# Patient Record
Sex: MALE | Race: WHITE | NOT HISPANIC OR LATINO | Employment: OTHER | URBAN - METROPOLITAN AREA
[De-identification: names, ages, dates, MRNs, and addresses within clinical notes are randomized per-mention and may not be internally consistent; named-entity substitution may affect disease eponyms.]

---

## 2019-08-01 ENCOUNTER — TELEPHONE (OUTPATIENT)
Dept: SCHEDULING | Facility: IMAGING CENTER | Age: 69
End: 2019-08-01

## 2019-08-07 ENCOUNTER — OFFICE VISIT (OUTPATIENT)
Dept: MEDICAL GROUP | Age: 69
End: 2019-08-07
Payer: MEDICARE

## 2019-08-07 VITALS
WEIGHT: 138.4 LBS | SYSTOLIC BLOOD PRESSURE: 118 MMHG | BODY MASS INDEX: 23.06 KG/M2 | DIASTOLIC BLOOD PRESSURE: 76 MMHG | HEART RATE: 82 BPM | OXYGEN SATURATION: 95 % | HEIGHT: 65 IN | TEMPERATURE: 98.5 F

## 2019-08-07 DIAGNOSIS — Z11.59 NEED FOR HEPATITIS C SCREENING TEST: ICD-10-CM

## 2019-08-07 DIAGNOSIS — J43.8 OTHER EMPHYSEMA (HCC): ICD-10-CM

## 2019-08-07 DIAGNOSIS — I10 ESSENTIAL HYPERTENSION: ICD-10-CM

## 2019-08-07 DIAGNOSIS — F17.210 CIGARETTE NICOTINE DEPENDENCE, UNCOMPLICATED: ICD-10-CM

## 2019-08-07 DIAGNOSIS — Z12.2 ENCOUNTER FOR SCREENING FOR MALIGNANT NEOPLASM OF LUNG: ICD-10-CM

## 2019-08-07 DIAGNOSIS — Z12.11 SCREENING FOR COLON CANCER: ICD-10-CM

## 2019-08-07 DIAGNOSIS — E78.5 DYSLIPIDEMIA: ICD-10-CM

## 2019-08-07 PROCEDURE — 99204 OFFICE O/P NEW MOD 45 MIN: CPT | Performed by: INTERNAL MEDICINE

## 2019-08-07 RX ORDER — HYDROCHLOROTHIAZIDE 25 MG/1
25 TABLET ORAL DAILY
Qty: 90 TAB | Refills: 3 | Status: SHIPPED | OUTPATIENT
Start: 2019-08-07 | End: 2020-06-29 | Stop reason: SDUPTHER

## 2019-08-07 RX ORDER — HYDROCHLOROTHIAZIDE 25 MG/1
25 TABLET ORAL DAILY
COMMUNITY
End: 2019-08-07 | Stop reason: SDUPTHER

## 2019-08-07 RX ORDER — BUPROPION HYDROCHLORIDE 75 MG/1
75 TABLET ORAL 2 TIMES DAILY
Qty: 180 TAB | Refills: 1 | Status: SHIPPED | OUTPATIENT
Start: 2019-08-07 | End: 2020-06-29

## 2019-08-07 RX ORDER — ALBUTEROL SULFATE 90 UG/1
2 AEROSOL, METERED RESPIRATORY (INHALATION) EVERY 6 HOURS PRN
Qty: 8.5 G | Refills: 3 | Status: SHIPPED | OUTPATIENT
Start: 2019-08-07 | End: 2020-11-02 | Stop reason: SDUPTHER

## 2019-08-07 RX ORDER — LISINOPRIL 20 MG/1
20 TABLET ORAL DAILY
COMMUNITY
End: 2019-08-07 | Stop reason: SDUPTHER

## 2019-08-07 RX ORDER — LISINOPRIL 20 MG/1
20 TABLET ORAL DAILY
Qty: 90 TAB | Refills: 3 | Status: SHIPPED | OUTPATIENT
Start: 2019-08-07 | End: 2020-06-29 | Stop reason: SDUPTHER

## 2019-08-07 SDOH — HEALTH STABILITY: MENTAL HEALTH: HOW OFTEN DO YOU HAVE 6 OR MORE DRINKS ON ONE OCCASION?: NEVER

## 2019-08-07 SDOH — HEALTH STABILITY: MENTAL HEALTH: HOW OFTEN DO YOU HAVE A DRINK CONTAINING ALCOHOL?: 2-3 TIMES A WEEK

## 2019-08-07 SDOH — HEALTH STABILITY: MENTAL HEALTH: HOW MANY STANDARD DRINKS CONTAINING ALCOHOL DO YOU HAVE ON A TYPICAL DAY?: 1 OR 2

## 2019-08-07 ASSESSMENT — PAIN SCALES - GENERAL: PAINLEVEL: NO PAIN

## 2019-08-07 ASSESSMENT — PATIENT HEALTH QUESTIONNAIRE - PHQ9: CLINICAL INTERPRETATION OF PHQ2 SCORE: 0

## 2019-08-07 NOTE — LETTER
[facility]  Jasmin Mead M.D.  [address]  [place] [address]  Fax: [phone]   Authorization for Release/Disclosure of   Protected Health Information   Name: ELO STARKEY : 1950 SSN: [id number]   Address: [address] [place] [address] Phone:    [phone] (home)    I authorize the entity listed below to release/disclose the PHI below to:   [facility]/Jasmin Mead M.D. and Jasmin Mead M.D.   Provider or Entity Name:  Rich Marshall MD  [place], [place]   Address   [facility], [facility], [address] [address] Phone:   [phone]    Fax:     Reason for request: continuity of care   Information to be released:    [  ] LAST COLONOSCOPY,  including any PATH REPORT and follow-up  [  ] LAST FIT/COLOGUARD RESULT [  ] LAST DEXA  [  ] LAST MAMMOGRAM  [  ] LAST PAP  [  ] LAST LABS [  ] RETINA EXAM REPORT  [XXX] IMMUNIZATION RECORDS  [XXX] Release all info      [  ] Check here and initial the line next to each item to release ALL health information INCLUDING  _____ Care and treatment for drug and / or alcohol abuse  _____ HIV testing, infection status, or AIDS  _____ Genetic Testing    DATES OF SERVICE OR TIME PERIOD TO BE DISCLOSED: _____________  I understand and acknowledge that:  * This Authorization may be revoked at any time by you in writing, except if your health information has already been used or disclosed.  * Your health information that will be used or disclosed as a result of you signing this authorization could be re-disclosed by the recipient. If this occurs, your re-disclosed health information may no longer be protected by State or Federal laws.  * You may refuse to sign this Authorization. Your refusal will not affect your ability to obtain treatment.  * This Authorization becomes effective upon signing and will  on (date) __________.      If no date is indicated, this Authorization will  one (1) year from the signature date.    Name:  Bright Shirley    Signature:   Date:     8/7/2019       PLEASE FAX REQUESTED RECORDS BACK TO: [phone]

## 2019-08-07 NOTE — PROGRESS NOTES
Faxed pt signed Cologaurd Order Requisition Form & signed Consent to release all medical info.([place], [place]). Received confirmation of completed fax transmittal. Scanned to pt's media.

## 2019-08-07 NOTE — PROGRESS NOTES
Bright Shirley is a 69 y.o. male here to establish care and the evaluation and management of:      HPI:      Essential hypertension  Chronic. Patient is on a combo pill of lisinopril 20 mg and hydrochlorothiazide 25 mg once daily. No reports of medication side effects or associated symptoms regarding hypertension. Patient's blood pressure in clinic today was 118/76. He is requesting to get a cheaper medication and states he is willing to take the pills individually.    Dyslipidemia  Patient's previous lab test showed slightly elevated cholesterol. He is not currently on any cholesterol medications. No reports of any active associated symptoms.    Need for hepatitis C screening test  Patient agrees to get hepatitis C screening, per CDC guidelines. Denies any active associated symptoms regarding this.    Screening for colon cancer  Patient has never had a colonoscopy, but he has done a stool fit test in the past which was negative. He denies personal or family history of colon cancer. Denies history of polyps.     Encounter for screening for malignant neoplasm of lung  Cigarette nicotine dependence, uncomplicated   Other emphysema (HCC)  Patient is a smoker of 1 pack per day for 45 years. He would like to be referred for lung cancer screening. He denies family history of lung cancer. Patient states he used Nicorette in the past to try to quit which seemed to help. States he tried wellbutrin many years ago and does not recall experiencing any significant medication side effects. States he has a vape pen at home which he sometimes uses if he is not smoking. States he was diagnosed with emphysema some time within the last 10 years. He is on Qvar which he does not use often. Patient notes that his father  from liver cirrhosis due to alcohol at age 48. States his father was a heavy drinker as well as a smoker.    Current medicines (including changes today)  Current Outpatient Medications   Medication Sig Dispense Refill    • NON SPECIFIED      • hydroCHLOROthiazide (HYDRODIURIL) 25 MG Tab Take 1 Tab by mouth every day. 90 Tab 3   • lisinopril (PRINIVIL) 20 MG Tab Take 1 Tab by mouth every day. 90 Tab 3   • albuterol 108 (90 Base) MCG/ACT Aero Soln inhalation aerosol Inhale 2 Puffs by mouth every 6 hours as needed. 8.5 g 3   • buPROPion (WELLBUTRIN) 75 MG Tab Take 1 Tab by mouth 2 times a day. 180 Tab 1     No current facility-administered medications for this visit.      He  has no past medical history on file.  He  has a past surgical history that includes hernia repair (Right) and orif, femur (Right).  Social History     Tobacco Use   • Smoking status: Current Every Day Smoker     Packs/day: 1.00     Years: 40.00     Pack years: 40.00     Types: Cigarettes     Start date: 1975   • Smokeless tobacco: Never Used   • Tobacco comment: vape pen & cigarettes   Substance Use Topics   • Alcohol use: Yes     Alcohol/week: 1.8 oz     Types: 1 Glasses of wine, 2 Cans of beer per week     Frequency: 2-3 times a week     Drinks per session: 1 or 2     Binge frequency: Never     Comment: weekend/social   • Drug use: Yes     Types: Marijuana     Comment: ediables     Social History     Social History Narrative   • Not on file     Family History   Problem Relation Age of Onset   • Cancer Mother         breast cancer   • Stroke Mother    • Other Father         cirrhosis form alcohol   • Stroke Sister    • Heart Disease Brother         cardiac aneurysm   • Hypertension Maternal Grandmother    • Hyperlipidemia Maternal Grandmother      No family status information on file.     Health Maintenance Topics with due status: Overdue       Topic Date Due    HEPATITIS C SCREENING 1950    IMM DTaP/Tdap/Td Vaccine 06/18/1969    COLONOSCOPY 06/18/2000    IMM ZOSTER VACCINES 06/18/2000    IMM PNEUMOCOCCAL VACCINE: 65+ Years 06/18/2015         ROS    Gen.: Denied weight change, appetite change, fatigue.  ENT: Denied sinus tenderness, nasal congestion,  "runny nose, or sore throat  CVS: Denied chest pain, palpitations, legs swelling.  Respiratory: Denied active cough, shortness of breath, wheezing.  GI: Denied abdominal pain, constipation or diarrhea.  Endocrine: Denied temperature intolerance, increased frequency of urination, polyphagia or polydipsia.  Musculoskeletal: Denied back pain or joint pain.    All other systems reviewed and are negative     Objective:     /76 (BP Location: Right arm, Patient Position: Sitting, BP Cuff Size: Adult)   Pulse 82   Temp 36.9 °C (98.5 °F) (Temporal)   Ht 1.64 m (5' 4.57\")   Wt 62.8 kg (138 lb 6.4 oz)   SpO2 95%  Body mass index is 23.34 kg/m².  Physical Exam:    Constitutional: Well nourished and Well developed, Alert, no distress.  Skin: Warm, dry, good turgor, no rashes in visible areas.  Eye: Equal, round and reactive, conjunctiva clear, lids normal.  ENMT: Lips without lesions, good dentition, oropharynx clear.  Neck: Trachea midline, no masses, no thyromegaly. No cervical or supraclavicular lymphadenopathy.  Respiratory: Unlabored respiratory effort, lungs clear to auscultation, no wheezes, no ronchi.  Cardiovascular: Normal S1, S2, no murmur, no edema.   Abdomen: Soft, non distended, non-tender, no masses, no hepatosplenomegaly. Bowel sound normal.  Extremities: No edema, no clubbing, no cyanosis.  Psych: Alert and oriented x3, normal affect and mood.      Assessment and Plan:   The following treatment plan was discussed       1. Essential hypertension  - Well-controlled on medication. Patient is doing well on lisinopril-hydrochlorothiazide pills. However, he is requesting to get these medications as individual pills to try to reduce cost.  - Recheck lab 1-2 weeks before next follow up visit.  - Reviewed the risks and benefits as well as potential side effects of medications with patient.  - Discussed to eat low-sodium diet and encouraged to do regular physical exercise.  - Recommend to monitor blood pressure " and heart rate at home.   - CBC WITH DIFFERENTIAL; Future  - Comp Metabolic Panel; Future  - hydroCHLOROthiazide (HYDRODIURIL) 25 MG Tab; Take 1 Tab by mouth every day.  Dispense: 90 Tab; Refill: 3  - lisinopril (PRINIVIL) 20 MG Tab; Take 1 Tab by mouth every day.  Dispense: 90 Tab; Refill: 3    2. Dyslipidemia  - Discussed last lab results from [place] which showed cholesterol elevated at 213. He is not currently on any cholesterol medications. He will manage this with his own efforts at this time.  - Recheck lab 1-2 weeks before next follow up visit.  - Advised to eat low fat, low carbohydrate and high fiber diet as well as do cardio physical exercise regularly.    - Comp Metabolic Panel; Future  - Lipid Profile; Future    3. Other emphysema (HCC)  - Patient states he was diagnosed with emphysema and COPD in the past. He was previously prescribed Qvar. Prescribing albuterol inhaler which he will use as needed. Advised to use his Qvar if he experiences severe emphysema symptoms. Reviewed the risks and benefits as well as potential side effects of medications with patient.   - albuterol 108 (90 Base) MCG/ACT Aero Soln inhalation aerosol; Inhale 2 Puffs by mouth every 6 hours as needed.  Dispense: 8.5 g; Refill: 3    4. Need for hepatitis C screening test  - Ordering hepatitis C screening test according to CDC guidelines.  Patient agrees with the plan.  - HEP C VIRUS ANTIBODY; Future    5. Screening for colon cancer  - Discussed options for colon cancer screening including colonoscopy and Cologuard test.  Patient agrees to get cologuard test. He understands that he is to get a colonoscopy if cologuard test is positive. He understands that he is to get cologuard test every 3 years if it is negative.  - COLOGUARD (FIT DNA)    6. Encounter for screening for malignant neoplasm of lung  - Patient would like to get referral lung cancer screening program   - REFERRAL TO LUNG CANCER SCREENING PROGRAM    7. Cigarette nicotine  dependence, uncomplicated   - Patient is interested in tobacco cessation. Prescribing wellbutrin to take 75 mg twice a day. Provided instructions on weaning off of cigarettes while on wellbutrin. Reviewed the risks and benefits as well as potential side effects of wellbutrin with patient.   - REFERRAL TO LUNG CANCER SCREENING PROGRAM  - buPROPion (WELLBUTRIN) 75 MG Tab; Take 1 Tab by mouth 2 times a day.  Dispense: 180 Tab; Refill: 1     Records requested.  Followup: Return in about 3 months (around 11/7/2019), or if symptoms worsen or fail to improve, for COPD, Hypertension, Hyperlipidemia, Lab review. sooner should new symptoms or problems arise.      Please note that this dictation was created using voice recognition software. I have made every reasonable attempt to correct obvious errors, but I expect that there may have unintended errors in text, spelling, punctuation, or grammar that I did not discover.        I, Nitin Walters (Scribe), am scribing for, and in the presence of, Jasmin Mead M.D.    Electronically signed by: Nitin Walters (Yoselinibe), 8/7/2019    IJasmin M.D. personally performed the services described in this documentation, as scribed by Nitin Walters in my presence, and it is both accurate and complete.

## 2019-08-12 ENCOUNTER — TELEPHONE (OUTPATIENT)
Dept: HEMATOLOGY ONCOLOGY | Facility: MEDICAL CENTER | Age: 69
End: 2019-08-12

## 2019-08-12 NOTE — TELEPHONE ENCOUNTER
Received referral to lung cancer screening program.  Chart review to assess for lung cancer screening program eligibility.   1. Age 55-77 yrs of age? Yes 69 y.o.  2. 30 pack year hx of smoking, or greater? Yes 1 [id number]= [id number] hx  3. Current smoker or if quit, has pt quit within last 15 yrs?Yes  Current smoker  4. Any signs or symptoms of lung cancer? None noted  5. Previous history of lung cancer? None noted  6. Chest CT within past 12 mos.? None noted  Patient does meet eligibility criteria. [facility] scheduling notified to schedule the shared decision making visit.

## 2019-09-04 ENCOUNTER — OFFICE VISIT (OUTPATIENT)
Dept: HEMATOLOGY ONCOLOGY | Facility: MEDICAL CENTER | Age: 69
End: 2019-09-04
Payer: MEDICARE

## 2019-09-04 VITALS
RESPIRATION RATE: 12 BRPM | TEMPERATURE: 98.1 F | SYSTOLIC BLOOD PRESSURE: 102 MMHG | HEIGHT: 64 IN | DIASTOLIC BLOOD PRESSURE: 60 MMHG | WEIGHT: 136.8 LBS | HEART RATE: 79 BPM | OXYGEN SATURATION: 95 % | BODY MASS INDEX: 23.35 KG/M2

## 2019-09-04 DIAGNOSIS — F17.210 CIGARETTE SMOKER: ICD-10-CM

## 2019-09-04 PROCEDURE — G0296 VISIT TO DETERM LDCT ELIG: HCPCS | Performed by: NURSE PRACTITIONER

## 2019-09-04 ASSESSMENT — ENCOUNTER SYMPTOMS
WHEEZING: 0
HEMOPTYSIS: 0
SHORTNESS OF BREATH: 1
WEIGHT LOSS: 0
SPUTUM PRODUCTION: 1
COUGH: 1

## 2019-09-04 ASSESSMENT — PAIN SCALES - GENERAL: PAINLEVEL: NO PAIN

## 2019-09-04 NOTE — PROGRESS NOTES
"Subjective:      Bright Shirley is a 69 y.o. male who presents for Lung Cancer Screening Program Prescreen for lung cancer screening shared decision making visit.       HPI   Patient seen today for initial lung cancer screening visit. Patient referred by PCP, Dr. Jasmin Mead.     The patient meets eligibility criteria including age, smoking history (30+ pack years), if former smoker, quit in the last 15 years, and absence of signs or symptoms of lung cancer.    - Age - 69  - Smoking history - Patient has smoked for 40 years at an average of 1 ppd = 40 pack year smoking history.  - Current smoking status - current smoker  - No symptoms of lung cancer and no previous history of lung cancer        Allergies   Allergen Reactions   • Seasonal          Current Outpatient Medications on File Prior to Visit   Medication Sig Dispense Refill   • NON SPECIFIED      • hydroCHLOROthiazide (HYDRODIURIL) 25 MG Tab Take 1 Tab by mouth every day. 90 Tab 3   • lisinopril (PRINIVIL) 20 MG Tab Take 1 Tab by mouth every day. 90 Tab 3   • albuterol 108 (90 Base) MCG/ACT Aero Soln inhalation aerosol Inhale 2 Puffs by mouth every 6 hours as needed. (Patient not taking: Reported on 9/4/2019) 8.5 g 3   • buPROPion (WELLBUTRIN) 75 MG Tab Take 1 Tab by mouth 2 times a day. (Patient not taking: Reported on 9/4/2019) 180 Tab 1     No current facility-administered medications on file prior to visit.           Review of Systems   Constitutional: Positive for malaise/fatigue (a bit more over the past few years - especially sicne moving to altitude). Negative for weight loss.   Respiratory: Positive for cough, sputum production (clear) and shortness of breath (with exertion). Negative for hemoptysis and wheezing.           Objective:     /60 (BP Location: Left arm, Patient Position: Sitting, BP Cuff Size: Adult)   Pulse 79   Temp 36.7 °C (98.1 °F) (Temporal)   Resp 12   Ht 1.626 m (5' 4\")   Wt 62.1 kg (136 lb 12.7 oz)   SpO2 95%  "  BMI 23.48 kg/m²      Physical Exam   Constitutional: He is oriented to person, place, and time. He appears well-developed and well-nourished. No distress.   Cardiovascular: Normal rate, regular rhythm and normal heart sounds. Exam reveals no gallop and no friction rub.   No murmur heard.  Pulmonary/Chest: Effort normal and breath sounds normal. No respiratory distress. He has no wheezes.   Musculoskeletal: Normal range of motion.   Neurological: He is alert and oriented to person, place, and time.   Skin: Skin is warm and dry. He is not diaphoretic.   Vitals reviewed.         Assessment/Plan:     1. Cigarette smoker  CT-LUNG CANCER-SCREENING       We conducted a shared decision-making process using a decision aid. We reviewed benefits and harms of screening, including false positives and potential need for additional diagnostic testing, the possibility of over diagnosis, and total radiation exposure.    We discussed the importance of adhering to annual LDCT screening. We also discussed the impact of comorbities on the patient's the ability or willingness to undergo diagnostic procedure(s) and treatment.    Counseling on the importance of maintaining cigarette smoking abstinence if former smoker; or the importance of smoking cessation if current smoker and, if appropriate, furnishing of information about tobacco cessation interventions. I provided patient with smoking cessation materials and resources within [facility] and the community. Patient appreciative of the resources.     Based on our discussion, we have decided to begin annual lung cancer screening starting now.

## 2019-11-11 DIAGNOSIS — R19.5 POSITIVE COLORECTAL CANCER SCREENING USING COLOGUARD TEST: ICD-10-CM

## 2019-11-11 NOTE — PROGRESS NOTES
Patient has positive test on recent Cologuard result done on 11/4/2019.  I received the Cologuard result today.  I placed the urgent referral to gastroenterology to do further evaluation with colonoscopy.  The result message is route to Medicare assistant to call to inform patient regarding the test result and the referral.    Jasmin Mead M.D.

## 2019-11-13 ENCOUNTER — TELEPHONE (OUTPATIENT)
Dept: MEDICAL GROUP | Age: 69
End: 2019-11-13

## 2019-11-14 ENCOUNTER — OFFICE VISIT (OUTPATIENT)
Dept: MEDICAL GROUP | Age: 69
End: 2019-11-14
Payer: MEDICARE

## 2019-11-14 VITALS
DIASTOLIC BLOOD PRESSURE: 64 MMHG | WEIGHT: 137.8 LBS | BODY MASS INDEX: 22.96 KG/M2 | HEART RATE: 72 BPM | HEIGHT: 65 IN | OXYGEN SATURATION: 95 % | TEMPERATURE: 97.2 F | SYSTOLIC BLOOD PRESSURE: 112 MMHG

## 2019-11-14 DIAGNOSIS — Z23 NEED FOR VACCINATION: ICD-10-CM

## 2019-11-14 DIAGNOSIS — I10 ESSENTIAL HYPERTENSION: ICD-10-CM

## 2019-11-14 DIAGNOSIS — E78.5 DYSLIPIDEMIA: ICD-10-CM

## 2019-11-14 DIAGNOSIS — J43.8 OTHER EMPHYSEMA (HCC): ICD-10-CM

## 2019-11-14 PROCEDURE — G0009 ADMIN PNEUMOCOCCAL VACCINE: HCPCS | Performed by: INTERNAL MEDICINE

## 2019-11-14 PROCEDURE — 90670 PCV13 VACCINE IM: CPT | Performed by: INTERNAL MEDICINE

## 2019-11-14 PROCEDURE — 99214 OFFICE O/P EST MOD 30 MIN: CPT | Mod: 25 | Performed by: INTERNAL MEDICINE

## 2019-11-14 PROCEDURE — G0008 ADMIN INFLUENZA VIRUS VAC: HCPCS | Performed by: INTERNAL MEDICINE

## 2019-11-14 PROCEDURE — 90662 IIV NO PRSV INCREASED AG IM: CPT | Performed by: INTERNAL MEDICINE

## 2019-11-14 ASSESSMENT — PAIN SCALES - GENERAL: PAINLEVEL: NO PAIN

## 2019-11-14 NOTE — TELEPHONE ENCOUNTER
----- Message from Jasmin Mead M.D. sent at 11/11/2019  3:04 PM PST -----  Please call to inform patient that his recent Cologuard result is positive.  It is important to have additional work-up with colonoscopy.  I placed the urgent referral to gastroenterology for colonoscopy.  He will receive a call from referral department in 1-2 week to schedule with gastroenterology.    Jasmin Mead M.D.

## 2019-11-14 NOTE — PROGRESS NOTES
Subjective:   Bright Shirley is a 69 y.o. male here today for evaluation and management of:    Patient had positive Cologuard stool test on 11/15/19. Today I advised patient on scheduling a colonoscopy to follow up on these results. At this time he denies any gross hematochezia or abdominal pain.  The referral to GI consultant was already approved and I provide contact information of GI consultant to patient.    Essential hypertension  Chronic. Patient reports compliancy with lisinopril 20 mg QD and hydrochlorothiazide 25 mg QD and denies any associated side effects. BP in clinic today is normal at 112/64. He denies any chest pain, dizziness, or headaches. Patient did not complete blood work prior to today's appointment, however has blood work scheduled for tomorrow.    Dyslipidemia  Chronic. Patient is currently attempting to control with diet and exercise, no prior statin treatment. He is a current smoker and being treated for hypertension. Patient did not complete blood work prior to today's appointment, however has blood work scheduled for tomorrow. Today he has agreed that if lipid panel is elevated, he will fill prescription for statin.    Other emphysema (HCC)  Chronic. Patient reports emphysema diagnosis within the past 10 years. At visit on 8/07/19 I prescribed him albuterol inhaler which he has not yet filled due to financial reasons. Patient is a current smoker with history of 1 pack daily for the past 45 years. He has pending prescription from 8/07/19 for bupropion 75 mg QD to help with smoking cessation, which he has not yet filled. He has previously used Nicorette for smoking cessation. Patient was seen by Amelia GARCIA (Oncology) on 9/04/19 for annual lung cancer screening. At that time CT-lung was ordered, however patient has not completed. He denies familial history of lung cancer.     Current medicines (including changes today)  Current Outpatient Medications   Medication Sig Dispense Refill  "  • hydroCHLOROthiazide (HYDRODIURIL) 25 MG Tab Take 1 Tab by mouth every day. 90 Tab 3   • lisinopril (PRINIVIL) 20 MG Tab Take 1 Tab by mouth every day. 90 Tab 3   • albuterol 108 (90 Base) MCG/ACT Aero Soln inhalation aerosol Inhale 2 Puffs by mouth every 6 hours as needed. 8.5 g 3   • buPROPion (WELLBUTRIN) 75 MG Tab Take 1 Tab by mouth 2 times a day. 180 Tab 1   • NON SPECIFIED        No current facility-administered medications for this visit.      He  has no past medical history on file.    ROS   No chest pain, no shortness of breath, no abdominal pain       Objective:     /64 (BP Location: Left arm, Patient Position: Sitting, BP Cuff Size: Adult)   Pulse 72   Temp 36.2 °C (97.2 °F) (Temporal)   Ht 1.646 m (5' 4.8\")   Wt 62.5 kg (137 lb 12.8 oz)   SpO2 95%  Body mass index is 23.07 kg/m².   Physical Exam:  General: Alert, oriented and no acute distress.  Eye contact is good, speech goal directed, affect calm  HEENT: conjunctiva non-injected, sclera non-icteric.  Oral mucous membranes pink and moist with no lesions.  Pinna normal.   Lungs: Normal respiratory effort, clear to auscultation bilaterally with good excursion.  CV: regular rate and rhythm. No murmurs.   Abdomen: soft, non distended, nontender, Bowel sound normal.  Ext: no edema, color normal, vascularity normal, temperature normal  Musculoskeletal exam: moving all extremities freely      Assessment and Plan:   The following treatment plan was discussed:    1. Essential hypertension  - Well-controlled. Continue current regimen, lisinopril 20 mg QD and hydrochlorothiazide 25 mg QD. Reviewed the risks and benefits as well as potential side effects of medications with patient.  - Discussed to eat low-sodium diet and encouraged to do regular physical exercise.  - Recommend to monitor blood pressure and heart rate at home.   - Plan to continue to monitor with blood work, which will be ordered and reviewed by their new PCP.    2. Dyslipidemia  - " Patient did not complete blood work prior to today's appointment, however has blood work scheduled for tomorrow. If lipid panel is elevated, patient agrees to initiate statin medication. I will [facility] regarding test results and if/what medication should be initiated. I reviewed potential risks, benefits, and side effects of medication with the patient in clinic today.  - Advised to eat low fat, low carbohydrate and high fiber diet as well as do cardio physical exercise regularly.     3. Other emphysema (HCC)  - Patient was encouraged to  previously ordered albuterol inhaler. I reviewed potential risks, benefits, and side effects of this medication with the patient in clinic today. We reviewed proper use of this inhaler.  - For smoking cessation, patient is advised to initiate previously ordered bupropion  75 mg QD. I reviewed potential risks, benefits, and side effects of this medication with the patient in clinic today. We had discussion of triggers to smoke and reasons to quit.  Counseled patient for smoking cessation today. Discussed stepped-down approach and support groups available to assist with smoking cessation.    4. Need for vaccination  - Patient was agreeable to receiving the influenza vaccine in clinic today after discussion of potential risks, benefits, and side effects. Vaccine was administered without adverse effects.  - Patient was agreeable to receiving the PCV-13 vaccine in clinic today after discussion of potential risks, benefits, and side effects. Vaccine was administered without adverse effects.  - INFLUENZA VACCINE, HIGH DOSE (65+ ONLY)  - Pneumococcal Conjugate Vaccine 13-Valent    5. Health Maintenance   - Patient is due for Hep C screening and it will be completed in upcoming blood test.  Patient is due for Tdap vaccine which he elects to postpone at this time.  - Patient was advised to inquire about the shingles vaccine at their local pharmacy due to the  cost of vaccine and his current health insurance. I reviewed the potential risks, benefits, and side effects with the patient.  - Patient should scheduled colonoscopy due to recent positive cologuard stool test. No acute bowel concerns. See HPI.  I provided contact information of GI consultant to patient.  I also provided patient a phone number to schedule for CT lungs cancer screening.    Follow up: Return in about 8 weeks (around 1/9/2020), or if symptoms worsen or fail to improve, for Hypertension, Hyperlipidemia, COPD, tobacco dependence, Lab review.    I, Tracey Kline (Scribe), am scribing for, and in the presence of, Jasmin Mead M.D.. Electronically signed by: Tracey Kline (Louis), 11/14/2019.  ?  I, Jasmin Mead M.D., personally performed the services described in this documentation, as scribed by Tracey Kline in my presence, and it is both accurate and complete.    Please note that this dictation was created using voice recognition software. I have made every reasonable attempt to correct obvious errors, but I expect that there may have unintended errors in text, spelling, punctuation, or grammar that I did not discover.

## 2019-11-14 NOTE — TELEPHONE ENCOUNTER
1. Caller Name: Bright Shirley                                         Call Back Number: [phone] (home)       Patient approves a detailed voicemail message: N\A    Patient called back and was informed of his positive cologuard resutls.

## 2019-11-14 NOTE — TELEPHONE ENCOUNTER
Phone Number Called: [phone] (home)     Call outcome: left message for patient to call back regarding message below    Message: LVM to call back for results

## 2019-11-18 ENCOUNTER — HOSPITAL ENCOUNTER (OUTPATIENT)
Dept: LAB | Facility: MEDICAL CENTER | Age: 69
End: 2019-11-18
Attending: INTERNAL MEDICINE
Payer: MEDICARE

## 2019-11-18 DIAGNOSIS — I10 ESSENTIAL HYPERTENSION: ICD-10-CM

## 2019-11-18 DIAGNOSIS — E78.5 DYSLIPIDEMIA: ICD-10-CM

## 2019-11-18 DIAGNOSIS — Z11.59 NEED FOR HEPATITIS C SCREENING TEST: ICD-10-CM

## 2019-11-18 LAB
ALBUMIN SERPL BCP-MCNC: 3.8 G/DL (ref 3.2–4.9)
ALBUMIN/GLOB SERPL: 1.2 G/DL
ALP SERPL-CCNC: 66 U/L (ref 30–99)
ALT SERPL-CCNC: 18 U/L (ref 2–50)
ANION GAP SERPL CALC-SCNC: 6 MMOL/L (ref 0–11.9)
AST SERPL-CCNC: 20 U/L (ref 12–45)
BASOPHILS # BLD AUTO: 1 % (ref 0–1.8)
BASOPHILS # BLD: 0.08 K/UL (ref 0–0.12)
BILIRUB SERPL-MCNC: 0.4 MG/DL (ref 0.1–1.5)
BUN SERPL-MCNC: 19 MG/DL (ref 8–22)
CALCIUM SERPL-MCNC: 9.3 MG/DL (ref 8.5–10.5)
CHLORIDE SERPL-SCNC: 101 MMOL/L (ref 96–112)
CHOLEST SERPL-MCNC: 207 MG/DL (ref 100–199)
CO2 SERPL-SCNC: 29 MMOL/L (ref 20–33)
CREAT SERPL-MCNC: 0.9 MG/DL (ref 0.5–1.4)
EOSINOPHIL # BLD AUTO: 0.15 K/UL (ref 0–0.51)
EOSINOPHIL NFR BLD: 1.8 % (ref 0–6.9)
ERYTHROCYTE [DISTWIDTH] IN BLOOD BY AUTOMATED COUNT: 42.4 FL (ref 35.9–50)
FASTING STATUS PATIENT QL REPORTED: NORMAL
GLOBULIN SER CALC-MCNC: 3.1 G/DL (ref 1.9–3.5)
GLUCOSE SERPL-MCNC: 86 MG/DL (ref 65–99)
HCT VFR BLD AUTO: 51.8 % (ref 42–52)
HCV AB SER QL: NEGATIVE
HDLC SERPL-MCNC: 50 MG/DL
HGB BLD-MCNC: 17 G/DL (ref 14–18)
IMM GRANULOCYTES # BLD AUTO: 0.03 K/UL (ref 0–0.11)
IMM GRANULOCYTES NFR BLD AUTO: 0.4 % (ref 0–0.9)
LDLC SERPL CALC-MCNC: 138 MG/DL
LYMPHOCYTES # BLD AUTO: 2.31 K/UL (ref 1–4.8)
LYMPHOCYTES NFR BLD: 28.3 % (ref 22–41)
MCH RBC QN AUTO: 29.6 PG (ref 27–33)
MCHC RBC AUTO-ENTMCNC: 32.8 G/DL (ref 33.7–35.3)
MCV RBC AUTO: 90.2 FL (ref 81.4–97.8)
MONOCYTES # BLD AUTO: 0.65 K/UL (ref 0–0.85)
MONOCYTES NFR BLD AUTO: 8 % (ref 0–13.4)
NEUTROPHILS # BLD AUTO: 4.94 K/UL (ref 1.82–7.42)
NEUTROPHILS NFR BLD: 60.5 % (ref 44–72)
NRBC # BLD AUTO: 0 K/UL
NRBC BLD-RTO: 0 /100 WBC
PLATELET # BLD AUTO: 301 K/UL (ref 164–446)
PMV BLD AUTO: 9.2 FL (ref 9–12.9)
POTASSIUM SERPL-SCNC: 4.1 MMOL/L (ref 3.6–5.5)
PROT SERPL-MCNC: 6.9 G/DL (ref 6–8.2)
RBC # BLD AUTO: 5.74 M/UL (ref 4.7–6.1)
SODIUM SERPL-SCNC: 136 MMOL/L (ref 135–145)
TRIGL SERPL-MCNC: 94 MG/DL (ref 0–149)
WBC # BLD AUTO: 8.2 K/UL (ref 4.8–10.8)

## 2019-11-18 PROCEDURE — 80061 LIPID PANEL: CPT

## 2019-11-18 PROCEDURE — 80053 COMPREHEN METABOLIC PANEL: CPT

## 2019-11-18 PROCEDURE — 85025 COMPLETE CBC W/AUTO DIFF WBC: CPT

## 2019-11-18 PROCEDURE — 36415 COLL VENOUS BLD VENIPUNCTURE: CPT

## 2019-11-18 PROCEDURE — 86803 HEPATITIS C AB TEST: CPT

## 2019-11-19 DIAGNOSIS — E78.5 DYSLIPIDEMIA: ICD-10-CM

## 2019-11-19 RX ORDER — SIMVASTATIN 10 MG
10 TABLET ORAL EVERY EVENING
Qty: 90 TAB | Refills: 3 | Status: SHIPPED | OUTPATIENT
Start: 2019-11-19 | End: 2020-06-29 | Stop reason: SDUPTHER

## 2019-11-19 NOTE — PROGRESS NOTES
Recent blood tests results on 11/18/2019 showed high total cholesterol and LDL cholesterol. The 10-year ASCVD risk score ([place] ERICK Jr., et al., 2013) is: 19.8%  Patient has high ASCVD score, has hypertension and has smoking history.  So I recommend him to start statin.  He agreed to prescribe statin during last office visit on 11/14/2019 if his recent cholesterol levels are high.  I prescribed simvastatin 10 mg to take 1 tablet every evening to [facility] pharmacy today.  I also inform patient with the result note through my chart today.  Please see result note for discussion.    Jasmin Mead M.D.

## 2020-06-29 ENCOUNTER — OFFICE VISIT (OUTPATIENT)
Dept: MEDICAL GROUP | Facility: MEDICAL CENTER | Age: 70
End: 2020-06-29
Payer: MEDICARE

## 2020-06-29 VITALS
WEIGHT: 146 LBS | SYSTOLIC BLOOD PRESSURE: 110 MMHG | HEART RATE: 75 BPM | OXYGEN SATURATION: 95 % | DIASTOLIC BLOOD PRESSURE: 70 MMHG | HEIGHT: 64 IN | RESPIRATION RATE: 16 BRPM | BODY MASS INDEX: 24.92 KG/M2 | TEMPERATURE: 97.7 F

## 2020-06-29 DIAGNOSIS — E78.5 DYSLIPIDEMIA: ICD-10-CM

## 2020-06-29 DIAGNOSIS — Z00.00 HEALTHCARE MAINTENANCE: ICD-10-CM

## 2020-06-29 DIAGNOSIS — F17.210 CIGARETTE NICOTINE DEPENDENCE, UNCOMPLICATED: ICD-10-CM

## 2020-06-29 DIAGNOSIS — I10 ESSENTIAL HYPERTENSION: ICD-10-CM

## 2020-06-29 DIAGNOSIS — J43.8 OTHER EMPHYSEMA (HCC): ICD-10-CM

## 2020-06-29 DIAGNOSIS — Z12.11 COLON CANCER SCREENING: ICD-10-CM

## 2020-06-29 DIAGNOSIS — M25.552 LEFT HIP PAIN: ICD-10-CM

## 2020-06-29 PROCEDURE — 99204 OFFICE O/P NEW MOD 45 MIN: CPT | Performed by: FAMILY MEDICINE

## 2020-06-29 RX ORDER — LISINOPRIL 20 MG/1
20 TABLET ORAL DAILY
Qty: 90 TAB | Refills: 3 | Status: SHIPPED | OUTPATIENT
Start: 2020-06-29 | End: 2020-11-02 | Stop reason: SDUPTHER

## 2020-06-29 RX ORDER — HYDROCHLOROTHIAZIDE 25 MG/1
25 TABLET ORAL DAILY
Qty: 90 TAB | Refills: 3 | Status: SHIPPED | OUTPATIENT
Start: 2020-06-29 | End: 2021-05-14

## 2020-06-29 RX ORDER — SIMVASTATIN 10 MG
10 TABLET ORAL EVERY EVENING
Qty: 90 TAB | Refills: 3 | Status: SHIPPED | OUTPATIENT
Start: 2020-06-29 | End: 2021-05-17 | Stop reason: SDUPTHER

## 2020-06-29 SDOH — ECONOMIC STABILITY: TRANSPORTATION INSECURITY
IN THE PAST 12 MONTHS, HAS THE LACK OF TRANSPORTATION KEPT YOU FROM MEDICAL APPOINTMENTS OR FROM GETTING MEDICATIONS?: NO

## 2020-06-29 SDOH — ECONOMIC STABILITY: FOOD INSECURITY: WITHIN THE PAST 12 MONTHS, THE FOOD YOU BOUGHT JUST DIDN'T LAST AND YOU DIDN'T HAVE MONEY TO GET MORE.: NEVER TRUE

## 2020-06-29 SDOH — ECONOMIC STABILITY: HOUSING INSECURITY
IN THE LAST 12 MONTHS, WAS THERE A TIME WHEN YOU DID NOT HAVE A STEADY PLACE TO SLEEP OR SLEPT IN A SHELTER (INCLUDING NOW)?: NO

## 2020-06-29 SDOH — SOCIAL STABILITY: SOCIAL NETWORK
DO YOU BELONG TO ANY CLUBS OR ORGANIZATIONS SUCH AS CHURCH GROUPS UNIONS, FRATERNAL OR ATHLETIC GROUPS, OR SCHOOL GROUPS?: NO

## 2020-06-29 SDOH — ECONOMIC STABILITY: INCOME INSECURITY: HOW HARD IS IT FOR YOU TO PAY FOR THE VERY BASICS LIKE FOOD, HOUSING, MEDICAL CARE, AND HEATING?: NOT VERY HARD

## 2020-06-29 SDOH — ECONOMIC STABILITY: INCOME INSECURITY: IN THE LAST 12 MONTHS, WAS THERE A TIME WHEN YOU WERE NOT ABLE TO PAY THE MORTGAGE OR RENT ON TIME?: NO

## 2020-06-29 SDOH — ECONOMIC STABILITY: TRANSPORTATION INSECURITY
IN THE PAST 12 MONTHS, HAS LACK OF TRANSPORTATION KEPT YOU FROM MEETINGS, WORK, OR FROM GETTING THINGS NEEDED FOR DAILY LIVING?: NO

## 2020-06-29 SDOH — ECONOMIC STABILITY: FOOD INSECURITY: WITHIN THE PAST 12 MONTHS, YOU WORRIED THAT YOUR FOOD WOULD RUN OUT BEFORE YOU GOT MONEY TO BUY MORE.: NEVER TRUE

## 2020-06-29 SDOH — HEALTH STABILITY: MENTAL HEALTH
STRESS IS WHEN SOMEONE FEELS TENSE, NERVOUS, ANXIOUS, OR CAN'T SLEEP AT NIGHT BECAUSE THEIR MIND IS TROUBLED. HOW STRESSED ARE YOU?: ONLY A LITTLE

## 2020-06-29 SDOH — HEALTH STABILITY: MENTAL HEALTH: HOW OFTEN DO YOU HAVE A DRINK CONTAINING ALCOHOL?: 2-4 TIMES A MONTH

## 2020-06-29 SDOH — SOCIAL STABILITY: SOCIAL NETWORK: ARE YOU MARRIED, WIDOWED, DIVORCED, SEPARATED, NEVER MARRIED, OR LIVING WITH A PARTNER?: SEPARATED

## 2020-06-29 SDOH — SOCIAL STABILITY: SOCIAL NETWORK: HOW OFTEN DO YOU GET TOGETHER WITH FRIENDS OR RELATIVES?: ONCE A WEEK

## 2020-06-29 SDOH — HEALTH STABILITY: PHYSICAL HEALTH: ON AVERAGE, HOW MANY DAYS PER WEEK DO YOU ENGAGE IN MODERATE TO STRENUOUS EXERCISE (LIKE A BRISK WALK)?: 0 DAYS

## 2020-06-29 SDOH — HEALTH STABILITY: PHYSICAL HEALTH: ON AVERAGE, HOW MANY MINUTES DO YOU ENGAGE IN EXERCISE AT THIS LEVEL?: 0 MINUTES

## 2020-06-29 SDOH — HEALTH STABILITY: PHYSICAL HEALTH: ON AVERAGE, HOW MANY MINUTES DO YOU ENGAGE IN EXERCISE AT THIS LEVEL?: 0 MIN

## 2020-06-29 SDOH — SOCIAL STABILITY: SOCIAL NETWORK: IN A TYPICAL WEEK, HOW MANY TIMES DO YOU TALK ON THE PHONE WITH FAMILY, FRIENDS, OR NEIGHBORS?: THREE TIMES A WEEK

## 2020-06-29 SDOH — SOCIAL STABILITY: SOCIAL NETWORK: HOW OFTEN DO YOU ATTENT MEETINGS OF THE CLUB OR ORGANIZATION YOU BELONG TO?: NEVER

## 2020-06-29 SDOH — ECONOMIC STABILITY: HOUSING INSECURITY

## 2020-06-29 SDOH — SOCIAL STABILITY: SOCIAL NETWORK: HOW OFTEN DO YOU ATTEND CHURCH OR RELIGIOUS SERVICES?: NEVER

## 2020-06-29 SDOH — ECONOMIC STABILITY: TRANSPORTATION INSECURITY
IN THE PAST 12 MONTHS, HAS LACK OF RELIABLE TRANSPORTATION KEPT YOU FROM MEDICAL APPOINTMENTS, MEETINGS, WORK OR FROM GETTING THINGS NEEDED FOR DAILY LIVING?: NO

## 2020-06-29 ASSESSMENT — SOCIAL DETERMINANTS OF HEALTH (SDOH)
HOW HARD IS IT FOR YOU TO PAY FOR THE VERY BASICS LIKE FOOD, HOUSING, MEDICAL CARE, AND HEATING?: NOT VERY HARD
HOW MANY DRINKS CONTAINING ALCOHOL DO YOU HAVE ON A TYPICAL DAY WHEN YOU ARE DRINKING: 1 OR 2
IN A TYPICAL WEEK, HOW MANY TIMES DO YOU TALK ON THE PHONE WITH FAMILY, FRIENDS, OR NEIGHBORS?: THREE TIMES A WEEK
DO YOU BELONG TO ANY CLUBS OR ORGANIZATIONS SUCH AS CHURCH GROUPS UNIONS, FRATERNAL OR ATHLETIC GROUPS, OR SCHOOL GROUPS?: NO
WITHIN THE PAST 12 MONTHS, THE FOOD YOU BOUGHT JUST DIDN'T LAST AND YOU DIDN'T HAVE MONEY TO GET MORE: NEVER TRUE
HOW OFTEN DO YOU HAVE SIX OR MORE DRINKS ON ONE OCCASION: NEVER
HOW OFTEN DO YOU ATTEND CHURCH OR RELIGIOUS SERVICES?: NEVER
HOW OFTEN DO YOU GET TOGETHER WITH FRIENDS OR RELATIVES?: ONCE A WEEK
WITHIN THE PAST 12 MONTHS, YOU WORRIED THAT YOUR FOOD WOULD RUN OUT BEFORE YOU GOT THE MONEY TO BUY MORE: NEVER TRUE
HOW OFTEN DO YOU ATTENT MEETINGS OF THE CLUB OR ORGANIZATION YOU BELONG TO?: NEVER
HOW OFTEN DO YOU HAVE A DRINK CONTAINING ALCOHOL: 2-4 TIMES A MONTH

## 2020-06-29 ASSESSMENT — FIBROSIS 4 INDEX: FIB4 SCORE: 1.1

## 2020-06-29 ASSESSMENT — PATIENT HEALTH QUESTIONNAIRE - PHQ9: CLINICAL INTERPRETATION OF PHQ2 SCORE: 0

## 2020-06-29 NOTE — PROGRESS NOTES
CC: New patient: Hypertension, hyperlipidemia, COPD, smoking, left hip pain    HPI:  Bright presents today to establish new PCP.    Patient has been active and independent with all ADLs.  Has the following chronic medical issues:     Essential hypertension  Has been adequately controlled on current medication. Denies headache, chest pain, and SOB.patient has been on hydrochlorothiazide 25 mg daily, lisinopril 20 mg daily.  No side effects    Dyslipidemia  He has been tolerating the statin. Denies muscle pain LFTs has been normal, he is currently on simvastatin 10 mg daily.  No stable diabetes, CAD, or stroke    Other emphysema (HCC)/tobacco dependence  Patient is currently asymptomatic denies any cough or shortness of breath.  He gets symptoms once in a while, he has been on albuterol as needed. Patient smokes about a pack a day for more than 40 years.  Discussed smoking cessation, patient is not ready    Left hip pain  Patient has been experiencing left hip pain.  He has history of right hip fracture, and he is concerned about that his right leg is shorter than the left leg and thus causing the pain on the left hip.  The pain is mainly on the left hip and knee it radiates to anterior left thigh.  Sometimes it wakes him from sleeping, pain scale is 3-5 over 10, dull aching pain(annoying pain).    Patient is due for colonoscopy.  Referral placed  Will discuss vaccination next visit.        Patient Active Problem List    Diagnosis Date Noted   • Dyslipidemia 08/07/2019   • Other emphysema (HCC) 08/07/2019   • Cigarette nicotine dependence, uncomplicated  08/07/2019   • Essential hypertension 01/10/2011       Current Outpatient Medications   Medication Sig Dispense Refill   • simvastatin (ZOCOR) 10 MG Tab Take 1 Tab by mouth every evening. 90 Tab 3   • hydroCHLOROthiazide (HYDRODIURIL) 25 MG Tab Take 1 Tab by mouth every day. 90 Tab 3   • lisinopril (PRINIVIL) 20 MG Tab Take 1 Tab by mouth every day. 90 Tab 3   •  albuterol 108 (90 Base) MCG/ACT Aero Soln inhalation aerosol Inhale 2 Puffs by mouth every 6 hours as needed. 8.5 g 3   • NON SPECIFIED        No current facility-administered medications for this visit.          Allergies as of 06/29/2020 - Reviewed 06/29/2020   Allergen Reaction Noted   • Seasonal  08/07/2019        Social History     Socioeconomic History   • Marital status: Single     Spouse name: Not on file   • Number of children: Not on file   • Years of education: Not on file   • Highest education level: Bachelor's degree (e.g., BA, AB, BS)   Occupational History   • Not on file   Social Needs   • Financial resource strain: Not very hard   • Food insecurity     Worry: Never true     Inability: Never true   • Transportation needs     Medical: No     Non-medical: No   Tobacco Use   • Smoking status: Current Every Day Smoker     Packs/day: 1.00     Years: 40.00     Pack years: 40.00     Types: Cigarettes     Start date: 1975   • Smokeless tobacco: Never Used   • Tobacco comment: vape pen & cigarettes   Substance and Sexual Activity   • Alcohol use: Yes     Alcohol/week: 1.8 oz     Types: 1 Glasses of wine, 2 Cans of beer per week     Frequency: 2-4 times a month     Drinks per session: 1 or 2     Binge frequency: Never     Comment: weekend/social   • Drug use: Not Currently     Types: Marijuana     Comment: ediables   • Sexual activity: Not Currently   Lifestyle   • Physical activity     Days per week: 0 days     Minutes per session: 0 min   • Stress: Only a little   Relationships   • Social connections     Talks on phone: Three times a week     Gets together: Once a week     Attends Rastafari service: Never     Active member of club or organization: No     Attends meetings of clubs or organizations: Never     Relationship status:    • Intimate partner violence     Fear of current or ex partner: Not on file     Emotionally abused: Not on file     Physically abused: Not on file     Forced sexual  "activity: Not on file   Other Topics Concern   • Not on file   Social History Narrative   • Not on file       Family History   Problem Relation Age of Onset   • Cancer Mother         breast cancer   • Stroke Mother    • Other Father         cirrhosis form alcohol   • Stroke Sister    • Heart Disease Brother         cardiac aneurysm   • Hypertension Maternal Grandmother    • Hyperlipidemia Maternal Grandmother        Past Surgical History:   Procedure Laterality Date   • HERNIA REPAIR Right    • ORIF, FEMUR Right     15 years ago       ROS:  Denies any Headache, Blurred Vision, Confusion Chest pain,  Shortness of breath,  Abdominal pain, Changes of bowel or bladder, Lower ext edema, Fevers, Nights sweats, Weight Changes, Focal weakness or numbness.  All other systems are negative.    /70 (BP Location: Right arm, Patient Position: Sitting, BP Cuff Size: Adult)   Pulse 75   Temp 36.5 °C (97.7 °F) (Temporal)   Resp 16   Ht 1.626 m (5' 4\")   Wt 66.2 kg (146 lb)   SpO2 95%   BMI 25.06 kg/m²     Physical Exam:  Gen:         Alert and oriented, No apparent distress.  HEENT:   Perrla, TM clear,  Oralpharynx no erythema or exudates.  Neck:       No Jugular venous distension, Lymphadenopathy, Thyromegaly, Bruits.  Lungs:     Clear to auscultation bilaterally  CV:          Regular rate and rhythm. No murmurs, rubs or gallops.  Abd:         Soft non tender, non distended. Normal active bowel sounds. No                                        Hepatosplenomegaly, No pulsatile masses.  Ext:          No clubbing, cyanosis, edema.      Assessment and Plan.   70 y.o. male     1. Essential hypertension  Controlled.  Continue on hydrochlorothiazide 25 mg daily, lisinopril 20 mg daily.    - CBC WITH DIFFERENTIAL; Future  - Comp Metabolic Panel; Future  - Lipid Profile; Future  - TSH; Future    2. Dyslipidemia  He has been tolerating the statin. Denies muscle pain LFTs has been normal  Continue simvastatin 10 mg daily.    - " Lipid Profile; Future    3. Other emphysema (HCC)  Currently asymptomatic.  Continue on albuterol as needed.  Smoking cessation discussed, patient is not ready    4. Cigarette nicotine dependence, uncomplicated   Patient smokes about a pack a day for more than 40 years, has a history of COPD which is mild, has been on albuterol as needed.  Discussed smoking cessation, patient is not ready    5. Healthcare maintenance  Patient is due for colonoscopy.  Referral placed  Will discuss vaccination next visit.    6. Colon cancer screening    - REFERRAL TO GI FOR COLONOSCOPY    7. Left hip pain  Possible osteoarthritis, however patient has history of right hip fracture, and he is concerned about that his right leg is shorter than the left leg and thus causing the pain on the left hip.  Will refer patient to be evaluated by an orthopedist.  - REFERRAL TO ORTHOPEDICS

## 2020-08-04 ENCOUNTER — HOSPITAL ENCOUNTER (OUTPATIENT)
Dept: LAB | Facility: MEDICAL CENTER | Age: 70
End: 2020-08-04
Attending: FAMILY MEDICINE
Payer: MEDICARE

## 2020-08-04 DIAGNOSIS — I10 ESSENTIAL HYPERTENSION: ICD-10-CM

## 2020-08-04 DIAGNOSIS — E78.5 DYSLIPIDEMIA: ICD-10-CM

## 2020-08-04 LAB
ALBUMIN SERPL BCP-MCNC: 4.1 G/DL (ref 3.2–4.9)
ALBUMIN/GLOB SERPL: 1.3 G/DL
ALP SERPL-CCNC: 74 U/L (ref 30–99)
ALT SERPL-CCNC: 21 U/L (ref 2–50)
ANION GAP SERPL CALC-SCNC: 12 MMOL/L (ref 7–16)
AST SERPL-CCNC: 21 U/L (ref 12–45)
BASOPHILS # BLD AUTO: 0.9 % (ref 0–1.8)
BASOPHILS # BLD: 0.09 K/UL (ref 0–0.12)
BILIRUB SERPL-MCNC: 0.2 MG/DL (ref 0.1–1.5)
BUN SERPL-MCNC: 19 MG/DL (ref 8–22)
CALCIUM SERPL-MCNC: 9.3 MG/DL (ref 8.4–10.2)
CHLORIDE SERPL-SCNC: 102 MMOL/L (ref 96–112)
CHOLEST SERPL-MCNC: 168 MG/DL (ref 100–199)
CO2 SERPL-SCNC: 24 MMOL/L (ref 20–33)
CREAT SERPL-MCNC: 0.92 MG/DL (ref 0.5–1.4)
EOSINOPHIL # BLD AUTO: 0.24 K/UL (ref 0–0.51)
EOSINOPHIL NFR BLD: 2.4 % (ref 0–6.9)
ERYTHROCYTE [DISTWIDTH] IN BLOOD BY AUTOMATED COUNT: 40.4 FL (ref 35.9–50)
FASTING STATUS PATIENT QL REPORTED: NORMAL
GLOBULIN SER CALC-MCNC: 3.1 G/DL (ref 1.9–3.5)
GLUCOSE SERPL-MCNC: 91 MG/DL (ref 65–99)
HCT VFR BLD AUTO: 46.3 % (ref 42–52)
HDLC SERPL-MCNC: 51 MG/DL
HGB BLD-MCNC: 15.5 G/DL (ref 14–18)
IMM GRANULOCYTES # BLD AUTO: 0.04 K/UL (ref 0–0.11)
IMM GRANULOCYTES NFR BLD AUTO: 0.4 % (ref 0–0.9)
LDLC SERPL CALC-MCNC: 94 MG/DL
LYMPHOCYTES # BLD AUTO: 3.55 K/UL (ref 1–4.8)
LYMPHOCYTES NFR BLD: 35.6 % (ref 22–41)
MCH RBC QN AUTO: 29.4 PG (ref 27–33)
MCHC RBC AUTO-ENTMCNC: 33.5 G/DL (ref 33.7–35.3)
MCV RBC AUTO: 87.9 FL (ref 81.4–97.8)
MONOCYTES # BLD AUTO: 0.67 K/UL (ref 0–0.85)
MONOCYTES NFR BLD AUTO: 6.7 % (ref 0–13.4)
NEUTROPHILS # BLD AUTO: 5.37 K/UL (ref 1.82–7.42)
NEUTROPHILS NFR BLD: 54 % (ref 44–72)
NRBC # BLD AUTO: 0 K/UL
NRBC BLD-RTO: 0 /100 WBC
PLATELET # BLD AUTO: 289 K/UL (ref 164–446)
PMV BLD AUTO: 8.9 FL (ref 9–12.9)
POTASSIUM SERPL-SCNC: 4 MMOL/L (ref 3.6–5.5)
PROT SERPL-MCNC: 7.2 G/DL (ref 6–8.2)
RBC # BLD AUTO: 5.27 M/UL (ref 4.7–6.1)
SODIUM SERPL-SCNC: 138 MMOL/L (ref 135–145)
TRIGL SERPL-MCNC: 116 MG/DL (ref 0–149)
TSH SERPL DL<=0.005 MIU/L-ACNC: 2.14 UIU/ML (ref 0.38–5.33)
WBC # BLD AUTO: 10 K/UL (ref 4.8–10.8)

## 2020-08-04 PROCEDURE — 85025 COMPLETE CBC W/AUTO DIFF WBC: CPT

## 2020-08-04 PROCEDURE — 84443 ASSAY THYROID STIM HORMONE: CPT

## 2020-08-04 PROCEDURE — 36415 COLL VENOUS BLD VENIPUNCTURE: CPT

## 2020-08-04 PROCEDURE — 80061 LIPID PANEL: CPT

## 2020-08-04 PROCEDURE — 80053 COMPREHEN METABOLIC PANEL: CPT

## 2020-11-02 ENCOUNTER — OFFICE VISIT (OUTPATIENT)
Dept: MEDICAL GROUP | Facility: MEDICAL CENTER | Age: 70
End: 2020-11-02
Payer: MEDICARE

## 2020-11-02 VITALS
HEART RATE: 82 BPM | BODY MASS INDEX: 24.84 KG/M2 | DIASTOLIC BLOOD PRESSURE: 70 MMHG | OXYGEN SATURATION: 90 % | SYSTOLIC BLOOD PRESSURE: 140 MMHG | HEIGHT: 64 IN | TEMPERATURE: 97.5 F | WEIGHT: 145.5 LBS | RESPIRATION RATE: 16 BRPM

## 2020-11-02 DIAGNOSIS — I10 ESSENTIAL HYPERTENSION: ICD-10-CM

## 2020-11-02 DIAGNOSIS — R21 SKIN RASH: ICD-10-CM

## 2020-11-02 DIAGNOSIS — J43.8 OTHER EMPHYSEMA (HCC): ICD-10-CM

## 2020-11-02 DIAGNOSIS — E78.5 DYSLIPIDEMIA: ICD-10-CM

## 2020-11-02 DIAGNOSIS — Z23 NEED FOR VACCINATION: ICD-10-CM

## 2020-11-02 PROCEDURE — 99214 OFFICE O/P EST MOD 30 MIN: CPT | Mod: 25 | Performed by: FAMILY MEDICINE

## 2020-11-02 PROCEDURE — G0008 ADMIN INFLUENZA VIRUS VAC: HCPCS | Performed by: FAMILY MEDICINE

## 2020-11-02 PROCEDURE — 90662 IIV NO PRSV INCREASED AG IM: CPT | Performed by: FAMILY MEDICINE

## 2020-11-02 PROCEDURE — 90472 IMMUNIZATION ADMIN EACH ADD: CPT | Performed by: FAMILY MEDICINE

## 2020-11-02 PROCEDURE — 90715 TDAP VACCINE 7 YRS/> IM: CPT | Performed by: FAMILY MEDICINE

## 2020-11-02 RX ORDER — TRIAMCINOLONE ACETONIDE 1 MG/G
1 CREAM TOPICAL 2 TIMES DAILY
Qty: 45 G | Refills: 0 | Status: ON HOLD | OUTPATIENT
Start: 2020-11-02 | End: 2021-06-11

## 2020-11-02 RX ORDER — LISINOPRIL 20 MG/1
20 TABLET ORAL DAILY
Qty: 90 TAB | Refills: 3 | Status: SHIPPED | OUTPATIENT
Start: 2020-11-02 | End: 2021-05-17 | Stop reason: SDUPTHER

## 2020-11-02 RX ORDER — ALBUTEROL SULFATE 90 UG/1
2 AEROSOL, METERED RESPIRATORY (INHALATION) EVERY 6 HOURS PRN
Qty: 8.5 G | Refills: 3 | Status: SHIPPED | OUTPATIENT
Start: 2020-11-02

## 2020-11-02 ASSESSMENT — FIBROSIS 4 INDEX: FIB4 SCORE: 1.11

## 2020-11-02 NOTE — PROGRESS NOTES
CC: Hypertension, hyperlipidemia, emphysema, skin rash, vaccinations    HPI:   Bright presents today to discuss the following medical issues:    Essential hypertension  Has been adequately controlled on current medication. Denies headache, chest pain, and SOB.Has been on lisinopril 20 mg daily    Dyslipidemia  He has been tolerating the statin. Denies muscle pain LFTs has been normal, has been on simvastatin 10 mg daily    Other emphysema (HCC)  Currently denies any cough or shortness of breath.  Has normal oxygen saturation.  Has been on albuterol as needed, needs refill.    Skin rash  Has erythematous macular rash on the lateral aspect of the upper right thigh, it is itchy, has been there for 2 weeks.  No other similar rash on his body.    Due for Tdap and flu shot  Due for shingles vaccine, not available.    Patient Active Problem List    Diagnosis Date Noted   • Dyslipidemia 08/07/2019   • Other emphysema (HCC) 08/07/2019   • Cigarette nicotine dependence, uncomplicated  08/07/2019   • Essential hypertension 01/10/2011       Current Outpatient Medications   Medication Sig Dispense Refill   • triamcinolone acetonide (KENALOG) 0.1 % Cream Apply 1 Application to affected area(s) 2 times a day. 45 g 0   • albuterol 108 (90 Base) MCG/ACT Aero Soln inhalation aerosol Inhale 2 Puffs by mouth every 6 hours as needed. 8.5 g 3   • lisinopril (PRINIVIL) 20 MG Tab Take 1 Tab by mouth every day. 90 Tab 3   • hydroCHLOROthiazide (HYDRODIURIL) 25 MG Tab Take 1 Tab by mouth every day. 90 Tab 3   • simvastatin (ZOCOR) 10 MG Tab Take 1 Tab by mouth every evening. 90 Tab 3   • NON SPECIFIED        No current facility-administered medications for this visit.          Allergies as of 11/02/2020 - Reviewed 06/29/2020   Allergen Reaction Noted   • Seasonal  08/07/2019        ROS: Denies any chest pain, Shortness of breath, Changes bowel or bladder, Lower extremity edema.    Physical Exam:  /70 (BP Location: Right arm, Patient  "Position: Sitting, BP Cuff Size: Adult)   Pulse 82   Temp 36.4 °C (97.5 °F) (Temporal)   Resp 16   Ht 1.626 m (5' 4\")   Wt 66 kg (145 lb 8.1 oz)   SpO2 90%   BMI 24.98 kg/m²   Gen.: Well-developed, well-nourished, no apparent distress,pleasant and cooperative with the examination  Skin:  Warm and dry with good turgor.  Erythematous macular upper right thigh, size 3 x 3 cm  HEENT:Sinuses nontender with palpation, TMs clear, nares patent with pink mucosa and clear rhinorrhea,no septal deviation ,polyps or lesions. lips without lesions, oropharynx clear.  Neck: Trachea midline,no masses or adenopathy. No JVD.  Cor: Regular rate and rhythm without murmur, gallop or rub.  Lungs: Respirations unlabored.Clear to auscultation with equal breath sounds bilaterally. No wheezes, rhonchi.  Extremities: No cyanosis, clubbing or edema.        Assessment and Plan.   70 y.o. male     1. Essential hypertension  Controlled.  Continue lisinopril 20 mg daily  - lisinopril (PRINIVIL) 20 MG Tab; Take 1 Tab by mouth every day.  Dispense: 90 Tab; Refill: 3    2. Dyslipidemia  He has been tolerating the statin. Denies muscle pain LFTs has been normal  Continue simvastatin 10 mg daily    3. Other emphysema (HCC)  Stable.  Continue on albuterol as needed    - albuterol 108 (90 Base) MCG/ACT Aero Soln inhalation aerosol; Inhale 2 Puffs by mouth every 6 hours as needed.  Dispense: 8.5 g; Refill: 3    4. Need for vaccination  Due for Tdap and flu shot  Due for shingles vaccine, not available.    - Tdap =>8yo IM  - INFLUENZA VACCINE, HIGH DOSE (65+ ONLY)    5. Skin rash  Possibly localized allergic reaction.    - triamcinolone acetonide (KENALOG) 0.1 % Cream; Apply 1 Application to affected area(s) 2 times a day.  Dispense: 45 g; Refill: 0      "

## 2021-01-15 DIAGNOSIS — Z23 NEED FOR VACCINATION: ICD-10-CM

## 2021-02-17 ENCOUNTER — IMMUNIZATION (OUTPATIENT)
Dept: FAMILY PLANNING/WOMEN'S HEALTH CLINIC | Facility: IMMUNIZATION CENTER | Age: 71
End: 2021-02-17
Attending: INTERNAL MEDICINE
Payer: MEDICARE

## 2021-02-17 DIAGNOSIS — Z23 ENCOUNTER FOR VACCINATION: Primary | ICD-10-CM

## 2021-02-17 DIAGNOSIS — Z23 NEED FOR VACCINATION: ICD-10-CM

## 2021-02-17 PROCEDURE — 0001A PFIZER SARS-COV-2 VACCINE: CPT

## 2021-02-17 PROCEDURE — 91300 PFIZER SARS-COV-2 VACCINE: CPT

## 2021-03-10 ENCOUNTER — IMMUNIZATION (OUTPATIENT)
Dept: FAMILY PLANNING/WOMEN'S HEALTH CLINIC | Facility: IMMUNIZATION CENTER | Age: 71
End: 2021-03-10
Attending: INTERNAL MEDICINE
Payer: MEDICARE

## 2021-03-10 DIAGNOSIS — Z23 ENCOUNTER FOR VACCINATION: Primary | ICD-10-CM

## 2021-03-10 PROCEDURE — 0002A PFIZER SARS-COV-2 VACCINE: CPT | Performed by: NURSE PRACTITIONER

## 2021-03-10 PROCEDURE — 91300 PFIZER SARS-COV-2 VACCINE: CPT | Performed by: NURSE PRACTITIONER

## 2021-05-13 DIAGNOSIS — I10 ESSENTIAL HYPERTENSION: ICD-10-CM

## 2021-05-14 ENCOUNTER — PATIENT MESSAGE (OUTPATIENT)
Dept: HEALTH INFORMATION MANAGEMENT | Facility: OTHER | Age: 71
End: 2021-05-14

## 2021-05-14 RX ORDER — HYDROCHLOROTHIAZIDE 25 MG/1
TABLET ORAL
Qty: 90 TABLET | Refills: 0 | Status: ON HOLD | OUTPATIENT
Start: 2021-05-14 | End: 2021-06-11

## 2021-05-17 DIAGNOSIS — I10 ESSENTIAL HYPERTENSION: ICD-10-CM

## 2021-05-17 DIAGNOSIS — E78.5 DYSLIPIDEMIA: ICD-10-CM

## 2021-05-17 NOTE — TELEPHONE ENCOUNTER
Pharmacy states sig is missing for refill instructions, and requesting 100 day supply per insurance

## 2021-05-18 RX ORDER — LISINOPRIL 20 MG/1
20 TABLET ORAL DAILY
Qty: 90 TABLET | Refills: 3 | Status: ON HOLD | OUTPATIENT
Start: 2021-05-18 | End: 2021-06-11

## 2021-05-18 RX ORDER — SIMVASTATIN 10 MG
10 TABLET ORAL EVERY EVENING
Qty: 90 TABLET | Refills: 3 | Status: SHIPPED | OUTPATIENT
Start: 2021-05-18 | End: 2021-12-06 | Stop reason: SDUPTHER

## 2021-06-06 ENCOUNTER — APPOINTMENT (OUTPATIENT)
Dept: RADIOLOGY | Facility: MEDICAL CENTER | Age: 71
DRG: 480 | End: 2021-06-06
Attending: EMERGENCY MEDICINE
Payer: MEDICARE

## 2021-06-06 ENCOUNTER — ANESTHESIA (OUTPATIENT)
Dept: SURGERY | Facility: MEDICAL CENTER | Age: 71
DRG: 480 | End: 2021-06-06
Payer: MEDICARE

## 2021-06-06 ENCOUNTER — HOSPITAL ENCOUNTER (INPATIENT)
Facility: MEDICAL CENTER | Age: 71
LOS: 5 days | DRG: 480 | End: 2021-06-11
Attending: EMERGENCY MEDICINE | Admitting: STUDENT IN AN ORGANIZED HEALTH CARE EDUCATION/TRAINING PROGRAM
Payer: MEDICARE

## 2021-06-06 ENCOUNTER — APPOINTMENT (OUTPATIENT)
Dept: RADIOLOGY | Facility: MEDICAL CENTER | Age: 71
DRG: 480 | End: 2021-06-06
Attending: ORTHOPAEDIC SURGERY
Payer: MEDICARE

## 2021-06-06 ENCOUNTER — ANESTHESIA EVENT (OUTPATIENT)
Dept: SURGERY | Facility: MEDICAL CENTER | Age: 71
DRG: 480 | End: 2021-06-06
Payer: MEDICARE

## 2021-06-06 DIAGNOSIS — R55 NEAR SYNCOPE: ICD-10-CM

## 2021-06-06 DIAGNOSIS — S72.102A CLOSED FRACTURE OF TROCHANTER OF LEFT FEMUR, INITIAL ENCOUNTER (HCC): ICD-10-CM

## 2021-06-06 DIAGNOSIS — E11.9 NEW ONSET TYPE 2 DIABETES MELLITUS (HCC): ICD-10-CM

## 2021-06-06 DIAGNOSIS — R06.02 SOB (SHORTNESS OF BREATH): ICD-10-CM

## 2021-06-06 DIAGNOSIS — E27.9 LESION OF ADRENAL GLAND (HCC): ICD-10-CM

## 2021-06-06 DIAGNOSIS — S72.145A CLOSED NONDISPLACED INTERTROCHANTERIC FRACTURE OF LEFT FEMUR, INITIAL ENCOUNTER (HCC): ICD-10-CM

## 2021-06-06 PROBLEM — S72.143A INTERTROCHANTERIC FRACTURE OF FEMUR (HCC): Status: ACTIVE | Noted: 2021-06-06

## 2021-06-06 LAB
ALBUMIN SERPL BCP-MCNC: 4 G/DL (ref 3.2–4.9)
ALBUMIN/GLOB SERPL: 1.3 G/DL
ALP SERPL-CCNC: 81 U/L (ref 30–99)
ALT SERPL-CCNC: 16 U/L (ref 2–50)
ANION GAP SERPL CALC-SCNC: 14 MMOL/L (ref 7–16)
AST SERPL-CCNC: 19 U/L (ref 12–45)
BASOPHILS # BLD AUTO: 0.1 % (ref 0–1.8)
BASOPHILS # BLD: 0.02 K/UL (ref 0–0.12)
BILIRUB SERPL-MCNC: 0.5 MG/DL (ref 0.1–1.5)
BUN SERPL-MCNC: 28 MG/DL (ref 8–22)
CALCIUM SERPL-MCNC: 8.7 MG/DL (ref 8.5–10.5)
CHLORIDE SERPL-SCNC: 93 MMOL/L (ref 96–112)
CO2 SERPL-SCNC: 22 MMOL/L (ref 20–33)
CREAT SERPL-MCNC: 1.07 MG/DL (ref 0.5–1.4)
D DIMER PPP IA.FEU-MCNC: >20 UG/ML (FEU) (ref 0–0.5)
EKG IMPRESSION: NORMAL
EOSINOPHIL # BLD AUTO: 0 K/UL (ref 0–0.51)
EOSINOPHIL NFR BLD: 0 % (ref 0–6.9)
ERYTHROCYTE [DISTWIDTH] IN BLOOD BY AUTOMATED COUNT: 40.8 FL (ref 35.9–50)
EST. AVERAGE GLUCOSE BLD GHB EST-MCNC: 111 MG/DL
FLUAV RNA SPEC QL NAA+PROBE: NEGATIVE
FLUBV RNA SPEC QL NAA+PROBE: NEGATIVE
GLOBULIN SER CALC-MCNC: 3 G/DL (ref 1.9–3.5)
GLUCOSE SERPL-MCNC: 238 MG/DL (ref 65–99)
HBA1C MFR BLD: 5.5 % (ref 4–5.6)
HCT VFR BLD AUTO: 43.3 % (ref 42–52)
HGB BLD-MCNC: 14.3 G/DL (ref 14–18)
IMM GRANULOCYTES # BLD AUTO: 0.06 K/UL (ref 0–0.11)
IMM GRANULOCYTES NFR BLD AUTO: 0.4 % (ref 0–0.9)
LIPASE SERPL-CCNC: 20 U/L (ref 11–82)
LYMPHOCYTES # BLD AUTO: 1.54 K/UL (ref 1–4.8)
LYMPHOCYTES NFR BLD: 10.1 % (ref 22–41)
MCH RBC QN AUTO: 29.7 PG (ref 27–33)
MCHC RBC AUTO-ENTMCNC: 33 G/DL (ref 33.7–35.3)
MCV RBC AUTO: 89.8 FL (ref 81.4–97.8)
MONOCYTES # BLD AUTO: 0.85 K/UL (ref 0–0.85)
MONOCYTES NFR BLD AUTO: 5.6 % (ref 0–13.4)
NEUTROPHILS # BLD AUTO: 12.78 K/UL (ref 1.82–7.42)
NEUTROPHILS NFR BLD: 83.8 % (ref 44–72)
NRBC # BLD AUTO: 0 K/UL
NRBC BLD-RTO: 0 /100 WBC
PLATELET # BLD AUTO: 264 K/UL (ref 164–446)
PMV BLD AUTO: 9.4 FL (ref 9–12.9)
POTASSIUM SERPL-SCNC: 4.1 MMOL/L (ref 3.6–5.5)
PROT SERPL-MCNC: 7 G/DL (ref 6–8.2)
RBC # BLD AUTO: 4.82 M/UL (ref 4.7–6.1)
RSV RNA SPEC QL NAA+PROBE: NEGATIVE
SARS-COV-2 RNA RESP QL NAA+PROBE: NOTDETECTED
SODIUM SERPL-SCNC: 129 MMOL/L (ref 135–145)
SPECIMEN SOURCE: NORMAL
TROPONIN T SERPL-MCNC: <6 NG/L (ref 6–19)
WBC # BLD AUTO: 15.3 K/UL (ref 4.8–10.8)

## 2021-06-06 PROCEDURE — 0241U HCHG SARS-COV-2 COVID-19 NFCT DS RESP RNA 4 TRGT MIC: CPT

## 2021-06-06 PROCEDURE — 73700 CT LOWER EXTREMITY W/O DYE: CPT | Mod: LT,MG

## 2021-06-06 PROCEDURE — 160035 HCHG PACU - 1ST 60 MINS PHASE I: Performed by: ORTHOPAEDIC SURGERY

## 2021-06-06 PROCEDURE — 83036 HEMOGLOBIN GLYCOSYLATED A1C: CPT

## 2021-06-06 PROCEDURE — 700117 HCHG RX CONTRAST REV CODE 255: Performed by: EMERGENCY MEDICINE

## 2021-06-06 PROCEDURE — 160036 HCHG PACU - EA ADDL 30 MINS PHASE I: Performed by: ORTHOPAEDIC SURGERY

## 2021-06-06 PROCEDURE — 160009 HCHG ANES TIME/MIN: Performed by: ORTHOPAEDIC SURGERY

## 2021-06-06 PROCEDURE — 160041 HCHG SURGERY MINUTES - EA ADDL 1 MIN LEVEL 4: Performed by: ORTHOPAEDIC SURGERY

## 2021-06-06 PROCEDURE — 71045 X-RAY EXAM CHEST 1 VIEW: CPT

## 2021-06-06 PROCEDURE — 700101 HCHG RX REV CODE 250: Performed by: ANESTHESIOLOGY

## 2021-06-06 PROCEDURE — 85025 COMPLETE CBC W/AUTO DIFF WBC: CPT

## 2021-06-06 PROCEDURE — 160002 HCHG RECOVERY MINUTES (STAT): Performed by: ORTHOPAEDIC SURGERY

## 2021-06-06 PROCEDURE — 700105 HCHG RX REV CODE 258: Performed by: ANESTHESIOLOGY

## 2021-06-06 PROCEDURE — 93005 ELECTROCARDIOGRAM TRACING: CPT

## 2021-06-06 PROCEDURE — 84484 ASSAY OF TROPONIN QUANT: CPT

## 2021-06-06 PROCEDURE — C9803 HOPD COVID-19 SPEC COLLECT: HCPCS | Performed by: EMERGENCY MEDICINE

## 2021-06-06 PROCEDURE — A9270 NON-COVERED ITEM OR SERVICE: HCPCS | Performed by: STUDENT IN AN ORGANIZED HEALTH CARE EDUCATION/TRAINING PROGRAM

## 2021-06-06 PROCEDURE — 83690 ASSAY OF LIPASE: CPT

## 2021-06-06 PROCEDURE — 72190 X-RAY EXAM OF PELVIS: CPT

## 2021-06-06 PROCEDURE — 93005 ELECTROCARDIOGRAM TRACING: CPT | Performed by: EMERGENCY MEDICINE

## 2021-06-06 PROCEDURE — 0QS734Z REPOSITION LEFT UPPER FEMUR WITH INTERNAL FIXATION DEVICE, PERCUTANEOUS APPROACH: ICD-10-PCS | Performed by: ORTHOPAEDIC SURGERY

## 2021-06-06 PROCEDURE — 80053 COMPREHEN METABOLIC PANEL: CPT

## 2021-06-06 PROCEDURE — 700111 HCHG RX REV CODE 636 W/ 250 OVERRIDE (IP): Performed by: ANESTHESIOLOGY

## 2021-06-06 PROCEDURE — 501838 HCHG SUTURE GENERAL: Performed by: ORTHOPAEDIC SURGERY

## 2021-06-06 PROCEDURE — 71275 CT ANGIOGRAPHY CHEST: CPT | Mod: ME

## 2021-06-06 PROCEDURE — 3E0R3BZ INTRODUCTION OF ANESTHETIC AGENT INTO SPINAL CANAL, PERCUTANEOUS APPROACH: ICD-10-PCS | Performed by: ANESTHESIOLOGY

## 2021-06-06 PROCEDURE — 160029 HCHG SURGERY MINUTES - 1ST 30 MINS LEVEL 4: Performed by: ORTHOPAEDIC SURGERY

## 2021-06-06 PROCEDURE — 85379 FIBRIN DEGRADATION QUANT: CPT

## 2021-06-06 PROCEDURE — 96374 THER/PROPH/DIAG INJ IV PUSH: CPT

## 2021-06-06 PROCEDURE — 36415 COLL VENOUS BLD VENIPUNCTURE: CPT

## 2021-06-06 PROCEDURE — 70450 CT HEAD/BRAIN W/O DYE: CPT | Mod: ME

## 2021-06-06 PROCEDURE — 770006 HCHG ROOM/CARE - MED/SURG/GYN SEMI*

## 2021-06-06 PROCEDURE — 700111 HCHG RX REV CODE 636 W/ 250 OVERRIDE (IP): Performed by: EMERGENCY MEDICINE

## 2021-06-06 PROCEDURE — 160048 HCHG OR STATISTICAL LEVEL 1-5: Performed by: ORTHOPAEDIC SURGERY

## 2021-06-06 PROCEDURE — 99285 EMERGENCY DEPT VISIT HI MDM: CPT

## 2021-06-06 PROCEDURE — 700111 HCHG RX REV CODE 636 W/ 250 OVERRIDE (IP): Mod: JG

## 2021-06-06 PROCEDURE — P9045 ALBUMIN (HUMAN), 5%, 250 ML: HCPCS | Mod: JG

## 2021-06-06 PROCEDURE — 700105 HCHG RX REV CODE 258: Performed by: STUDENT IN AN ORGANIZED HEALTH CARE EDUCATION/TRAINING PROGRAM

## 2021-06-06 PROCEDURE — C1713 ANCHOR/SCREW BN/BN,TIS/BN: HCPCS | Performed by: ORTHOPAEDIC SURGERY

## 2021-06-06 PROCEDURE — 99223 1ST HOSP IP/OBS HIGH 75: CPT | Mod: AI | Performed by: STUDENT IN AN ORGANIZED HEALTH CARE EDUCATION/TRAINING PROGRAM

## 2021-06-06 PROCEDURE — 500891 HCHG PACK, ORTHO MAJOR: Performed by: ORTHOPAEDIC SURGERY

## 2021-06-06 PROCEDURE — 73502 X-RAY EXAM HIP UNI 2-3 VIEWS: CPT | Mod: LT

## 2021-06-06 PROCEDURE — 700102 HCHG RX REV CODE 250 W/ 637 OVERRIDE(OP): Performed by: STUDENT IN AN ORGANIZED HEALTH CARE EDUCATION/TRAINING PROGRAM

## 2021-06-06 DEVICE — SCREW CORTEX 4.5X38 ST OIC - (T=8): Type: IMPLANTABLE DEVICE | Site: HIP | Status: FUNCTIONAL

## 2021-06-06 RX ORDER — AMOXICILLIN 250 MG
2 CAPSULE ORAL 2 TIMES DAILY
Status: DISCONTINUED | OUTPATIENT
Start: 2021-06-06 | End: 2021-06-11 | Stop reason: HOSPADM

## 2021-06-06 RX ORDER — SODIUM CHLORIDE, SODIUM GLUCONATE, SODIUM ACETATE, POTASSIUM CHLORIDE AND MAGNESIUM CHLORIDE 526; 502; 368; 37; 30 MG/100ML; MG/100ML; MG/100ML; MG/100ML; MG/100ML
INJECTION, SOLUTION INTRAVENOUS
Status: DISCONTINUED | OUTPATIENT
Start: 2021-06-06 | End: 2021-06-06 | Stop reason: SURG

## 2021-06-06 RX ORDER — KETOROLAC TROMETHAMINE 30 MG/ML
INJECTION, SOLUTION INTRAMUSCULAR; INTRAVENOUS PRN
Status: DISCONTINUED | OUTPATIENT
Start: 2021-06-06 | End: 2021-06-06 | Stop reason: SURG

## 2021-06-06 RX ORDER — HYDROMORPHONE HYDROCHLORIDE 1 MG/ML
1 INJECTION, SOLUTION INTRAMUSCULAR; INTRAVENOUS; SUBCUTANEOUS
Status: DISCONTINUED | OUTPATIENT
Start: 2021-06-06 | End: 2021-06-06 | Stop reason: HOSPADM

## 2021-06-06 RX ORDER — BUPIVACAINE HYDROCHLORIDE 2.5 MG/ML
INJECTION, SOLUTION EPIDURAL; INFILTRATION; INTRACAUDAL PRN
Status: DISCONTINUED | OUTPATIENT
Start: 2021-06-06 | End: 2021-06-06 | Stop reason: SURG

## 2021-06-06 RX ORDER — ONDANSETRON 4 MG/1
4 TABLET, ORALLY DISINTEGRATING ORAL EVERY 4 HOURS PRN
Status: DISCONTINUED | OUTPATIENT
Start: 2021-06-06 | End: 2021-06-11 | Stop reason: HOSPADM

## 2021-06-06 RX ORDER — LISINOPRIL 10 MG/1
20 TABLET ORAL DAILY
Status: DISCONTINUED | OUTPATIENT
Start: 2021-06-06 | End: 2021-06-07

## 2021-06-06 RX ORDER — ALBUMIN, HUMAN INJ 5% 5 %
SOLUTION INTRAVENOUS
Status: COMPLETED
Start: 2021-06-06 | End: 2021-06-06

## 2021-06-06 RX ORDER — LIDOCAINE HYDROCHLORIDE 20 MG/ML
INJECTION, SOLUTION EPIDURAL; INFILTRATION; INTRACAUDAL; PERINEURAL PRN
Status: DISCONTINUED | OUTPATIENT
Start: 2021-06-06 | End: 2021-06-06 | Stop reason: SURG

## 2021-06-06 RX ORDER — POLYETHYLENE GLYCOL 3350 17 G/17G
1 POWDER, FOR SOLUTION ORAL
Status: DISCONTINUED | OUTPATIENT
Start: 2021-06-06 | End: 2021-06-11 | Stop reason: HOSPADM

## 2021-06-06 RX ORDER — ONDANSETRON 2 MG/ML
INJECTION INTRAMUSCULAR; INTRAVENOUS PRN
Status: DISCONTINUED | OUTPATIENT
Start: 2021-06-06 | End: 2021-06-06 | Stop reason: SURG

## 2021-06-06 RX ORDER — MIDAZOLAM HYDROCHLORIDE 1 MG/ML
INJECTION INTRAMUSCULAR; INTRAVENOUS PRN
Status: DISCONTINUED | OUTPATIENT
Start: 2021-06-06 | End: 2021-06-06 | Stop reason: SURG

## 2021-06-06 RX ORDER — CALCIUM CHLORIDE 100 MG/ML
INJECTION INTRAVENOUS; INTRAVENTRICULAR PRN
Status: DISCONTINUED | OUTPATIENT
Start: 2021-06-06 | End: 2021-06-06 | Stop reason: SURG

## 2021-06-06 RX ORDER — DIPHENHYDRAMINE HYDROCHLORIDE 50 MG/ML
12.5 INJECTION INTRAMUSCULAR; INTRAVENOUS
Status: DISCONTINUED | OUTPATIENT
Start: 2021-06-06 | End: 2021-06-06 | Stop reason: HOSPADM

## 2021-06-06 RX ORDER — LIDOCAINE HYDROCHLORIDE 40 MG/ML
SOLUTION TOPICAL PRN
Status: DISCONTINUED | OUTPATIENT
Start: 2021-06-06 | End: 2021-06-06 | Stop reason: SURG

## 2021-06-06 RX ORDER — SODIUM CHLORIDE, SODIUM GLUCONATE, SODIUM ACETATE, POTASSIUM CHLORIDE AND MAGNESIUM CHLORIDE 526; 502; 368; 37; 30 MG/100ML; MG/100ML; MG/100ML; MG/100ML; MG/100ML
500 INJECTION, SOLUTION INTRAVENOUS CONTINUOUS
Status: DISCONTINUED | OUTPATIENT
Start: 2021-06-06 | End: 2021-06-06 | Stop reason: HOSPADM

## 2021-06-06 RX ORDER — LABETALOL HYDROCHLORIDE 5 MG/ML
10 INJECTION, SOLUTION INTRAVENOUS EVERY 4 HOURS PRN
Status: DISCONTINUED | OUTPATIENT
Start: 2021-06-06 | End: 2021-06-11 | Stop reason: HOSPADM

## 2021-06-06 RX ORDER — ALBUMIN, HUMAN INJ 5% 5 %
250 SOLUTION INTRAVENOUS ONCE
Status: DISCONTINUED | OUTPATIENT
Start: 2021-06-06 | End: 2021-06-06

## 2021-06-06 RX ORDER — HETASTARCH 6 G/100ML
INJECTION, SOLUTION INTRAVENOUS PRN
Status: DISCONTINUED | OUTPATIENT
Start: 2021-06-06 | End: 2021-06-06 | Stop reason: SURG

## 2021-06-06 RX ORDER — NICOTINE 21 MG/24HR
14 PATCH, TRANSDERMAL 24 HOURS TRANSDERMAL
Status: DISCONTINUED | OUTPATIENT
Start: 2021-06-06 | End: 2021-06-11 | Stop reason: HOSPADM

## 2021-06-06 RX ORDER — SODIUM CHLORIDE, SODIUM LACTATE, POTASSIUM CHLORIDE, CALCIUM CHLORIDE 600; 310; 30; 20 MG/100ML; MG/100ML; MG/100ML; MG/100ML
INJECTION, SOLUTION INTRAVENOUS
Status: DISCONTINUED | OUTPATIENT
Start: 2021-06-06 | End: 2021-06-06 | Stop reason: SURG

## 2021-06-06 RX ORDER — ONDANSETRON 2 MG/ML
4 INJECTION INTRAMUSCULAR; INTRAVENOUS EVERY 4 HOURS PRN
Status: DISCONTINUED | OUTPATIENT
Start: 2021-06-06 | End: 2021-06-11 | Stop reason: HOSPADM

## 2021-06-06 RX ORDER — ACETAMINOPHEN 500 MG
1000 TABLET ORAL EVERY 6 HOURS PRN
Status: ON HOLD | COMMUNITY
End: 2021-06-11

## 2021-06-06 RX ORDER — HALOPERIDOL 5 MG/ML
1 INJECTION INTRAMUSCULAR
Status: DISCONTINUED | OUTPATIENT
Start: 2021-06-06 | End: 2021-06-06 | Stop reason: HOSPADM

## 2021-06-06 RX ORDER — MEPERIDINE HYDROCHLORIDE 25 MG/ML
12.5 INJECTION INTRAMUSCULAR; INTRAVENOUS; SUBCUTANEOUS
Status: DISCONTINUED | OUTPATIENT
Start: 2021-06-06 | End: 2021-06-06 | Stop reason: HOSPADM

## 2021-06-06 RX ORDER — MIDAZOLAM HYDROCHLORIDE 1 MG/ML
1 INJECTION INTRAMUSCULAR; INTRAVENOUS
Status: DISCONTINUED | OUTPATIENT
Start: 2021-06-06 | End: 2021-06-06 | Stop reason: HOSPADM

## 2021-06-06 RX ORDER — SODIUM CHLORIDE 9 MG/ML
INJECTION, SOLUTION INTRAVENOUS CONTINUOUS
Status: DISCONTINUED | OUTPATIENT
Start: 2021-06-06 | End: 2021-06-10

## 2021-06-06 RX ORDER — PHENYLEPHRINE HCL IN 0.9% NACL 0.5 MG/5ML
SYRINGE (ML) INTRAVENOUS PRN
Status: DISCONTINUED | OUTPATIENT
Start: 2021-06-06 | End: 2021-06-06 | Stop reason: SURG

## 2021-06-06 RX ORDER — ENALAPRILAT 1.25 MG/ML
1.25 INJECTION INTRAVENOUS EVERY 6 HOURS PRN
Status: DISCONTINUED | OUTPATIENT
Start: 2021-06-06 | End: 2021-06-11 | Stop reason: HOSPADM

## 2021-06-06 RX ORDER — OXYCODONE HYDROCHLORIDE 5 MG/1
5 TABLET ORAL
Status: DISCONTINUED | OUTPATIENT
Start: 2021-06-06 | End: 2021-06-11 | Stop reason: HOSPADM

## 2021-06-06 RX ORDER — BISACODYL 10 MG
10 SUPPOSITORY, RECTAL RECTAL
Status: DISCONTINUED | OUTPATIENT
Start: 2021-06-06 | End: 2021-06-11 | Stop reason: HOSPADM

## 2021-06-06 RX ORDER — ROCURONIUM BROMIDE 10 MG/ML
INJECTION, SOLUTION INTRAVENOUS PRN
Status: DISCONTINUED | OUTPATIENT
Start: 2021-06-06 | End: 2021-06-06 | Stop reason: SURG

## 2021-06-06 RX ORDER — SIMVASTATIN 20 MG
10 TABLET ORAL EVERY EVENING
Status: DISCONTINUED | OUTPATIENT
Start: 2021-06-06 | End: 2021-06-11 | Stop reason: HOSPADM

## 2021-06-06 RX ORDER — OXYCODONE HCL 5 MG/5 ML
10 SOLUTION, ORAL ORAL
Status: DISCONTINUED | OUTPATIENT
Start: 2021-06-06 | End: 2021-06-06 | Stop reason: HOSPADM

## 2021-06-06 RX ORDER — HYDROMORPHONE HYDROCHLORIDE 1 MG/ML
0.2 INJECTION, SOLUTION INTRAMUSCULAR; INTRAVENOUS; SUBCUTANEOUS
Status: DISCONTINUED | OUTPATIENT
Start: 2021-06-06 | End: 2021-06-06 | Stop reason: HOSPADM

## 2021-06-06 RX ORDER — ONDANSETRON 2 MG/ML
4 INJECTION INTRAMUSCULAR; INTRAVENOUS
Status: DISCONTINUED | OUTPATIENT
Start: 2021-06-06 | End: 2021-06-06 | Stop reason: HOSPADM

## 2021-06-06 RX ORDER — ACETAMINOPHEN 325 MG/1
650 TABLET ORAL EVERY 6 HOURS PRN
Status: DISCONTINUED | OUTPATIENT
Start: 2021-06-06 | End: 2021-06-11 | Stop reason: HOSPADM

## 2021-06-06 RX ORDER — CEFAZOLIN SODIUM 1 G/3ML
INJECTION, POWDER, FOR SOLUTION INTRAMUSCULAR; INTRAVENOUS PRN
Status: DISCONTINUED | OUTPATIENT
Start: 2021-06-06 | End: 2021-06-06 | Stop reason: SURG

## 2021-06-06 RX ORDER — OXYCODONE HCL 5 MG/5 ML
5 SOLUTION, ORAL ORAL
Status: DISCONTINUED | OUTPATIENT
Start: 2021-06-06 | End: 2021-06-06 | Stop reason: HOSPADM

## 2021-06-06 RX ORDER — MORPHINE SULFATE 0.5 MG/ML
INJECTION, SOLUTION EPIDURAL; INTRATHECAL; INTRAVENOUS PRN
Status: DISCONTINUED | OUTPATIENT
Start: 2021-06-06 | End: 2021-06-06 | Stop reason: SURG

## 2021-06-06 RX ORDER — HYDROMORPHONE HYDROCHLORIDE 1 MG/ML
1 INJECTION, SOLUTION INTRAMUSCULAR; INTRAVENOUS; SUBCUTANEOUS ONCE
Status: COMPLETED | OUTPATIENT
Start: 2021-06-06 | End: 2021-06-06

## 2021-06-06 RX ORDER — IPRATROPIUM BROMIDE AND ALBUTEROL SULFATE 2.5; .5 MG/3ML; MG/3ML
3 SOLUTION RESPIRATORY (INHALATION)
Status: DISCONTINUED | OUTPATIENT
Start: 2021-06-06 | End: 2021-06-06 | Stop reason: HOSPADM

## 2021-06-06 RX ORDER — HYDROCHLOROTHIAZIDE 25 MG/1
25 TABLET ORAL
Status: DISCONTINUED | OUTPATIENT
Start: 2021-06-06 | End: 2021-06-07

## 2021-06-06 RX ORDER — ALBUTEROL SULFATE 90 UG/1
2 AEROSOL, METERED RESPIRATORY (INHALATION) EVERY 6 HOURS PRN
Status: DISCONTINUED | OUTPATIENT
Start: 2021-06-06 | End: 2021-06-06

## 2021-06-06 RX ORDER — ALBUMIN, HUMAN INJ 5% 5 %
12.5 SOLUTION INTRAVENOUS ONCE
Status: COMPLETED | OUTPATIENT
Start: 2021-06-06 | End: 2021-06-06

## 2021-06-06 RX ORDER — HYDROMORPHONE HYDROCHLORIDE 1 MG/ML
0.4 INJECTION, SOLUTION INTRAMUSCULAR; INTRAVENOUS; SUBCUTANEOUS
Status: DISCONTINUED | OUTPATIENT
Start: 2021-06-06 | End: 2021-06-06 | Stop reason: HOSPADM

## 2021-06-06 RX ORDER — HYDROMORPHONE HYDROCHLORIDE 1 MG/ML
0.25 INJECTION, SOLUTION INTRAMUSCULAR; INTRAVENOUS; SUBCUTANEOUS
Status: DISCONTINUED | OUTPATIENT
Start: 2021-06-06 | End: 2021-06-11 | Stop reason: HOSPADM

## 2021-06-06 RX ORDER — IBUPROFEN 200 MG
400 TABLET ORAL EVERY 6 HOURS PRN
Status: ON HOLD | COMMUNITY
End: 2021-06-11

## 2021-06-06 RX ORDER — OXYCODONE HYDROCHLORIDE 5 MG/1
2.5 TABLET ORAL
Status: DISCONTINUED | OUTPATIENT
Start: 2021-06-06 | End: 2021-06-11 | Stop reason: HOSPADM

## 2021-06-06 RX ADMIN — OXYCODONE 5 MG: 5 TABLET ORAL at 13:18

## 2021-06-06 RX ADMIN — SODIUM CHLORIDE, SODIUM GLUCONATE, SODIUM ACETATE, POTASSIUM CHLORIDE AND MAGNESIUM CHLORIDE: 526; 502; 368; 37; 30 INJECTION, SOLUTION INTRAVENOUS at 19:40

## 2021-06-06 RX ADMIN — SODIUM CHLORIDE: 9 INJECTION, SOLUTION INTRAVENOUS at 12:59

## 2021-06-06 RX ADMIN — HETASTARCH IN SODIUM CHLORIDE 500 ML: 6; .9 INJECTION, SOLUTION INTRAVENOUS at 19:40

## 2021-06-06 RX ADMIN — NICOTINE 14 MG: 14 PATCH TRANSDERMAL at 07:00

## 2021-06-06 RX ADMIN — EPHEDRINE SULFATE 10 MG: 50 INJECTION, SOLUTION INTRAVENOUS at 19:33

## 2021-06-06 RX ADMIN — PROPOFOL 100 MG: 10 INJECTION, EMULSION INTRAVENOUS at 19:15

## 2021-06-06 RX ADMIN — EPHEDRINE SULFATE 50 MG: 50 INJECTION INTRAVENOUS at 21:16

## 2021-06-06 RX ADMIN — ROCURONIUM BROMIDE 50 MG: 10 INJECTION, SOLUTION INTRAVENOUS at 19:15

## 2021-06-06 RX ADMIN — SODIUM CHLORIDE, SODIUM GLUCONATE, SODIUM ACETATE, POTASSIUM CHLORIDE AND MAGNESIUM CHLORIDE: 526; 502; 368; 37; 30 INJECTION, SOLUTION INTRAVENOUS at 20:03

## 2021-06-06 RX ADMIN — BUPIVACAINE HYDROCHLORIDE 3 ML: 2.5 INJECTION, SOLUTION EPIDURAL; INFILTRATION; INTRACAUDAL; PERINEURAL at 20:14

## 2021-06-06 RX ADMIN — LIDOCAINE HYDROCHLORIDE 4 ML: 40 SOLUTION TOPICAL at 19:15

## 2021-06-06 RX ADMIN — MORPHINE SULFATE 0.15 MG: 0.5 INJECTION, SOLUTION EPIDURAL; INTRATHECAL; INTRAVENOUS at 20:14

## 2021-06-06 RX ADMIN — Medication 200 MCG: at 19:33

## 2021-06-06 RX ADMIN — SODIUM CHLORIDE, POTASSIUM CHLORIDE, SODIUM LACTATE AND CALCIUM CHLORIDE: 600; 310; 30; 20 INJECTION, SOLUTION INTRAVENOUS at 19:07

## 2021-06-06 RX ADMIN — LIDOCAINE HYDROCHLORIDE 50 MG: 20 INJECTION, SOLUTION EPIDURAL; INFILTRATION; INTRACAUDAL at 19:15

## 2021-06-06 RX ADMIN — ALBUMIN HUMAN 12.5 G: 50 SOLUTION INTRAVENOUS at 21:18

## 2021-06-06 RX ADMIN — SUGAMMADEX 200 MG: 100 INJECTION, SOLUTION INTRAVENOUS at 20:10

## 2021-06-06 RX ADMIN — HYDROMORPHONE HYDROCHLORIDE 1 MG: 1 INJECTION, SOLUTION INTRAMUSCULAR; INTRAVENOUS; SUBCUTANEOUS at 04:01

## 2021-06-06 RX ADMIN — ONDANSETRON 4 MG: 2 INJECTION INTRAMUSCULAR; INTRAVENOUS at 20:10

## 2021-06-06 RX ADMIN — ALBUMIN, HUMAN INJ 5% 12.5 G: 5 SOLUTION at 21:18

## 2021-06-06 RX ADMIN — CEFAZOLIN 2 G: 330 INJECTION, POWDER, FOR SOLUTION INTRAMUSCULAR; INTRAVENOUS at 19:19

## 2021-06-06 RX ADMIN — CALCIUM CHLORIDE 1 G: 100 INJECTION INTRAVENOUS; INTRAVENTRICULAR at 19:35

## 2021-06-06 RX ADMIN — MIDAZOLAM HYDROCHLORIDE 2 MG: 1 INJECTION, SOLUTION INTRAMUSCULAR; INTRAVENOUS at 19:13

## 2021-06-06 RX ADMIN — OXYCODONE 2.5 MG: 5 TABLET ORAL at 10:08

## 2021-06-06 RX ADMIN — HYDROCHLOROTHIAZIDE 25 MG: 25 TABLET ORAL at 06:59

## 2021-06-06 RX ADMIN — IOHEXOL 47 ML: 350 INJECTION, SOLUTION INTRAVENOUS at 05:15

## 2021-06-06 RX ADMIN — FENTANYL CITRATE 100 MCG: 50 INJECTION, SOLUTION INTRAMUSCULAR; INTRAVENOUS at 19:29

## 2021-06-06 RX ADMIN — KETOROLAC TROMETHAMINE 15 MG: 30 INJECTION, SOLUTION INTRAMUSCULAR at 20:10

## 2021-06-06 ASSESSMENT — ENCOUNTER SYMPTOMS
NAUSEA: 0
NECK PAIN: 0
DIARRHEA: 0
BACK PAIN: 0
SORE THROAT: 0
DOUBLE VISION: 0
COUGH: 1
VOMITING: 0
INSOMNIA: 0
SPUTUM PRODUCTION: 1
BRUISES/BLEEDS EASILY: 0
BLURRED VISION: 0
FOCAL WEAKNESS: 0
FALLS: 1
WHEEZING: 0
CHILLS: 0
LOSS OF CONSCIOUSNESS: 1
DEPRESSION: 0
DIAPHORESIS: 1
CONSTIPATION: 0
SHORTNESS OF BREATH: 0
BLOOD IN STOOL: 0
PALPITATIONS: 0
HEADACHES: 0
HEARTBURN: 0
WEAKNESS: 0
FEVER: 0
ABDOMINAL PAIN: 0

## 2021-06-06 ASSESSMENT — PAIN DESCRIPTION - PAIN TYPE
TYPE: ACUTE PAIN
TYPE: ACUTE PAIN
TYPE: SURGICAL PAIN
TYPE: ACUTE PAIN
TYPE: SURGICAL PAIN
TYPE: ACUTE PAIN;SURGICAL PAIN
TYPE: ACUTE PAIN
TYPE: SURGICAL PAIN
TYPE: SURGICAL PAIN
TYPE: ACUTE PAIN

## 2021-06-06 ASSESSMENT — PAIN SCALES - GENERAL: PAIN_LEVEL: 0

## 2021-06-06 ASSESSMENT — FIBROSIS 4 INDEX: FIB4 SCORE: 1.11

## 2021-06-06 NOTE — ASSESSMENT & PLAN NOTE
Intertrochanteric femur fracture of left femur appreciated on CT scan as seen above  Pt underwent surgical treatment of left intertrochanteric femur fracture with dynamic hip screw and sideplate in PM of 6/5.  Analgesics for pain control  Pulses intact and no paresthesias associated.    Enoxaparin as VTE restarted 6/7.   PT/OT eval - pending assessment for stairs at home.     6/10/21: Pending SNF placement.

## 2021-06-06 NOTE — ASSESSMENT & PLAN NOTE
BP has been borderline low here  IVF provided  Diuretics HCTZ on hold  Lisinopril decreased to 10mg daily    6/9/21: We will continue with lisnopril. Monitor, make changes accordingly.

## 2021-06-06 NOTE — ED PROVIDER NOTES
"ED Provider Note    Scribed for Russel Mckenna M.D. by Nadeem Canela. 6/6/2021  1:35 AM    Primary care provider: Nella Ramirez M.D.  Means of arrival: EMS  History obtained from: Patient  History limited by: None    CHIEF COMPLAINT  Chief Complaint   Patient presents with   • Syncope     c/o \"blacking out\" after feeling dizzy/light headed after standing up, then had GLF, denies hitting head   • Hip Pain     left hip pain after fall, limited ROM d/t increased pain, NO shortening or rotation noted       HPI  Bright Shirley is a 70 y.o. male with a history of arthritis in his left hip who presents to the Emergency Department via EMS for moderate left hip pain following a fall that occurred around 4 PM yesterday. Per the patient, he got up from the couch in his living room when he suddenly experienced lightheadedness, causing him to pass out and fall on hist left hip. Now he is unable to bear weight on his left hip or move it. He states that this is the second time that he has fallen this week due to feeling lightheaded for the past couple of days; his first fall was 3 days ago. The patient reports additional symptoms of palpitations and cough with sputum production (no blood) onset 2-3 months ago, and denies vomiting, diarrhea, leg swelling, hemoptysis, headache, head trauma, nausea, or chest pain. No other exacerbating or alleviating factors were noted. He also denies blood thinner use, new medications, recreational drug use prior to his falls, or steroid or antibiotic use, and adds he has a history of high cholesterol, COPD, and hypertension. He says his PCP wanted him to get his left hip replaced due to his arthritis. He has never been intubated for his COPD.     REVIEW OF SYSTEMS  Pertinent positives include: syncope, left hip pain, lightheadedness, palpitations, cough with sputum production. Pertinent negatives include: vomiting, diarrhea, leg swelling, hemoptysis, headache, head trauma, nausea, or " chest pain. See history of present illness. All other systems are negative.     PAST MEDICAL HISTORY   has a past medical history of Chronic obstructive pulmonary disease (HCC), Hypercholesteremia, and Hypertension.    SURGICAL HISTORY   has a past surgical history that includes hernia repair (Right); orif, femur (Right); other orthopedic surgery; and other.    SOCIAL HISTORY  Social History     Tobacco Use   • Smoking status: Current Some Day Smoker     Packs/day: 1.00     Years: 40.00     Pack years: 40.00     Types: Cigarettes     Start date: 1975   • Smokeless tobacco: Never Used   • Tobacco comment: vape pen & cigarettes   Vaping Use   • Vaping Use: Some days   Substance Use Topics   • Alcohol use: Yes     Alcohol/week: 1.8 oz     Types: 1 Glasses of wine, 2 Cans of beer per week     Comment: weekend/social   • Drug use: Yes     Types: Marijuana     Comment: THC edible      Social History     Substance and Sexual Activity   Drug Use Yes   • Types: Marijuana    Comment: THC edible       FAMILY HISTORY  Family History   Problem Relation Age of Onset   • Cancer Mother         breast cancer   • Stroke Mother    • Other Father         cirrhosis form alcohol   • Stroke Sister    • Heart Disease Brother         cardiac aneurysm   • Hypertension Maternal Grandmother    • Hyperlipidemia Maternal Grandmother        CURRENT MEDICATIONS  Current Outpatient Medications   Medication Instructions   • albuterol 108 (90 Base) MCG/ACT Aero Soln inhalation aerosol 2 Puffs, Inhalation, EVERY 6 HOURS PRN   • hydroCHLOROthiazide (HYDRODIURIL) 25 MG Tab TAKE 1 TABLET BY MOUTH EVERY DAY   • lisinopril (PRINIVIL) 20 mg, Oral, DAILY   • NON SPECIFIED No dose, route, or frequency recorded.   • simvastatin (ZOCOR) 10 mg, Oral, EVERY EVENING   • triamcinolone acetonide (KENALOG) 0.1 % Cream 1 Application, Topical, 2 TIMES DAILY     ALLERGIES  Allergies   Allergen Reactions   • Seasonal        PHYSICAL EXAM  VITAL SIGNS: /66   Pulse  "(!) 108   Temp 36.7 °C (98.1 °F) (Oral)   Resp 20   Ht 1.702 m (5' 7\")   Wt 59.9 kg (132 lb)   SpO2 93%   BMI 20.67 kg/m²     Constitutional: Alert in no apparent distress.  HENT: No signs of trauma, Bilateral external ears normal, Nose normal. Uvula midline.   Eyes: Pupils are equal and reactive, Conjunctiva normal, Non-icteric.   Neck: Normal range of motion, No tenderness, Supple, No stridor.   Lymphatic: No lymphadenopathy noted.   Cardiovascular: Regular rate and rhythm, no murmurs.   Thorax & Lungs: Diminished breath sounds at base bilaterally, No respiratory distress, No wheezing, No chest tenderness.   Abdomen:  Soft, No tenderness, No peritoneal signs, No masses, No pulsatile masses.   Skin: Warm, Dry, No erythema, No rash.   Back: No bony tenderness, No CVA tenderness. No C/T/L spine step-offs or deformities, No C/T/L spine tenderness to palpation.  Extremities: Intact distal pulses, 2+ dp pulses, No edema, Tenderness to palpation of lateral aspect of left thigh. No cyanosis.  Musculoskeletal: Tenderness upon internal rotation of left leg. No major deformities noted.   Neurologic: Alert , Normal motor function, Normal sensory function, No focal deficits noted.   Psychiatric: Affect normal, Judgment normal, Mood normal.     DIAGNOSTIC STUDIES / PROCEDURES    LABS  Labs Reviewed   CBC WITH DIFFERENTIAL - Abnormal; Notable for the following components:       Result Value    WBC 15.3 (*)     MCHC 33.0 (*)     Neutrophils-Polys 83.80 (*)     Lymphocytes 10.10 (*)     Neutrophils (Absolute) 12.78 (*)     All other components within normal limits    Narrative:     1. Age less then 50  2. Heart reate less then 100  3. 02 sat > 94%  4. No prior history of DVT or PE  5. No recent trauma or surgery (2 weeks)  6. No hemoptisis.  7. No  or HRP  8. No un-lateral leg swelling.   COMP METABOLIC PANEL - Abnormal; Notable for the following components:    Sodium 129 (*)     Chloride 93 (*)     Glucose 238 (*)     " Bun 28 (*)     All other components within normal limits    Narrative:     1. Age less then 50  2. Heart reate less then 100  3. 02 sat > 94%  4. No prior history of DVT or PE  5. No recent trauma or surgery (2 weeks)  6. No hemoptisis.  7. No  or HRP  8. No un-lateral leg swelling.   D-DIMER - Abnormal; Notable for the following components:    D-Dimer Screen >20.00 (*)     All other components within normal limits    Narrative:     1. Age less then 50  2. Heart reate less then 100  3. 02 sat > 94%  4. No prior history of DVT or PE  5. No recent trauma or surgery (2 weeks)  6. No hemoptisis.  7. No  or HRP  8. No un-lateral leg swelling.   TROPONIN    Narrative:     1. Age less then 50  2. Heart reate less then 100  3. 02 sat > 94%  4. No prior history of DVT or PE  5. No recent trauma or surgery (2 weeks)  6. No hemoptisis.  7. No  or HRP  8. No un-lateral leg swelling.   LIPASE    Narrative:     1. Age less then 50  2. Heart reate less then 100  3. 02 sat > 94%  4. No prior history of DVT or PE  5. No recent trauma or surgery (2 weeks)  6. No hemoptisis.  7. No  or HRP  8. No un-lateral leg swelling.   ESTIMATED GFR    Narrative:     1. Age less then 50  2. Heart reate less then 100  3. 02 sat > 94%  4. No prior history of DVT or PE  5. No recent trauma or surgery (2 weeks)  6. No hemoptisis.  7. No  or HRP  8. No un-lateral leg swelling.   HEMOGLOBIN A1C   COV-2, FLU A/B, AND RSV BY PCR    Narrative:     Have you been in close contact with a person who is suspected  or known to be positive for COVID-19 within the last 30 days  (e.g. last seen that person < 30 days ago)->No      All labs reviewed by me.        RADIOLOGY  CT-CTA CHEST PULMONARY ARTERY W/ RECONS   Final Result      1.  No evidence of pulmonary emboli or other acute thoracic abnormality.   2.  Emphysema, diffuse bladder bolus formation in both lungs.   3.  Bilateral low-attenuation adrenal gland lesions.            CT-HIP W/O PLUS  RECONS LEFT   Final Result      Intertrochanteric fracture of the left femur.      CT-HEAD W/O   Final Result      1.  No acute intracranial abnormality.   2.  Age-consistent atrophy, along with chronic white matter lucencies in both cerebral hemispheres as noted above.               INTERPRETING LOCATION:  [facility] [facility], [place], [id number]      DX-PELVIS-TRAUMA SERIES  3-   Final Result      No acute bony findings.      DX-CHEST-PORTABLE (1 VIEW)   Final Result      Minimal diffuse bilateral interstitial opacities, consistent with mild pulmonary edema.        The radiologist's interpretation of all radiological studies have been reviewed by me.    COURSE & MEDICAL DECISION MAKING  Nursing notes, VS, PMSFHx reviewed in chart.    70 y.o. male p/w chief complaint of left hip pain.    1:35 AM Patient seen and examined at bedside. Discussed plans to evaluate with labs and radiology. Patient verbalizes understanding and agreement to this plan of care.     I verified that the patient was wearing a mask and I was wearing appropriate PPE every time I entered the room. The patient's mask was on the patient at all times during my encounter except for a brief view of the oropharynx.     The differential diagnoses include but are not limited to:     X-ray of hip with no evidence of fx,   Given patient is unable to bear weight and has significant tenderness to palpation of the left hip, will obtain CT-hip to evaluate for fx w/ neg XR  CT-head ordered given multiple falls for concern of tearing bridging veins and distracting injury.     Given shortness of breath and multiple syncopal episodes D-dimer ordered found to be greater than 28 therefore CT PE ordered with no evidence of pulmonary embolism however severe emphysema present.    Patient with no prior history of diabetes however found to have glucose greater than 200 here today.  Dr. Gauthier aware and plan for sliding scale insulin as inpatient    Incidental adrenal gland lesions  noted, would consider follow-up as outpatient.    2:08 AM Reviewed patient's radiology. Ordered CT-Hip w/o to evaluate further.     2:18 AM Patient reevaluated at bedside. Discussed ordering further imaging to evaluate further. Patient verbalizes understanding and agreement to this plan of care.     5:38 AM discussed this case with Dr. Weeks he requests patient to be kept n.p.o. for potential surgery later today  Patient mated to Dr. Gauhtier in guarded condition.      FINAL IMPRESSION  1. Closed fracture of trochanter of left femur, initial encounter ([facility])    2. SOB (shortness of breath)    3. Near syncope    4. Lesion of adrenal gland (HCC)    5. New onset type 2 diabetes mellitus (HCC)          Nadeem HERNANDEZ (Scribe), am scribing for, and in the presence of, Russel Mckenna M.D..    Electronically signed by: Nadeem Canela (Scribe), 6/6/2021    IRussel M.D. personally performed the services described in this documentation, as scribed by Nadeem Canela in my presence, and it is both accurate and complete.    C    The note accurately reflects work and decisions made by me.  Russel Mckenna M.D.  6/6/2021  5:07 AM

## 2021-06-06 NOTE — CONSULTS
"Case Summary:  70 y.o. male presenting with a chief complaint of left hip pain after mechanical fall at home yesterday.  He is unable to ambulate.  He states that he had a similar injury about 15 years ago on the right side and had surgery for this.  He denies any numbness or tingling.      Ortho Exam / Vitals / Weight:  Body mass index is 20.67 kg/m².  Height: 170.2 cm (5' 7\") Weight: 59.9 kg (132 lb)  /66   Pulse 88   Temp 36.7 °C (98.1 °F) (Oral)   Resp 16   Ht 1.702 m (5' 7\")   Wt 59.9 kg (132 lb)   SpO2 96%   BMI 20.67 kg/m²       Orthopedic Physical Exam:  GEN: NAD, resting comfortably, nonlabored breathing on RA, pleasant and conversational      LISSA:  - skin intact, atraumatic, otherwise distally neurovascularly intact  -Pain with any left hip motion  - fires tibial and peroneal nerves appropriately  - compartments soft and compressible  - SILT tibial/peroneal/sural/saphenous nerve distribution  - digits warm and well perfused, excellent capillary refill        Lab:    Recent Labs     06/06/21  0122   WBC 15.3*   RBC 4.82   HEMOGLOBIN 14.3   HEMATOCRIT 43.3   MCV 89.8   MCH 29.7   MCHC 33.0*   RDW 40.8   PLATELETCT 264   MPV 9.4     Recent Labs     06/06/21  0122   SODIUM 129*   POTASSIUM 4.1   CHLORIDE 93*   CO2 22   GLUCOSE 238*   BUN 28*             Past Medical / Past Surgical:  Past Medical History:   Diagnosis Date   • Chronic obstructive pulmonary disease (HCC)    • Hypercholesteremia    • Hypertension       Past Surgical History:   Procedure Laterality Date   • HERNIA REPAIR Right    • ORIF, FEMUR Right     15 years ago   • OTHER      right inguinal hernia repair   • OTHER ORTHOPEDIC SURGERY      right hip       Home Medications:  [facility] [employer]     Allergies:    Seasonal    Social / Family Histories:    Social History     Tobacco Use   • Smoking status: Current Some Day Smoker     Packs/day: 1.00     Years: 40.00     Pack years: 40.00     Types: Cigarettes     Start date: 1975   • " Smokeless tobacco: Never Used   • Tobacco comment: vape pen & cigarettes   Substance Use Topics   • Alcohol use: Yes     Alcohol/week: 1.8 oz     Types: 1 Glasses of wine, 2 Cans of beer per week     Comment: weekend/social     @[facility]([id number])@  Social History     Social History Narrative   • Not on file      Family History   Problem Relation Age of Onset   • Cancer Mother         breast cancer   • Stroke Mother    • Other Father         cirrhosis form alcohol   • Stroke Sister    • Heart Disease Brother         cardiac aneurysm   • Hypertension Maternal Grandmother    • Hyperlipidemia Maternal Grandmother        Review of Systems:    Per HPI. The remainder of the review of systems is negative/non-contributory.       Imaging / Other Studies:    Radiographs as well as a CT scan show a displaced left intertrochanteric hip fracture      Assessment and Recommendations:  70 y.o. male with left IT fracture.  Discussed the injury with him as well as treatment options.  He does have severe hip arthritis as well and was asking about performing a total hip arthroplasty to treat both problems at the same time.  We discussed that this can cause some issues due to the fracture going through his greater trochanter and the associated muscles that attach here.  We discussed that generally the outcomes are better for a staged ORIF followed by total hip arthroplasty in the future.  He expressed understanding.  -N.p.o.  -Plan for ORIF left hip today

## 2021-06-06 NOTE — ED TRIAGE NOTES
"Chief Complaint   Patient presents with   • Syncope     c/o \"blacking out\" after feeling dizzy/light headed after standing up, then had GLF, denies hitting head   • Hip Pain     left hip pain after fall, limited ROM d/t increased pain, NO shortening or rotation noted     Pt BB [facility] with above complaints. Denies chest pain or discomfort. EKG complete, blood drawn, sent to lab.   "

## 2021-06-06 NOTE — PROGRESS NOTES
hospitals Short Progress Note    Pt was admitted past midnight. Dr. Gauthier's H&P, ED course, labs and imagings reviewed.     69 y/o M with COPD presented with syncope suffering a fall and subsequent left intertrochanteric fracture (noted on CT).  D-dimer elevated greater than 20 on admission.  Subsequent CTA negative for PE.  Patient complains of 2 syncopal episodes this past week and does have associated murmur (echo requested).  Orthopedic surgery consulted by ERP and patient was kelp n.p.o. and held anticoagulation for potential surgical intervention today 6/6  Hemodynamically stable - Pt was subsequently admitted for further management.     6/6 on T3 unit:  Vitals reviewed; afebrile.  The rest of the vitals within normal parameters. BP a bit soft.   Pain scale reported - 5 in left hip  Blood glucose in last 24hrs - 238    At bedside, denies acute complain. Pain in left hip with movement but he is looking forward to the procedure. He recounts the events leading to syncope - happened when he stood up. He denies recent changes in medications. Reports to be in his baseline health but this is the 3rd syncopal episode.     Exam: grossly unremarkable except left leg ROM limited due to pain; soft murmur in 2nd LLSB.     Labs reviewed  Trop neg  Lipase negative  WBC 15.5 with left shift  , CL 93  CR 1.07  HbA1C 5.5    Imagings reviewed    EKG reviewed - Sinus tach (V-rate 114), QTc 463    Plan:  Agree with Dr. Gauthier's Assessment and Plan:   -Added IVF (hypoNa and hypoCl with a slight bump in Cr)  -Diuretic (HCTZ on hold)  -Will follow up Echo  -Likely current cause of syncope is vasovagal with position change  -Orthopedics consult appreciated - will monitor post op.

## 2021-06-06 NOTE — PROGRESS NOTES
Attending Hospitalist today is Dr Teague starting at 0700. Please call this Physician for orders, updates and questions.

## 2021-06-06 NOTE — ASSESSMENT & PLAN NOTE
Murmur appreciated on physical examination.  Most likely consistent with mitral regurgitation no signs of aortic insufficiency per auscultation; however, ordered echocardiogram to access (pending)  Sinus tachycardia appreciated on EKG without signs of S1Q3T3.  D-dimer was elevated however CTA negative for pulmonary emboli.  Trop neg; no chest pain    Based on nature of the clinical history, likely cause of syncope is a vasovagal episode (too much anti-BP +/- dehydration).   BP meds being adjusted  Pt has been advised to have a regular BP check (he will get a BP machine) and to take his time changing positions.     6/10/21: Patient currently not complaining of symptoms. Echocardiogram report grossly normal.

## 2021-06-06 NOTE — H&P
"Hospital Medicine History & Physical Note    Date of Service  6/6/2021    Primary Care Physician  Nella Ramirez M.D.    Consultants  Orthopedic surgery    Code Status  Full Code    Chief Complaint  Chief Complaint   Patient presents with   • Syncope     c/o \"blacking out\" after feeling dizzy/light headed after standing up, then had GLF, denies hitting head   • Hip Pain     left hip pain after fall, limited ROM d/t increased pain, NO shortening or rotation noted       History of Presenting Illness  70 y.o. male who presented 6/6/2021 with complaints of syncopal episode after standing up.  Patient states that he had fallen over on hardwood floor and difficulty ambulating/standing on left lower extremity.  Of note, patient does complain of prior syncopal event earlier this week without any trauma injury.  He is concerned after passing out and was told in the past he will need an echocardiogram which we will order on this admission.  Patient also complains of severe left lower extremity pain without radiation at level of hip.  Admits to relief with analgesics and worsened with movement.  He also complains of night sweats/diaphoresis and denies unintentional weight loss.  He admits to smoking 1 pack/day and requests nicotine patch at this time.  He does complain of chronic cough with sputum production and informed of chronic bronchitis.  He denies any fevers or chills, vertigo or incoordination.  Also denies substernal chest pain, cough with sputum production, hemoptysis, hematemesis, nausea vomiting, constipation, diarrhea, melena, hematochezia, paresthesias, dysuria, hematuria.    Vital signs on admission were as follows: 98.1, 110, 20, 128/76, 93% room air.    Chest x-ray was performed and revealed minimal diffuse bilateral interstitial opacities consistent with mild pulmonary edema.  Pelvic x-ray was performed and revealed no bony abnormalities.  CT head was performed without contrast and reveals " age-related atrophy along with chronic white matter lucencies in both cerebral hemispheres however no acute intracranial abnormalities noted.  CT head without contrast left side reveals intertrochanteric fracture of left femur.  CTA was performed and reveals no evidence of pulmonary emboli or thoracic abnormality however does reveal emphysema, reported as bladder bullous formation in both lungs but probably needs bilateral bullae in both lungs.  He is also noted he had bilateral low-attenuation adrenal gland lesions.    Labs were obtained on admission and CBC reveals neutrophilic predominant leukocytosis with WBC count of 15.3, mild lymphocytopenia at 10.1% and otherwise relatively unremarkable.  Lipase measured at level of 20, troponin T less than 6, CMP reveals hyperglycemia of 238, hyponatremia of 129, hypochloremia of 93 and otherwise relatively unremarkable.  Hemoglobin A1c has been checked and currently pending.  D-dimer was ordered and resulted as positive.  20 and subsequent CTA negative for PE.    Patient administered analgesics in ED.  ERP had consulted with orthopedic surgery who will evaluate in a.m.  Patient will be kept n.p.o. and anticoagulation held for suspected surgical intervention in a.m.  He agrees to full CODE STATUS at time of evaluation and is alert and oriented x4.  Patient will be optimized in ED and subsequently transferred to orthopedic [place] floor for further optimization of medical management.    Review of Systems  Review of Systems   Constitutional: Positive for diaphoresis (and night sweats). Negative for chills and fever.   HENT: Negative for congestion and sore throat.    Eyes: Negative for blurred vision and double vision.   Respiratory: Positive for cough and sputum production. Negative for shortness of breath and wheezing.    Cardiovascular: Negative for chest pain and palpitations.   Gastrointestinal: Negative for abdominal pain, blood in stool, constipation, diarrhea, heartburn,  melena, nausea and vomiting.   Genitourinary: Negative for dysuria and frequency.   Musculoskeletal: Positive for falls and joint pain. Negative for back pain and neck pain.   Skin: Negative for itching and rash.   Neurological: Positive for loss of consciousness. Negative for focal weakness, weakness and headaches.   Endo/Heme/Allergies: Negative for environmental allergies. Does not bruise/bleed easily.   Psychiatric/Behavioral: Negative for depression. The patient does not have insomnia.        Past Medical History   has a past medical history of Chronic obstructive pulmonary disease (HCC), Hypercholesteremia, and Hypertension.    Surgical History   has a past surgical history that includes hernia repair (Right); orif, femur (Right); other orthopedic surgery; and other.     Family History  family history includes Cancer in his mother; Heart Disease in his brother; Hyperlipidemia in his maternal grandmother; Hypertension in his maternal grandmother; Other in his father; Stroke in his mother and sister.     Social History   reports that he has been smoking cigarettes. He started smoking about 46 years ago. He has a 40.00 pack-year smoking history. He has never used smokeless tobacco. He reports current alcohol use of about 1.8 oz of alcohol per week. He reports current drug use. Drug: Marijuana.    Allergies  Allergies   Allergen Reactions   • Seasonal        Medications  Prior to Admission Medications   Prescriptions Last Dose Informant Patient Reported? Taking?   NON SPECIFIED   Yes No   albuterol 108 (90 Base) MCG/ACT Aero Soln inhalation aerosol   No No   Sig: Inhale 2 Puffs by mouth every 6 hours as needed.   hydroCHLOROthiazide (HYDRODIURIL) 25 MG Tab   No No   Sig: TAKE 1 TABLET BY MOUTH EVERY DAY   lisinopril (PRINIVIL) 20 MG Tab   No No   Sig: Take 1 tablet by mouth every day.   simvastatin (ZOCOR) 10 MG Tab   No No   Sig: Take 1 tablet by mouth every evening.   triamcinolone acetonide (KENALOG) 0.1 %  Cream   No No   Sig: Apply 1 Application to affected area(s) 2 times a day.      Facility-Administered Medications: None       Physical Exam  Temp:  [36.7 °C (98.1 °F)] 36.7 °C (98.1 °F)  Pulse:  [] 93  Resp:  [16-20] 16  BP: ()/(56-76) 103/60  SpO2:  [90 %-93 %] 90 %    Physical Exam  Vitals reviewed.   Constitutional:       Appearance: Normal appearance. He is normal weight. He is not toxic-appearing or diaphoretic.   HENT:      Head: Normocephalic and atraumatic.      Mouth/Throat:      Mouth: Mucous membranes are moist.      Pharynx: Oropharynx is clear. No oropharyngeal exudate or posterior oropharyngeal erythema.      Comments: Dentures appreciated upper and lower  Eyes:      General: No scleral icterus.     Extraocular Movements: Extraocular movements intact.      Conjunctiva/sclera: Conjunctivae normal.      Pupils: Pupils are equal, round, and reactive to light.   Neck:      Vascular: No carotid bruit.   Cardiovascular:      Rate and Rhythm: Regular rhythm. Tachycardia present.      Pulses: Normal pulses.      Heart sounds: Murmur (Grade 3 systolic ejection murmur consistent with mitral regurgitation.) heard.   No friction rub. No gallop.    Pulmonary:      Effort: Pulmonary effort is normal.      Breath sounds: Normal breath sounds. No wheezing, rhonchi or rales.   Abdominal:      General: Bowel sounds are normal. There is no distension.      Palpations: Abdomen is soft. There is no mass.      Tenderness: There is no abdominal tenderness. There is no guarding or rebound.      Hernia: No hernia is present.   Musculoskeletal:         General: Tenderness and signs of injury (left hip) present.      Cervical back: Normal range of motion and neck supple. No muscular tenderness.      Right lower leg: No edema.      Left lower leg: No edema.   Lymphadenopathy:      Cervical: No cervical adenopathy.   Skin:     General: Skin is warm and dry.      Capillary Refill: Capillary refill takes less than 2  seconds.      Coloration: Skin is not pale.      Findings: No erythema or rash.   Neurological:      General: No focal deficit present.      Mental Status: He is alert and oriented to person, place, and time. Mental status is at baseline.      Cranial Nerves: No cranial nerve deficit.      Sensory: No sensory deficit.      Motor: No weakness.   Psychiatric:         Mood and Affect: Mood normal.         Behavior: Behavior normal.         Thought Content: Thought content normal.         Judgment: Judgment normal.         Laboratory:  Recent Labs     06/06/21  0122   WBC 15.3*   RBC 4.82   HEMOGLOBIN 14.3   HEMATOCRIT 43.3   MCV 89.8   MCH 29.7   MCHC 33.0*   RDW 40.8   PLATELETCT 264   MPV 9.4     Recent Labs     06/06/21  0122   SODIUM 129*   POTASSIUM 4.1   CHLORIDE 93*   CO2 22   GLUCOSE 238*   BUN 28*   CREATININE 1.07   CALCIUM 8.7     Recent Labs     06/06/21 0122   ALTSGPT 16   ASTSGOT 19   ALKPHOSPHAT 81   TBILIRUBIN 0.5   LIPASE 20   GLUCOSE 238*         No results for input(s): NTPROBNP in the last 72 hours.      Recent Labs     06/06/21 0122   TROPONINT <6       I reviewed and interpreted the above twelve-lead EKG and appreciate sinus tachycardia without S1Q3T3 waveform abnormality.  QTc within normal limits and no ischemic changes noted.    Imaging:  CT-CTA CHEST PULMONARY ARTERY W/ RECONS   Final Result      1.  No evidence of pulmonary emboli or other acute thoracic abnormality.   2.  Emphysema, diffuse bladder bolus formation in both lungs.   3.  Bilateral low-attenuation adrenal gland lesions.            CT-HIP W/O PLUS RECONS LEFT   Final Result      Intertrochanteric fracture of the left femur.      CT-HEAD W/O   Final Result      1.  No acute intracranial abnormality.   2.  Age-consistent atrophy, along with chronic white matter lucencies in both cerebral hemispheres as noted above.               INTERPRETING LOCATION:  [address] [facility], [place], [address]      DX-PELVIS-TRAUMA SERIES  3-   Final Result       No acute bony findings.      DX-CHEST-PORTABLE (1 VIEW)   Final Result      Minimal diffuse bilateral interstitial opacities, consistent with mild pulmonary edema.      EC-ECHOCARDIOGRAM COMPLETE W/O CONT    (Results Pending)       I have reviewed and interpreted the above CT pelvis and appreciate left-sided intratrochanteric fracture as seen above.    Assessment/Plan:  I anticipate this patient will require at least two midnights for appropriate medical management, necessitating inpatient admission.    Syncope- (present on admission)  Assessment & Plan  Murmur appreciated on physical examination.  Most likely consistent with mitral regurgitation no signs of aortic insufficiency per auscultation however we will order echocardiogram to rule out aortic insufficiency.  Orthostatic vital signs ordered and pending.  Admit to telemetry  Sinus tachycardia appreciated on EKG without signs of S1Q3T3.  D-dimer was elevated however CTA negative for pulmonary emboli.    Intertrochanteric fracture of femur (HCC)- (present on admission)  Assessment & Plan  Intertrochanteric femur fracture of left femur appreciated on CT scan as seen above  N.p.o.  Orthopedic surgery consulted by ERP  Hold anticoagulation for expected surgery in a.m.  Analgesics for pain control  Pulses intact and no paresthesias associated.    Cigarette nicotine dependence, uncomplicated - (present on admission)  Assessment & Plan  Smoking cessation counseling commenced at bedside.  Patient in agreement with nicotine patch for smoking cessation.  Recommend outpatient follow-up with pulmonology/PCP for continuation of smoking cessation.    Essential hypertension- (present on admission)  Assessment & Plan  Continue lisinopril 20 mg p.o. daily  Continue hydrochlorothiazide 25 mg p.o. daily  As needed antihypertensive medications ordered

## 2021-06-06 NOTE — ED NOTES
Tech from Lab called with critical result of D-Dimer >20 at 0331. Critical lab result read back to .   Dr. Mckenna notified of critical lab result at 0332.  Critical lab result read back by Dr. Mckenna.

## 2021-06-06 NOTE — ASSESSMENT & PLAN NOTE
Smoking cessation counseling commenced at bedside.    Patient in agreement with nicotine patch for smoking cessation.    Recommend outpatient follow-up with PCP for continuation of smoking cessation and NRT if agreeable.

## 2021-06-07 ENCOUNTER — PATIENT OUTREACH (OUTPATIENT)
Dept: HEALTH INFORMATION MANAGEMENT | Facility: OTHER | Age: 71
End: 2021-06-07

## 2021-06-07 ENCOUNTER — APPOINTMENT (OUTPATIENT)
Dept: CARDIOLOGY | Facility: MEDICAL CENTER | Age: 71
DRG: 480 | End: 2021-06-07
Attending: STUDENT IN AN ORGANIZED HEALTH CARE EDUCATION/TRAINING PROGRAM
Payer: MEDICARE

## 2021-06-07 PROBLEM — R33.8 ACUTE URINARY RETENTION: Status: ACTIVE | Noted: 2021-06-07

## 2021-06-07 PROBLEM — D64.9 ANEMIA: Status: ACTIVE | Noted: 2021-06-07

## 2021-06-07 LAB
ANION GAP SERPL CALC-SCNC: 13 MMOL/L (ref 7–16)
BUN SERPL-MCNC: 13 MG/DL (ref 8–22)
CALCIUM SERPL-MCNC: 8.3 MG/DL (ref 8.5–10.5)
CHLORIDE SERPL-SCNC: 98 MMOL/L (ref 96–112)
CO2 SERPL-SCNC: 21 MMOL/L (ref 20–33)
CREAT SERPL-MCNC: 0.78 MG/DL (ref 0.5–1.4)
ERYTHROCYTE [DISTWIDTH] IN BLOOD BY AUTOMATED COUNT: 42.6 FL (ref 35.9–50)
GLUCOSE SERPL-MCNC: 144 MG/DL (ref 65–99)
HCT VFR BLD AUTO: 33 % (ref 42–52)
HGB BLD-MCNC: 10.8 G/DL (ref 14–18)
MCH RBC QN AUTO: 30.3 PG (ref 27–33)
MCHC RBC AUTO-ENTMCNC: 32.7 G/DL (ref 33.7–35.3)
MCV RBC AUTO: 92.7 FL (ref 81.4–97.8)
PLATELET # BLD AUTO: 232 K/UL (ref 164–446)
PMV BLD AUTO: 9.7 FL (ref 9–12.9)
POTASSIUM SERPL-SCNC: 3.6 MMOL/L (ref 3.6–5.5)
RBC # BLD AUTO: 3.56 M/UL (ref 4.7–6.1)
SODIUM SERPL-SCNC: 132 MMOL/L (ref 135–145)
WBC # BLD AUTO: 13.7 K/UL (ref 4.8–10.8)

## 2021-06-07 PROCEDURE — 80048 BASIC METABOLIC PNL TOTAL CA: CPT

## 2021-06-07 PROCEDURE — 94664 DEMO&/EVAL PT USE INHALER: CPT

## 2021-06-07 PROCEDURE — 85027 COMPLETE CBC AUTOMATED: CPT

## 2021-06-07 PROCEDURE — A9270 NON-COVERED ITEM OR SERVICE: HCPCS | Performed by: STUDENT IN AN ORGANIZED HEALTH CARE EDUCATION/TRAINING PROGRAM

## 2021-06-07 PROCEDURE — 97166 OT EVAL MOD COMPLEX 45 MIN: CPT

## 2021-06-07 PROCEDURE — 99233 SBSQ HOSP IP/OBS HIGH 50: CPT | Performed by: STUDENT IN AN ORGANIZED HEALTH CARE EDUCATION/TRAINING PROGRAM

## 2021-06-07 PROCEDURE — 99406 BEHAV CHNG SMOKING 3-10 MIN: CPT

## 2021-06-07 PROCEDURE — 700105 HCHG RX REV CODE 258: Performed by: STUDENT IN AN ORGANIZED HEALTH CARE EDUCATION/TRAINING PROGRAM

## 2021-06-07 PROCEDURE — 97161 PT EVAL LOW COMPLEX 20 MIN: CPT

## 2021-06-07 PROCEDURE — 700102 HCHG RX REV CODE 250 W/ 637 OVERRIDE(OP): Performed by: STUDENT IN AN ORGANIZED HEALTH CARE EDUCATION/TRAINING PROGRAM

## 2021-06-07 PROCEDURE — 700111 HCHG RX REV CODE 636 W/ 250 OVERRIDE (IP): Performed by: STUDENT IN AN ORGANIZED HEALTH CARE EDUCATION/TRAINING PROGRAM

## 2021-06-07 PROCEDURE — 770006 HCHG ROOM/CARE - MED/SURG/GYN SEMI*

## 2021-06-07 PROCEDURE — 36415 COLL VENOUS BLD VENIPUNCTURE: CPT

## 2021-06-07 PROCEDURE — 51798 US URINE CAPACITY MEASURE: CPT

## 2021-06-07 PROCEDURE — 93306 TTE W/DOPPLER COMPLETE: CPT

## 2021-06-07 RX ORDER — SODIUM CHLORIDE 9 MG/ML
500 INJECTION, SOLUTION INTRAVENOUS ONCE
Status: COMPLETED | OUTPATIENT
Start: 2021-06-07 | End: 2021-06-07

## 2021-06-07 RX ORDER — LISINOPRIL 10 MG/1
10 TABLET ORAL DAILY
Status: DISCONTINUED | OUTPATIENT
Start: 2021-06-08 | End: 2021-06-11 | Stop reason: HOSPADM

## 2021-06-07 RX ORDER — TAMSULOSIN HYDROCHLORIDE 0.4 MG/1
0.4 CAPSULE ORAL
Status: DISCONTINUED | OUTPATIENT
Start: 2021-06-07 | End: 2021-06-11 | Stop reason: HOSPADM

## 2021-06-07 RX ADMIN — DOCUSATE SODIUM 50 MG AND SENNOSIDES 8.6 MG 2 TABLET: 8.6; 5 TABLET, FILM COATED ORAL at 05:26

## 2021-06-07 RX ADMIN — SIMVASTATIN 10 MG: 20 TABLET, FILM COATED ORAL at 16:58

## 2021-06-07 RX ADMIN — SODIUM CHLORIDE 500 ML: 9 INJECTION, SOLUTION INTRAVENOUS at 16:14

## 2021-06-07 RX ADMIN — SODIUM CHLORIDE: 9 INJECTION, SOLUTION INTRAVENOUS at 22:42

## 2021-06-07 RX ADMIN — OXYCODONE 5 MG: 5 TABLET ORAL at 21:22

## 2021-06-07 RX ADMIN — DOCUSATE SODIUM 50 MG AND SENNOSIDES 8.6 MG 2 TABLET: 8.6; 5 TABLET, FILM COATED ORAL at 16:58

## 2021-06-07 RX ADMIN — SODIUM CHLORIDE: 9 INJECTION, SOLUTION INTRAVENOUS at 15:51

## 2021-06-07 RX ADMIN — LISINOPRIL 20 MG: 10 TABLET ORAL at 05:26

## 2021-06-07 RX ADMIN — TAMSULOSIN HYDROCHLORIDE 0.4 MG: 0.4 CAPSULE ORAL at 11:51

## 2021-06-07 RX ADMIN — NICOTINE 14 MG: 14 PATCH TRANSDERMAL at 05:27

## 2021-06-07 RX ADMIN — ENOXAPARIN SODIUM 40 MG: 40 INJECTION SUBCUTANEOUS at 11:51

## 2021-06-07 RX ADMIN — OXYCODONE 5 MG: 5 TABLET ORAL at 15:50

## 2021-06-07 SDOH — ECONOMIC STABILITY: FOOD INSECURITY: WITHIN THE PAST 12 MONTHS, THE FOOD YOU BOUGHT JUST DIDN'T LAST AND YOU DIDN'T HAVE MONEY TO GET MORE.: NEVER TRUE

## 2021-06-07 SDOH — ECONOMIC STABILITY: FOOD INSECURITY: WITHIN THE PAST 12 MONTHS, YOU WORRIED THAT YOUR FOOD WOULD RUN OUT BEFORE YOU GOT MONEY TO BUY MORE.: NEVER TRUE

## 2021-06-07 SDOH — ECONOMIC STABILITY: INCOME INSECURITY: HOW HARD IS IT FOR YOU TO PAY FOR THE VERY BASICS LIKE FOOD, HOUSING, MEDICAL CARE, AND HEATING?: NOT HARD AT ALL

## 2021-06-07 ASSESSMENT — ENCOUNTER SYMPTOMS
MYALGIAS: 0
COUGH: 0
FOCAL WEAKNESS: 0
FEVER: 0
SPUTUM PRODUCTION: 0
SORE THROAT: 0
ABDOMINAL PAIN: 0
HEADACHES: 0
DIZZINESS: 1
PALPITATIONS: 0
HEARTBURN: 0
NAUSEA: 0
SHORTNESS OF BREATH: 0
DEPRESSION: 0
FALLS: 1
CHILLS: 0
NECK PAIN: 0
VOMITING: 0

## 2021-06-07 ASSESSMENT — COGNITIVE AND FUNCTIONAL STATUS - GENERAL
CLIMB 3 TO 5 STEPS WITH RAILING: A LOT
MOBILITY SCORE: 17
MOVING FROM LYING ON BACK TO SITTING ON SIDE OF FLAT BED: A LITTLE
WALKING IN HOSPITAL ROOM: A LITTLE
MOVING TO AND FROM BED TO CHAIR: A LITTLE
SUGGESTED CMS G CODE MODIFIER DAILY ACTIVITY: CJ
DAILY ACTIVITIY SCORE: 21
STANDING UP FROM CHAIR USING ARMS: A LITTLE
SUGGESTED CMS G CODE MODIFIER MOBILITY: CK
DRESSING REGULAR LOWER BODY CLOTHING: A LITTLE
TURNING FROM BACK TO SIDE WHILE IN FLAT BAD: A LITTLE
HELP NEEDED FOR BATHING: A LITTLE
TOILETING: A LITTLE

## 2021-06-07 ASSESSMENT — PAIN DESCRIPTION - PAIN TYPE
TYPE: ACUTE PAIN;SURGICAL PAIN
TYPE: ACUTE PAIN

## 2021-06-07 ASSESSMENT — GAIT ASSESSMENTS
DEVIATION: ANTALGIC;STEP TO
ASSISTIVE DEVICE: FRONT WHEEL WALKER
GAIT LEVEL OF ASSIST: SUPERVISED
DISTANCE (FEET): 40

## 2021-06-07 ASSESSMENT — ACTIVITIES OF DAILY LIVING (ADL): TOILETING: INDEPENDENT

## 2021-06-07 ASSESSMENT — LIFESTYLE VARIABLES: PACK_YEARS: 40

## 2021-06-07 NOTE — ANESTHESIA PROCEDURE NOTES
Spinal Block    Date/Time: 6/6/2021 8:10 PM  Performed by: Oliver Zaldivar III, M.D.  Authorized by: Oliver Zaldivar III, M.D.     Patient Location:  OR  Start Time:  6/6/2021 8:10 PM  End Time:  6/6/2021 8:14 PM  Reason for Block: primary anesthetic    patient identified, IV checked, site marked, risks and benefits discussed, surgical consent, monitors and equipment checked, pre-op evaluation and timeout performed    Patient Position:  Sitting  Prep: ChloraPrep, patient draped and sterile technique    Monitoring:  Blood pressure, continuous pulse oximetry and heart rate  Approach:  Midline  Location:  L3-4  Injection Technique:  Single-shot  Skin infiltration:  Lidocaine  Strength:  1%  Dose:  3ml  Needle Type:  Pencan  Needle Gauge:  25 G  CSF flowing pre/post injection:  Yes  Sensory Level:  T10   Done under GA

## 2021-06-07 NOTE — RESPIRATORY CARE
COPD EDUCATION by COPD CLINICAL EDUCATOR  6/6/2021 at 5:15 PM by Leena Sauceda, RRT     Patient not available Team will revisit

## 2021-06-07 NOTE — PROGRESS NOTES
Pt having decreased urine output post-op. Pt starting to complain of discomfort and having the sensation to urinate but not being able to urinate a lot at 0530. This RN ordered a bladder scan per protocol, traction arrived and performed scan. Reported to this RN that scan was >1000mL. Pt stated he would like to try to urinate more one more time prior to this RN placing menon catheter. RN agreed with the condition that if he did not pee >600mL a menon would be placed per protocol, and if he did this RN would page the MD for other orders. Pt only able to pee 250 mL after trying to pee post bladder scan. 16F menon catheter placed per protocol. Urine flowback was immediate. 750mL output noted, bag continuing to fill when draining.  See Flowsheet documentation.

## 2021-06-07 NOTE — THERAPY
"Physical Therapy   Initial Evaluation     Patient Name: Bright Shirley  Age:  70 y.o., Sex:  male  Medical Record #: [id number]  Today's Date: 6/7/2021     Precautions: Fall Risk, Weight Bearing As Tolerated Left Lower Extremity    Assessment  Patient is 70 y.o. male that presented to acute with L hip pain after syncopal episode. Imaging revealed L intertrochanteric fracture, he is s/p surgical intervention with dynamic hip screw and sideplate. He ambulated approximately 40ft with FWW and supervision and reported remarkable low pain since surgery. Provided education regarding WB precautions, use of AD, progression of ROM and activity; patient verbalized understanding. Will follow. Anticipate he will progress well with continued mobilization.    Plan    Recommend Physical Therapy 4 times per week until therapy goals are met for the following treatments:  Bed Mobility, Community Re-integration, Equipment, Gait Training, Manual Therapy, Neuro Re-Education / Balance, Self Care/Home Evaluation, Stair Training, Therapeutic Activities and Therapeutic Exercises    DC Equipment Recommendations: Front-Wheel Walker  Discharge Recommendations: Recommend outpatient physical therapy services to address higher level deficits    Subjective    \"It took me weeks to get to this point with the last hip.\"     Objective       06/07/21 0905   Total Time Spent   Total Time Spent (Mins) 18   Charge Group   PT Evaluation PT Evaluation Low   Initial Contact Note    Initial Contact Note Order Received and Verified, Physical Therapy Evaluation in Progress with Full Report to Follow.   Precautions   Precautions Fall Risk;Weight Bearing As Tolerated Left Lower Extremity   Vitals   O2 (LPM) 0   O2 Delivery Device None - Room Air   Pain 0 - 10 Group   Therapist Pain Assessment   (no pain complaint during session)   Prior Living Situation   Prior Services None   Housing / Facility   (duplex)   Steps Into Home 6   Steps In Home 0   Equipment Owned None "   Lives with - Patient's Self Care Capacity Unrelated Adult   Comments Patient reported roommate able to assist as needed   Prior Level of Functional Mobility   Bed Mobility Independent   Transfer Status Independent   Ambulation Independent   Distance Ambulation (Feet)   (community)   Assistive Devices Used None   Stairs Independent   History of Falls   History of Falls Yes  (reason for admit)   Cognition    Cognition / Consciousness WDL   Level of Consciousness Alert   Comments pleasant, cooperative   Passive ROM Lower Body   Passive ROM Lower Body WDL   Comments functional for mobility, anticipate some deficits to full ROM due to pain   Active ROM Lower Body    Active ROM Lower Body  WDL   Comments as above   Strength Lower Body   Lower Body Strength  WDL   Comments as above   Sensation Lower Body   Lower Extremity Sensation   Not Tested   Lower Body Muscle Tone   Lower Body Muscle Tone  WDL   Coordination Lower Body    Coordination Lower Body  WDL   Balance Assessment   Sitting Balance (Static) Fair +   Sitting Balance (Dynamic) Fair +   Standing Balance (Static) Fair   Standing Balance (Dynamic) Fair   Weight Shift Sitting Good   Weight Shift Standing Fair   Comments with FWW, no LOB   Gait Analysis   Gait Level Of Assist Supervised   Assistive Device Front Wheel Walker   Distance (Feet) 40   # of Times Distance was Traveled 1   Deviation Antalgic;Step To  (progressed to step through during session)   # of Stairs Climbed 0   Weight Bearing Status WBAT LLE   Vision Deficits Impacting Mobility NT   Bed Mobility    Supine to Sit Supervised  (HOB flat, no rail)   Sit to Supine   (NT, left in chair)   Scooting Supervised  (seated)   Functional Mobility   Sit to Stand Supervised   Bed, Chair, Wheelchair Transfer Supervised   Transfer Method Stand Step   How much difficulty does the patient currently have...   Turning over in bed (including adjusting bedclothes, sheets and blankets)? 3   Sitting down on and standing up  from a chair with arms (e.g., wheelchair, bedside commode, etc.) 3   Moving from lying on back to sitting on the side of the bed? 3   How much help from another person does the patient currently need...   Moving to and from a bed to a chair (including a wheelchair)? 3   Need to walk in a hospital room? 3   Climbing 3-5 steps with a railing? 2   6 clicks Mobility Score 17   Activity Tolerance   Sitting in Chair left in chair   Sitting Edge of Bed 5 min   Standing 10 min   Comments no overt pain, fatigue, SOB, dizziness   Edema / Skin Assessment   Edema / Skin  Not Assessed   Patient / Family Goals    Patient / Family Goal #1 go home   Short Term Goals    Short Term Goal # 1 Patient will ambulate 150ft with supervision within 6tx in order to access environment   Short Term Goal # 2 Patient will ascend/descend 6 steps with supervision within 6tx in order to enter/exit home   Education Group   Education Provided Role of Physical Therapist;Weight Bearing Precautions   Role of Physical Therapist Patient Response Patient;Acceptance;Explanation;Verbal Demonstration   Problem List    Problems Pain;Impaired Ambulation;Decreased Activity Tolerance   Anticipated Discharge Equipment and Recommendations   DC Equipment Recommendations Front-Wheel Walker   Discharge Recommendations Anticipate that the patient will have no further physical therapy needs after discharge from the hospital   Interdisciplinary Plan of Care Collaboration   IDT Collaboration with  Nursing;Occupational Therapist   Patient Position at End of Therapy Seated;Call Light within Reach;Tray Table within Reach;Phone within Reach   Collaboration Comments RN aware of visit, response   Session Information   Date / Session Number  6/7-1 (1/4, 6/13)   Priority 4  (if DCing home for stairs)

## 2021-06-07 NOTE — PROGRESS NOTES
Called Echo tech, she said that the pt will most likely to have Echo done either this evening or tomorrow morning.

## 2021-06-07 NOTE — ASSESSMENT & PLAN NOTE
Likely acute blood loss related anemia from procedure  No other terri bleeding   Will monitor, repeat CBC and make changes accordingly.

## 2021-06-07 NOTE — PROGRESS NOTES
2 RN Skin Check    2 RN skin check complete.   Devices in place: OxyMask.  Skin assessed under devices: yes.  Confirmed pressure ulcers found on: NA.  New potential pressure ulcers noted on NA Wound consult placed No (with explanation) no new potention for PI.  The following interventions in place Pillows.    L hip incision, dressing in place CDI.   Blanchable redness on sacrum.   SCD to RLE only.

## 2021-06-07 NOTE — ANESTHESIA PROCEDURE NOTES
Peripheral IV    Date/Time: 6/6/2021 7:22 PM  Performed by: Oliver Zaldivar III, M.D.  Authorized by: Oliver Zaldivar III, M.D.     Size:  16 G (short Jelco)  Laterality:  Left (hand)  Local Anesthetic:  None  Site Prep:  Alcohol  Technique:  Direct puncture  Attempts:  1

## 2021-06-07 NOTE — CARE PLAN
The patient is Stable - Low risk of patient condition declining or worsening    Shift Goals  Clinical Goals: pain control    Progress made toward(s) clinical / shift goals:  pt is exhibiting adequate pain control on this shift.     Problem: Pain - Standard  Goal: Alleviation of pain or a reduction in pain to the patient’s comfort goal  Outcome: Progressing     Problem: Knowledge Deficit - Standard  Goal: Patient and family/care givers will demonstrate understanding of plan of care, disease process/condition, diagnostic tests and medications  Outcome: Progressing     Problem: Fall Risk  Goal: Patient will remain free from falls  Outcome: Progressing

## 2021-06-07 NOTE — ANESTHESIA POSTPROCEDURE EVALUATION
Patient: Bright Shirley    Procedure Summary     Date: 06/06/21 Room / Location: [place] [address] / SURGERY [facility]    Anesthesia Start: 1907 Anesthesia Stop: 2032    Procedure: FIXATION, FRACTURE, HIP, USING DYNAMIC HIP SCREW, WITH COMPRESSION (Left Hip) Diagnosis: (LEFT intertrochanteric fracture)    Surgeons: Avinash Suazo M.D. Responsible Provider: Oliver Zaldivar III, M.D.    Anesthesia Type: general, spinal ASA Status: 2 - Emergent          Final Anesthesia Type: general, spinal  Last vitals  BP   Blood Pressure : (!) 83/49    Temp   36.4 °C (97.5 °F)    Pulse   89   Resp   14    SpO2   100 %      Anesthesia Post Evaluation    Patient location during evaluation: PACU  Patient participation: complete - patient participated  Level of consciousness: awake and alert  Pain score: 0    Airway patency: patent  Anesthetic complications: no  Cardiovascular status: hemodynamically stable  Respiratory status: acceptable  Hydration status: euvolemic    PONV: none          No complications documented.     Nurse Pain Score: 0 (NPRS)

## 2021-06-07 NOTE — ANESTHESIA TIME REPORT
Anesthesia Start and Stop Event Times     Date Time Event    6/6/2021 1812 Ready for Procedure     1907 Anesthesia Start     2032 Anesthesia Stop        Responsible Staff  06/06/21    Name Role Begin End    Oliver Zaldivar III, M.D. Anesth 1907 2032        Preop Diagnosis (Free Text):  Pre-op Diagnosis     LEFT intertrochanteric fracture        Preop Diagnosis (Codes):    Post op Diagnosis  Closed fracture of left femur, unspecified fracture morphology, initial encounter ([facility])      Premium Reason  E. Weekend    Comments: emergency case, post op spinal under GA for post op pain control

## 2021-06-07 NOTE — OR NURSING
Pt A&O x4. VSS. Pt's BP WNL x4.   Report called to JOYCELYN Douglas and pt transported to T 334-01 via bed.   Pt transported on 10 L O2 via oxymask per [facility] Protocol. O2 tank 80% full.

## 2021-06-07 NOTE — OR NURSING
2031- Pt arrives to PACU from OR on 6L of oxygen via mask. Report received. Dressing to L hip CDI. Pt switched to 10L mask per [facility] protocol. Pt denies pain.    2048- Pt roommate Chalino called for update, no answer at this time.      2112- Dr. Zaldivar notified of pt hypotension, orders received.     2125- Report given to Kaye HIRSCH.

## 2021-06-07 NOTE — ANESTHESIA PREPROCEDURE EVALUATION
Relevant Problems   PULMONARY   (positive) Other emphysema (HCC)      CARDIAC   (positive) Essential hypertension       Physical Exam    Airway   Mallampati: II  TM distance: >3 FB  Neck ROM: full       Cardiovascular - normal exam  Rhythm: regular  Rate: normal  (-) murmur     Dental - normal exam           Pulmonary - normal exam  Breath sounds clear to auscultation     Abdominal    Neurological - normal exam         Other findings: Smoker, COPD, toothless, acute hip fracture with intractable fracture pain            Anesthesia Plan    ASA 2- EMERGENT       Plan - general and peripheral nerve block     Peripheral nerve block will be post-op pain control  Airway plan will be ETT    (Acute Intractable hip fracture pain)      Induction: intravenous    Postoperative Plan: Postoperative administration of opioids is intended.    Pertinent diagnostic labs and testing reviewed    Informed Consent:    Anesthetic plan and risks discussed with patient.    Use of blood products discussed with: patient whom consented to blood products.

## 2021-06-07 NOTE — DISCHARGE PLANNING
Anticipated Discharge Disposition:Home with HH and DME    Action: Pt needs HH, SCP contracts with [facility]. Pt needs FWW, [facility] is the [facility]. Referrals made.    Barriers to Discharge:Pending HH accept and DME delivery.    Plan: F/U on referrals

## 2021-06-07 NOTE — PROGRESS NOTES
Community Health Worker Intake  • Social determinates of health intake complete.   • Identified no barriers.  • Contact information provided to Bright Shirley.  • Has PCP appointment scheduled for June 22 at 4pm.  • Accepted [facility].   • Inpatient assessment completed.    CHW met with pt bedside to introduce [facility]. Pt accepted and CHW sent referral via email. Pt agreeable for CHW to schedule hospital follow up. Pt will drive himself to the appt. Pt denies needs for housing, and food resources. Pt lives in a home with roommate. Pt has a good support system with friends and neighbors. Pt states he is confident in managing his health post d/c. Pt reports no other needs from CHW.     Plan: Warm-hand off to [facility].

## 2021-06-07 NOTE — ASSESSMENT & PLAN NOTE
Likely post-op related  Acute retention that needed a menon cath placement overnight.   Flomax added  Will try a voiding trial in PM 6/7; if still retention, then will have to go home with menon and f/u outpatient     6/8/21: It appears patient passed voiding trial, we will continue to monitor.

## 2021-06-07 NOTE — CARE PLAN
The patient is Stable - Low risk of patient condition declining or worsening    Shift Goals  Clinical Goals: PT/OT  Patient Goals: Therapy   Family Goals: no family present    Progress made toward(s) clinical / shift goals:  Yes      Problem: Pain - Standard  Goal: Alleviation of pain or a reduction in pain to the patient’s comfort goal  Outcome: Progressing     Problem: Knowledge Deficit - Standard  Goal: Patient and family/care givers will demonstrate understanding of plan of care, disease process/condition, diagnostic tests and medications  Outcome: Progressing     Problem: Fall Risk  Goal: Patient will remain free from falls  Outcome: Progressing

## 2021-06-07 NOTE — ANESTHESIA PROCEDURE NOTES
Peripheral IV    Date/Time: 6/6/2021 7:37 PM  Performed by: Oliver Zaldivar III, M.D.  Authorized by: Oliver Zaldivar III, M.D.     Size:  16 G (short Jelco)  Laterality:  Right (hand)  Local Anesthetic:  None  Site Prep:  Alcohol  Technique:  Direct puncture  Attempts:  1

## 2021-06-07 NOTE — OR NURSING
Bedside hand off received from JOYCELYN Marquez. Care assumed.     Pt resting quietly in bed and denies pain and nausea.   Sensation absent in BLE at level L1.   Surgical drsg CDI. Ice pack in place. Pedal pulse 2+.     EPhedrine IM and albumin 12.5 mg administered. Will continue to monitor BP.

## 2021-06-07 NOTE — ANESTHESIA PREPROCEDURE EVALUATION
Relevant Problems   PULMONARY   (positive) Other emphysema (HCC)      CARDIAC   (positive) Essential hypertension       Physical Exam    Anesthesia Plan    ASA 2- EMERGENT       Plan - general and spinal   Neuraxial block will be post-op pain control    Airway plan will be ETT    (Intractable fracture pain from acute hip fracture, post op spinal under GA for post op pain control)      Induction: intravenous    Postoperative Plan: Postoperative administration of opioids is intended.    Pertinent diagnostic labs and testing reviewed    Informed Consent:    Anesthetic plan and risks discussed with patient.    Use of blood products discussed with: patient whom consented to blood products.

## 2021-06-07 NOTE — PROGRESS NOTES
Bedside report received from day RN, pt currently in OR.  PACU RN Kaye called and gave this RN report on pt surgery.     Pt arrived on floor, 2RN skin check completed. Pt resting comfortably with no pain at this time.     Neuro - A/Ox4. nerve block in place, pt WBAT. Pt has little to no sensation in BLE, can however move BLE.   Resp - diminished bases  Cardiac - intact, trace edema in LLE  GI - hypoactive bowel sounds   - incontinent due to  Nerve block, pt urinating with no issues, BS q6 in place  Skin - L hip incision, dressing in place CDI. Blanchable redness on sacrum.     POC discussed with pt, no other concerns at this time. Call light and side table within reach. Hourly rounding in place.

## 2021-06-07 NOTE — ANESTHESIA PROCEDURE NOTES
Airway    Date/Time: 6/6/2021 7:15 PM  Performed by: Oliver Zaldivar III, M.D.  Authorized by: Oliver Zaldivar III, M.D.     Location:  OR  Urgency:  Elective  Difficult Airway: No    Indications for Airway Management:  Anesthesia      Spontaneous Ventilation: absent    Sedation Level:  Deep  Preoxygenated: Yes    Patient Position:  Sniffing  Final Airway Type:  Endotracheal airway  Final Endotracheal Airway:  ETT  Cuffed: Yes    Technique Used for Successful ETT Placement:  Direct laryngoscopy    Insertion Site:  Oral  Blade Type:  Alexander  Laryngoscope Blade/Videolaryngoscope Blade Size:  3  ETT Size (mm):  8.0  Measured from:  Lips  ETT to Lips (cm):  23  Placement Verified by: auscultation and capnometry    Cormack-Lehane Classification:  Grade III - view of epiglottis only  Number of Attempts at Approach:  1

## 2021-06-07 NOTE — DISCHARGE PLANNING
Care Transition Team Assessment  In the case of an emergency, pt's legal NOK is sister Rafael Collier     RNCM met with pt at bedside and obtained the information used in this assessment. Pt verified accuracy of facesheet. Pt lives in a single story home with room mate.   Pt uses [facility]. Prior to current hospitalization, pt was completely independent in ADLS/IADLS. Pt drives and is able to attend necessary MD appointments.  Pt has a good support system. Pt denies any hx of substance use and denies any dx of mh.    Information Source:pt  Orientation Level: Oriented X4  Information Given By: Patient  Informant's Name:  (Bright)  Who is responsible for making decisions for patient? : Patient         Elopement Risk  Legal Hold: No  Ambulatory or Self Mobile in Wheelchair: No-Not an Elopement Risk  Elopement Risk: Not at Risk for Elopement    Interdisciplinary Discharge Planning  Does Admitting Nurse Feel This Could be a Complex Discharge?: No  Primary Care Physician:  (Dr. Ramirez)  Lives with - Patient's Self Care Capacity: Unrelated Adult  Patient or legal guardian wants to designate a caregiver: No  Support Systems: Family Member(s), Friends / Neighbors  Housing / Facility: [address]  Do You Take your Prescribed Medications Regularly: Yes  Mobility Issues: No  Prior Services: None  Durable Medical Equipment: Not Applicable    Discharge Preparedness  What is your plan after discharge?: Home with help, Home health care  Prior Functional Level: Ambulatory, Drives Self, Independent with Activities of Daily Living, Independent with Medication Management  Difficulity with ADLs: None  Difficulity with IADLs: None    Functional Assesment  Prior Functional Level: Ambulatory, Drives Self, Independent with Activities of Daily Living, Independent with Medication Management    Finances  Prescription Coverage: Yes    Vision / Hearing Impairment  Right Eye Vision: Impaired, Wears Glasses  Left Eye Vision:  Impaired, Wears Glasses              Domestic Abuse  Have you ever been the victim of abuse or violence?: No    Psychological Assessment  History of Substance Abuse: None  History of Psychiatric Problems: No

## 2021-06-07 NOTE — PROGRESS NOTES
[facility] Medicine Daily Progress Note    Date of Service  6/7/2021    Chief Complaint  70 y.o. male presented 6/6/2021 with syncope and fall    Hospital Course  71 y/o M with COPD presented with syncope suffering a fall and subsequent left intertrochanteric fracture (noted on CT).  D-dimer elevated greater than 20 on admission.  Subsequent CTA negative for PE.  Patient complains of 2 syncopal episodes this past week and does have associated murmur (echo requested). Orthopedic surgery consulted by ERP and patient was kelp n.p.o. and held anticoagulation for potential surgical intervention today 6/6  Hemodynamically stable - Pt was subsequently admitted for further management.      6/6 on T3 unit:  Vitals wnl; afebrile. BP a bit soft. Pain scale reported - 5 in left hip  Blood glucose in last 24hrs - 238 (HbA1C 5.5)  Based on nature of the clinical history, likely cause of syncope is a vasovagal episode (too much anti-BP +/- dehydration).   Exam: grossly unremarkable except left leg ROM limited due to pain; soft murmur in 2nd LLSB.   EKG - NSR, QTc 463  Leukocytosis is likely reactive; IVF started for hypoNa and hypoCl (with a slight bump in Cr)  Pt underwent surgical treatment of left intertrochanteric femur fracture with dynamic hip screw and sideplate in PM.     Interval Problem Update  6/7:  Vitals reviewed; afebrile.  The rest of the vitals within normal parameters. SBP in 90s.   Pain scale reported - none this AM  Blood glucose in last 24hrs - mid 100s   I/O - was retaining overnight with bladder scan >1000cc - required a menon placement overnight.       At bedside, reported that pain is adequately controlled - able to participate with PT/OT - to re-eval for stairs tomorrow 6/8. Possible etiology of syncopal discussed - stop HCTZ and c/w lisinopril discussed. Echo pending for murmur.     Labs reviewed  WBC 15.5 > 13.7  Hb 14.3 > 10.8   > 133  CR 1.07 > 0.78    Consultants/Specialty  Orthopedics     Code  Status  Full Code    Disposition  Likely home in AM 6/8   Home health referral placed     Barrier to DC: continued monitoring (Echo), PT/OT (will need to see how Pt engages stairs), pain management    Discussed with patient, patient's nurse and with multidisciplinary team during rounds including , pharmacist and charge nurse.        Review of Systems  Review of Systems   Constitutional: Negative for chills, fever and malaise/fatigue.   HENT: Negative for congestion and sore throat.    Respiratory: Negative for cough, sputum production and shortness of breath.    Cardiovascular: Negative for chest pain, palpitations and leg swelling.   Gastrointestinal: Negative for abdominal pain, heartburn, nausea and vomiting.   Genitourinary: Negative for dysuria, frequency and urgency.   Musculoskeletal: Positive for falls and joint pain. Negative for myalgias and neck pain.   Neurological: Positive for dizziness. Negative for focal weakness and headaches.   Psychiatric/Behavioral: Negative for depression.        Physical Exam  Temp:  [36.2 °C (97.2 °F)-37.8 °C (100.1 °F)] 36.3 °C (97.3 °F)  Pulse:  [79-98] 98  Resp:  [12-21] 17  BP: ()/(49-71) 95/53  SpO2:  [88 %-100 %] 88 %    Physical Exam  Vitals and nursing note reviewed.   Constitutional:       General: He is not in acute distress.     Appearance: Normal appearance. He is not ill-appearing.   HENT:      Head: Normocephalic and atraumatic.      Mouth/Throat:      Mouth: Mucous membranes are moist.      Pharynx: Oropharynx is clear.   Eyes:      General: No scleral icterus.     Conjunctiva/sclera: Conjunctivae normal.   Cardiovascular:      Rate and Rhythm: Normal rate and regular rhythm.      Pulses: Normal pulses.      Heart sounds: Murmur heard.     Pulmonary:      Effort: Pulmonary effort is normal. No respiratory distress.      Breath sounds: Normal breath sounds. No wheezing.   Abdominal:      General: Bowel sounds are normal. There is no distension.       Palpations: Abdomen is soft.      Tenderness: There is no abdominal tenderness.   Genitourinary:     Comments: Pittman in place  Musculoskeletal:         General: Tenderness (Left hip) present. No swelling.   Skin:     General: Skin is warm and dry.      Capillary Refill: Capillary refill takes less than 2 seconds.   Neurological:      General: No focal deficit present.      Mental Status: He is alert and oriented to person, place, and time. Mental status is at baseline.   Psychiatric:         Mood and Affect: Mood normal.         Fluids    Intake/Output Summary (Last 24 hours) at 6/7/2021 1033  Last data filed at 6/7/2021 0600  Gross per 24 hour   Intake 4190 ml   Output 1200 ml   Net 2990 ml       Laboratory  Recent Labs     06/06/21  0122 06/07/21  0619   WBC 15.3* 13.7*   RBC 4.82 3.56*   HEMOGLOBIN 14.3 10.8*   HEMATOCRIT 43.3 33.0*   MCV 89.8 92.7   MCH 29.7 30.3   MCHC 33.0* 32.7*   RDW 40.8 42.6   PLATELETCT 264 232   MPV 9.4 9.7     Recent Labs     06/06/21  0122 06/07/21  0619   SODIUM 129* 132*   POTASSIUM 4.1 3.6   CHLORIDE 93* 98   CO2 22 21   GLUCOSE 238* 144*   BUN 28* 13   CREATININE 1.07 0.78   CALCIUM 8.7 8.3*                   Imaging  DX-PORTABLE FLUORO > 1 HOUR   Final Result      Portable fluoroscopy utilized for 54 seconds.         INTERPRETING LOCATION: [address] [place] [address], [id number]      DX-HIP-UNILATERAL-W/O PELVIS-2/3 VIEWS LEFT   Final Result      1.  Previous ORIF of left hip.      2.  No evidence of acute fracture and/or dislocation.      CT-CTA CHEST PULMONARY ARTERY W/ RECONS   Final Result      1.  No evidence of pulmonary emboli or other acute thoracic abnormality.   2.  Emphysema, diffuse bladder bolus formation in both lungs.   3.  Bilateral low-attenuation adrenal gland lesions.            CT-HIP W/O PLUS RECONS LEFT   Final Result      Intertrochanteric fracture of the left femur.      CT-HEAD W/O   Final Result      1.  No acute intracranial abnormality.   2.  Age-consistent  atrophy, along with chronic white matter lucencies in both cerebral hemispheres as noted above.               INTERPRETING LOCATION:  [address], [place], [address]      DX-PELVIS-TRAUMA SERIES  3-   Final Result      No acute bony findings.      DX-CHEST-PORTABLE (1 VIEW)   Final Result      Minimal diffuse bilateral interstitial opacities, consistent with mild pulmonary edema.      EC-ECHOCARDIOGRAM COMPLETE W/O CONT    (Results Pending)        Assessment/Plan  * Syncope- (present on admission)  Assessment & Plan  Murmur appreciated on physical examination.  Most likely consistent with mitral regurgitation no signs of aortic insufficiency per auscultation; however, ordered echocardiogram to access (pending)  Sinus tachycardia appreciated on EKG without signs of S1Q3T3.  D-dimer was elevated however CTA negative for pulmonary emboli.  Trop neg; no chest pain    Based on nature of the clinical history, likely cause of syncope is a vasovagal episode (too much anti-BP +/- dehydration).   BP meds being adjusted  Pt has been advised to have a regular BP check (he will get a BP machine) and to take his time changing positions.       Anemia  Assessment & Plan  Likely acute blood loss related anemia from procedure  No other terri bleeding   Will monitor for now    Acute urinary retention- (present on admission)  Assessment & Plan  Likely post-op related  Acute retention that needed a menon cath placement overnight.   Flomax added  Will try a voiding trial in PM 6/7; if still retention, then will have to go home with menon and f/u outpatient     Intertrochanteric fracture of femur (HCC)- (present on admission)  Assessment & Plan  Intertrochanteric femur fracture of left femur appreciated on CT scan as seen above  Pt underwent surgical treatment of left intertrochanteric femur fracture with dynamic hip screw and sideplate in PM of 6/5.  Analgesics for pain control  Pulses intact and no paresthesias associated.    Enoxaparin as  VTE restarted 6/7.   PT/OT eval - pending assessment for stairs at home.     Cigarette nicotine dependence, uncomplicated - (present on admission)  Assessment & Plan  Smoking cessation counseling commenced at bedside.    Patient in agreement with nicotine patch for smoking cessation.    Recommend outpatient follow-up with PCP for continuation of smoking cessation and NRT if agreeable.     Dyslipidemia- (present on admission)  Assessment & Plan  C/w home statin    Essential hypertension- (present on admission)  Assessment & Plan  BP has been borderline low here  IVF provided  Diuretics HCTZ on hold  Lisinopril decreased to 10mg daily       VTE prophylaxis: Enoxaparin

## 2021-06-07 NOTE — OP REPORT
DATE OF OPERATION: 6/6/2021     PREOPERATIVE DIAGNOSIS: left intertrochanteric femur fracture    POSTOPERATIVE DIAGNOSIS: Same    PROCEDURE PERFORMED: Surgical treatment of left intertrochanteric femur fracture with dynamic hip screw and sideplate    SURGEON: Avinash Suazo M.D.     ANESTHESIOLOGIST: Dr. Zaldivar    ANESTHESIA: General    ASA CLASSIFICATION: II    INDICATIONS: The patient is a 70 y.o. male with a left intertrochanteric femur fracture resulting from a ground level fall.  The patient denies antecedent pain, and was found to have a normal neurovascular exam and skin envelope.  Radiographs demonstrated the intertrochanteric femur fracture.  Given these findings, the patient is an appropriately indicated candidate for surgical treatment of the intertrochanteric fracture.  I discussed the risks and benefits of the procedure, including the risks of infection, wound healing complication, neurovascular injury, persistent hip pain, malunion, non-union, malrotation, and the medical risks of anesthesia including MI, stroke, and death.  Benefits include early mobilization, improved chance of union, and reduction in the medical risks of hip fractures.  Alternatives to surgery were also discussed, including non-operative management, which I did not recommend.  The patient was in agreement with the plan to proceed, and the informed consent was signed and documented.  I met with the patient pre-operatively and marked the operative extremity with their agreement.  We proceeded to the operating room.     WOUND CLASSIFICATION: Class I    SPECIMEN: None    ESTIMATED BLOOD LOSS: 125 mL    PROCEDURE:  The patient underwent anesthesia, and was positioned supine on the fracture table with all bony prominences were well padded.  Sequential compression devices were employed.  Preoperative imaging was obtained, demonstrated good reduction of the fracture using the table. The correct operative site was prepped and draped into a  sterile field, and the surgical team scrubbed in.  A procedural pause was conducted to verify correct patient, correct extremity, presence of the surgeons initials on the operative extremity, and administration of IV antibiotics, in this case, Ancef.    The starting guide pin for the [facility] DHS was laid over the proximal femur, and fluoroscopy was used to localize a lateral thigh incision.  We then dissected down to the lateral femur, incising the fascia with cautery, and elevated the vastus lateralis off the posterior septum to expose the lateral femur.  We then further reduced the fracture using a bone hook and zimmerman elevator.    Then, using the 135 degree guide, we advanced the guide pin for the DHS lag screw to a center-center position in the femoral neck and head, confirmed by AP and lateral fluoroscopy.  We measured for our lag screw, and selected a 95 mm screw.  We then reamed for our screw using the triple reamer.  We tapped for the screw over the guide pin.  We then placed the lag screw by hand.  When that was complete, we advanced the 2-hole 135 degree DHS long barrel side plate over the lag screw, and impacted it into place.  We then drilled for, and placed, two 4.5 mm cortical non-locking screws, each achieving excellent bicortical purchase.  We then confirmed our reduction and hardware position on AP and lateral fluoroscopy, and were quite satisfied.    We then irrigated all wounds with normal saline, and closed in layers, with#1 Vicryl in the fascia, 2-0 Vicryl in the subcutaneous tissue, and staples in the skin.  Sterile dressings were applied.       The patient tolerated the procedure well. There were no apparent complications. All sponge, needle, and instrument counts were correct on two separate occasions. He was awakened, extubated, and transferred to the recovery room in satisfactory condition.     Post-Operative Plan:    1.  The patient should bear weight as tolerated on their operative extremity.   Gait aids (crutch or crutches, cane, walker) may be used as needed, and may be discontinued when no longer required.  2.  IV antibiotics - may be continued for 24 hours, but are not required.  3.  DVT prophylaxis - SCD's and Lovenox 40 mg SQ daily while inpatient.  The patient may transition to Aspirin 325 mg PO BID as an outpatient  4.  Discharge planning  ____________________________________   Avinash Suazo M.D.   DD: 6/6/2021  8:21 PM

## 2021-06-07 NOTE — RESPIRATORY CARE
"  COPD EDUCATION by COPD CLINICAL EDUCATOR  6/7/2021  at  11:26 AM by Zeinab Li, RRT     Patient interviewed by COPD education team.  Patient unable to participate in full program.  Short intervention has been conducted.  A comprehensive packet including information about COPD and home treatment.    Smoking Cessation Intervention and education completed, 5 minutes spent on smoking cessation education with patient.    Provided smoking cessation packet with \"Tips to Quit\" and brochure for \"Free Smoking Cessation Classes\".     COPD Assessment  COPD Clinical Specialists ONLY  COPD Education Initiated: Yes--Short Intervention  Physician Name: ELSA RESENDIZ  Pulmonologist Name: not on file. patient not interested in follow up at this time  Referrals Initiated: Yes  Pulmonary Rehab: N/A  Smoking Cessation: Yes  $ Smoking Cessation 3-10 Minutes: Symptomatic  Smoking Pack Years: 40  Hospice: N/A  Home Health Care: Yes  [facility] Outreach: Yes  [facility]: Yes (outreach 5/14/2021. information given to patient)  [facility]: N/A  Private In-Home Care Agency: N/A  Is this a COPD exacerbation patient?: No  $ Demo/Eval of SVN's, MDI's and Aerosols: Yes    Meds to Beds  Would the patient like to opt in for Bedside Medication Delivery at Discharge?: Yes, interested     MY COPD ACTION PLAN     It is recommended that patients and physicians /healthcare providers complete this action plan together. This plan should be discussed at each physician visit and updated as needed.    The green, yellow and red zones show groups of symptoms of COPD. This list of symptoms is not comprehensive, and you may experience other symptoms. In the \"Actions\" column, your healthcare provider has recommended actions for you to take based on your symptoms.    Patient Name: Bright Shirley   YOB: 1950   Last Updated on: 6/7/2021 11:25 AM   Green Zone:  I am doing well today Actions   •  Usual " "activitiy and exercise level •  Take daily medications   •  Usual amounts of cough and phlegm/mucus •  Use oxygen as prescribed   •  Sleep well at night •  Continue regular exercise/diet plan   •  Appetite is good •  At all times avoid cigarette smoke, inhaled irritants     Daily Medications (these medications are taken every day):                Yellow Zone:  I am having a bad day or a COPD flare Actions   •  More breathless than usual •  Continue daily medications   •  I have less energy for my daily activities •  Use quick relief inhaler as ordered   •  Increased or thicker phlegm/mucus •  Use oxygen as prescribed   •  Using quick relief inhaler/nebulizer more often •  Get plenty of rest   •  Swelling of ankles more than usual •  Use pursed lip breathing   •  More coughing than usual •  At all times avoid cigarette smoke, inhaled irritants   •  I feel like I have a \"chest cold\"   •  Poor sleep and my symptoms woke me up   •  My appetite is not good   •  My medicine is not helping    •  Call provider immediately if symptoms don’t improve     Continue daily medications, add rescue medications:   Albuterol 2 Puffs Every 6 hours PRN       Medications to be used during a flare up, (as Discussed with Provider):           Additional Information:  Use with spacer    Red Zone:  I need urgent medical care Actions   •  Severe shortness of breath even at rest •  Call 911 or seek medical care immediately   •  Not able to do any activity because of breathing    •  Fever or shaking chills    •  Feeling confused or very drowsy     •  Chest pains    •  Coughing up blood              "

## 2021-06-07 NOTE — THERAPY
"Occupational Therapy   Initial Evaluation     Patient Name: Bright Shirley  Age:  70 y.o., Sex:  male  Medical Record #: [id number]  Today's Date: 6/7/2021     Precautions  Precautions: (P) Fall Risk, Weight Bearing As Tolerated Left Lower Extremity    Assessment  Patient is 70 y.o. male admitted following a GLF pt sustained a left femur fracture s/p dynamic hip screw and sideplate, WBAT LLE. Pt lives in a [facility] with [address] [facility] and a roommate who can assist as needed. Pt able to complete most mobility with Nicolle, Nicolle needed for lower body dressing with adaptive equipment, pt would benefit from continued skilled therapy while admitted with no further needs at discharge.    Plan    Recommend Occupational Therapy 3 times per week until therapy goals are met for the following treatments:  Adaptive Equipment, Self Care/Activities of Daily Living, Therapeutic Activities and Therapeutic Exercises.    DC Equipment Recommendations: (P) Other (Comments) (sock aide, reacher)  Discharge Recommendations: (P) Anticipate that the patient will have no further occupational therapy needs after discharge from the hospital     Subjective    \"My roommate can help me with that\"     Objective       06/07/21 0907   Prior Living Situation   Prior Services None   Housing / Facility 1 Story Apartment / Condo   Steps Into Home 6   Bathroom Set up Bathtub / Shower Combination   Equipment Owned None   Lives with - Patient's Self Care Capacity Unrelated Adult   Comments Roommate able to assist as needed   Prior Level of ADL Function   Self Feeding Independent   Grooming / Hygiene Independent   Bathing Independent   Dressing Independent   Toileting Independent   Prior Level of IADL Function   Medication Management Independent   Laundry Independent   Kitchen Mobility Independent   Finances Independent   Home Management Independent   Shopping Independent   Prior Level Of Mobility Independent Without Device in Community   Driving / Transportation Driving " Independent   Occupation (Pre-Hospital Vocational) Retired Due To Age   History of Falls   History of Falls Yes   Date of Last Fall   (reason for admit)   Precautions   Precautions Fall Risk;Weight Bearing As Tolerated Left Lower Extremity   Pain 0 - 10 Group   Therapist Pain Assessment Post Activity Pain Same as Prior to Activity;Nurse Notified   Cognition    Cognition / Consciousness WDL   Level of Consciousness Alert   Comments Pleasant, cooperative, receptive to all education   Active ROM Upper Body   Active ROM Upper Body  WDL   Dominant Hand Right   Strength Upper Body   Upper Body Strength  WDL   Sensation Upper Body   Upper Extremity Sensation  WDL   Upper Body Muscle Tone   Upper Body Muscle Tone  WDL   Neurological Concerns   Neurological Concerns No   Coordination Upper Body   Coordination WDL   Balance Assessment   Sitting Balance (Static) Fair +   Sitting Balance (Dynamic) Fair +   Standing Balance (Static) Fair   Standing Balance (Dynamic) Fair   Weight Shift Sitting Good   Weight Shift Standing Fair   Comments w/ FWW   Bed Mobility    Supine to Sit Supervised   Scooting Supervised   ADL Assessment   Upper Body Dressing Supervision   Lower Body Dressing Minimal Assist   Toileting   (NT-cath in place, no BM needed)   How much help from another person does the patient currently need...   Putting on and taking off regular lower body clothing? 3   Bathing (including washing, rinsing, and drying)? 3   Toileting, which includes using a toilet, bedpan, or urinal? 3   Putting on and taking off regular upper body clothing? 4   Taking care of personal grooming such as brushing teeth? 4   Eating meals? 4   6 Clicks Daily Activity Score 21   Functional Mobility   Sit to Stand Minimal Assist   Bed, Chair, Wheelchair Transfer Minimal Assist   Toilet Transfers Supervised   Transfer Method Stand Step   Mobility bed mobility, bathroom mobility, hallway, up to chair   Comments w/ FWW   Visual Perception   Visual  Perception  Not Tested   Edema / Skin Assessment   Edema / Skin  Not Assessed   Activity Tolerance   Sitting in Chair left seated in chair   Sitting Edge of Bed 5   Standing 10   Patient / Family Goals   Patient / Family Goal #1 To go home   Short Term Goals   Short Term Goal # 1 Pt will complete all ADL transfers with supervision   Short Term Goal # 2 Pt will complete LB dressing with supervision   Short Term Goal # 3 Pt will complete toileting with supervision   Education Group   Education Provided Role of Occupational Therapist;Adaptive Equipment;Weight Bearing Precautions   Role of Occupational Therapist Patient Response Patient;Acceptance;Explanation   Adaptive Equipment Patient Response Patient;Acceptance;Explanation;Handout;Action Demonstration   Weight Bearing Precautions Patient Response Patient;Acceptance;Explanation;Action Demonstration   Problem List   Problem List Decreased Active Daily Living Skills;Decreased Homemaking Skills;Decreased Functional Mobility;Decreased Activity Tolerance   Interdisciplinary Plan of Care Collaboration   IDT Collaboration with  Nursing;Physical Therapist   Patient Position at End of Therapy Seated;Call Light within Reach;Tray Table within Reach;Phone within Reach   Collaboration Comments RN updated

## 2021-06-07 NOTE — DISCHARGE PLANNING
Received Choice form at 1200  Agency/Facility Name: [facility]  Referral sent per Choice form @ 1200.

## 2021-06-07 NOTE — PROGRESS NOTES
"   Orthopaedic Progress Note    Interval changes:  Patient doing well  Cleared for DC by ortho pending medicine clearance    ROS - Patient denies any new issues.  Pain well controlled.    BP (!) 94/58   Pulse (!) 106   Temp 36.4 °C (97.6 °F) (Temporal)   Resp 18   Ht 1.702 m (5' 7\")   Wt 59.9 kg (132 lb)   SpO2 95%       Patient seen and examined  No acute distress  Breathing non labored  RRR  L hip Surgical dressing is clean, dry, and intact. Patient clearly fires tibialis anterior, EHL, and gastrocnemius/soleus. Sensation is intact to light touch throughout superficial peroneal, deep peroneal, tibial, saphenous, and sural nerve distributions. Strong and palpable 2+ dorsalis pedis and posterior tibial pulses with capillary refill less than 2 seconds. No lower leg tenderness or discomfort.        Recent Labs     06/06/21  0122 06/07/21  0619   WBC 15.3* 13.7*   RBC 4.82 3.56*   HEMOGLOBIN 14.3 10.8*   HEMATOCRIT 43.3 33.0*   MCV 89.8 92.7   MCH 29.7 30.3   MCHC 33.0* 32.7*   RDW 40.8 42.6   PLATELETCT 264 232   MPV 9.4 9.7       Active Hospital Problems    Diagnosis    • Acute urinary retention [R33.8]    • Anemia [D64.9]    • Intertrochanteric fracture of femur (HCC) [S72.143A]    • Syncope [R55]    • Cigarette nicotine dependence, uncomplicated  [F17.210]    • Dyslipidemia [E78.5]    • Essential hypertension [I10]        Assessment/Plan:  Patient doing well  Cleared for DC by ortho pending medicine clearance  POD#1 S/P Surgical treatment of left intertrochanteric femur fracture with dynamic hip screw and sideplate  Wt bearing status - WBAT  Wound care/Drains - dressing changed every other day by nursing  Future Procedures - none planned   Lovenox: Start 6/7, Duration-until ambulatory > 150'  Sutures/Staples out- 10-14 days post operatively  PT/OT-initiated  Antibiotics: completed  DVT Prophylaxis- TEDS/SCDs/Foot pumps  [place]man-none  Case Coordination for Discharge Planning - Disposition home   "

## 2021-06-08 ENCOUNTER — HOME HEALTH ADMISSION (OUTPATIENT)
Dept: HOME HEALTH SERVICES | Facility: HOME HEALTHCARE | Age: 71
End: 2021-06-08
Payer: MEDICARE

## 2021-06-08 PROBLEM — J96.01 ACUTE HYPOXEMIC RESPIRATORY FAILURE (HCC): Status: ACTIVE | Noted: 2021-06-08

## 2021-06-08 LAB
BASOPHILS # BLD AUTO: 0.3 % (ref 0–1.8)
BASOPHILS # BLD: 0.03 K/UL (ref 0–0.12)
EOSINOPHIL # BLD AUTO: 0.01 K/UL (ref 0–0.51)
EOSINOPHIL NFR BLD: 0.1 % (ref 0–6.9)
ERYTHROCYTE [DISTWIDTH] IN BLOOD BY AUTOMATED COUNT: 41.9 FL (ref 35.9–50)
HCT VFR BLD AUTO: 29.3 % (ref 42–52)
HGB BLD-MCNC: 9.6 G/DL (ref 14–18)
IMM GRANULOCYTES # BLD AUTO: 0.05 K/UL (ref 0–0.11)
IMM GRANULOCYTES NFR BLD AUTO: 0.5 % (ref 0–0.9)
LYMPHOCYTES # BLD AUTO: 1.85 K/UL (ref 1–4.8)
LYMPHOCYTES NFR BLD: 17.6 % (ref 22–41)
MCH RBC QN AUTO: 30.1 PG (ref 27–33)
MCHC RBC AUTO-ENTMCNC: 32.8 G/DL (ref 33.7–35.3)
MCV RBC AUTO: 91.8 FL (ref 81.4–97.8)
MONOCYTES # BLD AUTO: 0.75 K/UL (ref 0–0.85)
MONOCYTES NFR BLD AUTO: 7.1 % (ref 0–13.4)
NEUTROPHILS # BLD AUTO: 7.81 K/UL (ref 1.82–7.42)
NEUTROPHILS NFR BLD: 74.4 % (ref 44–72)
NRBC # BLD AUTO: 0 K/UL
NRBC BLD-RTO: 0 /100 WBC
PLATELET # BLD AUTO: 190 K/UL (ref 164–446)
PMV BLD AUTO: 9.3 FL (ref 9–12.9)
PROCALCITONIN SERPL-MCNC: 0.25 NG/ML
RBC # BLD AUTO: 3.19 M/UL (ref 4.7–6.1)
WBC # BLD AUTO: 10.5 K/UL (ref 4.8–10.8)

## 2021-06-08 PROCEDURE — A9270 NON-COVERED ITEM OR SERVICE: HCPCS | Performed by: INTERNAL MEDICINE

## 2021-06-08 PROCEDURE — 97116 GAIT TRAINING THERAPY: CPT | Mod: CQ

## 2021-06-08 PROCEDURE — 700102 HCHG RX REV CODE 250 W/ 637 OVERRIDE(OP): Performed by: STUDENT IN AN ORGANIZED HEALTH CARE EDUCATION/TRAINING PROGRAM

## 2021-06-08 PROCEDURE — 84145 PROCALCITONIN (PCT): CPT

## 2021-06-08 PROCEDURE — 36415 COLL VENOUS BLD VENIPUNCTURE: CPT

## 2021-06-08 PROCEDURE — 97530 THERAPEUTIC ACTIVITIES: CPT | Mod: CQ

## 2021-06-08 PROCEDURE — 770006 HCHG ROOM/CARE - MED/SURG/GYN SEMI*

## 2021-06-08 PROCEDURE — 700105 HCHG RX REV CODE 258: Performed by: STUDENT IN AN ORGANIZED HEALTH CARE EDUCATION/TRAINING PROGRAM

## 2021-06-08 PROCEDURE — A9270 NON-COVERED ITEM OR SERVICE: HCPCS | Performed by: STUDENT IN AN ORGANIZED HEALTH CARE EDUCATION/TRAINING PROGRAM

## 2021-06-08 PROCEDURE — 700111 HCHG RX REV CODE 636 W/ 250 OVERRIDE (IP): Performed by: STUDENT IN AN ORGANIZED HEALTH CARE EDUCATION/TRAINING PROGRAM

## 2021-06-08 PROCEDURE — 99232 SBSQ HOSP IP/OBS MODERATE 35: CPT | Performed by: INTERNAL MEDICINE

## 2021-06-08 PROCEDURE — 85025 COMPLETE CBC W/AUTO DIFF WBC: CPT

## 2021-06-08 PROCEDURE — 700102 HCHG RX REV CODE 250 W/ 637 OVERRIDE(OP): Performed by: INTERNAL MEDICINE

## 2021-06-08 PROCEDURE — 97110 THERAPEUTIC EXERCISES: CPT | Mod: CQ

## 2021-06-08 RX ADMIN — SODIUM CHLORIDE: 9 INJECTION, SOLUTION INTRAVENOUS at 10:00

## 2021-06-08 RX ADMIN — TAMSULOSIN HYDROCHLORIDE 0.4 MG: 0.4 CAPSULE ORAL at 09:44

## 2021-06-08 RX ADMIN — LISINOPRIL 10 MG: 10 TABLET ORAL at 05:47

## 2021-06-08 RX ADMIN — OXYCODONE 2.5 MG: 5 TABLET ORAL at 12:56

## 2021-06-08 RX ADMIN — ASPIRIN 81 MG: 81 TABLET, COATED ORAL at 17:16

## 2021-06-08 RX ADMIN — NICOTINE 14 MG: 14 PATCH TRANSDERMAL at 05:48

## 2021-06-08 RX ADMIN — OXYCODONE 2.5 MG: 5 TABLET ORAL at 09:44

## 2021-06-08 RX ADMIN — OXYCODONE 5 MG: 5 TABLET ORAL at 02:27

## 2021-06-08 RX ADMIN — SIMVASTATIN 10 MG: 20 TABLET, FILM COATED ORAL at 17:16

## 2021-06-08 RX ADMIN — DOCUSATE SODIUM 50 MG AND SENNOSIDES 8.6 MG 2 TABLET: 8.6; 5 TABLET, FILM COATED ORAL at 05:48

## 2021-06-08 RX ADMIN — ENOXAPARIN SODIUM 40 MG: 40 INJECTION SUBCUTANEOUS at 05:47

## 2021-06-08 ASSESSMENT — GAIT ASSESSMENTS
ASSISTIVE DEVICE: FRONT WHEEL WALKER
DEVIATION: ANTALGIC;BRADYKINETIC;SHUFFLED GAIT
GAIT LEVEL OF ASSIST: MINIMAL ASSIST
DISTANCE (FEET): 10

## 2021-06-08 ASSESSMENT — ENCOUNTER SYMPTOMS
CHILLS: 0
FEVER: 0
PALPITATIONS: 0
HEARTBURN: 0
FOCAL WEAKNESS: 0
DEPRESSION: 0
COUGH: 0
MYALGIAS: 0
ABDOMINAL PAIN: 0
SORE THROAT: 0
VOMITING: 0
SHORTNESS OF BREATH: 0
DIZZINESS: 0
HEADACHES: 0
NAUSEA: 0
SPUTUM PRODUCTION: 0
NECK PAIN: 0

## 2021-06-08 ASSESSMENT — COGNITIVE AND FUNCTIONAL STATUS - GENERAL
MOVING FROM LYING ON BACK TO SITTING ON SIDE OF FLAT BED: A LOT
SUGGESTED CMS G CODE MODIFIER MOBILITY: CK
MOVING TO AND FROM BED TO CHAIR: A LOT
WALKING IN HOSPITAL ROOM: A LITTLE
STANDING UP FROM CHAIR USING ARMS: A LITTLE
MOBILITY SCORE: 15
TURNING FROM BACK TO SIDE WHILE IN FLAT BAD: A LITTLE
CLIMB 3 TO 5 STEPS WITH RAILING: A LOT

## 2021-06-08 ASSESSMENT — PAIN DESCRIPTION - PAIN TYPE
TYPE: SURGICAL PAIN
TYPE: ACUTE PAIN;SURGICAL PAIN
TYPE: SURGICAL PAIN
TYPE: ACUTE PAIN;SURGICAL PAIN
TYPE: SURGICAL PAIN

## 2021-06-08 NOTE — PROGRESS NOTES
Called echo tech regarding echo results still pending. Per echo tech, results finalized and should be available shortly.

## 2021-06-08 NOTE — THERAPY
"Physical Therapy   Daily Treatment     Patient Name: Bright Shirley  Age:  70 y.o., Sex:  male  Medical Record #: [id number]  Today's Date: 6/8/2021     Precautions: Fall Risk, Weight Bearing As Tolerated Left Lower Extremity    Assessment    Pt presenting w/ decreased functional mobility than initial eval. Pt more limited by fatigue and SOB. Pt demonstrating a significant increase WOB after only 10 feet of gait causing pt to shake and require more assist for balance. Pt needing a lot of effort to perform stairs and heavy use of B HR however pt only has one HR at home. Pts O2 dropping to 78 on 1L during mobility and he needed 5 min and an increase to 3L to get to 90. Pt provided an HEP to assist w/ strength. Currently, pt is not safe for DC home d/t incapability to perform household mobility today. Pt educate on increased mobility w/ nursing staff throughout the day.    Plan    Continue current treatment plan.    DC Equipment Recommendations: Front-Wheel Walker  Discharge Recommendations: Other - (Currently post acute however anticipate progression to HH)      Subjective    \"I can usually tell when I'm going to pass out.\"     Objective       06/08/21 1056   Precautions   Precautions Fall Risk;Weight Bearing As Tolerated Left Lower Extremity   Gait Analysis   Gait Level Of Assist Minimal Assist   Assistive Device Front Wheel Walker   Distance (Feet) 10   # of Times Distance was Traveled 2   Deviation Antalgic;Bradykinetic;Shuffled Gait  (Tremulous)   # of Stairs Climbed 2   Level of Assist with Stairs Minimal Assist   Comments Pt w/ a significant increase in WOB w/ little mobility causing pt to shake. Pt needing a lot of effort to perform stairs and heavy HR use.   Bed Mobility    Comments Pt up in chair pre/post. Stating he needed assist w/ L LE to get OOB.   Functional Mobility   Sit to Stand Minimal Assist   Bed, Chair, Wheelchair Transfer Minimal Assist   Transfer Method Other (Comments)  (Ambulating)   Mobility With " FWW   Short Term Goals    Short Term Goal # 1 Patient will ambulate 150ft with supervision within 6tx in order to access environment   Goal Outcome # 1 goal not met   Short Term Goal # 2 Patient will ascend/descend 6 steps with supervision within 6tx in order to enter/exit home   Goal Outcome # 2 Goal not met

## 2021-06-08 NOTE — DISCHARGE PLANNING
Anticipated Discharge Disposition: Home with HH and DME.    Action: FWW delivered yesterday by [employer]. PT states walker is defective, it rocks. Called [employer], spoke to Kelby. Replacement FWW will be delivered to bedside in 2 hrs. [facility] has accepted.    Barriers to Discharge: Pending new FWW delivery.    Plan: F/U to ensure walker delivered.

## 2021-06-08 NOTE — PROGRESS NOTES
"   Orthopaedic Progress Note    Interval changes:  Patient doing well  Cleared for DC by ortho pending medicine clearance    ROS - Patient denies any new issues.  Pain well controlled.    /68   Pulse (!) 103   Temp 37.2 °C (99 °F) (Temporal)   Resp 16   Ht 1.702 m (5' 7\")   Wt 59.9 kg (132 lb)   SpO2 91%       Patient seen and examined  No acute distress  Breathing non labored  RRR  L hip surgical dressing is clean, dry, and intact. Patient clearly fires tibialis anterior, EHL, and gastrocnemius/soleus. Sensation is intact to light touch throughout superficial peroneal, deep peroneal, tibial, saphenous, and sural nerve distributions. Strong and palpable 2+ dorsalis pedis and posterior tibial pulses with capillary refill less than 2 seconds. No lower leg tenderness or discomfort.        Recent Labs     06/06/21  0122 06/07/21  0619 06/08/21  0603   WBC 15.3* 13.7* 10.5   RBC 4.82 3.56* 3.19*   HEMOGLOBIN 14.3 10.8* 9.6*   HEMATOCRIT 43.3 33.0* 29.3*   MCV 89.8 92.7 91.8   MCH 29.7 30.3 30.1   MCHC 33.0* 32.7* 32.8*   RDW 40.8 42.6 41.9   PLATELETCT 264 232 190   MPV 9.4 9.7 9.3       Active Hospital Problems    Diagnosis    • Acute urinary retention [R33.8]    • Anemia [D64.9]    • Intertrochanteric fracture of femur (HCC) [S72.143A]    • Syncope [R55]    • Cigarette nicotine dependence, uncomplicated  [F17.210]    • Dyslipidemia [E78.5]    • Essential hypertension [I10]        Assessment/Plan:  Patient doing well  Cleared for DC by ortho pending medicine clearance  POD#2 S/P Surgical treatment of left intertrochanteric femur fracture with dynamic hip screw and sideplate  Wt bearing status - WBAT  Wound care/Drains - dressing changed every other day by nursing  Future Procedures - none planned   Lovenox: Start 6/7, Duration-until ambulatory > 150'  Sutures/Staples out- 14 days post operatively  PT/OT-initiated  Antibiotics: completed  DVT Prophylaxis- TEDS/SCDs/Foot pumps  Pittman-none  Case Coordination " for Discharge Planning - Disposition home

## 2021-06-08 NOTE — ASSESSMENT & PLAN NOTE
Patient currently on 2L of oxygen, desaturating on ambulation.  We are pending echocardiogram result  Patient had a CTA of the chest on 6/6/21, without reporting of PE. They did report emphysema on imaging report.  We have ordered bilateral US of lower extremities to rule out DVT.  We will encourage IS  Supplemental oxygen as needed    We will order procalcitonin as well.    6/9/21: Procalcitonin mildly elevated, CT scan did not report PE on 6/6. US of bilateral legs negative for DVT. No leukocytosis or fever. CT scan did report emphysema. We will start the patient on duonebs and encourage incentive spirometer.    6/10: Due to the patient's emphysema we will treat as copd exacerbation, patient complaining of sputum production, SOB, we will start prednisone 40 mg x 5 days and also ceftriaxone.

## 2021-06-08 NOTE — PROGRESS NOTES
Pt BP has been running low all day. MD notified. Orders received for bolus of  cc one time. Post bolus BP 97/62. Pt is asymptomatic.

## 2021-06-08 NOTE — CARE PLAN
The patient is Stable - Low risk of patient condition declining or worsening    Shift Goals  Clinical Goals: Pain control, up to chair for meals  Patient Goals: Pain control  Family Goals: no family present    Progress made toward(s) clinical / shift goals:  Pain medication given (see MAR). Pt educated about non-pharmacological pain control interventions. Pain well controlled with current pain medication regimen. Pt educated about mobility goals. Pt up to chair for lunch.    Patient is not progressing towards the following goals:none      Problem: Pain - Standard  Goal: Alleviation of pain or a reduction in pain to the patient’s comfort goal  Outcome: Progressing     Problem: Knowledge Deficit - Standard  Goal: Patient and family/care givers will demonstrate understanding of plan of care, disease process/condition, diagnostic tests and medications  Outcome: Progressing     Problem: Fall Risk  Goal: Patient will remain free from falls  Outcome: Progressing

## 2021-06-08 NOTE — PROGRESS NOTES
Hospital Medicine Daily Progress Note    Date of Service  6/8/2021    Chief Complaint  70 y.o. male presented 6/6/2021 with syncope and fall    Hospital Course  69 y/o M with COPD presented with syncope suffering a fall and subsequent left intertrochanteric fracture (noted on CT).  D-dimer elevated greater than 20 on admission.  Subsequent CTA negative for PE.  Patient complains of 2 syncopal episodes this past week and does have associated murmur (echo requested). Orthopedic surgery consulted by ERP and patient was kelp n.p.o. and held anticoagulation for potential surgical intervention today 6/6  Hemodynamically stable - Pt was subsequently admitted for further management.      6/6 on T3 unit:  Vitals wnl; afebrile. BP a bit soft. Pain scale reported - 5 in left hip  Blood glucose in last 24hrs - 238 (HbA1C 5.5)  Based on nature of the clinical history, likely cause of syncope is a vasovagal episode (too much anti-BP +/- dehydration).   Exam: grossly unremarkable except left leg ROM limited due to pain; soft murmur in 2nd LLSB.   EKG - NSR, QTc 463  Leukocytosis is likely reactive; IVF started for hypoNa and hypoCl (with a slight bump in Cr)  Pt underwent surgical treatment of left intertrochanteric femur fracture with dynamic hip screw and sideplate in PM.     Interval Problem Update  6/7:  Vitals reviewed; afebrile.  The rest of the vitals within normal parameters. SBP in 90s.   Pain scale reported - none this AM  Blood glucose in last 24hrs - mid 100s   I/O - was retaining overnight with bladder scan >1000cc - required a menon placement overnight.       At bedside, reported that pain is adequately controlled - able to participate with PT/OT - to re-eval for stairs tomorrow 6/8. Possible etiology of syncopal discussed - stop HCTZ and c/w lisinopril discussed. Echo pending for murmur.     PATIENT SEEN BY PREVIOUS HOSPITALIST UNTIL 6/7/21 6/8/21: Patient seen at bedside this morning.  Patient currently using  oxygen, the patient denies using oxygen at home.  CTA of the chest on 6/6/21 was negative for PE.  We have ordered ultrasound of the lower extremities to rule out DVT.  We have also ordered procalcitonin, pending result. As per nursing no other overnight events reported.    Labs reviewed  WBC 15.5 > 13.7  Hb 14.3 > 10.8   > 133  CR 1.07 > 0.78    Consultants/Specialty  Orthopedics     Code Status  Full Code    Disposition  Pending final recommendations.    Barrier to DC: continued monitoring (Echo), PT/OT (will need to see how Pt engages stairs), pain management    Discussed with patient, patient's nurse and with multidisciplinary team during rounds including , pharmacist and charge nurse.        Review of Systems  Review of Systems   Constitutional: Negative for chills, fever and malaise/fatigue.   HENT: Negative for congestion and sore throat.    Respiratory: Negative for cough, sputum production and shortness of breath.    Cardiovascular: Negative for chest pain, palpitations and leg swelling.   Gastrointestinal: Negative for abdominal pain, heartburn, nausea and vomiting.   Genitourinary: Negative for dysuria, frequency and urgency.   Musculoskeletal: Positive for joint pain. Negative for myalgias and neck pain.   Neurological: Negative for dizziness, focal weakness and headaches.   Psychiatric/Behavioral: Negative for depression.        Physical Exam  Temp:  [36.8 °C (98.3 °F)-37.3 °C (99.1 °F)] 36.8 °C (98.3 °F)  Pulse:  [102-107] 103  Resp:  [16-18] 17  BP: ()/(60-69) 127/61  SpO2:  [91 %-96 %] 93 %    Physical Exam  Vitals and nursing note reviewed.   Constitutional:       General: He is not in acute distress.     Appearance: Normal appearance. He is not ill-appearing.   HENT:      Head: Normocephalic and atraumatic.      Mouth/Throat:      Mouth: Mucous membranes are moist.      Pharynx: Oropharynx is clear.   Eyes:      General: No scleral icterus.     Conjunctiva/sclera:  Conjunctivae normal.   Cardiovascular:      Rate and Rhythm: Normal rate and regular rhythm.      Pulses: Normal pulses.   Pulmonary:      Effort: Pulmonary effort is normal. No respiratory distress.      Breath sounds: Normal breath sounds. No wheezing.   Abdominal:      General: Bowel sounds are normal. There is no distension.      Palpations: Abdomen is soft.      Tenderness: There is no abdominal tenderness.   Genitourinary:     Comments: Pittman in place  Musculoskeletal:         General: Tenderness (Left hip) present. No swelling.   Skin:     General: Skin is warm and dry.      Capillary Refill: Capillary refill takes less than 2 seconds.   Neurological:      General: No focal deficit present.      Mental Status: He is alert and oriented to person, place, and time. Mental status is at baseline.   Psychiatric:         Mood and Affect: Mood normal.         Fluids    Intake/Output Summary (Last 24 hours) at 6/8/2021 1653  Last data filed at 6/8/2021 1338  Gross per 24 hour   Intake 1949 ml   Output 2825 ml   Net -876 ml       Laboratory  Recent Labs     06/06/21  0122 06/07/21  0619 06/08/21  0603   WBC 15.3* 13.7* 10.5   RBC 4.82 3.56* 3.19*   HEMOGLOBIN 14.3 10.8* 9.6*   HEMATOCRIT 43.3 33.0* 29.3*   MCV 89.8 92.7 91.8   MCH 29.7 30.3 30.1   MCHC 33.0* 32.7* 32.8*   RDW 40.8 42.6 41.9   PLATELETCT 264 232 190   MPV 9.4 9.7 9.3     Recent Labs     06/06/21  0122 06/07/21  0619   SODIUM 129* 132*   POTASSIUM 4.1 3.6   CHLORIDE 93* 98   CO2 22 21   GLUCOSE 238* 144*   BUN 28* 13   CREATININE 1.07 0.78   CALCIUM 8.7 8.3*                   Imaging  EC-ECHOCARDIOGRAM COMPLETE W/O CONT         DX-PORTABLE FLUORO > 1 HOUR   Final Result      Portable fluoroscopy utilized for 54 seconds.         INTERPRETING LOCATION: [address] [place] [address] [place], [id number]      DX-HIP-UNILATERAL-W/O PELVIS-2/3 VIEWS LEFT   Final Result      1.  Previous ORIF of left hip.      2.  No evidence of acute fracture and/or dislocation.      CT-CTA CHEST  PULMONARY ARTERY W/ RECONS   Final Result      1.  No evidence of pulmonary emboli or other acute thoracic abnormality.   2.  Emphysema, diffuse bladder bolus formation in both lungs.   3.  Bilateral low-attenuation adrenal gland lesions.            CT-HIP W/O PLUS RECONS LEFT   Final Result      Intertrochanteric fracture of the left femur.      CT-HEAD W/O   Final Result      1.  No acute intracranial abnormality.   2.  Age-consistent atrophy, along with chronic white matter lucencies in both cerebral hemispheres as noted above.               INTERPRETING LOCATION:  [facility], [facility], [address]      DX-PELVIS-TRAUMA SERIES  3-   Final Result      No acute bony findings.      DX-CHEST-PORTABLE (1 VIEW)   Final Result      Minimal diffuse bilateral interstitial opacities, consistent with mild pulmonary edema.      US-EXTREMITY VENOUS LOWER BILAT    (Results Pending)        Assessment/Plan  * Acute hypoxemic respiratory failure (HCC)  Assessment & Plan  Patient currently on 2L of oxygen, desaturating on ambulation.  We are pending echocardiogram result  Patient had a CTA of the chest on 6/6/21, without reporting of PE. They did report emphysema on imaging report.  We have ordered bilateral US of lower extremities to rule out DVT.  We will encourage IS  Supplemental oxygen as needed    We will order procalcitonin as well.    Intertrochanteric fracture of femur (HCC)- (present on admission)  Assessment & Plan  Intertrochanteric femur fracture of left femur appreciated on CT scan as seen above  Pt underwent surgical treatment of left intertrochanteric femur fracture with dynamic hip screw and sideplate in PM of 6/5.  Analgesics for pain control  Pulses intact and no paresthesias associated.    Enoxaparin as VTE restarted 6/7.   PT/OT eval - pending assessment for stairs at home.     6/8/21: Pending final recommendations for placement.    Anemia  Assessment & Plan  Likely acute blood loss related anemia from  procedure  No other terri bleeding   Will monitor, repeat CBC and make changes accordingly.    Acute urinary retention- (present on admission)  Assessment & Plan  Likely post-op related  Acute retention that needed a menon cath placement overnight.   Flomax added  Will try a voiding trial in PM 6/7; if still retention, then will have to go home with menon and f/u outpatient     6/8/21: It appears patient passed voiding trial, we will continue to monitor.    Syncope- (present on admission)  Assessment & Plan  Murmur appreciated on physical examination.  Most likely consistent with mitral regurgitation no signs of aortic insufficiency per auscultation; however, ordered echocardiogram to access (pending)  Sinus tachycardia appreciated on EKG without signs of S1Q3T3.  D-dimer was elevated however CTA negative for pulmonary emboli.  Trop neg; no chest pain    Based on nature of the clinical history, likely cause of syncope is a vasovagal episode (too much anti-BP +/- dehydration).   BP meds being adjusted  Pt has been advised to have a regular BP check (he will get a BP machine) and to take his time changing positions.     6/8/21: Patient currently not complaining of symptoms. Pending echocardiogram.      Cigarette nicotine dependence, uncomplicated - (present on admission)  Assessment & Plan  Smoking cessation counseling commenced at bedside.    Patient in agreement with nicotine patch for smoking cessation.    Recommend outpatient follow-up with PCP for continuation of smoking cessation and NRT if agreeable.     Dyslipidemia- (present on admission)  Assessment & Plan  C/w home statin    Essential hypertension- (present on admission)  Assessment & Plan  BP has been borderline low here  IVF provided  Diuretics HCTZ on hold  Lisinopril decreased to 10mg daily    6/8/21: We will continue with lisnopril. Monitor, make changes accordingly.       VTE prophylaxis: Enoxaparin

## 2021-06-08 NOTE — DISCHARGE PLANNING
ATTN: Case Management  RE: Referral for Home Health    As of 6/8/2021, we have accepted the Home Health referral for the patient listed above.    A [facility] clinician will be out to see the patient within 48 hours. If you have any questions or concerns regarding the patient’s transition to Home Health, please do not hesitate to contact us at [phone].      We look forward to collaborating with you,  [facility] Team

## 2021-06-08 NOTE — CARE PLAN
The patient is Stable - Low risk of patient condition declining or worsening    Shift Goals  Clinical Goals: PT/OT  Patient Goals: Therapy   Family Goals: no family present    Progress made toward(s) clinical / shift goals:  Patient pain is controlled with PO medication. Up to chair and ambulating to bathroom; standby assist with walker; steady gait. BP soft at times. Other VSS. Will continue to monitor.        Problem: Pain - Standard  Goal: Alleviation of pain or a reduction in pain to the patient’s comfort goal  Outcome: Progressing     Problem: Fall Risk  Goal: Patient will remain free from falls  Outcome: Progressing

## 2021-06-09 ENCOUNTER — APPOINTMENT (OUTPATIENT)
Dept: RADIOLOGY | Facility: MEDICAL CENTER | Age: 71
DRG: 480 | End: 2021-06-09
Attending: INTERNAL MEDICINE
Payer: MEDICARE

## 2021-06-09 PROBLEM — E87.6 HYPOKALEMIA: Status: ACTIVE | Noted: 2021-06-09

## 2021-06-09 LAB
ALBUMIN SERPL BCP-MCNC: 2.4 G/DL (ref 3.2–4.9)
ALBUMIN/GLOB SERPL: 0.9 G/DL
ALP SERPL-CCNC: 60 U/L (ref 30–99)
ALT SERPL-CCNC: 19 U/L (ref 2–50)
ANION GAP SERPL CALC-SCNC: 9 MMOL/L (ref 7–16)
AST SERPL-CCNC: 16 U/L (ref 12–45)
BASOPHILS # BLD AUTO: 0.2 % (ref 0–1.8)
BASOPHILS # BLD: 0.02 K/UL (ref 0–0.12)
BILIRUB SERPL-MCNC: 0.5 MG/DL (ref 0.1–1.5)
BUN SERPL-MCNC: 7 MG/DL (ref 8–22)
CALCIUM SERPL-MCNC: 7.4 MG/DL (ref 8.5–10.5)
CHLORIDE SERPL-SCNC: 105 MMOL/L (ref 96–112)
CO2 SERPL-SCNC: 23 MMOL/L (ref 20–33)
CREAT SERPL-MCNC: 0.55 MG/DL (ref 0.5–1.4)
EOSINOPHIL # BLD AUTO: 0.04 K/UL (ref 0–0.51)
EOSINOPHIL NFR BLD: 0.5 % (ref 0–6.9)
ERYTHROCYTE [DISTWIDTH] IN BLOOD BY AUTOMATED COUNT: 43.8 FL (ref 35.9–50)
GLOBULIN SER CALC-MCNC: 2.7 G/DL (ref 1.9–3.5)
GLUCOSE SERPL-MCNC: 100 MG/DL (ref 65–99)
HCT VFR BLD AUTO: 27.4 % (ref 42–52)
HGB BLD-MCNC: 8.6 G/DL (ref 14–18)
IMM GRANULOCYTES # BLD AUTO: 0.05 K/UL (ref 0–0.11)
IMM GRANULOCYTES NFR BLD AUTO: 0.6 % (ref 0–0.9)
LYMPHOCYTES # BLD AUTO: 1.25 K/UL (ref 1–4.8)
LYMPHOCYTES NFR BLD: 14.4 % (ref 22–41)
MAGNESIUM SERPL-MCNC: 1.9 MG/DL (ref 1.5–2.5)
MCH RBC QN AUTO: 29.5 PG (ref 27–33)
MCHC RBC AUTO-ENTMCNC: 31.4 G/DL (ref 33.7–35.3)
MCV RBC AUTO: 93.8 FL (ref 81.4–97.8)
MONOCYTES # BLD AUTO: 0.75 K/UL (ref 0–0.85)
MONOCYTES NFR BLD AUTO: 8.7 % (ref 0–13.4)
NEUTROPHILS # BLD AUTO: 6.55 K/UL (ref 1.82–7.42)
NEUTROPHILS NFR BLD: 75.6 % (ref 44–72)
NRBC # BLD AUTO: 0 K/UL
NRBC BLD-RTO: 0 /100 WBC
PLATELET # BLD AUTO: 215 K/UL (ref 164–446)
PMV BLD AUTO: 9.7 FL (ref 9–12.9)
POTASSIUM SERPL-SCNC: 3.2 MMOL/L (ref 3.6–5.5)
PROT SERPL-MCNC: 5.1 G/DL (ref 6–8.2)
RBC # BLD AUTO: 2.92 M/UL (ref 4.7–6.1)
SODIUM SERPL-SCNC: 137 MMOL/L (ref 135–145)
WBC # BLD AUTO: 8.7 K/UL (ref 4.8–10.8)

## 2021-06-09 PROCEDURE — A9270 NON-COVERED ITEM OR SERVICE: HCPCS | Performed by: STUDENT IN AN ORGANIZED HEALTH CARE EDUCATION/TRAINING PROGRAM

## 2021-06-09 PROCEDURE — 85025 COMPLETE CBC W/AUTO DIFF WBC: CPT

## 2021-06-09 PROCEDURE — 700105 HCHG RX REV CODE 258: Performed by: STUDENT IN AN ORGANIZED HEALTH CARE EDUCATION/TRAINING PROGRAM

## 2021-06-09 PROCEDURE — 36415 COLL VENOUS BLD VENIPUNCTURE: CPT

## 2021-06-09 PROCEDURE — 700111 HCHG RX REV CODE 636 W/ 250 OVERRIDE (IP): Performed by: STUDENT IN AN ORGANIZED HEALTH CARE EDUCATION/TRAINING PROGRAM

## 2021-06-09 PROCEDURE — A9270 NON-COVERED ITEM OR SERVICE: HCPCS | Performed by: INTERNAL MEDICINE

## 2021-06-09 PROCEDURE — 700102 HCHG RX REV CODE 250 W/ 637 OVERRIDE(OP): Performed by: STUDENT IN AN ORGANIZED HEALTH CARE EDUCATION/TRAINING PROGRAM

## 2021-06-09 PROCEDURE — 83735 ASSAY OF MAGNESIUM: CPT

## 2021-06-09 PROCEDURE — 770006 HCHG ROOM/CARE - MED/SURG/GYN SEMI*

## 2021-06-09 PROCEDURE — 80053 COMPREHEN METABOLIC PANEL: CPT

## 2021-06-09 PROCEDURE — 93970 EXTREMITY STUDY: CPT

## 2021-06-09 PROCEDURE — 99232 SBSQ HOSP IP/OBS MODERATE 35: CPT | Performed by: INTERNAL MEDICINE

## 2021-06-09 PROCEDURE — 700102 HCHG RX REV CODE 250 W/ 637 OVERRIDE(OP): Performed by: INTERNAL MEDICINE

## 2021-06-09 RX ORDER — IPRATROPIUM BROMIDE AND ALBUTEROL SULFATE 2.5; .5 MG/3ML; MG/3ML
3 SOLUTION RESPIRATORY (INHALATION)
Status: DISCONTINUED | OUTPATIENT
Start: 2021-06-09 | End: 2021-06-10

## 2021-06-09 RX ORDER — POTASSIUM CHLORIDE 20 MEQ/1
20 TABLET, EXTENDED RELEASE ORAL ONCE
Status: COMPLETED | OUTPATIENT
Start: 2021-06-09 | End: 2021-06-09

## 2021-06-09 RX ADMIN — SIMVASTATIN 10 MG: 20 TABLET, FILM COATED ORAL at 16:39

## 2021-06-09 RX ADMIN — OXYCODONE 2.5 MG: 5 TABLET ORAL at 00:58

## 2021-06-09 RX ADMIN — POTASSIUM CHLORIDE 20 MEQ: 1500 TABLET, EXTENDED RELEASE ORAL at 18:26

## 2021-06-09 RX ADMIN — LISINOPRIL 10 MG: 10 TABLET ORAL at 04:57

## 2021-06-09 RX ADMIN — SODIUM CHLORIDE: 9 INJECTION, SOLUTION INTRAVENOUS at 00:59

## 2021-06-09 RX ADMIN — NICOTINE 14 MG: 14 PATCH TRANSDERMAL at 04:58

## 2021-06-09 RX ADMIN — ASPIRIN 81 MG: 81 TABLET, COATED ORAL at 04:57

## 2021-06-09 RX ADMIN — OXYCODONE 2.5 MG: 5 TABLET ORAL at 21:14

## 2021-06-09 RX ADMIN — ENOXAPARIN SODIUM 40 MG: 40 INJECTION SUBCUTANEOUS at 04:57

## 2021-06-09 RX ADMIN — OXYCODONE 5 MG: 5 TABLET ORAL at 15:49

## 2021-06-09 ASSESSMENT — ENCOUNTER SYMPTOMS
DEPRESSION: 0
DIZZINESS: 0
CHILLS: 0
COUGH: 0
MYALGIAS: 0
SPUTUM PRODUCTION: 0
HEARTBURN: 0
FEVER: 0
HEADACHES: 0
ABDOMINAL PAIN: 0
SHORTNESS OF BREATH: 1
NECK PAIN: 0
VOMITING: 0
SORE THROAT: 0
FOCAL WEAKNESS: 0
PALPITATIONS: 0
NAUSEA: 0

## 2021-06-09 ASSESSMENT — PAIN DESCRIPTION - PAIN TYPE
TYPE: ACUTE PAIN;SURGICAL PAIN

## 2021-06-09 NOTE — CARE PLAN
Problem: Knowledge Deficit - Standard  Goal: Patient and family/care givers will demonstrate understanding of plan of care, disease process/condition, diagnostic tests and medications  Outcome: Progressing     Problem: Knowledge Deficit - Standard  Goal: Patient and family/care givers will demonstrate understanding of plan of care, disease process/condition, diagnostic tests and medications  Outcome: Progressing   The patient is Stable - Low risk of patient condition declining or worsening    Shift Goals  Clinical Goals: Pain control, up to chair for meals  Patient Goals: Pain control  Family Goals: no family present    Progress made toward(s) clinical / shift goals:  Went over POC.    Patient is not progressing towards the following goals:

## 2021-06-09 NOTE — DISCHARGE PLANNING
Received Choice form at [phone]  Agency/Facility Name: [facility] [place], [place]  Referral sent per Choice form @ [phone]

## 2021-06-09 NOTE — PROGRESS NOTES
Hospital Medicine Daily Progress Note    Date of Service  6/9/2021    Chief Complaint  70 y.o. male presented 6/6/2021 with syncope and fall    Hospital Course  69 y/o M with COPD presented with syncope suffering a fall and subsequent left intertrochanteric fracture (noted on CT).  D-dimer elevated greater than 20 on admission.  Subsequent CTA negative for PE.  Patient complains of 2 syncopal episodes this past week and does have associated murmur (echo requested). Orthopedic surgery consulted by ERP and patient was kelp n.p.o. and held anticoagulation for potential surgical intervention today 6/6  Hemodynamically stable - Pt was subsequently admitted for further management.      6/6 on T3 unit:  Vitals wnl; afebrile. BP a bit soft. Pain scale reported - 5 in left hip  Blood glucose in last 24hrs - 238 (HbA1C 5.5)  Based on nature of the clinical history, likely cause of syncope is a vasovagal episode (too much anti-BP +/- dehydration).   Exam: grossly unremarkable except left leg ROM limited due to pain; soft murmur in 2nd LLSB.   EKG - NSR, QTc 463  Leukocytosis is likely reactive; IVF started for hypoNa and hypoCl (with a slight bump in Cr)  Pt underwent surgical treatment of left intertrochanteric femur fracture with dynamic hip screw and sideplate in PM.     Interval Problem Update  6/7:  Vitals reviewed; afebrile.  The rest of the vitals within normal parameters. SBP in 90s.   Pain scale reported - none this AM  Blood glucose in last 24hrs - mid 100s   I/O - was retaining overnight with bladder scan >1000cc - required a menon placement overnight.       At bedside, reported that pain is adequately controlled - able to participate with PT/OT - to re-eval for stairs tomorrow 6/8. Possible etiology of syncopal discussed - stop HCTZ and c/w lisinopril discussed. Echo pending for murmur.     PATIENT SEEN BY PREVIOUS HOSPITALIST UNTIL 6/7/21 6/8/21: Patient seen at bedside this morning.  Patient currently using  oxygen, the patient denies using oxygen at home.  CTA of the chest on 6/6/21 was negative for PE.  We have ordered ultrasound of the lower extremities to rule out DVT.  We have also ordered procalcitonin, pending result. As per nursing no other overnight events reported.    6/9: Patient seen at bedside this morning. Patient not complaining of pain at the moment. It appears PT have now recommend SNF placement, and we are pending placement. We are pending echocardiogram. As per nursing no other over night events reported.    Labs reviewed  WBC 15.5 > 13.7  Hb 14.3 > 10.8   > 133  CR 1.07 > 0.78    Consultants/Specialty  Orthopedics     Code Status  Full Code    Disposition  Pending final recommendations.    Barrier to DC: continued monitoring (Echo), PT/OT (will need to see how Pt engages stairs), pain management    Discussed with patient, patient's nurse and with multidisciplinary team during rounds including , pharmacist and charge nurse.        Review of Systems  Review of Systems   Constitutional: Negative for chills, fever and malaise/fatigue.   HENT: Negative for congestion and sore throat.    Respiratory: Positive for shortness of breath. Negative for cough and sputum production.    Cardiovascular: Negative for chest pain, palpitations and leg swelling.   Gastrointestinal: Negative for abdominal pain, heartburn, nausea and vomiting.   Genitourinary: Negative for dysuria, frequency and urgency.   Musculoskeletal: Positive for joint pain. Negative for myalgias and neck pain.   Neurological: Negative for dizziness, focal weakness and headaches.   Psychiatric/Behavioral: Negative for depression.        Physical Exam  Temp:  [36.7 °C (98 °F)-37.2 °C (99 °F)] 36.7 °C (98 °F)  Pulse:  [] 98  Resp:  [16-17] 16  BP: (100-133)/(60-72) 114/66  SpO2:  [93 %-98 %] 94 %    Physical Exam  Vitals and nursing note reviewed.   Constitutional:       General: He is not in acute distress.     Appearance:  Normal appearance. He is not ill-appearing.   HENT:      Head: Normocephalic and atraumatic.      Mouth/Throat:      Mouth: Mucous membranes are moist.      Pharynx: Oropharynx is clear.   Eyes:      General: No scleral icterus.     Conjunctiva/sclera: Conjunctivae normal.   Cardiovascular:      Rate and Rhythm: Normal rate and regular rhythm.      Pulses: Normal pulses.   Pulmonary:      Breath sounds: No wheezing or rhonchi.   Abdominal:      General: Bowel sounds are normal. There is no distension.      Palpations: Abdomen is soft.      Tenderness: There is no abdominal tenderness.   Musculoskeletal:         General: Tenderness (Left hip) present. No swelling.   Skin:     General: Skin is warm and dry.      Capillary Refill: Capillary refill takes less than 2 seconds.   Neurological:      General: No focal deficit present.      Mental Status: He is alert and oriented to person, place, and time. Mental status is at baseline.   Psychiatric:         Mood and Affect: Mood normal.         Fluids    Intake/Output Summary (Last 24 hours) at 6/9/2021 1654  Last data filed at 6/9/2021 1600  Gross per 24 hour   Intake --   Output 2475 ml   Net -2475 ml       Laboratory  Recent Labs     06/07/21  0619 06/08/21  0603 06/09/21  0756   WBC 13.7* 10.5 8.7   RBC 3.56* 3.19* 2.92*   HEMOGLOBIN 10.8* 9.6* 8.6*   HEMATOCRIT 33.0* 29.3* 27.4*   MCV 92.7 91.8 93.8   MCH 30.3 30.1 29.5   MCHC 32.7* 32.8* 31.4*   RDW 42.6 41.9 43.8   PLATELETCT 232 190 215   MPV 9.7 9.3 9.7     Recent Labs     06/07/21  0619 06/09/21  0756   SODIUM 132* 137   POTASSIUM 3.6 3.2*   CHLORIDE 98 105   CO2 21 23   GLUCOSE 144* 100*   BUN 13 7*   CREATININE 0.78 0.55   CALCIUM 8.3* 7.4*                   Imaging  US-EXTREMITY VENOUS LOWER BILAT   Final Result      EC-ECHOCARDIOGRAM COMPLETE W/O CONT         DX-PORTABLE FLUORO > 1 HOUR   Final Result      Portable fluoroscopy utilized for 54 seconds.         INTERPRETING LOCATION: [address] [place] [address], [facility]       DX-HIP-UNILATERAL-W/O PELVIS-2/3 VIEWS LEFT   Final Result      1.  Previous ORIF of left hip.      2.  No evidence of acute fracture and/or dislocation.      CT-CTA CHEST PULMONARY ARTERY W/ RECONS   Final Result      1.  No evidence of pulmonary emboli or other acute thoracic abnormality.   2.  Emphysema, diffuse bladder bolus formation in both lungs.   3.  Bilateral low-attenuation adrenal gland lesions.            CT-HIP W/O PLUS RECONS LEFT   Final Result      Intertrochanteric fracture of the left femur.      CT-HEAD W/O   Final Result      1.  No acute intracranial abnormality.   2.  Age-consistent atrophy, along with chronic white matter lucencies in both cerebral hemispheres as noted above.               INTERPRETING LOCATION:  [address] [place] [address], [facility], [address]      DX-PELVIS-TRAUMA SERIES  3-   Final Result      No acute bony findings.      DX-CHEST-PORTABLE (1 VIEW)   Final Result      Minimal diffuse bilateral interstitial opacities, consistent with mild pulmonary edema.           Assessment/Plan  * Acute hypoxemic respiratory failure (HCC)  Assessment & Plan  Patient currently on 2L of oxygen, desaturating on ambulation.  We are pending echocardiogram result  Patient had a CTA of the chest on 6/6/21, without reporting of PE. They did report emphysema on imaging report.  We have ordered bilateral US of lower extremities to rule out DVT.  We will encourage IS  Supplemental oxygen as needed    We will order procalcitonin as well.    6/9/21: Procalcitonin mildly elevated, CT scan did not report PE on 6/6. US of bilateral legs negative for DVT. No leukocytosis or fever. CT scan did report emphysema. We will start the patient on duonebs and encourage incentive spirometer.    Intertrochanteric fracture of femur (HCC)- (present on admission)  Assessment & Plan  Intertrochanteric femur fracture of left femur appreciated on CT scan as seen above  Pt underwent surgical treatment of left intertrochanteric femur  fracture with dynamic hip screw and sideplate in PM of 6/5.  Analgesics for pain control  Pulses intact and no paresthesias associated.    Enoxaparin as VTE restarted 6/7.   PT/OT eval - pending assessment for stairs at home.     6/9/21: Pending SNF placement.    Hypokalemia  Assessment & Plan  Replace as needed  Monitor, make changes accordingly    Anemia  Assessment & Plan  Likely acute blood loss related anemia from procedure  No other terri bleeding   Will monitor, repeat CBC and make changes accordingly.    Acute urinary retention- (present on admission)  Assessment & Plan  Likely post-op related  Acute retention that needed a menon cath placement overnight.   Flomax added  Will try a voiding trial in PM 6/7; if still retention, then will have to go home with menon and f/u outpatient     6/8/21: It appears patient passed voiding trial, we will continue to monitor.    Syncope- (present on admission)  Assessment & Plan  Murmur appreciated on physical examination.  Most likely consistent with mitral regurgitation no signs of aortic insufficiency per auscultation; however, ordered echocardiogram to access (pending)  Sinus tachycardia appreciated on EKG without signs of S1Q3T3.  D-dimer was elevated however CTA negative for pulmonary emboli.  Trop neg; no chest pain    Based on nature of the clinical history, likely cause of syncope is a vasovagal episode (too much anti-BP +/- dehydration).   BP meds being adjusted  Pt has been advised to have a regular BP check (he will get a BP machine) and to take his time changing positions.     6/9/21: Patient currently not complaining of symptoms. Pending echocardiogram results.      Cigarette nicotine dependence, uncomplicated - (present on admission)  Assessment & Plan  Smoking cessation counseling commenced at bedside.    Patient in agreement with nicotine patch for smoking cessation.    Recommend outpatient follow-up with PCP for continuation of smoking cessation and NRT  if agreeable.     Dyslipidemia- (present on admission)  Assessment & Plan  C/w home statin    Essential hypertension- (present on admission)  Assessment & Plan  BP has been borderline low here  IVF provided  Diuretics HCTZ on hold  Lisinopril decreased to 10mg daily    6/9/21: We will continue with lisnopril. Monitor, make changes accordingly.       VTE prophylaxis: Enoxaparin

## 2021-06-09 NOTE — PROGRESS NOTES
"   Orthopaedic Progress Note    Interval changes:  Patient doing well  Cleared for DC by ortho pending medicine clearance    ROS - Patient denies any new issues.  Pain well controlled.    /71   Pulse 92   Temp 36.7 °C (98.1 °F) (Temporal)   Resp 16   Ht 1.702 m (5' 7\")   Wt 59.9 kg (132 lb)   SpO2 98%     Patient seen and examined  No acute distress  Breathing non labored  RRR  L hip surgical dressing is clean, dry, and intact. Patient clearly fires tibialis anterior, EHL, and gastrocnemius/soleus. Sensation is intact to light touch throughout superficial peroneal, deep peroneal, tibial, saphenous, and sural nerve distributions. Strong and palpable 2+ dorsalis pedis and posterior tibial pulses with capillary refill less than 2 seconds. No lower leg tenderness or discomfort.      Recent Labs     06/07/21  0619 06/08/21  0603 06/09/21  0756   WBC 13.7* 10.5 8.7   RBC 3.56* 3.19* 2.92*   HEMOGLOBIN 10.8* 9.6* 8.6*   HEMATOCRIT 33.0* 29.3* 27.4*   MCV 92.7 91.8 93.8   MCH 30.3 30.1 29.5   MCHC 32.7* 32.8* 31.4*   RDW 42.6 41.9 43.8   PLATELETCT 232 190 215   MPV 9.7 9.3 9.7     Active Hospital Problems    Diagnosis    • Acute hypoxemic respiratory failure (HCC) [J96.01]    • Acute urinary retention [R33.8]    • Anemia [D64.9]    • Intertrochanteric fracture of femur (HCC) [S72.143A]    • Syncope [R55]    • Cigarette nicotine dependence, uncomplicated  [F17.210]    • Dyslipidemia [E78.5]    • Essential hypertension [I10]      Assessment/Plan:  Patient doing well  Cleared for DC by ortho pending medicine clearance  POD #3 S/P Surgical treatment of left intertrochanteric femur fracture with dynamic hip screw and sideplate  Wt bearing status - WBAT  Wound care/Drains - dressing changed every other day by nursing  Future Procedures - none planned   Lovenox: Start 6/7, Duration-until ambulatory > 150'  Sutures/Staples out- 14 days post operatively  PT/OT-initiated  Antibiotics: completed  DVT Prophylaxis- " TEDS/SCDs/Foot pumps  Zain-none  Case Coordination for Discharge Planning - Disposition home

## 2021-06-09 NOTE — CARE PLAN
The patient is Stable - Low risk of patient condition declining or worsening    Shift Goals  Clinical Goals: Pain control, up to chair for meals  Patient Goals: Pain control  Family Goals: no family present    Progress made toward(s) clinical / shift goals:  Patient received PO pain medication x1 during shift. 2L O2. Doppler of BLE today. VSS. Will continue to monitor.

## 2021-06-10 LAB
ALBUMIN SERPL BCP-MCNC: 2.4 G/DL (ref 3.2–4.9)
ALBUMIN/GLOB SERPL: 0.9 G/DL
ALP SERPL-CCNC: 79 U/L (ref 30–99)
ALT SERPL-CCNC: 30 U/L (ref 2–50)
ANION GAP SERPL CALC-SCNC: 8 MMOL/L (ref 7–16)
AST SERPL-CCNC: 29 U/L (ref 12–45)
BASOPHILS # BLD AUTO: 0.3 % (ref 0–1.8)
BASOPHILS # BLD: 0.03 K/UL (ref 0–0.12)
BILIRUB SERPL-MCNC: 0.5 MG/DL (ref 0.1–1.5)
BUN SERPL-MCNC: 9 MG/DL (ref 8–22)
CALCIUM SERPL-MCNC: 7.4 MG/DL (ref 8.5–10.5)
CHLORIDE SERPL-SCNC: 105 MMOL/L (ref 96–112)
CO2 SERPL-SCNC: 24 MMOL/L (ref 20–33)
CREAT SERPL-MCNC: 0.62 MG/DL (ref 0.5–1.4)
EOSINOPHIL # BLD AUTO: 0.09 K/UL (ref 0–0.51)
EOSINOPHIL NFR BLD: 1 % (ref 0–6.9)
ERYTHROCYTE [DISTWIDTH] IN BLOOD BY AUTOMATED COUNT: 41.2 FL (ref 35.9–50)
GLOBULIN SER CALC-MCNC: 2.8 G/DL (ref 1.9–3.5)
GLUCOSE SERPL-MCNC: 111 MG/DL (ref 65–99)
HCT VFR BLD AUTO: 27.6 % (ref 42–52)
HGB BLD-MCNC: 9.1 G/DL (ref 14–18)
IMM GRANULOCYTES # BLD AUTO: 0.04 K/UL (ref 0–0.11)
IMM GRANULOCYTES NFR BLD AUTO: 0.4 % (ref 0–0.9)
LV EJECT FRACT  99904: 70
LV EJECT FRACT MOD 2C 99903: 72.15
LV EJECT FRACT MOD 4C 99902: 68.25
LV EJECT FRACT MOD BP 99901: 68.71
LYMPHOCYTES # BLD AUTO: 1.57 K/UL (ref 1–4.8)
LYMPHOCYTES NFR BLD: 16.7 % (ref 22–41)
MAGNESIUM SERPL-MCNC: 1.9 MG/DL (ref 1.5–2.5)
MCH RBC QN AUTO: 29.8 PG (ref 27–33)
MCHC RBC AUTO-ENTMCNC: 33 G/DL (ref 33.7–35.3)
MCV RBC AUTO: 90.5 FL (ref 81.4–97.8)
MONOCYTES # BLD AUTO: 0.92 K/UL (ref 0–0.85)
MONOCYTES NFR BLD AUTO: 9.8 % (ref 0–13.4)
NEUTROPHILS # BLD AUTO: 6.77 K/UL (ref 1.82–7.42)
NEUTROPHILS NFR BLD: 71.8 % (ref 44–72)
NRBC # BLD AUTO: 0 K/UL
NRBC BLD-RTO: 0 /100 WBC
PLATELET # BLD AUTO: 235 K/UL (ref 164–446)
PMV BLD AUTO: 9.1 FL (ref 9–12.9)
POTASSIUM SERPL-SCNC: 3.6 MMOL/L (ref 3.6–5.5)
PROT SERPL-MCNC: 5.2 G/DL (ref 6–8.2)
RBC # BLD AUTO: 3.05 M/UL (ref 4.7–6.1)
SODIUM SERPL-SCNC: 137 MMOL/L (ref 135–145)
WBC # BLD AUTO: 9.4 K/UL (ref 4.8–10.8)

## 2021-06-10 PROCEDURE — 700102 HCHG RX REV CODE 250 W/ 637 OVERRIDE(OP): Performed by: STUDENT IN AN ORGANIZED HEALTH CARE EDUCATION/TRAINING PROGRAM

## 2021-06-10 PROCEDURE — 97116 GAIT TRAINING THERAPY: CPT | Mod: CQ

## 2021-06-10 PROCEDURE — 99232 SBSQ HOSP IP/OBS MODERATE 35: CPT | Performed by: INTERNAL MEDICINE

## 2021-06-10 PROCEDURE — 700102 HCHG RX REV CODE 250 W/ 637 OVERRIDE(OP): Performed by: INTERNAL MEDICINE

## 2021-06-10 PROCEDURE — 85025 COMPLETE CBC W/AUTO DIFF WBC: CPT

## 2021-06-10 PROCEDURE — A9270 NON-COVERED ITEM OR SERVICE: HCPCS | Performed by: STUDENT IN AN ORGANIZED HEALTH CARE EDUCATION/TRAINING PROGRAM

## 2021-06-10 PROCEDURE — 700111 HCHG RX REV CODE 636 W/ 250 OVERRIDE (IP): Performed by: INTERNAL MEDICINE

## 2021-06-10 PROCEDURE — 94640 AIRWAY INHALATION TREATMENT: CPT

## 2021-06-10 PROCEDURE — A9270 NON-COVERED ITEM OR SERVICE: HCPCS | Performed by: INTERNAL MEDICINE

## 2021-06-10 PROCEDURE — 700111 HCHG RX REV CODE 636 W/ 250 OVERRIDE (IP): Performed by: STUDENT IN AN ORGANIZED HEALTH CARE EDUCATION/TRAINING PROGRAM

## 2021-06-10 PROCEDURE — 83735 ASSAY OF MAGNESIUM: CPT

## 2021-06-10 PROCEDURE — 80053 COMPREHEN METABOLIC PANEL: CPT

## 2021-06-10 PROCEDURE — 700101 HCHG RX REV CODE 250: Performed by: INTERNAL MEDICINE

## 2021-06-10 PROCEDURE — 93306 TTE W/DOPPLER COMPLETE: CPT | Mod: 26 | Performed by: INTERNAL MEDICINE

## 2021-06-10 PROCEDURE — 97530 THERAPEUTIC ACTIVITIES: CPT | Mod: CQ

## 2021-06-10 PROCEDURE — 94760 N-INVAS EAR/PLS OXIMETRY 1: CPT

## 2021-06-10 PROCEDURE — 36415 COLL VENOUS BLD VENIPUNCTURE: CPT

## 2021-06-10 PROCEDURE — 770006 HCHG ROOM/CARE - MED/SURG/GYN SEMI*

## 2021-06-10 RX ORDER — IPRATROPIUM BROMIDE AND ALBUTEROL SULFATE 2.5; .5 MG/3ML; MG/3ML
3 SOLUTION RESPIRATORY (INHALATION)
Status: DISCONTINUED | OUTPATIENT
Start: 2021-06-11 | End: 2021-06-11 | Stop reason: HOSPADM

## 2021-06-10 RX ORDER — DOXYCYCLINE 100 MG/1
100 TABLET ORAL EVERY 12 HOURS
Status: DISCONTINUED | OUTPATIENT
Start: 2021-06-10 | End: 2021-06-10

## 2021-06-10 RX ORDER — PREDNISONE 20 MG/1
40 TABLET ORAL DAILY
Status: DISCONTINUED | OUTPATIENT
Start: 2021-06-10 | End: 2021-06-11 | Stop reason: HOSPADM

## 2021-06-10 RX ORDER — CHOLECALCIFEROL (VITAMIN D3) 125 MCG
5 CAPSULE ORAL NIGHTLY
Status: DISCONTINUED | OUTPATIENT
Start: 2021-06-10 | End: 2021-06-11 | Stop reason: HOSPADM

## 2021-06-10 RX ORDER — DOXYCYCLINE 100 MG/1
100 TABLET ORAL EVERY 12 HOURS
Status: DISCONTINUED | OUTPATIENT
Start: 2021-06-11 | End: 2021-06-11 | Stop reason: HOSPADM

## 2021-06-10 RX ORDER — DOXYCYCLINE 100 MG/1
100 TABLET ORAL ONCE
Status: COMPLETED | OUTPATIENT
Start: 2021-06-10 | End: 2021-06-10

## 2021-06-10 RX ADMIN — ACETAMINOPHEN 650 MG: 325 TABLET, FILM COATED ORAL at 23:18

## 2021-06-10 RX ADMIN — DOXYCYCLINE 100 MG: 100 TABLET, FILM COATED ORAL at 15:36

## 2021-06-10 RX ADMIN — PREDNISONE 40 MG: 20 TABLET ORAL at 15:36

## 2021-06-10 RX ADMIN — ASPIRIN 81 MG: 81 TABLET, COATED ORAL at 05:44

## 2021-06-10 RX ADMIN — NICOTINE 14 MG: 14 PATCH TRANSDERMAL at 05:44

## 2021-06-10 RX ADMIN — IPRATROPIUM BROMIDE AND ALBUTEROL SULFATE 3 ML: .5; 2.5 SOLUTION RESPIRATORY (INHALATION) at 15:23

## 2021-06-10 RX ADMIN — LISINOPRIL 10 MG: 10 TABLET ORAL at 05:44

## 2021-06-10 RX ADMIN — ENOXAPARIN SODIUM 40 MG: 40 INJECTION SUBCUTANEOUS at 05:45

## 2021-06-10 RX ADMIN — OXYCODONE 2.5 MG: 5 TABLET ORAL at 05:48

## 2021-06-10 RX ADMIN — IPRATROPIUM BROMIDE AND ALBUTEROL SULFATE 3 ML: .5; 2.5 SOLUTION RESPIRATORY (INHALATION) at 11:12

## 2021-06-10 RX ADMIN — IPRATROPIUM BROMIDE AND ALBUTEROL SULFATE 3 ML: .5; 2.5 SOLUTION RESPIRATORY (INHALATION) at 07:40

## 2021-06-10 RX ADMIN — OXYCODONE 5 MG: 5 TABLET ORAL at 15:40

## 2021-06-10 RX ADMIN — IPRATROPIUM BROMIDE AND ALBUTEROL SULFATE 3 ML: .5; 2.5 SOLUTION RESPIRATORY (INHALATION) at 19:58

## 2021-06-10 RX ADMIN — SIMVASTATIN 10 MG: 20 TABLET, FILM COATED ORAL at 18:00

## 2021-06-10 RX ADMIN — Medication 5 MG: at 23:18

## 2021-06-10 ASSESSMENT — ENCOUNTER SYMPTOMS
HEADACHES: 0
SORE THROAT: 0
VOMITING: 0
CHILLS: 0
DEPRESSION: 0
COUGH: 1
FEVER: 0
MYALGIAS: 0
HEARTBURN: 0
SHORTNESS OF BREATH: 1
SPUTUM PRODUCTION: 1
PALPITATIONS: 0
DIZZINESS: 0
ABDOMINAL PAIN: 0
NECK PAIN: 0
NAUSEA: 0
FOCAL WEAKNESS: 0

## 2021-06-10 ASSESSMENT — GAIT ASSESSMENTS
GAIT LEVEL OF ASSIST: MINIMAL ASSIST
DEVIATION: ANTALGIC;BRADYKINETIC;SHUFFLED GAIT
ASSISTIVE DEVICE: FRONT WHEEL WALKER
DISTANCE (FEET): 40

## 2021-06-10 ASSESSMENT — PAIN DESCRIPTION - PAIN TYPE
TYPE: ACUTE PAIN;SURGICAL PAIN
TYPE: SURGICAL PAIN
TYPE: ACUTE PAIN;SURGICAL PAIN

## 2021-06-10 ASSESSMENT — COGNITIVE AND FUNCTIONAL STATUS - GENERAL
WALKING IN HOSPITAL ROOM: A LITTLE
STANDING UP FROM CHAIR USING ARMS: A LITTLE
MOVING FROM LYING ON BACK TO SITTING ON SIDE OF FLAT BED: A LITTLE
MOVING TO AND FROM BED TO CHAIR: A LITTLE
TURNING FROM BACK TO SIDE WHILE IN FLAT BAD: A LITTLE
MOBILITY SCORE: 17
CLIMB 3 TO 5 STEPS WITH RAILING: A LOT
SUGGESTED CMS G CODE MODIFIER MOBILITY: CK

## 2021-06-10 NOTE — CARE PLAN
The patient is Stable - Low risk of patient condition declining or worsening    Shift Goals  Clinical Goals: Pain control, up to chair for meals  Patient Goals: Pain control  Family Goals: no family present    Progress made toward(s) clinical / shift goals:  Patient taking oxy as needed for pain. Incision clean, dry, intact. Call appropriately before ambulation. VSS. Will continue to monitor.        Problem: Pain - Standard  Goal: Alleviation of pain or a reduction in pain to the patient’s comfort goal  Outcome: Progressing     Problem: Fall Risk  Goal: Patient will remain free from falls  Outcome: Progressing

## 2021-06-10 NOTE — THERAPY
Physical Therapy   Daily Treatment     Patient Name: Bright Shirley  Age:  70 y.o., Sex:  male  Medical Record #: [id number]  Today's Date: 6/10/2021     Precautions: Fall Risk, Weight Bearing As Tolerated Left Lower Extremity    Assessment    Pt was pleasant and agreeable to therapy session. Continues to present with impairments in strength, balance, activity tolerance. He reports he has roommate but can only assist a small amount. He continues to require hands on for safety with balance. Presents with short shuffling steps, slow tanner and cues to push fww instead of picking up. Deferred stairs due to focusing on quality and gait pattern. He was able to teach back no concerns about supine exercises but reviewed seated exercises for strengthening. Will continue to follow while in house. Educated and encouraged to continue to mobilize with nursing staff.     Plan    Continue current treatment plan.    DC Equipment Recommendations: Front-Wheel Walker  Discharge Recommendations: Recommend post-acute placement for additional physical therapy services prior to discharge home         06/10/21 1025   Other Treatments   Other Treatments Provided Reviewed seated exercises while in chair. Pt reporting no issues for supine exercises.    Balance   Sitting Balance (Static) Fair +   Sitting Balance (Dynamic) Fair   Standing Balance (Static) Fair   Standing Balance (Dynamic) Poor +   Weight Shift Sitting Fair   Weight Shift Standing Fair   Comments with fww no lob    Gait Analysis   Gait Level Of Assist Minimal Assist   Assistive Device Front Wheel Walker   Distance (Feet) 40   # of Times Distance was Traveled 2   Deviation Antalgic;Bradykinetic;Shuffled Gait   Skilled Intervention Verbal Cuing   Comments Vc for improving sequencing with fww and step through gait pattern. Hands on for safety, Deferred stairs to focus on distance with gait.    Bed Mobility    Supine to Sit Supervised   Sit to Supine   (left up in chair )   Scooting  Supervised   Comments hob elevated, up in chair post tx session    Functional Mobility   Sit to Stand Minimal Assist   Bed, Chair, Wheelchair Transfer Minimal Assist   Skilled Intervention Verbal Cuing   Comments Assist for slow descend and vc for hand placement and technique    Short Term Goals    Short Term Goal # 1 Patient will ambulate 150ft with supervision within 6tx in order to access environment   Goal Outcome # 1 goal not met   Short Term Goal # 2 Patient will ascend/descend 6 steps with supervision within 6tx in order to enter/exit home   Goal Outcome # 2 Goal not met

## 2021-06-10 NOTE — PROGRESS NOTES
Hospital Medicine Daily Progress Note    Date of Service  6/10/2021    Chief Complaint  70 y.o. male presented 6/6/2021 with syncope and fall    Hospital Course  71 y/o M with COPD presented with syncope suffering a fall and subsequent left intertrochanteric fracture (noted on CT).  D-dimer elevated greater than 20 on admission.  Subsequent CTA negative for PE.  Patient complains of 2 syncopal episodes this past week and does have associated murmur (echo requested). Orthopedic surgery consulted by ERP and patient was kelp n.p.o. and held anticoagulation for potential surgical intervention today 6/6  Hemodynamically stable - Pt was subsequently admitted for further management.      6/6 on T3 unit:  Vitals wnl; afebrile. BP a bit soft. Pain scale reported - 5 in left hip  Blood glucose in last 24hrs - 238 (HbA1C 5.5)  Based on nature of the clinical history, likely cause of syncope is a vasovagal episode (too much anti-BP +/- dehydration).   Exam: grossly unremarkable except left leg ROM limited due to pain; soft murmur in 2nd LLSB.   EKG - NSR, QTc 463  Leukocytosis is likely reactive; IVF started for hypoNa and hypoCl (with a slight bump in Cr)  Pt underwent surgical treatment of left intertrochanteric femur fracture with dynamic hip screw and sideplate in PM.     Interval Problem Update  6/7:  Vitals reviewed; afebrile.  The rest of the vitals within normal parameters. SBP in 90s.   Pain scale reported - none this AM  Blood glucose in last 24hrs - mid 100s   I/O - was retaining overnight with bladder scan >1000cc - required a menon placement overnight.       At bedside, reported that pain is adequately controlled - able to participate with PT/OT - to re-eval for stairs tomorrow 6/8. Possible etiology of syncopal discussed - stop HCTZ and c/w lisinopril discussed. Echo pending for murmur.     PATIENT SEEN BY PREVIOUS HOSPITALIST UNTIL 6/7/21 6/8/21: Patient seen at bedside this morning.  Patient currently using  oxygen, the patient denies using oxygen at home.  CTA of the chest on 6/6/21 was negative for PE.  We have ordered ultrasound of the lower extremities to rule out DVT.  We have also ordered procalcitonin, pending result. As per nursing no other overnight events reported.    6/9: Patient seen at bedside this morning. Patient not complaining of pain at the moment. It appears PT have now recommend SNF placement, and we are pending placement. We are pending echocardiogram. As per nursing no other over night events reported.    6/10: Patient seen at bedside this morning.  The patient still complaining of some sputum production and cough.  We will start the patient on prednisone and ceftriaxone as COPD exacerbation.  Echocardiogram report was grossly normal.  We are pending placement.  As per nursing no other overnight events reported.    Labs reviewed  WBC 15.5 > 13.7  Hb 14.3 > 10.8   > 133  CR 1.07 > 0.78    Consultants/Specialty  Orthopedics     Code Status  Full Code    Disposition  Pending final recommendations.    Barrier to DC: continued monitoring (Echo), PT/OT (will need to see how Pt engages stairs), pain management    Discussed with patient, patient's nurse and with multidisciplinary team during rounds including , pharmacist and charge nurse.        Review of Systems  Review of Systems   Constitutional: Negative for chills, fever and malaise/fatigue.   HENT: Negative for congestion and sore throat.    Respiratory: Positive for cough, sputum production and shortness of breath.    Cardiovascular: Negative for chest pain, palpitations and leg swelling.   Gastrointestinal: Negative for abdominal pain, heartburn, nausea and vomiting.   Genitourinary: Negative for dysuria, frequency and urgency.   Musculoskeletal: Positive for joint pain. Negative for myalgias and neck pain.   Neurological: Negative for dizziness, focal weakness and headaches.   Psychiatric/Behavioral: Negative for depression.         Physical Exam  Temp:  [35.9 °C (96.7 °F)-36.7 °C (98 °F)] 35.9 °C (96.7 °F)  Pulse:  [90-99] 99  Resp:  [] 16  BP: (114-131)/(66-81) 117/69  SpO2:  [90 %-97 %] 94 %    Physical Exam  Vitals and nursing note reviewed.   Constitutional:       General: He is not in acute distress.     Appearance: Normal appearance. He is not ill-appearing.   HENT:      Head: Normocephalic and atraumatic.      Mouth/Throat:      Mouth: Mucous membranes are moist.      Pharynx: Oropharynx is clear.   Eyes:      General: No scleral icterus.     Conjunctiva/sclera: Conjunctivae normal.   Cardiovascular:      Rate and Rhythm: Normal rate and regular rhythm.      Pulses: Normal pulses.   Pulmonary:      Breath sounds: No wheezing or rhonchi.   Abdominal:      General: Bowel sounds are normal. There is no distension.      Palpations: Abdomen is soft.      Tenderness: There is no abdominal tenderness.   Musculoskeletal:         General: Tenderness (Left hip) present. No swelling.   Skin:     General: Skin is warm and dry.      Capillary Refill: Capillary refill takes less than 2 seconds.   Neurological:      General: No focal deficit present.      Mental Status: He is alert and oriented to person, place, and time. Mental status is at baseline.   Psychiatric:         Mood and Affect: Mood normal.         Fluids    Intake/Output Summary (Last 24 hours) at 6/10/2021 1401  Last data filed at 6/10/2021 1000  Gross per 24 hour   Intake --   Output 1950 ml   Net -1950 ml       Laboratory  Recent Labs     06/08/21  0603 06/09/21  0756 06/10/21  0504   WBC 10.5 8.7 9.4   RBC 3.19* 2.92* 3.05*   HEMOGLOBIN 9.6* 8.6* 9.1*   HEMATOCRIT 29.3* 27.4* 27.6*   MCV 91.8 93.8 90.5   MCH 30.1 29.5 29.8   MCHC 32.8* 31.4* 33.0*   RDW 41.9 43.8 41.2   PLATELETCT 190 215 235   MPV 9.3 9.7 9.1     Recent Labs     06/09/21  0756 06/10/21  0504   SODIUM 137 137   POTASSIUM 3.2* 3.6   CHLORIDE 105 105   CO2 23 24   GLUCOSE 100* 111*   BUN 7* 9   CREATININE  0.55 0.62   CALCIUM 7.4* 7.4*                   Imaging  US-EXTREMITY VENOUS LOWER BILAT   Final Result      EC-ECHOCARDIOGRAM COMPLETE W/O CONT   Final Result      DX-PORTABLE FLUORO > 1 HOUR   Final Result      Portable fluoroscopy utilized for 54 seconds.         INTERPRETING LOCATION: [address], [place], [address]      DX-HIP-UNILATERAL-W/O PELVIS-2/3 VIEWS LEFT   Final Result      1.  Previous ORIF of left hip.      2.  No evidence of acute fracture and/or dislocation.      CT-CTA CHEST PULMONARY ARTERY W/ RECONS   Final Result      1.  No evidence of pulmonary emboli or other acute thoracic abnormality.   2.  Emphysema, diffuse bladder bolus formation in both lungs.   3.  Bilateral low-attenuation adrenal gland lesions.            CT-HIP W/O PLUS RECONS LEFT   Final Result      Intertrochanteric fracture of the left femur.      CT-HEAD W/O   Final Result      1.  No acute intracranial abnormality.   2.  Age-consistent atrophy, along with chronic white matter lucencies in both cerebral hemispheres as noted above.               INTERPRETING LOCATION:  [address], [place], [address]      DX-PELVIS-TRAUMA SERIES  3-   Final Result      No acute bony findings.      DX-CHEST-PORTABLE (1 VIEW)   Final Result      Minimal diffuse bilateral interstitial opacities, consistent with mild pulmonary edema.           Assessment/Plan  * Acute hypoxemic respiratory failure (HCC)  Assessment & Plan  Patient currently on 2L of oxygen, desaturating on ambulation.  We are pending echocardiogram result  Patient had a CTA of the chest on 6/6/21, without reporting of PE. They did report emphysema on imaging report.  We have ordered bilateral US of lower extremities to rule out DVT.  We will encourage IS  Supplemental oxygen as needed    We will order procalcitonin as well.    6/9/21: Procalcitonin mildly elevated, CT scan did not report PE on 6/6. US of bilateral legs negative for DVT. No leukocytosis or fever. CT scan did report  emphysema. We will start the patient on duonebs and encourage incentive spirometer.    6/10: Due to the patient's emphysema we will treat as copd exacerbation, patient complaining of sputum production, SOB, we will start prednisone 40 mg x 5 days and also ceftriaxone.    Intertrochanteric fracture of femur (HCC)- (present on admission)  Assessment & Plan  Intertrochanteric femur fracture of left femur appreciated on CT scan as seen above  Pt underwent surgical treatment of left intertrochanteric femur fracture with dynamic hip screw and sideplate in PM of 6/5.  Analgesics for pain control  Pulses intact and no paresthesias associated.    Enoxaparin as VTE restarted 6/7.   PT/OT eval - pending assessment for stairs at home.     6/10/21: Pending SNF placement.    Hypokalemia  Assessment & Plan  Replace as needed  Monitor, make changes accordingly    Anemia  Assessment & Plan  Likely acute blood loss related anemia from procedure  No other terri bleeding   Will monitor, repeat CBC and make changes accordingly.    Acute urinary retention- (present on admission)  Assessment & Plan  Likely post-op related  Acute retention that needed a menon cath placement overnight.   Flomax added  Will try a voiding trial in PM 6/7; if still retention, then will have to go home with menon and f/u outpatient     6/8/21: It appears patient passed voiding trial, we will continue to monitor.    Syncope- (present on admission)  Assessment & Plan  Murmur appreciated on physical examination.  Most likely consistent with mitral regurgitation no signs of aortic insufficiency per auscultation; however, ordered echocardiogram to access (pending)  Sinus tachycardia appreciated on EKG without signs of S1Q3T3.  D-dimer was elevated however CTA negative for pulmonary emboli.  Trop neg; no chest pain    Based on nature of the clinical history, likely cause of syncope is a vasovagal episode (too much anti-BP +/- dehydration).   BP meds being  adjusted  Pt has been advised to have a regular BP check (he will get a BP machine) and to take his time changing positions.     6/10/21: Patient currently not complaining of symptoms. Echocardiogram report grossly normal.    Cigarette nicotine dependence, uncomplicated - (present on admission)  Assessment & Plan  Smoking cessation counseling commenced at bedside.    Patient in agreement with nicotine patch for smoking cessation.    Recommend outpatient follow-up with PCP for continuation of smoking cessation and NRT if agreeable.     Dyslipidemia- (present on admission)  Assessment & Plan  C/w home statin    Essential hypertension- (present on admission)  Assessment & Plan  BP has been borderline low here  IVF provided  Diuretics HCTZ on hold  Lisinopril decreased to 10mg daily    6/9/21: We will continue with lisnopril. Monitor, make changes accordingly.       VTE prophylaxis: Enoxaparin

## 2021-06-10 NOTE — DISCHARGE PLANNING
@0630  Agency[facility]Facility Name: [facility]   Spoke To: Tess   Outcome: Per Tess, she will review referral.    [facility]   Outcome: Voice mail left regarding referral. Requested. Requested a call back.

## 2021-06-10 NOTE — DISCHARGE PLANNING
[facility]ency[facility] Name: [facility]   Spoke To: Tess   Outcome: Per Tess bed available tomorrow for patient

## 2021-06-10 NOTE — CARE PLAN
Problem: Knowledge Deficit - Standard  Goal: Patient and family/care givers will demonstrate understanding of plan of care, disease process/condition, diagnostic tests and medications  Outcome: Progressing   The patient is Stable - Low risk of patient condition declining or worsening    Shift Goals  Clinical Goals: Pain control, up to chair for meals  Patient Goals: Pain control  Family Goals: no family present    Progress made toward(s) clinical / shift goals:  Went over POC.    Patient is not progressing towards the following goals:

## 2021-06-11 ENCOUNTER — PATIENT OUTREACH (OUTPATIENT)
Dept: HEALTH INFORMATION MANAGEMENT | Facility: OTHER | Age: 71
End: 2021-06-11

## 2021-06-11 VITALS
SYSTOLIC BLOOD PRESSURE: 108 MMHG | DIASTOLIC BLOOD PRESSURE: 72 MMHG | WEIGHT: 132 LBS | HEART RATE: 102 BPM | OXYGEN SATURATION: 92 % | BODY MASS INDEX: 20.72 KG/M2 | RESPIRATION RATE: 16 BRPM | HEIGHT: 67 IN | TEMPERATURE: 98.1 F

## 2021-06-11 LAB
BASOPHILS # BLD AUTO: 0.2 % (ref 0–1.8)
BASOPHILS # BLD: 0.02 K/UL (ref 0–0.12)
EOSINOPHIL # BLD AUTO: 0.02 K/UL (ref 0–0.51)
EOSINOPHIL NFR BLD: 0.2 % (ref 0–6.9)
ERYTHROCYTE [DISTWIDTH] IN BLOOD BY AUTOMATED COUNT: 43 FL (ref 35.9–50)
HCT VFR BLD AUTO: 25.5 % (ref 42–52)
HCT VFR BLD AUTO: 31.7 % (ref 42–52)
HGB BLD-MCNC: 8.2 G/DL (ref 14–18)
HGB BLD-MCNC: 9.9 G/DL (ref 14–18)
IMM GRANULOCYTES # BLD AUTO: 0.04 K/UL (ref 0–0.11)
IMM GRANULOCYTES NFR BLD AUTO: 0.5 % (ref 0–0.9)
LYMPHOCYTES # BLD AUTO: 1.9 K/UL (ref 1–4.8)
LYMPHOCYTES NFR BLD: 21.4 % (ref 22–41)
MCH RBC QN AUTO: 29.6 PG (ref 27–33)
MCHC RBC AUTO-ENTMCNC: 32.2 G/DL (ref 33.7–35.3)
MCV RBC AUTO: 92.1 FL (ref 81.4–97.8)
MONOCYTES # BLD AUTO: 0.84 K/UL (ref 0–0.85)
MONOCYTES NFR BLD AUTO: 9.5 % (ref 0–13.4)
NEUTROPHILS # BLD AUTO: 6.05 K/UL (ref 1.82–7.42)
NEUTROPHILS NFR BLD: 68.2 % (ref 44–72)
NRBC # BLD AUTO: 0 K/UL
NRBC BLD-RTO: 0 /100 WBC
PLATELET # BLD AUTO: 269 K/UL (ref 164–446)
PMV BLD AUTO: 9.4 FL (ref 9–12.9)
RBC # BLD AUTO: 2.77 M/UL (ref 4.7–6.1)
WBC # BLD AUTO: 8.9 K/UL (ref 4.8–10.8)

## 2021-06-11 PROCEDURE — 85014 HEMATOCRIT: CPT

## 2021-06-11 PROCEDURE — 97116 GAIT TRAINING THERAPY: CPT | Mod: CQ

## 2021-06-11 PROCEDURE — 97110 THERAPEUTIC EXERCISES: CPT | Mod: CQ

## 2021-06-11 PROCEDURE — 700102 HCHG RX REV CODE 250 W/ 637 OVERRIDE(OP): Performed by: INTERNAL MEDICINE

## 2021-06-11 PROCEDURE — 94640 AIRWAY INHALATION TREATMENT: CPT

## 2021-06-11 PROCEDURE — 700102 HCHG RX REV CODE 250 W/ 637 OVERRIDE(OP): Performed by: STUDENT IN AN ORGANIZED HEALTH CARE EDUCATION/TRAINING PROGRAM

## 2021-06-11 PROCEDURE — 99239 HOSP IP/OBS DSCHRG MGMT >30: CPT | Performed by: INTERNAL MEDICINE

## 2021-06-11 PROCEDURE — 700111 HCHG RX REV CODE 636 W/ 250 OVERRIDE (IP): Performed by: INTERNAL MEDICINE

## 2021-06-11 PROCEDURE — A9270 NON-COVERED ITEM OR SERVICE: HCPCS | Performed by: STUDENT IN AN ORGANIZED HEALTH CARE EDUCATION/TRAINING PROGRAM

## 2021-06-11 PROCEDURE — 700111 HCHG RX REV CODE 636 W/ 250 OVERRIDE (IP): Performed by: STUDENT IN AN ORGANIZED HEALTH CARE EDUCATION/TRAINING PROGRAM

## 2021-06-11 PROCEDURE — 700101 HCHG RX REV CODE 250: Performed by: INTERNAL MEDICINE

## 2021-06-11 PROCEDURE — 36415 COLL VENOUS BLD VENIPUNCTURE: CPT

## 2021-06-11 PROCEDURE — 85025 COMPLETE CBC W/AUTO DIFF WBC: CPT

## 2021-06-11 PROCEDURE — 85018 HEMOGLOBIN: CPT

## 2021-06-11 PROCEDURE — A9270 NON-COVERED ITEM OR SERVICE: HCPCS | Performed by: INTERNAL MEDICINE

## 2021-06-11 RX ORDER — AMOXICILLIN 250 MG
2 CAPSULE ORAL 2 TIMES DAILY
Qty: 30 TABLET | Refills: 0 | Status: SHIPPED
Start: 2021-06-11 | End: 2021-07-02

## 2021-06-11 RX ORDER — OXYCODONE HYDROCHLORIDE 5 MG/1
5 TABLET ORAL EVERY 8 HOURS PRN
Qty: 12 TABLET | Refills: 0 | Status: SHIPPED | OUTPATIENT
Start: 2021-06-11 | End: 2021-06-16

## 2021-06-11 RX ORDER — IPRATROPIUM BROMIDE AND ALBUTEROL SULFATE 2.5; .5 MG/3ML; MG/3ML
3 SOLUTION RESPIRATORY (INHALATION) 4 TIMES DAILY
Status: SHIPPED
Start: 2021-06-11 | End: 2021-07-02

## 2021-06-11 RX ORDER — TAMSULOSIN HYDROCHLORIDE 0.4 MG/1
0.4 CAPSULE ORAL
Qty: 30 CAPSULE | Status: SHIPPED
Start: 2021-06-12 | End: 2021-07-12

## 2021-06-11 RX ORDER — PREDNISONE 20 MG/1
40 TABLET ORAL DAILY
Qty: 30 TABLET | Refills: 0 | Status: SHIPPED | OUTPATIENT
Start: 2021-06-12 | End: 2022-09-28

## 2021-06-11 RX ORDER — LISINOPRIL 10 MG/1
10 TABLET ORAL DAILY
Qty: 30 TABLET | Status: SHIPPED
Start: 2021-06-12 | End: 2021-09-08

## 2021-06-11 RX ORDER — POLYETHYLENE GLYCOL 3350 17 G/17G
17 POWDER, FOR SOLUTION ORAL
Refills: 3 | Status: SHIPPED
Start: 2021-06-11 | End: 2021-07-02

## 2021-06-11 RX ORDER — NICOTINE 21 MG/24HR
1 PATCH, TRANSDERMAL 24 HOURS TRANSDERMAL EVERY 24 HOURS
Qty: 30 PATCH | Status: SHIPPED
Start: 2021-06-11 | End: 2021-07-02 | Stop reason: SDUPTHER

## 2021-06-11 RX ORDER — CHOLECALCIFEROL (VITAMIN D3) 125 MCG
5 CAPSULE ORAL
Status: SHIPPED
Start: 2021-06-11

## 2021-06-11 RX ORDER — ACETAMINOPHEN 325 MG/1
650 TABLET ORAL EVERY 6 HOURS PRN
Qty: 30 TABLET | Refills: 0 | Status: SHIPPED
Start: 2021-06-11 | End: 2021-07-06 | Stop reason: SDUPTHER

## 2021-06-11 RX ORDER — DOXYCYCLINE 100 MG/1
100 TABLET ORAL EVERY 12 HOURS
Qty: 8 TABLET | Refills: 0 | Status: SHIPPED | OUTPATIENT
Start: 2021-06-11 | End: 2021-06-15

## 2021-06-11 RX ORDER — BISACODYL 10 MG
10 SUPPOSITORY, RECTAL RECTAL
Refills: 0 | Status: SHIPPED
Start: 2021-06-11 | End: 2021-07-02

## 2021-06-11 RX ADMIN — LISINOPRIL 10 MG: 10 TABLET ORAL at 06:11

## 2021-06-11 RX ADMIN — IPRATROPIUM BROMIDE AND ALBUTEROL SULFATE 3 ML: .5; 2.5 SOLUTION RESPIRATORY (INHALATION) at 10:34

## 2021-06-11 RX ADMIN — IPRATROPIUM BROMIDE AND ALBUTEROL SULFATE 3 ML: .5; 2.5 SOLUTION RESPIRATORY (INHALATION) at 07:46

## 2021-06-11 RX ADMIN — NICOTINE 14 MG: 14 PATCH TRANSDERMAL at 06:12

## 2021-06-11 RX ADMIN — PREDNISONE 40 MG: 20 TABLET ORAL at 06:11

## 2021-06-11 RX ADMIN — OXYCODONE 2.5 MG: 5 TABLET ORAL at 04:23

## 2021-06-11 RX ADMIN — DOXYCYCLINE 100 MG: 100 TABLET, FILM COATED ORAL at 06:11

## 2021-06-11 RX ADMIN — TAMSULOSIN HYDROCHLORIDE 0.4 MG: 0.4 CAPSULE ORAL at 08:05

## 2021-06-11 RX ADMIN — ASPIRIN 81 MG: 81 TABLET, COATED ORAL at 06:11

## 2021-06-11 RX ADMIN — ENOXAPARIN SODIUM 40 MG: 40 INJECTION SUBCUTANEOUS at 06:11

## 2021-06-11 ASSESSMENT — GAIT ASSESSMENTS
ASSISTIVE DEVICE: FRONT WHEEL WALKER
DISTANCE (FEET): 50
DEVIATION: ANTALGIC;STEP TO;BRADYKINETIC;SHUFFLED GAIT
GAIT LEVEL OF ASSIST: MINIMAL ASSIST

## 2021-06-11 ASSESSMENT — COGNITIVE AND FUNCTIONAL STATUS - GENERAL
CLIMB 3 TO 5 STEPS WITH RAILING: A LOT
MOVING FROM LYING ON BACK TO SITTING ON SIDE OF FLAT BED: A LITTLE
MOBILITY SCORE: 17
STANDING UP FROM CHAIR USING ARMS: A LITTLE
MOVING TO AND FROM BED TO CHAIR: A LITTLE
SUGGESTED CMS G CODE MODIFIER MOBILITY: CK
WALKING IN HOSPITAL ROOM: A LITTLE
TURNING FROM BACK TO SIDE WHILE IN FLAT BAD: A LITTLE

## 2021-06-11 ASSESSMENT — PAIN DESCRIPTION - PAIN TYPE: TYPE: SURGICAL PAIN

## 2021-06-11 NOTE — PROGRESS NOTES
"   Orthopaedic Progress Note    Interval changes:  Patient doing well  Cleared for DC today    ROS - Patient denies any new issues.  Pain well controlled.    /72   Pulse (!) 102   Temp 36.7 °C (98.1 °F) (Temporal)   Resp 16   Ht 1.702 m (5' 7\")   Wt 59.9 kg (132 lb)   SpO2 92%     Patient seen and examined  No acute distress  Breathing non labored  RRR  L hip surgical dressing is clean, dry, and intact. Patient clearly fires tibialis anterior, EHL, and gastrocnemius/soleus. Sensation is intact to light touch throughout superficial peroneal, deep peroneal, tibial, saphenous, and sural nerve distributions. Strong and palpable 2+ dorsalis pedis and posterior tibial pulses with capillary refill less than 2 seconds. No lower leg tenderness or discomfort.      Recent Labs     06/09/21  0756 06/09/21  0756 06/10/21  0504 06/11/21  0641 06/11/21  1018   WBC 8.7  --  9.4 8.9  --    RBC 2.92*  --  3.05* 2.77*  --    HEMOGLOBIN 8.6*   < > 9.1* 8.2* 9.9*   HEMATOCRIT 27.4*   < > 27.6* 25.5* 31.7*   MCV 93.8  --  90.5 92.1  --    MCH 29.5  --  29.8 29.6  --    MCHC 31.4*  --  33.0* 32.2*  --    RDW 43.8  --  41.2 43.0  --    PLATELETCT 215  --  235 269  --    MPV 9.7  --  9.1 9.4  --     < > = values in this interval not displayed.     Active Hospital Problems    Diagnosis    • Hypokalemia [E87.6]    • Acute hypoxemic respiratory failure (HCC) [J96.01]    • Acute urinary retention [R33.8]    • Anemia [D64.9]    • Intertrochanteric fracture of femur (HCC) [S72.143A]    • Syncope [R55]    • Cigarette nicotine dependence, uncomplicated  [F17.210]    • Dyslipidemia [E78.5]    • Essential hypertension [I10]      Assessment/Plan:  Patient doing well  Cleared for DC by ortho pending medicine clearance  POD #5 S/P Surgical treatment of left intertrochanteric femur fracture with dynamic hip screw and sideplate  Wt bearing status - WBAT  Wound care/Drains - dressing changed every other day by nursing  Future Procedures - none " planned   Lovenox: Start 6/7, Duration-until ambulatory > 150'  Sutures/Staples out- 14 days post operatively  PT/OT-initiated  Antibiotics: completed  DVT Prophylaxis- TEDS/SCDs/Foot pumps  [place]-none  Case Coordination for Discharge Planning - Disposition home

## 2021-06-11 NOTE — DISCHARGE PLANNING
Anticipated Discharge Disposition: [facility] today at noon via wheelchair van    Action: Spoke with the patient at the bedside about going to [facility] today. The patient verbalized understanding and agreement and signed the [employer] choice form. The patient stated that he already let his family know about the transfer.    Barriers to Discharge: None    Plan: Will continue to assist with any discharge needs/barriers.

## 2021-06-11 NOTE — DISCHARGE PLANNING
ATTN: Case Management  RE: Referral for Home Health    Reason for referral denial: Patient discharged to [facility]              Unfortunately, we are not able to accept this referral for the reason listed above. If further clarity is needed, our Transitional Care Specialists are available to discuss any barriers to service at [phone].      We look forward to collaborating with you in the future,  [facility] Team

## 2021-06-11 NOTE — PROGRESS NOTES
Patient left unit in  with advanced transport to [facility]. Personal belongings including clothing and personal laptop sent with patient. Patient stable at discharge.

## 2021-06-11 NOTE — PROGRESS NOTES
Paged at [phone]. Spoke to Dr. Wood at. Pt is requesting Melatonin to help him sleep. MD stated he would place order as appropriate.

## 2021-06-11 NOTE — THERAPY
"Physical Therapy   Daily Treatment     Patient Name: Bright Shirley  Age:  70 y.o., Sex:  male  Medical Record #: [id number]  Today's Date: 6/11/2021     Precautions: Fall Risk, Weight Bearing As Tolerated Left Lower Extremity    Assessment    Pt continues to present highly motivated to participate. Pt continues to be limited by poor endurance. Pt attempting to get dressed EOB and was significantly fatigued from just donning underwear. Pt had one major posterior LOB during initial STS. With increased standing, pt was able to demonstrate improved balance. Pts gait mostly limited by increased WOB, requiring frequent standing rest breaks. Pt did state improved ease of weight bearing.    Plan    Continue current treatment plan.    DC Equipment Recommendations: Unable to determine at this time  Discharge Recommendations: Recommend post-acute placement for additional physical therapy services prior to discharge home      Subjective    \"I heard I will get respiratory therapy there too.\"     Objective       06/11/21 1016   Precautions   Precautions Fall Risk;Weight Bearing As Tolerated Left Lower Extremity   Gait Analysis   Gait Level Of Assist Minimal Assist   Assistive Device Front Wheel Walker   Distance (Feet) 50   # of Times Distance was Traveled 1   Deviation Antalgic;Step To;Bradykinetic;Shuffled Gait   Comments Pt w/ heavy UE use. Frequent standing rest breaks d/t increased WOB.   Bed Mobility    Supine to Sit Supervised   Sit to Supine   (NT up in chair)   Scooting Supervised   Rolling Supervised   Comments Pt scooting self toward the EOB then hooking R LE to move the L.   Functional Mobility   Sit to Stand Minimal Assist   Bed, Chair, Wheelchair Transfer Minimal Assist   Transfer Method Other (Comments)  (Ambulating)   Mobility With FWW. Pt w/ one posterior LOB during STS.   Short Term Goals    Short Term Goal # 1 Patient will ambulate 150ft with supervision within 6tx in order to access environment   Goal Outcome # 1 " goal not met   Short Term Goal # 2 Patient will ascend/descend 6 steps with supervision within 6tx in order to enter/exit home   Goal Outcome # 2 Goal not met

## 2021-06-11 NOTE — DISCHARGE PLANNING
Agency[facility]Facility Name: [facility]   Spoke To: Tess   Outcome: Per Tess she has arranged transport for 1200 today.

## 2021-06-11 NOTE — CARE PLAN
The patient is Stable - Low risk of patient condition declining or worsening    Shift Goals  Clinical Goals: Discharge to SNF  Patient Goals: Pain control  Family Goals: no family present    Progress made toward(s) clinical / shift goals:  Pain management, discharge planning      Problem: Pain - Standard  Goal: Alleviation of pain or a reduction in pain to the patient’s comfort goal  Outcome: Progressing     Problem: Knowledge Deficit - Standard  Goal: Patient and family/care givers will demonstrate understanding of plan of care, disease process/condition, diagnostic tests and medications  Outcome: Progressing     Problem: Fall Risk  Goal: Patient will remain free from falls  Outcome: Progressing

## 2021-06-11 NOTE — CARE PLAN
The patient is Stable - Low risk of patient condition declining or worsening    Shift Goals: Pain control, use of IS  Clinical Goals: Pain control, use of IS  Patient Goals: Pain control  Family Goals: no family present    Progress made toward(s) clinical / shift goals:  Use of IS, PRN pain medication     Patient is not progressing towards the following goals: N/A      Problem: Pain - Standard  Goal: Alleviation of pain or a reduction in pain to the patient’s comfort goal  Outcome: Progressing  Pain assessed using verbal and nonverbal scales. PRN pain medication given as needed and as ordered.      Mobility Progress Note    Surgery patient: Yes  Date of surgery: 6/6/2021  Ambulated 50 ft on day of surgery? (N/A if patient did not undergo surgery today): N/A  Number of times ambulated 50 feet or greater today: One  Patient has been up to chair, edge of bed or HOB 90 degrees for all meals?: Yes  Goal met? (goal is ambulating at least 50 feet 2 times on day shift, one time on night shift): No  If patient did not meet mobility goal, why?: Pt refused ambulation on night shift, but stated he would like to walk in the morning. Will continue to encourage mobility.

## 2021-06-11 NOTE — DISCHARGE INSTRUCTIONS
Discharge Instructions    [facility] other [facility] by [facility] with escort. Discharged via ambulance, hospital escort: Yes.  Special equipment needed: Oxygen    Be sure to schedule a follow-up appointment with your primary care doctor or any specialists as instructed.     Discharge Plan:   Diet Plan: Discussed  Activity Level: Discussed  Confirmed Follow up Appointment: Patient to Call and Schedule Appointment  Confirmed Symptoms Management: Discussed  Medication Reconciliation Updated: Yes    I understand that a diet low in cholesterol, fat, and sodium is recommended for good health. Unless I have been given specific instructions below for another diet, I accept this instruction as my diet prescription.   Other diet: Regular    Special Instructions: Discharge instructions for the Orthopedic Patient    Follow up with Primary Care Physician within 2 weeks of discharge to home, regarding:  Review of medications and diagnostic testing.  Surveillance for medical complications.  Workup and treatment of osteoporosis, if appropriate.     -Is this a Hip/Knee/Shoulder Joint Replacement patient? No    -Is this patient being discharged with medication to prevent blood clots?  Yes, Aspirin Aspirin, ASA oral tablets  What is this medicine?  ASPIRIN (AS pir in) is a pain reliever. It is used to treat mild pain and fever. This medicine is also used as directed by a doctor to prevent and to treat heart attacks, to prevent strokes and blood clots, and to treat arthritis or inflammation.  This medicine may be used for other purposes; ask your health care provider or pharmacist if you have questions.  COMMON BRAND NAME(S): Aspir-Low, Aspir-Ara, Aspirtab, Elvia Advanced Aspirin, Elvia Aspirin, Elvia Aspirin Extra Strength, Elvia Aspirin Plus, Elvia Extra Strength, Elvia Extra Strength Plus, Elvia Genuine Aspirin, Elvia Womens Aspirin, Bufferin, Bufferin Extra Strength, Bufferin Low Dose  What should I tell my health care  provider before I take this medicine?  They need to know if you have any of these conditions:  · anemia  · asthma  · bleeding problems  · child with chickenpox, the flu, or other viral infection  · diabetes  · gout  · if you frequently drink alcohol containing drinks  · kidney disease  · liver disease  · low level of vitamin K  · lupus  · smoke tobacco  · stomach ulcers or other problems  · an unusual or allergic reaction to aspirin, tartrazine dye, other medicines, dyes, or preservatives  · pregnant or trying to get pregnant  · breast-feeding  How should I use this medicine?  Take this medicine by mouth with a glass of water. Follow the directions on the package or prescription label. You can take this medicine with or without food. If it upsets your stomach, take it with food. Do not take your medicine more often than directed.  Talk to your pediatrician regarding the use of this medicine in children. While this drug may be prescribed for children as young as 12 years of age for selected conditions, precautions do apply. Children and teenagers should not use this medicine to treat chicken pox or flu symptoms unless directed by a doctor.  Patients over 65 years old may have a stronger reaction and need a smaller dose.  Overdosage: If you think you have taken too much of this medicine contact a poison control center or emergency room at once.  NOTE: This medicine is only for you. Do not share this medicine with others.  What if I miss a dose?  If you are taking this medicine on a regular schedule and miss a dose, take it as soon as you can. If it is almost time for your next dose, take only that dose. Do not take double or extra doses.  What may interact with this medicine?  Do not take this medicine with any of the following medications:  · cidofovir  · ketorolac  · probenecid  This medicine may also interact with the following medications:  · alcohol  · alendronate  · bismuth subsalicylate  · flavocoxid  · herbal  supplements like feverfew, garlic, iza, ginkgo biloba, horse chestnut  · medicines for diabetes or glaucoma like acetazolamide, methazolamide  · medicines for gout  · medicines that treat or prevent blood clots like enoxaparin, heparin, ticlopidine, warfarin  · other aspirin and aspirin-like medicines  · NSAIDs, medicines for pain and inflammation, like ibuprofen or naproxen  · pemetrexed  · sulfinpyrazone  · varicella live vaccine  This list may not describe all possible interactions. Give your health care provider a list of all the medicines, herbs, non-prescription drugs, or dietary supplements you use. Also tell them if you smoke, drink alcohol, or use illegal drugs. Some items may interact with your medicine.  What should I watch for while using this medicine?  If you are treating yourself for pain, tell your doctor or health care professional if the pain lasts more than 10 days, if it gets worse, or if there is a new or different kind of pain. Tell your doctor if you see redness or swelling. Also, check with your doctor if you have a fever that lasts for more than 3 days. Only take this medicine to prevent heart attacks or blood clotting if prescribed by your doctor or health care professional.  Do not take aspirin or aspirin-like medicines with this medicine. Too much aspirin can be dangerous. Always read the labels carefully.  This medicine can irritate your stomach or cause bleeding problems. Do not smoke cigarettes or drink alcohol while taking this medicine. Do not lie down for 30 minutes after taking this medicine to prevent irritation to your throat.  If you are scheduled for any medical or dental procedure, tell your healthcare provider that you are taking this medicine. You may need to stop taking this medicine before the procedure.  This medicine may be used to treat migraines. If you take migraine medicines for 10 or more days a month, your migraines may get worse. Keep a diary of headache days  and medicine use. Contact your healthcare professional if your migraine attacks occur more frequently.  What side effects may I notice from receiving this medicine?  Side effects that you should report to your doctor or health care professional as soon as possible:  · allergic reactions like skin rash, itching or hives, swelling of the face, lips, or tongue  · breathing problems  · changes in hearing, ringing in the ears  · confusion  · general ill feeling or flu-like symptoms  · pain on swallowing  · redness, blistering, peeling or loosening of the skin, including inside the mouth or nose  · signs and symptoms of bleeding such as bloody or black, tarry stools; red or dark-brown urine; spitting up blood or brown material that looks like coffee grounds; red spots on the skin; unusual bruising or bleeding from the eye, gums, or nose  · trouble passing urine or change in the amount of urine  · unusually weak or tired  · yellowing of the eyes or skin  Side effects that usually do not require medical attention (report to your doctor or health care professional if they continue or are bothersome):  · diarrhea or constipation  · headache  · nausea, vomiting  · stomach gas, heartburn  This list may not describe all possible side effects. Call your doctor for medical advice about side effects. You may report side effects to FDA at 8-340-FDA-2973.  Where should I keep my medicine?  Keep out of the reach of children.  Store at room temperature between 15 and 30 degrees C (59 and 86 degrees F). Protect from heat and moisture. Do not use this medicine if it has a strong vinegar smell. Throw away any unused medicine after the expiration date.  NOTE: This sheet is a summary. It may not cover all possible information. If you have questions about this medicine, talk to your doctor, pharmacist, or health care provider.  © 2020 [employer]/Gold Standard (2018-01-30 10:42:13)      · Is patient discharged on Warfarin / Coumadin?   No      Depression / Suicide Risk    As you are discharged from this [facility] facility, it is important to learn how to keep safe from harming yourself.    Recognize the warning signs:  · Abrupt changes in personality, positive or negative- including increase in energy   · Giving away possessions  · Change in eating patterns- significant weight changes-  positive or negative  · Change in sleeping patterns- unable to sleep or sleeping all the time   · Unwillingness or inability to communicate  · Depression  · Unusual sadness, discouragement and loneliness  · Talk of wanting to die  · Neglect of personal appearance   · Rebelliousness- reckless behavior  · Withdrawal from people/activities they love  · Confusion- inability to concentrate     If you or a loved one observes any of these behaviors or has concerns about self-harm, here's what you can do:  · Talk about it- your feelings and reasons for harming yourself  · Remove any means that you might use to hurt yourself (examples: pills, rope, extension cords, firearm)  · Get professional help from the community (Mental Health, Substance Abuse, psychological counseling)  · Do not be alone:Call your Safe Contact- someone whom you trust who will be there for you.  · Call your local CRISIS HOTLINE 815-1205 or 752-054-2048  · Call your local Children's Mobile Crisis Response Team [place] (868) 705-5100 or www.[employer]  · Call the toll free National Suicide Prevention Hotlines   · National Suicide Prevention Lifeline 797-556-CJAX (6348)  · National Hope Line Network 800-SUICIDE (007-8173)    - Discharge Instructions to Pt:  · Follow up with your Primary Care Physician   · Follow up with your Orthopedic Surgeon  · Be compliant with your follow up appointments.  · Please be compliant with your medications, including new ones.  · A new medication has been given please take as advised.  · Keep blood pressure controlled and be compliant to keep systolic blood  pressure <140.  · Please adhere to a healthy diet, that consist of low fat, low salt, low calorie.  · Weight loss and physical activity as tolerated is encouraged.   · Ambulate with assistance.  ·  If there any signs or symptoms of worsening or symptoms recurring, please call your Primary Care Physician or return to Emergency Department for further assessment.  · Avoid sudden changes in position, like standing up quickly.  · Do not drive until cleared by PCP.  · Refrain from drinking alcohol and smoking.     Recommendation to PCP:  · Would recommend a CBC, BMP in 3-5 days.  · Your pt will need close monitoring.  · If  failure to respond to therapy, we suggest having patient return for further care, or if cannot be treated as an outpatient, return to ED if worsening of symptoms.  · Please make certain your pt follows up with specialist appointments  · Ensure patient adheres to diet and lifestyle modifications and reconcile.  · Please note the change to medication and reconcile.  · Patient without progression of symptoms. Prognosis and risk factors discussed in detail with patient and he understands.

## 2021-06-11 NOTE — DISCHARGE SUMMARY
"Discharge Summary    CHIEF COMPLAINT ON ADMISSION  Chief Complaint   Patient presents with   • Syncope     c/o \"blacking out\" after feeling dizzy/light headed after standing up, then had GLF, denies hitting head   • Hip Pain     left hip pain after fall, limited ROM d/t increased pain, NO shortening or rotation noted       Reason for Admission  EMS     CODE STATUS  Full Code    HPI & HOSPITAL COURSE  \"70 y.o. male who presented 6/6/2021 with complaints of syncopal episode after standing up.  Patient states that he had fallen over on hardwood floor and difficulty ambulating/standing on left lower extremity.  Of note, patient does complain of prior syncopal event earlier this week without any trauma injury.  He is concerned after passing out and was told in the past he will need an echocardiogram which we will order on this admission.  Patient also complains of severe left lower extremity pain without radiation at level of hip.  Admits to relief with analgesics and worsened with movement.  He also complains of night sweats/diaphoresis and denies unintentional weight loss.  He admits to smoking 1 pack/day and requests nicotine patch at this time.  He does complain of chronic cough with sputum production and informed of chronic bronchitis.  He denies any fevers or chills, vertigo or incoordination.  Also denies substernal chest pain, cough with sputum production, hemoptysis, hematemesis, nausea vomiting, constipation, diarrhea, melena, hematochezia, paresthesias, dysuria, hematuria.     Vital signs on admission were as follows: 98.1, 110, 20, 128/76, 93% room air.     Chest x-ray was performed and revealed minimal diffuse bilateral interstitial opacities consistent with mild pulmonary edema.  Pelvic x-ray was performed and revealed no bony abnormalities.  CT head was performed without contrast and reveals age-related atrophy along with chronic white matter lucencies in both cerebral hemispheres however no acute " "intracranial abnormalities noted.  CT head without contrast left side reveals intertrochanteric fracture of left femur.  CTA was performed and reveals no evidence of pulmonary emboli or thoracic abnormality however does reveal emphysema, reported as bladder bullous formation in both lungs but probably needs bilateral bullae in both lungs.  He is also noted he had bilateral low-attenuation adrenal gland lesions.     Labs were obtained on admission and CBC reveals neutrophilic predominant leukocytosis with WBC count of 15.3, mild lymphocytopenia at 10.1% and otherwise relatively unremarkable.  Lipase measured at level of 20, troponin T less than 6, CMP reveals hyperglycemia of 238, hyponatremia of 129, hypochloremia of 93 and otherwise relatively unremarkable.  Hemoglobin A1c has been checked and currently pending.  D-dimer was ordered and resulted as positive.  20 and subsequent CTA negative for PE.\"    The patient was admitted, Orthopedic surgery evaluated the patient and performed surgical treatment of left intertrochanteric femur fracture with dynamic hip screw and sideplate. PT evaluated patient and recommended SNF where he will be going today.    During this hospitalization the patient became hypoxemic.  The patient states that he does not use oxygen at home.  There was a concern for pulmonary embolism, however CTA was negative for PE and we also ordered ultrasound of the bilateral lower extremities which was negative for DVT.  CT scan did report emphysema, and the patient was complaining of increased cough and sputum production so we started the patient on COPD exacerbation medications with prednisone, duo nebs and doxycycline.  The patient states that he feels much better.  He will need to complete 5 days of treatment and he has been accepted to a skilled nursing facility where he will be discharged today.    The patient did have a echocardiogram done during this hospitalization which was grossly normal.  The " patient denied any further syncopal episodes during this hospitalization and the patient mentions that he feels much better.    Today the patient was seen at bedside, currently lying in bed comfortably, he has been accepted to SNF and he will be discharged today.      Therefore, he is discharged in fair and stable condition to skilled nursing facility.    The patient met 2-midnight criteria for an inpatient stay at the time of discharge.      FOLLOW UP ITEMS POST DISCHARGE  Patient will require close follow-up with PCP and orthopedic surgery.    DISCHARGE DIAGNOSES  Principal Problem:    Acute hypoxemic respiratory failure (HCC) POA: Unknown  Active Problems:    Intertrochanteric fracture of femur (HCC) POA: Yes    Essential hypertension POA: Yes    Dyslipidemia POA: Yes    Cigarette nicotine dependence, uncomplicated  POA: Yes    Syncope POA: Yes    Acute urinary retention POA: Yes    Anemia POA: No    Hypokalemia POA: Unknown  Resolved Problems:    * No resolved hospital problems. *      FOLLOW UP  Future Appointments   Date Time Provider Department Center   6/22/2021  4:00 PM Nella Ramirez M.D. [id number] [address] [address]  [phone]        Avinash Suazo M.D.  [address] [facility] [address]  [phone]    Call  Please call to schedule a post op with Dr. Suazo for a vistit in 2 weeks. Thank you.      MEDICATIONS ON DISCHARGE     Medication List      START taking these medications      Instructions   bisacodyl 10 MG Supp  Commonly known as: DULCOLAX   Insert 1 Suppository into the rectum 1 time a day as needed (if magnesium hydroxide ineffective after 24 hours).  Dose: 10 mg     doxycycline monohydrate 100 MG tablet  Commonly known as: ADOXA   Take 1 tablet by mouth every 12 hours for 4 days.  Dose: 100 mg     enoxaparin 40 MG/0.4ML Soln inj  Start taking on: June 12, 2021  Commonly known as: LOVENOX   Inject 40 mg under the skin  every day.  Dose: 40 mg     ipratropium-albuterol 0.5-2.5 (3) MG/3ML nebulizer solution  Commonly known as: DUONEB   Take 3 mL by nebulization 4 times a day.  Dose: 3 mL     melatonin 5 mg Tabs   Take 1 tablet by mouth at bedtime.  Dose: 5 mg     nicotine 14 MG/24HR Pt24  Commonly known as: NICODERM   Place 1 Patch on the skin every 24 hours.  Dose: 1 Patch     oxyCODONE immediate-release 5 MG Tabs  Commonly known as: ROXICODONE   Take 1 tablet by mouth every 8 hours as needed for Severe Pain for up to 5 days.  Dose: 5 mg     polyethylene glycol/lytes 17 g Pack  Commonly known as: MIRALAX   Take 1 Packet by mouth 1 time a day as needed (if sennosides and docusate ineffective after 24 hours).  Dose: 17 g     predniSONE 20 MG Tabs  Start taking on: June 12, 2021  Commonly known as: DELTASONE   Take 2 Tablets by mouth every day.  Dose: 40 mg     senna-docusate 8.6-50 MG Tabs  Commonly known as: PERICOLACE or SENOKOT S   Take 2 Tablets by mouth 2 times a day.  Dose: 2 tablet     tamsulosin 0.4 MG capsule  Start taking on: June 12, 2021  Commonly known as: FLOMAX   Take 1 capsule by mouth 1/2 hour after breakfast.  Dose: 0.4 mg        CHANGE how you take these medications      Instructions   acetaminophen 325 MG Tabs  What changed:   · medication strength  · how much to take  Commonly known as: Tylenol   Take 2 Tablets by mouth every 6 hours as needed.  Dose: 650 mg     lisinopril 10 MG Tabs  Start taking on: June 12, 2021  What changed:   · medication strength  · how much to take  Commonly known as: PRINIVIL   Take 1 tablet by mouth every day.  Dose: 10 mg        CONTINUE taking these medications      Instructions   albuterol 108 (90 Base) MCG/ACT Aers inhalation aerosol   Doctor's comments: Please provide any brand of albuterol that cover under patient's insurance.  Inhale 2 Puffs by mouth every 6 hours as needed.  Dose: 2 Puff     aspirin EC 81 MG Tbec  Commonly known as: ECOTRIN   Take 81 mg by mouth every  day.  Dose: 81 mg     simvastatin 10 MG Tabs  Commonly known as: ZOCOR   Take 1 tablet by mouth every evening.  Dose: 10 mg        STOP taking these medications    hydroCHLOROthiazide 25 MG Tabs  Commonly known as: HYDRODIURIL     ibuprofen 200 MG Tabs  Commonly known as: MOTRIN     triamcinolone acetonide 0.1 % Crea  Commonly known as: KENALOG            Allergies  Allergies   Allergen Reactions   • Seasonal        DIET  Orders Placed This Encounter   Procedures   • Diet Order Diet: Regular     Standing Status:   Standing     Number of Occurrences:   1     Order Specific Question:   Diet:     Answer:   Regular [1]       ACTIVITY  As tolerated and directed by skilled nursing.  Weight bearing as tolerated and directed by skilled nursing.    LINES, DRAINS, AND WOUNDS  This is an automated list. Peripheral IVs will be removed prior to discharge.       Wound 06/06/21 Incision Hip Left XEROFORM, 4X4 GAUZE, MEDIPORE TAPE, TEGADERM (Active)   Site Assessment ANAHI 06/11/21 0805   Periwound Assessment ANAHI 06/11/21 0805   Margins ANAHI 06/11/21 0805   Closure ANAHI 06/11/21 0805   Drainage Amount ANAHI 06/11/21 0805   Drainage Description ANAHI 06/11/21 0805   Treatments Ice applied 06/07/21 0845   Dressing Options Dry Gauze;Transparent Film 06/11/21 0805   Dressing Changed Observed 06/11/21 0805   Dressing Status Clean;Dry;Intact 06/11/21 0805                  MENTAL STATUS ON TRANSFER   Alert and Oriented          CONSULTATIONS  Orthopedic Surgery    PROCEDURES  Surgical treatment of left intertrochanteric femur fracture with dynamic hip screw and sideplate on 6/6/21 by Dr Avinash Suazo.    US-EXTREMITY VENOUS LOWER BILAT   Final Result      EC-ECHOCARDIOGRAM COMPLETE W/O CONT   Final Result      DX-PORTABLE FLUORO > 1 HOUR   Final Result      Portable fluoroscopy utilized for 54 seconds.         INTERPRETING LOCATION: [address] [place] [address] [place], [address]      DX-HIP-UNILATERAL-W/O PELVIS-2/3 VIEWS LEFT   Final Result      1.  Previous ORIF  of left hip.      2.  No evidence of acute fracture and/or dislocation.      CT-CTA CHEST PULMONARY ARTERY W/ RECONS   Final Result      1.  No evidence of pulmonary emboli or other acute thoracic abnormality.   2.  Emphysema, diffuse bladder bolus formation in both lungs.   3.  Bilateral low-attenuation adrenal gland lesions.            CT-HIP W/O PLUS RECONS LEFT   Final Result      Intertrochanteric fracture of the left femur.      CT-HEAD W/O   Final Result      1.  No acute intracranial abnormality.   2.  Age-consistent atrophy, along with chronic white matter lucencies in both cerebral hemispheres as noted above.               INTERPRETING LOCATION:  [address] [place] [address], [id number]      DX-PELVIS-TRAUMA SERIES  3-   Final Result      No acute bony findings.      DX-CHEST-PORTABLE (1 VIEW)   Final Result      Minimal diffuse bilateral interstitial opacities, consistent with mild pulmonary edema.          LABORATORY  Lab Results   Component Value Date    SODIUM 137 06/10/2021    POTASSIUM 3.6 06/10/2021    CHLORIDE 105 06/10/2021    CO2 24 06/10/2021    GLUCOSE 111 (H) 06/10/2021    BUN 9 06/10/2021    CREATININE 0.62 06/10/2021        Lab Results   Component Value Date    WBC 8.9 06/11/2021    HEMOGLOBIN 9.9 (L) 06/11/2021    HEMATOCRIT 31.7 (L) 06/11/2021    PLATELETCT 269 06/11/2021        Total time of the discharge process exceeds 35 minutes.

## 2021-06-15 PROBLEM — K52.1 DIARRHEA DUE TO DRUG: Status: ACTIVE | Noted: 2021-06-15

## 2021-06-23 PROBLEM — K52.1 DIARRHEA DUE TO DRUG: Status: RESOLVED | Noted: 2021-06-15 | Resolved: 2021-06-23

## 2021-06-30 ENCOUNTER — HOME HEALTH ADMISSION (OUTPATIENT)
Dept: HOME HEALTH SERVICES | Facility: HOME HEALTHCARE | Age: 71
End: 2021-06-30
Payer: MEDICARE

## 2021-06-30 PROBLEM — Z99.81 SUPPLEMENTAL OXYGEN DEPENDENT: Status: ACTIVE | Noted: 2021-06-30

## 2021-07-01 ENCOUNTER — TELEPHONE (OUTPATIENT)
Dept: HEALTH INFORMATION MANAGEMENT | Facility: OTHER | Age: 71
End: 2021-07-01

## 2021-07-01 PROBLEM — J44.9 COPD (CHRONIC OBSTRUCTIVE PULMONARY DISEASE) (HCC): Status: ACTIVE | Noted: 2021-07-01

## 2021-07-01 NOTE — TELEPHONE ENCOUNTER
TCN contacted Patient to discuss discharge plan from SNF. Patient informed about covered services at DC such as GSC, HH and DME. Patient is agreeable to [facility]SC and referral was sent at conclusion of call. Facility to set up HH with [facility]. Patient will also require the following DME :O2 for night which is being ordered by facility prior to DC. Pt requesting smaller tanks than wht were provided and he was encouraged to discuss concern with [facility] provider during visit. Contact information given to patient for [facility] should any additional questions or needs arise. .

## 2021-07-03 ENCOUNTER — HOME CARE VISIT (OUTPATIENT)
Dept: HOME HEALTH SERVICES | Facility: HOME HEALTHCARE | Age: 71
End: 2021-07-03
Payer: MEDICARE

## 2021-07-03 VITALS
TEMPERATURE: 98.8 F | HEART RATE: 61 BPM | RESPIRATION RATE: 16 BRPM | BODY MASS INDEX: 22.91 KG/M2 | DIASTOLIC BLOOD PRESSURE: 70 MMHG | WEIGHT: 146 LBS | HEIGHT: 67 IN | OXYGEN SATURATION: 94 % | SYSTOLIC BLOOD PRESSURE: 105 MMHG

## 2021-07-03 PROCEDURE — G0493 RN CARE EA 15 MIN HH/HOSPICE: HCPCS

## 2021-07-03 PROCEDURE — 665001 SOC-HOME HEALTH

## 2021-07-03 SDOH — ECONOMIC STABILITY: HOUSING INSECURITY: EVIDENCE OF SMOKING MATERIAL: 0

## 2021-07-03 ASSESSMENT — PATIENT HEALTH QUESTIONNAIRE - PHQ9
1. LITTLE INTEREST OR PLEASURE IN DOING THINGS: 00
CLINICAL INTERPRETATION OF PHQ2 SCORE: 0
2. FEELING DOWN, DEPRESSED, IRRITABLE, OR HOPELESS: 00

## 2021-07-03 ASSESSMENT — ENCOUNTER SYMPTOMS
ADDITIONAL INFORMATION: 3/10
VOMITING: DENIES
NAUSEA: DENIE

## 2021-07-03 ASSESSMENT — FIBROSIS 4 INDEX: FIB4 SCORE: 1.4

## 2021-07-05 ENCOUNTER — HOME CARE VISIT (OUTPATIENT)
Dept: HOME HEALTH SERVICES | Facility: HOME HEALTHCARE | Age: 71
End: 2021-07-05
Payer: MEDICARE

## 2021-07-05 VITALS
DIASTOLIC BLOOD PRESSURE: 67 MMHG | RESPIRATION RATE: 16 BRPM | OXYGEN SATURATION: 91 % | SYSTOLIC BLOOD PRESSURE: 128 MMHG | TEMPERATURE: 97.7 F | HEART RATE: 67 BPM

## 2021-07-05 PROCEDURE — G0151 HHCP-SERV OF PT,EA 15 MIN: HCPCS

## 2021-07-05 SDOH — ECONOMIC STABILITY: HOUSING INSECURITY: EVIDENCE OF SMOKING MATERIAL: 1

## 2021-07-05 ASSESSMENT — BALANCE ASSESSMENTS
IMMEDIATE STANDING BALANCE FIRST 5 SECONDS: 1 - STEADY BUT USES WALKER OR OTHER SUPPORT
BALANCE SCORE: 10
ARISING SCORE: 1
NUDGED SCORE: 1
EYES CLOSED AT MAXIMUM POSITION NUDGED: 1 - STEADY
STANDING BALANCE: 1 - STEADY BUT WIDE STANCE AND USES CANE OR OTHER SUPPORT
NUDGED: 1 - STAGGERS, GRABS, CATCHES SELF
SITTING BALANCE: 1 - STEADY, SAFE
SITTING DOWN: 1 - USES ARMS OR NOT SMOOTH MOTION
TURNING 360 DEGREES STEPS: 0 - DISCONTINUOUS STEPS
ARISES: 1 - ABLE, USES ARMS TO HELP
ATTEMPTS TO ARISE: 2 - ABLE TO RISE, ONE ATTEMPT

## 2021-07-05 ASSESSMENT — GAIT ASSESSMENTS
GAIT SCORE: 7
TRUNK: 0 - MARKED SWAY OR USES WALKING AID
TRUNK SCORE: 0
BALANCE AND GAIT SCORE: 17
INITIATION OF GAIT IMMEDIATELY AFTER GO: 1 - NO HESITANCY
PATH SCORE: 1
PATH: 1 - MILD/MODERATE DEVIATION OR USES WALKING AID
WALKING STANCE: 0 - HEELS APART
STEP SYMMETRY: 0 - RIGHT AND LEFT STEP LENGTH NOT EQUAL
STEP CONTINUITY: 1 - STEPS APPEAR CONTINUOUS

## 2021-07-05 ASSESSMENT — ACTIVITIES OF DAILY LIVING (ADL)
AMBULATION ASSISTANCE ON FLAT SURFACES: 1
AMBULATION_DISTANCE/DURATION_TOLERATED: 100 FEET X 2

## 2021-07-05 ASSESSMENT — ENCOUNTER SYMPTOMS: MUSCLE WEAKNESS: 1

## 2021-07-06 ENCOUNTER — HOME CARE VISIT (OUTPATIENT)
Dept: HOME HEALTH SERVICES | Facility: HOME HEALTHCARE | Age: 71
End: 2021-07-06
Payer: MEDICARE

## 2021-07-06 ENCOUNTER — DOCUMENTATION (OUTPATIENT)
Dept: VASCULAR LAB | Facility: MEDICAL CENTER | Age: 71
End: 2021-07-06

## 2021-07-06 PROCEDURE — G0299 HHS/HOSPICE OF RN EA 15 MIN: HCPCS

## 2021-07-06 RX ORDER — ACETAMINOPHEN 325 MG/1
650 TABLET ORAL EVERY 6 HOURS PRN
Qty: 30 TABLET | Refills: 0 | Status: SHIPPED | OUTPATIENT
Start: 2021-07-06

## 2021-07-06 NOTE — PROGRESS NOTES
Medication chart review for [facility] services    PCP:  Nella Ramirez M.D.  [address] [address]  [place] [address]  Fax: [phone]    Current medication list     Current Outpatient Medications:   •  acetaminophen, 650 mg, Oral, Q6HRS PRN  •  docosahexanoic acid, 1,000 mg, Oral, DAILY AT 1800  •  Co Q-10, 1 capsule, Oral, DAILY AT 1800  •  Ginseng, 250 mg, Oral, DAILY AT 1800  •  Multiple Vitamin (MULTIVITAMIN ADULT PO), 1 tablet, Oral, DAILY AT 1800  •  diphenhydramine, 25 mg, Oral, HS PRN  •  calcium polycarbophil, 625 mg, Oral, DAILY  •  Ginkgo, 1 tablet, Oral, DAILY AT 1800  •  Home Care Oxygen, 2 L/min, Inhalation, QHS  •  nicotine, 1 Patch, Transdermal, Q24HRS  •  lisinopril, 10 mg, Oral, DAILY  •  melatonin, 5 mg, Oral, QHS  •  predniSONE, 40 mg, Oral, DAILY (Patient not taking: Reported on 6/23/2021)  •  tamsulosin, 0.4 mg, Oral, AFTER BREAKFAST (Patient not taking: Reported on 6/23/2021)  •  aspirin EC, 81 mg, Oral, DAILY  •  simvastatin, 10 mg, Oral, Q EVENING  •  albuterol, 2 Puff, Inhalation, Q6HRS PRN    Allergies   Allergen Reactions   • Seasonal        Labs     Lab Results   Component Value Date/Time    SODIUM 137 06/10/2021 05:04 AM    POTASSIUM 3.6 06/10/2021 05:04 AM    CHLORIDE 105 06/10/2021 05:04 AM    CO2 24 06/10/2021 05:04 AM    GLUCOSE 111 (H) 06/10/2021 05:04 AM    BUN 9 06/10/2021 05:04 AM    CREATININE 0.62 06/10/2021 05:04 AM      Lab Results   Component Value Date/Time    ALKPHOSPHAT 79 06/10/2021 05:04 AM    ASTSGOT 29 06/10/2021 05:04 AM    ALTSGPT 30 06/10/2021 05:04 AM    TBILIRUBIN 0.5 06/10/2021 05:04 AM    ALBUMIN 2.4 (L) 06/10/2021 05:04 AM          Assessment and Plan:   • Received referral from [facility]. Medications reviewed.         Armando Guzman, PharmD, MS, BCACP, [facility]  Phone [phone] fax [phone]    This note was created using voice recognition software (Dragon). The accuracy of the dictation is  limited by the abilities of the software. I have reviewed the note prior to signing, however some errors in grammar and context are still possible. If you have any questions related to this note please do not hesitate to contact our office.

## 2021-07-07 ENCOUNTER — TELEPHONE (OUTPATIENT)
Dept: MEDICAL GROUP | Facility: MEDICAL CENTER | Age: 71
End: 2021-07-07

## 2021-07-07 ENCOUNTER — HOME CARE VISIT (OUTPATIENT)
Dept: HOME HEALTH SERVICES | Facility: HOME HEALTHCARE | Age: 71
End: 2021-07-07
Payer: MEDICARE

## 2021-07-07 VITALS
SYSTOLIC BLOOD PRESSURE: 112 MMHG | OXYGEN SATURATION: 93 % | DIASTOLIC BLOOD PRESSURE: 60 MMHG | HEART RATE: 89 BPM | RESPIRATION RATE: 16 BRPM | TEMPERATURE: 97.4 F

## 2021-07-07 ASSESSMENT — ENCOUNTER SYMPTOMS
MUSCLE WEAKNESS: 1
LIMITED RANGE OF MOTION: 1
NAUSEA: DENIES
VOMITING: DENIES

## 2021-07-07 NOTE — TELEPHONE ENCOUNTER
ESTABLISHED PATIENT PRE-VISIT PLANNING     Patient was NOT contacted to complete PVP.     Note: Patient will not be contacted if there is no indication to call.     1.  Reviewed notes from the last few office visits within the medical group: Yes    2.  If any orders were placed at last visit or intended to be done for this visit (i.e. 6 mos follow-up), do we have Results/Consult Notes?         •  Labs - Labs were not ordered at last office visit.  Note: If patient appointment is for lab review and patient did not complete labs, check with provider if OK to reschedule patient until labs completed.       •  Imaging - Imaging was not ordered at last office visit.       •  Referrals - No referrals were ordered at last office visit.    3. Is this appointment scheduled as a Hospital Follow-Up? Yes, visit was at [facility].     4. AHA (Pulse8) form printed for Provider? Email sent to [facility] requesting form

## 2021-07-07 NOTE — CASE COMMUNICATION
For information only    Pt ambulates with walker, answered door when SN arrived. He lives with roommate who is sleeping in another bedroom during this visit. Small portable oxygen tanks are delivered this morning but pt wasn't home at the time of delivery. Oxygen company representative will come back tomorrow to demonstrate of oxygen tanks.   SpO2 above 90 % today even with exertion. Pt reports only uses supplemental oxygen at night time. Educated pt on oxygen safety, and assisted pt to place all oxygen tanks on their side since there's no rack available. Reviewed oxygen equipment care. Pt voiced understanding.   Pt reports urinary frequency due to use of Flomax which he was taking daily since home from SNF. SNF record shows Flomax was discontinued on 6/17/21. Pt tells RN that he still has one tab of Flomax left but won' take since it's stopped by SNF medical provider.   Pt declines SLP eval. No memory, cognition, or swallowing issue noted at this visit. Will continue to monitor.

## 2021-07-08 ENCOUNTER — HOME CARE VISIT (OUTPATIENT)
Dept: HOME HEALTH SERVICES | Facility: HOME HEALTHCARE | Age: 71
End: 2021-07-08
Payer: MEDICARE

## 2021-07-08 VITALS
HEART RATE: 88 BPM | RESPIRATION RATE: 16 BRPM | TEMPERATURE: 98.3 F | OXYGEN SATURATION: 88 % | SYSTOLIC BLOOD PRESSURE: 108 MMHG | DIASTOLIC BLOOD PRESSURE: 70 MMHG

## 2021-07-08 PROCEDURE — G0151 HHCP-SERV OF PT,EA 15 MIN: HCPCS

## 2021-07-08 NOTE — Clinical Note
FYI    Patient Bright Shirley presented with the following variations in vitals with activity today.    HR:  88 at rest, 110 with activity,94 after 3 min rest/recovery  O2 sats: 92 at rest, 83 with activity, 92 after 3 min rest/recovery.  Patient on ROOM AIR

## 2021-07-09 ENCOUNTER — HOME CARE VISIT (OUTPATIENT)
Dept: HOME HEALTH SERVICES | Facility: HOME HEALTHCARE | Age: 71
End: 2021-07-09
Payer: MEDICARE

## 2021-07-09 VITALS
RESPIRATION RATE: 16 BRPM | OXYGEN SATURATION: 93 % | TEMPERATURE: 98.6 F | DIASTOLIC BLOOD PRESSURE: 80 MMHG | HEART RATE: 67 BPM | SYSTOLIC BLOOD PRESSURE: 120 MMHG

## 2021-07-09 PROCEDURE — G0299 HHS/HOSPICE OF RN EA 15 MIN: HCPCS

## 2021-07-09 PROCEDURE — G0155 HHCP-SVS OF CSW,EA 15 MIN: HCPCS

## 2021-07-09 ASSESSMENT — ENCOUNTER SYMPTOMS
VOMITING: DENIES
NAUSEA: DENIES
MUSCLE WEAKNESS: 1

## 2021-07-09 ASSESSMENT — SOCIAL DETERMINANTS OF HEALTH (SDOH)
HAS ADEQUATE INCOME: Y
IS CONCERNED ABOUT INCOME: N

## 2021-07-12 ENCOUNTER — OFFICE VISIT (OUTPATIENT)
Dept: MEDICAL GROUP | Facility: MEDICAL CENTER | Age: 71
End: 2021-07-12
Payer: MEDICARE

## 2021-07-12 ENCOUNTER — HOME CARE VISIT (OUTPATIENT)
Dept: HOME HEALTH SERVICES | Facility: HOME HEALTHCARE | Age: 71
End: 2021-07-12
Payer: MEDICARE

## 2021-07-12 VITALS
HEIGHT: 66 IN | WEIGHT: 142.4 LBS | TEMPERATURE: 98.5 F | SYSTOLIC BLOOD PRESSURE: 120 MMHG | RESPIRATION RATE: 16 BRPM | OXYGEN SATURATION: 92 % | DIASTOLIC BLOOD PRESSURE: 84 MMHG | HEART RATE: 99 BPM | BODY MASS INDEX: 22.88 KG/M2

## 2021-07-12 DIAGNOSIS — J43.9 PULMONARY EMPHYSEMA, UNSPECIFIED EMPHYSEMA TYPE (HCC): ICD-10-CM

## 2021-07-12 DIAGNOSIS — S72.146D CLOSED NONDISPLACED INTERTROCHANTERIC FRACTURE OF FEMUR WITH ROUTINE HEALING, UNSPECIFIED LATERALITY, SUBSEQUENT ENCOUNTER: ICD-10-CM

## 2021-07-12 DIAGNOSIS — Z09 HOSPITAL DISCHARGE FOLLOW-UP: ICD-10-CM

## 2021-07-12 DIAGNOSIS — D64.9 ANEMIA, UNSPECIFIED TYPE: ICD-10-CM

## 2021-07-12 PROCEDURE — 99214 OFFICE O/P EST MOD 30 MIN: CPT | Performed by: FAMILY MEDICINE

## 2021-07-12 RX ORDER — LEVOFLOXACIN 500 MG/1
500 TABLET, FILM COATED ORAL
COMMUNITY
Start: 2021-07-05 | End: 2021-07-12

## 2021-07-12 ASSESSMENT — FIBROSIS 4 INDEX: FIB4 SCORE: 1.4

## 2021-07-12 NOTE — CASE COMMUNICATION
CM noted.    ----- Message -----  From: Zeferino Gray, PT  Sent: 7/9/2021   7:11 AM PDT  To: DORCAS Brandon    Patient Bright Shirley presented with the following variations in vitals with activity today.    HR:  88 at rest, 110 with activity,94 after 3 min rest/recovery  O2 sats: 92 at rest, 83 with activity, 92 after 3 min rest/recovery.  Patient on ROOM AIR

## 2021-07-12 NOTE — PROGRESS NOTES
CC: Hospital discharge follow-up(intertrochanteric left femur fracture status post surgery)    HPI:   Bright presents today for posthospitalization follow-up.    Patient was admitted to [facility] on 6/6/2021.  He presented with syncopal episode after standing up.  Patient states that he had fallen over on hardwood floor and difficulty ambulating/standing on left lower extremity.  Patient was complaining of severe left lower extremity pain without radiation at level of hip.  Patient was a chronic smoker, used to smoke 1 pack a day until the day of admission.  He also presented with chronic cough with sputum production .     Chest x-ray was performed and revealed minimal diffuse bilateral interstitial opacities consistent with mild pulmonary edema.  Pelvic x-ray was performed and revealed no bony abnormalities.  CT head was performed without contrast and reveals age-related atrophy along with chronic white matter lucencies in both cerebral hemispheres however no acute intracranial abnormalities noted.  CT hip, left side reveals intertrochanteric fracture of left femur.  CTA was performed and reveals no evidence of pulmonary emboli or thoracic abnormality however does reveal emphysema, reported as bladder bullous formation in both lungs but probably needs bilateral bullae in both lungs.  Patient felt better the patient was admitted, Orthopedic surgery evaluated the patient and performed surgical treatment of left intertrochanteric femur fracture with dynamic hip screw and sideplate. PT evaluated patient and recommended SNF .The patient did have a echocardiogram done during this hospitalization which was grossly normal.  The patient denied any further syncopal episodes during this hospitalization and the patient mentions that he feels much better.   During this hospitalization the patient became hypoxemic.  The patient states that he does not use oxygen at home.  There was a concern for pulmonary embolism, however  CTA was negative for PE and we also ordered ultrasound of the bilateral lower extremities which was negative for DVT.  CT scan did report emphysema, and the patient was complaining of increased cough and sputum production so we started the patient on COPD exacerbation medications with prednisone, duo nebs and doxycycline.    And was discharged on 6/11/2021, sent to rehab facility on 5 days and to follow antibiotics while he was on rehab facility.    Clinic today for follow-up, he has been walking independently a walker.  Denies cough or shortness of breath, takes oxygen only at night.  However his oxygen saturation today is normal.  Has been using albuterol only as needed, once maximum twice a week sometimes none during the week.  Denies any headache or dizziness.  His blood pressure has been adequately controlled, lisinopril dose decreased 10 mg daily.    Patient Active Problem List    Diagnosis Date Noted   • COPD (chronic obstructive pulmonary disease) ([facility]) 07/01/2021   • Supplemental oxygen dependent 06/30/2021   • Hypokalemia 06/09/2021   • Acute hypoxemic respiratory failure ([facility]) 06/08/2021   • Acute urinary retention 06/07/2021   • Anemia 06/07/2021   • Intertrochanteric fracture of femur ([facility]) 06/06/2021   • Syncope 06/06/2021   • Dyslipidemia 08/07/2019   • Other emphysema ([facility]) 08/07/2019   • Cigarette nicotine dependence, uncomplicated  08/07/2019   • Essential hypertension 01/10/2011       Current Outpatient Medications   Medication Sig Dispense Refill   • acetaminophen (TYLENOL) 325 MG Tab Take 2 Tablets by mouth every 6 hours as needed. 30 tablet 0   • aspirin (ASA) 325 MG Tab Take 0.25 Tablets by mouth every day. Rx indicates 81 mg of ASA daily. Pt states he has 325 mg tabs and takes 1/4 tab daily. States he will purchase 81 mg ASAP.     • docosahexanoic acid (OMEGA 3 FA) 1000 MG Cap Take 1,000 mg by mouth every day at 6 PM.     • Coenzyme Q10 (CO Q-10) 100 MG Cap Take 1 capsule by mouth every day  "at 6 PM.     • Ginseng 250 MG Cap Take 250 mg by mouth every day at 6 PM.     • Multiple Vitamin (MULTIVITAMIN ADULT PO) Take 1 tablet by mouth every day at 6 PM.     • diphenhydramine (SOMINEX) 25 MG tablet Take 25 mg by mouth at bedtime as needed. Patient use as a sleep aid PRN.     • calcium polycarbophil (FIBERCON) 625 MG Tab Take 625 mg by mouth every day.     • Ginkgo 60 MG Tab Take 1 tablet by mouth every day at 6 PM.     • Home Care Oxygen Inhale 2 L/min at bedtime. PATIENT STATES HE ONLY USES HIS OXYGEN AT [facility].     • nicotine (NICODERM) 14 MG/24HR PATCH 24 HR Place 1 Patch on the skin every 24 hours for 14 days. 30 Patch 0   • lisinopril (PRINIVIL) 10 MG Tab Take 1 tablet by mouth every day. 30 tablet    • melatonin 5 mg Tab Take 1 tablet by mouth at bedtime.     • predniSONE (DELTASONE) 20 MG Tab Take 2 Tablets by mouth every day. (Patient not taking: Reported on 6/23/2021) 30 tablet 0   • aspirin EC (ECOTRIN) 81 MG Tablet Delayed Response Take 81 mg by mouth every day.     • simvastatin (ZOCOR) 10 MG Tab Take 1 tablet by mouth every evening. 90 tablet 3   • albuterol 108 (90 Base) MCG/ACT Aero Soln inhalation aerosol Inhale 2 Puffs by mouth every 6 hours as needed. 8.5 g 3     No current facility-administered medications for this visit.         Allergies as of 07/12/2021 - Reviewed 07/12/2021   Allergen Reaction Noted   • Seasonal  08/07/2019        ROS: Denies any chest pain, Shortness of breath, Changes bowel or bladder, Lower extremity edema.    Physical Exam:  /84 (BP Location: Right arm, Patient Position: Sitting, BP Cuff Size: Adult)   Pulse 99   Temp 36.9 °C (98.5 °F) (Temporal)   Resp 16   Ht 1.676 m (5' 6\")   Wt 64.6 kg (142 lb 6.4 oz)   SpO2 92%   BMI 22.98 kg/m²   Gen.: Well-developed, well-nourished, no apparent distress,pleasant and cooperative with the examination  Skin:  Warm and dry with good turgor. No rashes or suspicious lesions in visible areas  HEENT:Sinuses nontender " with palpation, TMs clear, nares patent with pink mucosa and clear rhinorrhea,no septal deviation ,polyps or lesions. lips without lesions, oropharynx clear.  Neck: Trachea midline,no masses or adenopathy. No JVD.  Cor: Regular rate and rhythm without murmur, gallop or rub.  Lungs: Respirations unlabored.Clear to auscultation with equal breath sounds bilaterally. No wheezes, rhonchi.  Extremities: No cyanosis, clubbing or edema.      Assessment and Plan.   71 y.o. male     1. Hospital discharge follow-up  Hospital discharge summary reviewed.    2. Closed nondisplaced intertrochanteric fracture of femur with routine healing, unspecified laterality, subsequent encounter  S/P surgical treatment of left intertrochanteric femur fracture with dynamic hip screw and sideplate  Currently no pain, however Tylenol has been helping his pain he does have it.    3. Pulmonary emphysema, unspecified emphysema type (HCC)  Stable.  Normal oxygen saturation.  Continue on albuterol as needed  Continue on oxygen only at night    4. Anemia, unspecified type  Probably due to bleeding during surgery.  However patient has been asymptomatic.    Repeat CBC.    - CBC WITH DIFFERENTIAL; Future

## 2021-07-13 ENCOUNTER — HOME CARE VISIT (OUTPATIENT)
Dept: HOME HEALTH SERVICES | Facility: HOME HEALTHCARE | Age: 71
End: 2021-07-13
Payer: MEDICARE

## 2021-07-13 VITALS
DIASTOLIC BLOOD PRESSURE: 70 MMHG | TEMPERATURE: 98.1 F | OXYGEN SATURATION: 94 % | HEART RATE: 87 BPM | SYSTOLIC BLOOD PRESSURE: 144 MMHG | RESPIRATION RATE: 16 BRPM

## 2021-07-13 VITALS
TEMPERATURE: 98.1 F | RESPIRATION RATE: 16 BRPM | SYSTOLIC BLOOD PRESSURE: 144 MMHG | HEART RATE: 87 BPM | OXYGEN SATURATION: 94 % | DIASTOLIC BLOOD PRESSURE: 70 MMHG

## 2021-07-13 PROCEDURE — G0299 HHS/HOSPICE OF RN EA 15 MIN: HCPCS

## 2021-07-13 PROCEDURE — G0151 HHCP-SERV OF PT,EA 15 MIN: HCPCS

## 2021-07-13 SDOH — ECONOMIC STABILITY: HOUSING INSECURITY: EVIDENCE OF SMOKING MATERIAL: 0

## 2021-07-13 SDOH — ECONOMIC STABILITY: HOUSING INSECURITY
HOME SAFETY: PT ONLY USES OXYGEN AT NIGHT AND REPORTS HE NEVER SMOKE WITH OXYGEN ON OR NEARBY. HE VERBALIZES UNDERSTANDING REGARDING OXYGEN SAFETY AND THE POTENTIAL DEADLY CONSEQUENCE R/T TO USE OF OPEN FLAME NEAR ANY OXYGEN SOURCES.

## 2021-07-13 ASSESSMENT — ACTIVITIES OF DAILY LIVING (ADL)
FEEDING_WITHIN_DEFINED_LIMITS: 1
AMBULATION ASSISTANCE: INDEPENDENT
BATHING_WITHIN_DEFINED_LIMITS: 1
OASIS_M1830: 05
GROOMING_WITHIN_DEFINED_LIMITS: 1
AMBULATION ASSISTANCE: 1

## 2021-07-13 ASSESSMENT — SOCIAL DETERMINANTS OF HEALTH (SDOH): IS CONCERNED ABOUT INCOME: N

## 2021-07-13 NOTE — CASE COMMUNICATION
Quality Review for 7.3.21 SOC OASIS performed on by KEYLA Stiles RN on 7.12, 2021:    Edits completed by KEYLA Stiles RN:  1. Added 02 template to MAR  2. Changed  to 7.3.21 date of valid referral  3. Added #6 to , pt had been using tobacco  4. Changed [id number] to 4 per MAR and resp tab pt using at NOC at rest   5. Changed  to na per MAR.   6. Changed  to 3 per ambulation score.   7. Changed  to 8  8. Added up as tolerated to activities permitted. Added endurance, ambulation and dyspnea with min exertion to functional limitations  9. Added ambulate only with assistance, correct use of support devices, phone access, proper med use, seizures precautions, smoke detectors, fall risk to safety measures. Added heart healthy diet to nutritional requirements per dxs  10. Updated F2F data  11. Added HTN and surgical aftercare to care plan. Re added 02 education with each visit selected. Scheduled out interventions  12. Labeled chart for [facility] referral for COPD  13. Changed [id number] to 3 per PLOF in narrative  14. Resolved depression from care plan per MAR, no supporting dx and PHQ 2 being 0.  15. Changed , , ,  to 2,  to 1,  to 2,  to 3, SR5050 C,E,F,G,H to 4, HK4210 A-F, I,J,K,P to 4 per narrative reporting pt has SOB, taxing effort to ambulate, and weakness and fatigue and per PT eval on 7.5.21 reporting pt can ambulate 100 ft x 2 but needs supervision. Changed  to 5, pt would need showering DME to safely perform. Changed  to 7.5.21 date of PT eval, data used as part of the collaboration convention

## 2021-07-13 NOTE — CASE COMMUNICATION
Patient is axox4, had PT visit earlier and was watching TV when SN arrived. Left hip surgical wound has healed. Pt tells this nurse that he is no longer home-bound. He has been going out actively for grocery shopping and dining with friends. Reviewed Home Health services criteria with patient. Pt voiced understanding and signed HHCCN to be discharged from  discipline due to not home-bound and no Skilled Nursing needs at this time. Patient states he would like to transition to out patient therapy. He has scheduled OT eval tomorrow and tells this RN that he would like to keep this appointment and meet with  OT.

## 2021-07-14 SDOH — ECONOMIC STABILITY: HOUSING INSECURITY
HOME SAFETY: OXYGEN SAFETY RISK ASSESSMENT PERFORMED. PATIENT DOES HAVE A NO SMOKING SIGN POSTED IN THE HOME. PATIENT DOES NOT HAVE A WORKING FIRE EXTINGUISHER PRESENT IN THE HOME.  PATIENTINSTRUCTED TO GET A FIRE EXTINGUISHER FOR HIS HOUSE. SMOKE ALARMS ARE PRES

## 2021-07-14 SDOH — ECONOMIC STABILITY: HOUSING INSECURITY
HOME SAFETY: ENT AND FUNCTIONAL ON EACH LEVEL OF THE HOME. PATIENT DOES HAVE A FIRE ESCAPE PLAN DEVELOPED. PATIENT DOES NOT HAVE FLAMMABLE MATERIALS PRESENT IN THE HOME PRESENTING A FIRE HAZARD. NO EVIDENCE FOUND OF SMOKING MATERIALS PRESENT IN THE HOME.

## 2021-07-14 NOTE — CASE COMMUNICATION
"Had further discussion with patient. Patient told this RN that he is independent with ADLs and IADLs, and \"Do I need OT? I don't think it will do me any good.\" Advised patient that OT eval is canceled due to no need at this time. Also advised patient that he is expected to be discharged from HH agency on 7/15/21 due to not home bound and no continuous HH nursing or therapy needs. Encouraged patient to call this RN or HH agency for any further questions or concerns. "

## 2021-07-14 NOTE — CASE COMMUNICATION
I agree with changes.  ----- Message -----  From: Mayelin Stiles R.N.  Sent: 7/13/2021   9:55 AM PDT  To: Rossana Mcdaniel R.N.      Quality Review for 7.3.21 SOC OASIS performed on by KEYLA Stiles RN on 7.12, 2021:    Edits completed by KEYLA Stiles RN:  1. Added 02 template to MAR  2. Changed  to 7.3.21 date of valid referral  3. Added #6 to , pt had been using tobacco  4. Changed [id number] to 4 per MAR and resp tab pt using at NOC at rest   5. Changed  to na per MAR.   6. Changed  to 3 per ambulation score.   7. Changed  to 8  8. Added up as tolerated to activities permitted. Added endurance, ambulation and dyspnea with min exertion to functional limitations  9. Added ambulate only with assistance, correct use of support devices, phone access, proper med use, seizures precautions, smoke detectors, fall risk to safety measures. Added heart healthy diet to nutritional requirements per dxs  10. Updated F2F data  11. Added HTN and surgical aftercare to care plan. Re added 02 education with each visit selected. Scheduled out interventions  12. Labeled chart for [facility] referral for COPD  13. Changed [id number] to 3 per PLOF in narrative  14. Resolved depression from care plan per MAR, no supporting dx and PHQ 2 being 0.  15. Changed , , ,  to 2,  to 1,  to 2,  to 3, TB9293 C,E,F,G,H to 4, HN3049 A-F, I,J,K,P to 4 per narrative reporting pt has SOB, taxing effort to ambulate, and weakness and fatigue and per PT eval on 7.5.21 reporting pt can ambulate 100 ft x 2 but needs supervision. Changed  to 5, pt would need showering DME to safely perform. Changed  to 7.5.21 date of PT eval, data used as part of the collaboration convention

## 2021-07-15 ENCOUNTER — HOME CARE VISIT (OUTPATIENT)
Dept: HOME HEALTH SERVICES | Facility: HOME HEALTHCARE | Age: 71
End: 2021-07-15
Payer: MEDICARE

## 2021-07-15 VITALS
RESPIRATION RATE: 16 BRPM | OXYGEN SATURATION: 95 % | DIASTOLIC BLOOD PRESSURE: 80 MMHG | HEART RATE: 88 BPM | SYSTOLIC BLOOD PRESSURE: 120 MMHG | TEMPERATURE: 98 F

## 2021-07-15 PROCEDURE — G0151 HHCP-SERV OF PT,EA 15 MIN: HCPCS

## 2021-07-15 ASSESSMENT — GAIT ASSESSMENTS
PATH: 1 - MILD/MODERATE DEVIATION OR USES WALKING AID
TRUNK: 0 - MARKED SWAY OR USES WALKING AID
STEP CONTINUITY: 1 - STEPS APPEAR CONTINUOUS
WALKING STANCE: 0 - HEELS APART
PATH SCORE: 1
STEP SYMMETRY: 1 - RIGHT AND LEFT STEP LENGTH APPEAR EQUAL
TRUNK SCORE: 0
GAIT SCORE: 8
INITIATION OF GAIT IMMEDIATELY AFTER GO: 1 - NO HESITANCY
BALANCE AND GAIT SCORE: 22

## 2021-07-15 ASSESSMENT — BALANCE ASSESSMENTS
ARISING SCORE: 2
SITTING BALANCE: 1 - STEADY, SAFE
ATTEMPTS TO ARISE: 2 - ABLE TO RISE, ONE ATTEMPT
STANDING BALANCE: 2 - NARROW STANCE WITHOUT SUPPORT
ARISES: 2 - ABLE WITHOUT USING ARMS
NUDGED SCORE: 2
IMMEDIATE STANDING BALANCE FIRST 5 SECONDS: 1 - STEADY BUT USES WALKER OR OTHER SUPPORT
SITTING DOWN: 2 - SAFE, SMOOTH MOTION
TURNING 360 DEGREES STEPS: 0 - DISCONTINUOUS STEPS
EYES CLOSED AT MAXIMUM POSITION NUDGED: 1 - STEADY
BALANCE SCORE: 14
NUDGED: 2 - STEADY

## 2021-07-15 ASSESSMENT — ACTIVITIES OF DAILY LIVING (ADL)
AMBULATION ASSISTANCE ON UNEVEN SURFACES: 1
OASIS_M1830: 01
AMBULATION_DISTANCE/DURATION_TOLERATED: >500 FEET
HOME_HEALTH_OASIS: 00
AMBULATION ASSISTANCE ON FLAT SURFACES: 1

## 2021-07-15 ASSESSMENT — PATIENT HEALTH QUESTIONNAIRE - PHQ9: CLINICAL INTERPRETATION OF PHQ2 SCORE: 0

## 2021-07-15 NOTE — Clinical Note
OASIS dc complete 7/15/21 with HH goals met. Pt will be transitioning to [facility] outpatient PT on 7/19/21

## 2021-07-19 ENCOUNTER — HOME CARE VISIT (OUTPATIENT)
Dept: HOME HEALTH SERVICES | Facility: HOME HEALTHCARE | Age: 71
End: 2021-07-19
Payer: MEDICARE

## 2021-07-19 ENCOUNTER — PHYSICAL THERAPY (OUTPATIENT)
Dept: PHYSICAL THERAPY | Facility: REHABILITATION | Age: 71
End: 2021-07-19
Attending: FAMILY MEDICINE
Payer: MEDICARE

## 2021-07-19 ENCOUNTER — DOCUMENTATION (OUTPATIENT)
Dept: HOME HEALTH SERVICES | Facility: HOME HEALTHCARE | Age: 71
End: 2021-07-19

## 2021-07-19 DIAGNOSIS — M62.81 MUSCLE WEAKNESS (GENERALIZED): ICD-10-CM

## 2021-07-19 PROCEDURE — 97161 PT EVAL LOW COMPLEX 20 MIN: CPT

## 2021-07-19 ASSESSMENT — ENCOUNTER SYMPTOMS
PAIN SCALE AT LOWEST: 0
PAIN SCALE AT HIGHEST: 0
PAIN SCALE: 1

## 2021-07-19 NOTE — CASE COMMUNICATION
I agree with these changes.     ----- Message -----  From: Mayelin Stiles R.N.  Sent: 7/19/2021   8:22 AM PDT  To: Zeferino Gray PT      Quality Review for 7.15.21 DC OASIS performed on by KEYLA Stiles RN on 7.19, 2021:    Edits completed by KEYLA Stiles RN:  1. Changed  to 2 per last two visits reporting no pain as well as dc visit  2. Changed  to 4, pt wears his 02 at NOC  3. Changed  to 9 per SOC and chart review, there were no clinical significant med issues.   4. Sent [facility] referral and CC to PCP

## 2021-07-19 NOTE — OP THERAPY EVALUATION
Outpatient Physical Therapy  INITIAL EVALUATION    [facility] [address] [facility], [address]  [place] [address]  Phone:  [phone]    Date of Evaluation: 2021    Patient: Bright Shirley  YOB: 1950  MRN: [id number]     Referring Provider: Nella Ramirez M.D.  [address]  [facility] [address]  [place] [address]   Referring Diagnosis Muscle weakness (generalized) [M62.81]     Time Calculation  Start time: 1415  Stop time: 1500 Time Calculation (min): 45 minutes     Chief Complaint: No chief complaint on file.    Visit Diagnoses     ICD-10-CM   1. Muscle weakness (generalized)  M62.81     Date of onset of impairment: 2021    Subjective:   History of Present Illness:     Date of onset:  2021    Mechanism of injury:  Patient underwent surgical treatment of left intertrochanteric femur fracture with dynamic hip screw and sideplate on 2021.  Patient was discharged from hospital to SNF for 20 days and then treated with HH PT for 2 weeks, and now has been referred to OP physical therapy.  Per HH PT note patient is discharged at Mod I level for community distances, but remains a moderate fall risk.  Patient reports that he is using cane for household distance, but uses 4WW for community distances.  Before using no AD.     Patient reports that he is taking 2 Tylenol every 6 hours, has been having trouble sleeping.  Doesn't want to call his symptoms pain, but more ache.  Doesn't believe that ache is making it worse. Averaging 2000 steps per day.     Side of the foot numbness new onset.  Feels numb at time. Unsure if it changes because he doesn't pay much attention to it.   Every since the fall patient states that he has use it at night.  Feels like he has lost stamina.  States he checks his SPO2 frequently.    PMHx - COP2.  Pain:     Current pain ratin    At best pain ratin    At worst pain ratin  Patient Goals:     Patient goals for therapy:   Procious with ADLs/IADLs      Past Medical History:   Diagnosis Date   • Chronic obstructive pulmonary disease (HCC)    • Hypercholesteremia    • Hypertension      Past Surgical History:   Procedure Laterality Date   • PB TREAT INTER/SUBTROCH FX,W/PLATE/SCREW Left 2021    Procedure: FIXATION, FRACTURE, HIP, USING DYNAMIC HIP SCREW, WITH COMPRESSION;  Surgeon: Avinash Suazo M.D.;  Location: SURGERY [facility];  Service: Orthopedics   • HERNIA REPAIR Right    • ORIF, FEMUR Right     15 years ago   • OTHER      right inguinal hernia repair   • OTHER ORTHOPEDIC SURGERY      right hip     Social History     Tobacco Use   • Smoking status: Former Smoker     Packs/day: 1.00     Years: 40.00     Pack years: 40.00     Types: Cigarettes     Start date:      Quit date: 2021     Years since quittin.0   • Smokeless tobacco: Never Used   • Tobacco comment: vape pen & cigarettes   Substance Use Topics   • Alcohol use: Yes     Alcohol/week: 1.8 oz     Types: 1 Glasses of wine, 2 Cans of beer per week     Comment: weekend/social     Family and Occupational History     Socioeconomic History   • Marital status: Single     Spouse name: Not on file   • Number of children: Not on file   • Years of education: Not on file   • Highest education level: Bachelor's degree (e.g., BA, AB, BS)   Occupational History   • Not on file     Objective     Functional Assessment     Comments  DGI - 20 with cane   Sit<>Stand for 30 seconds - 7 times     Hip Strength   ABD - 3-/5   Extension - 4/5   Hip ER - 4/5     Hip ROM   Extension - 0  ER - 15  IR - 10    SPO2   Prior:  HR - 96, O2 - 97  After - HR - 94, O2 - 96    Patient notes O2 usually is around 95%.         Therapeutic Exercises (CPT 66259):     1. UPOC - 2021      Time-based treatments/modalities:           Assessment, Response and Plan:   Impairments: lacks appropriate home exercise program and pain with function    Assessment details:  Patient demonstrate signs  and symptoms that are consistent with muscle atrophy secondary surgically repair hip fracture, indicated by visible atrophy, strength deficits, and balance deficits.  Current impairments include strength, ROM, balance, activity tolerance, proprioception, and pain which are all negatively impacting functional mobility.  Anticipate patient will need long POC, but overall will progress well with physical therapy.  COPD will limit capacity to strengthening hip and train balance.    Precautions include:  COPD, likely syncope from low O2.   Goals:   Short Term Goals:   Patient will demonstrate compliance with HEP in 1 week in order to progress with physical therapy  Patient will demonstrate 6 minutes of continuous walking without O2 dropping below 90 in order to tolerate further strengthening to progress in rehabilitation.   Patient will demonstrate 1/2 grade improvement in hip strength in order to improve participate in ADLs  Short term goal time span:  4-6 weeks      Long Term Goals:    Patient will demonstrate independence with HEP in 4 week in order to progress towards d/c from physical therapy  Patient will demonstrate DGI of > 19 without cane in order to demonstrate patient is not a community fall risk   Patient will demonstrate 1/2 grade improvement in hip strength in order to improve participate in ADLs  Long term goal time span:  2-4 months    Plan:   Therapy options:  Physical therapy treatment to continue  Planned therapy interventions:  Neuromuscular Re-education (CPT 28942), Manual Therapy (CPT 03599), Gait Training (CPT 00235), Therapeutic Exercise (CPT 99568) and Therapeutic Activities (CPT 26552)  Frequency:  2x week  Duration in weeks:  12  Duration in visits:  20  Discussed with:  Patient  Plan details:  Likely will reduce to 1x/week at later stages of rehabilitation.       Functional Assessment Used        Referring provider co-signature:  I have reviewed this plan of care and my co-signature certifies  the need for services.    Certification Period: 07/19/2021 to  10/17/21    Physician Signature: ________________________________ Date: ______________

## 2021-07-19 NOTE — CASE COMMUNICATION
Quality Review for 7.15.21 DC OASIS performed on by KEYLA Stiles RN on 7.19, 2021:    Edits completed by KEYLA Stiles RN:  1. Changed  to 2 per last two visits reporting no pain as well as dc visit  2. Changed  to 4, pt wears his 02 at NOC  3. Changed  to 9 per SOC and chart review, there were no clinical significant med issues.   4. Sent [facility] referral and CC to PCP

## 2021-07-19 NOTE — CASE COMMUNICATION
CM noted. Thank you.   ----- Message -----  From: Zeferino Gray, PT  Sent: 7/15/2021  12:32 PM PDT  To: [facility] dc complete 7/15/21 with HH goals met. Pt will be transitioning to [facility] outpatient PT on 7/19/21

## 2021-07-19 NOTE — CASE COMMUNICATION
FYI: Mr. Shirley has been referred to the [facility] Outreach program for continued care and follow up for COPD after discharge from [facility]. Thank you for allowing [facility] to serve your patients health care needs.

## 2021-07-22 ENCOUNTER — PHYSICAL THERAPY (OUTPATIENT)
Dept: PHYSICAL THERAPY | Facility: REHABILITATION | Age: 71
End: 2021-07-22
Attending: FAMILY MEDICINE
Payer: MEDICARE

## 2021-07-22 DIAGNOSIS — M62.81 MUSCLE WEAKNESS (GENERALIZED): ICD-10-CM

## 2021-07-22 PROCEDURE — 97110 THERAPEUTIC EXERCISES: CPT

## 2021-07-22 NOTE — OP THERAPY DAILY TREATMENT
Outpatient Physical Therapy  DAILY TREATMENT     [facility] Outpatient Physical Therapy [address], [address]  [place] [address]  Phone:  [phone]    Date: 07/22/2021    Patient: Bright Shirley  YOB: 1950  MRN: [id number]     Time Calculation    Start time: 1330  Stop time: 1415 Time Calculation (min): 45 minutes     Chief Complaint: No chief complaint on file.    Visit #: 2    SUBJECTIVE:  Ready to get started.     OBJECTIVE:  6MWT - 180 feet <-- limited by O2, patient cued to stop at 88%, and cued to start again at 90% if patient felt he was ready.           Therapeutic Exercises (CPT 78860):     1. UPOC - 8/19/2021      Therapeutic Exercise Summary:   6MWT   LAQ -   Clams - 4 x 12   Sit<>Stands - performing at home     Interval Walking Program  12 x 50 feet no cane - starting at 92%, dropping to low as 84%.  Resting 1-2 minutes until return to 92%. Stop interval program secondary to loss of gait quality more than aerobic tolerance at this time.       Time-based treatments/modalities:    Physical Therapy Timed Treatment Charges  Therapeutic exercise minutes (CPT 09337): 45 minutes    ASSESSMENT:   Introduced loading program, and monitored spO2 without session.  Aerobic capacity is limiting factor during session.  Increased rest time needed to maximize function.  Patient tolerated well.      Fatigue - 3-4/10    PLAN/RECOMMENDATIONS:   Plan for treatment: therapy treatment to continue next visit.  Planned interventions for next visit: continue with current treatment.

## 2021-07-25 PROCEDURE — G0180 MD CERTIFICATION HHA PATIENT: HCPCS | Performed by: FAMILY MEDICINE

## 2021-07-26 ENCOUNTER — PHYSICAL THERAPY (OUTPATIENT)
Dept: PHYSICAL THERAPY | Facility: REHABILITATION | Age: 71
End: 2021-07-26
Attending: FAMILY MEDICINE
Payer: MEDICARE

## 2021-07-26 DIAGNOSIS — M62.81 MUSCLE WEAKNESS (GENERALIZED): ICD-10-CM

## 2021-07-26 PROCEDURE — 97110 THERAPEUTIC EXERCISES: CPT

## 2021-07-26 NOTE — OP THERAPY DAILY TREATMENT
Outpatient Physical Therapy  DAILY TREATMENT     [facility] Outpatient Physical Therapy [address], [address]  [place] [address]  Phone:  [phone]    Date: 07/26/2021    Patient: Bright Shirley  YOB: 1950  MRN: [id number]     Time Calculation    Start time: 1330  Stop time: 1415 Time Calculation (min): 45 minutes     Chief Complaint: No chief complaint on file.    Visit #: 3    SUBJECTIVE:  Was sore suprisingly after last session.  Trouble breathing with all the smoke.  Patient notes saw MD, and will follow up in 6 weeks as bone not fully healed yet.     OBJECTIVE:  6MWT - 180 feet <-- limited by O2, patient cued to stop at 88%, and cued to start again at 90% if patient felt he was ready.           Therapeutic Exercises (CPT 76093):     1. UPOC - 8/19/2021      Therapeutic Exercise Summary:   Static step ups - 6 sets   Retrowalks - 3 sets   Standing Hip ABD - 2 sets    Clams -  3 sets   LAQ -  OTB 3 sets     Walking Program  2 x 150 feet with no cane.      HEP  Sit<>Stands, Standing hip ABD B, Ambulation program.      Time-based treatments/modalities:    Physical Therapy Timed Treatment Charges  Therapeutic exercise minutes (CPT 47619): 45 minutes    ASSESSMENT:   Focused on proximal stability and balance today.  Overall patient progressing well. Patient has difficulty with CC tolerance, and compensates with lumbar spine.     Fatigue - 4-5/10    PLAN/RECOMMENDATIONS:   Plan for treatment: therapy treatment to continue next visit.  Planned interventions for next visit: continue with current treatment.

## 2021-07-28 ENCOUNTER — PHYSICAL THERAPY (OUTPATIENT)
Dept: PHYSICAL THERAPY | Facility: REHABILITATION | Age: 71
End: 2021-07-28
Attending: FAMILY MEDICINE
Payer: MEDICARE

## 2021-07-28 DIAGNOSIS — M62.81 MUSCLE WEAKNESS (GENERALIZED): ICD-10-CM

## 2021-07-28 PROCEDURE — 97110 THERAPEUTIC EXERCISES: CPT

## 2021-07-28 NOTE — OP THERAPY DAILY TREATMENT
Outpatient Physical Therapy  DAILY TREATMENT     [facility] Outpatient Physical Therapy [address] [facility], [address]  [place] [address]  Phone:  [phone]    Date: 07/28/2021    Patient: Bright Shirley  YOB: 1950  MRN: [id number]     Time Calculation    Start time: 1415  Stop time: 1500 Time Calculation (min): 45 minutes     Chief Complaint: No chief complaint on file.    Visit #: 4    SUBJECTIVE:  Sore still at night, but able to walk more due to the air quality improving. After last session 4-5/10 soreness.      OBJECTIVE:  6MWT - 180 feet <-- limited by O2, patient cued to stop at 88%, and cued to start again at 90% if patient felt he was ready.     Awaiting Bone healing (6 weeks from 7/26)          Therapeutic Exercises (CPT 86726):     1. UPOC - 8/19/2021      Therapeutic Exercise Summary:   Static step ups - 3 x 3 x 10 seconds    Retrowalks - 3 sets   Standing Hip ABD - 4 sets    Clams -  2 sets   LAQ -  PTB 4 x 8     Lateral Walks - 6 laps    Unilateral Reverse Hypers - 3 sets (first two very high, more standing hip abduction)     HEP  Sit<>Stands, Standing hip ABD B, Ambulation program.      Time-based treatments/modalities:    Physical Therapy Timed Treatment Charges  Therapeutic exercise minutes (CPT 92935): 45 minutes    ASSESSMENT:   Continue to progress lateral hip loading and focus on hip strength.  Patient walking at home and performing sit<>stand.  Overall expect slow and consistent progress.     Fatigue - 5-6/10    PLAN/RECOMMENDATIONS:   Plan for treatment: therapy treatment to continue next visit.  Planned interventions for next visit: continue with current treatment.

## 2021-08-02 ENCOUNTER — PHYSICAL THERAPY (OUTPATIENT)
Dept: PHYSICAL THERAPY | Facility: REHABILITATION | Age: 71
End: 2021-08-02
Attending: FAMILY MEDICINE
Payer: MEDICARE

## 2021-08-02 DIAGNOSIS — M62.81 MUSCLE WEAKNESS (GENERALIZED): ICD-10-CM

## 2021-08-02 PROCEDURE — 97110 THERAPEUTIC EXERCISES: CPT

## 2021-08-04 ENCOUNTER — PHYSICAL THERAPY (OUTPATIENT)
Dept: PHYSICAL THERAPY | Facility: REHABILITATION | Age: 71
End: 2021-08-04
Attending: FAMILY MEDICINE
Payer: MEDICARE

## 2021-08-04 DIAGNOSIS — M62.81 MUSCLE WEAKNESS (GENERALIZED): ICD-10-CM

## 2021-08-04 PROCEDURE — 97110 THERAPEUTIC EXERCISES: CPT

## 2021-08-04 NOTE — OP THERAPY DAILY TREATMENT
Outpatient Physical Therapy  DAILY TREATMENT     [facility] Outpatient Physical Therapy [address], [address]  [place] [address]  Phone:  [phone]    Date: 08/04/2021    Patient: Bright Shirley  YOB: 1950  MRN: [id number]     Time Calculation    Start time: 1036  Stop time: 1115 Time Calculation (min): 39 minutes     Chief Complaint: No chief complaint on file.    Visit #: 6    SUBJECTIVE:  AM/PM pain worse, sore after last visit, but functionally improving.     OBJECTIVE:  Awaiting Bone healing (MD visit around 9/6/2021 per patient)          Therapeutic Exercises (CPT 33011):     1. UPOC - 8/19/2021      Therapeutic Exercise Summary:   Bridges - 3 sets   Static step ups - 3 x 3 x 10 seconds  Standing Hip ABD - 4 sets    Unilateral Reverse Hypers - 5 sets  Manual hip flexor stretch - 5 sets   Next visit - active hip flexion - 2 sets   Gait - 6 sets 120 feet         Time-based treatments/modalities:    Physical Therapy Timed Treatment Charges  Therapeutic exercise minutes (CPT 92589): 38 minutes    ASSESSMENT:   Reduced CC loading due to soreness and increased OC activities and volume.  Patient tolerated well, will determine if CC Activities was  of soreness, or just tissue healing.  Patient verbalizes understanding.     Fatigue - 3-4/10    PLAN/RECOMMENDATIONS:   Plan for treatment: therapy treatment to continue next visit.  Planned interventions for next visit: continue with current treatment.

## 2021-08-05 ENCOUNTER — PATIENT OUTREACH (OUTPATIENT)
Dept: HEALTH INFORMATION MANAGEMENT | Facility: OTHER | Age: 71
End: 2021-08-05

## 2021-08-05 NOTE — NON-PROVIDER
Reminder-Comprehensive Health Assessment. Member confirmed appointment. Verified HIPAA, no questions or concerns.

## 2021-08-09 ENCOUNTER — PHYSICAL THERAPY (OUTPATIENT)
Dept: PHYSICAL THERAPY | Facility: REHABILITATION | Age: 71
End: 2021-08-09
Attending: FAMILY MEDICINE
Payer: MEDICARE

## 2021-08-09 DIAGNOSIS — M62.81 MUSCLE WEAKNESS (GENERALIZED): ICD-10-CM

## 2021-08-09 PROBLEM — R73.9 HYPERGLYCEMIA: Status: ACTIVE | Noted: 2021-08-09

## 2021-08-09 PROBLEM — J96.11 CHRONIC HYPOXEMIC RESPIRATORY FAILURE (HCC): Status: ACTIVE | Noted: 2021-08-09

## 2021-08-09 PROBLEM — I73.9 PERIPHERAL VASCULAR DISEASE, UNSPECIFIED (HCC): Status: ACTIVE | Noted: 2021-08-09

## 2021-08-09 PROBLEM — E88.810 DYSMETABOLIC SYNDROME: Status: ACTIVE | Noted: 2021-08-09

## 2021-08-09 PROCEDURE — 97110 THERAPEUTIC EXERCISES: CPT

## 2021-08-09 NOTE — OP THERAPY DAILY TREATMENT
Outpatient Physical Therapy  DAILY TREATMENT     [facility] Outpatient Physical Therapy [address] [facility], [address]  [place] [address]  Phone:  [phone]    Date: 08/09/2021    Patient: Bright Shirley  YOB: 1950  MRN: [id number]     Time Calculation    Start time: 1500  Stop time: 1545 Time Calculation (min): 45 minutes     Chief Complaint: No chief complaint on file.    Visit #: 7    SUBJECTIVE:  Response to reduced CC activity - no change in soreness, but using Rolator less.     OBJECTIVE:  Awaiting Bone healing (MD visit around 9/6/2021 per patient)          Therapeutic Exercises (CPT 15047):     1. UPOC - 8/19/2021      Therapeutic Exercise Summary:   Bridges - 4 set x 10   Clams - 2 sets x 20   Static step ups - 3 x 3 x 10 seconds (held)  Unilateral Reverse Hypers - 4 sets  AROM hip flexion (supine) - 4 sets  (bent knee)    Gait - 4 sets 200 feet       Time-based treatments/modalities:    Physical Therapy Timed Treatment Charges  Therapeutic exercise minutes (CPT 30897): 40 minutes    ASSESSMENT:   Progressed program this visit, overall patient progressing as expected.  Reintroduced CC loading secondary to no change in soreness post activities.  Continues to demonstrate improved single leg balance and hip extension strength.  Plan to progress to more balance activities next visit as patient tolerating more volume.     Fatigue - 5/10    PLAN/RECOMMENDATIONS:   Plan for treatment: therapy treatment to continue next visit.  Planned interventions for next visit: continue with current treatment.

## 2021-08-11 ENCOUNTER — PHYSICAL THERAPY (OUTPATIENT)
Dept: PHYSICAL THERAPY | Facility: REHABILITATION | Age: 71
End: 2021-08-11
Attending: FAMILY MEDICINE
Payer: MEDICARE

## 2021-08-11 DIAGNOSIS — M62.81 MUSCLE WEAKNESS (GENERALIZED): ICD-10-CM

## 2021-08-11 PROCEDURE — 97110 THERAPEUTIC EXERCISES: CPT

## 2021-08-11 PROCEDURE — 97140 MANUAL THERAPY 1/> REGIONS: CPT

## 2021-08-11 NOTE — OP THERAPY DAILY TREATMENT
Outpatient Physical Therapy  DAILY TREATMENT     [facility] Outpatient Physical Therapy [address] [facility], [address]  [place] [address]  Phone:  [phone]    Date: 08/11/2021    Patient: Bright Shirley  YOB: 1950  MRN: [id number]     Time Calculation    Start time: 0900  Stop time: 0945 Time Calculation (min): 45 minutes     Chief Complaint: No chief complaint on file.    Visit #: 8    SUBJECTIVE:  Sore after the last session, but overall doing fine.  Took some medication and I am okay (sore in the AM this morning).  Patient notes he is sleeping longer intervals.     OBJECTIVE:  Awaiting Bone healing (MD visit around 9/6/2021 per patient)          Therapeutic Exercises (CPT 19062):     1. UPOC - 8/19/2021      Therapeutic Exercise Summary:   Bridges - 4 set x 10   Clams - 2 sets x 20   SL ABD - 4 x 5   Static step ups - 3 x 3 x 10 seconds (held)  Unilateral Reverse Hypers - 4 sets  AROM hip flexion (supine) - 4 sets  (bent knee)    Gait - 4 sets 200 feet (held)    Time-based treatments/modalities:    Physical Therapy Timed Treatment Charges  Manual therapy minutes (CPT 35687): 15 minutes  Therapeutic exercise minutes (CPT 31794): 25 minutes    ASSESSMENT:   Hypertrophy loading performed in open chain to reduce soreness, but motor control in CC.  Reinforced improving hip extension in gait now that patient does not have trendelenburg.  Minimal CC today secondary to increased discomfort into hip.  Will reintroduce CC next visit (less fatigue)     Fatigue - 4/10    PLAN/RECOMMENDATIONS:   Plan for treatment: therapy treatment to continue next visit.  Planned interventions for next visit: continue with current treatment.

## 2021-08-12 ENCOUNTER — TELEPHONE (OUTPATIENT)
Dept: MEDICAL GROUP | Facility: MEDICAL CENTER | Age: 71
End: 2021-08-12

## 2021-08-12 NOTE — TELEPHONE ENCOUNTER
Phone Number Called: [phone]    Call outcome: Spoke to pharmacy    Message: Called patient's pharmacy per patient request to figure out why he is only receiving a 30 day supply instead of a 3 month supply when picking up his medications. Per pharmacy, it is his insurance that is only allowing the 30-day supply. Patient notified to call insurance.

## 2021-08-16 ENCOUNTER — PHYSICAL THERAPY (OUTPATIENT)
Dept: PHYSICAL THERAPY | Facility: REHABILITATION | Age: 71
End: 2021-08-16
Attending: FAMILY MEDICINE
Payer: MEDICARE

## 2021-08-16 DIAGNOSIS — M62.81 MUSCLE WEAKNESS (GENERALIZED): ICD-10-CM

## 2021-08-16 PROCEDURE — 97110 THERAPEUTIC EXERCISES: CPT

## 2021-08-16 NOTE — OP THERAPY DAILY TREATMENT
Outpatient Physical Therapy  DAILY TREATMENT     [facility] Outpatient Physical Therapy [address] [facility], [address]  [place] [address]  Phone:  [phone]    Date: 08/16/2021    Patient: Bright Shirley  YOB: 1950  MRN: [id number]     Time Calculation    Start time: 1332  Stop time: 1415 Time Calculation (min): 43 minutes     Chief Complaint: No chief complaint on file.    Visit #: 9    SUBJECTIVE:  Feel like I have made no progress, like I stuck in a rut.  (discussed re-evaluation next visit, as patient making visible progress, but likely does not notice the day to day improvement).      OBJECTIVE:  Awaiting Bone healing (MD visit around 9/6/2021 per patient)          Therapeutic Exercises (CPT 80309):     1. UPOC - 8/19/2021      Therapeutic Exercise Summary:   Bridges - 2 x 10   SL ABD - 2 x 5  AROM hip flexion (supine) - 2 x 5 (bent knee)   Gait - 200 feet (cued reduced cane use)     Static step ups - 2 x 3 x 15 seconds   Static step up w/ lift off 2 x 3 x 3-5 seconds    Time-based treatments/modalities:    Physical Therapy Timed Treatment Charges  Therapeutic exercise minutes (CPT 97737): 45 minutes    ASSESSMENT:   Continue to promote LE strength, endurance and gait quality.  Reinforced the importance of hip extension during gait.  Overall patient progressing as expected, limited by bone healing.  Re-assessment next visit; provide number for patient to demonstrate progress; as mental aspect of rehabilitation is challenging - feeling like he is limiting, and night pain remaining during bone healing.      Fatigue - 5/10    PLAN/RECOMMENDATIONS:   Plan for treatment: therapy treatment to continue next visit.  Planned interventions for next visit: continue with current treatment.

## 2021-08-18 ENCOUNTER — PATIENT MESSAGE (OUTPATIENT)
Dept: MEDICAL GROUP | Facility: MEDICAL CENTER | Age: 71
End: 2021-08-18

## 2021-08-18 ENCOUNTER — PHYSICAL THERAPY (OUTPATIENT)
Dept: PHYSICAL THERAPY | Facility: REHABILITATION | Age: 71
End: 2021-08-18
Attending: FAMILY MEDICINE
Payer: MEDICARE

## 2021-08-18 DIAGNOSIS — M62.81 MUSCLE WEAKNESS (GENERALIZED): ICD-10-CM

## 2021-08-18 PROCEDURE — 97112 NEUROMUSCULAR REEDUCATION: CPT

## 2021-08-18 PROCEDURE — 97110 THERAPEUTIC EXERCISES: CPT

## 2021-08-18 NOTE — OP THERAPY DAILY TREATMENT
Outpatient Physical Therapy  DAILY TREATMENT     [facility] Outpatient Physical Therapy [address], [address]  [place] [address]  Phone:  [phone]    Date: 08/18/2021    Patient: Bright Shirley  YOB: 1950  MRN: [id number]     Time Calculation    Start time: 1421  Stop time: 1500 Time Calculation (min): 39 minutes     Chief Complaint: No chief complaint on file.    Visit #: 10    SUBJECTIVE:  Night pain continues.     Patient showed up 6 minutes late to this visit.     OBJECTIVE:  Awaiting Bone healing (MD visit around 9/6/2021 per patient)  MMT - Hip ABD - 3+, Hip Extension - 4/5   30 second sit<>stand - 10     BP - 176/100 - 186/106 (post exercise) - 176/100 (3-4 minutes of rest), all asymptomatic          Therapeutic Exercises (CPT 01168):     1. UPOC - 8/19/2021      Therapeutic Exercise Summary:   Bridges - 2 x 10   SL ABD - 2 x 5, 20 second hold     Held   AROM hip flexion (supine) - 2 x 5 (bent knee)   Gait - 200 feet (cued reduced cane use)   Static step ups - 2 x 3 x 15 seconds   Static step up w/ lift off 2 x 3 x 3-5 seconds    Time-based treatments/modalities:    Physical Therapy Timed Treatment Charges  Neuromusc re-ed, balance, coor, post minutes (CPT 33782): 9 minutes  Therapeutic exercise minutes (CPT 20360): 30 minutes    ASSESSMENT:   Held PT today secondary to blood pressure.  Physician called and messaged about follow up, and recommend patient to clal.  Educated patient on FAST, and signs of cardiovascular issues.  Reinforced going to ER if signs present, especially FAST, and recommend patient hold drinking ETOH or exercising outside of ADLs until follow up with MD.  Patient receptive.     PT will hold until patient is medically cleared to return to PT.  If no specific guideline provide in writing, will continue with standard exercise guidelines with BP monitoring.    PLAN/RECOMMENDATIONS:   Plan for treatment: therapy treatment to continue next visit.  Planned interventions  for next visit: continue with current treatment.

## 2021-08-23 ENCOUNTER — APPOINTMENT (OUTPATIENT)
Dept: PHYSICAL THERAPY | Facility: REHABILITATION | Age: 71
End: 2021-08-23
Attending: FAMILY MEDICINE
Payer: MEDICARE

## 2021-08-25 ENCOUNTER — APPOINTMENT (OUTPATIENT)
Dept: PHYSICAL THERAPY | Facility: REHABILITATION | Age: 71
End: 2021-08-25
Attending: FAMILY MEDICINE
Payer: MEDICARE

## 2021-08-25 ENCOUNTER — PATIENT MESSAGE (OUTPATIENT)
Dept: MEDICAL GROUP | Facility: MEDICAL CENTER | Age: 71
End: 2021-08-25

## 2021-09-01 ENCOUNTER — PATIENT MESSAGE (OUTPATIENT)
Dept: MEDICAL GROUP | Facility: MEDICAL CENTER | Age: 71
End: 2021-09-01

## 2021-09-02 ENCOUNTER — PATIENT MESSAGE (OUTPATIENT)
Dept: MEDICAL GROUP | Facility: MEDICAL CENTER | Age: 71
End: 2021-09-02

## 2021-09-02 ENCOUNTER — APPOINTMENT (OUTPATIENT)
Dept: PHYSICAL THERAPY | Facility: REHABILITATION | Age: 71
End: 2021-09-02
Attending: FAMILY MEDICINE
Payer: MEDICARE

## 2021-09-03 ENCOUNTER — TELEPHONE (OUTPATIENT)
Dept: MEDICAL GROUP | Facility: MEDICAL CENTER | Age: 71
End: 2021-09-03

## 2021-09-03 NOTE — TELEPHONE ENCOUNTER
Marilu from [facility] PT called and stated that Addy Copeland needs medical clearance for the patient to return to PT. Marilu stated that is can be emailed over to him.

## 2021-09-07 ENCOUNTER — PHYSICAL THERAPY (OUTPATIENT)
Dept: PHYSICAL THERAPY | Facility: REHABILITATION | Age: 71
End: 2021-09-07
Attending: FAMILY MEDICINE
Payer: MEDICARE

## 2021-09-07 DIAGNOSIS — M62.81 MUSCLE WEAKNESS (GENERALIZED): ICD-10-CM

## 2021-09-07 PROCEDURE — 97112 NEUROMUSCULAR REEDUCATION: CPT

## 2021-09-07 PROCEDURE — 97110 THERAPEUTIC EXERCISES: CPT

## 2021-09-07 PROCEDURE — 97530 THERAPEUTIC ACTIVITIES: CPT

## 2021-09-07 NOTE — OP THERAPY DAILY TREATMENT
Outpatient Physical Therapy  DAILY TREATMENT     [facility] Outpatient Physical Therapy [place] [address] [facility], [address]  [place] [address]  Phone:  [phone]    Date: 09/07/2021    Patient: Bright Shirley  YOB: 1950  MRN: [id number]     Time Calculation    Start time: 1038  Stop time: 1103 Time Calculation (min): 25 minutes     Chief Complaint: No chief complaint on file.    Visit #: 11    SUBJECTIVE:  About the same, just has been walking a little bit.  BP has been variable and can vary side to side.  Have been taking medication, but sometimes not taking it in the PM if BP is low (can get into the 100s).      Follow up visit for MD - reports 9/21 (around there per patient)    Patient showed up 7 minutes late to todays visit.     OBJECTIVE:  Per Physician Note - Patient can participate in PT as long as blood pressure remains 145/90.      Took BP medication at 9 o'clock.   Pre - BP - 160/89  Post gentle exercises - 167/92  After rest - 160/85    Taken with machine and manually.  Left arm.     Last visit   MMT - Hip ABD - 3+, Hip Extension - 4/5   30 second sit<>stand - 10         Therapeutic Exercises (CPT 93274):     1. UPOC - 8/19/2021      Therapeutic Exercise Summary:   Bridges - 3 x 10   SL ABD - 2 x 5, 20 second hold     Held   AROM hip flexion (supine) - 2 x 5 (bent knee)   Gait - 200 feet (cued reduced cane use)   Static step ups - 2 x 3 x 15 seconds   Static step up w/ lift off 2 x 3 x 3-5 seconds    Time-based treatments/modalities:    Physical Therapy Timed Treatment Charges  Therapeutic activity minutes (CPT 35187): 25 minutes    ASSESSMENT:   Held PT today with monitoring of symptoms as patient remains to have variable and elevated BP.  Defer to MD for medical optimization as patient is exceeding BP of 145/90 indicated as max level by referring provider.  Reached out to MD for further guidance.    PT will hold until patient is medically cleared to return to PT, or until patient is managed to  lower than 145/90 set by MD, unless further orders provided.     PLAN/RECOMMENDATIONS:   Plan for treatment: Hold until medically optimized.   Planned interventions for next visit: continue with current treatment.

## 2021-09-08 ENCOUNTER — PATIENT MESSAGE (OUTPATIENT)
Dept: MEDICAL GROUP | Facility: MEDICAL CENTER | Age: 71
End: 2021-09-08

## 2021-09-08 DIAGNOSIS — I10 ESSENTIAL HYPERTENSION: ICD-10-CM

## 2021-09-08 PROCEDURE — RXMED WILLOW AMBULATORY MEDICATION CHARGE: Performed by: FAMILY MEDICINE

## 2021-09-08 RX ORDER — AMLODIPINE BESYLATE 10 MG/1
10 TABLET ORAL DAILY
Qty: 90 TABLET | Refills: 2 | Status: SHIPPED | OUTPATIENT
Start: 2021-09-08 | End: 2022-06-20 | Stop reason: SDUPTHER

## 2021-09-08 RX ORDER — LISINOPRIL 20 MG/1
TABLET ORAL
Qty: 90 TABLET | Refills: 3 | Status: SHIPPED | OUTPATIENT
Start: 2021-09-08 | End: 2022-09-28

## 2021-09-08 RX ORDER — LISINOPRIL 20 MG/1
20 TABLET ORAL 2 TIMES DAILY
Qty: 180 TABLET | Refills: 2 | Status: SHIPPED | OUTPATIENT
Start: 2021-09-08 | End: 2021-10-05 | Stop reason: SDUPTHER

## 2021-09-09 ENCOUNTER — PHARMACY VISIT (OUTPATIENT)
Dept: PHARMACY | Facility: MEDICAL CENTER | Age: 71
End: 2021-09-09
Payer: COMMERCIAL

## 2021-09-14 ENCOUNTER — APPOINTMENT (OUTPATIENT)
Dept: PHYSICAL THERAPY | Facility: REHABILITATION | Age: 71
End: 2021-09-14
Attending: FAMILY MEDICINE
Payer: MEDICARE

## 2021-09-16 ENCOUNTER — PATIENT MESSAGE (OUTPATIENT)
Dept: MEDICAL GROUP | Facility: MEDICAL CENTER | Age: 71
End: 2021-09-16

## 2021-09-16 ENCOUNTER — APPOINTMENT (OUTPATIENT)
Dept: PHYSICAL THERAPY | Facility: REHABILITATION | Age: 71
End: 2021-09-16
Attending: FAMILY MEDICINE
Payer: MEDICARE

## 2021-09-16 VITALS — DIASTOLIC BLOOD PRESSURE: 80 MMHG | SYSTOLIC BLOOD PRESSURE: 128 MMHG

## 2021-09-20 ENCOUNTER — PHYSICAL THERAPY (OUTPATIENT)
Dept: PHYSICAL THERAPY | Facility: REHABILITATION | Age: 71
End: 2021-09-20
Attending: FAMILY MEDICINE
Payer: MEDICARE

## 2021-09-20 ENCOUNTER — TELEPHONE (OUTPATIENT)
Dept: MEDICAL GROUP | Facility: MEDICAL CENTER | Age: 71
End: 2021-09-20

## 2021-09-20 DIAGNOSIS — M62.81 MUSCLE WEAKNESS (GENERALIZED): ICD-10-CM

## 2021-09-20 PROCEDURE — 97110 THERAPEUTIC EXERCISES: CPT

## 2021-09-20 NOTE — OP THERAPY DAILY TREATMENT
Outpatient Physical Therapy  DAILY TREATMENT     [facility] Outpatient Physical Therapy [address], [address]  [place] [address]  Phone:  [phone]    Date: 09/20/2021    Patient: Bright Shirley  YOB: 1950  MRN: [id number]     Time Calculation    Start time: 1540  Stop time: 1620 Time Calculation (min): 40 minutes     Chief Complaint: No chief complaint on file.    Visit #: 12    SUBJECTIVE:  Ortho - x-ray looks good, bone is healed, unsure why you still hurt, gave pain meds.  PCP - new BP medication. Helping but BP can get low at night.      OBJECTIVE:  BP - 138/88          Therapeutic Exercises (CPT 71629):     1. UPOC - next visit      Therapeutic Exercise Summary:   Bridges - 2 x 10   < 90 piriformis stretch - 3 x 30 seconds   Bent knee hip flexor stretch   Clams - 30x   SL ABD - only felt in HS (d/c for now)    SLS - 10 x 10 seconds   SLS with step - 4 laps     Distraction improves symptoms.       Time-based treatments/modalities:    Physical Therapy Timed Treatment Charges  Therapeutic exercise minutes (CPT 21565): 40 minutes      ASSESSMENT:   Re-initiated PT plan of care, will re-assess next visit.  Patient tolerated gentle mobility and strengthening this visit, but has some soreness into lateral hip.  Anticipate will need to gradually reintroduce full program.     PLAN/RECOMMENDATIONS:   Plan for treatment: therapy treatment to continue next visit.  Planned interventions for next visit: continue with current treatment.

## 2021-09-20 NOTE — TELEPHONE ENCOUNTER
----- Message from Addy Copeland PT sent at 9/20/2021  9:44 AM PDT -----  Tonja Carmen,    I was just reviewing Bright's chart today, do you mind having your office fax over to the [facility] office a clearance to return to PT, with any BP limitations you may have?  (similar to the one before).  Thanks,    Addy

## 2021-09-27 ENCOUNTER — PHYSICAL THERAPY (OUTPATIENT)
Dept: PHYSICAL THERAPY | Facility: REHABILITATION | Age: 71
End: 2021-09-27
Attending: FAMILY MEDICINE
Payer: MEDICARE

## 2021-09-27 DIAGNOSIS — M62.81 MUSCLE WEAKNESS (GENERALIZED): ICD-10-CM

## 2021-09-27 PROCEDURE — 97110 THERAPEUTIC EXERCISES: CPT

## 2021-09-27 NOTE — OP THERAPY DAILY TREATMENT
Outpatient Physical Therapy  DAILY TREATMENT     [facility] Outpatient Physical Therapy [address] [facility], [address]  [place] [address]  Phone:  [phone]    Date: 09/27/2021    Patient: Bright Shirley  YOB: 1950  MRN: [id number]     Time Calculation    Start time: 1415  Stop time: 1500 Time Calculation (min): 45 minutes     Chief Complaint: No chief complaint on file.    Visit #: 13    SUBJECTIVE:  Hip mostly sore at night, taking a lot of pain medication, during the day it's overall fine.     OBJECTIVE:  Sit<> 30 seconds -11  SLS - 18 seconds  DGI without cane - 20/26 not fall risk, but likely fall risk when fatigued.  Continue to use cane as needed.           Therapeutic Exercises (CPT 12050):     1. UPOC - 10/27/21      Therapeutic Exercise Summary:   Bridges - 3 x 10   Clams in extension - 3 x 10   Toe Taps - 3 sets (held)    Reviewed flexibility drills   < 90 piriformis stretch - 3 x 30 seconds   Bent knee hip flexor stretch      SLS - 10 x 10 seconds   SLS with step - 4 laps  Step ups - 4inch - 3x5, 6 inch - 2x5, 8 inch - 1 x 3     Lateral Steps - 4 x 1 lap     Distraction improves symptoms.       Time-based treatments/modalities:    Physical Therapy Timed Treatment Charges  Therapeutic exercise minutes (CPT 89768): 45 minutes    ASSESSMENT:   Progressed balance and lateral hip strengthening this visit.  Focused on increasing endurance and CC tolerance.  Anticipate soreness.  Patient is behind in rehabilitation, due to long layoff from cardiovascular concerns.  Plan to continue to progress as able. No good response to gentle distraction, but stretching appears to relieve pain some.  Encouraged stretching HEP.     PLAN/RECOMMENDATIONS:   Plan for treatment: therapy treatment to continue next visit.  Planned interventions for next visit: continue with current treatment.

## 2021-09-30 ENCOUNTER — PHYSICAL THERAPY (OUTPATIENT)
Dept: PHYSICAL THERAPY | Facility: REHABILITATION | Age: 71
End: 2021-09-30
Attending: FAMILY MEDICINE
Payer: MEDICARE

## 2021-09-30 DIAGNOSIS — M62.81 MUSCLE WEAKNESS (GENERALIZED): ICD-10-CM

## 2021-09-30 PROCEDURE — 97110 THERAPEUTIC EXERCISES: CPT

## 2021-09-30 PROCEDURE — 97112 NEUROMUSCULAR REEDUCATION: CPT

## 2021-09-30 NOTE — OP THERAPY DAILY TREATMENT
Outpatient Physical Therapy  DAILY TREATMENT     [facility] Outpatient Physical Therapy [address], [address]  [place] [address]  Phone:  [phone]    Date: 09/30/2021    Patient: Bright Shirley  YOB: 1950  MRN: [id number]     Time Calculation    Start time: 1330  Stop time: 1415 Time Calculation (min): 45 minutes     Chief Complaint: No chief complaint on file.    Visit #: 14    SUBJECTIVE:  Not too bad, sore but doing okay.  Some soreness into the hip at night still.     OBJECTIVE:  Sit<> 30 seconds -11  SLS - 18 seconds  DGI without cane - 20/26 not fall risk, but likely fall risk when fatigued.  Continue to use cane as needed.           Therapeutic Exercises (CPT 74189):     1. UPOC - 10/27/21      Therapeutic Exercise Summary:   Bridges - 3 x 10   Clams in extension - 2 x 15  Toe Taps - 2 sets     Reviewed flexibility drills   < 90 piriformis stretch - 3 x 30 seconds   Bent knee hip flexor stretch     Distraction improves symptoms.     Therapeutic Treatments and Modalities:     1. Neuromuscular Re-education (CPT 59022)    Therapeutic Treatment and Modalities Summary: Step ups - 8 inches - 3x5   Side step ups - 6 inch 2 x 5, 8 inch 2 x 5   Split Squat holds B UE assist 4 x 10 seconds    Time-based treatments/modalities:    Physical Therapy Timed Treatment Charges  Neuromusc re-ed, balance, coor, post minutes (CPT 03606): 15 minutes  Therapeutic exercise minutes (CPT 28828): 30 minutes    ASSESSMENT:   Progressed balance and lateral hip strengthening this visit.  Focused on increasing endurance and CC tolerance.  Anticipate soreness.  Continue to encourage ambulation and stretching, as despite clear functional progress, patient focused on night pain as measure of improvement.  Likely will take more time to improve strength/ROM prior to pain reducing, consistent reinforcement of that provided.     Patient is behind in rehabilitation, due to long layoff from cardiovascular concerns.   Plan to continue to progress as able. No good response to gentle distraction, but stretching appears to relieve pain some.  Encouraged stretching HEP.     PLAN/RECOMMENDATIONS:   Plan for treatment: therapy treatment to continue next visit.  Planned interventions for next visit: continue with current treatment.

## 2021-10-04 ENCOUNTER — PHYSICAL THERAPY (OUTPATIENT)
Dept: PHYSICAL THERAPY | Facility: REHABILITATION | Age: 71
End: 2021-10-04
Attending: FAMILY MEDICINE
Payer: MEDICARE

## 2021-10-04 DIAGNOSIS — M62.81 MUSCLE WEAKNESS (GENERALIZED): ICD-10-CM

## 2021-10-04 PROCEDURE — 97112 NEUROMUSCULAR REEDUCATION: CPT

## 2021-10-04 PROCEDURE — 97110 THERAPEUTIC EXERCISES: CPT

## 2021-10-04 PROCEDURE — 97124 MASSAGE THERAPY: CPT

## 2021-10-04 NOTE — OP THERAPY DAILY TREATMENT
Outpatient Physical Therapy  DAILY TREATMENT     [facility] Outpatient Physical Therapy [address] [facility], [address]  [place] [address]  Phone:  [phone]    Date: 10/04/2021    Patient: Bright Shirley  YOB: 1950  MRN: [id number]     Time Calculation    Start time: 1545  Stop time: 1630 Time Calculation (min): 45 minutes     Chief Complaint: No chief complaint on file.    Visit #: 15    SUBJECTIVE:  Been limiting more today, was sore after last two sessions, but not too bad.     OBJECTIVE:  Sit<> 30 seconds -11  SLS - 18 seconds  DGI without cane - 20/26 not fall risk, but likely fall risk when fatigued.  Continue to use cane as needed.           Therapeutic Exercises (CPT 89640):     1. UPOC - 10/27/21      Therapeutic Exercise Summary:   Bridges - 3 x 10   Clams in extension - 2 x 15  SL ABD - 5 x 20 seconds   Planks (d/c worsened ROM)    Reviewed flexibility drills   crosslegged ER - 10x  AAROM hip flexion with straight leg - 10x   SL ABD 5 x 20 seconds (performed today)  Heat - 10 minutes     Distraction improves symptoms.     Therapeutic Treatments and Modalities:     1. Manual Therapy (CPT 68189)    Therapeutic Treatment and Modalities Summary:   Posterior glide progressive   Lateral glide with flexion and ER.   (both improved hip flexion/ER movement    NM (held)  Side step ups - 6 inch 2 x 5, 8 inch 2 x 5   Split Squat holds B UE assist 4 x 10 seconds  Lateral Steps w/ March - 6 laps     Time-based treatments/modalities:    Physical Therapy Timed Treatment Charges  Neuromusc re-ed, balance, coor, post minutes (CPT 50577): 15 minutes  Therapeutic exercise minutes (CPT 81138): 30 minutes    ASSESSMENT:   POC adjusted to focus more on ROM than balance at this point, as patient demonstrates good balance, despite ABD weakness.  Hip Flexion/ER/IR remain limited despite stretching program, plan to focus HEP on joint capsule, starting posterior and moving anterior.     Patient is behind in  rehabilitation, due to long layoff from cardiovascular concerns.  Plan to continue to progress as able. No good response to gentle distraction, but stretching appears to relieve pain some.  Encouraged stretching HEP.     PLAN/RECOMMENDATIONS:   Plan for treatment: therapy treatment to continue next visit.  Planned interventions for next visit: continue with current treatment.

## 2021-10-05 DIAGNOSIS — I10 ESSENTIAL HYPERTENSION: ICD-10-CM

## 2021-10-05 RX ORDER — LISINOPRIL 20 MG/1
20 TABLET ORAL 2 TIMES DAILY
Qty: 200 TABLET | Refills: 2 | Status: SHIPPED | OUTPATIENT
Start: 2021-10-05 | End: 2021-12-06 | Stop reason: SDUPTHER

## 2021-10-07 ENCOUNTER — APPOINTMENT (OUTPATIENT)
Dept: PHYSICAL THERAPY | Facility: REHABILITATION | Age: 71
End: 2021-10-07
Attending: FAMILY MEDICINE
Payer: MEDICARE

## 2021-10-18 ENCOUNTER — PHARMACY VISIT (OUTPATIENT)
Dept: PHARMACY | Facility: MEDICAL CENTER | Age: 71
End: 2021-10-18
Payer: COMMERCIAL

## 2021-10-18 PROCEDURE — RXMED WILLOW AMBULATORY MEDICATION CHARGE: Performed by: FAMILY MEDICINE

## 2021-10-20 ENCOUNTER — PHYSICAL THERAPY (OUTPATIENT)
Dept: PHYSICAL THERAPY | Facility: REHABILITATION | Age: 71
End: 2021-10-20
Attending: FAMILY MEDICINE
Payer: MEDICARE

## 2021-10-20 DIAGNOSIS — M62.81 MUSCLE WEAKNESS (GENERALIZED): ICD-10-CM

## 2021-10-20 PROCEDURE — 97110 THERAPEUTIC EXERCISES: CPT

## 2021-10-20 PROCEDURE — 97112 NEUROMUSCULAR REEDUCATION: CPT

## 2021-10-20 NOTE — OP THERAPY DAILY TREATMENT
Outpatient Physical Therapy  DAILY TREATMENT     [facility] Physical Therapy [address], [address]  [place] [address]  Phone:  [phone]    Date: 10/20/2021    Patient: Bright Shirley  YOB: 1950  MRN: [id number]     Time Calculation    Start time: 1200  Stop time: 1245 Time Calculation (min): 45 minutes         Chief Complaint: hip pain  Visit #: 16    SUBJECTIVE:  Pt states he was sore for a couple days after last visit: no sharp pain.     OBJECTIVE:      Therapeutic Exercises (CPT 57213):     1. Bike  5 minutes    2. Ball bridging , 2 x 15    3. Bridge with marching    4. Shuttle  3 C DL, 2C SL, 30x DL 2 x 10 left LE    5. 6 inch step downs, 2 x 12, pt uses right LE to push up step    6. Lateral stepping with tband, needs stand by assist for balance    7. Ball bridging with hs curls, 2 x 15, mild balance problems    8. Clam shells with pink tband, 2 x 15    9. Balance work on 1/2 roller, SBA    10. Tandem walking      Therapeutic Exercise Summary: Access Code: [email or web address]  URL: [email or web address]  Date: 10/20/2021  Prepared by: Nany Moscoso    Exercises  •Hooklying Isometric Clamshell - 1 x daily - 7 x weekly - 3 sets - 10 reps  •Supine Bridge with Resistance Band - 1 x daily - 7 x weekly - 3 sets - 10 reps  •Marching Bridge - 1 x daily - 7 x weekly - 3 sets - 10 reps          Time-based treatments/modalities:    Physical Therapy Timed Treatment Charges  Neuromusc re-ed, balance, coor, post minutes (CPT 77840): 10 minutes  Therapeutic exercise minutes (CPT 07078): 35 minutes        ASSESSMENT:   Response to treatment: pt fatigues quickly with SOB. Added a few new exercises to HEP. Pt has some balance deficits with single leg activities.     PLAN/RECOMMENDATIONS:   Plan for treatment: therapy treatment to continue next visit.  Planned interventions for next visit: neuromuscular re-education (CPT 16595) and therapeutic exercise (CPT 40189).

## 2021-11-03 ENCOUNTER — TELEPHONE (OUTPATIENT)
Dept: MEDICAL GROUP | Facility: MEDICAL CENTER | Age: 71
End: 2021-11-03

## 2021-11-03 NOTE — TELEPHONE ENCOUNTER
ESTABLISHED PATIENT PRE-VISIT PLANNING     Patient was contacted to complete PVP.     Note: Patient will not be contacted if there is no indication to call.     1.  Reviewed notes from the last few office visits within the medical group: Yes    2.  If any orders were placed at last visit or intended to be done for this visit (i.e. 6 mos follow-up), do we have Results/Consult Notes?         •  Labs - Labs ordered, NOT completed. Patient advised to complete prior to next appointment.  Note: If patient appointment is for lab review and patient did not complete labs, check with provider if OK to reschedule patient until labs completed.       •  Imaging - Imaging was not ordered at last office visit.       •  Referrals - No referrals were ordered at last office visit.    3. Is this appointment scheduled as a Hospital Follow-Up? No    4.  Immunizations were updated in Epic using Reconcile Outside Information activity? No    5.  Patient is due for the following Health Maintenance Topics:   Health Maintenance Due   Topic Date Due   • Annual Pulmonary Function Test / Spirometry  Never done   • IMM ZOSTER VACCINES (1 of 2) Never done   • IMM PNEUMOCOCCAL VACCINE: 65+ Years (1 of 2 - PPSV23) 01/09/2020   • IMM INFLUENZA (1) 09/01/2021       - Patient is up-to-date on all Health Maintenance topics. No records have been requested at this time.    6.  AHA (Pulse8) form printed for Provider? No, patient does not have any open alerts

## 2021-11-04 ENCOUNTER — PHARMACY VISIT (OUTPATIENT)
Dept: PHARMACY | Facility: MEDICAL CENTER | Age: 71
End: 2021-11-04
Payer: COMMERCIAL

## 2021-11-04 ENCOUNTER — OFFICE VISIT (OUTPATIENT)
Dept: MEDICAL GROUP | Facility: MEDICAL CENTER | Age: 71
End: 2021-11-04
Payer: MEDICARE

## 2021-11-04 VITALS
RESPIRATION RATE: 16 BRPM | HEART RATE: 93 BPM | HEIGHT: 66 IN | TEMPERATURE: 98.3 F | WEIGHT: 139 LBS | DIASTOLIC BLOOD PRESSURE: 60 MMHG | SYSTOLIC BLOOD PRESSURE: 118 MMHG | BODY MASS INDEX: 22.34 KG/M2 | OXYGEN SATURATION: 96 %

## 2021-11-04 DIAGNOSIS — I10 ESSENTIAL HYPERTENSION: ICD-10-CM

## 2021-11-04 DIAGNOSIS — J44.9 CHRONIC OBSTRUCTIVE PULMONARY DISEASE, UNSPECIFIED COPD TYPE (HCC): ICD-10-CM

## 2021-11-04 DIAGNOSIS — Z23 NEED FOR VACCINATION: ICD-10-CM

## 2021-11-04 DIAGNOSIS — D64.9 ANEMIA, UNSPECIFIED TYPE: ICD-10-CM

## 2021-11-04 PROCEDURE — RXMED WILLOW AMBULATORY MEDICATION CHARGE: Performed by: INTERNAL MEDICINE

## 2021-11-04 PROCEDURE — 90662 IIV NO PRSV INCREASED AG IM: CPT | Performed by: FAMILY MEDICINE

## 2021-11-04 PROCEDURE — 99214 OFFICE O/P EST MOD 30 MIN: CPT | Mod: 25 | Performed by: FAMILY MEDICINE

## 2021-11-04 PROCEDURE — G0008 ADMIN INFLUENZA VIRUS VAC: HCPCS | Performed by: FAMILY MEDICINE

## 2021-11-04 RX ORDER — RNA INGREDIENT BNT-162B2 0.23 G/1.8ML
0.3 INJECTION, SUSPENSION INTRAMUSCULAR
Qty: 0.3 ML | Refills: 0 | Status: SHIPPED | OUTPATIENT
Start: 2021-11-04 | End: 2022-09-28

## 2021-11-04 ASSESSMENT — FIBROSIS 4 INDEX: FIB4 SCORE: 1.4

## 2021-11-04 NOTE — PROGRESS NOTES
CC: Anemia, hypertension, COPD    HPI:   Bright presents today to discuss the following:    Anemia, unspecified type  Patient had left femur fracture, status post internal fixation with plates and screws.  Patient has been feeling better.  However patient had significant anemia, his last hemoglobin level was 9.9.   Last visit patient advised to repeat CBC but has not done it.  Currently denies any palpitation, shortness of breath.  He has been having intermittent dizziness.  Denies any blood in the stool, or abdominal pain.    Essential hypertension  Blood pressure has been adequately controlled on lisinopril 20 mg twice a day, and amlodipine 10 mg daily.  No side effects    Chronic obstructive pulmonary disease, unspecified COPD type ([facility])  Patient denies any cough or shortness of breath.  Has been on albuterol as needed    Patient is due for flu shot.    Patient Active Problem List    Diagnosis Date Noted   • Peripheral vascular disease, unspecified ([facility]) 08/09/2021   • Hyperglycemia 08/09/2021   • Dysmetabolic syndrome 08/09/2021   • Chronic hypoxemic respiratory failure ([facility]) 08/09/2021   • COPD (chronic obstructive pulmonary disease) ([facility]) 07/01/2021   • Supplemental oxygen dependent 06/30/2021   • Hypokalemia 06/09/2021   • Acute hypoxemic respiratory failure ([facility]) 06/08/2021   • Acute urinary retention 06/07/2021   • Anemia 06/07/2021   • Intertrochanteric fracture of femur ([facility]) 06/06/2021   • Syncopal episodes 06/06/2021   • Dyslipidemia 08/07/2019   • Other emphysema ([facility]) 08/07/2019   • Cigarette nicotine dependence, uncomplicated  08/07/2019   • Essential hypertension 01/10/2011       Current Outpatient Medications   Medication Sig Dispense Refill   • lisinopril (PRINIVIL) 20 MG Tab Take 1 Tablet by mouth 2 times a day. 200 Tablet 2   • celecoxib (CELEBREX) 200 MG Cap Take 1 Capsule by mouth 2 times a day. 60 Capsule 0   • lisinopril (PRINIVIL) 20 MG Tab TAKE 1 TABLET BY MOUTH EVERY DAY 90 Tablet 3   •  amLODIPine (NORVASC) 10 MG Tab Take 1 Tablet by mouth every day. 90 Tablet 2   • acetaminophen (TYLENOL) 325 MG Tab Take 2 Tablets by mouth every 6 hours as needed. 30 tablet 0   • aspirin (ASA) 325 MG Tab Take 0.25 Tablets by mouth every day. Rx indicates 81 mg of ASA daily. Pt states he has 325 mg tabs and takes 1/4 tab daily. States he will purchase 81 mg ASAP.     • docosahexanoic acid (OMEGA 3 FA) 1000 MG Cap Take 1,000 mg by mouth every day at 6 PM.     • Coenzyme Q10 (CO Q-10) 100 MG Cap Take 1 capsule by mouth every day at 6 PM.     • Ginseng 250 MG Cap Take 250 mg by mouth every day at 6 PM.     • Multiple Vitamin (MULTIVITAMIN ADULT PO) Take 1 tablet by mouth every day at 6 PM.     • diphenhydramine (SOMINEX) 25 MG tablet Take 25 mg by mouth at bedtime as needed. Patient use as a sleep aid PRN.     • calcium polycarbophil (FIBERCON) 625 MG Tab Take 625 mg by mouth every day.     • Ginkgo 60 MG Tab Take 1 tablet by mouth every day at 6 PM.     • Home Care Oxygen Inhale 2 L/min at bedtime. PATIENT STATES HE ONLY USES HIS OXYGEN AT [facility].  Oxygen dose range: 2 L/min  Respiratory route via: Nasal Cannula   Oxygen supplier: Preferred         • melatonin 5 mg Tab Take 1 tablet by mouth at bedtime.     • predniSONE (DELTASONE) 20 MG Tab Take 2 Tablets by mouth every day. 30 tablet 0   • aspirin EC (ECOTRIN) 81 MG Tablet Delayed Response Take 81 mg by mouth every day. (Patient not taking: Reported on 8/9/2021)     • simvastatin (ZOCOR) 10 MG Tab Take 1 tablet by mouth every evening. 90 tablet 3   • albuterol 108 (90 Base) MCG/ACT Aero Soln inhalation aerosol Inhale 2 Puffs by mouth every 6 hours as needed. 8.5 g 3     No current facility-administered medications for this visit.         Allergies as of 11/04/2021 - Reviewed 11/04/2021   Allergen Reaction Noted   • Seasonal  08/07/2019        ROS: Denies any chest pain, Shortness of breath, Changes bowel or bladder, Lower extremity edema.    Physical Exam:  /60  "(BP Location: Right arm, Patient Position: Sitting, BP Cuff Size: Adult)   Pulse 93   Temp 36.8 °C (98.3 °F) (Temporal)   Resp 16   Ht 1.676 m (5' 6\")   Wt 63 kg (139 lb)   SpO2 96%   BMI 22.44 kg/m²   Gen.: Well-developed, well-nourished, no apparent distress,pleasant and cooperative with the examination  Skin:  Warm and dry with good turgor. No rashes or suspicious lesions in visible areas  HEENT:Sinuses nontender with palpation, TMs clear, nares patent with pink mucosa and clear rhinorrhea,no septal deviation ,polyps or lesions. lips without lesions, oropharynx clear.  Neck: Trachea midline,no masses or adenopathy. No JVD.  Cor: Regular rate and rhythm without murmur, gallop or rub.  Lungs: Respirations unlabored.Clear to auscultation with equal breath sounds bilaterally. No wheezes, rhonchi.  Extremities: No cyanosis, clubbing or edema.      Assessment and Plan.   71 y.o. male     1. Anemia, unspecified type  Patient advised to repeat CBC but has not done it.  Last hemoglobin was 9.9.  Will check CBC and iron level.    - CBC WITH DIFFERENTIAL; Future  - IRON/TOTAL IRON BIND; Future    2. Need for vaccination  Due for flu shot, given today.  - INFLUENZA VACCINE, HIGH DOSE (65+ ONLY)    3. Essential hypertension  Controlled.  Continue on lisinopril 20 mg twice a day, and amlodipine 10 mg daily    4. Chronic obstructive pulmonary disease, unspecified COPD type (HCC)  Stable.  Continue on albuterol as needed      "

## 2021-12-05 PROCEDURE — RXMED WILLOW AMBULATORY MEDICATION CHARGE: Performed by: FAMILY MEDICINE

## 2021-12-06 ENCOUNTER — PATIENT MESSAGE (OUTPATIENT)
Dept: MEDICAL GROUP | Facility: MEDICAL CENTER | Age: 71
End: 2021-12-06

## 2021-12-06 ENCOUNTER — PHARMACY VISIT (OUTPATIENT)
Dept: PHARMACY | Facility: MEDICAL CENTER | Age: 71
End: 2021-12-06
Payer: COMMERCIAL

## 2021-12-06 DIAGNOSIS — E78.5 DYSLIPIDEMIA: ICD-10-CM

## 2021-12-06 DIAGNOSIS — I10 ESSENTIAL HYPERTENSION: ICD-10-CM

## 2021-12-06 RX ORDER — LISINOPRIL 20 MG/1
20 TABLET ORAL 2 TIMES DAILY
Qty: 200 TABLET | Refills: 2 | Status: SHIPPED | OUTPATIENT
Start: 2021-12-06 | End: 2022-02-10 | Stop reason: SDUPTHER

## 2021-12-06 RX ORDER — SIMVASTATIN 10 MG
10 TABLET ORAL EVERY EVENING
Qty: 90 TABLET | Refills: 3 | Status: SHIPPED | OUTPATIENT
Start: 2021-12-06 | End: 2022-06-28

## 2021-12-06 NOTE — TELEPHONE ENCOUNTER
From: Bright Shirley  To: Physician Nella Ramirez  Sent: 12/6/2021 3:07 PM PST  Subject: Lisinopril 20 mg.    I am out of my Lisinopril prescription as of today. I attempted to order it from the [facility] but I get a message saying it is too soon to reorder? Not sure why this is happening? I notice that my chart stills indicated 1 pill once a day, when you changed my prescription to twice a day? Can you please or your nurse please clear this up with the Pharmacy and my insurance so I can get this refilled. The last time I ordered a refill was on Oct. 18th, 2021. Thank you, Bright Shirley. My prescription number is [id number].

## 2021-12-07 PROCEDURE — RXMED WILLOW AMBULATORY MEDICATION CHARGE: Performed by: FAMILY MEDICINE

## 2021-12-08 ENCOUNTER — PHARMACY VISIT (OUTPATIENT)
Dept: PHARMACY | Facility: MEDICAL CENTER | Age: 71
End: 2021-12-08
Payer: COMMERCIAL

## 2021-12-08 PROCEDURE — RXMED WILLOW AMBULATORY MEDICATION CHARGE: Performed by: FAMILY MEDICINE

## 2021-12-21 ENCOUNTER — PHARMACY VISIT (OUTPATIENT)
Dept: PHARMACY | Facility: MEDICAL CENTER | Age: 71
End: 2021-12-21
Payer: COMMERCIAL

## 2022-01-03 PROCEDURE — RXMED WILLOW AMBULATORY MEDICATION CHARGE: Performed by: FAMILY MEDICINE

## 2022-01-05 ENCOUNTER — PHARMACY VISIT (OUTPATIENT)
Dept: PHARMACY | Facility: MEDICAL CENTER | Age: 72
End: 2022-01-05
Payer: COMMERCIAL

## 2022-02-01 NOTE — OP THERAPY DAILY TREATMENT
Outpatient Physical Therapy  DAILY TREATMENT     [facility] Outpatient Physical Therapy [address] [facility], [address]  [place] [address]  Phone:  [phone]    Date: 08/02/2021    Patient: Bright Shirley  YOB: 1950  MRN: [id number]     Time Calculation    Start time: 1036  Stop time: 1115 Time Calculation (min): 39 minutes     Chief Complaint: No chief complaint on file.    Visit #: 5    SUBJECTIVE:  Tired of night pain (discussed with position and talking to MD about pain management).      Soreness - 7/10    OBJECTIVE:  Awaiting Bone healing (MD visit around 9/6/2021 per patient)          Therapeutic Exercises (CPT 78680):     1. UPOC - 8/19/2021      Therapeutic Exercise Summary:   Bridges - 3 sets   Static step ups - 3 x 3 x 10 seconds    Standing Hip ABD - 4 sets    LAQ -  PTB 4 x 8 (held)  Lateral Walks - 6 laps  (held)  Unilateral Reverse Hypers - 2 sets (held)    HEP  Sit<>Stands, Standing hip ABD B, Ambulation program.      Time-based treatments/modalities:    Physical Therapy Timed Treatment Charges  Therapeutic exercise minutes (CPT 67451): 45 minutes    ASSESSMENT:   Continue to progression of left LE strengthening.  Patient tolerating more CC activity (gradually).  Continue to reinforce need for strengthening and improving gait mechanics without cane.  Progressing as expected. Reduced volume today as patient will return on Wednesday.    Fatigue - 3-4/10    PLAN/RECOMMENDATIONS:   Plan for treatment: therapy treatment to continue next visit.  Planned interventions for next visit: continue with current treatment.       
Detail Level: Zone
Detail Level: Generalized

## 2022-02-07 ENCOUNTER — PHARMACY VISIT (OUTPATIENT)
Dept: PHARMACY | Facility: MEDICAL CENTER | Age: 72
End: 2022-02-07
Payer: COMMERCIAL

## 2022-02-07 PROCEDURE — RXMED WILLOW AMBULATORY MEDICATION CHARGE: Performed by: FAMILY MEDICINE

## 2022-02-10 DIAGNOSIS — I10 ESSENTIAL HYPERTENSION: ICD-10-CM

## 2022-02-10 RX ORDER — LISINOPRIL 20 MG/1
20 TABLET ORAL 2 TIMES DAILY
Qty: 200 TABLET | Refills: 2 | Status: SHIPPED | OUTPATIENT
Start: 2022-02-10 | End: 2023-01-13 | Stop reason: SDUPTHER

## 2022-03-01 PROCEDURE — RXMED WILLOW AMBULATORY MEDICATION CHARGE: Performed by: FAMILY MEDICINE

## 2022-03-03 ENCOUNTER — TELEPHONE (OUTPATIENT)
Dept: PHYSICAL THERAPY | Facility: REHABILITATION | Age: 72
End: 2022-03-03
Payer: MEDICARE

## 2022-03-03 NOTE — OP THERAPY DISCHARGE SUMMARY
Outpatient Physical Therapy  DISCHARGE SUMMARY NOTE      [facility] Physical Therapy [address]  [address]  [place] [address]  Phone:  [phone]  Fax:  [phone]    Date of Visit: 03/03/2022    Patient: Bright Shirley  YOB: 1950  MRN: [id number]     Referring Provider: No referring provider defined for this encounter.   Referring Diagnosis No admission diagnoses are documented for this encounter.     Your patient is being discharged from Physical Therapy with the following comments:   · Patient cancelled or missed more than 2 scheduled appointments/non-compliant    Addy Copeland PT    Date: 3/3/2022

## 2022-03-07 ENCOUNTER — PHARMACY VISIT (OUTPATIENT)
Dept: PHARMACY | Facility: MEDICAL CENTER | Age: 72
End: 2022-03-07
Payer: COMMERCIAL

## 2022-03-07 PROCEDURE — RXMED WILLOW AMBULATORY MEDICATION CHARGE: Performed by: FAMILY MEDICINE

## 2022-03-11 ENCOUNTER — PHARMACY VISIT (OUTPATIENT)
Dept: PHARMACY | Facility: MEDICAL CENTER | Age: 72
End: 2022-03-11
Payer: COMMERCIAL

## 2022-05-26 PROBLEM — J96.01 ACUTE HYPOXEMIC RESPIRATORY FAILURE (HCC): Status: RESOLVED | Noted: 2021-06-08 | Resolved: 2022-05-26

## 2022-05-26 PROBLEM — R33.8 ACUTE URINARY RETENTION: Status: RESOLVED | Noted: 2021-06-07 | Resolved: 2022-05-26

## 2022-05-26 PROBLEM — E44.1 MILD PROTEIN-CALORIE MALNUTRITION (HCC): Status: ACTIVE | Noted: 2022-05-26

## 2022-05-26 PROBLEM — Z99.81 SUPPLEMENTAL OXYGEN DEPENDENT: Status: RESOLVED | Noted: 2021-06-30 | Resolved: 2022-05-26

## 2022-05-26 PROBLEM — S72.143A INTERTROCHANTERIC FRACTURE OF FEMUR (HCC): Status: RESOLVED | Noted: 2021-06-06 | Resolved: 2022-05-26

## 2022-05-26 PROBLEM — E83.51 HYPOCALCEMIA: Status: ACTIVE | Noted: 2022-05-26

## 2022-05-26 PROBLEM — J43.8 OTHER EMPHYSEMA (HCC): Status: RESOLVED | Noted: 2019-08-07 | Resolved: 2022-05-26

## 2022-06-20 ENCOUNTER — TELEPHONE (OUTPATIENT)
Dept: MEDICAL GROUP | Facility: MEDICAL CENTER | Age: 72
End: 2022-06-20
Payer: MEDICARE

## 2022-06-20 ENCOUNTER — PHARMACY VISIT (OUTPATIENT)
Dept: PHARMACY | Facility: MEDICAL CENTER | Age: 72
End: 2022-06-20
Payer: COMMERCIAL

## 2022-06-20 DIAGNOSIS — I10 ESSENTIAL HYPERTENSION: ICD-10-CM

## 2022-06-20 PROCEDURE — RXMED WILLOW AMBULATORY MEDICATION CHARGE: Performed by: FAMILY MEDICINE

## 2022-06-20 RX ORDER — AMLODIPINE BESYLATE 10 MG/1
10 TABLET ORAL DAILY
Qty: 90 TABLET | Refills: 2 | Status: SHIPPED | OUTPATIENT
Start: 2022-06-20 | End: 2023-03-17 | Stop reason: SDUPTHER

## 2022-06-20 NOTE — TELEPHONE ENCOUNTER
[facility]/APPOINTMENT SCHEDULING  Patient overdue for appointment. Called patient to schedule appointment.  Phone Number Called: [phone] (home)   Call outcome: Spoke to patient, scheduled appointment, Dr. Ramirez, Tuesday, 6/28/22, Check-in: 1:05 PM, [address], [phone].

## 2022-06-24 ENCOUNTER — HOSPITAL ENCOUNTER (OUTPATIENT)
Dept: LAB | Facility: MEDICAL CENTER | Age: 72
End: 2022-06-24
Attending: FAMILY MEDICINE
Payer: MEDICARE

## 2022-06-24 DIAGNOSIS — D64.9 ANEMIA, UNSPECIFIED TYPE: ICD-10-CM

## 2022-06-24 LAB
BASOPHILS # BLD AUTO: 0.9 % (ref 0–1.8)
BASOPHILS # BLD: 0.08 K/UL (ref 0–0.12)
EOSINOPHIL # BLD AUTO: 0.22 K/UL (ref 0–0.51)
EOSINOPHIL NFR BLD: 2.4 % (ref 0–6.9)
ERYTHROCYTE [DISTWIDTH] IN BLOOD BY AUTOMATED COUNT: 42.5 FL (ref 35.9–50)
HCT VFR BLD AUTO: 46 % (ref 42–52)
HGB BLD-MCNC: 14.8 G/DL (ref 14–18)
IMM GRANULOCYTES # BLD AUTO: 0.03 K/UL (ref 0–0.11)
IMM GRANULOCYTES NFR BLD AUTO: 0.3 % (ref 0–0.9)
IRON SATN MFR SERPL: 32 % (ref 15–55)
IRON SERPL-MCNC: 99 UG/DL (ref 50–180)
LYMPHOCYTES # BLD AUTO: 3.05 K/UL (ref 1–4.8)
LYMPHOCYTES NFR BLD: 33.9 % (ref 22–41)
MCH RBC QN AUTO: 29.4 PG (ref 27–33)
MCHC RBC AUTO-ENTMCNC: 32.2 G/DL (ref 33.7–35.3)
MCV RBC AUTO: 91.5 FL (ref 81.4–97.8)
MONOCYTES # BLD AUTO: 0.72 K/UL (ref 0–0.85)
MONOCYTES NFR BLD AUTO: 8 % (ref 0–13.4)
NEUTROPHILS # BLD AUTO: 4.89 K/UL (ref 1.82–7.42)
NEUTROPHILS NFR BLD: 54.5 % (ref 44–72)
NRBC # BLD AUTO: 0 K/UL
NRBC BLD-RTO: 0 /100 WBC
PLATELET # BLD AUTO: 304 K/UL (ref 164–446)
PMV BLD AUTO: 9.5 FL (ref 9–12.9)
RBC # BLD AUTO: 5.03 M/UL (ref 4.7–6.1)
TIBC SERPL-MCNC: 311 UG/DL (ref 250–450)
UIBC SERPL-MCNC: 212 UG/DL (ref 110–370)
WBC # BLD AUTO: 9 K/UL (ref 4.8–10.8)

## 2022-06-24 PROCEDURE — 85025 COMPLETE CBC W/AUTO DIFF WBC: CPT

## 2022-06-24 PROCEDURE — 83550 IRON BINDING TEST: CPT

## 2022-06-24 PROCEDURE — 83540 ASSAY OF IRON: CPT

## 2022-06-24 PROCEDURE — 36415 COLL VENOUS BLD VENIPUNCTURE: CPT

## 2022-06-28 ENCOUNTER — OFFICE VISIT (OUTPATIENT)
Dept: MEDICAL GROUP | Facility: MEDICAL CENTER | Age: 72
End: 2022-06-28
Payer: MEDICARE

## 2022-06-28 VITALS
SYSTOLIC BLOOD PRESSURE: 120 MMHG | RESPIRATION RATE: 16 BRPM | TEMPERATURE: 98.7 F | WEIGHT: 153 LBS | OXYGEN SATURATION: 95 % | BODY MASS INDEX: 26.12 KG/M2 | DIASTOLIC BLOOD PRESSURE: 60 MMHG | HEIGHT: 64 IN | HEART RATE: 72 BPM

## 2022-06-28 DIAGNOSIS — E78.5 DYSLIPIDEMIA: ICD-10-CM

## 2022-06-28 DIAGNOSIS — R55 SYNCOPE, UNSPECIFIED SYNCOPE TYPE: ICD-10-CM

## 2022-06-28 DIAGNOSIS — I10 ESSENTIAL HYPERTENSION: ICD-10-CM

## 2022-06-28 PROCEDURE — RXMED WILLOW AMBULATORY MEDICATION CHARGE: Performed by: FAMILY MEDICINE

## 2022-06-28 PROCEDURE — 99214 OFFICE O/P EST MOD 30 MIN: CPT | Performed by: FAMILY MEDICINE

## 2022-06-28 RX ORDER — ROSUVASTATIN CALCIUM 10 MG/1
10 TABLET, COATED ORAL EVERY EVENING
Qty: 30 TABLET | Refills: 11 | Status: SHIPPED | OUTPATIENT
Start: 2022-06-28 | End: 2023-07-12 | Stop reason: SDUPTHER

## 2022-06-28 ASSESSMENT — FIBROSIS 4 INDEX: FIB4 SCORE: 1.25

## 2022-06-28 NOTE — PROGRESS NOTES
CC: Recurrent syncopal episodes, hypertension, hyperlipidemia    HPI:   Bright presents today to discuss the following:    Recurrent syncopal episode  Patient reported 2 syncopal episodes in the past 2 weeks.  The first one he was standing in his bedroom doing nothing, all of a sudden he blacked out and fell down to the ground.  He lost his consciousness for few seconds, work-up and stand up normal without problem.  He denies any dizziness just before that episode.  But he felt slightly dizzy before he blacked out in the second episode, he did not fall, he leaned against the wall in the house for few seconds and wake up immediately.  Patient had similar episode a year ago after which he fell and broke his hip.    Essential hypertension  Blood pressure has been good controlled on lisinopril 20 mg daily, and amlodipine 10 mg daily.  No side effects    Dyslipidemia  He has been tolerating the statin. Denies muscle pain LFTs has been normal.  He has been on simvastatin 10 mg daily, no issues with statins.         Patient Active Problem List    Diagnosis Date Noted   • Hypocalcemia 05/26/2022   • Mild protein-calorie malnutrition (HCC) 05/26/2022   • BMI 25.0-25.9,adult 05/26/2022   • Peripheral arterial disease (HCC) 08/09/2021   • Hyperglycemia 08/09/2021   • Dysmetabolic syndrome 08/09/2021   • Chronic hypoxemic respiratory failure ([facility]) 08/09/2021   • COPD (chronic obstructive pulmonary disease) ([facility]) 07/01/2021   • Hypokalemia 06/09/2021   • Anemia 06/07/2021   • Syncopal episodes 06/06/2021   • Dyslipidemia 08/07/2019   • Cigarette nicotine dependence, uncomplicated  08/07/2019   • Essential hypertension 01/10/2011       Current Outpatient Medications   Medication Sig Dispense Refill   • rosuvastatin (CRESTOR) 10 MG Tab Take 1 Tablet by mouth every evening. 30 Tablet 11   • amLODIPine (NORVASC) 10 MG Tab Take 1 Tablet by mouth every day. 90 Tablet 2   • lisinopril (PRINIVIL) 20 MG Tab Take 1 Tablet by mouth 2 times  a day. 200 Tablet 2   • COVID-19 mRNA Vaccine, Pfizer, (PFIZER-[employer] COVID-19 VACC) 30 MCG/0.3ML Suspension injection Inject 0.3 mL into the shoulder, thigh, or buttocks. 0.3 mL 0   • lisinopril (PRINIVIL) 20 MG Tab TAKE 1 TABLET BY MOUTH EVERY DAY 90 Tablet 3   • acetaminophen (TYLENOL) 325 MG Tab Take 2 Tablets by mouth every 6 hours as needed. 30 tablet 0   • aspirin (ASA) 325 MG Tab Take 0.25 Tablets by mouth every day. Rx indicates 81 mg of ASA daily. Pt states he has 325 mg tabs and takes 1/4 tab daily. States he will purchase 81 mg ASAP.     • docosahexanoic acid (OMEGA 3 FA) 1000 MG Cap Take 1,000 mg by mouth every day at 6 PM.     • Coenzyme Q10 (CO Q-10) 100 MG Cap Take 1 capsule by mouth every day at 6 PM.     • Ginseng 250 MG Cap Take 250 mg by mouth every day at 6 PM.     • Multiple Vitamin (MULTIVITAMIN ADULT PO) Take 1 tablet by mouth every day at 6 PM.     • calcium polycarbophil (FIBERCON) 625 MG Tab Take 625 mg by mouth every day.     • Ginkgo 60 MG Tab Take 1 tablet by mouth every day at 6 PM.     • Home Care Oxygen Inhale 2 L/min at bedtime. PATIENT STATES HE ONLY USES HIS OXYGEN AT [facility].  Oxygen dose range: 2 L/min  Respiratory route via: Nasal Cannula   Oxygen supplier: Preferred         • melatonin 5 mg Tab Take 1 tablet by mouth at bedtime.     • predniSONE (DELTASONE) 20 MG Tab Take 2 Tablets by mouth every day. 30 tablet 0   • aspirin EC (ECOTRIN) 81 MG Tablet Delayed Response Take 81 mg by mouth every day. (Patient not taking: Reported on 8/9/2021)     • albuterol 108 (90 Base) MCG/ACT Aero Soln inhalation aerosol Inhale 2 Puffs by mouth every 6 hours as needed. 8.5 g 3     No current facility-administered medications for this visit.         Allergies as of 06/28/2022 - Reviewed 05/26/2022   Allergen Reaction Noted   • Seasonal  08/07/2019        ROS: Denies any chest pain, Shortness of breath, Changes bowel or bladder, Lower extremity edema.    Physical Exam:  /60 (BP Location:  "Right arm, Patient Position: Sitting, BP Cuff Size: Adult)   Pulse 72   Temp 37.1 °C (98.7 °F) (Temporal)   Resp 16   Ht 1.626 m (5' 4\")   Wt 69.4 kg (153 lb)   SpO2 95%   BMI 26.26 kg/m²   Gen.: Well-developed, well-nourished, no apparent distress,pleasant and cooperative with the examination  Skin:  Warm and dry with good turgor. No rashes or suspicious lesions in visible areas  HEENT:Sinuses nontender with palpation, TMs clear, nares patent with pink mucosa and clear rhinorrhea,no septal deviation ,polyps or lesions. lips without lesions, oropharynx clear.  Neck: Trachea midline,no masses or adenopathy. No JVD.  Cor: Regular rate and rhythm without murmur, gallop or rub.  Lungs: Respirations unlabored.Clear to auscultation with equal breath sounds bilaterally. No wheezes, rhonchi.  Extremities: No cyanosis, clubbing or edema.      Assessment and Plan.   72 y.o. male     1. Syncope, unspecified syncope type  Patient has 2 syncopal episodes in the past 2 weeks, vasovagal is unlikely.  Had a major syncopal episode a year ago after which he broke his hip.    - REFERRAL TO CARDIOLOGY  - [facility] - Cardiology Performed (48HR); Future  - CT-HEAD W/O; Future  - EC-ECHOCARDIOGRAM COMPLETE W/ CONT; Future  - US-CAROTID DOPPLER BILAT; Future    2. Essential hypertension  Controlled.  Continue on lisinopril 20 mg daily, and amlodipine 10 mg daily.    - CBC WITH DIFFERENTIAL; Future  - Comp Metabolic Panel; Future  - Lipid Profile; Future  - TSH; Future    3. Dyslipidemia  He has been tolerating the statin. Denies muscle pain LFTs has been normal  He has been on simvastatin 10 mg daily, no issues with statins.  So we will change simvastatin to Crestor 10 mg daily.    - rosuvastatin (CRESTOR) 10 MG Tab; Take 1 Tablet by mouth every evening.  Dispense: 30 Tablet; Refill: 11  - Lipid Profile; Future  - TSH; Future          "

## 2022-07-06 ENCOUNTER — PHARMACY VISIT (OUTPATIENT)
Dept: PHARMACY | Facility: MEDICAL CENTER | Age: 72
End: 2022-07-06
Payer: COMMERCIAL

## 2022-07-08 ENCOUNTER — NON-PROVIDER VISIT (OUTPATIENT)
Dept: CARDIOLOGY | Facility: MEDICAL CENTER | Age: 72
End: 2022-07-08
Attending: FAMILY MEDICINE
Payer: MEDICARE

## 2022-07-08 DIAGNOSIS — I49.3 PVCS (PREMATURE VENTRICULAR CONTRACTIONS): ICD-10-CM

## 2022-07-08 DIAGNOSIS — I49.1 PREMATURE ATRIAL CONTRACTIONS: ICD-10-CM

## 2022-07-08 DIAGNOSIS — I47.10 SVT (SUPRAVENTRICULAR TACHYCARDIA) (HCC): ICD-10-CM

## 2022-07-08 DIAGNOSIS — R55 SYNCOPE, UNSPECIFIED SYNCOPE TYPE: ICD-10-CM

## 2022-07-11 ENCOUNTER — TELEPHONE (OUTPATIENT)
Dept: CARDIOLOGY | Facility: MEDICAL CENTER | Age: 72
End: 2022-07-11
Payer: MEDICARE

## 2022-07-11 LAB — EKG IMPRESSION: NORMAL

## 2022-07-11 PROCEDURE — 93224 XTRNL ECG REC UP TO 48 HRS: CPT | Performed by: STUDENT IN AN ORGANIZED HEALTH CARE EDUCATION/TRAINING PROGRAM

## 2022-07-19 ENCOUNTER — HOSPITAL ENCOUNTER (OUTPATIENT)
Dept: RADIOLOGY | Facility: MEDICAL CENTER | Age: 72
End: 2022-07-19
Attending: FAMILY MEDICINE
Payer: MEDICARE

## 2022-07-19 DIAGNOSIS — R55 SYNCOPE, UNSPECIFIED SYNCOPE TYPE: ICD-10-CM

## 2022-07-19 PROCEDURE — 70450 CT HEAD/BRAIN W/O DYE: CPT | Mod: MG

## 2022-08-04 ENCOUNTER — PHARMACY VISIT (OUTPATIENT)
Dept: PHARMACY | Facility: MEDICAL CENTER | Age: 72
End: 2022-08-04
Payer: COMMERCIAL

## 2022-08-04 PROCEDURE — RXMED WILLOW AMBULATORY MEDICATION CHARGE: Performed by: FAMILY MEDICINE

## 2022-08-09 ENCOUNTER — HOSPITAL ENCOUNTER (OUTPATIENT)
Dept: RADIOLOGY | Facility: MEDICAL CENTER | Age: 72
End: 2022-08-09
Attending: FAMILY MEDICINE
Payer: MEDICARE

## 2022-08-09 DIAGNOSIS — R55 SYNCOPE, UNSPECIFIED SYNCOPE TYPE: ICD-10-CM

## 2022-08-09 PROCEDURE — 93880 EXTRACRANIAL BILAT STUDY: CPT

## 2022-09-06 ENCOUNTER — PHARMACY VISIT (OUTPATIENT)
Dept: PHARMACY | Facility: MEDICAL CENTER | Age: 72
End: 2022-09-06
Payer: COMMERCIAL

## 2022-09-06 PROCEDURE — RXMED WILLOW AMBULATORY MEDICATION CHARGE: Performed by: FAMILY MEDICINE

## 2022-09-20 PROCEDURE — RXMED WILLOW AMBULATORY MEDICATION CHARGE: Performed by: FAMILY MEDICINE

## 2022-09-21 ENCOUNTER — PHARMACY VISIT (OUTPATIENT)
Dept: PHARMACY | Facility: MEDICAL CENTER | Age: 72
End: 2022-09-21
Payer: COMMERCIAL

## 2022-09-28 ENCOUNTER — OFFICE VISIT (OUTPATIENT)
Dept: CARDIOLOGY | Facility: MEDICAL CENTER | Age: 72
End: 2022-09-28
Attending: FAMILY MEDICINE
Payer: MEDICARE

## 2022-09-28 VITALS
BODY MASS INDEX: 26.8 KG/M2 | RESPIRATION RATE: 13 BRPM | HEIGHT: 64 IN | DIASTOLIC BLOOD PRESSURE: 64 MMHG | OXYGEN SATURATION: 95 % | HEART RATE: 64 BPM | SYSTOLIC BLOOD PRESSURE: 100 MMHG | WEIGHT: 157 LBS

## 2022-09-28 DIAGNOSIS — I73.9 PERIPHERAL ARTERIAL DISEASE (HCC): ICD-10-CM

## 2022-09-28 DIAGNOSIS — I70.0 AORTIC ATHEROSCLEROSIS (HCC): Chronic | ICD-10-CM

## 2022-09-28 DIAGNOSIS — I65.23 CAROTID STENOSIS, BILATERAL: Chronic | ICD-10-CM

## 2022-09-28 DIAGNOSIS — E78.5 DYSLIPIDEMIA: ICD-10-CM

## 2022-09-28 DIAGNOSIS — I63.9 CEREBRAL INFARCTION, UNSPECIFIED MECHANISM (HCC): ICD-10-CM

## 2022-09-28 DIAGNOSIS — I25.10 CORONARY ARTERY CALCIFICATION SEEN ON CAT SCAN: Chronic | ICD-10-CM

## 2022-09-28 DIAGNOSIS — R55 SYNCOPE AND COLLAPSE: ICD-10-CM

## 2022-09-28 DIAGNOSIS — R55 SYNCOPE, UNSPECIFIED SYNCOPE TYPE: ICD-10-CM

## 2022-09-28 DIAGNOSIS — I10 ESSENTIAL HYPERTENSION: ICD-10-CM

## 2022-09-28 DIAGNOSIS — R73.09 OTHER ABNORMAL GLUCOSE: ICD-10-CM

## 2022-09-28 LAB — EKG IMPRESSION: NORMAL

## 2022-09-28 PROCEDURE — 99204 OFFICE O/P NEW MOD 45 MIN: CPT | Mod: 25 | Performed by: INTERNAL MEDICINE

## 2022-09-28 PROCEDURE — 93000 ELECTROCARDIOGRAM COMPLETE: CPT | Performed by: INTERNAL MEDICINE

## 2022-09-28 RX ORDER — ASPIRIN 81 MG/1
81 TABLET, CHEWABLE ORAL DAILY
Qty: 100 TABLET | COMMUNITY

## 2022-09-28 ASSESSMENT — ENCOUNTER SYMPTOMS
SORE THROAT: 0
BLURRED VISION: 0
FALLS: 1
PND: 0
COUGH: 1
FOCAL WEAKNESS: 0
NAUSEA: 0
CLAUDICATION: 0
CHILLS: 0
PALPITATIONS: 0
BRUISES/BLEEDS EASILY: 1
ABDOMINAL PAIN: 0
LOSS OF CONSCIOUSNESS: 1
WEAKNESS: 0
FEVER: 0
SHORTNESS OF BREATH: 1
DIZZINESS: 0

## 2022-09-28 ASSESSMENT — FIBROSIS 4 INDEX: FIB4 SCORE: 1.25

## 2022-09-28 NOTE — PROGRESS NOTES
Chief Complaint   Patient presents with    Syncope       Subjective     Bright Shirley is a 72 y.o. male who presents today  in consultation from Nella aRmirez M.D. for evaluation of  Syncope.  He had a syncopal spell in his bedroom earlier this year work-up shows on CT scan he has a new stroke finding that was not present on a CT of the head last year when he had presented with fall with fracture.  At that time he had an echocardiogram that was normal and he had a CAT scan which shows coronary calcification and emphysema he had a carotid ultrasound which does show moderate to severe amount of plaque but only mild stenosis.  He is on aspirin and statin therapy he reports some history of sugar issues with hypoglycemia or hyperglycemia not sure but his sugar and A1c has been normal not testing.    He continues to have spells of dizziness no particular trigger    Past Medical History:   Diagnosis Date    Acute hypoxemic respiratory failure (HCC) 6/8/2021    Acute urinary retention 6/7/2021    Chronic obstructive pulmonary disease (HCC)     Hypercholesteremia     Hypertension     Intertrochanteric fracture of femur ([facility]) 6/6/2021    Other emphysema ([facility]) 8/7/2019    Supplemental oxygen dependent 6/30/2021     Past Surgical History:   Procedure Laterality Date    PB TREAT INTER/SUBTROCH FX,W/PLATE/SCREW Left 6/6/2021    Procedure: FIXATION, FRACTURE, HIP, USING DYNAMIC HIP SCREW, WITH COMPRESSION;  Surgeon: Avinash Suazo M.D.;  Location: SURGERY [facility];  Service: Orthopedics    HERNIA REPAIR Right     ORIF, FEMUR Right     15 years ago    OTHER      right inguinal hernia repair    OTHER ORTHOPEDIC SURGERY      right hip     Family History   Problem Relation Age of Onset    Cancer Mother         breast cancer    Stroke Mother     Other Father         cirrhosis form alcohol    Stroke Sister     Heart Disease Brother         cardiac aneurysm    Hypertension Maternal Grandmother     Hyperlipidemia  Maternal Grandmother      Social History     Socioeconomic History    Marital status: Single     Spouse name: Not on file    Number of children: Not on file    Years of education: Not on file    Highest education level: Bachelor's degree (e.g., BA, AB, BS)   Occupational History    Not on file   Tobacco Use    Smoking status: Every Day     Packs/day: 0.50     Years: 40.00     Pack years: 20.00     Types: Cigarettes     Start date:      Last attempt to quit: 2021     Years since quittin.2    Smokeless tobacco: Never    Tobacco comments:     vape pen & cigarettes   Vaping Use    Vaping Use: Former   Substance and Sexual Activity    Alcohol use: Yes     Alcohol/week: 1.8 oz     Types: 1 Glasses of wine, 2 Cans of beer per week     Comment: weekend/social    Drug use: Yes     Types: Marijuana     Comment: THC edible    Sexual activity: Not Currently   Other Topics Concern    Not on file   Social History Narrative    Not on file     Social Determinants of Health     Financial Resource Strain: Not on file   Food Insecurity: Not on file   Transportation Needs: Not on file   Physical Activity: Not on file   Stress: Not on file   Social Connections: Not on file   Intimate Partner Violence: Not on file   Housing Stability: Not on file     Allergies   Allergen Reactions    Seasonal      Outpatient Encounter Medications as of 2022   Medication Sig Dispense Refill    rosuvastatin (CRESTOR) 10 MG Tab Take 1 Tablet by mouth every evening. 30 Tablet 11    amLODIPine (NORVASC) 10 MG Tab Take 1 Tablet by mouth every day. 90 Tablet 2    lisinopril (PRINIVIL) 20 MG Tab Take 1 Tablet by mouth 2 times a day. 200 Tablet 2    COVID-19 mRNA Vaccine, Pfizer, (PFIZER-BIONTSidestage COVID-19 VACC) 30 MCG/0.3ML Suspension injection Inject 0.3 mL into the shoulder, thigh, or buttocks. 0.3 mL 0    [DISCONTINUED] lisinopril (PRINIVIL) 20 MG Tab TAKE 1 TABLET BY MOUTH EVERY DAY 90 Tablet 3    acetaminophen (TYLENOL) 325 MG Tab Take 2  Tablets by mouth every 6 hours as needed. 30 tablet 0    aspirin (ASA) 325 MG Tab Take 0.25 Tablets by mouth every day. Rx indicates 81 mg of ASA daily. Pt states he has 325 mg tabs and takes 1/4 tab daily. States he will purchase 81 mg ASAP.      docosahexanoic acid (OMEGA 3 FA) 1000 MG Cap Take 1,000 mg by mouth every day at 6 PM.      Coenzyme Q10 (CO Q-10) 100 MG Cap Take 1 capsule by mouth every day at 6 PM.      Ginseng 250 MG Cap Take 250 mg by mouth every day at 6 PM.      Multiple Vitamin (MULTIVITAMIN ADULT PO) Take 1 tablet by mouth every day at 6 PM.      calcium polycarbophil (FIBERCON) 625 MG Tab Take 625 mg by mouth every day.      Ginkgo 60 MG Tab Take 1 tablet by mouth every day at 6 PM.      Home Care Oxygen Inhale 2 L/min at bedtime. PATIENT STATES HE ONLY USES HIS OXYGEN AT [facility].  Oxygen dose range: 2 L/min  Respiratory route via: Nasal Cannula   Oxygen supplier: Preferred          melatonin 5 mg Tab Take 1 tablet by mouth at bedtime.      predniSONE (DELTASONE) 20 MG Tab Take 2 Tablets by mouth every day. 30 tablet 0    [DISCONTINUED] aspirin EC (ECOTRIN) 81 MG Tablet Delayed Response Take 81 mg by mouth every day. (Patient not taking: Reported on 8/9/2021)      albuterol 108 (90 Base) MCG/ACT Aero Soln inhalation aerosol Inhale 2 Puffs by mouth every 6 hours as needed. 8.5 g 3     No facility-administered encounter medications on file as of 9/28/2022.     Review of Systems   Constitutional:  Negative for chills and fever.   HENT:  Negative for sore throat.    Eyes:  Negative for blurred vision.   Respiratory:  Positive for cough and shortness of breath.    Cardiovascular:  Negative for chest pain, palpitations, claudication, leg swelling and PND.   Gastrointestinal:  Negative for abdominal pain and nausea.   Musculoskeletal:  Positive for falls and joint pain.   Skin:  Negative for rash.   Neurological:  Positive for loss of consciousness. Negative for dizziness, focal weakness and weakness.  "  Endo/Heme/Allergies:  Bruises/bleeds easily.            Objective     BP (!) 0/0 (BP Location: Left arm, Patient Position: Sitting, BP Cuff Size: Adult)   Ht 1.626 m (5' 4\")   Wt 71.2 kg (157 lb)   BMI 26.95 kg/m²     Physical Exam  Constitutional:       General: He is not in acute distress.     Appearance: He is not diaphoretic.   HENT:      Mouth/Throat:      Comments: Wearing a mask for COVID protocol  Eyes:      General: No scleral icterus.  Neck:      Vascular: No JVD.   Cardiovascular:      Rate and Rhythm: Normal rate.      Heart sounds: Normal heart sounds. No murmur heard.    No friction rub. No gallop.   Pulmonary:      Effort: No respiratory distress.      Breath sounds: No wheezing or rales.   Abdominal:      General: Bowel sounds are normal.      Palpations: Abdomen is soft.   Musculoskeletal:      Right lower leg: No edema.      Left lower leg: No edema.   Skin:     Findings: No rash.   Neurological:      Mental Status: He is alert. Mental status is at baseline.   Psychiatric:         Mood and Affect: Mood normal.          We reviewed the images of his carotid ultrasound which does show a fair amount of calcified plaque but only mild stenosis based on velocities      Interpretive Statements   DOS: 7/8/2022     Summary:   1. The predominant underlying rhythm was sinus rhythm.   2. One run of supraventricular tachycardia, lasting 3 beats with a max rate   of 140 bpm.   3. Rare PACs <1%.  Rare PVCs <1%.   4. No symptoms reported.     We reviewed in person the most recent labs  Recent Results (from the past 5040 hour(s))   CBC WITH DIFFERENTIAL    Collection Time: 06/24/22 10:51 AM   Result Value Ref Range    WBC 9.0 4.8 - 10.8 K/uL    RBC 5.03 4.70 - 6.10 M/uL    Hemoglobin 14.8 14.0 - 18.0 g/dL    Hematocrit 46.0 42.0 - 52.0 %    MCV 91.5 81.4 - 97.8 fL    MCH 29.4 27.0 - 33.0 pg    MCHC 32.2 (L) 33.7 - 35.3 g/dL    RDW 42.5 35.9 - 50.0 fL    Platelet Count 304 164 - 446 K/uL    MPV 9.5 9.0 - 12.9 " fL    Neutrophils-Polys 54.50 44.00 - 72.00 %    Lymphocytes 33.90 22.00 - 41.00 %    Monocytes 8.00 0.00 - 13.40 %    Eosinophils 2.40 0.00 - 6.90 %    Basophils 0.90 0.00 - 1.80 %    Immature Granulocytes 0.30 0.00 - 0.90 %    Nucleated RBC 0.00 /100 WBC    Neutrophils (Absolute) 4.89 1.82 - 7.42 K/uL    Lymphs (Absolute) 3.05 1.00 - 4.80 K/uL    Monos (Absolute) 0.72 0.00 - 0.85 K/uL    Eos (Absolute) 0.22 0.00 - 0.51 K/uL    Baso (Absolute) 0.08 0.00 - 0.12 K/uL    Immature Granulocytes (abs) 0.03 0.00 - 0.11 K/uL    NRBC (Absolute) 0.00 K/uL   IRON/TOTAL IRON BIND    Collection Time: 22 10:51 AM   Result Value Ref Range    Iron 99 50 - 180 ug/dL    Total Iron Binding 311 250 - 450 ug/dL    Unsat Iron Binding 212 110 - 370 ug/dL    % Saturation 32 15 - 55 %   [facility] - Cardiology Performed (48HR)    Collection Time: 22 11:49 AM   Result Value Ref Range    Report       [facility]    Test Date:  2022  Pt Name:    ELO STARKEY                Department: [facility]  MRN:        [id number]                      Room:  Gender:     Male                         Technician: Collin Parada  :        1950                   Requested By:ELSA RESENDIZ  Order #:    [id number]                    Reading MD: Zachary Irwin MD      Interpretive Statements  DOS: 2022    Summary:  1. The predominant underlying rhythm was sinus rhythm.  2. One run of supraventricular tachycardia, lasting 3 beats with a max rate  of 140 bpm.  3. Rare PACs <1%.  Rare PVCs <1%.  4. No symptoms reported.      Electronically Signed by: Zachary Irwin M.D.    2022  6:56 PM        *   Monitoring started at 11:49 AM and continued for 48 hours. The overall  rhythm was Sinus. The average heart rate was 77  BPM. The minimum heart rate was 45 BPM, occurring at 3:52:33 AM D2. The  maximum heart rate was 128 BPM, occurring at  9:12:45 PM D1. The longest R-R interval  was 1.5 seconds occurring at 4:50:28  PM D1.    *    Ventricular ectopic activity consisted of 60 single, multifocal PVCs, 1  single endiastolic beat.    *   The patient's rhythm included 6 hours 11 minutes 21 seconds of  bradycardia. The slowest single episode of bradycardia  occurred at 3:44:08 AM D2, lasting 9 minutes 29 seconds, with minimum heart  rate of 45 BPM.    *   The patient's rhythm included 50 minutes 46 seconds of tachycardia. The  fastest single episode of tachycardia occurred at  9:12:17 PM D1, lasting 36 seconds, with maximum heart rate of 128 BPM.    *   Supraventricular ectopic activity consisted of 1 run, 2 late beats, 29  single PACs. The longest and fastest supraventricular run  occurred at 7:04:30 AM D1 consisting of 3 beats, with maximum heart rate of  140 BPM.    *   Diary Entries- No symptoms listed in diary.    Electronically Signed On 7- 18:56:14 PDT by Zachary Irwin MD       CT-CTA CHEST PULMONARY ARTERY W/ RECONS  Order: [id number]  Status: Final result    Visible to patient: Yes (seen)    Next appt: 10/14/2022 at 02:15 PM in Cardiology ([facility] EXAM 10 ECHO)    0 Result Notes  Details    Reading Physician Reading Date Result Priority   Ole Awan M.D. 6/6/2021 STAT     Narrative & Impression     6/6/2021 3:41 AM     HISTORY/REASON FOR EXAM:  PE suspected, high prob        TECHNIQUE/EXAM DESCRIPTION:  CT angiogram scan for pulmonary embolism with contrast, with reconstructions.     1.25 mm helical sections were obtained from the lung apices through the lung bases following the rapid bolus administration of 47 mL of Omnipaque 350 nonionic contrast. Thin-section overlapping reconstruction interval was utilized. Coronal   reconstructions were generated from the axial data. MIP post processing was performed and utilized for the interpretation.     Low dose optimization technique was utilized for this CT exam including automated exposure control and adjustment of the mA and/or kV according to patient size.     COMPARISON: None      FINDINGS:  Pulmonary Embolism: No.     Lungs: No suspicious nodules or air space process. Blebs and small bullae are seen throughout both lungs.     Pleura: No pleural effusion.     Nodes: No enlarged lymph nodes.     Additional findings: Low-attenuation nodules are present in both adrenal glands, consistent with lipophilic adenomata.     IMPRESSION:     1.  No evidence of pulmonary emboli or other acute thoracic abnormality.  2.  Emphysema, diffuse bladder bolus formation in both lungs.  3.  Bilateral low-attenuation adrenal gland lesions.     No Reading Provider Prelim 6/7/2021    Ramakrishna Knapp M.D.  [phone] 6/10/2021      Narrative & Impression  Transthoracic  Echo Report        Echocardiography Laboratory     CONCLUSIONS  No prior study is available for comparison.   Technically difficult study - adequate information is obtained.   Technically difficult study - adequate information is obtained.   Hyperdynamic systolic function.  Left ventricular ejection fraction is visually estimated to be 70%.  The left atrium is normal in size.  Structurally normal mitral valve without significant stenosis or   regurgitation.  Mild aortic valve sclerosis without significant stenosis or   regurgitation.  Structurally normal tricuspid valve without significant stenosis or   regurgitation.  The pulmonic valve is not well visualized.  Normal pericardium without effusion.    IMPRESSION:     1.  There moderate-severe atherosclerotic plaque.  Plaque is located in carotid bulbs and proximal internal carotid arteries.  Plaque characterization:  heterogeneous, calcific and irregular.     2. Diameter reduction in the internal carotid arteries: less than 50%. There is no evidence of carotid occlusion.     3.  Vertebral arteries demonstrate antegrade flow.    Results for orders placed or performed during the hospital encounter of 06/06/21   EKG   Result Value Ref Range    Report       [facility] Emergency  Dept[facility]    Test Date:  2021  Pt Name:    ELO SHIRLEY                Department: ER  MRN:        [id number]                      Room:       BL 18  Gender:     Male                         Technician: [id number]  :        1950                   Requested By:ER TRIAGE PROTOCOL  Order #:    [id number]                    Reading MD: Russel Mckenna MD    Measurements  Intervals                                Axis  Rate:       114                          P:          62  IN:         168                          QRS:        71  QRSD:       90                           T:          67  QT:         336  QTc:        463    Interpretive Statements  SINUS TACHYCARDIA  BORDERLINE INFERIOR Q WAVES  BASELINE WANDER IN LEAD(S) III  No previous ECG available for comparison  Electronically Signed On 2021 5:07:30 PDT by Russel Mckenna MD         No results found for this or any previous visit.    Assessment & Plan     1. Syncope and collapse  EKG      2. Dyslipidemia        3. Essential hypertension            Medical Decision Making: Today's Assessment/Status/Plan:      It was my pleasure to meet with Mr. Shirley.    We addressed the management of hypertension at today's visit. Blood pressure is well controlled.  We specifically assessed the labs on hypertension treatment    We addressed the management of dyslipidemia at today's visit. He is on appropriate statin.    We addressed the management of peripheral artery disease.  He is on proper antiplatelet, cholesterol management and hypertension control as appropriate.  We addressed the potential side effects and laboratory follow-up for these medications.    We will get a Biotel for longer-term monitoring of his symptoms of palpitations and dizziness and a syncopal spell if he does not have these episodes while wearing it he may want consider a loop recorder continue conservative management and certainly do a another Biotel or loop recorder if his symptoms persist.    He is not  interested in smoking he has tried medications in the past    He does not need a repeat echo last year was perfectly normal so I canceled that appointment    I will see Mr. Shirley back depending on the results and encouraged him to follow up with us over the phone or electronically using my [employer] as issues arise.    It is my pleasure to participate in the care of Mr. Shirley.  Please do not hesitate to contact me with questions or concerns.    Zeferino Perez MD PhD [facility]  Cardiologist [facility]    Please note that this dictation was created using voice recognition software. There may be errors I did not discover before finalizing the note.

## 2022-09-28 NOTE — PATIENT INSTRUCTIONS
Work on at least 1.5 - 5 hours a week of moderate exercise (typical brisk walking or similar activity)    If you have had a heart attack, stent, bypass or reduced heart strength (EF <35%): cardiac rehab may reduce your risk of dying by 13-24% and need to go to the hospital by 30% within the first year (1)    Please look into the following diets and incorporate them into your diet    LOW SALT DIET   KEEP YOUR SODIUM EQUAL TO CALORIES AND NO MORE THAN DOUBLE THE CALORIES FOR A LOW SALT DIET    Cardiosmart.org - great resource for American College of Cardiology on heart disease prevention and treatment    FOR TREATMENT OR PREVENTION OF CORONARY ARTERY DISEASE  These three programs are approved by Medicare/Insurers for those with heart disease  [facility]  Dr. Allen[facility] for Reversing Heart Disease - Mikhail Tovar's Cardiologist vegetarian-based  [facility] - [place]-based mind-body Program    This is a commonly referenced Program  Dr Atkinson - Olga Lidia over [place] (book and documentary) - vegetarian-based    FOR TREATMENT OF BLOOD PRESSURE  DASH DIET - American Heart Association for treatment of HYPERTENSION    FOR TREATMENT OF BAD CHOLESTEROL/FATS  REDUCE PROCESSED SUGAR AS MUCH AS POSSIBLE  INCREASE WHOLE GRAINS/VEGETABLES  INCREASE FIBER    Lowering total cholesterol and LDL (bad) cholesterol:  - Eat leaner cuts of meat, or eliminate altogether if possible red meat, and frequently substitute fish or chicken.  - Limit saturated fat to no more than 7-10% of total calories no more than 10 g per day is recommended. Some sources of saturated fat include butter, animal fats, hydrogenated vegetable fats and oils, many desserts, whole milk dairy products.  - Replaced saturated fats with polyunsaturated fats and monounsaturated fats. Foods high in monounsaturated fat include nuts, canola oil, avocados, and olives.  - Limit trans fat (processed foods)  and replaced with fresh fruits and vegetables  - Recommend nonfat dairy products  - Increase substantially the amount of soluble fiber intake (legumes such as beans, fruit, whole grains).  - Consider nutritional supplements: plant sterile spreads such as Benecol, fish oil,  flaxseed oil, omega-3 acids capsules 1000 mg twice a day, or viscous fiber such as Metamucil  - Attain ideal weight and regular exercise (at least 30 minutes per day of moderate exercise)  ASK ABOUT STATIN OR NON STATIN MEDICATION TO REDUCE YOUR LDL AND HEART RISK    Lowering triglycerides:  - Reduce intake of simple sugar: Desserts, candy, pastries, honey, sodas, sugared cereals, yogurt, Gatorade, sports bars, canned fruit, smoothies, fruit juice, coffee drinks  - Reduced intake of refined starches: Refined Pasta, most bread  - Reduce or abstain from alcohol  - Increase omega-3 fatty acids: [place], Trout, Mackerel, Herring, Albacore tuna and supplements  - Attain ideal weight and regular exercise (at least 30 minutes per day of moderate exercise)  ASK ABOUT PURIFIED OMEGA 3 EPA or FISH OIL TO REDUCE YOUR TG AND HEART RISK    Elevating HDL (good) cholesterol:  - Increase physical activity  - Increase omega-3 fatty acids and supplements as listed above  - Incorporating appropriate amounts of monounsaturated fats such as nuts, olive oil, canola oil, avocados, olives  - Stop smoking  - Attain ideal weight and regular exercise (at least 30 minutes per day of moderate exercise)

## 2022-09-29 ENCOUNTER — NON-PROVIDER VISIT (OUTPATIENT)
Dept: CARDIOLOGY | Facility: MEDICAL CENTER | Age: 72
End: 2022-09-29
Attending: INTERNAL MEDICINE
Payer: MEDICARE

## 2022-09-29 DIAGNOSIS — I63.9 CEREBRAL INFARCTION, UNSPECIFIED MECHANISM (HCC): ICD-10-CM

## 2022-09-29 DIAGNOSIS — I49.3 PVC (PREMATURE VENTRICULAR CONTRACTION): ICD-10-CM

## 2022-09-29 DIAGNOSIS — I49.1 APC (ATRIAL PREMATURE CONTRACTIONS): ICD-10-CM

## 2022-09-29 DIAGNOSIS — R55 SYNCOPE AND COLLAPSE: ICD-10-CM

## 2022-09-29 NOTE — PROGRESS NOTES
Patient enrolled in the 30 day [facility] monitoring program per Zeferino Perez MD..  >Office hook-up with Baseline transmitted, serial # [id number].  >Pending EOS

## 2022-10-04 PROCEDURE — RXMED WILLOW AMBULATORY MEDICATION CHARGE: Performed by: FAMILY MEDICINE

## 2022-10-05 ENCOUNTER — PHARMACY VISIT (OUTPATIENT)
Dept: PHARMACY | Facility: MEDICAL CENTER | Age: 72
End: 2022-10-05
Payer: COMMERCIAL

## 2022-11-07 ENCOUNTER — TELEPHONE (OUTPATIENT)
Dept: CARDIOLOGY | Facility: MEDICAL CENTER | Age: 72
End: 2022-11-07
Payer: MEDICARE

## 2022-11-07 PROCEDURE — 93268 ECG RECORD/REVIEW: CPT | Performed by: INTERNAL MEDICINE

## 2022-11-08 ENCOUNTER — PATIENT MESSAGE (OUTPATIENT)
Dept: HEALTH INFORMATION MANAGEMENT | Facility: OTHER | Age: 72
End: 2022-11-08

## 2022-12-16 PROCEDURE — RXMED WILLOW AMBULATORY MEDICATION CHARGE: Performed by: FAMILY MEDICINE

## 2022-12-20 ENCOUNTER — PHARMACY VISIT (OUTPATIENT)
Dept: PHARMACY | Facility: MEDICAL CENTER | Age: 72
End: 2022-12-20
Payer: COMMERCIAL

## 2023-01-13 DIAGNOSIS — I10 ESSENTIAL HYPERTENSION: ICD-10-CM

## 2023-01-16 PROCEDURE — RXMED WILLOW AMBULATORY MEDICATION CHARGE: Performed by: FAMILY MEDICINE

## 2023-01-16 RX ORDER — LISINOPRIL 20 MG/1
20 TABLET ORAL 2 TIMES DAILY
Qty: 200 TABLET | Refills: 2 | Status: SHIPPED | OUTPATIENT
Start: 2023-01-16 | End: 2023-11-27 | Stop reason: SDUPTHER

## 2023-01-17 ENCOUNTER — PHARMACY VISIT (OUTPATIENT)
Dept: PHARMACY | Facility: MEDICAL CENTER | Age: 73
End: 2023-01-17
Payer: COMMERCIAL

## 2023-03-17 DIAGNOSIS — I10 ESSENTIAL HYPERTENSION: ICD-10-CM

## 2023-03-20 PROCEDURE — RXMED WILLOW AMBULATORY MEDICATION CHARGE: Performed by: FAMILY MEDICINE

## 2023-03-20 RX ORDER — AMLODIPINE BESYLATE 10 MG/1
10 TABLET ORAL DAILY
Qty: 90 TABLET | Refills: 2 | Status: SHIPPED | OUTPATIENT
Start: 2023-03-20 | End: 2024-01-05 | Stop reason: SDUPTHER

## 2023-03-21 ENCOUNTER — PHARMACY VISIT (OUTPATIENT)
Dept: PHARMACY | Facility: MEDICAL CENTER | Age: 73
End: 2023-03-21
Payer: COMMERCIAL

## 2023-04-03 ENCOUNTER — PHARMACY VISIT (OUTPATIENT)
Dept: PHARMACY | Facility: MEDICAL CENTER | Age: 73
End: 2023-04-03
Payer: COMMERCIAL

## 2023-04-03 PROCEDURE — RXMED WILLOW AMBULATORY MEDICATION CHARGE: Performed by: FAMILY MEDICINE

## 2023-04-29 PROCEDURE — RXMED WILLOW AMBULATORY MEDICATION CHARGE: Performed by: FAMILY MEDICINE

## 2023-05-01 ENCOUNTER — PHARMACY VISIT (OUTPATIENT)
Dept: PHARMACY | Facility: MEDICAL CENTER | Age: 73
End: 2023-05-01
Payer: COMMERCIAL

## 2023-06-07 ENCOUNTER — DOCUMENTATION (OUTPATIENT)
Dept: HEALTH INFORMATION MANAGEMENT | Facility: OTHER | Age: 73
End: 2023-06-07
Payer: MEDICARE

## 2023-06-07 NOTE — PROGRESS NOTES
STELLA: was scheduled by [facility] Completed on 4/3/2023    Colorectal Cancer Screening 8/2030

## 2023-06-29 ENCOUNTER — PHARMACY VISIT (OUTPATIENT)
Dept: PHARMACY | Facility: MEDICAL CENTER | Age: 73
End: 2023-06-29
Payer: COMMERCIAL

## 2023-06-29 PROCEDURE — RXMED WILLOW AMBULATORY MEDICATION CHARGE: Performed by: FAMILY MEDICINE

## 2023-07-10 ENCOUNTER — HOSPITAL ENCOUNTER (OUTPATIENT)
Dept: LAB | Facility: MEDICAL CENTER | Age: 73
End: 2023-07-10
Attending: FAMILY MEDICINE
Payer: MEDICARE

## 2023-07-10 DIAGNOSIS — R73.02 IGT (IMPAIRED GLUCOSE TOLERANCE): ICD-10-CM

## 2023-07-10 DIAGNOSIS — I10 ESSENTIAL HYPERTENSION: ICD-10-CM

## 2023-07-10 LAB
ALBUMIN SERPL BCP-MCNC: 4.2 G/DL (ref 3.2–4.9)
ALBUMIN/GLOB SERPL: 1.2 G/DL
ALP SERPL-CCNC: 93 U/L (ref 30–99)
ALT SERPL-CCNC: 14 U/L (ref 2–50)
ANION GAP SERPL CALC-SCNC: 13 MMOL/L (ref 7–16)
AST SERPL-CCNC: 15 U/L (ref 12–45)
BASOPHILS # BLD AUTO: 0.7 % (ref 0–1.8)
BASOPHILS # BLD: 0.06 K/UL (ref 0–0.12)
BILIRUB SERPL-MCNC: 0.3 MG/DL (ref 0.1–1.5)
BUN SERPL-MCNC: 14 MG/DL (ref 8–22)
CALCIUM ALBUM COR SERPL-MCNC: 9.1 MG/DL (ref 8.5–10.5)
CALCIUM SERPL-MCNC: 9.3 MG/DL (ref 8.5–10.5)
CHLORIDE SERPL-SCNC: 103 MMOL/L (ref 96–112)
CHOLEST SERPL-MCNC: 144 MG/DL (ref 100–199)
CO2 SERPL-SCNC: 23 MMOL/L (ref 20–33)
CREAT SERPL-MCNC: 1.03 MG/DL (ref 0.5–1.4)
EOSINOPHIL # BLD AUTO: 0.17 K/UL (ref 0–0.51)
EOSINOPHIL NFR BLD: 2 % (ref 0–6.9)
ERYTHROCYTE [DISTWIDTH] IN BLOOD BY AUTOMATED COUNT: 41.8 FL (ref 35.9–50)
EST. AVERAGE GLUCOSE BLD GHB EST-MCNC: 128 MG/DL
GFR SERPLBLD CREATININE-BSD FMLA CKD-EPI: 77 ML/MIN/1.73 M 2
GLOBULIN SER CALC-MCNC: 3.4 G/DL (ref 1.9–3.5)
GLUCOSE SERPL-MCNC: 90 MG/DL (ref 65–99)
HBA1C MFR BLD: 6.1 % (ref 4–5.6)
HCT VFR BLD AUTO: 47.2 % (ref 42–52)
HDLC SERPL-MCNC: 48 MG/DL
HGB BLD-MCNC: 15.2 G/DL (ref 14–18)
IMM GRANULOCYTES # BLD AUTO: 0.01 K/UL (ref 0–0.11)
IMM GRANULOCYTES NFR BLD AUTO: 0.1 % (ref 0–0.9)
LDLC SERPL CALC-MCNC: 77 MG/DL
LYMPHOCYTES # BLD AUTO: 2.56 K/UL (ref 1–4.8)
LYMPHOCYTES NFR BLD: 30.6 % (ref 22–41)
MCH RBC QN AUTO: 28.8 PG (ref 27–33)
MCHC RBC AUTO-ENTMCNC: 32.2 G/DL (ref 32.3–36.5)
MCV RBC AUTO: 89.4 FL (ref 81.4–97.8)
MONOCYTES # BLD AUTO: 0.56 K/UL (ref 0–0.85)
MONOCYTES NFR BLD AUTO: 6.7 % (ref 0–13.4)
NEUTROPHILS # BLD AUTO: 5 K/UL (ref 1.82–7.42)
NEUTROPHILS NFR BLD: 59.9 % (ref 44–72)
NRBC # BLD AUTO: 0 K/UL
NRBC BLD-RTO: 0 /100 WBC (ref 0–0.2)
PLATELET # BLD AUTO: 290 K/UL (ref 164–446)
PMV BLD AUTO: 9.3 FL (ref 9–12.9)
POTASSIUM SERPL-SCNC: 4.9 MMOL/L (ref 3.6–5.5)
PROT SERPL-MCNC: 7.6 G/DL (ref 6–8.2)
RBC # BLD AUTO: 5.28 M/UL (ref 4.7–6.1)
SODIUM SERPL-SCNC: 139 MMOL/L (ref 135–145)
TRIGL SERPL-MCNC: 94 MG/DL (ref 0–149)
TSH SERPL DL<=0.005 MIU/L-ACNC: 3.49 UIU/ML (ref 0.38–5.33)
WBC # BLD AUTO: 8.4 K/UL (ref 4.8–10.8)

## 2023-07-10 PROCEDURE — 85025 COMPLETE CBC W/AUTO DIFF WBC: CPT

## 2023-07-10 PROCEDURE — 80061 LIPID PANEL: CPT

## 2023-07-10 PROCEDURE — 80053 COMPREHEN METABOLIC PANEL: CPT

## 2023-07-10 PROCEDURE — 84443 ASSAY THYROID STIM HORMONE: CPT

## 2023-07-10 PROCEDURE — 36415 COLL VENOUS BLD VENIPUNCTURE: CPT

## 2023-07-10 PROCEDURE — 83036 HEMOGLOBIN GLYCOSYLATED A1C: CPT

## 2023-07-12 DIAGNOSIS — E78.5 DYSLIPIDEMIA: ICD-10-CM

## 2023-07-12 RX ORDER — ROSUVASTATIN CALCIUM 10 MG/1
10 TABLET, COATED ORAL EVERY EVENING
Qty: 90 TABLET | Refills: 2 | Status: SHIPPED | OUTPATIENT
Start: 2023-07-12 | End: 2024-01-26 | Stop reason: SDUPTHER

## 2023-07-12 RX ORDER — ROSUVASTATIN CALCIUM 10 MG/1
10 TABLET, COATED ORAL EVERY EVENING
Qty: 30 TABLET | Refills: 11 | Status: SHIPPED | OUTPATIENT
Start: 2023-07-12 | End: 2023-07-12 | Stop reason: SDUPTHER

## 2023-07-13 PROCEDURE — RXMED WILLOW AMBULATORY MEDICATION CHARGE: Performed by: FAMILY MEDICINE

## 2023-07-17 ENCOUNTER — OFFICE VISIT (OUTPATIENT)
Dept: MEDICAL GROUP | Facility: MEDICAL CENTER | Age: 73
End: 2023-07-17
Payer: MEDICARE

## 2023-07-17 VITALS
SYSTOLIC BLOOD PRESSURE: 118 MMHG | HEART RATE: 66 BPM | DIASTOLIC BLOOD PRESSURE: 62 MMHG | HEIGHT: 66 IN | OXYGEN SATURATION: 96 % | BODY MASS INDEX: 24.62 KG/M2 | TEMPERATURE: 98 F | WEIGHT: 153.2 LBS

## 2023-07-17 DIAGNOSIS — I63.9 CEREBRAL INFARCTION, UNSPECIFIED MECHANISM (HCC): Chronic | ICD-10-CM

## 2023-07-17 DIAGNOSIS — F51.04 CHRONIC INSOMNIA: ICD-10-CM

## 2023-07-17 DIAGNOSIS — M25.551 CHRONIC PAIN OF BOTH HIPS: ICD-10-CM

## 2023-07-17 DIAGNOSIS — J43.9 PULMONARY EMPHYSEMA, UNSPECIFIED EMPHYSEMA TYPE (HCC): ICD-10-CM

## 2023-07-17 DIAGNOSIS — R73.02 IGT (IMPAIRED GLUCOSE TOLERANCE): ICD-10-CM

## 2023-07-17 DIAGNOSIS — I65.23 CAROTID STENOSIS, BILATERAL: Chronic | ICD-10-CM

## 2023-07-17 DIAGNOSIS — G89.29 CHRONIC PAIN OF BOTH HIPS: ICD-10-CM

## 2023-07-17 DIAGNOSIS — M25.552 CHRONIC PAIN OF BOTH HIPS: ICD-10-CM

## 2023-07-17 DIAGNOSIS — I10 ESSENTIAL HYPERTENSION: ICD-10-CM

## 2023-07-17 PROCEDURE — 3074F SYST BP LT 130 MM HG: CPT | Performed by: FAMILY MEDICINE

## 2023-07-17 PROCEDURE — 3078F DIAST BP <80 MM HG: CPT | Performed by: FAMILY MEDICINE

## 2023-07-17 PROCEDURE — 99214 OFFICE O/P EST MOD 30 MIN: CPT | Performed by: FAMILY MEDICINE

## 2023-07-17 PROCEDURE — RXMED WILLOW AMBULATORY MEDICATION CHARGE: Performed by: FAMILY MEDICINE

## 2023-07-17 RX ORDER — ALBUTEROL SULFATE 90 UG/1
2 AEROSOL, METERED RESPIRATORY (INHALATION) EVERY 4 HOURS PRN
Qty: 18 G | Refills: 2 | Status: SHIPPED | OUTPATIENT
Start: 2023-07-17 | End: 2024-02-29 | Stop reason: SDUPTHER

## 2023-07-17 RX ORDER — MIRTAZAPINE 15 MG/1
15 TABLET, FILM COATED ORAL NIGHTLY
Qty: 30 TABLET | Refills: 2 | Status: SHIPPED | OUTPATIENT
Start: 2023-07-17 | End: 2023-08-17

## 2023-07-17 ASSESSMENT — FIBROSIS 4 INDEX: FIB4 SCORE: 1.01

## 2023-07-17 NOTE — PROGRESS NOTES
CC: Impaired glucose tolerance, hypertension, cerebral infarction, carotid stenosis, chronic hip pain COPD, chronic insomnia    HPI:   Bright presents today to discuss the following:    IGT (impaired glucose tolerance)  Most recent A1c was 6.1.  No history of diabetes.  Denies polyuria and polydipsia.  Currently not on medication.    Essential hypertension  Patient's blood pressure has been adequate controlled on amlodipine 10 mg daily, and lisinopril 20 mg daily.  No side effects    Cerebral infarction, unspecified mechanism (HCC)  A previous head CT done in July 2022 showed: Focal 10 mm area of new hypodensity in the left parietal subcortical white matter consistent with an old white matter infarct. This was not present on the prior exam.    Patient currently has no residual effect.  Has been on rosuvastatin and aspirin    Carotid stenosis, bilateral - mild  Patient has bilateral carotid plaques, however is asymptomatic.  Has history of falls a year ago, but he has been asymptomatic since then.  Currently on rosuvastatin and aspirin    Chronic pain of both hips  Patient is history of bilateral intramedullary nailing placement, now patient has been complaining of bilateral hip pain especially with walking, more on the right side.    Pulmonary emphysema, unspecified emphysema type (HCC)/ Tobacco dependence  Currently denies any cough or shortness of breath.  However he sometimes gets gets a smoker's cough symptoms and shortness of breath with exertion.  Albuterol as needed has been helping.  Patient is a chronic smoker, smoking cessation discussed.  Patient cut down his smoking to half a pack a day, he will try to continue cutting down.    Chronic insomnia  Patient has been having a problem staying asleep, has tried melatonin has helped a little bit but he has been taking high-dose about 30 mg at night.  Patient stated that he wants to try something else.    Patient Active Problem List    Diagnosis Date Noted     Cerebral infarction (HCC) - based on CT head 7/2022     Carotid stenosis, bilateral - mild     Aortic atherosclerosis (HCC)     Coronary artery calcification seen on CAT scan     Hypocalcemia 05/26/2022    Mild protein-calorie malnutrition (HCC) 05/26/2022    BMI 24.0-24.9, adult 05/26/2022    Peripheral arterial disease (HCC) 08/09/2021    Hyperglycemia 08/09/2021    Dysmetabolic syndrome 08/09/2021    Chronic hypoxemic respiratory failure (HCC) 08/09/2021    COPD (chronic obstructive pulmonary disease) ([facility]) 07/01/2021    Hypokalemia 06/09/2021    Anemia 06/07/2021    Syncopal episodes 06/06/2021    Dyslipidemia 08/07/2019    Cigarette nicotine dependence, uncomplicated  08/07/2019    Essential hypertension 01/10/2011       Current Outpatient Medications   Medication Sig Dispense Refill    albuterol 108 (90 Base) MCG/ACT Aero Soln inhalation aerosol Inhale 2 Puffs every four hours as needed for Shortness of Breath. 1 Each 2    mirtazapine (REMERON) 15 MG Tab Take 1 Tablet by mouth every evening. 30 Tablet 2    rosuvastatin (CRESTOR) 10 MG Tab Take 1 Tablet by mouth every evening. 90 Tablet 2    amLODIPine (NORVASC) 10 MG Tab Take 1 Tablet by mouth every day. 90 Tablet 2    lisinopril (PRINIVIL) 20 MG Tab Take 1 Tablet by mouth 2 times a day. 200 Tablet 2    aspirin (ASA) 81 MG Chew Tab chewable tablet Chew 1 Tablet every day. 100 Tablet     acetaminophen (TYLENOL) 325 MG Tab Take 2 Tablets by mouth every 6 hours as needed. 30 tablet 0    docosahexanoic acid (OMEGA 3 FA) 1000 MG Cap Take 1,000 mg by mouth every day at 6 PM.      Coenzyme Q10 (CO Q-10) 100 MG Cap Take 1 capsule by mouth every day at 6 PM.      Ginseng 250 MG Cap Take 250 mg by mouth every day at 6 PM.      Multiple Vitamin (MULTIVITAMIN ADULT PO) Take 1 tablet by mouth every day at 6 PM.      calcium polycarbophil (FIBERCON) 625 MG Tab Take 625 mg by mouth every day.      Ginkgo 60 MG Tab Take 1 tablet by mouth every day at 6 PM.      melatonin 5 mg  "Tab Take 1 tablet by mouth at bedtime.      albuterol 108 (90 Base) MCG/ACT Aero Soln inhalation aerosol Inhale 2 Puffs by mouth every 6 hours as needed. 8.5 g 3     No current facility-administered medications for this visit.         Allergies as of 07/17/2023 - Reviewed 07/17/2023   Allergen Reaction Noted    Seasonal  08/07/2019        ROS: Denies any chest pain, Shortness of breath, Changes bowel or bladder, Lower extremity edema.    Physical Exam:  /62 (BP Location: Left arm, Patient Position: Sitting, BP Cuff Size: Adult)   Pulse 66   Temp 36.7 °C (98 °F) (Temporal)   Ht 1.689 m (5' 6.5\")   Wt 69.5 kg (153 lb 3.2 oz)   SpO2 96%   BMI 24.36 kg/m²   Gen.: Well-developed, well-nourished, no apparent distress,pleasant and cooperative with the examination  Skin:  Warm and dry with good turgor. No rashes or suspicious lesions in visible areas  HEENT:Sinuses nontender with palpation, TMs clear, nares patent with pink mucosa and clear rhinorrhea,no septal deviation ,polyps or lesions. lips without lesions, oropharynx clear.  Neck: Trachea midline,no masses or adenopathy. No JVD.  Cor: Regular rate and rhythm without murmur, gallop or rub.  Lungs: Respirations unlabored.Clear to auscultation with equal breath sounds bilaterally. No wheezes, rhonchi.  Extremities: No cyanosis, clubbing or edema.          Assessment and Plan.   73 y.o. male     1. IGT (impaired glucose tolerance)  Most recent A1c was 6.1.  No history of diabetes  Patient is counseled on lifestyle modification  We will continue monitor blood glucose    2. Essential hypertension  Controlled.  Continue on amlodipine 10 mg daily, and lisinopril 20 mg daily    3. Cerebral infarction, unspecified mechanism (HCC)  Accidental finding on head CT, however no residual effect.  Continue on statin and aspirin    4. Carotid stenosis, bilateral - mild  Stable, asymptomatic  Continue on aspirin and rosuvastatin    5. Chronic pain of both hips  Patient is " history of bilateral intramedullary nailing placement, now patient has been complaining of bilateral hip pain especially with walking, more on the right side.    - DX-HIP-BILATERAL-WITH PELVIS-2 VIEWS; Future    6. Pulmonary emphysema, unspecified emphysema type (HCC)  7. tobacco dependence  Currently asymptomatic.  Continue albuterol as needed  Chronic smoker, smoking cessation discussed.  Patient cut down his smoking to half a pack a day, he will try to continue cutting down.    - albuterol 108 (90 Base) MCG/ACT Aero Soln inhalation aerosol; Inhale 2 Puffs every four hours as needed for Shortness of Breath.  Dispense: 1 Each; Refill: 2    8. Chronic insomnia  Patient has been having a problem staying asleep, has tried melatonin has helped a little bit but he has been taking high-dose about 30 mg at night.  Advised to try mirtazapine 5 mg as needed nightly.    - mirtazapine (REMERON) 15 MG Tab; Take 1 Tablet by mouth every evening.  Dispense: 30 Tablet; Refill: 2

## 2023-07-19 ENCOUNTER — PHARMACY VISIT (OUTPATIENT)
Dept: PHARMACY | Facility: MEDICAL CENTER | Age: 73
End: 2023-07-19
Payer: COMMERCIAL

## 2023-07-22 ENCOUNTER — HOSPITAL ENCOUNTER (OUTPATIENT)
Dept: RADIOLOGY | Facility: MEDICAL CENTER | Age: 73
End: 2023-07-22
Attending: FAMILY MEDICINE
Payer: MEDICARE

## 2023-07-22 DIAGNOSIS — M25.551 CHRONIC PAIN OF BOTH HIPS: ICD-10-CM

## 2023-07-22 DIAGNOSIS — M25.552 CHRONIC PAIN OF BOTH HIPS: ICD-10-CM

## 2023-07-22 DIAGNOSIS — G89.29 CHRONIC PAIN OF BOTH HIPS: ICD-10-CM

## 2023-07-22 PROCEDURE — 73521 X-RAY EXAM HIPS BI 2 VIEWS: CPT

## 2023-08-08 PROCEDURE — RXMED WILLOW AMBULATORY MEDICATION CHARGE: Performed by: FAMILY MEDICINE

## 2023-08-10 ENCOUNTER — PHARMACY VISIT (OUTPATIENT)
Dept: PHARMACY | Facility: MEDICAL CENTER | Age: 73
End: 2023-08-10
Payer: COMMERCIAL

## 2023-08-18 ENCOUNTER — PHARMACY VISIT (OUTPATIENT)
Dept: PHARMACY | Facility: MEDICAL CENTER | Age: 73
End: 2023-08-18
Payer: COMMERCIAL

## 2023-08-18 PROCEDURE — RXMED WILLOW AMBULATORY MEDICATION CHARGE: Performed by: FAMILY MEDICINE

## 2023-08-30 PROCEDURE — RXMED WILLOW AMBULATORY MEDICATION CHARGE: Performed by: FAMILY MEDICINE

## 2023-09-10 PROCEDURE — RXMED WILLOW AMBULATORY MEDICATION CHARGE: Performed by: FAMILY MEDICINE

## 2023-09-11 ENCOUNTER — PHARMACY VISIT (OUTPATIENT)
Dept: PHARMACY | Facility: MEDICAL CENTER | Age: 73
End: 2023-09-11
Payer: COMMERCIAL

## 2023-09-11 PROCEDURE — RXMED WILLOW AMBULATORY MEDICATION CHARGE: Performed by: INTERNAL MEDICINE

## 2023-09-11 RX ORDER — RESPIRATORY SYNCYTIAL VIRUS VACCINE 120MCG/0.5
KIT INTRAMUSCULAR
Qty: 0.5 ML | Refills: 0 | OUTPATIENT
Start: 2023-09-11

## 2023-10-02 ENCOUNTER — PHARMACY VISIT (OUTPATIENT)
Dept: PHARMACY | Facility: MEDICAL CENTER | Age: 73
End: 2023-10-02
Payer: COMMERCIAL

## 2023-10-02 PROCEDURE — RXMED WILLOW AMBULATORY MEDICATION CHARGE: Performed by: INTERNAL MEDICINE

## 2023-10-02 RX ORDER — INFLUENZA A VIRUS A/MICHIGAN/45/2015 X-275 (H1N1) ANTIGEN (FORMALDEHYDE INACTIVATED), INFLUENZA A VIRUS A/SINGAPORE/INFIMH-16-0019/2016 IVR-186 (H3N2) ANTIGEN (FORMALDEHYDE INACTIVATED), INFLUENZA B VIRUS B/PHUKET/3073/2013 ANTIGEN (FORMALDEHYDE INACTIVATED), AND INFLUENZA B VIRUS B/MARYLAND/15/2016 BX-69A ANTIGEN (FORMALDEHYDE INACTIVATED) 60; 60; 60; 60 UG/.7ML; UG/.7ML; UG/.7ML; UG/.7ML
INJECTION, SUSPENSION INTRAMUSCULAR
Qty: 0.7 ML | Refills: 0 | OUTPATIENT
Start: 2023-10-02

## 2023-10-09 PROCEDURE — RXMED WILLOW AMBULATORY MEDICATION CHARGE: Performed by: FAMILY MEDICINE

## 2023-10-10 ENCOUNTER — PHARMACY VISIT (OUTPATIENT)
Dept: PHARMACY | Facility: MEDICAL CENTER | Age: 73
End: 2023-10-10
Payer: COMMERCIAL

## 2023-10-16 ENCOUNTER — PHARMACY VISIT (OUTPATIENT)
Dept: PHARMACY | Facility: MEDICAL CENTER | Age: 73
End: 2023-10-16
Payer: COMMERCIAL

## 2023-10-16 PROCEDURE — RXMED WILLOW AMBULATORY MEDICATION CHARGE: Performed by: FAMILY MEDICINE

## 2023-10-23 ENCOUNTER — PHARMACY VISIT (OUTPATIENT)
Dept: PHARMACY | Facility: MEDICAL CENTER | Age: 73
End: 2023-10-23
Payer: COMMERCIAL

## 2023-10-23 PROCEDURE — RXMED WILLOW AMBULATORY MEDICATION CHARGE: Performed by: FAMILY MEDICINE

## 2023-11-16 ENCOUNTER — PHARMACY VISIT (OUTPATIENT)
Dept: PHARMACY | Facility: MEDICAL CENTER | Age: 73
End: 2023-11-16
Payer: COMMERCIAL

## 2023-11-16 DIAGNOSIS — G47.00 INSOMNIA, UNSPECIFIED TYPE: ICD-10-CM

## 2023-11-16 PROCEDURE — RXMED WILLOW AMBULATORY MEDICATION CHARGE: Performed by: FAMILY MEDICINE

## 2023-11-16 RX ORDER — TRAZODONE HYDROCHLORIDE 50 MG/1
50 TABLET ORAL NIGHTLY
Qty: 90 TABLET | Refills: 3 | Status: SHIPPED | OUTPATIENT
Start: 2023-11-16

## 2023-11-24 DIAGNOSIS — I10 ESSENTIAL HYPERTENSION: ICD-10-CM

## 2023-11-24 RX ORDER — LISINOPRIL 20 MG/1
20 TABLET ORAL 2 TIMES DAILY
Qty: 200 TABLET | Refills: 2 | Status: CANCELLED | OUTPATIENT
Start: 2023-11-24

## 2023-11-25 ENCOUNTER — PHARMACY VISIT (OUTPATIENT)
Dept: PHARMACY | Facility: MEDICAL CENTER | Age: 73
End: 2023-11-25
Payer: COMMERCIAL

## 2023-11-25 PROCEDURE — RXMED WILLOW AMBULATORY MEDICATION CHARGE: Performed by: FAMILY MEDICINE

## 2023-11-27 ENCOUNTER — PHARMACY VISIT (OUTPATIENT)
Dept: PHARMACY | Facility: MEDICAL CENTER | Age: 73
End: 2023-11-27
Payer: COMMERCIAL

## 2023-11-27 DIAGNOSIS — I10 ESSENTIAL HYPERTENSION: ICD-10-CM

## 2023-11-27 PROCEDURE — RXMED WILLOW AMBULATORY MEDICATION CHARGE: Performed by: FAMILY MEDICINE

## 2023-11-27 RX ORDER — LISINOPRIL 20 MG/1
20 TABLET ORAL 2 TIMES DAILY
Qty: 200 TABLET | Refills: 2 | Status: SHIPPED | OUTPATIENT
Start: 2023-11-27

## 2023-12-08 PROCEDURE — RXMED WILLOW AMBULATORY MEDICATION CHARGE: Performed by: FAMILY MEDICINE

## 2023-12-11 ENCOUNTER — PHARMACY VISIT (OUTPATIENT)
Dept: PHARMACY | Facility: MEDICAL CENTER | Age: 73
End: 2023-12-11
Payer: COMMERCIAL

## 2023-12-15 ENCOUNTER — PHARMACY VISIT (OUTPATIENT)
Dept: PHARMACY | Facility: MEDICAL CENTER | Age: 73
End: 2023-12-15
Payer: COMMERCIAL

## 2023-12-15 PROCEDURE — RXMED WILLOW AMBULATORY MEDICATION CHARGE: Performed by: INTERNAL MEDICINE

## 2023-12-15 RX ORDER — PNEUMOCOCCAL 20-VALENT CONJUGATE VACCINE 2.2; 2.2; 2.2; 2.2; 2.2; 2.2; 2.2; 2.2; 2.2; 2.2; 2.2; 2.2; 2.2; 2.2; 2.2; 2.2; 4.4; 2.2; 2.2; 2.2 UG/.5ML; UG/.5ML; UG/.5ML; UG/.5ML; UG/.5ML; UG/.5ML; UG/.5ML; UG/.5ML; UG/.5ML; UG/.5ML; UG/.5ML; UG/.5ML; UG/.5ML; UG/.5ML; UG/.5ML; UG/.5ML; UG/.5ML; UG/.5ML; UG/.5ML; UG/.5ML
0.5 INJECTION, SUSPENSION INTRAMUSCULAR
Qty: 0.5 ML | Refills: 0 | OUTPATIENT
Start: 2023-12-15 | End: 2023-12-16

## 2023-12-15 RX ORDER — COVID-19 VACCINE, MRNA 0.04 MG/.418ML
0.3 INJECTION, SUSPENSION INTRAMUSCULAR
Qty: 0.3 ML | Refills: 0 | OUTPATIENT
Start: 2023-12-15 | End: 2023-12-16

## 2024-01-05 DIAGNOSIS — I10 ESSENTIAL HYPERTENSION: ICD-10-CM

## 2024-01-07 RX ORDER — AMLODIPINE BESYLATE 10 MG/1
10 TABLET ORAL DAILY
Qty: 90 TABLET | Refills: 2 | Status: SHIPPED | OUTPATIENT
Start: 2024-01-07

## 2024-01-08 PROCEDURE — RXMED WILLOW AMBULATORY MEDICATION CHARGE: Performed by: FAMILY MEDICINE

## 2024-01-10 ENCOUNTER — PHARMACY VISIT (OUTPATIENT)
Dept: PHARMACY | Facility: MEDICAL CENTER | Age: 74
End: 2024-01-10
Payer: COMMERCIAL

## 2024-01-26 ENCOUNTER — PHARMACY VISIT (OUTPATIENT)
Dept: PHARMACY | Facility: MEDICAL CENTER | Age: 74
End: 2024-01-26
Payer: COMMERCIAL

## 2024-01-26 DIAGNOSIS — E78.5 DYSLIPIDEMIA: ICD-10-CM

## 2024-01-26 PROCEDURE — RXMED WILLOW AMBULATORY MEDICATION CHARGE: Performed by: FAMILY MEDICINE

## 2024-01-26 RX ORDER — ROSUVASTATIN CALCIUM 10 MG/1
10 TABLET, COATED ORAL EVERY EVENING
Qty: 90 TABLET | Refills: 2 | Status: SHIPPED | OUTPATIENT
Start: 2024-01-26

## 2024-02-29 DIAGNOSIS — J43.9 PULMONARY EMPHYSEMA, UNSPECIFIED EMPHYSEMA TYPE (HCC): ICD-10-CM

## 2024-02-29 RX ORDER — ALBUTEROL SULFATE 90 UG/1
2 AEROSOL, METERED RESPIRATORY (INHALATION) EVERY 4 HOURS PRN
Qty: 18 G | Refills: 2 | Status: SHIPPED | OUTPATIENT
Start: 2024-02-29

## 2024-03-05 PROCEDURE — RXMED WILLOW AMBULATORY MEDICATION CHARGE: Performed by: FAMILY MEDICINE

## 2024-03-06 ENCOUNTER — PHARMACY VISIT (OUTPATIENT)
Dept: PHARMACY | Facility: MEDICAL CENTER | Age: 74
End: 2024-03-06
Payer: COMMERCIAL

## 2024-03-19 ENCOUNTER — PHARMACY VISIT (OUTPATIENT)
Dept: PHARMACY | Facility: MEDICAL CENTER | Age: 74
End: 2024-03-19

## 2024-03-29 PROCEDURE — RXMED WILLOW AMBULATORY MEDICATION CHARGE: Performed by: FAMILY MEDICINE

## 2024-04-02 ENCOUNTER — PHARMACY VISIT (OUTPATIENT)
Dept: PHARMACY | Facility: MEDICAL CENTER | Age: 74
End: 2024-04-02
Payer: COMMERCIAL

## 2024-04-05 PROCEDURE — RXMED WILLOW AMBULATORY MEDICATION CHARGE: Performed by: FAMILY MEDICINE

## 2024-04-09 ENCOUNTER — PHARMACY VISIT (OUTPATIENT)
Dept: PHARMACY | Facility: MEDICAL CENTER | Age: 74
End: 2024-04-09
Payer: COMMERCIAL

## 2024-04-29 ENCOUNTER — HOSPITAL ENCOUNTER (INPATIENT)
Facility: MEDICAL CENTER | Age: 74
DRG: 177 | End: 2024-04-29
Attending: EMERGENCY MEDICINE | Admitting: INTERNAL MEDICINE
Payer: MEDICARE

## 2024-04-29 ENCOUNTER — APPOINTMENT (OUTPATIENT)
Dept: RADIOLOGY | Facility: MEDICAL CENTER | Age: 74
DRG: 177 | End: 2024-04-29
Attending: EMERGENCY MEDICINE
Payer: MEDICARE

## 2024-04-29 DIAGNOSIS — U07.1 COVID-19: ICD-10-CM

## 2024-04-29 DIAGNOSIS — R91.8 MASS OF LEFT LUNG: ICD-10-CM

## 2024-04-29 DIAGNOSIS — U07.1 PNEUMONIA DUE TO COVID-19 VIRUS: ICD-10-CM

## 2024-04-29 DIAGNOSIS — U07.1 COVID-19 VIRUS INFECTION: ICD-10-CM

## 2024-04-29 DIAGNOSIS — J96.01 ACUTE RESPIRATORY FAILURE WITH HYPOXIA (HCC): ICD-10-CM

## 2024-04-29 DIAGNOSIS — J12.82 PNEUMONIA DUE TO COVID-19 VIRUS: ICD-10-CM

## 2024-04-29 PROBLEM — J44.1 ACUTE EXACERBATION OF CHRONIC OBSTRUCTIVE PULMONARY DISEASE (COPD) (HCC): Status: ACTIVE | Noted: 2021-07-01

## 2024-04-29 PROBLEM — Z72.0 TOBACCO ABUSE: Status: ACTIVE | Noted: 2024-04-29

## 2024-04-29 PROBLEM — Z78.9 FULL CODE STATUS: Status: ACTIVE | Noted: 2024-04-29

## 2024-04-29 LAB
ALBUMIN SERPL BCP-MCNC: 3.7 G/DL (ref 3.2–4.9)
ALBUMIN/GLOB SERPL: 0.9 G/DL
ALP SERPL-CCNC: 77 U/L (ref 30–99)
ALT SERPL-CCNC: 19 U/L (ref 2–50)
ANION GAP SERPL CALC-SCNC: 15 MMOL/L (ref 7–16)
APPEARANCE UR: CLEAR
AST SERPL-CCNC: 21 U/L (ref 12–45)
BACTERIA #/AREA URNS HPF: ABNORMAL /HPF
BASOPHILS # BLD AUTO: 0.3 % (ref 0–1.8)
BASOPHILS # BLD: 0.03 K/UL (ref 0–0.12)
BILIRUB SERPL-MCNC: 0.5 MG/DL (ref 0.1–1.5)
BILIRUB UR QL STRIP.AUTO: NEGATIVE
BUN SERPL-MCNC: 12 MG/DL (ref 8–22)
CALCIUM ALBUM COR SERPL-MCNC: 8.7 MG/DL (ref 8.5–10.5)
CALCIUM SERPL-MCNC: 8.5 MG/DL (ref 8.5–10.5)
CHLORIDE SERPL-SCNC: 102 MMOL/L (ref 96–112)
CO2 SERPL-SCNC: 19 MMOL/L (ref 20–33)
COLOR UR: ABNORMAL
CREAT SERPL-MCNC: 0.82 MG/DL (ref 0.5–1.4)
CRP SERPL HS-MCNC: 22.45 MG/DL (ref 0–0.75)
EKG IMPRESSION: NORMAL
EOSINOPHIL # BLD AUTO: 0 K/UL (ref 0–0.51)
EOSINOPHIL NFR BLD: 0 % (ref 0–6.9)
EPI CELLS #/AREA URNS HPF: NEGATIVE /HPF
ERYTHROCYTE [DISTWIDTH] IN BLOOD BY AUTOMATED COUNT: 43.3 FL (ref 35.9–50)
FLUAV RNA SPEC QL NAA+PROBE: NEGATIVE
FLUBV RNA SPEC QL NAA+PROBE: NEGATIVE
GFR SERPLBLD CREATININE-BSD FMLA CKD-EPI: 92 ML/MIN/1.73 M 2
GLOBULIN SER CALC-MCNC: 4 G/DL (ref 1.9–3.5)
GLUCOSE SERPL-MCNC: 154 MG/DL (ref 65–99)
GLUCOSE UR STRIP.AUTO-MCNC: NEGATIVE MG/DL
HCT VFR BLD AUTO: 42.4 % (ref 42–52)
HGB BLD-MCNC: 14 G/DL (ref 14–18)
HYALINE CASTS #/AREA URNS LPF: ABNORMAL /LPF
IMM GRANULOCYTES # BLD AUTO: 0.02 K/UL (ref 0–0.11)
IMM GRANULOCYTES NFR BLD AUTO: 0.2 % (ref 0–0.9)
KETONES UR STRIP.AUTO-MCNC: ABNORMAL MG/DL
LACTATE SERPL-SCNC: 1.5 MMOL/L (ref 0.5–2)
LEUKOCYTE ESTERASE UR QL STRIP.AUTO: NEGATIVE
LYMPHOCYTES # BLD AUTO: 0.87 K/UL (ref 1–4.8)
LYMPHOCYTES NFR BLD: 9.9 % (ref 22–41)
MAGNESIUM SERPL-MCNC: 2.1 MG/DL (ref 1.5–2.5)
MCH RBC QN AUTO: 27.9 PG (ref 27–33)
MCHC RBC AUTO-ENTMCNC: 33 G/DL (ref 32.3–36.5)
MCV RBC AUTO: 84.5 FL (ref 81.4–97.8)
MICRO URNS: ABNORMAL
MONOCYTES # BLD AUTO: 0.79 K/UL (ref 0–0.85)
MONOCYTES NFR BLD AUTO: 9 % (ref 0–13.4)
NEUTROPHILS # BLD AUTO: 7.06 K/UL (ref 1.82–7.42)
NEUTROPHILS NFR BLD: 80.6 % (ref 44–72)
NITRITE UR QL STRIP.AUTO: NEGATIVE
NRBC # BLD AUTO: 0 K/UL
NRBC BLD-RTO: 0 /100 WBC (ref 0–0.2)
NT-PROBNP SERPL IA-MCNC: 1139 PG/ML (ref 0–125)
PH UR STRIP.AUTO: 5.5 [PH] (ref 5–8)
PLATELET # BLD AUTO: 278 K/UL (ref 164–446)
PMV BLD AUTO: 9.2 FL (ref 9–12.9)
POTASSIUM SERPL-SCNC: 3.9 MMOL/L (ref 3.6–5.5)
PROCALCITONIN SERPL-MCNC: 0.09 NG/ML
PROCALCITONIN SERPL-MCNC: 0.13 NG/ML
PROT SERPL-MCNC: 7.7 G/DL (ref 6–8.2)
PROT UR QL STRIP: 100 MG/DL
RBC # BLD AUTO: 5.02 M/UL (ref 4.7–6.1)
RBC # URNS HPF: ABNORMAL /HPF
RBC UR QL AUTO: NEGATIVE
RSV RNA SPEC QL NAA+PROBE: NEGATIVE
SARS-COV-2 RNA RESP QL NAA+PROBE: DETECTED
SODIUM SERPL-SCNC: 136 MMOL/L (ref 135–145)
SP GR UR STRIP.AUTO: 1.03
TROPONIN T SERPL-MCNC: 19 NG/L (ref 6–19)
UROBILINOGEN UR STRIP.AUTO-MCNC: 1 MG/DL
WBC # BLD AUTO: 8.8 K/UL (ref 4.8–10.8)
WBC #/AREA URNS HPF: ABNORMAL /HPF

## 2024-04-29 PROCEDURE — 700111 HCHG RX REV CODE 636 W/ 250 OVERRIDE (IP): Performed by: INTERNAL MEDICINE

## 2024-04-29 PROCEDURE — 84145 PROCALCITONIN (PCT): CPT

## 2024-04-29 PROCEDURE — 93005 ELECTROCARDIOGRAM TRACING: CPT

## 2024-04-29 PROCEDURE — 83880 ASSAY OF NATRIURETIC PEPTIDE: CPT

## 2024-04-29 PROCEDURE — 93005 ELECTROCARDIOGRAM TRACING: CPT | Performed by: EMERGENCY MEDICINE

## 2024-04-29 PROCEDURE — 83605 ASSAY OF LACTIC ACID: CPT

## 2024-04-29 PROCEDURE — 87040 BLOOD CULTURE FOR BACTERIA: CPT

## 2024-04-29 PROCEDURE — 700102 HCHG RX REV CODE 250 W/ 637 OVERRIDE(OP): Performed by: INTERNAL MEDICINE

## 2024-04-29 PROCEDURE — 99406 BEHAV CHNG SMOKING 3-10 MIN: CPT | Mod: 25 | Performed by: INTERNAL MEDICINE

## 2024-04-29 PROCEDURE — 700111 HCHG RX REV CODE 636 W/ 250 OVERRIDE (IP): Mod: JZ | Performed by: EMERGENCY MEDICINE

## 2024-04-29 PROCEDURE — 81001 URINALYSIS AUTO W/SCOPE: CPT

## 2024-04-29 PROCEDURE — 700101 HCHG RX REV CODE 250: Performed by: INTERNAL MEDICINE

## 2024-04-29 PROCEDURE — 86140 C-REACTIVE PROTEIN: CPT

## 2024-04-29 PROCEDURE — 84484 ASSAY OF TROPONIN QUANT: CPT

## 2024-04-29 PROCEDURE — A9270 NON-COVERED ITEM OR SERVICE: HCPCS | Performed by: EMERGENCY MEDICINE

## 2024-04-29 PROCEDURE — 87086 URINE CULTURE/COLONY COUNT: CPT

## 2024-04-29 PROCEDURE — 94640 AIRWAY INHALATION TREATMENT: CPT

## 2024-04-29 PROCEDURE — 85025 COMPLETE CBC W/AUTO DIFF WBC: CPT

## 2024-04-29 PROCEDURE — 0241U HCHG SARS-COV-2 COVID-19 NFCT DS RESP RNA 4 TRGT ED POC: CPT

## 2024-04-29 PROCEDURE — A9270 NON-COVERED ITEM OR SERVICE: HCPCS | Performed by: INTERNAL MEDICINE

## 2024-04-29 PROCEDURE — 700102 HCHG RX REV CODE 250 W/ 637 OVERRIDE(OP): Performed by: EMERGENCY MEDICINE

## 2024-04-29 PROCEDURE — 99291 CRITICAL CARE FIRST HOUR: CPT | Performed by: INTERNAL MEDICINE

## 2024-04-29 PROCEDURE — 94669 MECHANICAL CHEST WALL OSCILL: CPT

## 2024-04-29 PROCEDURE — 700101 HCHG RX REV CODE 250: Performed by: EMERGENCY MEDICINE

## 2024-04-29 PROCEDURE — 770000 HCHG ROOM/CARE - INTERMEDIATE ICU *

## 2024-04-29 PROCEDURE — 83735 ASSAY OF MAGNESIUM: CPT

## 2024-04-29 PROCEDURE — 8E0ZXY6 ISOLATION: ICD-10-PCS | Performed by: EMERGENCY MEDICINE

## 2024-04-29 PROCEDURE — 80053 COMPREHEN METABOLIC PANEL: CPT

## 2024-04-29 RX ORDER — UREA 10 %
10 LOTION (ML) TOPICAL
Status: DISCONTINUED | OUTPATIENT
Start: 2024-04-29 | End: 2024-05-05 | Stop reason: HOSPADM

## 2024-04-29 RX ORDER — NICOTINE 21 MG/24HR
14 PATCH, TRANSDERMAL 24 HOURS TRANSDERMAL
Status: DISCONTINUED | OUTPATIENT
Start: 2024-04-29 | End: 2024-05-05 | Stop reason: HOSPADM

## 2024-04-29 RX ORDER — ACETAMINOPHEN 325 MG/1
500 TABLET ORAL EVERY 6 HOURS PRN
COMMUNITY

## 2024-04-29 RX ORDER — TRAZODONE HYDROCHLORIDE 100 MG/1
50 TABLET ORAL NIGHTLY
Status: DISCONTINUED | OUTPATIENT
Start: 2024-04-29 | End: 2024-05-05 | Stop reason: HOSPADM

## 2024-04-29 RX ORDER — ACETAMINOPHEN 500 MG
1000 TABLET ORAL EVERY 6 HOURS PRN
COMMUNITY
End: 2024-05-20

## 2024-04-29 RX ORDER — ONDANSETRON 2 MG/ML
4 INJECTION INTRAMUSCULAR; INTRAVENOUS EVERY 4 HOURS PRN
Status: DISCONTINUED | OUTPATIENT
Start: 2024-04-29 | End: 2024-05-05 | Stop reason: HOSPADM

## 2024-04-29 RX ORDER — ACETAMINOPHEN 325 MG/1
650 TABLET ORAL EVERY 6 HOURS PRN
Status: DISCONTINUED | OUTPATIENT
Start: 2024-04-29 | End: 2024-05-05 | Stop reason: HOSPADM

## 2024-04-29 RX ORDER — AMOXICILLIN 250 MG
2 CAPSULE ORAL EVERY EVENING
Status: DISCONTINUED | OUTPATIENT
Start: 2024-04-29 | End: 2024-05-05 | Stop reason: HOSPADM

## 2024-04-29 RX ORDER — AZITHROMYCIN 250 MG/1
500 TABLET, FILM COATED ORAL ONCE
Status: COMPLETED | OUTPATIENT
Start: 2024-04-29 | End: 2024-04-29

## 2024-04-29 RX ORDER — CHLORPHENIRAMINE MALEATE 4 MG/1
4 TABLET ORAL DAILY
Status: DISCONTINUED | OUTPATIENT
Start: 2024-04-29 | End: 2024-04-29

## 2024-04-29 RX ORDER — ROSUVASTATIN CALCIUM 5 MG/1
10 TABLET, COATED ORAL EVERY EVENING
Status: DISCONTINUED | OUTPATIENT
Start: 2024-04-29 | End: 2024-05-05 | Stop reason: HOSPADM

## 2024-04-29 RX ORDER — POLYETHYLENE GLYCOL 3350 17 G/17G
1 POWDER, FOR SOLUTION ORAL
Status: DISCONTINUED | OUTPATIENT
Start: 2024-04-29 | End: 2024-05-05 | Stop reason: HOSPADM

## 2024-04-29 RX ORDER — HYDRALAZINE HYDROCHLORIDE 20 MG/ML
10 INJECTION INTRAMUSCULAR; INTRAVENOUS EVERY 4 HOURS PRN
Status: DISCONTINUED | OUTPATIENT
Start: 2024-04-29 | End: 2024-05-05 | Stop reason: HOSPADM

## 2024-04-29 RX ORDER — IPRATROPIUM BROMIDE AND ALBUTEROL SULFATE 2.5; .5 MG/3ML; MG/3ML
3 SOLUTION RESPIRATORY (INHALATION)
Status: DISCONTINUED | OUTPATIENT
Start: 2024-04-29 | End: 2024-04-30

## 2024-04-29 RX ORDER — LISINOPRIL 20 MG/1
20 TABLET ORAL 2 TIMES DAILY
Status: DISCONTINUED | OUTPATIENT
Start: 2024-04-29 | End: 2024-05-05 | Stop reason: HOSPADM

## 2024-04-29 RX ORDER — ASPIRIN 81 MG/1
81 TABLET, CHEWABLE ORAL EVERY EVENING
Status: DISCONTINUED | OUTPATIENT
Start: 2024-04-29 | End: 2024-05-05 | Stop reason: HOSPADM

## 2024-04-29 RX ORDER — IPRATROPIUM BROMIDE AND ALBUTEROL SULFATE 2.5; .5 MG/3ML; MG/3ML
3 SOLUTION RESPIRATORY (INHALATION)
Status: COMPLETED | OUTPATIENT
Start: 2024-04-29 | End: 2024-04-29

## 2024-04-29 RX ORDER — PHENOL 1.4 %
30 AEROSOL, SPRAY (ML) MUCOUS MEMBRANE EVERY EVENING
COMMUNITY

## 2024-04-29 RX ORDER — CEFTRIAXONE 2 G/1
2000 INJECTION, POWDER, FOR SOLUTION INTRAMUSCULAR; INTRAVENOUS ONCE
Status: COMPLETED | OUTPATIENT
Start: 2024-04-29 | End: 2024-04-29

## 2024-04-29 RX ORDER — AMLODIPINE BESYLATE 5 MG/1
10 TABLET ORAL EVERY EVENING
Status: DISCONTINUED | OUTPATIENT
Start: 2024-04-29 | End: 2024-05-05 | Stop reason: HOSPADM

## 2024-04-29 RX ORDER — DEXAMETHASONE SODIUM PHOSPHATE 4 MG/ML
6 INJECTION, SOLUTION INTRA-ARTICULAR; INTRALESIONAL; INTRAMUSCULAR; INTRAVENOUS; SOFT TISSUE DAILY
Status: DISCONTINUED | OUTPATIENT
Start: 2024-04-29 | End: 2024-04-30

## 2024-04-29 RX ORDER — ONDANSETRON 4 MG/1
4 TABLET, ORALLY DISINTEGRATING ORAL EVERY 4 HOURS PRN
Status: DISCONTINUED | OUTPATIENT
Start: 2024-04-29 | End: 2024-05-05 | Stop reason: HOSPADM

## 2024-04-29 RX ORDER — IPRATROPIUM BROMIDE AND ALBUTEROL SULFATE 2.5; .5 MG/3ML; MG/3ML
3 SOLUTION RESPIRATORY (INHALATION)
Status: DISCONTINUED | OUTPATIENT
Start: 2024-04-29 | End: 2024-05-02

## 2024-04-29 RX ORDER — ENOXAPARIN SODIUM 100 MG/ML
40 INJECTION SUBCUTANEOUS DAILY
Status: DISCONTINUED | OUTPATIENT
Start: 2024-04-29 | End: 2024-04-30

## 2024-04-29 RX ADMIN — NICOTINE 14 MG: 14 PATCH TRANSDERMAL at 14:30

## 2024-04-29 RX ADMIN — ASPIRIN 81 MG: 81 TABLET, CHEWABLE ORAL at 18:01

## 2024-04-29 RX ADMIN — ACETAMINOPHEN 650 MG: 325 TABLET, FILM COATED ORAL at 22:19

## 2024-04-29 RX ADMIN — IPRATROPIUM BROMIDE AND ALBUTEROL SULFATE 3 ML: 2.5; .5 SOLUTION RESPIRATORY (INHALATION) at 14:30

## 2024-04-29 RX ADMIN — IPRATROPIUM BROMIDE AND ALBUTEROL SULFATE 3 ML: 2.5; .5 SOLUTION RESPIRATORY (INHALATION) at 10:37

## 2024-04-29 RX ADMIN — DEXAMETHASONE SODIUM PHOSPHATE 6 MG: 4 INJECTION, SOLUTION INTRAMUSCULAR; INTRAVENOUS at 14:30

## 2024-04-29 RX ADMIN — CEFTRIAXONE SODIUM 2000 MG: 2 INJECTION, POWDER, FOR SOLUTION INTRAMUSCULAR; INTRAVENOUS at 10:29

## 2024-04-29 RX ADMIN — BARICITINIB 4 MG: 2 TABLET, FILM COATED ORAL at 18:00

## 2024-04-29 RX ADMIN — AMLODIPINE BESYLATE 10 MG: 10 TABLET ORAL at 18:01

## 2024-04-29 RX ADMIN — IPRATROPIUM BROMIDE AND ALBUTEROL SULFATE 3 ML: 2.5; .5 SOLUTION RESPIRATORY (INHALATION) at 21:46

## 2024-04-29 RX ADMIN — TRAZODONE HYDROCHLORIDE 50 MG: 50 TABLET ORAL at 22:19

## 2024-04-29 RX ADMIN — AZITHROMYCIN DIHYDRATE 500 MG: 250 TABLET, FILM COATED ORAL at 10:30

## 2024-04-29 RX ADMIN — ROSUVASTATIN CALCIUM 10 MG: 5 TABLET, FILM COATED ORAL at 18:01

## 2024-04-29 RX ADMIN — IPRATROPIUM BROMIDE AND ALBUTEROL SULFATE 3 ML: 2.5; .5 SOLUTION RESPIRATORY (INHALATION) at 18:23

## 2024-04-29 RX ADMIN — ENOXAPARIN SODIUM 40 MG: 100 INJECTION SUBCUTANEOUS at 18:01

## 2024-04-29 RX ADMIN — Medication 10 MG: at 22:20

## 2024-04-29 RX ADMIN — LISINOPRIL 20 MG: 20 TABLET ORAL at 18:01

## 2024-04-29 ASSESSMENT — LIFESTYLE VARIABLES
HAVE PEOPLE ANNOYED YOU BY CRITICIZING YOUR DRINKING: NO
EVER HAD A DRINK FIRST THING IN THE MORNING TO STEADY YOUR NERVES TO GET RID OF A HANGOVER: NO
CONSUMPTION TOTAL: NEGATIVE
TOTAL SCORE: 0
DOES PATIENT WANT TO STOP DRINKING: NO
HAVE YOU EVER FELT YOU SHOULD CUT DOWN ON YOUR DRINKING: NO
AVERAGE NUMBER OF DAYS PER WEEK YOU HAVE A DRINK CONTAINING ALCOHOL: 0
HOW MANY TIMES IN THE PAST YEAR HAVE YOU HAD 5 OR MORE DRINKS IN A DAY: 0
EVER FELT BAD OR GUILTY ABOUT YOUR DRINKING: NO
ON A TYPICAL DAY WHEN YOU DRINK ALCOHOL HOW MANY DRINKS DO YOU HAVE: 0
ALCOHOL_USE: NO
SUBSTANCE_ABUSE: 1
TOTAL SCORE: 0
TOTAL SCORE: 0

## 2024-04-29 ASSESSMENT — FIBROSIS 4 INDEX
FIB4 SCORE: 1.27
FIB4 SCORE: 1.27
FIB4 SCORE: 1.01

## 2024-04-29 ASSESSMENT — COGNITIVE AND FUNCTIONAL STATUS - GENERAL
SUGGESTED CMS G CODE MODIFIER MOBILITY: CH
MOBILITY SCORE: 24
MOBILITY SCORE: 24
SUGGESTED CMS G CODE MODIFIER MOBILITY: CH
DAILY ACTIVITIY SCORE: 24
SUGGESTED CMS G CODE MODIFIER DAILY ACTIVITY: CH

## 2024-04-29 ASSESSMENT — ENCOUNTER SYMPTOMS
HALLUCINATIONS: 0
HEADACHES: 0
NECK PAIN: 0
FEVER: 0
DIARRHEA: 1
BLURRED VISION: 0
BACK PAIN: 0
SENSORY CHANGE: 0
ABDOMINAL PAIN: 0
PHOTOPHOBIA: 0
TREMORS: 0
MYALGIAS: 0
PALPITATIONS: 0
EYE PAIN: 0
SHORTNESS OF BREATH: 1
WEIGHT LOSS: 0
CONSTIPATION: 0
COUGH: 0
TINGLING: 0
CHILLS: 0
SPEECH CHANGE: 0
NAUSEA: 0
WEAKNESS: 1
DOUBLE VISION: 0
DIZZINESS: 0
ORTHOPNEA: 0
FOCAL WEAKNESS: 0
VOMITING: 0
SPUTUM PRODUCTION: 0

## 2024-04-29 ASSESSMENT — COPD QUESTIONNAIRES
COPD SCREENING SCORE: 8
DURING THE PAST 4 WEEKS HOW MUCH DID YOU FEEL SHORT OF BREATH: SOME OF THE TIME
HAVE YOU SMOKED AT LEAST 100 CIGARETTES IN YOUR ENTIRE LIFE: YES
DO YOU EVER COUGH UP ANY MUCUS OR PHLEGM?: YES, EVERY DAY

## 2024-04-29 ASSESSMENT — PATIENT HEALTH QUESTIONNAIRE - PHQ9
SUM OF ALL RESPONSES TO PHQ9 QUESTIONS 1 AND 2: 0
1. LITTLE INTEREST OR PLEASURE IN DOING THINGS: NOT AT ALL
2. FEELING DOWN, DEPRESSED, IRRITABLE, OR HOPELESS: NOT AT ALL

## 2024-04-29 ASSESSMENT — PAIN DESCRIPTION - PAIN TYPE
TYPE: ACUTE PAIN

## 2024-04-29 NOTE — H&P
Hospital Medicine History & Physical Note    Date of Service  4/29/2024    Primary Care Physician  Nella Ramirez M.D.    Consultants  None    Specialist Names: N/A    Code Status  Full Code    Chief Complaint  Chief Complaint   Patient presents with    Shortness of Breath     BIBA 3 days of SOB, cough, diarrhea. O2 sats in 60's when picked up by EMS, started on 10L O2 NRB. Pt took COVID test at home that was positive. Hx COPD       History of Presenting Illness  Bright Shirley is a 73 y.o. male with past medical history of COPD not on home oxygen currently, current smoker, hypertension, carotid stenosis who presented to the hospital on 4/29/2024 with complaint of shortness of breath.  Patient reported that he is not feeling well for the last 4 to 5 days and he developed shortness of breath approximately 3 days ago.  He denies complaint of cough, nausea and vomiting.  There is no specific aggravating or elevating factors.  He reported symptoms of shortness of breath associated with diarrhea and generalized weakness.  He denies any history of sick contact.  He reported he has history of COPD and he has not been using his oxygen for the last 1 year and he checks his oxygen with pulse ox and usually it looks normal.  He smokes half a pack a day.  He denies any other acute complaints or associated symptoms.    I discussed about this admission with ER physician Dr. Armando    ER course: Patient found to have significant hypoxia and he was placed on 2 L of oxygen.  At the time of evaluation his oxygen looks significantly better and he is able to talk in full sentences.  He found to have COVID-19 virus infection.  He received a dose of antibiotics in ER.    I discussed the plan of care with patient.    Review of Systems  Review of Systems   Constitutional:  Positive for malaise/fatigue. Negative for chills, fever and weight loss.   HENT:  Negative for hearing loss and tinnitus.    Eyes:  Negative for  blurred vision, double vision, photophobia and pain.   Respiratory:  Positive for shortness of breath. Negative for cough and sputum production.    Cardiovascular:  Negative for chest pain, palpitations, orthopnea and leg swelling.   Gastrointestinal:  Positive for diarrhea. Negative for abdominal pain, constipation, nausea and vomiting.   Genitourinary:  Negative for dysuria, frequency and urgency.   Musculoskeletal:  Negative for back pain, joint pain, myalgias and neck pain.   Skin:  Negative for rash.   Neurological:  Positive for weakness. Negative for dizziness, tingling, tremors, sensory change, speech change, focal weakness and headaches.   Psychiatric/Behavioral:  Positive for substance abuse (Tobacco). Negative for hallucinations.    All other systems reviewed and are negative.      Past Medical History   has a past medical history of Acute hypoxemic respiratory failure (HCC) (6/8/2021), Acute urinary retention (6/7/2021), Aortic atherosclerosis ([facility]), Carotid stenosis, bilateral - mild, Cerebral infarction ([facility]) - based on CT head 7/2022, Chronic obstructive pulmonary disease (HCC), Hypercholesteremia, Hypertension, Intertrochanteric fracture of femur (HCC) (6/6/2021), Other emphysema ([facility]) (8/7/2019), and Supplemental oxygen dependent (6/30/2021).    Surgical History   has a past surgical history that includes hernia repair (Right); orif, femur (Right); other orthopedic surgery; other; and pr treat inter/subtroch fx,w/plate/screw (Left, 6/6/2021).     Family History  family history includes Cancer in his mother; Heart Disease in his brother; Hyperlipidemia in his maternal grandmother; Hypertension in his maternal grandmother; Other in his father; Stroke in his mother and sister.   Family history reviewed with patient. There is no family history that is pertinent to the chief complaint.     Social History   reports that he has been smoking cigarettes. He started smoking about 49 years ago. He has a 23.2  pack-year smoking history. He has never used smokeless tobacco. He reports current alcohol use of about 1.8 oz of alcohol per week. He reports current drug use. Drug: Marijuana.    Allergies  Allergies   Allergen Reactions    Seasonal        Medications  Prior to Admission Medications   Prescriptions Last Dose Informant Patient Reported? Taking?   Chlorpheniramine Maleate (ALLERGY PO) 4/28/2024 at am Patient Yes Yes   Sig: Take 1 Tablet by mouth every day.   Melatonin 10 MG Tab 4/28/2024 at pm Patient Yes Yes   Sig: Take 30 mg by mouth every evening.   Multiple Vitamin (MULTIVITAMIN ADULT PO) 4/28/2024 at am Patient Yes Yes   Sig: Take 1 Tablet by mouth every day.   acetaminophen (TYLENOL) 325 MG Tab >2 weeks at unk Patient Yes Yes   Sig: Take 650 mg by mouth every 6 hours as needed for Mild Pain.   acetaminophen (TYLENOL) 500 MG Tab >2 weeks at unk Patient Yes Yes   Sig: Take 1,000 mg by mouth every 6 hours as needed for Mild Pain.   amLODIPine (NORVASC) 10 MG Tab 4/28/2024 at pm Patient No Yes   Sig: Take 1 Tablet by mouth every day.   aspirin (ASA) 81 MG Chew Tab chewable tablet 4/28/2024 at pm Patient Yes Yes   Sig: Chew 81 mg every evening.   calcium polycarbophil (FIBERCON) 625 MG Tab 4/28/2024 at am Patient Yes Yes   Sig: Take 625 mg by mouth every day.   docosahexanoic acid (OMEGA 3 FA) 1000 MG Cap 4/28/2024 at am Patient Yes Yes   Sig: Take 1,000 mg by mouth every day.   lisinopril (PRINIVIL) 20 MG Tab 4/28/2024 at pm Patient No Yes   Sig: Take 1 Tablet by mouth 2 times a day.   rosuvastatin (CRESTOR) 10 MG Tab 4/28/2024 at pm Patient No Yes   Sig: Take 1 Tablet by mouth every evening.   traZODone (DESYREL) 50 MG Tab 4/28/2024 at pm Patient No Yes   Sig: Take 1 tablet by mouth every evening.      Facility-Administered Medications: None       Physical Exam  Temp:  [36.9 °C (98.5 °F)] 36.9 °C (98.5 °F)  Pulse:  [86-97] 97  Resp:  [18-24] 20  BP: (133-145)/(69-77) 145/77  SpO2:  [88 %-93 %] 90 %  Blood  Pressure : (!) 145/77   Temperature: 36.9 °C (98.5 °F)   Pulse: 97   Respiration: 20   Pulse Oximetry: 90 %       Physical Exam  Vitals reviewed.   Constitutional:       General: He is in acute distress.   HENT:      Head: Normocephalic and atraumatic. No contusion.      Right Ear: External ear normal.      Left Ear: External ear normal.      Nose: Nose normal.      Mouth/Throat:      Pharynx: No oropharyngeal exudate.      Comments: OxyMask  Eyes:      General:         Right eye: No discharge.         Left eye: No discharge.      Pupils: Pupils are equal, round, and reactive to light.   Cardiovascular:      Rate and Rhythm: Normal rate and regular rhythm.      Heart sounds: No murmur heard.     No friction rub. No gallop.   Pulmonary:      Effort: Pulmonary effort is normal.      Breath sounds: No wheezing or rhonchi.   Abdominal:      General: Bowel sounds are normal. There is no distension.      Palpations: Abdomen is soft.      Tenderness: There is no abdominal tenderness. There is no rebound.   Musculoskeletal:         General: No swelling or tenderness. Normal range of motion.      Cervical back: No rigidity. No muscular tenderness.   Skin:     General: Skin is warm and dry.      Coloration: Skin is not jaundiced.   Neurological:      General: No focal deficit present.      Mental Status: He is alert.      Cranial Nerves: No cranial nerve deficit.      Sensory: No sensory deficit.   Psychiatric:         Mood and Affect: Mood normal.         Laboratory:  Recent Labs     04/29/24  0958   WBC 8.8   RBC 5.02   HEMOGLOBIN 14.0   HEMATOCRIT 42.4   MCV 84.5   MCH 27.9   MCHC 33.0   RDW 43.3   PLATELETCT 278   MPV 9.2     Recent Labs     04/29/24  0958   SODIUM 136   POTASSIUM 3.9   CHLORIDE 102   CO2 19*   GLUCOSE 154*   BUN 12   CREATININE 0.82   CALCIUM 8.5     Recent Labs     04/29/24  0958   ALTSGPT 19   ASTSGOT 21   ALKPHOSPHAT 77   TBILIRUBIN 0.5   GLUCOSE 154*         Recent Labs     04/29/24  0958   NTPROBNP  1139*         Recent Labs     04/29/24  0958   TROPONINT 19       Imaging:  DX-CHEST-PORTABLE (1 VIEW)   Final Result         Diffuse groundglass and interstitial opacities could relate to infection, inflammation or edema      New rounded opacity in the left upper lung could relate to pneumonia. Follow-up is recommended to ensure resolution.          X-Ray:  My impression is: Chest x-ray on my interpretation showed diffuse bilateral infiltrate.  No signs of a specific pleural effusion.    Assessment/Plan:  Justification for Admission Status  I anticipate this patient will require at least two midnights for appropriate medical management, necessitating inpatient admission because due to acute respiratory failure with hypoxia secondary to COVID-19 virus infection that will require insertion of steroids and may need further intervention for his significant hypoxia.    Patient will need a Intermediate Care (Adult and Pediatrics) bed on MEDICAL service .  The need is secondary to COVID-19 virus infection.    * Acute respiratory failure with hypoxia (HCC)- (present on admission)  Assessment & Plan  He found to have acute respiratory failure with hypoxia secondary to COVID-19 virus infection and possible COPD exacerbation.  Currently he is requiring 2 L of oxygen via oxygen mask to maintain oxygen saturation.  Chest x-ray showed groundglass and interstitial opacities.  I started him on IV Decadron 6 mg daily for 10 days.  I also started him on COPD exacerbation order set.  Plan is to admit him to IMCU.    Full code status  Assessment & Plan  I discussed the procedure with him and requested to be full code.  As per patient wishes I placed full code orders.    Tobacco abuse  Assessment & Plan  He is a current smoker and he smokes 10 cigarettes/day.  He also vapes along with smoking.  I provided him counseling and discussed with him regarding nicotine replacement therapy.  I started him on nicotine replacement  therapy.    Time spent 4 minutes    COVID-19 virus infection  Assessment & Plan  He found to have COVID-19 virus infection and is requiring 12 L of oxygen.  I started him on Decadron.  If his oxygen requirement continue to remain high he may need further advanced therapies for his COVID-19 infection disease consultation.  Continue enhanced droplet, contact and eye protection.  Admit to IMCU.    Acute exacerbation of chronic obstructive pulmonary disease (COPD) ([facility])- (present on admission)  Assessment & Plan  Patient has history of COPD and is a current smoker.  He may have underlying COPD exacerbation along with COVID-19 virus infection.  I started him on COPD order set.  Continue DuoNeb every 4 hourly.  Respiratory therapy consult      Primary hypertension- (present on admission)  Assessment & Plan  I started him on outpatient blood pressure medications.  I started him on IV hydralazine as needed as needed with parameters.  Admit to IMCU for close monitoring.        I discussed with the ER physician Dr. Armando regarding Mr. Shirley current medical condition plan of care.    Addendum:    Patient respiratory status and hypoxia significantly worsen and I requested consultation with infectious disease Dr. Hammond for his COVID infection for advanced therapies.  He was started on barcitinib.    Patient is critically ill.   The patient continues to have: Worsening acute respiratory failure with hypoxia  The vital organ system that is affected is the: Pulmonary  If untreated there is a high chance of deterioration into: Pulmonary arrest  And eventually death.   The critical care that I am providing today is: I started him on high flow nasal cannula.  Currently is requiring 60 L of oxygen to maintain oxygen saturation.  Plan is to titrate down high flow nasal cannula as tolerated.  The critical that has been undertaken is medically complex.   There has been no overlap in critical care time.   Critical Care Time not  including procedures: 42 minutes      VTE prophylaxis: enoxaparin ppx

## 2024-04-29 NOTE — PROGRESS NOTES
Assumed care of patient on floor. VSS on 12L NRB. Sba from gurney to bed. Denies any pain. Quickly desats to 70s w ambulation.

## 2024-04-29 NOTE — CONSULTS
Brief intensivist note.     Pt presented with SOB, tachypnea, requiring 12L NRB mask.   Pt was tested positive for COVID at home  Here also posted positive for COVID.   Alert, oriented.   Pt was slightly tachypneic on my exam.   Reasonable to go to IMCU for airway watch for now.     RTOC notified.   D/w ERP    Lucius Mock D.O.

## 2024-04-29 NOTE — ED NOTES
Med rec is complete per Patient at bedside.     Allergies reviewed.    Has patient had any outside antibiotics in the last 30 days? N    Any Anticoagulants (rivaroxaban, apixaban, edoxaban, dabigatran, warfarin, enoxaparin) taken in the last 14 days? N           Pharmacy/Pharmacies Pt utilizes : [facility]

## 2024-04-29 NOTE — ASSESSMENT & PLAN NOTE
I started him on outpatient blood pressure medications.  I started him on IV hydralazine as needed as needed with parameters.  Admit to IMCU for close monitoring.

## 2024-04-29 NOTE — ED PROVIDER NOTES
ED Provider Note    CHIEF COMPLAINT  Chief Complaint   Patient presents with    Shortness of Breath     BIBA 3 days of SOB, cough, diarrhea. O2 sats in 60's when picked up by EMS, started on 10L O2 NRB. Pt took COVID test at home that was positive. Hx COPD           HPI/ROS  LIMITATION TO HISTORY   None  OUTSIDE HISTORIAN(S):  None    Bright Shirley is a 73 y.o. male who presents to the emergency department for shortness of breath.  The patient's had shortness of breath, runny nose and achiness for the last several days.  He reports a positive home COVID test.  The worsening shortness of breath more severe than his typical COPD.  He called EMS.  Found to be 60% on room air.  He was brought in here for further evaluation and treatment.  Denies any chest pain.  Denies any sputum production.  Denies any history of PE or DVT.  Does have a history of COPD.  Is chronically on oxygen no history of recent admissions.    PAST MEDICAL HISTORY   has a past medical history of Acute hypoxemic respiratory failure (HCC) (6/8/2021), Acute urinary retention (6/7/2021), Aortic atherosclerosis (HCC), Carotid stenosis, bilateral - mild, Cerebral infarction (HCC) - based on CT head 7/2022, Chronic obstructive pulmonary disease (HCC), Hypercholesteremia, Hypertension, Intertrochanteric fracture of femur (HCC) (6/6/2021), Other emphysema (HCC) (8/7/2019), and Supplemental oxygen dependent (6/30/2021).    SURGICAL HISTORY   has a past surgical history that includes hernia repair (Right); orif, femur (Right); other orthopedic surgery; other; and treat inter/subtroch fx,w/plate/screw (Left, 6/6/2021).    FAMILY HISTORY  Family History   Problem Relation Age of Onset    Cancer Mother         breast cancer    Stroke Mother     Other Father         cirrhosis form alcohol    Stroke Sister     Heart Disease Brother         cardiac aneurysm    Hypertension Maternal Grandmother     Hyperlipidemia Maternal Grandmother        SOCIAL  "HISTORY  Social History     Tobacco Use    Smoking status: Every Day     Current packs/day: 0.00     Average packs/day: 0.5 packs/day for 46.4 years (23.2 ttl pk-yrs)     Types: Cigarettes     Start date:      Last attempt to quit: 2021     Years since quittin.8    Smokeless tobacco: Never    Tobacco comments:     vape pen & cigarettes   Vaping Use    Vaping Use: Former   Substance and Sexual Activity    Alcohol use: Yes     Alcohol/week: 1.8 oz     Types: 1 Glasses of wine, 2 Cans of beer per week     Comment: weekend/social    Drug use: Yes     Types: Marijuana     Comment: THC edible    Sexual activity: Not Currently       CURRENT MEDICATIONS  Home Medications       Reviewed by Steve Nevarez (Pharmacy Tech) on 24 at 1104  Med List Status: Complete     Medication Last Dose Status   acetaminophen (TYLENOL) 325 MG Tab >2 weeks Active   acetaminophen (TYLENOL) 500 MG Tab >2 weeks Active   amLODIPine (NORVASC) 10 MG Tab 2024 Active   aspirin (ASA) 81 MG Chew Tab chewable tablet 2024 Active   calcium polycarbophil (FIBERCON) 625 MG Tab 2024 Active   Chlorpheniramine Maleate (ALLERGY PO) 2024 Active   docosahexanoic acid (OMEGA 3 FA) 1000 MG Cap 2024 Active   lisinopril (PRINIVIL) 20 MG Tab 2024 Active   Melatonin 10 MG Tab 2024 Active   Multiple Vitamin (MULTIVITAMIN ADULT PO) 2024 Active   rosuvastatin (CRESTOR) 10 MG Tab 2024 Active   traZODone (DESYREL) 50 MG Tab 2024 Active                    ALLERGIES  Allergies   Allergen Reactions    Seasonal        PHYSICAL EXAM  VITAL SIGNS: BP (!) 143/69   Pulse 97   Temp 36.9 °C (98.5 °F)   Resp (!) 24   Ht 1.676 m (5' 6\")   Wt 70.3 kg (155 lb)   SpO2 93%   BMI 25.02 kg/m²    Constitutional: Awake nontoxic moderate distress.  HENT: Normocephalic, Atraumatic, Bilateral external ears normal,  Eyes: PERRL, EOMI, Conjunctiva normal, No discharge.   Neck: Normal range of motion,  Cardiovascular: " Normal heart rate, Normal rhythm, No murmurs, No rubs, No gallops.   Thorax & Lungs: Diffuse crackles throughout..   Abdomen:soft, No tenderness  Skin: Warm, Dry, No erythema, No rash.   Back: No tenderness, No CVA tenderness.   Musculoskeletal: Good range of motion in all major joints.  No asymmetric edema  Neurologic: Alert, No focal deficits noted.         EKG/LABS    Results for orders placed or performed during the hospital encounter of 04/29/24   Lactic Acid   Result Value Ref Range    Lactic Acid 1.5 0.5 - 2.0 mmol/L   CBC with Differential   Result Value Ref Range    WBC 8.8 4.8 - 10.8 K/uL    RBC 5.02 4.70 - 6.10 M/uL    Hemoglobin 14.0 14.0 - 18.0 g/dL    Hematocrit 42.4 42.0 - 52.0 %    MCV 84.5 81.4 - 97.8 fL    MCH 27.9 27.0 - 33.0 pg    MCHC 33.0 32.3 - 36.5 g/dL    RDW 43.3 35.9 - 50.0 fL    Platelet Count 278 164 - 446 K/uL    MPV 9.2 9.0 - 12.9 fL    Neutrophils-Polys 80.60 (H) 44.00 - 72.00 %    Lymphocytes 9.90 (L) 22.00 - 41.00 %    Monocytes 9.00 0.00 - 13.40 %    Eosinophils 0.00 0.00 - 6.90 %    Basophils 0.30 0.00 - 1.80 %    Immature Granulocytes 0.20 0.00 - 0.90 %    Nucleated RBC 0.00 0.00 - 0.20 /100 WBC    Neutrophils (Absolute) 7.06 1.82 - 7.42 K/uL    Lymphs (Absolute) 0.87 (L) 1.00 - 4.80 K/uL    Monos (Absolute) 0.79 0.00 - 0.85 K/uL    Eos (Absolute) 0.00 0.00 - 0.51 K/uL    Baso (Absolute) 0.03 0.00 - 0.12 K/uL    Immature Granulocytes (abs) 0.02 0.00 - 0.11 K/uL    NRBC (Absolute) 0.00 K/uL   Complete Metabolic Panel   Result Value Ref Range    Sodium 136 135 - 145 mmol/L    Potassium 3.9 3.6 - 5.5 mmol/L    Chloride 102 96 - 112 mmol/L    Co2 19 (L) 20 - 33 mmol/L    Anion Gap 15.0 7.0 - 16.0    Glucose 154 (H) 65 - 99 mg/dL    Bun 12 8 - 22 mg/dL    Creatinine 0.82 0.50 - 1.40 mg/dL    Calcium 8.5 8.5 - 10.5 mg/dL    Correct Calcium 8.7 8.5 - 10.5 mg/dL    AST(SGOT) 21 12 - 45 U/L    ALT(SGPT) 19 2 - 50 U/L    Alkaline Phosphatase 77 30 - 99 U/L    Total Bilirubin 0.5 0.1 - 1.5  mg/dL    Albumin 3.7 3.2 - 4.9 g/dL    Total Protein 7.7 6.0 - 8.2 g/dL    Globulin 4.0 (H) 1.9 - 3.5 g/dL    A-G Ratio 0.9 g/dL   proBrain Natriuretic Peptide, NT   Result Value Ref Range    NT-proBNP 1139 (H) 0 - 125 pg/mL   TROPONIN   Result Value Ref Range    Troponin T 19 6 - 19 ng/L   ESTIMATED GFR   Result Value Ref Range    GFR (CKD-EPI) 92 >60 mL/min/1.73 m 2   EKG   Result Value Ref Range    Report       [facility] Emergency Dept.    Test Date:  2024  Pt Name:    ELO STARKEY                Department: ER  MRN:        [id number]                      Room:       RD 11  Gender:     Male                         Technician: [id number]  :        1950                   Requested By:ER TRIAGE PROTOCOL  Order #:    [id number]                    Reading MD: TRACEE HEREDIA. AMD    Measurements  Intervals                                Axis  Rate:       95                           P:          39  NH:         140                          QRS:        44  QRSD:       95                           T:          28  QT:         365  QTc:        459    Interpretive Statements  Sinus rhythm  Compared to ECG 2022 13:19:10  No significant changes  Electronically Signed On 2024 10:51:23 PDT by TRACEE HEREDIA. AMD     POC CoV-2, FLU A/B, RSV by PCR   Result Value Ref Range    POC Influenza A RNA, PCR Negative Negative    POC Influenza B RNA, PCR Negative Negative    POC RSV, by PCR Negative Negative    POC SARS-CoV-2, PCR DETECTED (AA)       I have independently interpreted this EKG    RADIOLOGY/PROCEDURES   I have independently interpreted the diagnostic imaging associated with this visit and am waiting the final reading from the radiologist.   My preliminary interpretation is as follows: I reviewed the images and agree with radiologist interpretation.    Radiologist interpretation:  DX-CHEST-PORTABLE (1 VIEW)   Final Result         Diffuse groundglass and interstitial opacities could  relate to infection, inflammation or edema      New rounded opacity in the left upper lung could relate to pneumonia. Follow-up is recommended to ensure resolution.          COURSE & MEDICAL DECISION MAKING          ASSESSMENT, COURSE AND PLAN  Care Narrative:     73-year-old male presents the emergency department for evaluation of shortness of breath, hypoxic respiratory failure.  Reports positive home COVID test.  The patient states that short of breath last several days.  Patient was seen and examined dorsal diagnose includes COVID, other viral infections, COPD exacerbation, bacterial pneumonia, heart failure, pulmonary embolism, pleural effusion.    Patient was seen and examined workup with labs and imaging.    Patient's chest x-ray is abnormal but this is chronic appearing interstitial infiltrates present on previous imaging.  He now looks like he has a focal pneumonia left upper lobe.  He is cultured and treated appropriately.  Procalcitonin is pending.    The patient is positive for COVID.  For that reason he is not given aggressive fluid resuscitation because of his ongoing respiratory failure and high FiO2 requirement.  He is given steroids.  Because of a history of COPD he is given a breathing treatment although is not really wheezing.    Patient require hospitalist for further workup and treatment.  He is currently requiring 12 L of oxygen to maintain his saturation.  Spoke with ICU doctor. Dr Mock.  He is seen the patient and recommends IMCU hospitalization.    Patient reassessed not tachypneic.  Doing fine on supplemental oxygen.  Require IMCU because of high FiO2 requirement.  Spoke with the hospitalist and care transferred at that time.    Patient will be hospitalized in critical condition.      CRITICAL CARE  The very real possibilty of a deterioration of this patient's condition required the highest level of my preparedness for sudden, emergent intervention.  I provided critical care services,  which included medication orders, frequent reevaluations of the patient's condition and response to treatment, ordering and reviewing test results, and discussing the case with various consultants.  The critical care time associated with the care of the patient was 45 minutes. Review chart for interventions. This time is exclusive of any other billable procedures.             Sepsis: Infection was suspected   (Time). Sepsis pathway was initiated. Fluids not needed (no hypotension or lactate greater than or equal to 4). Antibiotics were given per protocol.  Patient presents with symptoms of COVID and fluid restriction contraindicated this time        ADDITIONAL PROBLEMS MANAGED  COPD exacerbation    DISPOSITION AND DISCUSSIONS  I have discussed management of the patient with the following physicians and HARVINDER's: Intensivist as above.      FINAL DIAGNOSIS  1. COVID-19    2. Acute respiratory failure with hypoxia (HCC)    3.  Critical care time of 35 minutes no procedures       Electronically signed by: Santino Armando M.D., 4/29/2024 9:56 AM

## 2024-04-29 NOTE — PROGRESS NOTES
4 Eyes Skin Assessment Completed by JOYCELYN Sanchez and JOYCELYN Alicea.    Head WDL  Ears Redness and Blanching  Nose WDL  Mouth WDL  Neck WDL  Breast/Chest WDL  Shoulder Blades WDL  Spine WDL  (R) Arm/Elbow/Hand WDL  (L) Arm/Elbow/Hand WDL  Abdomen WDL  Groin WDL  Scrotum/Coccyx/Buttocks WDL  (R) Leg WDL  (L) Leg WDL  (R) Heel/Foot/Toe Redness and Blanching tops of feet  (L) Heel/Foot/Toe Redness and Blanching tops of feet          Devices In Places ECG, Blood Pressure Cuff, Pulse Ox, and Oxy Mask      Interventions In Place Pillows, Q2 Turns, and Low Air Loss Mattress    Possible Skin Injury No    Pictures Uploaded Into Epic N/A  Wound Consult Placed N/A  RN Wound Prevention Protocol Ordered No

## 2024-04-29 NOTE — ASSESSMENT & PLAN NOTE
I discussed the procedure with him and requested to be full code.  As per patient wishes I placed full code orders.

## 2024-04-29 NOTE — ASSESSMENT & PLAN NOTE
He found to have COVID-19 virus infection and is requiring 12 L of oxygen.  I started him on Decadron.  If his oxygen requirement continue to remain high he may need further advanced therapies for his COVID-19 infection disease consultation.  Continue enhanced droplet, contact and eye protection.  Admit to IMCU.

## 2024-04-29 NOTE — ED TRIAGE NOTES
Chief Complaint   Patient presents with    Shortness of Breath     BIBA 3 days of SOB, cough, diarrhea. O2 sats in 60's when picked up by EMS, started on 10L O2 NRB. Pt took COVID test at home that was positive. Hx COPD

## 2024-04-29 NOTE — ASSESSMENT & PLAN NOTE
Patient has history of COPD and is a current smoker.  He may have underlying COPD exacerbation along with COVID-19 virus infection.  On steroids

## 2024-04-29 NOTE — ASSESSMENT & PLAN NOTE
He found to have acute respiratory failure with hypoxia secondary to COVID-19 virus infection and possible COPD exacerbation.  Currently he is requiring high flow oxygen  Chest x-ray showed groundglass and interstitial opacities.  Continuous pulse oxymetry in the CU.

## 2024-04-30 VITALS
DIASTOLIC BLOOD PRESSURE: 72 MMHG | SYSTOLIC BLOOD PRESSURE: 130 MMHG | HEART RATE: 106 BPM | BODY MASS INDEX: 25.9 KG/M2 | OXYGEN SATURATION: 90 % | TEMPERATURE: 97.8 F | WEIGHT: 161.16 LBS | RESPIRATION RATE: 21 BRPM | HEIGHT: 66 IN

## 2024-04-30 PROBLEM — J12.82 PNEUMONIA DUE TO COVID-19 VIRUS: Status: ACTIVE | Noted: 2024-04-30

## 2024-04-30 PROBLEM — Z78.9 FULL CODE STATUS: Status: RESOLVED | Noted: 2024-04-29 | Resolved: 2024-04-30

## 2024-04-30 PROBLEM — U07.1 COVID-19 VIRUS INFECTION: Status: RESOLVED | Noted: 2024-04-29 | Resolved: 2024-04-30

## 2024-04-30 PROBLEM — R91.8 MASS OF LEFT LUNG: Status: ACTIVE | Noted: 2024-04-30

## 2024-04-30 PROBLEM — U07.1 PNEUMONIA DUE TO COVID-19 VIRUS: Status: ACTIVE | Noted: 2024-04-30

## 2024-04-30 LAB
ALBUMIN SERPL BCP-MCNC: 3.4 G/DL (ref 3.2–4.9)
ALBUMIN/GLOB SERPL: 0.9 G/DL
ALP SERPL-CCNC: 73 U/L (ref 30–99)
ALT SERPL-CCNC: 23 U/L (ref 2–50)
ANION GAP SERPL CALC-SCNC: 13 MMOL/L (ref 7–16)
AST SERPL-CCNC: 25 U/L (ref 12–45)
BACTERIA BLD CULT: NORMAL
BACTERIA BLD CULT: NORMAL
BASOPHILS # BLD AUTO: 0 % (ref 0–1.8)
BASOPHILS # BLD: 0 K/UL (ref 0–0.12)
BILIRUB SERPL-MCNC: 0.2 MG/DL (ref 0.1–1.5)
BUN SERPL-MCNC: 22 MG/DL (ref 8–22)
CALCIUM ALBUM COR SERPL-MCNC: 9 MG/DL (ref 8.5–10.5)
CALCIUM SERPL-MCNC: 8.5 MG/DL (ref 8.5–10.5)
CHLORIDE SERPL-SCNC: 104 MMOL/L (ref 96–112)
CO2 SERPL-SCNC: 20 MMOL/L (ref 20–33)
CREAT SERPL-MCNC: 0.8 MG/DL (ref 0.5–1.4)
EOSINOPHIL # BLD AUTO: 0 K/UL (ref 0–0.51)
EOSINOPHIL NFR BLD: 0 % (ref 0–6.9)
ERYTHROCYTE [DISTWIDTH] IN BLOOD BY AUTOMATED COUNT: 42.8 FL (ref 35.9–50)
GFR SERPLBLD CREATININE-BSD FMLA CKD-EPI: 93 ML/MIN/1.73 M 2
GLOBULIN SER CALC-MCNC: 3.9 G/DL (ref 1.9–3.5)
GLUCOSE SERPL-MCNC: 138 MG/DL (ref 65–99)
HCT VFR BLD AUTO: 39.6 % (ref 42–52)
HGB BLD-MCNC: 13 G/DL (ref 14–18)
IMM GRANULOCYTES # BLD AUTO: 0.02 K/UL (ref 0–0.11)
IMM GRANULOCYTES NFR BLD AUTO: 0.4 % (ref 0–0.9)
LYMPHOCYTES # BLD AUTO: 1.01 K/UL (ref 1–4.8)
LYMPHOCYTES NFR BLD: 19 % (ref 22–41)
MCH RBC QN AUTO: 27.7 PG (ref 27–33)
MCHC RBC AUTO-ENTMCNC: 32.8 G/DL (ref 32.3–36.5)
MCV RBC AUTO: 84.3 FL (ref 81.4–97.8)
MONOCYTES # BLD AUTO: 0.36 K/UL (ref 0–0.85)
MONOCYTES NFR BLD AUTO: 6.8 % (ref 0–13.4)
NEUTROPHILS # BLD AUTO: 3.92 K/UL (ref 1.82–7.42)
NEUTROPHILS NFR BLD: 73.8 % (ref 44–72)
NRBC # BLD AUTO: 0 K/UL
NRBC BLD-RTO: 0 /100 WBC (ref 0–0.2)
PLATELET # BLD AUTO: 258 K/UL (ref 164–446)
PMV BLD AUTO: 9.3 FL (ref 9–12.9)
POTASSIUM SERPL-SCNC: 4 MMOL/L (ref 3.6–5.5)
PROCALCITONIN SERPL-MCNC: 0.12 NG/ML
PROT SERPL-MCNC: 7.3 G/DL (ref 6–8.2)
RBC # BLD AUTO: 4.7 M/UL (ref 4.7–6.1)
SIGNIFICANT IND 70042: NORMAL
SIGNIFICANT IND 70042: NORMAL
SITE SITE: NORMAL
SITE SITE: NORMAL
SODIUM SERPL-SCNC: 137 MMOL/L (ref 135–145)
SOURCE SOURCE: NORMAL
SOURCE SOURCE: NORMAL
WBC # BLD AUTO: 5.3 K/UL (ref 4.8–10.8)

## 2024-04-30 PROCEDURE — 99222 1ST HOSP IP/OBS MODERATE 55: CPT | Performed by: STUDENT IN AN ORGANIZED HEALTH CARE EDUCATION/TRAINING PROGRAM

## 2024-04-30 PROCEDURE — 700101 HCHG RX REV CODE 250: Performed by: INTERNAL MEDICINE

## 2024-04-30 PROCEDURE — A9270 NON-COVERED ITEM OR SERVICE: HCPCS | Performed by: INTERNAL MEDICINE

## 2024-04-30 PROCEDURE — 94664 DEMO&/EVAL PT USE INHALER: CPT

## 2024-04-30 PROCEDURE — 94640 AIRWAY INHALATION TREATMENT: CPT

## 2024-04-30 PROCEDURE — 700111 HCHG RX REV CODE 636 W/ 250 OVERRIDE (IP): Performed by: INTERNAL MEDICINE

## 2024-04-30 PROCEDURE — A9270 NON-COVERED ITEM OR SERVICE: HCPCS | Performed by: HOSPITALIST

## 2024-04-30 PROCEDURE — 770000 HCHG ROOM/CARE - INTERMEDIATE ICU *

## 2024-04-30 PROCEDURE — 700102 HCHG RX REV CODE 250 W/ 637 OVERRIDE(OP): Performed by: INTERNAL MEDICINE

## 2024-04-30 PROCEDURE — 99291 CRITICAL CARE FIRST HOUR: CPT | Performed by: HOSPITALIST

## 2024-04-30 PROCEDURE — 94669 MECHANICAL CHEST WALL OSCILL: CPT

## 2024-04-30 PROCEDURE — 700102 HCHG RX REV CODE 250 W/ 637 OVERRIDE(OP): Performed by: HOSPITALIST

## 2024-04-30 RX ORDER — IPRATROPIUM BROMIDE AND ALBUTEROL SULFATE 2.5; .5 MG/3ML; MG/3ML
3 SOLUTION RESPIRATORY (INHALATION)
Status: DISCONTINUED | OUTPATIENT
Start: 2024-05-01 | End: 2024-05-02

## 2024-04-30 RX ORDER — DEXAMETHASONE 6 MG/1
6 TABLET ORAL DAILY
Qty: 8 TABLET | Refills: 0 | Status: DISCONTINUED | OUTPATIENT
Start: 2024-05-01 | End: 2024-05-05 | Stop reason: HOSPADM

## 2024-04-30 RX ADMIN — SENNOSIDES AND DOCUSATE SODIUM 2 TABLET: 50; 8.6 TABLET ORAL at 17:34

## 2024-04-30 RX ADMIN — Medication 10 MG: at 22:53

## 2024-04-30 RX ADMIN — RIVAROXABAN 10 MG: 10 TABLET, FILM COATED ORAL at 17:34

## 2024-04-30 RX ADMIN — LISINOPRIL 20 MG: 20 TABLET ORAL at 17:35

## 2024-04-30 RX ADMIN — ROSUVASTATIN CALCIUM 10 MG: 5 TABLET, FILM COATED ORAL at 17:34

## 2024-04-30 RX ADMIN — TRAZODONE HYDROCHLORIDE 50 MG: 50 TABLET ORAL at 22:53

## 2024-04-30 RX ADMIN — IPRATROPIUM BROMIDE AND ALBUTEROL SULFATE 3 ML: 2.5; .5 SOLUTION RESPIRATORY (INHALATION) at 01:55

## 2024-04-30 RX ADMIN — ACETAMINOPHEN 650 MG: 325 TABLET, FILM COATED ORAL at 22:53

## 2024-04-30 RX ADMIN — DEXAMETHASONE SODIUM PHOSPHATE 6 MG: 4 INJECTION, SOLUTION INTRAMUSCULAR; INTRAVENOUS at 04:44

## 2024-04-30 RX ADMIN — IPRATROPIUM BROMIDE AND ALBUTEROL SULFATE 3 ML: 2.5; .5 SOLUTION RESPIRATORY (INHALATION) at 11:13

## 2024-04-30 RX ADMIN — NICOTINE 14 MG: 14 PATCH TRANSDERMAL at 04:45

## 2024-04-30 RX ADMIN — IPRATROPIUM BROMIDE AND ALBUTEROL SULFATE 3 ML: 2.5; .5 SOLUTION RESPIRATORY (INHALATION) at 07:00

## 2024-04-30 RX ADMIN — BARICITINIB 4 MG: 2 TABLET, FILM COATED ORAL at 17:34

## 2024-04-30 RX ADMIN — LISINOPRIL 20 MG: 20 TABLET ORAL at 04:44

## 2024-04-30 RX ADMIN — ASPIRIN 81 MG: 81 TABLET, CHEWABLE ORAL at 17:35

## 2024-04-30 RX ADMIN — IPRATROPIUM BROMIDE AND ALBUTEROL SULFATE 3 ML: 2.5; .5 SOLUTION RESPIRATORY (INHALATION) at 17:57

## 2024-04-30 RX ADMIN — AMLODIPINE BESYLATE 10 MG: 10 TABLET ORAL at 17:35

## 2024-04-30 ASSESSMENT — ENCOUNTER SYMPTOMS
NAUSEA: 0
COUGH: 0
SPUTUM PRODUCTION: 0
FALLS: 0
SHORTNESS OF BREATH: 1
WHEEZING: 0
FOCAL WEAKNESS: 0
VOMITING: 0

## 2024-04-30 ASSESSMENT — PAIN DESCRIPTION - PAIN TYPE
TYPE: ACUTE PAIN

## 2024-04-30 NOTE — PROGRESS NOTES
[facility] Medicine Daily Progress Note    Date of Service  4/30/2024    Chief Complaint  Bright Shirley is a 73 y.o. male admitted 4/29/2024 with shortness of breath.    Hospital Course  Mr. Shirley is a 73 y.o. male with past medical history of COPD not on home oxygen currently, current smoker, hypertension, carotid stenosis who presented to the hospital on 4/29/2024 with complaint of shortness of breath.  Patient reported that he is not feeling well for the last 4 to 5 days and he developed shortness of breath approximately 3 days ago.  He denies complaint of cough, nausea and vomiting.  There is no specific aggravating or elevating factors.  He reported symptoms of shortness of breath associated with diarrhea and generalized weakness.  He denies any history of sick contact.  He reported he has history of COPD and he has not been using his oxygen for the last 1 year and he checks his oxygen with pulse ox and usually it looks normal.  He smokes half a pack a day.  He denies any other acute complaints or associated symptoms.  Patient found to have significant hypoxia and required a non-rebreather. He was found to have COVID-19 virus infection.  He received a dose of antibiotics in ER. Mr. Jackson was admitted to the IMCU on decadron.     Interval Problem Update  4/30: Mr. Shirley was evaluated in the IMCU. He is on IV decadron and Baricitinib. He is on high flow oxygen at 50 liters and 50%. Procal neg thus no abx.     I have discussed this patient's plan of care and discharge plan at IDT rounds today with Case Management, Nursing, Nursing leadership, and other members of the IDT team.    Consultants/Specialty  none    Code Status  Full Code    Disposition  The patient is not medically cleared for discharge to home or a post-acute facility.  Anticipate discharge to: home with close outpatient follow-up    I have placed the appropriate orders for post-discharge needs.    Review of Systems  Review of Systems    Respiratory:  Positive for shortness of breath. Negative for cough, sputum production and wheezing.    Cardiovascular:  Negative for chest pain and leg swelling.   All other systems reviewed and are negative.       Physical Exam  Temp:  [36.5 °C (97.7 °F)-37.1 °C (98.8 °F)] 36.5 °C (97.7 °F)  Pulse:  [] 75  Resp:  [15-24] 15  BP: ()/(55-81) 100/58  SpO2:  [88 %-98 %] 97 %    Physical Exam  Vitals and nursing note reviewed.   Constitutional:       General: He is not in acute distress.     Appearance: He is ill-appearing.   Cardiovascular:      Rate and Rhythm: Normal rate and regular rhythm.   Pulmonary:      Comments: High flow oxygen 50 liters 50%  No wheezing  Normal work of breathing.   Abdominal:      General: There is distension.      Tenderness: There is no abdominal tenderness.   Musculoskeletal:      Right lower leg: No edema.      Left lower leg: No edema.   Neurological:      General: No focal deficit present.      Mental Status: He is alert and oriented to person, place, and time.   Psychiatric:         Mood and Affect: Mood normal.         Behavior: Behavior normal.         Fluids    Intake/Output Summary (Last 24 hours) at 4/30/2024 0728  Last data filed at 4/30/2024 0400  Gross per 24 hour   Intake 380 ml   Output 600 ml   Net -220 ml       Laboratory  Recent Labs     04/29/24  0958 04/30/24  0542   WBC 8.8 5.3   RBC 5.02 4.70   HEMOGLOBIN 14.0 13.0*   HEMATOCRIT 42.4 39.6*   MCV 84.5 84.3   MCH 27.9 27.7   MCHC 33.0 32.8   RDW 43.3 42.8   PLATELETCT 278 258   MPV 9.2 9.3     Recent Labs     04/29/24  0958 04/30/24  0331   SODIUM 136 137   POTASSIUM 3.9 4.0   CHLORIDE 102 104   CO2 19* 20   GLUCOSE 154* 138*   BUN 12 22   CREATININE 0.82 0.80   CALCIUM 8.5 8.5                   Imaging  DX-CHEST-PORTABLE (1 VIEW)   Final Result         Diffuse groundglass and interstitial opacities could relate to infection, inflammation or edema      New rounded opacity in the left upper lung could  relate to pneumonia. Follow-up is recommended to ensure resolution.           Assessment/Plan  * Pneumonia due to COVID-19 virus- (present on admission)  Assessment & Plan  This is his first infection with COVID  He has had vaccinations x 4  Decadron 6 mg daily and baricitinib  Isolation precautions  With respiratory failure    Acute respiratory failure with hypoxia (HCC)- (present on admission)  Assessment & Plan  He found to have acute respiratory failure with hypoxia secondary to COVID-19 virus infection and possible COPD exacerbation.  Currently he is requiring high flow oxygen  Chest x-ray showed groundglass and interstitial opacities.  Continuous pulse oxymetry in the IMCU.    Mass of left lung- (present on admission)  Assessment & Plan  Procalcitonin negative  May be COVID though outpatient chest xray in 4-6 weeks is appropriate.    Acute exacerbation of chronic obstructive pulmonary disease (COPD) (HCC)- (present on admission)  Assessment & Plan  Patient has history of COPD and is a current smoker.  He may have underlying COPD exacerbation along with COVID-19 virus infection.  On steroids       Primary hypertension- (present on admission)  Assessment & Plan  I started him on outpatient blood pressure medications.  I started him on IV hydralazine as needed as needed with parameters.  Admit to IMCU for close monitoring.      Tobacco abuse  Assessment & Plan  Cessation discussed         VTE prophylaxis:    Xarelto 10mg daily as prophylaxis      I have performed a physical exam and reviewed and updated ROS and Plan today (4/30/2024). In review of yesterday's note (4/29/2024), there are no changes except as documented above.    Mr. Shirley is critically ill. 32 minutes of critical care were spent with patient, nursing, pharmacy and in specific management of acute respiratory failure requiring active titration of high flow oxygen in the setting of COVID-19 pneumonia    .

## 2024-04-30 NOTE — PROGRESS NOTES
4 Eyes Skin Assessment Completed by JOYCELYN Crawford and JOYCELYN Soto.    Head WDL  Ears WDL  Nose WDL  Mouth WDL  Neck WDL  Breast/Chest WDL  Shoulder Blades WDL  Spine WDL  (R) Arm/Elbow/Hand Bruising  (L) Arm/Elbow/Hand Bruising  Abdomen WDL  Groin WDL  Scrotum/Coccyx/Buttocks WDL  (R) Leg WDL  (L) Leg WDL  (R) Heel/Foot/Toe Redness and Blanching  (L) Heel/Foot/Toe Redness and Blanching          Devices In Places Tele Box, Blood Pressure Cuff, Pulse Ox, and HFNC and PIV      Interventions In Place Pillows    Possible Skin Injury No    Pictures Uploaded Into Epic N/A  Wound Consult Placed N/A  RN Wound Prevention Protocol Ordered No

## 2024-04-30 NOTE — RESPIRATORY CARE
COPD EDUCATION by COPD CLINICAL EDUCATOR  4/30/2024  at  3:16 PM by Aga Duarte RRT     Patient interviewed by education team.  Patient declined or is unable to participate in the full program.  Therefore, a short intervention has been conducted.  A comprehensive packet including information about COPD, types of treatments to manage their disease and safe home Oxygen usage was provided and reviewed with patient at the bedside.  Spacer and flutter device with education were given to patient.       COPD Screen  COPD Risk Screening  Do you have a history of COPD?: Yes  Do you have a Pulmonologist?: No  COPD Population Screener  During the past 4 weeks, how much did you feel short of breath?: Some of the time (within the last week)  Do you ever cough up any mucus or phlegm?: Yes, every day  In the past 12 months, you do less than you used to because of your breathing problems: Agree  Have you smoked at least 100 cigarettes in your entire life?: Yes  How old are you?: 60+  COPD Screening Score: 8  COPD Coordinator Recommended: Yes    COPD Assessment  COPD Clinical Specialists ONLY  COPD Education Initiated: Yes--Short Intervention (Pt was given spacer, COPD booklet reviewed with patient, no PFT no pulmonologist.He is eagar to learn more about his disease and ways to manage it. Quit smoking can't remember when, denies marijuana smoking hx.)  Is this a COPD exacerbation patient?: Yes (Order set used)  DME Company: none at this time  DME Equipment Type: none at this time  Physician Follow Up Appointment:  (none at this time)  Physician Name: Nella Ramirez M.D.  Pulmonary Follow Up Appointment:  (none at this time)  Pulmonologist Name: none at this time, referral is placed.  Referrals Initiated: Yes  Pulmonary Rehab: N/A (No PFT)  Smoking Cessation: No (denies smoking)  Hospice: N/A  Home Health Care: N/A  Mobile Urgent Care Services: N/A  Geriatric Specialty Group: N/A  Private In-Home Care Agency: N/A  $  "Demo/Eval of SVN's, MDI's and Aerosols: Yes (spacer given home medications reviewed.)  Interdisciplinary Rounds: Attendance at Rounds (30 Min)    PFT Results    No results found for: \"PFT\"    M[facility] Beds  [facility].  Would you like to opt out of this program for any reason?: No - Stay Opted In  Reason the patient would like to opt out of Bedside Medication Delivery at Discharge?: Patient prefers another pharmacy     MY COPD ACTION PLAN     It is recommended that patients and physicians /healthcare providers complete this action plan together. This plan should be discussed at each physician visit and updated as needed.    The green, yellow and red zones show groups of symptoms of COPD. This list of symptoms is not comprehensive, and you may experience other symptoms. In the \"Actions\" column, your healthcare provider has recommended actions for you to take based on your symptoms.    Patient Name: Bright Shirley   YOB: 1950   Last Updated on: 4/30/2024  3:10 PM   Green Zone:  I am doing well today Actions     Usual activitiy and exercise level   Take daily medications     Usual amounts of cough and phlegm/mucus   Use oxygen as prescribed     Sleep well at night   Continue regular exercise/diet plan     Appetite is good   At all times avoid cigarette smoke, inhaled irritants     Daily Medications (these medications are taken every day):             Additional Information:  No current maintenance medications     Yellow Zone:  I am having a bad day or a COPD flare Actions     More breathless than usual   Continue daily medications     I have less energy for my daily activities   Use quick relief inhaler as ordered     Increased or thicker phlegm/mucus   Use oxygen as prescribed     Using quick relief inhaler/nebulizer more often   Get plenty of rest     Swelling of ankles more than usual   Use pursed lip breathing     More " "coughing than usual   At all times avoid cigarette smoke, inhaled irritants     I feel like I have a \"chest cold\"     Poor sleep and my symptoms woke me up     My appetite is not good     My medicine is not helping      Call provider immediately if symptoms don’t improve     Continue daily medications, add rescue medications:   Albuterol 2 Puffs Every 6 hours PRN       Medications to be used during a flare up, (as Discussed with Provider):           Additional Information:  Use with spacer    Red Zone:  I need urgent medical care Actions     Severe shortness of breath even at rest   Call 911 or seek medical care immediately     Not able to do any activity because of breathing      Fever or shaking chills      Feeling confused or very drowsy       Chest pains      Coughing up blood                  "

## 2024-04-30 NOTE — CONSULTS
Pulmonary Consultation    Date of consult: 4/30/2024    Referring Physician  Jefry Newton M.D.    Reason for Consultation  COPD    History of Presenting Illness  73 y.o. male with ongoing tobacco use, history of COPD who presented 4/29/2024 with acute hypoxic respiratory failure and was found to be COVID+.   He was started on nebulizers, steroids and placed on HFNC.  Patient reports being diagnosed with COPD multiple years ago and used to be on 1 L O2 at night but stopped that about 1 year ago.  He does not see a pulmonologist.  He is only on as needed albuterol and only uses it 1-2X a week at most. No prior exacerbations.  Received 4 vaccinations for COVID.    Code Status  Full Code    Review of Systems  Review of Systems   Constitutional:  Positive for malaise/fatigue.   Respiratory:  Positive for shortness of breath.    Cardiovascular:  Negative for chest pain.   Gastrointestinal:  Negative for nausea and vomiting.   Musculoskeletal:  Negative for falls.   Neurological:  Negative for focal weakness.       Past Medical History   has a past medical history of Acute hypoxemic respiratory failure (HCC) (6/8/2021), Acute urinary retention (6/7/2021), Aortic atherosclerosis ([facility]), Carotid stenosis, bilateral - mild, Cerebral infarction (HCC) - based on CT head 7/2022, Chronic obstructive pulmonary disease (HCC), Hypercholesteremia, Hypertension, Intertrochanteric fracture of femur (HCC) (6/6/2021), Other emphysema ([facility]) (8/7/2019), and Supplemental oxygen dependent (6/30/2021).    Surgical History   has a past surgical history that includes hernia repair (Right); orif, femur (Right); other orthopedic surgery; other; and pr treat inter/subtroch fx,w/plate/screw (Left, 6/6/2021).    Family History  family history includes Cancer in his mother; Heart Disease in his brother; Hyperlipidemia in his maternal grandmother; Hypertension in his maternal grandmother; Other in his father; Stroke in his mother and  sister.    Social History   reports that he has been smoking cigarettes. He started smoking about 49 years ago. He has a 23.2 pack-year smoking history. He has never used smokeless tobacco. He reports current alcohol use of about 1.8 oz of alcohol per week. He reports current drug use. Drug: Marijuana.    Medications  Home Medications       Reviewed by Steve Nevarez ([facility]) on 04/29/24 at 1104  Med List Status: Complete     Medication Last Dose Status   acetaminophen (TYLENOL) 325 MG Tab >2 weeks Active   acetaminophen (TYLENOL) 500 MG Tab >2 weeks Active   amLODIPine (NORVASC) 10 MG Tab 4/28/2024 Active   aspirin (ASA) 81 MG Chew Tab chewable tablet 4/28/2024 Active   calcium polycarbophil (FIBERCON) 625 MG Tab 4/28/2024 Active   Chlorpheniramine Maleate (ALLERGY PO) 4/28/2024 Active   docosahexanoic acid (OMEGA 3 FA) 1000 MG Cap 4/28/2024 Active   lisinopril (PRINIVIL) 20 MG Tab 4/28/2024 Active   Melatonin 10 MG Tab 4/28/2024 Active   Multiple Vitamin (MULTIVITAMIN ADULT PO) 4/28/2024 Active   rosuvastatin (CRESTOR) 10 MG Tab 4/28/2024 Active   traZODone (DESYREL) 50 MG Tab 4/28/2024 Active                  Current Facility-Administered Medications   Medication Dose Route Frequency Provider Last Rate Last Admin    rivaroxaban (Xarelto) tablet 10 mg  10 mg Oral DAILY AT 1800 Jefry Newton M.D.        [START ON 5/1/2024] dexamethasone (Decadron) tablet 6 mg  6 mg Oral DAILY Jefry Newton M.D.        acetaminophen (Tylenol) tablet 650 mg  650 mg Oral Q6HRS PRN Sherly Rajan M.D.   650 mg at 04/29/24 2219    senna-docusate (Pericolace Or Senokot S) 8.6-50 MG per tablet 2 Tablet  2 Tablet Oral Q EVENING Sherly Rajan M.D.        And    polyethylene glycol/lytes (Miralax) Packet 1 Packet  1 Packet Oral QDAY PRN Sherly Rajan M.D.        hydrALAZINE (Apresoline) injection 10 mg  10 mg Intravenous Q4HRS PRN Sherly Rajan M.D.        ondansetron (Zofran) syringe/vial  injection 4 mg  4 mg Intravenous Q4HRS PRN Sherly Rajan M.D.        ondansetron (Zofran ODT) dispertab 4 mg  4 mg Oral Q4HRS PRN Sherly Rajan M.D.        nicotine (Nicoderm) 14 MG/24HR 14 mg  14 mg Transdermal Daily-0600 Sherly Rajan M.D.   14 mg at 04/30/24 0445    And    nicotine polacrilex (Nicorette) 2 MG piece 2 mg  2 mg Oral Q HOUR PRN Sherly Rajan M.D.        amLODIPine (Norvasc) tablet 10 mg  10 mg Oral Q EVENING Sherly Rajan M.D.   10 mg at 04/29/24 1801    aspirin (Asa) chewable tab 81 mg  81 mg Oral Q EVENING Sherly Rajan M.D.   81 mg at 04/29/24 1801    lisinopril (Prinivil) tablet 20 mg  20 mg Oral BID Sherly Rajan M.D.   20 mg at 04/30/24 0444    melatonin tablet 10 mg  10 mg Oral QHS Sherly Rajan M.D.   10 mg at 04/29/24 2220    rosuvastatin (Crestor) tablet 10 mg  10 mg Oral Q EVENING Sherly Rajan M.D.   10 mg at 04/29/24 1801    traZODone (Desyrel) tablet 50 mg  50 mg Oral Nightly Sherly Rajan M.D.   50 mg at 04/29/24 2219    Respiratory Therapy Consult   Nebulization Continuous RT Sherly Rajan M.D.        ipratropium-albuterol (DUONEB) nebulizer solution  3 mL Nebulization Q4HRS (RT) Sherly Rajan M.D.   3 mL at 04/30/24 1113    ipratropium-albuterol (DUONEB) nebulizer solution  3 mL Nebulization Q2HRS PRN (RT) Sherly Rajan M.D.        baricitinib (Olumiant) tablet 4 mg  4 mg Oral DAILY AT 1800 Jasmin Hammond M.D.   4 mg at 04/29/24 1800       Allergies  Allergies   Allergen Reactions    Seasonal        Vital Signs last 24 hours  Temp:  [36.5 °C (97.7 °F)-37.1 °C (98.8 °F)] 36.5 °C (97.7 °F)  Pulse:  [] 86  Resp:  [15-23] 18  BP: ()/(55-81) 114/61  SpO2:  [91 %-98 %] 96 %    Physical Exam  Physical Exam  Vitals and nursing note reviewed.   Constitutional:       General: He is not in acute distress.  HENT:      Head: Normocephalic.      Mouth/Throat:       Mouth: Mucous membranes are moist.   Eyes:      Conjunctiva/sclera: Conjunctivae normal.   Cardiovascular:      Rate and Rhythm: Normal rate and regular rhythm.   Pulmonary:      Effort: Pulmonary effort is normal. No respiratory distress.      Breath sounds: No wheezing.   Abdominal:      Palpations: Abdomen is soft.   Musculoskeletal:         General: No deformity.   Skin:     General: Skin is warm and dry.   Neurological:      Mental Status: He is alert. Mental status is at baseline.         Fluids    Intake/Output Summary (Last 24 hours) at 2024 1232  Last data filed at 2024 0400  Gross per 24 hour   Intake 380 ml   Output 600 ml   Net -220 ml       Laboratory  Recent Results (from the past 48 hour(s))   EKG    Collection Time: 24  9:53 AM   Result Value Ref Range    Report       [facility] Emergency Dept.    Test Date:  2024  Pt Name:    ELO STARKEY                Department: ER  MRN:        [id number]                      Room:       [facility]  Gender:     Male                         Technician: [id number]  :        1950                   Requested By:ER TRIAGE PROTOCOL  Order #:    [id number]                    Reading MD: TRACEE HEREDIA. [facility]    Measurements  Intervals                                Axis  Rate:       95                           P:          39  UT:         140                          QRS:        44  QRSD:       95                           T:          28  QT:         365  QTc:        459    Interpretive Statements  Sinus rhythm  Compared to ECG 2022 13:19:10  No significant changes  Electronically Signed On 2024 10:51:23 PDT by TRACEE HEREDIA. AMD     Lactic Acid    Collection Time: 24  9:58 AM   Result Value Ref Range    Lactic Acid 1.5 0.5 - 2.0 mmol/L   CBC with Differential    Collection Time: 24  9:58 AM   Result Value Ref Range    WBC 8.8 4.8 - 10.8 K/uL    RBC 5.02 4.70 - 6.10 M/uL    Hemoglobin 14.0 14.0 - 18.0 g/dL     Hematocrit 42.4 42.0 - 52.0 %    MCV 84.5 81.4 - 97.8 fL    MCH 27.9 27.0 - 33.0 pg    MCHC 33.0 32.3 - 36.5 g/dL    RDW 43.3 35.9 - 50.0 fL    Platelet Count 278 164 - 446 K/uL    MPV 9.2 9.0 - 12.9 fL    Neutrophils-Polys 80.60 (H) 44.00 - 72.00 %    Lymphocytes 9.90 (L) 22.00 - 41.00 %    Monocytes 9.00 0.00 - 13.40 %    Eosinophils 0.00 0.00 - 6.90 %    Basophils 0.30 0.00 - 1.80 %    Immature Granulocytes 0.20 0.00 - 0.90 %    Nucleated RBC 0.00 0.00 - 0.20 /100 WBC    Neutrophils (Absolute) 7.06 1.82 - 7.42 K/uL    Lymphs (Absolute) 0.87 (L) 1.00 - 4.80 K/uL    Monos (Absolute) 0.79 0.00 - 0.85 K/uL    Eos (Absolute) 0.00 0.00 - 0.51 K/uL    Baso (Absolute) 0.03 0.00 - 0.12 K/uL    Immature Granulocytes (abs) 0.02 0.00 - 0.11 K/uL    NRBC (Absolute) 0.00 K/uL   Complete Metabolic Panel    Collection Time: 04/29/24  9:58 AM   Result Value Ref Range    Sodium 136 135 - 145 mmol/L    Potassium 3.9 3.6 - 5.5 mmol/L    Chloride 102 96 - 112 mmol/L    Co2 19 (L) 20 - 33 mmol/L    Anion Gap 15.0 7.0 - 16.0    Glucose 154 (H) 65 - 99 mg/dL    Bun 12 8 - 22 mg/dL    Creatinine 0.82 0.50 - 1.40 mg/dL    Calcium 8.5 8.5 - 10.5 mg/dL    Correct Calcium 8.7 8.5 - 10.5 mg/dL    AST(SGOT) 21 12 - 45 U/L    ALT(SGPT) 19 2 - 50 U/L    Alkaline Phosphatase 77 30 - 99 U/L    Total Bilirubin 0.5 0.1 - 1.5 mg/dL    Albumin 3.7 3.2 - 4.9 g/dL    Total Protein 7.7 6.0 - 8.2 g/dL    Globulin 4.0 (H) 1.9 - 3.5 g/dL    A-G Ratio 0.9 g/dL   Blood Culture - Draw one from central line and one from peripheral site    Collection Time: 04/29/24  9:58 AM    Specimen: Peripheral; Blood   Result Value Ref Range    Significant Indicator NEG     Source BLD     Site PERIPHERAL     Culture Result       No Growth  Note: Blood cultures are incubated for 5 days and  are monitored continuously.Positive blood cultures  are called to the RN and reported as soon as  they are identified.     proBrain Natriuretic Peptide, NT    Collection Time: 04/29/24   9:58 AM   Result Value Ref Range    NT-proBNP 1139 (H) 0 - 125 pg/mL   TROPONIN    Collection Time: 04/29/24  9:58 AM   Result Value Ref Range    Troponin T 19 6 - 19 ng/L   ESTIMATED GFR    Collection Time: 04/29/24  9:58 AM   Result Value Ref Range    GFR (CKD-EPI) 92 >60 mL/min/1.73 m 2   PROCALCITONIN    Collection Time: 04/29/24  9:58 AM   Result Value Ref Range    Procalcitonin 0.09 <0.25 ng/mL   MAGNESIUM    Collection Time: 04/29/24  9:58 AM   Result Value Ref Range    Magnesium 2.1 1.5 - 2.5 mg/dL   CRP QUANTITIVE (NON-CARDIAC)    Collection Time: 04/29/24  9:58 AM   Result Value Ref Range    Stat C-Reactive Protein 22.45 (H) 0.00 - 0.75 mg/dL   POC CoV-2, FLU A/B, RSV by PCR    Collection Time: 04/29/24 10:08 AM   Result Value Ref Range    POC Influenza A RNA, PCR Negative Negative    POC Influenza B RNA, PCR Negative Negative    POC RSV, by PCR Negative Negative    POC SARS-CoV-2, PCR DETECTED (AA)    Blood Culture - Draw one from central line and one from peripheral site    Collection Time: 04/29/24 10:15 AM    Specimen: Line; Blood   Result Value Ref Range    Significant Indicator NEG     Source BLD     Site Peripheral     Culture Result       No Growth  Note: Blood cultures are incubated for 5 days and  are monitored continuously.Positive blood cultures  are called to the RN and reported as soon as  they are identified.     PROCALCITONIN    Collection Time: 04/29/24  4:00 PM   Result Value Ref Range    Procalcitonin 0.13 <0.25 ng/mL   Urinalysis    Collection Time: 04/29/24  7:00 PM    Specimen: Urine   Result Value Ref Range    Color DK Yellow     Character Clear     Specific Gravity 1.028 <1.035    Ph 5.5 5.0 - 8.0    Glucose Negative Negative mg/dL    Ketones Trace (A) Negative mg/dL    Protein 100 (A) Negative mg/dL    Bilirubin Negative Negative    Urobilinogen, Urine 1.0 Negative    Nitrite Negative Negative    Leukocyte Esterase Negative Negative    Occult Blood Negative Negative    Micro Urine  Req Microscopic    URINE MICROSCOPIC (W/UA)    Collection Time: 04/29/24  7:00 PM   Result Value Ref Range    WBC 0-2 (A) /hpf    RBC 0-2 (A) /hpf    Bacteria Few (A) None /hpf    Epithelial Cells Negative /hpf    Hyaline Cast 3-5 (A) /lpf   Comp Metabolic Panel (CMP)    Collection Time: 04/30/24  3:31 AM   Result Value Ref Range    Sodium 137 135 - 145 mmol/L    Potassium 4.0 3.6 - 5.5 mmol/L    Chloride 104 96 - 112 mmol/L    Co2 20 20 - 33 mmol/L    Anion Gap 13.0 7.0 - 16.0    Glucose 138 (H) 65 - 99 mg/dL    Bun 22 8 - 22 mg/dL    Creatinine 0.80 0.50 - 1.40 mg/dL    Calcium 8.5 8.5 - 10.5 mg/dL    Correct Calcium 9.0 8.5 - 10.5 mg/dL    AST(SGOT) 25 12 - 45 U/L    ALT(SGPT) 23 2 - 50 U/L    Alkaline Phosphatase 73 30 - 99 U/L    Total Bilirubin 0.2 0.1 - 1.5 mg/dL    Albumin 3.4 3.2 - 4.9 g/dL    Total Protein 7.3 6.0 - 8.2 g/dL    Globulin 3.9 (H) 1.9 - 3.5 g/dL    A-G Ratio 0.9 g/dL   PROCALCITONIN    Collection Time: 04/30/24  3:31 AM   Result Value Ref Range    Procalcitonin 0.12 <0.25 ng/mL   ESTIMATED GFR    Collection Time: 04/30/24  3:31 AM   Result Value Ref Range    GFR (CKD-EPI) 93 >60 mL/min/1.73 m 2   CBC WITH DIFFERENTIAL    Collection Time: 04/30/24  5:42 AM   Result Value Ref Range    WBC 5.3 4.8 - 10.8 K/uL    RBC 4.70 4.70 - 6.10 M/uL    Hemoglobin 13.0 (L) 14.0 - 18.0 g/dL    Hematocrit 39.6 (L) 42.0 - 52.0 %    MCV 84.3 81.4 - 97.8 fL    MCH 27.7 27.0 - 33.0 pg    MCHC 32.8 32.3 - 36.5 g/dL    RDW 42.8 35.9 - 50.0 fL    Platelet Count 258 164 - 446 K/uL    MPV 9.3 9.0 - 12.9 fL    Neutrophils-Polys 73.80 (H) 44.00 - 72.00 %    Lymphocytes 19.00 (L) 22.00 - 41.00 %    Monocytes 6.80 0.00 - 13.40 %    Eosinophils 0.00 0.00 - 6.90 %    Basophils 0.00 0.00 - 1.80 %    Immature Granulocytes 0.40 0.00 - 0.90 %    Nucleated RBC 0.00 0.00 - 0.20 /100 WBC    Neutrophils (Absolute) 3.92 1.82 - 7.42 K/uL    Lymphs (Absolute) 1.01 1.00 - 4.80 K/uL    Monos (Absolute) 0.36 0.00 - 0.85 K/uL    Eos  (Absolute) 0.00 0.00 - 0.51 K/uL    Baso (Absolute) 0.00 0.00 - 0.12 K/uL    Immature Granulocytes (abs) 0.02 0.00 - 0.11 K/uL    NRBC (Absolute) 0.00 K/uL       Imaging  Reviewed     Assessment/Plan    Acute hypoxic respiratory failure  COVID+  Hx of COPD  Very mild end expiratory wheezes that clears with deep breaths.  - decadron 6mg x10 days  - duonebs q6h PRN  - ok to start patient on LAMA, rec DC home w/this and he can de-escalate if needed outpatient based on his symptoms  - please refer to pulmonary upon discharge and place order for PFT  - OOB, mobility, IS use    Pulmonary will sign off at this time - please call with questions/concerns.     Faboila Dominguez MD  Pulmonary and Critical Care Medicine  [facility]

## 2024-04-30 NOTE — PROGRESS NOTES
"Received report from Laura HIRSCH and assumed care of patient (pt) at shift change.    Assessment completed.  Pt is A&O x 4, calls appropriately.  Pt denies any pain at this time . Pt laying in bed comfortably.  VS /64   Pulse 90   Temp 36.8 °C (98.2 °F) (Temporal)   Resp (!) 21   Ht 1.676 m (5' 6\")   Wt 73.1 kg (161 lb 2.5 oz)   SpO2 97% HFNC, FIO2 70%. Pt on tele monitoring, SR on monitor.     Fall precaution in place: Bed is in lowest, locked position, call light and belongings are within reach. Pt educated to call for assistance and OOB.  Plan of care discussed and all questions answered. All other needs met at this time.   "

## 2024-04-30 NOTE — CARE PLAN
The patient is Watcher - Medium risk of patient condition declining or worsening    Shift Goals  Clinical Goals: patient will remain spo2 >90% through shift  Patient Goals: feel better  Family Goals: updated on poc    Progress made toward(s) clinical / shift goals:      Problem: Hemodynamics  Goal: Patient's hemodynamics, fluid balance and neurologic status will be stable or improve  Note: Cardiac vs wdl. Denies any cp.      Problem: Self Care  Goal: Patient will have the ability to perform ADLs independently or with assistance (bathe, groom, dress, toilet and feed)  Outcome: Progressing  Note: A+ ox4. Sba. Calling appropriately.        Patient is not progressing towards the following goals:      Problem: Impaired Gas Exchange  Goal: Patient will demonstrate improved ventilation and adequate oxygenation and participate in treatment regimen within the level of ability/situation.  Outcome: Not Progressing  Note: Upgraded to HHF - now maintaining spo2? 90%. Will follow closely. Quick desats with ambulation.

## 2024-04-30 NOTE — CARE PLAN
Problem: Humidified High Flow Nasal Cannula  Goal: Maintain adequate oxygenation dependent on patient condition  Description: Target End Date:  resolve prior to discharge or when underlying condition is resolved/stabilized    1.  Implement humidified high flow oxygen therapy  2.  Titrate high flow oxygen to maintain appropriate SpO2  Flowsheets  Taken 4/30/2024 0000 by Yvette Gibbons R.N.  O2 (LPM): 60  FiO2%: 60 %  Taken 4/29/2024 2140 by Anthony Lassiter  Indication: COPD diagnosis: SpO2 less than 89% despite conventional supplemental oxygen devices  Outcome: Normoxia

## 2024-04-30 NOTE — CARE PLAN
The patient is Stable - Low risk of patient condition declining or worsening    Shift Goals  Clinical Goals: O2 will remain >90% through shift, trend labs, and safety  Patient Goals: Rest  Family Goals: No family at bedside    Progress made toward(s) clinical / shift goals:    Problem: Knowledge Deficit - COPD  Goal: Patient/significant other demonstrates understanding of disease process, utilization of the Action Plan, medications and discharge instruction  Description: Target End Date:  1-3 days or as soon as patient condition allows    Document in Patient Education    1.  Discuss the importance of medical follow-up care, periodic chest x-rays, sputum cultures  2.  Review worsening signs and symptoms of COPD flare and exacerbation and its management  3.  Discuss respiratory medications, side effects, adverse reactions  4.  Demonstrate technique for using a metered-dose inhaler (MDI) and utilization of a spacer  5.  Review the COPD Action Plan  6.  Instruct and reinforce the rationale for breathing exercises, coughing effectively, and general conditioning exercises  7.  Stress importance of oral care and dental hygiene  8.  Discuss the importance of avoiding people with active respiratory infections and need for routine influenza and pneumococcal vaccinations  9.  Discuss factors that may trigger condition and encourage patient/significant other to explore ways to control these factors in and around the home and work setting  10. Review the harmful effects of smoking and advise cessation of smoking  11. Provide information about activity limitations and alternating activities with rest periods to prevent fatigue  12. Instruct asthmatic patient of use of peak flow meter, as appropriate  13. Review oxygen requirements and dosage, safe use of oxygen, and refer to the supplier as indicated  14. Educate patient/family/caregiver on the use of Nasal Intermittent Positive Pressure Ventilation (NIPPV) and possible adverse  reactions  Outcome: Progressing     Problem: Hemodynamics  Goal: Patient's hemodynamics, fluid balance and neurologic status will be stable or improve  Description: Target End Date:  Prior to discharge or change in level of care    Document on Assessment and I/O flowsheet templates    1.  Monitor vital signs, pulse oximetry and cardiac monitor per provider order and/or policy  2.  Maintain blood pressure per provider order  3.  Hemodynamic monitoring per provider order  4.  Manage IV fluids and IV infusions  5.  Monitor intake and output  6.  Daily weights per unit policy or provider order  7.  Assess peripheral pulses and capillary refill  8.  Assess color and body temperature  9.  Position patient for maximum circulation/cardiac output  10. Monitor for signs/symptoms of excessive bleeding  11. Assess mental status, restlessness and changes in level of consciousness  12. Monitor temperature and report fever or hypothermia to provider immediately. Consideration of targeted temperature management.  Outcome: Progressing     Problem: Respiratory  Goal: Patient will achieve/maintain optimum respiratory ventilation and gas exchange  Description: Target End Date:  Prior to discharge or change in level of care    Document on Assessment flowsheet    1.  Assess and monitor rate, rhythm, depth and effort of respiration  2.  Breath sounds assessed qshift and/or as needed  3.  Assess O2 saturation, administer/titrate oxygen as ordered  4.  Position patient for maximum ventilatory efficiency  5.  Turn, cough, and deep breath with splinting to improve effectiveness  6.  Collaborate with RT to administer medication/treatments per order  7.  Encourage use of incentive spirometer and encourage patient to cough after use and utilize splinting techniques if applicable  8.  Airway suctioning  9.  Monitor sputum production for changes in color, consistency and frequency  10. Perform frequent oral hygiene  11. Alternate physical activity  with rest periods  Outcome: Progressing     Problem: Pain - Standard  Goal: Alleviation of pain or a reduction in pain to the patient’s comfort goal  Description: Target End Date:  Prior to discharge or change in level of care    Document on Vitals flowsheet    1.  Document pain using the appropriate pain scale per order or unit policy  2.  Educate and implement non-pharmacologic comfort measures (i.e. relaxation, distraction, massage, cold/heat therapy, etc.)  3.  Pain management medications as ordered  4.  Reassess pain after pain med administration per policy  5.  If opiods administered assess patient's response to pain medication is appropriate per POSS sedation scale  6.  Follow pain management plan developed in collaboration with patient and interdisciplinary team (including palliative care or pain specialists if applicable)  Outcome: Progressing       Patient is not progressing towards the following goals:

## 2024-05-01 PROCEDURE — 99291 CRITICAL CARE FIRST HOUR: CPT | Performed by: HOSPITALIST

## 2024-05-01 RX ADMIN — ROSUVASTATIN CALCIUM 10 MG: 5 TABLET, FILM COATED ORAL at 17:05

## 2024-05-01 RX ADMIN — AMLODIPINE BESYLATE 10 MG: 10 TABLET ORAL at 17:05

## 2024-05-01 RX ADMIN — TRAZODONE HYDROCHLORIDE 50 MG: 50 TABLET ORAL at 22:14

## 2024-05-01 RX ADMIN — BARICITINIB 4 MG: 2 TABLET, FILM COATED ORAL at 17:05

## 2024-05-01 RX ADMIN — LISINOPRIL 20 MG: 20 TABLET ORAL at 05:54

## 2024-05-01 RX ADMIN — IPRATROPIUM BROMIDE AND ALBUTEROL SULFATE 3 ML: .5; 2.5 SOLUTION RESPIRATORY (INHALATION) at 18:10

## 2024-05-01 RX ADMIN — IPRATROPIUM BROMIDE AND ALBUTEROL SULFATE 3 ML: .5; 2.5 SOLUTION RESPIRATORY (INHALATION) at 14:48

## 2024-05-01 RX ADMIN — IPRATROPIUM BROMIDE AND ALBUTEROL SULFATE 3 ML: .5; 2.5 SOLUTION RESPIRATORY (INHALATION) at 06:59

## 2024-05-01 RX ADMIN — Medication 10 MG: at 22:15

## 2024-05-01 RX ADMIN — NICOTINE 14 MG: 14 PATCH TRANSDERMAL at 05:55

## 2024-05-01 RX ADMIN — RIVAROXABAN 10 MG: 10 TABLET, FILM COATED ORAL at 17:05

## 2024-05-01 RX ADMIN — LISINOPRIL 20 MG: 20 TABLET ORAL at 17:05

## 2024-05-01 RX ADMIN — ASPIRIN 81 MG: 81 TABLET, CHEWABLE ORAL at 17:05

## 2024-05-01 RX ADMIN — IPRATROPIUM BROMIDE AND ALBUTEROL SULFATE 3 ML: .5; 2.5 SOLUTION RESPIRATORY (INHALATION) at 10:29

## 2024-05-01 RX ADMIN — ACETAMINOPHEN 650 MG: 325 TABLET, FILM COATED ORAL at 22:17

## 2024-05-01 RX ADMIN — DEXAMETHASONE 6 MG: 6 TABLET ORAL at 05:54

## 2024-05-01 ASSESSMENT — PAIN DESCRIPTION - PAIN TYPE
TYPE: ACUTE PAIN

## 2024-05-01 ASSESSMENT — ENCOUNTER SYMPTOMS
COUGH: 0
WHEEZING: 0
SHORTNESS OF BREATH: 1
SPUTUM PRODUCTION: 0

## 2024-05-01 ASSESSMENT — FIBROSIS 4 INDEX
FIB4 SCORE: 1.47
FIB4 SCORE: 1.47

## 2024-05-01 NOTE — ASSESSMENT & PLAN NOTE
This is his first infection with COVID  He has had vaccinations x 4  Decadron 6 mg daily and baricitinib  Isolation precautions  With respiratory failure

## 2024-05-01 NOTE — PROGRESS NOTES
[facility] Medicine Daily Progress Note    Date of Service  5/1/2024    Chief Complaint  Bright Shirley is a 73 y.o. male admitted 4/29/2024 with shortness of breath.    Hospital Course  Mr. Shirley is a 73 y.o. male with past medical history of COPD not on home oxygen currently, current smoker, hypertension, carotid stenosis who presented to the hospital on 4/29/2024 with complaint of shortness of breath.  Patient reported that he is not feeling well for the last 4 to 5 days and he developed shortness of breath approximately 3 days ago.  He denies complaint of cough, nausea and vomiting.  There is no specific aggravating or elevating factors.  He reported symptoms of shortness of breath associated with diarrhea and generalized weakness.  He denies any history of sick contact.  He reported he has history of COPD and he has not been using his oxygen for the last 1 year and he checks his oxygen with pulse ox and usually it looks normal.  He smokes half a pack a day.  He denies any other acute complaints or associated symptoms.  Patient found to have significant hypoxia and required a non-rebreather. He was found to have COVID-19 virus infection.  He received a dose of antibiotics in ER. Mr. Jackson was admitted to the IMCU on decadron.     Interval Problem Update  4/30: Mr. Shirley was evaluated in the IMCU. He is on IV decadron and Baricitinib. He is on high flow oxygen at 50 liters and 50%. Procal neg thus no abx.   5/1: Mr. Shirley was seen in the IMCU. He is on high flow oxygen 30 liters 40%.  He states he has not had any significant cough no sputum production.  He will try and get up today to chair.  He was able he will try to work with incentive spirometry.    I have discussed this patient's plan of care and discharge plan at IDT rounds today with Case Management, Nursing, Nursing leadership, and other members of the IDT team.    Consultants/Specialty  none    Code Status  Full Code    Disposition  The  patient is not medically cleared for discharge to home or a post-acute facility.  Anticipate discharge to: home with close outpatient follow-up    I have placed the appropriate orders for post-discharge needs.    Review of Systems  Review of Systems   Constitutional:         Generally weak   Respiratory:  Positive for shortness of breath. Negative for cough, sputum production and wheezing.    Cardiovascular:  Negative for chest pain and leg swelling.   All other systems reviewed and are negative.       Physical Exam  Temp:  [36.4 °C (97.6 °F)-36.7 °C (98.1 °F)] 36.4 °C (97.6 °F)  Pulse:  [] 78  Resp:  [15-29] 22  BP: (106-172)/(57-96) 128/67  SpO2:  [87 %-97 %] 95 %    Physical Exam  Vitals and nursing note reviewed.   Constitutional:       General: He is not in acute distress.     Appearance: He is ill-appearing and toxic-appearing.   Cardiovascular:      Rate and Rhythm: Normal rate and regular rhythm.   Pulmonary:      Comments: High flow oxygen 50 liters 50%  No wheezing  Normal work of breathing.   Abdominal:      General: There is distension.      Tenderness: There is no abdominal tenderness.   Musculoskeletal:      Right lower leg: No edema.      Left lower leg: No edema.   Skin:     General: Skin is dry.   Neurological:      General: No focal deficit present.      Mental Status: He is alert and oriented to person, place, and time.   Psychiatric:         Mood and Affect: Mood normal.         Behavior: Behavior normal.         Fluids    Intake/Output Summary (Last 24 hours) at 5/1/2024 0715  Last data filed at 5/1/2024 0600  Gross per 24 hour   Intake --   Output 1050 ml   Net -1050 ml       Laboratory  Recent Labs     04/29/24  0958 04/30/24  0542   WBC 8.8 5.3   RBC 5.02 4.70   HEMOGLOBIN 14.0 13.0*   HEMATOCRIT 42.4 39.6*   MCV 84.5 84.3   MCH 27.9 27.7   MCHC 33.0 32.8   RDW 43.3 42.8   PLATELETCT 278 258   MPV 9.2 9.3     Recent Labs     04/29/24  0958 04/30/24  0331   SODIUM 136 137   POTASSIUM 3.9  4.0   CHLORIDE 102 104   CO2 19* 20   GLUCOSE 154* 138*   BUN 12 22   CREATININE 0.82 0.80   CALCIUM 8.5 8.5                   Imaging  DX-CHEST-PORTABLE (1 VIEW)   Final Result         Diffuse groundglass and interstitial opacities could relate to infection, inflammation or edema      New rounded opacity in the left upper lung could relate to pneumonia. Follow-up is recommended to ensure resolution.           Assessment/Plan  * Pneumonia due to COVID-19 virus- (present on admission)  Assessment & Plan  This is his first infection with COVID  He has had vaccinations x 4  Decadron 6 mg daily for 10 days or until he is off oxygen and baricitinib  Isolation precautions  With respiratory failure    Acute respiratory failure with hypoxia (HCC)- (present on admission)  Assessment & Plan  He found to have acute respiratory failure with hypoxia secondary to COVID-19 virus infection and possible COPD exacerbation.  He continues to require high flow oxygen now at 30 L 40% and high flow is being titrated  Chest x-ray showed groundglass and interstitial opacities.  Continuous pulse oxymetry in the IMCU.    Mass of left lung- (present on admission)  Assessment & Plan  Procalcitonin negative  May be COVID though outpatient chest xray in 4-6 weeks is appropriate.    Acute exacerbation of chronic obstructive pulmonary disease (COPD) (HCC)- (present on admission)  Assessment & Plan  Patient has history of COPD and is a current smoker.  He may have underlying COPD exacerbation along with COVID-19 virus infection.  On steroids       Primary hypertension- (present on admission)  Assessment & Plan  I started him on outpatient blood pressure medications.  I started him on IV hydralazine as needed as needed with parameters.  Admit to IMCU for close monitoring.      Tobacco abuse  Assessment & Plan  Cessation discussed         VTE prophylaxis:    Xarelto 10mg daily as prophylaxis      I have performed a physical exam and reviewed and  updated ROS and Plan today (5/1/2024). In review of yesterday's note (4/30/2024), there are no changes except as documented above.    Mr. Shirley is critically ill. 35 minutes of critical care were spent with patient, nursing, pharmacy and in specific management of acute respiratory failure requiring active titration of high flow oxygen in the setting of COVID-19 pneumonia    .

## 2024-05-01 NOTE — CARE PLAN
Problem: Humidified High Flow Nasal Cannula  Goal: Maintain adequate oxygenation dependent on patient condition  Description: Target End Date:  resolve prior to discharge or when underlying condition is resolved/stabilized    1.  Implement humidified high flow oxygen therapy  2.  Titrate high flow oxygen to maintain appropriate SpO2  Outcome: Progressing  Flowsheets  Taken 5/1/2024 0300 by Radha Aranda R.N.  O2 (LPM): 30  FiO2%: 40 %  Taken 4/29/2024 2140 by Anhtony Lassiter  Indication: COPD diagnosis: SpO2 less than 89% despite conventional supplemental oxygen devices  Outcome: Normoxia

## 2024-05-01 NOTE — PROGRESS NOTES
4 Eyes Skin Assessment Completed by JOYCELYN Hallman and JOYCELYN Oliveira.    Head WDL  Ears WDL  Nose WDL  Mouth WDL  Neck WDL  Breast/Chest WDL  Shoulder Blades WDL  Spine WDL  (R) Arm/Elbow/Hand Bruising  (L) Arm/Elbow/Hand Bruising  Abdomen WDL  Groin WDL  Scrotum/Coccyx/Buttocks WDL  (R) Leg WDL  (L) Leg WDL  (R) Heel/Foot/Toe Redness and Blanching  (L) Heel/Foot/Toe Redness and Blanching        Devices In Places Blood Pressure Cuff, Pulse Ox, SCD's, and HFNC      Interventions In Place NC W/Ear Foams, TAP System, Pillows, and Low Air Loss Mattress    Possible Skin Injury No    Pictures Uploaded Into Epic N/A  Wound Consult Placed N/A  RN Wound Prevention Protocol Ordered Yes

## 2024-05-01 NOTE — PROGRESS NOTES
..4 Eyes Skin Assessment Completed by Vick, RN and Fanny RN.    Head WDL  Ears Redness and Blanching  Nose WDL  Mouth WDL  Neck WDL  Breast/Chest WDL  Shoulder Blades WDL  Spine WDL  (R) Arm/Elbow/Hand WDL  (L) Arm/Elbow/Hand WDL  Abdomen WDL  Groin WDL  Scrotum/Coccyx/Buttocks WDL  (R) Leg WDL  (L) Leg WDL  (R) Heel/Foot/Toe WDL  (L) Heel/Foot/Toe WDL          Devices In Places Tele Box, Blood Pressure Cuff, Pulse Ox, HFNC, and Nasal Cannula      Interventions In Place Gray Ear Foams    Possible Skin Injury No    Pictures Uploaded Into Epic N/A  Wound Consult Placed N/A  RN Wound Prevention Protocol Ordered Yes

## 2024-05-01 NOTE — CARE PLAN
Problem: Humidified High Flow Nasal Cannula  Goal: Maintain adequate oxygenation dependent on patient condition  Description: Target End Date:  resolve prior to discharge or when underlying condition is resolved/stabilized    1.  Implement humidified high flow oxygen therapy  2.  Titrate high flow oxygen to maintain appropriate SpO2  Outcome: Met     Pt weaned off HHFNC and placed on 6L NC no distress noted

## 2024-05-01 NOTE — CARE PLAN
The patient is Stable - Low risk of patient condition declining or worsening    Shift Goals  Clinical Goals: Wean off High flow Oxygen  Patient Goals: Comfort  Family Goals: No family at bedside    Progress made toward(s) clinical / shift goals:    Problem: Knowledge Deficit - Standard  Goal: Patient and family/care givers will demonstrate understanding of plan of care, disease process/condition, diagnostic tests and medications  Outcome: Progressing     Problem: Risk for Infection - COPD  Goal: Patient will remain free from signs and symptoms of infection  Outcome: Progressing     Problem: Hemodynamics  Goal: Patient's hemodynamics, fluid balance and neurologic status will be stable or improve  Outcome: Progressing     Problem: Pain - Standard  Goal: Alleviation of pain or a reduction in pain to the patient’s comfort goal  Outcome: Progressing

## 2024-05-02 LAB
BACTERIA UR CULT: NORMAL
SIGNIFICANT IND 70042: NORMAL
SITE SITE: NORMAL
SOURCE SOURCE: NORMAL

## 2024-05-02 PROCEDURE — 99232 SBSQ HOSP IP/OBS MODERATE 35: CPT | Performed by: HOSPITALIST

## 2024-05-02 RX ORDER — IPRATROPIUM BROMIDE AND ALBUTEROL SULFATE 2.5; .5 MG/3ML; MG/3ML
3 SOLUTION RESPIRATORY (INHALATION)
Status: DISCONTINUED | OUTPATIENT
Start: 2024-05-02 | End: 2024-05-05 | Stop reason: HOSPADM

## 2024-05-02 RX ADMIN — IPRATROPIUM BROMIDE AND ALBUTEROL SULFATE 3 ML: .5; 2.5 SOLUTION RESPIRATORY (INHALATION) at 07:05

## 2024-05-02 RX ADMIN — TRAZODONE HYDROCHLORIDE 50 MG: 50 TABLET ORAL at 20:27

## 2024-05-02 RX ADMIN — Medication 10 MG: at 20:26

## 2024-05-02 RX ADMIN — LISINOPRIL 20 MG: 20 TABLET ORAL at 05:06

## 2024-05-02 RX ADMIN — AMLODIPINE BESYLATE 10 MG: 10 TABLET ORAL at 17:53

## 2024-05-02 RX ADMIN — BARICITINIB 4 MG: 2 TABLET, FILM COATED ORAL at 17:54

## 2024-05-02 RX ADMIN — ROSUVASTATIN CALCIUM 10 MG: 5 TABLET, FILM COATED ORAL at 17:53

## 2024-05-02 RX ADMIN — LISINOPRIL 20 MG: 20 TABLET ORAL at 17:53

## 2024-05-02 RX ADMIN — NICOTINE 14 MG: 14 PATCH TRANSDERMAL at 05:06

## 2024-05-02 RX ADMIN — ACETAMINOPHEN 650 MG: 325 TABLET, FILM COATED ORAL at 22:58

## 2024-05-02 RX ADMIN — RIVAROXABAN 10 MG: 10 TABLET, FILM COATED ORAL at 17:53

## 2024-05-02 RX ADMIN — SENNOSIDES AND DOCUSATE SODIUM 2 TABLET: 50; 8.6 TABLET ORAL at 17:53

## 2024-05-02 RX ADMIN — IPRATROPIUM BROMIDE AND ALBUTEROL SULFATE 3 ML: .5; 2.5 SOLUTION RESPIRATORY (INHALATION) at 10:09

## 2024-05-02 RX ADMIN — DEXAMETHASONE 6 MG: 6 TABLET ORAL at 05:07

## 2024-05-02 RX ADMIN — ASPIRIN 81 MG: 81 TABLET, CHEWABLE ORAL at 17:53

## 2024-05-02 ASSESSMENT — PAIN DESCRIPTION - PAIN TYPE
TYPE: ACUTE PAIN

## 2024-05-02 ASSESSMENT — COGNITIVE AND FUNCTIONAL STATUS - GENERAL
SUGGESTED CMS G CODE MODIFIER MOBILITY: CH
DAILY ACTIVITIY SCORE: 24
MOBILITY SCORE: 24
SUGGESTED CMS G CODE MODIFIER DAILY ACTIVITY: CH

## 2024-05-02 ASSESSMENT — ENCOUNTER SYMPTOMS
CHILLS: 0
COUGH: 1
SPUTUM PRODUCTION: 0
FEVER: 0
WHEEZING: 0
SHORTNESS OF BREATH: 1
ROS GI COMMENTS: TOLERATING A DIET

## 2024-05-02 ASSESSMENT — FIBROSIS 4 INDEX: FIB4 SCORE: 1.47

## 2024-05-02 NOTE — CARE PLAN
Problem: Bronchoconstriction  Goal: Improve in air movement and diminished wheezing  Description: Target End Date:  2 to 3 days    1.  Implement inhaled treatments  2.  Evaluate and manage medication effects  Outcome: Progressing    Duoneb - QID (can be reassessed 5/2 first rounds)

## 2024-05-02 NOTE — PROGRESS NOTES
[facility] Medicine Daily Progress Note    Date of Service  5/2/2024    Chief Complaint  Bright Shirley is a 73 y.o. male admitted 4/29/2024 with shortness of breath.    Hospital Course  Mr. Shirley is a 73 y.o. male with past medical history of COPD not on home oxygen currently, current smoker, hypertension, carotid stenosis who presented to the hospital on 4/29/2024 with complaint of shortness of breath.  Patient reported that he is not feeling well for the last 4 to 5 days and he developed shortness of breath approximately 3 days ago.  He denies complaint of cough, nausea and vomiting.  There is no specific aggravating or elevating factors.  He reported symptoms of shortness of breath associated with diarrhea and generalized weakness.  He denies any history of sick contact.  He reported he has history of COPD and he has not been using his oxygen for the last 1 year and he checks his oxygen with pulse ox and usually it looks normal.  He smokes half a pack a day.  He denies any other acute complaints or associated symptoms.  Patient found to have significant hypoxia and required a non-rebreather. He was found to have COVID-19 virus infection.  He received a dose of antibiotics in ER. Mr. Jackson was admitted to the [facility] on decadron.     Interval Problem Update  4/30: Mr. Shirley was evaluated in the CU. He is on IV decadron and Baricitinib. He is on high flow oxygen at 50 liters and 50%. Procal neg thus no abx.   5/1: Mr. Shirley was seen in the IMCU. He is on high flow oxygen 30 liters 40%.  He states he has not had any significant cough no sputum production.  He will try and get up today to chair.  He was able he will try to work with incentive spirometry.  5/2: Mr. Shirley was seen in the CU. He is now on 6 liters nasal cannula oxygen. He continues to have a dry cough. His RN will do a home oxygen eval. He lives with a roommate and hopes to be able to go home. He is on decadron 6 mg daily.    I have  discussed this patient's plan of care and discharge plan at IDT rounds today with Case Management, Nursing, Nursing leadership, and other members of the IDT team.    Consultants/Specialty  none    Code Status  Full Code    Disposition  The patient is not medically cleared for discharge to home or a post-acute facility.  Anticipate discharge to: home with close outpatient follow-up    I have placed the appropriate orders for post-discharge needs.    Review of Systems  Review of Systems   Constitutional:  Negative for chills and fever.   Respiratory:  Positive for cough and shortness of breath. Negative for sputum production and wheezing.    Cardiovascular:  Negative for chest pain and leg swelling.   Gastrointestinal:         Tolerating a diet   All other systems reviewed and are negative.       Physical Exam  Temp:  [36.2 °C (97.2 °F)-37.2 °C (99 °F)] 37.2 °C (99 °F)  Pulse:  [69-98] 70  Resp:  [16-33] 20  BP: (121-161)/(56-84) 134/63  SpO2:  [91 %-98 %] 97 %    Physical Exam  Vitals and nursing note reviewed.   Constitutional:       General: He is not in acute distress.     Appearance: He is ill-appearing. He is not toxic-appearing.   Cardiovascular:      Rate and Rhythm: Normal rate and regular rhythm.   Pulmonary:      Comments: 6 liters nasal cannula oxygen  No wheezing  Normal work of breathing.   Abdominal:      General: There is no distension.      Tenderness: There is no abdominal tenderness.   Musculoskeletal:      Right lower leg: No edema.      Left lower leg: No edema.   Skin:     General: Skin is dry.   Neurological:      General: No focal deficit present.      Mental Status: He is alert and oriented to person, place, and time.   Psychiatric:         Mood and Affect: Mood normal.         Behavior: Behavior normal.         Thought Content: Thought content normal.         Fluids    Intake/Output Summary (Last 24 hours) at 5/2/2024 0734  Last data filed at 5/2/2024 0600  Gross per 24 hour   Intake 250 ml    Output 1300 ml   Net -1050 ml       Laboratory  Recent Labs     04/29/24  0958 04/30/24  0542   WBC 8.8 5.3   RBC 5.02 4.70   HEMOGLOBIN 14.0 13.0*   HEMATOCRIT 42.4 39.6*   MCV 84.5 84.3   MCH 27.9 27.7   MCHC 33.0 32.8   RDW 43.3 42.8   PLATELETCT 278 258   MPV 9.2 9.3     Recent Labs     04/29/24  0958 04/30/24  0331   SODIUM 136 137   POTASSIUM 3.9 4.0   CHLORIDE 102 104   CO2 19* 20   GLUCOSE 154* 138*   BUN 12 22   CREATININE 0.82 0.80   CALCIUM 8.5 8.5                   Imaging  DX-CHEST-PORTABLE (1 VIEW)   Final Result         Diffuse groundglass and interstitial opacities could relate to infection, inflammation or edema      New rounded opacity in the left upper lung could relate to pneumonia. Follow-up is recommended to ensure resolution.           Assessment/Plan  * Pneumonia due to COVID-19 virus- (present on admission)  Assessment & Plan  This is his first infection with COVID  He has had vaccinations x 4  Decadron 6 mg daily for 10 days or until he is off oxygen and baricitinib  Isolation precautions  With respiratory failure    Acute respiratory failure with hypoxia (HCC)- (present on admission)  Assessment & Plan  He found to have acute respiratory failure with hypoxia secondary to COVID-19 virus infection and possible COPD exacerbation.  He had been on high flow oxygen and now tapered to nasal cannula 6 liters.  Chest x-ray showed groundglass and interstitial opacities.  Continuous pulse oxymetry   Home oxygen eval ordered    Mass of left lung- (present on admission)  Assessment & Plan  Procalcitonin negative  May be COVID though outpatient chest xray in 4-6 weeks is appropriate which can but done through Dr. Ramirez's office    Acute exacerbation of chronic obstructive pulmonary disease (COPD) (HCC)- (present on admission)  Assessment & Plan  Patient has history of COPD and is a current smoker.  He may have underlying COPD exacerbation along with COVID-19 virus infection.  On steroids        Primary hypertension- (present on admission)  Assessment & Plan  Home norvasc and lisinopril restarted        Tobacco abuse  Assessment & Plan  Cessation discussed         VTE prophylaxis:    Xarelto 10mg daily as prophylaxis      I have performed a physical exam and reviewed and updated ROS and Plan today (5/2/2024). In review of yesterday's note (5/1/2024), there are no changes except as documented above.      .

## 2024-05-02 NOTE — CARE PLAN
The patient is Stable - Low risk of patient condition declining or worsening    Shift Goals  Clinical Goals: hemodynamic stability, improve oxygenation/ventalation, wean o2  Patient Goals: rest  Family Goals: ANAHI    Progress made toward(s) clinical / shift goals:    Problem: Impaired Gas Exchange  Goal: Patient will demonstrate improved ventilation and adequate oxygenation and participate in treatment regimen within the level of ability/situation.  Outcome: Progressing   Pt weaned down to 4L NC from HFNC, pt able to perform well on OPEP     Problem: Hemodynamics  Goal: Patient's hemodynamics, fluid balance and neurologic status will be stable or improve  Outcome: Progressing     Problem: Respiratory  Goal: Patient will achieve/maintain optimum respiratory ventilation and gas exchange  Outcome: Progressing   Pt weaned from HFNC to 4L NC    Problem: Pain - Standard  Goal: Alleviation of pain or a reduction in pain to the patient’s comfort goal  Outcome: Progressing   Pt medicated per MAR for neck pain

## 2024-05-02 NOTE — PROGRESS NOTES
4 Eyes Skin Assessment Completed by Francisco Javier RN and JOYCELYN Bautista.    Head WDL  Ears Redness and Blanching  Nose WDL  Mouth WDL  Neck WDL  Breast/Chest WDL  Shoulder Blades WDL  Spine WDL  (R) Arm/Elbow/Hand Redness and Bruising  (L) Arm/Elbow/Hand Redness and Bruising  Abdomen WDL  Groin WDL  Scrotum/Coccyx/Buttocks WDL  (R) Leg Bruising  (L) Leg Bruising  (R) Heel/Foot/Toe Redness and Bruising  (L) Heel/Foot/Toe Redness and Bruising          Devices In Places ECG, Tele Box, Blood Pressure Cuff, Pulse Ox, and Nasal Cannula      Interventions In Place NC W/Ear Foams, Pillows, and Low Air Loss Mattress    Possible Skin Injury No    Pictures Uploaded Into Epic N/A  Wound Consult Placed N/A  RN Wound Prevention Protocol Ordered No

## 2024-05-03 ENCOUNTER — APPOINTMENT (OUTPATIENT)
Dept: RADIOLOGY | Facility: MEDICAL CENTER | Age: 74
DRG: 177 | End: 2024-05-03
Payer: MEDICARE

## 2024-05-03 LAB
ALBUMIN SERPL BCP-MCNC: 3.6 G/DL (ref 3.2–4.9)
ALBUMIN/GLOB SERPL: 1.1 G/DL
ALP SERPL-CCNC: 70 U/L (ref 30–99)
ALT SERPL-CCNC: 178 U/L (ref 2–50)
ANION GAP SERPL CALC-SCNC: 12 MMOL/L (ref 7–16)
AST SERPL-CCNC: 66 U/L (ref 12–45)
BASOPHILS # BLD AUTO: 0.1 % (ref 0–1.8)
BASOPHILS # BLD: 0.01 K/UL (ref 0–0.12)
BILIRUB SERPL-MCNC: 0.3 MG/DL (ref 0.1–1.5)
BUN SERPL-MCNC: 20 MG/DL (ref 8–22)
CALCIUM ALBUM COR SERPL-MCNC: 9.3 MG/DL (ref 8.5–10.5)
CALCIUM SERPL-MCNC: 9 MG/DL (ref 8.5–10.5)
CHLORIDE SERPL-SCNC: 101 MMOL/L (ref 96–112)
CO2 SERPL-SCNC: 23 MMOL/L (ref 20–33)
CREAT SERPL-MCNC: 0.69 MG/DL (ref 0.5–1.4)
EOSINOPHIL # BLD AUTO: 0.05 K/UL (ref 0–0.51)
EOSINOPHIL NFR BLD: 0.5 % (ref 0–6.9)
ERYTHROCYTE [DISTWIDTH] IN BLOOD BY AUTOMATED COUNT: 44.3 FL (ref 35.9–50)
GFR SERPLBLD CREATININE-BSD FMLA CKD-EPI: 97 ML/MIN/1.73 M 2
GLOBULIN SER CALC-MCNC: 3.4 G/DL (ref 1.9–3.5)
GLUCOSE BLD STRIP.AUTO-MCNC: 101 MG/DL (ref 65–99)
GLUCOSE BLD STRIP.AUTO-MCNC: 103 MG/DL (ref 65–99)
GLUCOSE SERPL-MCNC: 100 MG/DL (ref 65–99)
HCT VFR BLD AUTO: 41.8 % (ref 42–52)
HGB BLD-MCNC: 13.4 G/DL (ref 14–18)
IMM GRANULOCYTES # BLD AUTO: 0.05 K/UL (ref 0–0.11)
IMM GRANULOCYTES NFR BLD AUTO: 0.5 % (ref 0–0.9)
LYMPHOCYTES # BLD AUTO: 2.65 K/UL (ref 1–4.8)
LYMPHOCYTES NFR BLD: 27.7 % (ref 22–41)
MCH RBC QN AUTO: 27.9 PG (ref 27–33)
MCHC RBC AUTO-ENTMCNC: 32.1 G/DL (ref 32.3–36.5)
MCV RBC AUTO: 86.9 FL (ref 81.4–97.8)
MONOCYTES # BLD AUTO: 1.01 K/UL (ref 0–0.85)
MONOCYTES NFR BLD AUTO: 10.6 % (ref 0–13.4)
NEUTROPHILS # BLD AUTO: 5.78 K/UL (ref 1.82–7.42)
NEUTROPHILS NFR BLD: 60.6 % (ref 44–72)
NRBC # BLD AUTO: 0 K/UL
NRBC BLD-RTO: 0 /100 WBC (ref 0–0.2)
PLATELET # BLD AUTO: 348 K/UL (ref 164–446)
PMV BLD AUTO: 9 FL (ref 9–12.9)
POTASSIUM SERPL-SCNC: 4.3 MMOL/L (ref 3.6–5.5)
PROT SERPL-MCNC: 7 G/DL (ref 6–8.2)
RBC # BLD AUTO: 4.81 M/UL (ref 4.7–6.1)
SODIUM SERPL-SCNC: 136 MMOL/L (ref 135–145)
WBC # BLD AUTO: 9.6 K/UL (ref 4.8–10.8)

## 2024-05-03 PROCEDURE — 99233 SBSQ HOSP IP/OBS HIGH 50: CPT | Mod: GC | Performed by: HOSPITALIST

## 2024-05-03 RX ORDER — DEXTROSE MONOHYDRATE 25 G/50ML
25 INJECTION, SOLUTION INTRAVENOUS
Status: DISCONTINUED | OUTPATIENT
Start: 2024-05-03 | End: 2024-05-04

## 2024-05-03 RX ADMIN — DEXAMETHASONE 6 MG: 6 TABLET ORAL at 04:51

## 2024-05-03 RX ADMIN — TRAZODONE HYDROCHLORIDE 50 MG: 50 TABLET ORAL at 22:12

## 2024-05-03 RX ADMIN — ACETAMINOPHEN 650 MG: 325 TABLET, FILM COATED ORAL at 22:13

## 2024-05-03 RX ADMIN — NICOTINE 14 MG: 14 PATCH TRANSDERMAL at 04:51

## 2024-05-03 RX ADMIN — ROSUVASTATIN CALCIUM 10 MG: 5 TABLET, FILM COATED ORAL at 16:36

## 2024-05-03 RX ADMIN — BARICITINIB 4 MG: 2 TABLET, FILM COATED ORAL at 16:36

## 2024-05-03 RX ADMIN — Medication 10 MG: at 22:13

## 2024-05-03 RX ADMIN — RIVAROXABAN 10 MG: 10 TABLET, FILM COATED ORAL at 16:36

## 2024-05-03 RX ADMIN — AMLODIPINE BESYLATE 10 MG: 10 TABLET ORAL at 16:36

## 2024-05-03 RX ADMIN — ASPIRIN 81 MG: 81 TABLET, CHEWABLE ORAL at 16:36

## 2024-05-03 RX ADMIN — SENNOSIDES AND DOCUSATE SODIUM 2 TABLET: 50; 8.6 TABLET ORAL at 18:00

## 2024-05-03 RX ADMIN — LISINOPRIL 20 MG: 20 TABLET ORAL at 16:36

## 2024-05-03 RX ADMIN — LISINOPRIL 20 MG: 20 TABLET ORAL at 04:51

## 2024-05-03 ASSESSMENT — ENCOUNTER SYMPTOMS
NEUROLOGICAL NEGATIVE: 1
MUSCULOSKELETAL NEGATIVE: 1
GASTROINTESTINAL NEGATIVE: 1
EYES NEGATIVE: 1
CARDIOVASCULAR NEGATIVE: 1
PSYCHIATRIC NEGATIVE: 1
CONSTITUTIONAL NEGATIVE: 1
SHORTNESS OF BREATH: 1

## 2024-05-03 ASSESSMENT — PAIN DESCRIPTION - PAIN TYPE: TYPE: ACUTE PAIN

## 2024-05-03 NOTE — PROGRESS NOTES
4 Eyes Skin Assessment Completed by JOYCELYN Nice and JOYCELYN Williamson.    Head WDL  Ears Redness and Blanching  Nose WDL  Mouth WDL  Neck WDL  Breast/Chest WDL  Shoulder Blades WDL  Spine WDL  (R) Arm/Elbow/Hand WDL  (L) Arm/Elbow/Hand WDL  Abdomen WDL  Groin WDL  Scrotum/Coccyx/Buttocks WDL  (R) Leg WDL  (L) Leg WDL  (R) Heel/Foot/Toe WDL  (L) Heel/Foot/Toe WDL          Devices In Places Pulse Ox and Nasal Cannula      Interventions In Place Gray Ear Foams and Pillows    Possible Skin Injury No    Pictures Uploaded Into Epic N/A  Wound Consult Placed N/A  RN Wound Prevention Protocol Ordered No

## 2024-05-03 NOTE — PROGRESS NOTES
Patient left via transport to S[facility], report given, bed room ready, transport left at approx 1830, no issues noted or reported during transfer

## 2024-05-03 NOTE — HOSPITAL COURSE
72 YO M with past medical history COPD (on 2-3L previously then self-discontinued), current tobacco use, HTN, carotid stenosis, history of CVA, who presented on 4/29 shortness of breath. PCR was positive for SARS-CoV-2. Chest X-ray demonstrated a new left upper lobe opacity and diffuse ground glass and interstitial opacities. He was admitted to the [facility] due to requiring 35L supplemental oxygen at one point. Supportive management with IV decadron and baricitinib were started. He was transferred to the floor on 5/02 on 6L NC. CT thorax revealed a large left upper lobe mass that is likely malignancy and outpatient CT-guided biopsy, oncology, and pulmonology consults were placed. On day of discharge, he required 3L NC at rest and 5L NC with ambulation. Discussed with patient risks and benefits of remaining hospitalized to finish course of baricitinib versus discharging without it. Patient opted to discharge and will return if he develops worsening shortness of breath.   [place]/100% of the time

## 2024-05-03 NOTE — CARE PLAN
The patient is Watcher - Medium risk of patient condition declining or worsening    Shift Goals  Clinical Goals: wean o2  Patient Goals: Rest  Family Goals: ANAHI      Problem: Knowledge Deficit - Standard  Goal: Patient and family/care givers will demonstrate understanding of plan of care, disease process/condition, diagnostic tests and medications  Outcome: Progressing     Problem: Knowledge Deficit - COPD  Goal: Patient/significant other demonstrates understanding of disease process, utilization of the Action Plan, medications and discharge instruction  Outcome: Progressing     Problem: Pain - Standard  Goal: Alleviation of pain or a reduction in pain to the patient’s comfort goal  Outcome: Progressing

## 2024-05-03 NOTE — PROGRESS NOTES
Bedside report received and patient care assumed. Pt is resting in bed, A&O4, with complaints of 5/10 neck pain, and is on 6 L via NC. All low fall precautions are in place, belongings at bedside table. Pt was updated on POC, no questions or concerns. Pt educated on use of call light for assistance.

## 2024-05-03 NOTE — PROGRESS NOTES
[facility] [place] [facility] Daily Progress Note    Date of Service  5/3/2024    R Team: [facility] [place] IM Blue Team   Attending: STEPHEN Hernandez M.d.  Senior Resident: Dr. Valdez  Intern:  Dr. Elizabeth  Contact Number: [phone]    Chief Complaint  Bright Shirley is a 73 y.o. male admitted 4/29/2024 with shortness of breath    Hospital Course  74 YO M with past medical history COPD, current tobacco use, HTN, carotid stenosis, history of CVA, who presented on 4/29 shortness of breath. PCR was positive for SARS-CoV-2. Chest X-ray demonstrated a new left upper lobe opacity and diffuse ground glass and interstitial opacities. He was admitted to the [facility] due to requiring 35L supplemental oxygen at one point. Supportive management with IV decadron and baricitinib were started. He was transferred to the floor on 5/02 on 6L NC.    Interval Problem Update  Assumed care of patient today. Patient feels much better. He was weaned from 6L to 2L NC overnight. Reports shortness of breath on 2L only with turning in bed. He was previously prescribed 2-3L supplemental oxygen but weaned himself off and has not required oxygen in the last year. He uses prn albuterol, no other inhalers. He denies cough, edema. Lives with a roommate.     I have discussed this patient's plan of care and discharge plan at IDT rounds today with Case Management, Nursing, Nursing leadership, and other members of the IDT team.    Consultants/Specialty  critical care    Code Status  Full Code    Disposition  The patient is not medically cleared for discharge to home or a post-acute facility.      I have placed the appropriate orders for post-discharge needs.    Review of Systems  Review of Systems   Constitutional: Negative.    HENT: Negative.     Eyes: Negative.    Respiratory:  Positive for shortness of breath (with turning in bed).    Cardiovascular: Negative.    Gastrointestinal: Negative.    Genitourinary: Negative.    Musculoskeletal: Negative.    Skin:  Negative.    Neurological: Negative.    Endo/Heme/Allergies: Negative.    Psychiatric/Behavioral: Negative.          Physical Exam  Temp:  [36.3 °C (97.3 °F)-36.7 °C (98.1 °F)] 36.7 °C (98.1 °F)  Pulse:  [60-97] 75  Resp:  [18-23] 19  BP: (103-149)/(52-97) 131/80  SpO2:  [89 %-95 %] 91 %    Physical Exam  Vitals and nursing note reviewed.   Constitutional:       Appearance: Normal appearance. He is normal weight.   HENT:      Head: Normocephalic.      Mouth/Throat:      Mouth: Mucous membranes are moist.   Eyes:      Extraocular Movements: Extraocular movements intact.      Pupils: Pupils are equal, round, and reactive to light.   Cardiovascular:      Rate and Rhythm: Normal rate and regular rhythm.      Pulses: Normal pulses.      Heart sounds: No murmur heard.  Pulmonary:      Effort: Pulmonary effort is normal.      Breath sounds: Rales (mild, in the right posterior upper lobe) present.   Abdominal:      General: Abdomen is flat.      Palpations: Abdomen is soft.   Musculoskeletal:      Right lower leg: No edema.      Left lower leg: No edema.   Skin:     General: Skin is warm.      Capillary Refill: Capillary refill takes less than 2 seconds.   Neurological:      General: No focal deficit present.      Mental Status: He is alert and oriented to person, place, and time.   Psychiatric:         Mood and Affect: Mood normal.         Fluids    Intake/Output Summary (Last 24 hours) at 5/3/2024 1326  Last data filed at 5/3/2024 1023  Gross per 24 hour   Intake 300 ml   Output 450 ml   Net -150 ml       Laboratory  Recent Labs     05/03/24  0637   WBC 9.6   RBC 4.81   HEMOGLOBIN 13.4*   HEMATOCRIT 41.8*   MCV 86.9   MCH 27.9   MCHC 32.1*   RDW 44.3   PLATELETCT 348   MPV 9.0     Recent Labs     05/03/24  0821   SODIUM 136   POTASSIUM 4.3   CHLORIDE 101   CO2 23   GLUCOSE 100*   BUN 20   CREATININE 0.69   CALCIUM 9.0                   Imaging  DX-CHEST-PORTABLE (1 VIEW)   Final Result         Diffuse groundglass and  interstitial opacities could relate to infection, inflammation or edema      New rounded opacity in the left upper lung could relate to pneumonia. Follow-up is recommended to ensure resolution.      CT-CHEST (THORAX) W/O    (Results Pending)        Assessment/Plan  Problem Representation:    * Pneumonia due to COVID-19 virus- (present on admission)  Assessment & Plan  This is his first infection with COVID. He has had vaccinations x 4.   -Decadron 6 mg daily for 10 days or until off oxygen   -Baricitinib 4 mg daily for 14 days or until off oxygen, needs daily CBC and CMP, quantiferon pending  -SSI due to high-dose steroids  -Isolation precautions    Acute respiratory failure with hypoxia (HCC)- (present on admission)  Assessment & Plan  Secondary to COVID-19 virus infection and possible COPD exacerbation. Initially required up to 35 HF and was therefore admitted to [facility]. Transferred to floor on 5/02 on 6L NC.  -Chest x-ray showed groundglass and interstitial opacities.  -Continuous pulse oxymetry, titrate to 88-92%  -Home oxygen eval prior to discharge    Mass of left lung- (present on admission)  Assessment & Plan  Procalcitonin negative. May be COVID. Patient denies weight loss.   -Obtain CT thorax w/o contrast  -Consider outpatient CXR in 4-6 weeks, which can be done through Dr. Ramirez's office    Acute exacerbation of chronic obstructive pulmonary disease (COPD) (HCC)- (present on admission)  Assessment & Plan  History of COPD using prn albuterol and 1/2 ppd current smoker. On room air for the last year  -Steroids as above    Primary hypertension- (present on admission)  Assessment & Plan  Home norvasc and lisinopril restarted    Tobacco abuse  Assessment & Plan  Cessation discussed, on NRT       VTE prophylaxis: Xarelto 10 mg daily as prophylaxis    I have performed a physical exam and reviewed and updated ROS and Plan today (5/3/2024). In review of yesterday's note (5/2/2024), there are no changes except as  documented above.

## 2024-05-03 NOTE — CARE PLAN
The patient is Stable - Low risk of patient condition declining or worsening    Shift Goals  Clinical Goals: Wean oxygen to goal 4-5 L  Patient Goals: rest, comfort, ambulate as tolerated  Family Goals: ANAHI    Progress made toward(s) clinical / shift goals:    Problem: Knowledge Deficit - Standard  Goal: Patient and family/care givers will demonstrate understanding of plan of care, disease process/condition, diagnostic tests and medications  Outcome: Progressing  Note: Patient remained A&Ox4 throughout the shift and has a good understanding of his plan of care and is able to verbalize it to nursing staff.      Problem: Impaired Gas Exchange  Goal: Patient will demonstrate improved ventilation and adequate oxygenation and participate in treatment regimen within the level of ability/situation.  Outcome: Progressing  Flowsheets (Taken 5/3/2024 0149)  Impaired Gas Exchange:   Assesed rate/rhythm/depth of effort of respirations   Assessed oxygenation   Administered/titrated oxygen   Positioned patient for maximum ventilatory efficiency   Turn, cough, and deep breathe   Assessed vital signs, pulse oximetry   Assessed breath sounds  Note: Patient's oxygen continues to be titrated down, though the patient still experiences O2 desaturation with movement and ambulation. Patient's oxygen demand continues to decrease throughout the shift, though not back down to his baseline RA.

## 2024-05-03 NOTE — PROGRESS NOTES
4 Eyes Skin Assessment Completed by Jaime Hill, RN and JOYCELYN La.    Head WDL  Ears Redness and Blanching  Nose WDL  Mouth WDL  Neck WDL  Breast/Chest WDL  Shoulder Blades WDL  Spine WDL  (R) Arm/Elbow/Hand Redness and Bruising  (L) Arm/Elbow/Hand Redness and Bruising  Abdomen WDL  Groin WDL  Scrotum/Coccyx/Buttocks WDL  (R) Leg Bruising  (L) Leg Bruising  (R) Heel/Foot/Toe Redness and Bruising  (L) Heel/Foot/Toe Redness and Bruising          Devices In Places ECG, Tele Box, Blood Pressure Cuff, Pulse Ox, and Nasal Cannula      Interventions In Place NC W/Ear Foams, Pillows, and Low Air Loss Mattress    Possible Skin Injury No    Pictures Uploaded Into Epic N/A  Wound Consult Placed N/A  RN Wound Prevention Protocol Ordered No

## 2024-05-03 NOTE — DISCHARGE PLANNING
HTH/SCP TCN chart review completed. Collaborated with SATHYA Woodson prior to meeting with the pt. The most current review of medical record, knowledge of pt's PLOF and social support, LACE+ score of 78, 6 clicks scores of 24 mobility were considered.      Pt seen at bedside. Introduced TCN program. Provided education regarding post acute levels of care. Education provided regarding case management policy for blanket SNF referrals. Discussed HTH/SCP plan benefits. Pt verbalizes understanding.     Pt reports he is anticipating he will be functionally capable of dc to home once medically cleared. Per pt report and review he remains ambulatory with no AD. He does currently have increased supplemental 02 needs and reports he does not use supplemental 02 at baseline though is agreeable if indicated at time of medical clearance. He reports he would also be agreeable to HH services if indicated as well.  Relayed above information to CM as well.     No additional provider requests at this time. Given aforementioned, choice proactively obtained for HH, DME (02), faxed to [facility] and CM aware. TCN will continue to follow and collaborate with discharge planning team as additional post acute needs arise. Thank you.     Completed today:  Choice obtained: HH ([facility]), DME (02 via [employer] if indicated)  SCP with [facility] PCP. Discussed possible outpatient PCP transitional visit and pt in agreement if indicated; TCN sent information to

## 2024-05-04 LAB
ALBUMIN SERPL BCP-MCNC: 3.3 G/DL (ref 3.2–4.9)
ALBUMIN/GLOB SERPL: 0.9 G/DL
ALP SERPL-CCNC: 67 U/L (ref 30–99)
ALT SERPL-CCNC: 155 U/L (ref 2–50)
ANION GAP SERPL CALC-SCNC: 11 MMOL/L (ref 7–16)
AST SERPL-CCNC: 50 U/L (ref 12–45)
BACTERIA BLD CULT: NORMAL
BACTERIA BLD CULT: NORMAL
BASOPHILS # BLD AUTO: 0.1 % (ref 0–1.8)
BASOPHILS # BLD: 0.01 K/UL (ref 0–0.12)
BILIRUB SERPL-MCNC: 0.3 MG/DL (ref 0.1–1.5)
BUN SERPL-MCNC: 23 MG/DL (ref 8–22)
CALCIUM ALBUM COR SERPL-MCNC: 9.4 MG/DL (ref 8.5–10.5)
CALCIUM SERPL-MCNC: 8.8 MG/DL (ref 8.5–10.5)
CHLORIDE SERPL-SCNC: 101 MMOL/L (ref 96–112)
CO2 SERPL-SCNC: 20 MMOL/L (ref 20–33)
CREAT SERPL-MCNC: 0.61 MG/DL (ref 0.5–1.4)
EOSINOPHIL # BLD AUTO: 0.04 K/UL (ref 0–0.51)
EOSINOPHIL NFR BLD: 0.4 % (ref 0–6.9)
ERYTHROCYTE [DISTWIDTH] IN BLOOD BY AUTOMATED COUNT: 42.8 FL (ref 35.9–50)
GFR SERPLBLD CREATININE-BSD FMLA CKD-EPI: 101 ML/MIN/1.73 M 2
GLOBULIN SER CALC-MCNC: 3.5 G/DL (ref 1.9–3.5)
GLUCOSE SERPL-MCNC: 106 MG/DL (ref 65–99)
HCT VFR BLD AUTO: 42 % (ref 42–52)
HGB BLD-MCNC: 13.6 G/DL (ref 14–18)
IMM GRANULOCYTES # BLD AUTO: 0.1 K/UL (ref 0–0.11)
IMM GRANULOCYTES NFR BLD AUTO: 0.9 % (ref 0–0.9)
LYMPHOCYTES # BLD AUTO: 1.55 K/UL (ref 1–4.8)
LYMPHOCYTES NFR BLD: 14.4 % (ref 22–41)
MCH RBC QN AUTO: 27.8 PG (ref 27–33)
MCHC RBC AUTO-ENTMCNC: 32.4 G/DL (ref 32.3–36.5)
MCV RBC AUTO: 85.9 FL (ref 81.4–97.8)
MONOCYTES # BLD AUTO: 0.43 K/UL (ref 0–0.85)
MONOCYTES NFR BLD AUTO: 4 % (ref 0–13.4)
NEUTROPHILS # BLD AUTO: 8.66 K/UL (ref 1.82–7.42)
NEUTROPHILS NFR BLD: 80.2 % (ref 44–72)
NRBC # BLD AUTO: 0 K/UL
NRBC BLD-RTO: 0 /100 WBC (ref 0–0.2)
PLATELET # BLD AUTO: 376 K/UL (ref 164–446)
PMV BLD AUTO: 9.2 FL (ref 9–12.9)
POTASSIUM SERPL-SCNC: 4.6 MMOL/L (ref 3.6–5.5)
PROT SERPL-MCNC: 6.8 G/DL (ref 6–8.2)
RBC # BLD AUTO: 4.89 M/UL (ref 4.7–6.1)
SIGNIFICANT IND 70042: NORMAL
SIGNIFICANT IND 70042: NORMAL
SITE SITE: NORMAL
SITE SITE: NORMAL
SODIUM SERPL-SCNC: 132 MMOL/L (ref 135–145)
SOURCE SOURCE: NORMAL
SOURCE SOURCE: NORMAL
WBC # BLD AUTO: 10.8 K/UL (ref 4.8–10.8)

## 2024-05-04 PROCEDURE — 99406 BEHAV CHNG SMOKING 3-10 MIN: CPT | Mod: 25,GC | Performed by: HOSPITALIST

## 2024-05-04 PROCEDURE — 99233 SBSQ HOSP IP/OBS HIGH 50: CPT | Mod: GC | Performed by: HOSPITALIST

## 2024-05-04 RX ADMIN — TRAZODONE HYDROCHLORIDE 50 MG: 50 TABLET ORAL at 23:02

## 2024-05-04 RX ADMIN — BARICITINIB 4 MG: 2 TABLET, FILM COATED ORAL at 16:10

## 2024-05-04 RX ADMIN — RIVAROXABAN 10 MG: 10 TABLET, FILM COATED ORAL at 16:10

## 2024-05-04 RX ADMIN — NICOTINE 14 MG: 14 PATCH TRANSDERMAL at 04:35

## 2024-05-04 RX ADMIN — ROSUVASTATIN CALCIUM 10 MG: 5 TABLET, FILM COATED ORAL at 16:09

## 2024-05-04 RX ADMIN — LISINOPRIL 20 MG: 20 TABLET ORAL at 04:35

## 2024-05-04 RX ADMIN — ASPIRIN 81 MG: 81 TABLET, CHEWABLE ORAL at 16:10

## 2024-05-04 RX ADMIN — DEXAMETHASONE 6 MG: 6 TABLET ORAL at 04:35

## 2024-05-04 RX ADMIN — Medication 10 MG: at 23:01

## 2024-05-04 ASSESSMENT — FIBROSIS 4 INDEX: FIB4 SCORE: 0.78

## 2024-05-04 ASSESSMENT — ENCOUNTER SYMPTOMS
EYES NEGATIVE: 1
CONSTITUTIONAL NEGATIVE: 1
CARDIOVASCULAR NEGATIVE: 1
MUSCULOSKELETAL NEGATIVE: 1
SHORTNESS OF BREATH: 1
NEUROLOGICAL NEGATIVE: 1
GASTROINTESTINAL NEGATIVE: 1
PSYCHIATRIC NEGATIVE: 1

## 2024-05-04 NOTE — DISCHARGE INSTRUCTIONS
You were hospitalized for COVID pneumonia. Please finish the decadron as prescribed.     We prescribed a new inhaler for you to use daily, which is for COPD, and you can continue to use your albuterol as needed.    For the lung mass, please get the CT-guided needle biopsy, which is done by interventional radiology, next week then follow up with pulmonology and oncology.     Discharge Instructions    Discharged to home by car with relative. Discharged via wheelchair, hospital escort: Yes.  Special equipment needed: Not Applicable    Be sure to schedule a follow-up appointment with your primary care doctor or any specialists as instructed.     Discharge Plan:   Diet Plan: Discussed  Activity Level: Discussed  Confirmed Follow up Appointment: Patient to Call and Schedule Appointment  Confirmed Symptoms Management: Discussed  Medication Reconciliation Updated: Yes    I understand that a diet low in cholesterol, fat, and sodium is recommended for good health. Unless I have been given specific instructions below for another diet, I accept this instruction as my diet prescription.   Other diet: regular    Special Instructions: None    -Is this patient being discharged with medication to prevent blood clots?  No    Is patient discharged on Warfarin / Coumadin?   No     Home Oxygen Use, Adult  When a medical condition keeps you from getting enough oxygen, your health care provider may instruct you to take extra oxygen at home. Your health care provider will let you know:  When to take oxygen.  How long to take oxygen.  How quickly oxygen should be delivered (flow rate), in liters per minute (LPM or L/M).  Home oxygen can be given through:  A mask.  A nasal cannula. This is a device or tube that goes in the nostrils.  A transtracheal catheter. This is a small, thin tube placed in the windpipe (trachea).  A breathing tube (tracheostomy tube) that is surgically placed in the windpipe. This may be used in severe cases.  These  devices are connected with tubing to an oxygen source, such as:  A tank. Tanks hold oxygen in gas form. They must be replaced when the oxygen is used up.  A liquid oxygen device. This holds oxygen in liquid form. Liquid oxygen is very cold. It must be replaced when the oxygen is used up.  An oxygen concentrator machine. This filters oxygen in the room. There are two types of oxygen concentrator machines--stationary and portable.  A stationary oxygen concentrator machine plugs into the main electricity supply at your home. You must have a backup cylinder of oxygen in case the power goes out.  A portable oxygen concentrator machine is smaller in size and more lightweight. This machine uses battery supply and can be used outside the home.  Work with your health care provider to find equipment that works best for you and your lifestyle.  What are the risks?  Delivery of supplemental oxygen is generally safe. However, some risks include:  Fire. This can happen if the oxygen is exposed to a heat source, flame, or spark.  Injury to skin. This can happen if liquid oxygen touches your skin.  Damage to the lungs or other organs. This can happen from getting too little or too much oxygen.  Supplies needed:  To use oxygen, you will need:  A mask, nasal cannula, transtracheal catheter, or tracheostomy.  An oxygen tank, a liquid oxygen device, or an oxygen concentrator.  The tape that your health care provider recommends (optional).  Your health care provider may also recommend:  A humidifier to warm and moisten the oxygen delivered. This will depend on how much oxygen you need and the type of home oxygen device you use.  A pulse oximeter. This device measures the percentage of oxygen in your blood.  How to use oxygen  Your health care provider or a person from your medical device company will show you how to use your oxygen device. Follow his or her instructions. The instructions may look something like this:  Wash your hands  with soap and water.  If you use an oxygen concentrator, make sure it is plugged in.  Place one end of the tube into the port on the tank, device, or machine.  Place the mask over your nose and mouth. Or, place the nasal cannula and secure it with tape if instructed. If you use a tracheostomy or transtracheal catheter, connect it to the oxygen source as directed.  Make sure the liter-flow setting on the machine is at the level prescribed by your health care provider.  Turn on the machine or adjust the knob on the tank or device to the correct liter-flow setting.  When you are done, turn off and unplug the machine, or turn the knob to OFF.  How to clean and care for the oxygen supplies  Nasal cannula  Clean it with a warm, wet cloth daily or as needed.  Wash it with a liquid soap once a week.  Rinse it thoroughly once or twice a week.  Air-dry it.  Replace it every 2-4 weeks.  If you have an infection, such as a cold or pneumonia, change the cannula when you get better.  Mask  Replace it every 2-4 weeks.  If you have an infection, such as a cold or pneumonia, change the mask when you get better.  Humidifier bottle  Wash the bottle between each refill:  Wash it with soap and warm water.  Rinse it thoroughly.  Clean it and its top with a disinfectant .  Air-dry it.  Make sure it is dry before you refill it.  Oxygen concentrator  Clean the air filter at least twice a week according to directions from your home medical equipment and service company.  Wipe down the cabinet every day. To do this:  Unplug the unit.  Wipe down the cabinet with a damp cloth.  Dry the cabinet.  Other equipment  Change any extra tubing every 1-3 months.  Follow instructions from your health care provider about taking care of any other equipment.  Safety tips  Fire safety tips    Keep your oxygen and oxygen supplies at least 6 ft (2 m) away from sources of heat, flames, and latham at all times.  Do not allow smoking near your oxygen. Put  "up \"no smoking\" signs in your home. Avoid smoking areas when in public.  Do not use materials that can burn (are flammable) while you use oxygen. This includes:  Petroleum jelly.  Hair spray or other aerosol sprays.  Rubbing alcohol.  Hand .  When you go to a restaurant with portable oxygen, ask to be seated in the non-smoking section.  Keep a fire extinguisher close by. Let your fire department know that you have oxygen in your home.  Test your home smoke detectors regularly.  Traveling  Secure your oxygen tank in the vehicle so that it does not move around. Follow instructions from your medical device company about how to safely secure your tank.  Make sure you have enough oxygen for the amount of time you will be away from home.  If you are planning to travel by public transportation (airplane, train, bus, or boat), contact the company to find out if it allows the use of an approved portable oxygen concentrator. You may also need documents from your health care provider and medical device company before you travel.  General safety tips  If you use an oxygen cylinder, make sure it is in a stand or secured to an object that will not move (fixed object).  If you use liquid oxygen, make sure its container is kept upright at all times.  If you use an oxygen concentrator:  Tell your electric company. Make sure you are given priority service in the event that your power goes out.  Avoid using extension cords if possible.  Follow these instructions at home:  Use oxygen only as told by your health care provider.  Do not use alcohol or other drugs that make you relax (sedating drugs) unless instructed. They can slow down your breathing rate and make it hard to get in enough oxygen.  Know how and when to order a refill of oxygen.  Always keep a spare tank of oxygen. Plan ahead for holidays when you may not be able to get a prescription filled.  Use water-based lubricants on your lips or nostrils. Do not use " oil-based products like petroleum jelly.  To prevent skin irritation on your cheeks or behind your ears, tuck some gauze under the tubing.  Where to find more information  American Lung Association: www.lung.org/oxygen  Contact a health care provider if:  You get headaches often.  You have a lasting cough.  You are restless or have anxiety.  You develop an illness that affects your breathing.  You cannot exercise at your regular level.  You have a fever.  You have persistent redness under your nose.  Get help right away if:  You are confused.  You are sleepy all the time.  You have blue lips or fingernails.  You have difficult or irregular breathing that is getting worse.  You are struggling to breathe.  These symptoms may represent a serious problem that is an emergency. Do not wait to see if the symptoms will go away. Get medical help right away. Call your local emergency services (911 in the [place] Do not drive yourself to the hospital.  Summary  Your health care provider or a person from your medical device company will show you how to use your oxygen device. Follow his or her instructions.  If you use an oxygen concentrator, make sure it is plugged in.  Make sure the liter-flow setting on the machine is at the level prescribed by your health care provider.  Use oxygen only as told by your health care provider.  Keep your oxygen and oxygen supplies at least 6 ft (2 m) away from sources of heat, flames, and latham at all times.  This information is not intended to replace advice given to you by your health care provider. Make sure you discuss any questions you have with your health care provider.  Document Revised: 04/20/2023 Document Reviewed: 12/15/2020  Elsevier Patient Education © 2023 [employer]    Pulse Oximetry  Pulse oximetry is a technology that measures the oxygen saturation level in the blood through the skin without the need for a blood sample. This may also be referred to as oxygen level. The  device used to measure the oxygen level is called a pulse oximeter. This device also measures the heart rate (pulse). Pulse oximetry helps to assess:  Current oxygen level, including low blood oxygen levels (hypoxemia).  The need for or effectiveness of oxygen therapy or other treatments, including the need for more or less oxygen.  Blood flow (circulation) to different parts of the body.  Oxygen level during activity.  What are the benefits?  Benefits of pulse oximetry include:  Not needing a blood sample to measure the oxygen level.  The test does not hurt.  Having the option to measure oxygen level continuously or as needed.  An alarm to tell you when your oxygen levels are out of range if pulse oximetry is continuous.  What are the risks?  The risks associated with pulse oximetry are rare. However, there is a risk of skin sores if the sensor is left in the same spot for long periods of time.  What happens during the test?  Pulse oximetry is done using a pulse oximeter device with a light sensor attached.  One side of the sensor passes a red beam of light through the skin, and the other side of the sensor measures the amount of light that is absorbed while it passes through. The sensor is connected to the pulse oximeter.  The pulse oximeter uses the information from the sensor to calculate the percentage of blood cells carrying oxygen in the blood.  The sensor is placed on an area of the body where the beam of light can easily pass through the skin.  For adults and children, the sensor is usually a clip placed on a finger, with the light centered over the nail bed. The sensor may also be placed on an earlobe or toe.  For babies, the sensor is usually a sticky tape strip that is placed around areas such as the sole of a foot or the palm of a hand.  What can I expect after the test?  The pulse oximetry results should be available right away.  If your pulse oximetry results are low, you may need to use oxygen.  The  pulse oximetry results are a percentage. The normal value may vary depending on your medical condition.  Most healthy people have oxygen saturation levels between 95% and 100%.  Low oxygen saturation levels are below 90%. This may happen in people with lung conditions, such as long-term (chronic) obstructive pulmonary disease (COPD).  What can affect the accuracy of the oximetry reading?  Pulse oximetry depends on the amount of light absorbed as it passes through skin tissue. Because of this, the accuracy of this measurement can be affected by one or more of the following:  Factors such as:  Dark nail polish or artificial nails.  Very dark skin.  Shivering or too much movement.  Bright, artificial lighting.  Chronic smoking and recent breathing-in (inhalation) of smoke or carbon monoxide.  Conditions such as:  Cool skin or poor blood flow to the area where the sensor is placed.  Sweating or very warm skin in the area where the sensor is placed.  Anemia, or low levels of hemoglobin or red blood cells.  Polycythemia vera. This is a bone marrow disease that causes high levels of red blood cells, white blood cells, and platelets.  If a more accurate measurement is needed, a blood sample will be taken.  Summary  Pulse oximetry uses a device to measure the oxygen level in the blood.  Pulse oximetry does not hurt. The risks associated with pulse oximetry are rare.  Most healthy people have oxygen levels between 95% and 100%. A low oxygen saturation level is below 90%.  People with low oxygen levels may need supplemental oxygen.  This information is not intended to replace advice given to you by your health care provider. Make sure you discuss any questions you have with your health care provider.  Document Revised: 08/31/2022 Document Reviewed: 03/22/2022  [employer] Patient Education © 2023 [employer]    COVID-19  COVID-19, or coronavirus disease 2019, is an infection that is caused by a new (novel) coronavirus called  SARS-CoV-2. COVID-19 can cause many symptoms. In some people, the virus may not cause any symptoms. In others, it may cause mild or severe symptoms. Some people with severe infection develop severe disease.  What are the causes?  This illness is caused by a virus. The virus may be in the air as tiny specks of fluid (aerosols) or droplets, or it may be on surfaces. You may catch the virus by:  Breathing in droplets from an infected person. Droplets can be spread by a person breathing, speaking, singing, coughing, or sneezing.  Touching something, like a table or a doorknob, that has virus on it (is contaminated) and then touching your mouth, nose, or eyes.  What increases the risk?  Risk for infection:  You are more likely to get infected with the COVID-19 virus if:  You are within 6 ft (1.8 m) of a person with COVID-19 for 15 minutes or longer.  You are providing care for a person who is infected with COVID-19.  You are in close personal contact with other people. Close personal contact includes hugging, kissing, or sharing eating or drinking utensils.  Risk for serious illness caused by COVID-19:  You are more likely to get seriously ill from the COVID-19 virus if:  You have cancer.  You have a long-term (chronic) disease, such as:  Chronic lung disease. This includes pulmonary embolism, chronic obstructive pulmonary disease, and cystic fibrosis.  Long-term disease that lowers your body's ability to fight infection (immunocompromise).  Serious cardiac conditions, such as heart failure, coronary artery disease, or cardiomyopathy.  Diabetes.  Chronic kidney disease.  Liver diseases. These include cirrhosis, nonalcoholic fatty liver disease, alcoholic liver disease, or autoimmune hepatitis.  You have obesity.  You are pregnant or were recently pregnant.  You have sickle cell disease.  What are the signs or symptoms?  Symptoms of this condition can range from mild to severe. Symptoms may appear any time from 2 to 14  days after being exposed to the virus. They include:  Fever or chills.  Shortness of breath or trouble breathing.  Feeling tired or very tired.  Headaches, body aches, or muscle aches.  Runny or stuffy nose, sneezing, coughing, or sore throat.  New loss of taste or smell. This is rare.  Some people may also have stomach problems, such as nausea, vomiting, or diarrhea.  Other people may not have any symptoms of COVID-19.  How is this diagnosed?  This condition may be diagnosed by testing samples to check for the COVID-19 virus. The most common tests are the PCR test and the antigen test. Tests may be done in the lab or at home. They include:  Using a swab to take a sample of fluid from the back of your nose and throat (nasopharyngeal fluid), from your nose, or from your throat.  Testing a sample of saliva from your mouth.  Testing a sample of coughed-up mucus from your lungs (sputum).  How is this treated?  Treatment for COVID-19 infection depends on the severity of the condition.  Mild symptoms can be managed at home with rest, fluids, and over-the-counter medicines.  Serious symptoms may be treated in a hospital intensive care unit (ICU). Treatment in the ICU may include:  Supplemental oxygen. Extra oxygen is given through a tube in the nose, a face mask, or a brown.  Medicines. These may include:  Antivirals, such as monoclonal antibodies. These help your body fight off certain viruses that can cause disease.  Anti-inflammatories, such as corticosteroids. These reduce inflammation and suppress the immune system.  Antithrombotics. These prevent or treat blood clots, if they develop.  Convalescent plasma. This helps boost your immune system, if you have an underlying immunosuppressive condition or are getting immunosuppressive treatments.  Prone positioning. This means you will lie on your stomach. This helps oxygen to get into your lungs.  Infection control measures.  If you are at risk for more serious illness  caused by COVID-19, your health care provider may prescribe two long-acting monoclonal antibodies, given together every 6 months.  How is this prevented?  To protect yourself:  Use preventive medicine (pre-exposure prophylaxis). You may get pre-exposure prophylaxis if you have moderate or severe immunocompromise.  Get vaccinated. Anyone 6 months old or older who meets guidelines can get a COVID-19 vaccine or vaccine series. This includes people who are pregnant or making breast milk (lactating).  Get an added dose of COVID-19 vaccine after your first vaccine or vaccine series if you have moderate to severe immunocompromise. This applies if you have had a solid organ transplant or have been diagnosed with an immunocompromising condition.  You should get the added dose 4 weeks after you got the first COVID-19 vaccine or vaccine series.  If you get an mRNA vaccine, you will need a 3-dose primary series.  If you get the J&J/Mario vaccine, you will need a 2-dose primary series, with the second dose being an mRNA vaccine.  Talk to your health care provider about getting experimental monoclonal antibodies. This treatment is approved under emergency use authorization to prevent severe illness before or after being exposed to the COVID-19 virus. You may be given monoclonal antibodies if:  You have moderate or severe immunocompromise. This includes treatments that lower your immune response. People with immunocompromise may not develop protection against COVID-19 when they are vaccinated.  You cannot be vaccinated. You may not get a vaccine if you have a severe allergic reaction to the vaccine or its components.  You are not fully vaccinated.  You are in a facility where COVID-19 is present and:  Are in close contact with a person who is infected with the COVID-19 virus.  Are at high risk of being exposed to the COVID-19 virus.  You are at risk of illness from new variants of the COVID-19 virus.  To protect others:  If you  have symptoms of COVID-19, take steps to prevent the virus from spreading to others.  Stay home. Leave your house only to get medical care. Do not use public transit, if possible.  Do not travel while you are sick.  Wash your hands often with soap and water for at least 20 seconds. If soap and water are not available, use alcohol-based hand .  Make sure that all people in your household wash their hands well and often.  Cough or sneeze into a tissue or your sleeve or elbow. Do not cough or sneeze into your hand or into the air.  Where to find more information  Centers for Disease Control and Prevention: www.cdc.gov/coronavirus  World Health Organization: www.who.int/health-topics/coronavirus  Get help right away if:  You have trouble breathing.  You have pain or pressure in your chest.  You are confused.  You have bluish lips and fingernails.  You have trouble waking from sleep.  You have symptoms that get worse.  These symptoms may be an emergency. Get help right away. Call 911.  Do not wait to see if the symptoms will go away.  Do not drive yourself to the hospital.  Summary  COVID-19 is an infection that is caused by a new coronavirus.  Sometimes, there are no symptoms. Other times, symptoms range from mild to severe. Some people with a severe COVID-19 infection develop severe disease.  The virus that causes COVID-19 can spread from person to person through droplets or aerosols from breathing, speaking, singing, coughing, or sneezing.  Mild symptoms of COVID-19 can be managed at home with rest, fluids, and over-the-counter medicines.  This information is not intended to replace advice given to you by your health care provider. Make sure you discuss any questions you have with your health care provider.  Document Revised: 12/08/2022 Document Reviewed: 12/08/2022  [employer]vier Patient Education © 2023 [employer]    Community-Acquired Pneumonia, Adult  Pneumonia is a lung infection that causes inflammation  and the buildup of mucus and fluids in the lungs. This may cause coughing and difficulty breathing. Community-acquired pneumonia is pneumonia that develops in people who are not, and have not recently been, in a hospital or other health care facility.  Usually, pneumonia develops as a result of an illness that is caused by a virus, such as the common cold and the flu (influenza). It can also be caused by bacteria or fungi. While the common cold and influenza can pass from person to person (are contagious), pneumonia itself is not considered contagious.  What are the causes?  This condition may be caused by:  Viruses.  Bacteria.  Fungi.  What increases the risk?  The following factors may make you more likely to develop this condition:  Being over age 65 or having certain medical conditions, such as:  A long-term (chronic) disease, such as: chronic obstructive pulmonary disease (COPD), asthma, heart failure, diabetes, or kidney disease.  A condition that increases the risk of breathing in (aspirating) mucus and other fluids from your mouth and nose.  A weakened body defense system (immune system).  Having had your spleen removed (splenectomy). The spleen is the organ that helps fight germs and infections.  Not cleaning your teeth and gums well (poor dental hygiene).  Using tobacco products.  Traveling to places where germs that cause pneumonia are present or being near certain animals or animal habitats that could have germs that cause pneumonia.  What are the signs or symptoms?  Symptoms of this condition include:  A dry cough or a wet (productive) cough.  A fever, sweating, or chills.  Chest pain, especially when breathing deeply or coughing.  Fast breathing, difficulty breathing, or shortness of breath.  Tiredness (fatigue) and muscle aches.  How is this diagnosed?  This condition may be diagnosed based on your medical history or a physical exam. You may also have tests, including:  Imaging, such as a chest X-ray  or lung ultrasound.  Tests of:  The level of oxygen and other gases in your blood.  Mucus from your lungs (sputum).  Fluid around your lungs (pleural fluid).  Your urine.  How is this treated?  Treatment for this condition depends on many factors, such as the cause of your pneumonia, your medicines, and other medical conditions that you have.  For most adults, pneumonia may be treated at home. In some cases, treatment must happen in a hospital and may include:  Medicines that are given by mouth (orally) or through an IV, including:  Antibiotic medicines, if bacteria caused the pneumonia.  Medicines that kill viruses (antiviral medicines), if a virus caused the pneumonia.  Oxygen therapy.  Severe pneumonia, although rare, may require the following treatments:  Mechanical ventilation.This procedure uses a machine to help you breathe if you cannot breathe well on your own or maintain a safe level of blood oxygen.  Thoracentesis. This procedure removes any buildup of pleural fluid to help with breathing.  Follow these instructions at home:    Medicines  Take over-the-counter and prescription medicines only as told by your health care provider.  Take cough medicine only if you have trouble sleeping. Cough medicine can prevent your body from removing mucus from your lungs.  If you were prescribed antibiotics, take them as told by your health care provider. Do not stop taking the antibiotic even if you start to feel better.  Lifestyle         Do not drink alcohol.  Do not use any products that contain nicotine or tobacco. These products include cigarettes, chewing tobacco, and vaping devices, such as e-cigarettes. If you need help quitting, ask your health care provider.  Eat a healthy diet. This includes plenty of vegetables, fruits, whole grains, low-fat dairy products, and lean protein.  General instructions  Rest a lot and get at least 8 hours of sleep each night.  Sleep in a partly upright position at night. Place a  few pillows under your head or sleep in a reclining chair.  Return to your normal activities as told by your health care provider. Ask your health care provider what activities are safe for you.  Drink enough fluid to keep your urine pale yellow. This helps to thin the mucus in your lungs.  If your throat is sore, gargle with a mixture of salt and water 3-4 times a day or as needed. To make salt water, completely dissolve ½-1 tsp (3-6 g) of salt in 1 cup (237 mL) of warm water.  Keep all follow-up visits.  How is this prevented?  You can lower your risk of developing community-acquired pneumonia by:  Getting the pneumonia vaccine. There are different types and schedules of pneumonia vaccines. Ask your health care provider which option is best for you. Consider getting the pneumonia vaccine if:  You are older than 65 years of age.  You are 19-65 years of age and are receiving cancer treatment, have chronic lung disease, or have other medical conditions that affect your immune system. Ask your health care provider if this applies to you.  Getting your influenza vaccine every year. Ask your health care provider which type of vaccine is best for you.  Getting regular dental checkups.  Washing your hands often with soap and water for at least 20 seconds. If soap and water are not available, use hand .  Contact a health care provider if:  You have a fever.  You have trouble sleeping because you cannot control your cough with cough medicine.  Get help right away if:  Your shortness of breath becomes worse.  Your chest pain increases.  Your sickness becomes worse, especially if you are an older adult or have a weak immune system.  You cough up blood.  These symptoms may be an emergency. Get help right away. Call 911.  Do not wait to see if the symptoms will go away.  Do not drive yourself to the hospital.  Summary  Pneumonia is an infection of the lungs.  Community-acquired pneumonia develops in people who have not  been in the hospital. It can be caused by bacteria, viruses, or fungi.  This condition may be treated with antibiotics or antiviral medicines.  Severe pneumonia may require a hospital stay and treatment to help with breathing.  This information is not intended to replace advice given to you by your health care provider. Make sure you discuss any questions you have with your health care provider.  Document Revised: 02/15/2023 Document Reviewed: 02/15/2023  [employer] Patient Education © 2023 [employer]    Dexamethasone tablets  What is this medication?  DEXAMETHASONE (dex a METH a sone) is a corticosteroid. It is commonly used to treat inflammation of the skin, joints, lungs, and other organs. Common conditions treated include asthma, allergies, and arthritis. It is also used for other conditions, such as blood disorders and diseases of the adrenal glands.  This medicine may be used for other purposes; ask your health care provider or pharmacist if you have questions.  This medicine may be used for other purposes; ask your health care provider or pharmacist if you have questions.  COMMON BRAND NAME(S): CUSHINGS SYNDROME DIAGNOSTIC, Decadron, Dexabliss, DexPak Jr TaperPak, DexPak TaperPak, Dxevo, Hemady, HiDex, TaperDex, ZCORT, Zema-Yann, ZoDex, ZonaCort 11 Day, ZonaCort 7 Day  What should I tell my care team before I take this medication?  They need to know if you have any of these conditions:  Cushing's syndrome  diabetes  glaucoma  heart disease  high blood pressure  infection like herpes, measles, tuberculosis, or chickenpox  kidney disease  liver disease  mental illness  myasthenia gravis  osteoporosis  previous heart attack  seizures  stomach or intestine problems  thyroid disease  an unusual or allergic reaction to dexamethasone, corticosteroids, other medicines, lactose, foods, dyes, or preservatives  pregnant or trying to get pregnant  breast-feeding  How should I use this medication?  Take this medicine by  mouth with a drink of water. Follow the directions on the prescription label. Take it with food or milk to avoid stomach upset. If you are taking this medicine once a day, take it in the morning. Do not take more medicine than you are told to take. Do not suddenly stop taking your medicine because you may develop a severe reaction. Your doctor will tell you how much medicine to take. If your doctor wants you to stop the medicine, the dose may be slowly lowered over time to avoid any side effects.  Talk to your pediatrician regarding the use of this medicine in children. Special care may be needed.  Patients over 65 years old may have a stronger reaction and need a smaller dose.  Overdosage: If you think you have taken too much of this medicine contact a poison control center or emergency room at once.  NOTE: This medicine is only for you. Do not share this medicine with others.  Overdosage: If you think you have taken too much of this medicine contact a poison control center or emergency room at once.  NOTE: This medicine is only for you. Do not share this medicine with others.  What if I miss a dose?  If you miss a dose, take it as soon as you can. If it is almost time for your next dose, talk to your doctor or health care professional. You may need to miss a dose or take an extra dose. Do not take double or extra doses without advice.  What may interact with this medication?  Do not take this medicine with any of the following medications:  live virus vaccines  This medicine may also interact with the following medications:  aminoglutethimide  amphotericin B  aspirin and aspirin-like medicines  certain antibiotics like erythromycin, clarithromycin, and troleandomycin  certain antivirals for HIV or hepatitis  certain medicines for seizures like carbamazepine, phenobarbital, phenytoin  certain medicines to treat myasthenia gravis  cholestyramine  cyclosporine  digoxin  diuretics  ephedrine  female hormones, like  estrogen or progestins and birth control pills  insulin or other medicines for diabetes  isoniazid  ketoconazole  medicines that relax muscles for surgery  mifepristone  NSAIDs, medicines for pain and inflammation, like ibuprofen or naproxen  rifampin  skin tests for allergies  thalidomide  vaccines  warfarin  This list may not describe all possible interactions. Give your health care provider a list of all the medicines, herbs, non-prescription drugs, or dietary supplements you use. Also tell them if you smoke, drink alcohol, or use illegal drugs. Some items may interact with your medicine.  This list may not describe all possible interactions. Give your health care provider a list of all the medicines, herbs, non-prescription drugs, or dietary supplements you use. Also tell them if you smoke, drink alcohol, or use illegal drugs. Some items may interact with your medicine.  What should I watch for while using this medication?  Visit your health care professional for regular checks on your progress. Tell your health care professional if your symptoms do not start to get better or if they get worse. Your condition will be monitored carefully while you are receiving this medicine.  Wear a medical ID bracelet or chain. Carry a card that describes your disease and details of your medicine and dosage times.  This medicine may increase your risk of getting an infection. Call your health care professional for advice if you get a fever, chills, or sore throat, or other symptoms of a cold or flu. Do not treat yourself. Try to avoid being around people who are sick. Call your health care professional if you are around anyone with measles, chickenpox, or if you develop sores or blisters that do not heal properly.  If you are going to need surgery or other procedures, tell your doctor or health care professional that you have taken this medicine within the last 12 months.  Ask your doctor or health care professional about your  diet. You may need to lower the amount of salt you eat.  This medicine may increase blood sugar. Ask your healthcare provider if changes in diet or medicines are needed if you have diabetes.  What side effects may I notice from receiving this medication?  Side effects that you should report to your doctor or health care professional as soon as possible:  allergic reactions like skin rash, itching or hives, swelling of the face, lips, or tongue  bloody or black, tarry stools  changes in emotions or moods  changes in vision  confusion, excitement, restlessness  depressed mood  eye pain  hallucinations  fever or chills, cough, sore throat, pain or difficulty passing urine  muscle weakness  severe or sudden stomach or belly pain  signs and symptoms of high blood sugar such as being more thirsty or hungry or having to urinate more than normal. You may also feel very tired or have blurry vision.  signs and symptoms of infection like fever; chills; cough; sore throat; pain or trouble passing urine  swelling of ankles, feet  unusual bruising or bleeding  wounds that do not heal  Side effects that usually do not require medical attention (report to your doctor or health care professional if they continue or are bothersome):  increased appetite  increased growth of face or body hair  headache  nausea, vomiting  skin problems, acne, thin and shiny skin  trouble sleeping  weight gain  This list may not describe all possible side effects. Call your doctor for medical advice about side effects. You may report side effects to FDA at 1-800-FDA-1088.  This list may not describe all possible side effects. Call your doctor for medical advice about side effects. You may report side effects to FDA at 1-800-FDA-1088.  Where should I keep my medication?  Keep out of the reach of children.  Store at room temperature between 20 and 25 degrees C (68 and 77 degrees F). Protect from light. Throw away any unused medicine after the expiration  date.  NOTE: This sheet is a summary. It may not cover all possible information. If you have questions about this medicine, talk to your doctor, pharmacist, or health care provider.  © 2023 [employer] Standard (2020-07-02 00:00:00)    Fluticasone; Salmeterol Metered Dose Inhaler (MDI)  What is this medication?  FLUTICASONE; SALMETEROL (floo TIK a sone; sal ME te role) treats asthma and chronic obstructive pulmonary disease (COPD). It works by opening the airways of the lungs, making it easier to breathe. It is a combination of an inhaled steroid and a bronchodilator. It is often called a controller inhaler. Do not use it to treat a sudden asthma attack.  This medicine may be used for other purposes; ask your health care provider or pharmacist if you have questions.  COMMON BRAND NAME(S): Advair HFA  What should I tell my care team before I take this medication?  They need to know if you have any of these conditions:  Diabetes (high blood sugar)  Eye disease, such as glaucoma, cataracts, or blurred vision  Heart disease  High blood pressure  Immune system problems  Infections, such as tuberculosis (TB) or other bacterial, fungal, or viral infections  Irregular heartbeat or rhythm  Liver disease  Osteoporosis, weak bones  Seizures  Taking other steroids like dexamethasone or prednisone  Thyroid disease  An unusual or allergic reaction to fluticasone, salmeterol, other corticosteroids, other medications, foods, dyes, or preservatives  Pregnant or trying to get pregnant  Breast-feeding  How should I use this medication?  This medication is inhaled through the mouth. Rinse your mouth with water after use. Make sure not to swallow the water. Take it as directed on the prescription label at the same time every day. Do not use it more often than directed.  This medication comes with INSTRUCTIONS FOR USE. Ask your pharmacist for directions on how to use this medication. Read the information carefully. Talk to your  pharmacist or care team if you have questions.  Talk to your care team about the use of this medication in children. While it may be prescribed for children as young as 12 years for selected conditions, precautions do apply.  Overdosage: If you think you have taken too much of this medicine contact a poison control center or emergency room at once.  NOTE: This medicine is only for you. Do not share this medicine with others.  What if I miss a dose?  If you miss a dose, use it as soon as you can. If it is almost time for your next dose, use only that dose. Do not use double or extra doses.  What may interact with this medication?  Do not take this medication with any of the following:  MAOIs like Carbex, Eldepryl, Marplan, Nardil, and Parnate  This medication may also interact with the following:  Aminophylline or theophylline  Antiviral medications for HIV or AIDS  Beta-blockers like metoprolol and propranolol  Certain antibiotics like clarithromycin, erythromycin, levofloxacin, linezolid, and telithromycin  Certain medications for fungal infections like ketoconazole, itraconazole, posaconazole, voriconazole  Conivaptan  Diuretics  Medications for colds  Medications for depression or emotional conditions  Nefazodone  Vaccines  This list may not describe all possible interactions. Give your health care provider a list of all the medicines, herbs, non-prescription drugs, or dietary supplements you use. Also tell them if you smoke, drink alcohol, or use illegal drugs. Some items may interact with your medicine.  What should I watch for while using this medication?  Visit your care team for regular checks on your progress. Tell your care team if your symptoms do not start to get better or if they get worse.  Talk to your care team about how to treat an acute asthma attack or bronchospasm (wheezing). Be sure to always have a short-acting inhaler with you. If you use your short-acting inhaler and your symptoms do not get  better or if they get worse, call your care team right away.  You and your care team should develop an Asthma Action Plan that is just for you. Be sure to know what to do if you are in the yellow (asthma is getting worse) or red (medical alert) zones.  This medication can worsen breathing or cause wheezing right after you use it. Be sure you have a short-acting inhaler for acute attacks (wheezing) nearby. If this happens, stop using this medication right away and call your care team.  This medication may increase your risk of dying from asthma-related problems. Talk to your care team if you have questions.  This medication may increase your risk of getting an infection. Call your care team for advice if you get a fever, chills, sore throat, or other symptoms of a cold or flu. Do not treat yourself. Try to avoid being around people who are sick. If you have not had the measles or chickenpox vaccines, tell your care team right away if you are around someone with these viruses.  This medication may slow your child's growth if it is taken for a long time at high doses. Your care team will monitor your child's growth.  Using this medication for a long time may weaken your bones. The risk of bone fractures may be increased. Talk to your care team about your bone health.  This medication may increase blood sugar. Ask your care team if changes in diet or medications are needed if you have diabetes.  Do not treat yourself for coughs, colds or allergies without asking your care team for advice. Some nonprescription medications can affect this one.  What side effects may I notice from receiving this medication?  Side effects that you should report to your care team as soon as possible:  Allergic reactions--skin rash, itching, hives, swelling of the face, lips, tongue, or throat  Flu-like symptoms--fever, chills, muscle pain, cough, headache, fatigue  Heart rhythm changes--fast or irregular heartbeat, dizziness, feeling faint  "or lightheaded, chest pain, trouble breathing  Increase in blood pressure  Low adrenal gland function--nausea, vomiting, loss of appetite, unusual weakness, fatigue, dizziness  Muscle pain or cramps  Pain, tingling, or numbness in the hands or feet  Sinus pain or pressure around the face or forehead  Thrush--white patches in the mouth  Wheezing or trouble breathing that is worse after use  Side effects that usually do not require medical attention (report to your care team if they continue or are bothersome):  Change in taste  Cough  Dry mouth  Headache  Hoarseness  Sore throat  Tremors or shaking  Trouble sleeping  This list may not describe all possible side effects. Call your doctor for medical advice about side effects. You may report side effects to FDA at 1-981-FDA-7295.  Where should I keep my medication?  Keep out of the reach of children and pets.  Store at room temperature between 20 and 25 degrees C (68 and 77 degrees F). Keep inhaler away from extreme heat. Get rid of it when the dose counter reads \"000\" or after the expiration date, whichever is first.  NOTE: This sheet is a summary. It may not cover all possible information. If you have questions about this medicine, talk to your doctor, pharmacist, or health care provider.  © 2023 [employer]/Gold Standard (2022-02-20 00:00:00)      "

## 2024-05-04 NOTE — CARE PLAN
The patient is Stable - Low risk of patient condition declining or worsening    Shift Goals  Clinical Goals: wean oxygen  Patient Goals: rest  Family Goals: ANAHI    Progress made toward(s) clinical / shift goals:        Problem: Knowledge Deficit - Standard  Goal: Patient and family/care givers will demonstrate understanding of plan of care, disease process/condition, diagnostic tests and medications  Description: Target End Date:  1-3 days or as soon as patient condition allows    Document in Patient Education    1.  Patient and family/caregiver oriented to unit, equipment, visitation policy and means for communicating concern  2.  Complete/review Learning Assessment  3.  Assess knowledge level of disease process/condition, treatment plan, diagnostic tests and medications  4.  Explain disease process/condition, treatment plan, diagnostic tests and medications  Outcome: Progressing     Problem: Knowledge Deficit - COPD  Goal: Patient/significant other demonstrates understanding of disease process, utilization of the Action Plan, medications and discharge instruction  Description: Target End Date:  1-3 days or as soon as patient condition allows    Document in Patient Education    1.  Discuss the importance of medical follow-up care, periodic chest x-rays, sputum cultures  2.  Review worsening signs and symptoms of COPD flare and exacerbation and its management  3.  Discuss respiratory medications, side effects, adverse reactions  4.  Demonstrate technique for using a metered-dose inhaler (MDI) and utilization of a spacer  5.  Review the COPD Action Plan  6.  Instruct and reinforce the rationale for breathing exercises, coughing effectively, and general conditioning exercises  7.  Stress importance of oral care and dental hygiene  8.  Discuss the importance of avoiding people with active respiratory infections and need for routine influenza and pneumococcal vaccinations  9.  Discuss factors that may trigger condition  and encourage patient/significant other to explore ways to control these factors in and around the home and work setting  10. Review the harmful effects of smoking and advise cessation of smoking  11. Provide information about activity limitations and alternating activities with rest periods to prevent fatigue  12. Instruct asthmatic patient of use of peak flow meter, as appropriate  13. Review oxygen requirements and dosage, safe use of oxygen, and refer to the supplier as indicated  14. Educate patient/family/caregiver on the use of Nasal Intermittent Positive Pressure Ventilation (NIPPV) and possible adverse reactions  Outcome: Progressing     Problem: Self Care  Goal: Patient will have the ability to perform ADLs independently or with assistance (bathe, groom, dress, toilet and feed)  Description: Target End Date:  Prior to discharge or change in level of care    Document on ADL flowsheet    1.  Assess the capability and level of deficiency to perform ADLs  2.  Encourage family/care giver involvement  3.  Provide assistive devices  4.  Consider PT/OT evaluations  5.  Maintain support, give positive feedback, encourage self-care allowing extra time and verbal cuing as needed  6.  Avoid doing something for patients they can do themselves, but provide assistance as needed  7.  Assist in anticipating/planning individual needs  8.  Collaborate with Case Management and  to meet discharge needs  Outcome: Progressing       Patient is not progressing towards the following goals:

## 2024-05-04 NOTE — DISCHARGE PLANNING
Case Management Discharge Planning    Admission Date: 4/29/2024  GMLOS: 5  ALOS: 5    6-Clicks ADL Score: 24  6-Clicks Mobility Score: 24      Anticipated Discharge Dispo: Discharge Disposition: Discharged to home/self care (01)    DME Needed: Yes    DME Ordered: No    Action(s) Taken: Patient is requiring oxygen upon discharge. Per Resident, patient required 9L O2 while ambulating and is not medically cleared for discharge today. Walking home O2 eval will need to be completed again tomorrow. DME choice was previously obtained for [facility].    Escalations Completed: None    Medically Clear: No    Next Steps: Walking O2 and order    Barriers to Discharge: Medical clearance    Is the patient up for discharge tomorrow: No

## 2024-05-04 NOTE — PROGRESS NOTES
[facility] Internal Medicine Daily Progress Note    Date of Service  5/4/2024    R Team: [facility] IM Blue Team   Attending: STEPHEN Hernandez M.d.  Senior Resident: Dr. Valdez  Intern:  Dr. Elizabeth  Contact Number: [phone]    Chief Complaint  Bright Shirley is a 73 y.o. male admitted 4/29/2024 with shortness of breath    Hospital Course  74 YO M with past medical history COPD, current tobacco use, HTN, carotid stenosis, history of CVA, who presented on 4/29 shortness of breath. PCR was positive for SARS-CoV-2. Chest X-ray demonstrated a new left upper lobe opacity and diffuse ground glass and interstitial opacities. He was admitted to the [facility] due to requiring 35L supplemental oxygen at one point. Supportive management with IV decadron and baricitinib were started. He was transferred to the floor on 5/02 on 6L NC.    Interval Problem Update  CT thorax showed a large upper lobe mass that is likely malignancy. No acute overnight events. Patient is on 3L NC. He reports shortness of breath is worse with ambulating and home O2 eval showed he is still requiring 9L with ambulation. He is still coughing intermittently. Continuing decadron and baricitinib.    Spoke with pharmacy, we do not discharge with baricitinib. If patient is discharged, the recommendation is to stop therapy.    I have discussed this patient's plan of care and discharge plan at IDT rounds today with Case Management, Nursing, Nursing leadership, and other members of the IDT team.    Consultants/Specialty  critical care    Code Status  Full Code    Disposition  The patient is not medically cleared for discharge to home or a post-acute facility.      I have placed the appropriate orders for post-discharge needs.    Review of Systems  Review of Systems   Constitutional: Negative.    HENT: Negative.     Eyes: Negative.    Respiratory:  Positive for shortness of breath (worse with ambulating).    Cardiovascular: Negative.    Gastrointestinal: Negative.     Genitourinary: Negative.    Musculoskeletal: Negative.    Skin: Negative.    Neurological: Negative.    Endo/Heme/Allergies: Negative.    Psychiatric/Behavioral: Negative.          Physical Exam  Temp:  [36.2 °C (97.2 °F)-36.8 °C (98.2 °F)] 36.8 °C (98.2 °F)  Pulse:  [63-95] 95  Resp:  [18] 18  BP: ()/(60-78) 120/75  SpO2:  [88 %-93 %] 93 %    Physical Exam  Vitals and nursing note reviewed.   Constitutional:       Appearance: Normal appearance. He is normal weight.   HENT:      Head: Normocephalic.      Mouth/Throat:      Mouth: Mucous membranes are moist.   Eyes:      Extraocular Movements: Extraocular movements intact.      Pupils: Pupils are equal, round, and reactive to light.   Cardiovascular:      Rate and Rhythm: Normal rate and regular rhythm.      Pulses: Normal pulses.      Heart sounds: No murmur heard.  Pulmonary:      Effort: Pulmonary effort is normal. No respiratory distress.      Breath sounds: Normal breath sounds. No wheezing or rales.   Abdominal:      General: Abdomen is flat.      Palpations: Abdomen is soft.   Musculoskeletal:      Right lower leg: No edema.      Left lower leg: No edema.   Skin:     General: Skin is warm.      Capillary Refill: Capillary refill takes less than 2 seconds.   Neurological:      General: No focal deficit present.      Mental Status: He is alert and oriented to person, place, and time.   Psychiatric:         Mood and Affect: Mood normal.         Fluids    Intake/Output Summary (Last 24 hours) at 5/4/2024 1532  Last data filed at 5/4/2024 1054  Gross per 24 hour   Intake 1090 ml   Output 300 ml   Net 790 ml       Laboratory  Recent Labs     05/03/24  0637 05/04/24  0803   WBC 9.6 10.8   RBC 4.81 4.89   HEMOGLOBIN 13.4* 13.6*   HEMATOCRIT 41.8* 42.0   MCV 86.9 85.9   MCH 27.9 27.8   MCHC 32.1* 32.4   RDW 44.3 42.8   PLATELETCT 348 376   MPV 9.0 9.2     Recent Labs     05/03/24  0821 05/04/24  0803   SODIUM 136 132*   POTASSIUM 4.3 4.6   CHLORIDE 101 101    CO2 23 20   GLUCOSE 100* 106*   BUN 20 23*   CREATININE 0.69 0.61   CALCIUM 9.0 8.8                   Imaging  CT-CHEST (THORAX) W/O   Final Result      1.  6.2 x 4.8 x 4.8 cm left upper lobe mass is most consistent with lung malignancy.   2.  Findings consistent with chronic interstitial lung disease/fibrosis in a UIP/IPF pattern.   3.  Patchy groundglass opacities bilaterally, nonspecific but may suggest infection/inflammation.   4.  Stable bilateral adrenal lesions.      Fleischner Society pulmonary nodule recommendations:   Not Applicable         DX-CHEST-PORTABLE (1 VIEW)   Final Result         Diffuse groundglass and interstitial opacities could relate to infection, inflammation or edema      New rounded opacity in the left upper lung could relate to pneumonia. Follow-up is recommended to ensure resolution.      IR-BIOPSY-LUNG/MEDIASTINUM W/GUIDE    (Results Pending)        Assessment/Plan  Problem Representation:    * Pneumonia due to COVID-19 virus- (present on admission)  Assessment & Plan  This is his first infection with COVID. He has had vaccinations x 4.   -Decadron 6 mg daily for 10 days or until off oxygen   -Baricitinib 4 mg daily for 14 days or until off oxygen, needs daily CBC and CMP, quantiferon pending  -Isolation precautions    Acute respiratory failure with hypoxia (HCC)- (present on admission)  Assessment & Plan  Secondary to COVID-19 virus infection and possible COPD exacerbation. Initially required up to 35 HF and was therefore admitted to [facility]. Transferred to floor on 5/02 on 6L NC.  -Chest x-ray showed groundglass and interstitial opacities.  -Continuous pulse oxymetry, titrate to 88-92%  -Home oxygen eval prior to discharge    Mass of left lung- (present on admission)  Assessment & Plan  CT thorax w/o contrast showed large left upper lobe mass that is likely malignancy in setting of tobacco use. Procalcitonin negative. Patient denies weight loss.   -Outpatient CT-guided biopsy,  oncology, and pulmonology consults placed    Acute exacerbation of chronic obstructive pulmonary disease (COPD) (HCC)- (present on admission)  Assessment & Plan  History of COPD using prn albuterol and 1/2 ppd current smoker. On room air for the last year  -Steroids as above    Primary hypertension- (present on admission)  Assessment & Plan  Home norvasc and lisinopril restarted    Tobacco abuse  Assessment & Plan  Cessation discussed, on NRT       VTE prophylaxis: Xarelto 10 mg daily as prophylaxis    I have performed a physical exam and reviewed and updated ROS and Plan today (5/4/2024). In review of yesterday's note (5/3/2024), there are no changes except as documented above.

## 2024-05-04 NOTE — DISCHARGE PLANNING
Received choice form @: [id number]  Agency/Facility name: [facility]  Sent referral per choice form @: no referral sent.  Pending orders.

## 2024-05-04 NOTE — CARE PLAN
The patient is Stable - Low risk of patient condition declining or worsening    Shift Goals  Clinical Goals: weakn O2  Patient Goals: rest, sleep  Family Goals: ANAHI    Progress made toward(s) clinical / shift goals:    Problem: Knowledge Deficit - Standard  Goal: Patient and family/care givers will demonstrate understanding of plan of care, disease process/condition, diagnostic tests and medications  Outcome: Progressing  Note: Updated on POC for the shift. Will continue to monitor O2 needs. Education provided on medications ordered for this shift. Verbalizes understanding.        Patient is not progressing towards the following goals:

## 2024-05-04 NOTE — CARE PLAN
The patient is Stable - Low risk of patient condition declining or worsening    Shift Goals  Clinical Goals: wean oxygen  Patient Goals: rest  Family Goals: ANAHI    Progress made toward(s) clinical / shift goals:      Problem: Knowledge Deficit - Standard  Goal: Patient and family/care givers will demonstrate understanding of plan of care, disease process/condition, diagnostic tests and medications  Description: Target End Date:  1-3 days or as soon as patient condition allows    Document in Patient Education    1.  Patient and family/caregiver oriented to unit, equipment, visitation policy and means for communicating concern  2.  Complete/review Learning Assessment  3.  Assess knowledge level of disease process/condition, treatment plan, diagnostic tests and medications  4.  Explain disease process/condition, treatment plan, diagnostic tests and medications  Outcome: Progressing     Problem: Impaired Gas Exchange  Goal: Patient will demonstrate improved ventilation and adequate oxygenation and participate in treatment regimen within the level of ability/situation.  Description: Target End Date:  Prior to discharge or change in level of care    1.   Assess/monitor rate/rhythm/depth of effort of respirations  2.   Assess oxygenation as ordered  3.   Administer/titrate oxygen as ordered  4.   Position patient for maximum ventilatory efficiency  5.   Turn, cough, and deep breathe  6.   Vital signs, pulse oximetry  7.   Assess color and body temperature  8.   Assess and monitor breath sounds  9.   Encourage deep-slow or pursed-lip breathing exercises  10. Monitor changes in the level of consciousness and mental status  11. Encourage expectoration of sputum; airway suctioning  12. Elevate the head of the bed and position patient for maximum ventilatory efficiency  13. Provide a calm, quiet environment  14. Limit patient's activity during the acute phase and have patient resume activity gradually and increase as  individually tolerated  15. Evaluate sleep patterns and limit stimulants such as caffeine  16. Collaborate with RT to administer medications/treatments as ordered  Outcome: Progressing     Problem: Self Care  Goal: Patient will have the ability to perform ADLs independently or with assistance (bathe, groom, dress, toilet and feed)  Description: Target End Date:  Prior to discharge or change in level of care    Document on ADL flowsheet    1.  Assess the capability and level of deficiency to perform ADLs  2.  Encourage family/care giver involvement  3.  Provide assistive devices  4.  Consider PT/OT evaluations  5.  Maintain support, give positive feedback, encourage self-care allowing extra time and verbal cuing as needed  6.  Avoid doing something for patients they can do themselves, but provide assistance as needed  7.  Assist in anticipating/planning individual needs  8.  Collaborate with Case Management and  to meet discharge needs  Outcome: Progressing     Problem: Respiratory  Goal: Patient will achieve/maintain optimum respiratory ventilation and gas exchange  Description: Target End Date:  Prior to discharge or change in level of care    Document on Assessment flowsheet    1.  Assess and monitor rate, rhythm, depth and effort of respiration  2.  Breath sounds assessed qshift and/or as needed  3.  Assess O2 saturation, administer/titrate oxygen as ordered  4.  Position patient for maximum ventilatory efficiency  5.  Turn, cough, and deep breath with splinting to improve effectiveness  6.  Collaborate with RT to administer medication/treatments per order  7.  Encourage use of incentive spirometer and encourage patient to cough after use and utilize splinting techniques if applicable  8.  Airway suctioning  9.  Monitor sputum production for changes in color, consistency and frequency  10. Perform frequent oral hygiene  11. Alternate physical activity with rest periods  Outcome: Progressing        Patient is not progressing towards the following goals:

## 2024-05-05 ENCOUNTER — PHARMACY VISIT (OUTPATIENT)
Dept: PHARMACY | Facility: MEDICAL CENTER | Age: 74
End: 2024-05-05
Payer: COMMERCIAL

## 2024-05-05 VITALS
OXYGEN SATURATION: 94 % | TEMPERATURE: 97.5 F | RESPIRATION RATE: 18 BRPM | WEIGHT: 158.73 LBS | BODY MASS INDEX: 25.51 KG/M2 | HEIGHT: 66 IN | DIASTOLIC BLOOD PRESSURE: 74 MMHG | SYSTOLIC BLOOD PRESSURE: 117 MMHG | HEART RATE: 74 BPM

## 2024-05-05 LAB
ALBUMIN SERPL BCP-MCNC: 3.5 G/DL (ref 3.2–4.9)
ALBUMIN/GLOB SERPL: 1.1 G/DL
ALP SERPL-CCNC: 63 U/L (ref 30–99)
ALT SERPL-CCNC: 129 U/L (ref 2–50)
ANION GAP SERPL CALC-SCNC: 10 MMOL/L (ref 7–16)
AST SERPL-CCNC: 52 U/L (ref 12–45)
BASOPHILS # BLD AUTO: 0.1 % (ref 0–1.8)
BASOPHILS # BLD: 0.01 K/UL (ref 0–0.12)
BILIRUB SERPL-MCNC: 0.3 MG/DL (ref 0.1–1.5)
BUN SERPL-MCNC: 27 MG/DL (ref 8–22)
CALCIUM ALBUM COR SERPL-MCNC: 9.2 MG/DL (ref 8.5–10.5)
CALCIUM SERPL-MCNC: 8.8 MG/DL (ref 8.5–10.5)
CHLORIDE SERPL-SCNC: 100 MMOL/L (ref 96–112)
CO2 SERPL-SCNC: 25 MMOL/L (ref 20–33)
CREAT SERPL-MCNC: 0.89 MG/DL (ref 0.5–1.4)
EOSINOPHIL # BLD AUTO: 0.06 K/UL (ref 0–0.51)
EOSINOPHIL NFR BLD: 0.5 % (ref 0–6.9)
ERYTHROCYTE [DISTWIDTH] IN BLOOD BY AUTOMATED COUNT: 43.8 FL (ref 35.9–50)
GFR SERPLBLD CREATININE-BSD FMLA CKD-EPI: 90 ML/MIN/1.73 M 2
GLOBULIN SER CALC-MCNC: 3.1 G/DL (ref 1.9–3.5)
GLUCOSE SERPL-MCNC: 90 MG/DL (ref 65–99)
HCT VFR BLD AUTO: 40.8 % (ref 42–52)
HGB BLD-MCNC: 13 G/DL (ref 14–18)
IMM GRANULOCYTES # BLD AUTO: 0.14 K/UL (ref 0–0.11)
IMM GRANULOCYTES NFR BLD AUTO: 1.3 % (ref 0–0.9)
LYMPHOCYTES # BLD AUTO: 3.81 K/UL (ref 1–4.8)
LYMPHOCYTES NFR BLD: 34.1 % (ref 22–41)
MCH RBC QN AUTO: 27.7 PG (ref 27–33)
MCHC RBC AUTO-ENTMCNC: 31.9 G/DL (ref 32.3–36.5)
MCV RBC AUTO: 87 FL (ref 81.4–97.8)
MONOCYTES # BLD AUTO: 1.04 K/UL (ref 0–0.85)
MONOCYTES NFR BLD AUTO: 9.3 % (ref 0–13.4)
NEUTROPHILS # BLD AUTO: 6.12 K/UL (ref 1.82–7.42)
NEUTROPHILS NFR BLD: 54.7 % (ref 44–72)
NRBC # BLD AUTO: 0 K/UL
NRBC BLD-RTO: 0 /100 WBC (ref 0–0.2)
PLATELET # BLD AUTO: 449 K/UL (ref 164–446)
PMV BLD AUTO: 9.4 FL (ref 9–12.9)
POTASSIUM SERPL-SCNC: 4.4 MMOL/L (ref 3.6–5.5)
PROT SERPL-MCNC: 6.6 G/DL (ref 6–8.2)
RBC # BLD AUTO: 4.69 M/UL (ref 4.7–6.1)
SODIUM SERPL-SCNC: 135 MMOL/L (ref 135–145)
WBC # BLD AUTO: 11.2 K/UL (ref 4.8–10.8)

## 2024-05-05 PROCEDURE — 99239 HOSP IP/OBS DSCHRG MGMT >30: CPT | Mod: GC | Performed by: HOSPITALIST

## 2024-05-05 PROCEDURE — RXMED WILLOW AMBULATORY MEDICATION CHARGE

## 2024-05-05 RX ORDER — SIMETHICONE 125 MG
125 TABLET,CHEWABLE ORAL ONCE
Status: COMPLETED | OUTPATIENT
Start: 2024-05-05 | End: 2024-05-05

## 2024-05-05 RX ORDER — DEXAMETHASONE 6 MG/1
6 TABLET ORAL DAILY
Qty: 4 TABLET | Refills: 0 | Status: SHIPPED | OUTPATIENT
Start: 2024-05-06 | End: 2024-05-11

## 2024-05-05 RX ORDER — FLUTICASONE PROPIONATE AND SALMETEROL 100; 50 UG/1; UG/1
1 POWDER RESPIRATORY (INHALATION) EVERY 12 HOURS
Qty: 60 EACH | Refills: 0 | Status: SHIPPED | OUTPATIENT
Start: 2024-05-05

## 2024-05-05 RX ADMIN — SIMETHICONE 125 MG: 125 TABLET, CHEWABLE ORAL at 11:45

## 2024-05-05 RX ADMIN — LISINOPRIL 20 MG: 20 TABLET ORAL at 05:13

## 2024-05-05 RX ADMIN — DEXAMETHASONE 6 MG: 6 TABLET ORAL at 05:13

## 2024-05-05 RX ADMIN — NICOTINE 14 MG: 14 PATCH TRANSDERMAL at 05:13

## 2024-05-05 ASSESSMENT — PAIN DESCRIPTION - PAIN TYPE
TYPE: ACUTE PAIN
TYPE: ACUTE PAIN

## 2024-05-05 NOTE — PROGRESS NOTES
PT discharged, friend to pick PT up, assisted via w/c.  Home meds sent with PT, [id number] given. Home O@2 sent with PT, PT to be set up with O2 concentrator upon arrival home,  IS sent with PT, reviewed d/c papers including follow up appointments.   PT denies Question or concern at this time.

## 2024-05-05 NOTE — DISCHARGE SUMMARY
[facility] Internal Medicine Discharge Summary    Attending: STEPHEN Hernandez M.d.  Senior Resident: Dr. Valdez  Intern:  Dr. Elizabeth  Contact Number: [phone]    CHIEF COMPLAINT ON ADMISSION  Chief Complaint   Patient presents with    Shortness of Breath     BIBA 3 days of SOB, cough, diarrhea. O2 sats in 60's when picked up by EMS, started on 10L O2 NRB. Pt took COVID test at home that was positive. Hx COPD       Reason for Admission  EMS     Admission Date  4/29/2024    CODE STATUS  Full Code    HPI & HOSPITAL COURSE  72 YO M with past medical history COPD (on 2-3L previously then self-discontinued), current tobacco use, HTN, carotid stenosis, history of CVA, who presented on 4/29 shortness of breath. PCR was positive for SARS-CoV-2. Chest X-ray demonstrated a new left upper lobe opacity and diffuse ground glass and interstitial opacities. He was admitted to the [facility] due to requiring 35L supplemental oxygen at one point. Supportive management with IV decadron and baricitinib were started. He was transferred to the floor on 5/02 on 6L NC. CT thorax revealed a large left upper lobe mass that is likely malignancy and outpatient CT-guided biopsy, oncology, and pulmonology consults were placed. On day of discharge, he required 3L NC at rest and 5L NC with ambulation. Discussed with patient risks and benefits of remaining hospitalized to finish course of baricitinib versus discharging without it. Patient opted to discharge and will return if he develops worsening shortness of breath.    Therefore, he is discharged in fair and stable condition to home with close outpatient follow-up.    The patient met 2-midnight criteria for an inpatient stay at the time of discharge.    Discharge Date  5/5/24    Physical Exam on Day of Discharge  Physical Exam    FOLLOW UP ITEMS POST DISCHARGE  #Acute hypoxic respiratory failure due to COVID pneumonia  #COPD exacerbation  -Finish 5 days of oral decadron  -Start Advair twice  daily  -Discharge on home oxygen  -Consult for outpatient pulmonology follow up placed    #Mass of left lung, suspected malignancy  -Consult for CT-guided biopsy next week placed  -Consult for oncology follow up placed    DISCHARGE DIAGNOSES  Principal Problem:    Pneumonia due to COVID-19 virus (POA: Yes)  Active Problems:    Acute respiratory failure with hypoxia (HCC) (POA: Yes)    Primary hypertension (POA: Yes)    Acute exacerbation of chronic obstructive pulmonary disease (COPD) (HCC) (POA: Yes)    Mass of left lung (POA: Yes)    Tobacco abuse (POA: Unknown)  Resolved Problems:    COVID-19 virus infection (POA: Yes)    Full code status (POA: Unknown)      FOLLOW UP  Future Appointments   Date Time Provider Department Center   5/9/2024  1:00 PM Nella Ramirez M.D. [address]   5/10/2024  3:00 PM JESIKA Layton [facility] None     Nella Ramirez M.D.  [address]  [facility] [address]  [facility] [id number] [phone]    Follow up in 1 week(s)  please get the CT-guided needle biopsy, which is done by interventional radiology, next week then follow up with pulmonology and oncology.    referrals have been placed, someone from [facility] will call you to set up an appointment after discharge on a weekday. If not please call [phone]., For Outpatient Follow Up With Specialist Doctor    CORBY DELGADILLO  [address] [place] [address] [id number] [phone]  Follow up in 1 day(s)  To work with RT and possibly switch to portable concentrator.      MEDICATIONS ON DISCHARGE     Medication List        START taking these medications        Instructions   dexamethasone 6 MG Tabs  Start taking on: May 6, 2024  Commonly known as: Decadron   Take 1 Tablet by mouth every day for 4 days.  Dose: 6 mg     fluticasone-salmeterol 100-50 MCG/ACT Aepb  Commonly known as: Advair   Inhale 1 Puff every 12 hours.  Dose: 1 Puff            CONTINUE taking these medications        Instructions   * acetaminophen 325  MG Tabs  Commonly known as: Tylenol   Take 650 mg by mouth every 6 hours as needed for Mild Pain.  Dose: 650 mg     * acetaminophen 500 MG Tabs  Commonly known as: Tylenol   Take 1,000 mg by mouth every 6 hours as needed for Mild Pain.  Dose: 1,000 mg     ALLERGY PO   Take 1 Tablet by mouth every day.  Dose: 1 Tablet     amLODIPine 10 MG Tabs  Commonly known as: Norvasc   Take 1 Tablet by mouth every day.  Dose: 10 mg     aspirin 81 MG Chew chewable tablet  Commonly known as: Asa   Chew 81 mg every evening.  Dose: 81 mg     calcium polycarbophil 625 MG Tabs  Commonly known as: Fibercon   Take 625 mg by mouth every day.  Dose: 625 mg     docosahexanoic acid 1000 MG Caps  Commonly known as: Omega 3 Fa   Take 1,000 mg by mouth every day.  Dose: 1,000 mg     lisinopril 20 MG Tabs  Commonly known as: Prinivil   Take 1 Tablet by mouth 2 times a day.  Dose: 20 mg     Melatonin 10 MG Tabs   Take 30 mg by mouth every evening.  Dose: 30 mg     MULTIVITAMIN ADULT PO   Take 1 Tablet by mouth every day.  Dose: 1 Tablet     rosuvastatin 10 MG Tabs  Commonly known as: Crestor   Take 1 Tablet by mouth every evening.  Dose: 10 mg     traZODone 50 MG Tabs  Commonly known as: Desyrel   Take 1 tablet by mouth every evening.  Dose: 50 mg           * This list has 2 medication(s) that are the same as other medications prescribed for you. Read the directions carefully, and ask your doctor or other care provider to review them with you.                  Allergies  Allergies   Allergen Reactions    Seasonal        DIET  Orders Placed This Encounter   Procedures    Diet Order Diet: Regular     Standing Status:   Standing     Number of Occurrences:   1     Order Specific Question:   Diet:     Answer:   Regular [1]       ACTIVITY  As tolerated.  Weight bearing as tolerated    CONSULTATIONS  Pulmonology  Critical care    PROCEDURES  None    LABORATORY  Lab Results   Component Value Date    SODIUM 135 05/05/2024    POTASSIUM 4.4 05/05/2024     CHLORIDE 100 05/05/2024    CO2 25 05/05/2024    GLUCOSE 90 05/05/2024    BUN 27 (H) 05/05/2024    CREATININE 0.89 05/05/2024        Lab Results   Component Value Date    WBC 11.2 (H) 05/05/2024    HEMOGLOBIN 13.0 (L) 05/05/2024    HEMATOCRIT 40.8 (L) 05/05/2024    PLATELETCT 449 (H) 05/05/2024        Total time of the discharge process exceeds 40 minutes.

## 2024-05-05 NOTE — CARE PLAN
The patient is Stable - Low risk of patient condition declining or worsening    Shift Goals  Clinical Goals: monitor/wean o2  Patient Goals: rest  Family Goals: ANAHI    Progress made toward(s) clinical / shift goals:    Problem: Risk for Infection - COPD  Goal: Patient will remain free from signs and symptoms of infection  Description: Target End Date:  Prior to discharge or change in level of care    1.  Infection prevention measures  2.  Instruct patient regarding signs and symptoms of infection  3.  Review the importance of breathing exercises, effective cough, frequent position changes, and adequate fluid intake  4.  Recommend rinsing mouth with water and spitting, not swallowing, or use of a spacer on the mouthpiece of inhaled corticosteroids  Outcome: Progressing     Problem: Self Care  Goal: Patient will have the ability to perform ADLs independently or with assistance (bathe, groom, dress, toilet and feed)  Description: Target End Date:  Prior to discharge or change in level of care    Document on ADL flowsheet    1.  Assess the capability and level of deficiency to perform ADLs  2.  Encourage family/care giver involvement  3.  Provide assistive devices  4.  Consider PT/OT evaluations  5.  Maintain support, give positive feedback, encourage self-care allowing extra time and verbal cuing as needed  6.  Avoid doing something for patients they can do themselves, but provide assistance as needed  7.  Assist in anticipating/planning individual needs  8.  Collaborate with Case Management and  to meet discharge needs  Outcome: Progressing     Problem: Hemodynamics  Goal: Patient's hemodynamics, fluid balance and neurologic status will be stable or improve  Description: Target End Date:  Prior to discharge or change in level of care    Document on Assessment and I/O flowsheet templates    1.  Monitor vital signs, pulse oximetry and cardiac monitor per provider order and/or policy  2.  Maintain blood  pressure per provider order  3.  Hemodynamic monitoring per provider order  4.  Manage IV fluids and IV infusions  5.  Monitor intake and output  6.  Daily weights per unit policy or provider order  7.  Assess peripheral pulses and capillary refill  8.  Assess color and body temperature  9.  Position patient for maximum circulation/cardiac output  10. Monitor for signs/symptoms of excessive bleeding  11. Assess mental status, restlessness and changes in level of consciousness  12. Monitor temperature and report fever or hypothermia to provider immediately. Consideration of targeted temperature management.  Outcome: Progressing     Problem: Respiratory  Goal: Patient will achieve/maintain optimum respiratory ventilation and gas exchange  Description: Target End Date:  Prior to discharge or change in level of care    Document on Assessment flowsheet    1.  Assess and monitor rate, rhythm, depth and effort of respiration  2.  Breath sounds assessed qshift and/or as needed  3.  Assess O2 saturation, administer/titrate oxygen as ordered  4.  Position patient for maximum ventilatory efficiency  5.  Turn, cough, and deep breath with splinting to improve effectiveness  6.  Collaborate with RT to administer medication/treatments per order  7.  Encourage use of incentive spirometer and encourage patient to cough after use and utilize splinting techniques if applicable  8.  Airway suctioning  9.  Monitor sputum production for changes in color, consistency and frequency  10. Perform frequent oral hygiene  11. Alternate physical activity with rest periods  Outcome: Progressing

## 2024-05-05 NOTE — DISCHARGE PLANNING
Spoke with Martha at [facility],  they have accepted patient on service.  Per Martha, his litter flow is currently to high for a portable concentrator .  Patient can call their office on Monday and they will have their RT work with him. SATHYA Paulino advised.  Martha to deliver portable to patient in next 2 hours.

## 2024-05-05 NOTE — DISCHARGE PLANNING
Case Management Discharge Planning    Admission Date: 4/29/2024  GMLOS: 5  ALOS: 6    6-Clicks ADL Score: 24  6-Clicks Mobility Score: 24      Anticipated Discharge Dispo: Discharge Disposition: Discharged to home/self care (01)    DME Needed: Yes    DME Ordered: Yes    Action(s) Taken: Choice obtained, Referral(s) sent, and DME Face to Face     Escalations Completed: None    Medically Clear: Yes    Next Steps: RNCM was notified that [facility] declined O2 due to lack of equipment. RNCM placed call to [facility] and gave verbal order. RNCM will send order through [facility] as well.     Barriers to Discharge: Oxygen Delivery    Is the patient up for discharge tomorrow: Patient will DC today.     1140: RNCM received call from Christy at [facility] who stated they do not take SCP. Christy was informed that patient is willing to pay out of pocket for portable concentrator but Christy stated that is not something they would be able to accommodate on a Sunday. RNCM will continue to look for DME companies to meet patients need.

## 2024-05-05 NOTE — DISCHARGE PLANNING
Called [facility] on call service to see why pt DME order was declined. Left a message with the representative that will contact on call team.    [phone]  Called Chelsie with [facility] (on call). LM to see if he can let me know if pt can pay out of pocket.   CHELSIE: [phone]  Chelsie with [facility] let me know they do not have equipment to support pt's LPM flow. Let RN CM know.

## 2024-05-05 NOTE — DISCHARGE PLANNING
HTH/SCP TCN chart review completed. Collaborated with SATHYA Paulino. Current discharge considerations are for discharge to home with possible supplemental O2 if indicated when medically cleared.  Per chart review, patient has a discharge order and a O2 FTF order completed by MD.  TCN will continue to follow and collaborate with discharge planning team as additional post acute needs arise. Thank you.    Completed:  Choice obtained: LEYLA ([facility]), NAINA (02 via [facility] if indicated)  SCP with [facility] PCP. Discussed possible outpatient PCP transitional visit and pt in agreement if indicated; TCN sent information to .    Addendum:  1247- Per chart review and collaboration with Dorie MCDONNELL O2 declined.  [facility] accepted for O2 needs.

## 2024-05-06 ENCOUNTER — PATIENT OUTREACH (OUTPATIENT)
Dept: MEDICAL GROUP | Facility: MEDICAL CENTER | Age: 74
End: 2024-05-06
Payer: MEDICARE

## 2024-05-06 LAB
GAMMA INTERFERON BACKGROUND BLD IA-ACNC: 0.05 IU/ML
M TB IFN-G BLD-IMP: NEGATIVE
M TB IFN-G CD4+ BCKGRND COR BLD-ACNC: 0.01 IU/ML
MITOGEN IGNF BCKGRD COR BLD-ACNC: 1.74 IU/ML
QFT TB2 - NIL TBQ2: 0 IU/ML

## 2024-05-06 PROCEDURE — RXMED WILLOW AMBULATORY MEDICATION CHARGE: Performed by: FAMILY MEDICINE

## 2024-05-06 PROCEDURE — RXMED WILLOW AMBULATORY MEDICATION CHARGE

## 2024-05-06 NOTE — PROGRESS NOTES
Transitional Care Management  TCM Outreach Date and Time: Filed (5/6/2024  8:56 AM)    Discharge Questions  Actual Discharge Date: 05/05/24  Now that you are home, how are you feeling?: Good  Did you receive any new prescriptions?: Yes (Dexamethasone, Advair)  Were you able to get them filled?: Yes  Meds to Bed or Pharmacy filled?: Meds to Bed  Do you have any questions about your current medications or new medications (Review Med Rec)?: Yes (Please explain) (patient only received 3 pills in his decradron Rx.However, Rx. says to be picked up at [facility]. He is going there tomorrow for another refill. I contacted the originating pharmacy which was [facility] and I was told they will reach out to the patient to fix.)  Did you have any durable medical equipment ordered?: Yes (Home O2-DME [employer]. Received home concentrator and portable tank to get him home. See progress note for more details re: patient request)  Did you receive it?: Yes  Do you have a follow up appointment scheduled with your PCP?: Yes  Appointment Date: 05/09/24  Appointment Time: 1300  Any issues or paperwork you wish to discuss with your PCP?: No  Are you (patient) able to get to the appointment?: Yes  If Home Health was ordered, have they contacted you (Patient): Not Applicable  Did you have enough support after your last discharge?: Yes  Does this patient qualify for the CCM program?: Yes (referred to Jacquelyn WU Rn care coordinator)    Transitional Care  Number of attempts made to contact patient: 1  Current or previous attempts completed within two business days of discharge? : Yes  Provided education regarding treatment plan, medications, self-management, ADLs?: Yes  Has patient completed an Advanced Directive?: No  Has the Care Manager's phone number provided?: No  Is there anything else I can help you with?: No    Discharge Summary  Chief Complaint: Shortness of breath  Admitting Diagnosis: Acute respiratory failure with hypoxia (HCC)  "(J96.01)  Discharge Diagnosis: Pneumonia due to COVID-19 virus        Patient states he is using 2.5L O2 at home and O2 Sat is \"mid 90's\" . Discharge planning notes states patient was to call [facility] on Monday to ask for RT to help wean him down so he can have more portable tanks--he was only given one for discharge home. Also Xavier from [facility] needed the order re-faxed. Kacy Lennon CM notified and will re-fax the order to the number provider, through fax and not Epic, as requested from [facility].   "

## 2024-05-07 ENCOUNTER — PHARMACY VISIT (OUTPATIENT)
Dept: PHARMACY | Facility: MEDICAL CENTER | Age: 74
End: 2024-05-07
Payer: COMMERCIAL

## 2024-05-07 LAB — EXTRA TUBE BLU BLU: NORMAL

## 2024-05-10 ENCOUNTER — OFFICE VISIT (OUTPATIENT)
Dept: SLEEP MEDICINE | Facility: MEDICAL CENTER | Age: 74
End: 2024-05-10
Attending: NURSE PRACTITIONER
Payer: MEDICARE

## 2024-05-10 VITALS
OXYGEN SATURATION: 90 % | SYSTOLIC BLOOD PRESSURE: 130 MMHG | WEIGHT: 158 LBS | HEART RATE: 64 BPM | BODY MASS INDEX: 25.39 KG/M2 | DIASTOLIC BLOOD PRESSURE: 70 MMHG | RESPIRATION RATE: 16 BRPM | HEIGHT: 66 IN

## 2024-05-10 DIAGNOSIS — R91.8 MASS OF UPPER LOBE OF LEFT LUNG: ICD-10-CM

## 2024-05-10 DIAGNOSIS — J96.11 CHRONIC RESPIRATORY FAILURE WITH HYPOXIA (HCC): ICD-10-CM

## 2024-05-10 DIAGNOSIS — J44.9 CHRONIC OBSTRUCTIVE PULMONARY DISEASE, UNSPECIFIED COPD TYPE (HCC): ICD-10-CM

## 2024-05-10 PROCEDURE — 99214 OFFICE O/P EST MOD 30 MIN: CPT | Mod: 25 | Performed by: NURSE PRACTITIONER

## 2024-05-10 PROCEDURE — 3078F DIAST BP <80 MM HG: CPT | Performed by: NURSE PRACTITIONER

## 2024-05-10 PROCEDURE — 3075F SYST BP GE 130 - 139MM HG: CPT | Performed by: NURSE PRACTITIONER

## 2024-05-10 ASSESSMENT — FIBROSIS 4 INDEX: FIB4 SCORE: 0.74

## 2024-05-10 NOTE — PROGRESS NOTES
Chief Complaint   Patient presents with    Follow-Up     Copd f/v consulted by marshal       HPI:  Bright Shirley is a 73 y.o. year old male here today for follow-up on COPD hospital discharge follow-up.  Consulted in hospital by Dr. Dominguez.  Patient is a current smoker.  Past medical history includes hypertension, carotid stenosis, CVA who presented for shortness of breath with positive COVID 2 viral panel.  Chest x-ray showed left upper lobe opacity and diffuse groundglass and interstitial opacities.  Admitted to the IMCU requiring 35 L supplemental oxygen.  Given IV Decadron and baricitinib.  Transferred to stepdown on 6 L/min nasal cannula.CT thorax revealed a large left upper lobe mass that is likely malignancy and outpatient CT-guided biopsy, oncology, and pulmonology consults were placed. On day of discharge, he required 3L NC at rest and 5L NC with ambulation. Discussed with patient risks and benefits of remaining hospitalized to finish course of baricitinib versus discharging without it. Patient opted to discharge and will return if he develops worsening shortness of breath.     Patient comes in for follow-up.  Currently using Advair twice daily and discharged on oral Decadron, but completed.  Feels that breathing is slightly improved compared to previous.  Is any chest congestion, wheezing, or chest tightness.  Dilatory multiple oximetry done in clinic today showed room air SpO2 of 85% with ambulation.  He was then placed on supplemental oxygen and titrated up to 3 L/min.    ROS: As per HPI and otherwise negative if not stated.    Past Medical History:   Diagnosis Date    Acute hypoxemic respiratory failure (HCC) 6/8/2021    Acute urinary retention 6/7/2021    Aortic atherosclerosis (HCC)     Carotid stenosis, bilateral - mild     Cerebral infarction (HCC) - based on CT head 7/2022     Chronic obstructive pulmonary disease (HCC)     Hypercholesteremia     Hypertension     Intertrochanteric  "fracture of femur (HCC) 6/6/2021    Other emphysema (HCC) 8/7/2019    Supplemental oxygen dependent 6/30/2021       Past Surgical History:   Procedure Laterality Date    PB TREAT INTER/SUBTROCH FX,W/PLATE/SCREW Left 6/6/2021    Procedure: FIXATION, FRACTURE, HIP, USING DYNAMIC HIP SCREW, WITH COMPRESSION;  Surgeon: Avinash Suazo M.D.;  Location: SURGERY [facility];  Service: Orthopedics    HERNIA REPAIR Right     ORIF, FEMUR Right     15 years ago    OTHER      right inguinal hernia repair    OTHER ORTHOPEDIC SURGERY      right hip       Family History   Problem Relation Age of Onset    Cancer Mother         breast cancer    Stroke Mother     Other Father         cirrhosis form alcohol    Stroke Sister     Heart Disease Brother         cardiac aneurysm    Hypertension Maternal Grandmother     Hyperlipidemia Maternal Grandmother        Allergies as of 05/10/2024 - Reviewed 05/10/2024   Allergen Reaction Noted    Seasonal  08/07/2019        Vitals:  /70 (BP Location: Left arm, Patient Position: Sitting, BP Cuff Size: Adult)   Pulse 64   Resp 16   Ht 1.676 m (5' 6\")   Wt 71.7 kg (158 lb)   SpO2 90%     Current medications as of today   Current Outpatient Medications   Medication Sig Dispense Refill    Home Care Oxygen Inhale 5 L/min continuous. O2 LPM @: 5 LPM  Specify Usage: Continuous  Type of O2: Gas  Oxygen via: N/C  Oxygen Modality: Concentrator and Portable Tank  Humidification: Yes      fluticasone-salmeterol (ADVAIR) 100-50 MCG/ACT AEROSOL POWDER, BREATH ACTIVATED Inhale 1 Puff every 12 hours. 60 Each 0    acetaminophen (TYLENOL) 325 MG Tab Take 650 mg by mouth every 6 hours as needed for Mild Pain.      Melatonin 10 MG Tab Take 30 mg by mouth every evening.      Chlorpheniramine Maleate (ALLERGY PO) Take 1 Tablet by mouth every day.      rosuvastatin (CRESTOR) 10 MG Tab Take 1 Tablet by mouth every evening. 90 Tablet 2    amLODIPine (NORVASC) 10 MG Tab Take 1 Tablet by mouth every day. 90 Tablet " 2    lisinopril (PRINIVIL) 20 MG Tab Take 1 Tablet by mouth 2 times a day. 200 Tablet 2    aspirin (ASA) 81 MG Chew Tab chewable tablet Chew 81 mg every evening. 100 Tablet     docosahexanoic acid (OMEGA 3 FA) 1000 MG Cap Take 1,000 mg by mouth every day.      Multiple Vitamin (MULTIVITAMIN ADULT PO) Take 1 Tablet by mouth every day.      calcium polycarbophil (FIBERCON) 625 MG Tab Take 625 mg by mouth every day.      acetaminophen (TYLENOL) 500 MG Tab Take 1,000 mg by mouth every 6 hours as needed for Mild Pain.      traZODone (DESYREL) 50 MG Tab Take 1 tablet by mouth every evening. 90 Tablet 3     No current facility-administered medications for this visit.         Physical Exam:   Gen:           Alert and oriented, No apparent distress. Mood and affect appropriate, normal interaction with examiner.  Eyes:          PERRL, EOM intact, sclere white, conjunctive moist.  Ears:          Not examined.   Hearing:     Grossly intact.  Nose:          Normal, no lesions or deformities.  Dentition:    Good dentition.  Oropharynx:   Tongue normal, posterior pharynx without erythema or exudate.  Neck:        Supple, trachea midline, no masses.  Respiratory Effort: No intercostal retractions or use of accessory muscles.   Lung Auscultation:      Clear to auscultation bilaterally; no rales, rhonchi or wheezing.  CV:            Regular rate and rhythm. No murmurs, rubs or gallops.  Abd:           Not examined.   Lymphadenopathy: Not examined.  Gait and Station: Normal.  Digits and Nails: No clubbing, cyanosis, petechiae, or nodes.   Cranial Nerves: II-XII grossly intact.  Skin:        No rashes, lesions or ulcers noted.               Ext:           No cyanosis or edema.      Assessment:  1. Mass of upper lobe of left lung  Bronchoscopy      2. Chronic obstructive pulmonary disease, unspecified COPD type (HCC)  Multiple Oximetry    PULMONARY FUNCTION TESTS -Test requested: Complete Pulmonary Function Test; Include MIPS/MEPS? No     Bronchoscopy    DME O2 New Set Up    Multiple Oximetry      3. Chronic respiratory failure with hypoxia (HCC)  DME O2 New Set Up          Plan:  CT thorax revealed a large left upper lobe mass that is likely malignancy and outpatient CT-guided biopsy.  Imaging with pulmonologist.  Recommended [facility] with [facility].  Order placed for PFTs.  Patient is improving and trending towards baseline.  Will need biopsy of lung mass and left upper lobe.  Patient to follow-up in 3 months.  2 needs are improving down to 3 L/min as evidenced by multi ox.    Please note that this dictation was created using voice recognition software. I have made every reasonable attempt to correct obvious errors, but it is possible there are errors of grammar and possibly content that I did not discover before finalizing the note.

## 2024-05-10 NOTE — PROCEDURES
Multi-Ox Readings  Multi Ox #1 Room air   O2 sat % at rest 93   O2 sat % on exertion 85   O2 sat average on exertion     Multi Ox #2 2 LPM   O2 sat % at rest 88   O2 sat % on exertion    O2 sat average on exertion       Multi Ox #3 3 LPM   O2 sat % at rest 93   O2 sat % on exertion 90   O2 sat average on exertion       Oxygen Use 2.5   Oxygen Frequency 24/7   Duration of need     Is the patient mobile within the home?     CPAP Use?     BIPAP Use?     Servo Titration

## 2024-05-13 ENCOUNTER — PATIENT OUTREACH (OUTPATIENT)
Dept: HEALTH INFORMATION MANAGEMENT | Facility: OTHER | Age: 74
End: 2024-05-13
Payer: MEDICARE

## 2024-05-13 DIAGNOSIS — I10 PRIMARY HYPERTENSION: ICD-10-CM

## 2024-05-13 NOTE — PROGRESS NOTES
CHW tried calling the pt to introduce the PCM program. Pt did not answer and this CHW left a VM requesting a return call.5/13  CHW tried calling the pt to introduce the PCM program. Pt did not answer and this CHW left a VM requesting a return call.5/15  Community Health Worker Follow-Up    Reason for outreach: CHW tried calling the pt to introduce the PCM program    CHW Interventions: CHW tried calling the pt several times without success. CHW will send a [employer] message with all the program details.    Specific Resources Provided:  Housing/Shelter: n/a  Transportation: n/a  Food: n/a  Financial: n/a  Social Supports: n/a  Other: [employer] message with the program details.    Plan: CHW sent the message and will not follow the pt at this time.

## 2024-05-15 ENCOUNTER — APPOINTMENT (OUTPATIENT)
Dept: MEDICAL GROUP | Facility: MEDICAL CENTER | Age: 74
End: 2024-05-15
Payer: MEDICARE

## 2024-05-16 ENCOUNTER — PATIENT MESSAGE (OUTPATIENT)
Dept: HEALTH INFORMATION MANAGEMENT | Facility: OTHER | Age: 74
End: 2024-05-16

## 2024-05-17 ENCOUNTER — OFFICE VISIT (OUTPATIENT)
Dept: MEDICAL GROUP | Facility: MEDICAL CENTER | Age: 74
End: 2024-05-17
Payer: MEDICARE

## 2024-05-17 VITALS
HEIGHT: 66 IN | DIASTOLIC BLOOD PRESSURE: 52 MMHG | BODY MASS INDEX: 26.2 KG/M2 | WEIGHT: 163 LBS | OXYGEN SATURATION: 94 % | HEART RATE: 84 BPM | SYSTOLIC BLOOD PRESSURE: 110 MMHG | TEMPERATURE: 98.2 F

## 2024-05-17 DIAGNOSIS — I10 ESSENTIAL HYPERTENSION: ICD-10-CM

## 2024-05-17 DIAGNOSIS — Z09 HOSPITAL DISCHARGE FOLLOW-UP: ICD-10-CM

## 2024-05-17 DIAGNOSIS — J43.9 PULMONARY EMPHYSEMA, UNSPECIFIED EMPHYSEMA TYPE (HCC): ICD-10-CM

## 2024-05-17 DIAGNOSIS — J96.11 CHRONIC RESPIRATORY FAILURE WITH HYPOXIA (HCC): ICD-10-CM

## 2024-05-17 DIAGNOSIS — J98.4 RESTRICTIVE LUNG DISEASE: ICD-10-CM

## 2024-05-17 DIAGNOSIS — R91.8 LUNG MASS: ICD-10-CM

## 2024-05-17 PROCEDURE — 99214 OFFICE O/P EST MOD 30 MIN: CPT | Performed by: FAMILY MEDICINE

## 2024-05-17 PROCEDURE — 3078F DIAST BP <80 MM HG: CPT | Performed by: FAMILY MEDICINE

## 2024-05-17 PROCEDURE — 3074F SYST BP LT 130 MM HG: CPT | Performed by: FAMILY MEDICINE

## 2024-05-17 ASSESSMENT — FIBROSIS 4 INDEX: FIB4 SCORE: 0.74

## 2024-05-17 NOTE — PROGRESS NOTES
CC: Hospital discharge follow-up    HPI:   Bright presents today for posthospitalization follow-up    Patient was admitted to [facility] on 4/29, presented with shortness of breath.  He was found PCR was positive for SARS-CoV-2. Chest X-ray demonstrated a new left upper lobe opacity and diffuse ground glass and interstitial opacities. He was admitted to the [facility] due to requiring 35L supplemental oxygen at one point. Supportive management with IV decadron and baricitinib were started. He was transferred to the floor on 5/02 on 6L NC. CT thorax revealed a large left upper lobe mass that is likely malignancy and outpatient CT-guided biopsy, oncology, and pulmonology consults were placed.  During this hospitalization his condition gradually gets better.  Patient eventually discharged on 5/5/2024 , he required 3L NC at rest and 5L NC with ambulation. Discussed with patient risks and benefits of remaining hospitalized to finish course of baricitinib versus discharging without it. Patient opted to discharge and will return if he develops worsening shortness of breath.     Given today for follow-up.  Patient stated that his condition gradually gets better.  He is still on oxygen at 2 to 3 L, patient stated that his oxygen saturation always above 90% at rest, it goes down when he exercises.  Patient did not be scheduled for his planned biopsy yet, today I discussed with patient the importance of doing the lung biopsy.  Denies any cough or fever.  Has been on Advair twice a day, and albuterol as needed.  Blood pressure has been adequately controlled on lisinopril and amlodipine.  Patient was seen by pulmonologist, recommended pulmonary function test.  Has a follow-up appointment with pulmonology and oncology.      Patient Active Problem List    Diagnosis Date Noted    Mass of left lung 04/30/2024    Pneumonia due to COVID-19 virus 04/30/2024    Acute respiratory failure with hypoxia (HCC) 04/29/2024    Tobacco abuse  04/29/2024    Cerebral infarction (HCC) - based on CT head 7/2022     Carotid stenosis, bilateral - mild     Aortic atherosclerosis (HCC)     Coronary artery calcification seen on CAT scan     Hypocalcemia 05/26/2022    Mild protein-calorie malnutrition (HCC) 05/26/2022    BMI 24.0-24.9, adult 05/26/2022    Peripheral arterial disease (HCC) 08/09/2021    Hyperglycemia 08/09/2021    Dysmetabolic syndrome 08/09/2021    Chronic hypoxemic respiratory failure (HCC) 08/09/2021    Acute exacerbation of chronic obstructive pulmonary disease (COPD) (HCC) 07/01/2021    Hypokalemia 06/09/2021    Anemia 06/07/2021    Syncopal episodes 06/06/2021    Dyslipidemia 08/07/2019    Cigarette nicotine dependence, uncomplicated  08/07/2019    Primary hypertension 01/10/2011       Current Outpatient Medications   Medication Sig Dispense Refill    Fexofenadine HCl (MUCINEX ALLERGY PO) Take  by mouth every day.      Home Care Oxygen Inhale 5 L/min continuous. O2 LPM @: 5 LPM  Specify Usage: Continuous  Type of O2: Gas  Oxygen via: N/C  Oxygen Modality: Concentrator and Portable Tank  Humidification: Yes      fluticasone-salmeterol (ADVAIR) 100-50 MCG/ACT AEROSOL POWDER, BREATH ACTIVATED Inhale 1 Puff every 12 hours. 60 Each 0    acetaminophen (TYLENOL) 325 MG Tab Take 500 mg by mouth every 6 hours as needed for Mild Pain.      Melatonin 10 MG Tab Take 30 mg by mouth every evening.      rosuvastatin (CRESTOR) 10 MG Tab Take 1 Tablet by mouth every evening. 90 Tablet 2    amLODIPine (NORVASC) 10 MG Tab Take 1 Tablet by mouth every day. 90 Tablet 2    lisinopril (PRINIVIL) 20 MG Tab Take 1 Tablet by mouth 2 times a day. 200 Tablet 2    traZODone (DESYREL) 50 MG Tab Take 1 tablet by mouth every evening. 90 Tablet 3    aspirin (ASA) 81 MG Chew Tab chewable tablet Chew 81 mg every evening. 100 Tablet     docosahexanoic acid (OMEGA 3 FA) 1000 MG Cap Take 1,000 mg by mouth every day.      Multiple Vitamin (MULTIVITAMIN ADULT PO) Take 1 Tablet by  "mouth every day.      calcium polycarbophil (FIBERCON) 625 MG Tab Take 625 mg by mouth every day.      acetaminophen (TYLENOL) 500 MG Tab Take 1,000 mg by mouth every 6 hours as needed for Mild Pain.      Chlorpheniramine Maleate (ALLERGY PO) Take 1 Tablet by mouth every day. (Patient not taking: Reported on 5/17/2024)       No current facility-administered medications for this visit.         Allergies as of 05/17/2024 - Reviewed 05/17/2024   Allergen Reaction Noted    Seasonal  08/07/2019        ROS: Denies any chest pain, Shortness of breath, Changes bowel or bladder, Lower extremity edema.    Physical Exam:  /52   Pulse 84   Temp 36.8 °C (98.2 °F) (Temporal)   Ht 1.676 m (5' 6\")   Wt 73.9 kg (163 lb)   SpO2 94% Comment: pt is on 2 lpm  BMI 26.31 kg/m²   Gen.: Well-developed, well-nourished, no apparent distress,pleasant and cooperative with the examination  Skin:  Warm and dry with good turgor. No rashes or suspicious lesions in visible areas  HEENT:Sinuses nontender with palpation, TMs clear, nares patent with pink mucosa and clear rhinorrhea,no septal deviation ,polyps or lesions. lips without lesions, oropharynx clear.  Neck: Trachea midline,no masses or adenopathy. No JVD.  Cor: Regular rate and rhythm without murmur, gallop or rub.  Lungs: Respirations unlabored. Decreased breath sounds bilaterally. No wheezes, rhonchi.  Extremities: No cyanosis, clubbing or edema.        Assessment and Plan.   73 y.o. male     1. Hospital discharge follow-up  Hospital discharge summary reviewed.    2. Pulmonary emphysema, unspecified emphysema type (HCC)  Stable.  Continue on Advair twice a day, and albuterol as needed  Continue follow-up with pulmonology    3. Restrictive lung disease  Possibly pulm fibrosis.  Scheduled for pulmonary function test  Continue follow-up with pulmonology    4. Lung mass  Accidental finding on CT scan, possibly malignancy.  Advised to schedule lung biopsy    5. Chronic respiratory " failure with hypoxia (HCC)  Continue on oxygen at 2 to 3 L/min 24/7    6. Essential hypertension  Controlled.    Continue on amlodipine 10 mg daily, and lisinopril 20 mg daily.

## 2024-05-20 ENCOUNTER — HOSPITAL ENCOUNTER (OUTPATIENT)
Dept: HEMATOLOGY ONCOLOGY | Facility: MEDICAL CENTER | Age: 74
End: 2024-05-20
Attending: NURSE PRACTITIONER
Payer: MEDICARE

## 2024-05-20 VITALS
HEART RATE: 76 BPM | OXYGEN SATURATION: 95 % | RESPIRATION RATE: 17 BRPM | WEIGHT: 161.93 LBS | HEIGHT: 70 IN | DIASTOLIC BLOOD PRESSURE: 64 MMHG | BODY MASS INDEX: 23.18 KG/M2 | TEMPERATURE: 97.3 F | SYSTOLIC BLOOD PRESSURE: 118 MMHG

## 2024-05-20 DIAGNOSIS — R91.1 LUNG NODULE: ICD-10-CM

## 2024-05-20 PROCEDURE — 99204 OFFICE O/P NEW MOD 45 MIN: CPT | Performed by: NURSE PRACTITIONER

## 2024-05-20 ASSESSMENT — ENCOUNTER SYMPTOMS
COUGH: 1
FEVER: 0
WHEEZING: 0
SPUTUM PRODUCTION: 0
MYALGIAS: 0
DIZZINESS: 0
CONSTIPATION: 0
CHILLS: 0
HEMOPTYSIS: 0
SHORTNESS OF BREATH: 1
VOMITING: 0
DIARRHEA: 0
PALPITATIONS: 0
HEADACHES: 0
NAUSEA: 0
WEIGHT LOSS: 0

## 2024-05-20 ASSESSMENT — FIBROSIS 4 INDEX: FIB4 SCORE: 0.74

## 2024-05-20 ASSESSMENT — PAIN SCALES - GENERAL: PAINLEVEL: NO PAIN

## 2024-05-20 NOTE — PROGRESS NOTES
Subjective     Bright Shirley is a 73 y.o. male who presents with New Patient (IOC/HTH/Mass of left lung/AGUSTINA MONTESINOS V)          HPI    Patient referred to me, Intake Oncology Coordinator by hospital team for lung nodule.  Patient is unaccompanied for today's visit.    Patient was not feeling well and experiencing shortness of breath as well as noticing his oxygen saturation levels were becoming quite low in the 60% range.  He presented to the emergency department with these complaints and ultimately was found to be COVID-positive.  During his hospitalization he did have a CT scan of the chest without contrast on 5/3/2024 which did show a 6.2 x 4.8 x 4.8 cm left upper lobe mass consistent with a lung primary.  Findings are consistent also with chronic interstitial lung disease/fibrosis.  He also had patchy groundglass opacities bilaterally suggesting infection/inflammation.  Bilateral adrenal masses were noted to measure up to 3 cm on the right and 3.5 cm on the left with a -8 Hounsfield unit consistent with a benign adrenal adenoma.  I did personally review the imaging report and images in detail, and reviewed the report in detail with the patient today.    Patient with COPD and emphysema already established with pulmonary.  He was seen by his pulmonology group last week and PFTs have been ordered and scheduled for 5/24/2024..  Hospital primary team requested biopsy of the lung mass which is currently scheduled for CT-guided biopsy on 6/11/2024.    Medically patient is having resolution of his respiratory symptoms.  He does still note a coughing and shortness of breath more so with exertion.  He does use continuous oxygen at this time and with exertion he is at 3-4 L.  He states he is able to decrease his oxygen down when he is at rest.  He did note some blood in sputum but that is resolved.  He did note some dryness of his nasal area with blood noted as well which may likely be the cause of blood in sputum.   He has not had any since.  He does have joint pain more so what he describes as arthritis with walking but otherwise denies any other pain in any other clinical concerns.    Please see past medical and surgical history below.    Patient does have a family history of cancer in his mother who had breast cancer.    Patient is a former smoker.  He has smoked for approximately 49 years on average of 1 pack/day.  He recently quit smoking altogether just approximately 4 weeks ago.  He was smoking cigarettes as well as using the vaping cigarettes.    Allergies   Allergen Reactions    Seasonal      Current Outpatient Medications on File Prior to Encounter   Medication Sig Dispense Refill    Fexofenadine HCl (MUCINEX ALLERGY PO) Take  by mouth every day.      Home Care Oxygen Inhale 5 L/min continuous. O2 LPM @: 5 LPM  Specify Usage: Continuous  Type of O2: Gas  Oxygen via: N/C  Oxygen Modality: Concentrator and Portable Tank  Humidification: Yes      fluticasone-salmeterol (ADVAIR) 100-50 MCG/ACT AEROSOL POWDER, BREATH ACTIVATED Inhale 1 Puff every 12 hours. 60 Each 0    acetaminophen (TYLENOL) 325 MG Tab Take 500 mg by mouth every 6 hours as needed for Mild Pain.      Melatonin 10 MG Tab Take 30 mg by mouth every evening.      rosuvastatin (CRESTOR) 10 MG Tab Take 1 Tablet by mouth every evening. 90 Tablet 2    amLODIPine (NORVASC) 10 MG Tab Take 1 Tablet by mouth every day. 90 Tablet 2    lisinopril (PRINIVIL) 20 MG Tab Take 1 Tablet by mouth 2 times a day. 200 Tablet 2    traZODone (DESYREL) 50 MG Tab Take 1 tablet by mouth every evening. 90 Tablet 3    aspirin (ASA) 81 MG Chew Tab chewable tablet Chew 81 mg every evening. 100 Tablet     docosahexanoic acid (OMEGA 3 FA) 1000 MG Cap Take 1,000 mg by mouth every day.      Multiple Vitamin (MULTIVITAMIN ADULT PO) Take 1 Tablet by mouth every day.      calcium polycarbophil (FIBERCON) 625 MG Tab Take 625 mg by mouth every day.      Chlorpheniramine Maleate (ALLERGY PO) Take 1  Tablet by mouth every day. (Patient not taking: Reported on 2024)       No current facility-administered medications on file prior to encounter.     Past Medical History:   Diagnosis Date    Acute hypoxemic respiratory failure (HCC) 2021    Acute urinary retention 2021    Aortic atherosclerosis (HCC)     Carotid stenosis, bilateral - mild     Cerebral infarction (HCC) - based on CT head 2022     Chronic obstructive pulmonary disease (HCC)     Hypercholesteremia     Hypertension     Intertrochanteric fracture of femur (HCC) 2021    Other emphysema (HCC) 2019    Supplemental oxygen dependent 2021     Past Surgical History:   Procedure Laterality Date    PB TREAT INTER/SUBTROCH FX,W/PLATE/SCREW Left 2021    Procedure: FIXATION, FRACTURE, HIP, USING DYNAMIC HIP SCREW, WITH COMPRESSION;  Surgeon: Avinash Suazo M.D.;  Location: SURGERY [facility];  Service: Orthopedics    HERNIA REPAIR Right     ORIF, FEMUR Right     15 years ago    OTHER      right inguinal hernia repair    OTHER ORTHOPEDIC SURGERY      right hip     Family History   Problem Relation Age of Onset    Cancer Mother         breast cancer    Stroke Mother     Other Father         cirrhosis form alcohol    Stroke Sister     Heart Disease Brother         cardiac aneurysm    Hypertension Maternal Grandmother     Hyperlipidemia Maternal Grandmother      Social History     Socioeconomic History    Marital status: Single    Highest education level: Bachelor's degree (e.g., BA, AB, BS)   Tobacco Use    Smoking status: Former     Current packs/day: 0.00     Average packs/day: 0.5 packs/day for 49.3 years (24.7 ttl pk-yrs)     Types: Cigarettes     Start date:      Quit date: 2024     Years since quittin.0    Smokeless tobacco: Never    Tobacco comments:     vape pen & cigarettes     49 years total andria an average of 1 ppd   Vaping Use    Vaping status: Former    Substances: Nicotine   Substance and Sexual Activity     Alcohol use: Yes     Alcohol/week: 1.8 oz     Types: 1 Glasses of wine, 2 Cans of beer per week     Comment: weekend/social - not often    Drug use: Yes     Types: Marijuana     Comment: THC edible occasional    Sexual activity: Not Currently     Social Determinants of Health     Financial Resource Strain: Low Risk  (6/7/2021)    Overall Financial Resource Strain (CARDIA)     Difficulty of Paying Living Expenses: Not hard at all   Food Insecurity: No Food Insecurity (6/7/2021)    Hunger Vital Sign     Worried About Running Out of Food in the Last Year: Never true     Ran Out of Food in the Last Year: Never true   Transportation Needs: No Transportation Needs (6/7/2021)    PRAPARE - Transportation     Lack of Transportation (Medical): No     Lack of Transportation (Non-Medical): No   Physical Activity: Inactive (6/29/2020)    Exercise Vital Sign     Days of Exercise per Week: 0 days     Minutes of Exercise per Session: 0 min   Stress: No Stress Concern Present (6/29/2020)    [employer] [place] [employer] - Occupational Stress Questionnaire     Feeling of Stress : Only a little   Social Connections: Socially Isolated (6/29/2020)    Social Connection and Isolation Panel [NHANES]     Frequency of Communication with Friends and Family: Three times a week     Frequency of Social Gatherings with Friends and Family: Once a week     Attends [employer] Services: Never     Active Member of Clubs or Organizations: No     Attends Club or Organization Meetings: Never     Marital Status:    Housing Stability: Unknown (6/29/2020)    Housing Stability Vital Sign     Unable to Pay for Housing in the Last Year: No     Unstable Housing in the Last Year: No       Review of Systems   Constitutional:  Negative for chills, fever, malaise/fatigue and weight loss.   Respiratory:  Positive for cough and shortness of breath (noted with exertion - uses continuous O2 with exertion at 3-4 L). Negative for hemoptysis, sputum  "production and wheezing.         Had some blood in sputum that has resolved    Cardiovascular:  Negative for chest pain and palpitations.   Gastrointestinal:  Negative for constipation, diarrhea, nausea and vomiting.   Genitourinary:  Negative for dysuria.   Musculoskeletal:  Positive for joint pain (arthritis pain when walking). Negative for myalgias.   Neurological:  Negative for dizziness and headaches.              Objective     /64 (BP Location: Left arm, Patient Position: Sitting, BP Cuff Size: Adult)   Pulse 76   Temp 36.3 °C (97.3 °F) (Temporal)   Resp 17 Comment: 3L  Ht 1.767 m (5' 9.57\")   Wt 73.5 kg (161 lb 14.9 oz)   SpO2 95%   BMI 23.52 kg/m²      Physical Exam  Vitals reviewed.   Constitutional:       General: He is not in acute distress.     Appearance: Normal appearance. He is not diaphoretic.   HENT:      Head: Normocephalic and atraumatic.   Cardiovascular:      Rate and Rhythm: Normal rate and regular rhythm.      Heart sounds: Normal heart sounds. No murmur heard.     No friction rub. No gallop.   Abdominal:      General: Bowel sounds are normal. There is no distension.      Palpations: Abdomen is soft.      Tenderness: There is no abdominal tenderness.   Musculoskeletal:         General: No swelling or tenderness. Normal range of motion.   Skin:     General: Skin is warm and dry.   Neurological:      Mental Status: He is alert and oriented to person, place, and time.   Psychiatric:         Mood and Affect: Mood normal.         Behavior: Behavior normal.               CT-CHEST (THORAX) W/O    Result Date: 5/3/2024  5/3/2024 3:48 PM HISTORY/REASON FOR EXAM:  VANESA mass. Shortness of breath. Chest pain TECHNIQUE/EXAM DESCRIPTION: CT scan of the chest without contrast. Noncontrast helical scanning of the chest was obtained from the lung apices through the adrenal glands. Low dose optimization technique was utilized for this CT exam including automated exposure control and adjustment of " the mA and/or kV according to patient size. COMPARISON: CT angiogram of the chest 6/6/2021 FINDINGS: Lungs: Left upper lobe mass measuring 6.2 x 4.8 cm with small central cavitation. It is most suspicious for lung malignancy and should be amenable to CT-guided biopsy. There is likely a combination of emphysematous changes and chronic interstitial lung disease/fibrosis. There is extensive honeycombing with a peripheral, basilar predominance. There is bilateral mild bronchiectasis. Patchy groundglass opacities are nonspecific but could represent areas of pneumonitis.. Mediastinum/Ariella: No significant adenopathy. Pleura: No pleural effusion. Cardiac: Heart normal in size without pericardial effusion. There is coronary artery calcification. Vascular: Atherosclerosis. Soft tissues: Unremarkable. Bones: No acute or destructive process. There are bilateral adrenal masses, grossly stable from prior CT, measuring 3 cm on the right and 3.5 cm on the left. The right demonstrates -8 Hounsfield units and is consistent with benign adrenal adenoma. The left is indeterminate but given the stability is also presumably benign.     1.  6.2 x 4.8 x 4.8 cm left upper lobe mass is most consistent with lung malignancy. 2.  Findings consistent with chronic interstitial lung disease/fibrosis in a UIP/IPF pattern. 3.  Patchy groundglass opacities bilaterally, nonspecific but may suggest infection/inflammation. 4.  Stable bilateral adrenal lesions. Fleischner Society pulmonary nodule recommendations: Not Applicable              Assessment & Plan       1. Lung nodule  [id number] BASE TO MID-THIGH              Patient with a large left upper lobe lung mass consistent with malignancy measuring up to 6.2 cm in size.  Currently patient is scheduled for biopsy of this lung mass on 6/11/2024.  He also has PFTs scheduled per pulmonary.  I did discuss with patient recommendation to proceed with a PET/CT for further evaluation.  This will complete  staging workup.  However I did discuss with patient that we may want to biopsy something other than the lung mass depending upon the PET/CT findings.  At this time biopsy of the lung mass will confirm diagnosis but if he has some other concerning findings on PET scan we would need to biopsy that to confirm disease outside of the lung or possible disease in the lymph node.  Therefore I requested a PET/CT and we will arrange to get this scheduled soon as possible.  Should there be a better site to biopsy based on PET/CT results I will contact patient and will arrange that.  If not he is to continue with the biopsy scheduled for 6/11/2024 and I will have him follow-up with me in the clinic to review the results and any further plan of care.    Patient did verbalize understanding's and agreement with the plan.      Please note that this dictation was created using voice recognition software. I have made every reasonable attempt to correct obvious errors, but I expect that there are errors of grammar and possibly content that I did not discover before finalizing the note.

## 2024-05-20 NOTE — ADDENDUM NOTE
Encounter addended by: Sara Fatima, Med Ass't on: 5/20/2024 4:51 PM   Actions taken: Charge Capture section accepted

## 2024-05-24 ENCOUNTER — NON-PROVIDER VISIT (OUTPATIENT)
Dept: SLEEP MEDICINE | Facility: MEDICAL CENTER | Age: 74
End: 2024-05-24
Attending: NURSE PRACTITIONER
Payer: MEDICARE

## 2024-05-24 VITALS — HEIGHT: 65 IN | BODY MASS INDEX: 26.73 KG/M2 | WEIGHT: 160.4 LBS

## 2024-05-24 DIAGNOSIS — J44.9 CHRONIC OBSTRUCTIVE PULMONARY DISEASE, UNSPECIFIED COPD TYPE (HCC): ICD-10-CM

## 2024-05-24 PROCEDURE — 94060 EVALUATION OF WHEEZING: CPT | Mod: 26 | Performed by: INTERNAL MEDICINE

## 2024-05-24 PROCEDURE — 94729 DIFFUSING CAPACITY: CPT | Mod: 26 | Performed by: INTERNAL MEDICINE

## 2024-05-24 PROCEDURE — 94726 PLETHYSMOGRAPHY LUNG VOLUMES: CPT | Mod: 26 | Performed by: INTERNAL MEDICINE

## 2024-05-24 ASSESSMENT — PULMONARY FUNCTION TESTS
FEV1_PERCENT_PREDICTED: 84
FEV1_PERCENT_CHANGE: 1
FEV1/FVC_PERCENT_PREDICTED: 104
FEV1_LLN: 2.14
FEV1: 2.29
FEV1_PERCENT_PREDICTED: 89
FVC: 2.78
FEV1/FVC_PREDICTED: 76
FVC: 2.71
FEV1/FVC_PERCENT_CHANGE: 500
FEV1: 2.16
FEV1/FVC: 82.37
FVC_LLN: 2.81
FEV1/FVC_PERCENT_PREDICTED: 76
FEV1_PREDICTED: 2.57
FEV1/FVC_PERCENT_CHANGE: 3
FEV1/FVC_PERCENT_LLN: 64
FEV1_PERCENT_CHANGE: 5
FVC_PERCENT_PREDICTED: 82
FEV1/FVC_PERCENT_PREDICTED: 109
FEV1/FVC_PERCENT_PREDICTED: 105
FEV1/FVC: 80
FVC_PREDICTED: 3.37
FEV1/FVC: 83
FVC_PERCENT_PREDICTED: 80
FEV1/FVC_PERCENT_PREDICTED: 108
FEV1/FVC: 80

## 2024-05-24 ASSESSMENT — FIBROSIS 4 INDEX: FIB4 SCORE: 0.74

## 2024-05-24 NOTE — PROCEDURES
Tech: Cintia Collado CRT  Good patient effort & cooperation.  Test was performed on the [employer] Body Plethysmograph- Elite DX system.  The predicted sets used for Spirometry are GLI-2012, for Lung Volumes are ITS, and for DLCO is GLI 2017.  The results of this test meet the ATS standards for acceptability and repeatability.  The DLCO was uncorrected for Hb.  A bronchodilator of Ventolin HFA 2 puffs via spacer was administered.  DLCO was performed during dilation period.  Pre FVC's are back extrapolated.    Interpretation:  There is no significant obstructive ventilatory defect on spirometry, however there is a markedly reduced peak expiratory flow with a significant bronchodilator response.  This may be consistent with asthma/reactive airways disease.  Clinically correlate.    More pronounced is a moderate restrictive ventilatory defect evidenced by the total lung capacity measuring 62% predicted.    There is severe diffusion impairment.  The reduced DLCO and DL/VA ratio is consistent with pulmonary emphysema, pulmonary vascular disease or interstitial lung disease.    Flow-volume loop is consistent with the above interpretation of likely mixed obstructive and restrictive ventilatory defect with a partial bronchodilator response.    No prior PFTs for comparison.    IKain M.D. am the author of this note (PFT interpretation).  __________  Kain White MD  Pulmonary and Critical Care Medicine  [facility]

## 2024-05-29 ENCOUNTER — PATIENT OUTREACH (OUTPATIENT)
Dept: HEALTH INFORMATION MANAGEMENT | Facility: OTHER | Age: 74
End: 2024-05-29

## 2024-05-29 ENCOUNTER — HOSPITAL ENCOUNTER (OUTPATIENT)
Dept: RADIOLOGY | Facility: MEDICAL CENTER | Age: 74
End: 2024-05-29
Attending: NURSE PRACTITIONER
Payer: MEDICARE

## 2024-05-29 DIAGNOSIS — R91.1 LUNG NODULE: ICD-10-CM

## 2024-05-29 LAB — GLUCOSE BLD-MCNC: 89 MG/DL (ref 65–99)

## 2024-05-29 NOTE — PROGRESS NOTES
CHW tried calling the pt to introduce the PCM program after receiving a MyChart message from the pt. Pt did not answer and this CHW left a VM requesting a return call. 5/29  CHW tried calling the pt to introduce the PCM program after receiving a MyChart message from the pt. Pt did not answer and this CHW left a VM requesting a return call. 5/31  Community Health Worker Follow-Up    Reason for outreach: CHW tried calling the pt. To introduce the PCM program.    CHW Interventions: Thisd CHW have made several attempts at introducing the PCM program to the pt. Pt responded with a MyChart message stated he wanted the program but has not answered his phone. This CHW will let the pt call back.    Specific Resources Provided:  Housing/Shelter: n/a  Transportation: n/a  Food: n/a  Financial: n/a  Social Supports: n/a  Other: n/a    Plan: CHW will not follow at this time.

## 2024-05-30 ENCOUNTER — PATIENT MESSAGE (OUTPATIENT)
Dept: HEALTH INFORMATION MANAGEMENT | Facility: OTHER | Age: 74
End: 2024-05-30

## 2024-06-03 ENCOUNTER — PATIENT MESSAGE (OUTPATIENT)
Dept: HEALTH INFORMATION MANAGEMENT | Facility: OTHER | Age: 74
End: 2024-06-03

## 2024-06-06 PROCEDURE — RXMED WILLOW AMBULATORY MEDICATION CHARGE: Performed by: FAMILY MEDICINE

## 2024-06-10 NOTE — OR NURSING
Called to preadmit patient for upcoming procedure. No answer, left call back info on his voicemail.

## 2024-06-11 ENCOUNTER — APPOINTMENT (OUTPATIENT)
Dept: RADIOLOGY | Facility: MEDICAL CENTER | Age: 74
End: 2024-06-11
Payer: MEDICARE

## 2024-06-11 ENCOUNTER — APPOINTMENT (OUTPATIENT)
Dept: RADIOLOGY | Facility: MEDICAL CENTER | Age: 74
End: 2024-06-11
Attending: RADIOLOGY
Payer: MEDICARE

## 2024-06-11 ENCOUNTER — HOSPITAL ENCOUNTER (OUTPATIENT)
Facility: MEDICAL CENTER | Age: 74
End: 2024-06-11
Attending: FAMILY MEDICINE | Admitting: FAMILY MEDICINE
Payer: MEDICARE

## 2024-06-11 VITALS
RESPIRATION RATE: 18 BRPM | HEART RATE: 69 BPM | SYSTOLIC BLOOD PRESSURE: 143 MMHG | HEIGHT: 64 IN | TEMPERATURE: 97.1 F | DIASTOLIC BLOOD PRESSURE: 86 MMHG | OXYGEN SATURATION: 92 % | WEIGHT: 161.16 LBS | BODY MASS INDEX: 27.51 KG/M2

## 2024-06-11 DIAGNOSIS — R91.8 MASS OF LEFT LUNG: ICD-10-CM

## 2024-06-11 LAB
ERYTHROCYTE [DISTWIDTH] IN BLOOD BY AUTOMATED COUNT: 43.8 FL (ref 35.9–50)
HCT VFR BLD AUTO: 37.8 % (ref 42–52)
HGB BLD-MCNC: 12.2 G/DL (ref 14–18)
INR PPP: 0.95 (ref 0.87–1.13)
MCH RBC QN AUTO: 27.6 PG (ref 27–33)
MCHC RBC AUTO-ENTMCNC: 32.3 G/DL (ref 32.3–36.5)
MCV RBC AUTO: 85.5 FL (ref 81.4–97.8)
PATHOLOGY CONSULT NOTE: NORMAL
PLATELET # BLD AUTO: 316 K/UL (ref 164–446)
PMV BLD AUTO: 9.1 FL (ref 9–12.9)
PROTHROMBIN TIME: 12.7 SEC (ref 12–14.6)
RBC # BLD AUTO: 4.42 M/UL (ref 4.7–6.1)
WBC # BLD AUTO: 8.4 K/UL (ref 4.8–10.8)

## 2024-06-11 PROCEDURE — 160002 HCHG RECOVERY MINUTES (STAT)

## 2024-06-11 PROCEDURE — 88341 IMHCHEM/IMCYTCHM EA ADD ANTB: CPT

## 2024-06-11 PROCEDURE — 160035 HCHG PACU - 1ST 60 MINS PHASE I

## 2024-06-11 PROCEDURE — 71045 X-RAY EXAM CHEST 1 VIEW: CPT

## 2024-06-11 PROCEDURE — 88342 IMHCHEM/IMCYTCHM 1ST ANTB: CPT

## 2024-06-11 PROCEDURE — 700105 HCHG RX REV CODE 258: Performed by: RADIOLOGY

## 2024-06-11 PROCEDURE — 160046 HCHG PACU - 1ST 60 MINS PHASE II

## 2024-06-11 PROCEDURE — 700111 HCHG RX REV CODE 636 W/ 250 OVERRIDE (IP): Mod: JZ

## 2024-06-11 PROCEDURE — 4401636 CT-BIOPSY-LUNG/MEDIASTINUM W/ GUIDE

## 2024-06-11 PROCEDURE — 160047 HCHG PACU  - EA ADDL 30 MINS PHASE II

## 2024-06-11 PROCEDURE — 85027 COMPLETE CBC AUTOMATED: CPT

## 2024-06-11 PROCEDURE — 85610 PROTHROMBIN TIME: CPT

## 2024-06-11 PROCEDURE — 88305 TISSUE EXAM BY PATHOLOGIST: CPT

## 2024-06-11 PROCEDURE — 88312 SPECIAL STAINS GROUP 1: CPT | Mod: 59

## 2024-06-11 PROCEDURE — RXMED WILLOW AMBULATORY MEDICATION CHARGE: Performed by: FAMILY MEDICINE

## 2024-06-11 RX ORDER — ONDANSETRON 2 MG/ML
4 INJECTION INTRAMUSCULAR; INTRAVENOUS EVERY 8 HOURS PRN
Status: DISCONTINUED | OUTPATIENT
Start: 2024-06-11 | End: 2024-06-11 | Stop reason: HOSPADM

## 2024-06-11 RX ORDER — MIDAZOLAM HYDROCHLORIDE 1 MG/ML
INJECTION INTRAMUSCULAR; INTRAVENOUS
Status: COMPLETED
Start: 2024-06-11 | End: 2024-06-11

## 2024-06-11 RX ORDER — HYDROMORPHONE HYDROCHLORIDE 1 MG/ML
0.5 INJECTION, SOLUTION INTRAMUSCULAR; INTRAVENOUS; SUBCUTANEOUS
Status: DISCONTINUED | OUTPATIENT
Start: 2024-06-11 | End: 2024-06-11 | Stop reason: HOSPADM

## 2024-06-11 RX ORDER — MIDAZOLAM HYDROCHLORIDE 1 MG/ML
.5-2 INJECTION INTRAMUSCULAR; INTRAVENOUS PRN
Status: ACTIVE | OUTPATIENT
Start: 2024-06-11 | End: 2024-06-11

## 2024-06-11 RX ORDER — SODIUM CHLORIDE 9 MG/ML
500 INJECTION, SOLUTION INTRAVENOUS
Status: DISPENSED | OUTPATIENT
Start: 2024-06-11 | End: 2024-06-11

## 2024-06-11 RX ORDER — OXYCODONE HYDROCHLORIDE 10 MG/1
10 TABLET ORAL
Status: DISCONTINUED | OUTPATIENT
Start: 2024-06-11 | End: 2024-06-11 | Stop reason: HOSPADM

## 2024-06-11 RX ORDER — OXYCODONE HYDROCHLORIDE 5 MG/1
5 TABLET ORAL
Status: DISCONTINUED | OUTPATIENT
Start: 2024-06-11 | End: 2024-06-11 | Stop reason: HOSPADM

## 2024-06-11 RX ORDER — ONDANSETRON 2 MG/ML
4 INJECTION INTRAMUSCULAR; INTRAVENOUS PRN
Status: ACTIVE | OUTPATIENT
Start: 2024-06-11 | End: 2024-06-11

## 2024-06-11 RX ADMIN — MIDAZOLAM HYDROCHLORIDE 1 MG: 1 INJECTION INTRAMUSCULAR; INTRAVENOUS at 09:15

## 2024-06-11 RX ADMIN — MIDAZOLAM HYDROCHLORIDE 1 MG: 1 INJECTION, SOLUTION INTRAMUSCULAR; INTRAVENOUS at 09:15

## 2024-06-11 ASSESSMENT — FIBROSIS 4 INDEX: FIB4 SCORE: 0.74

## 2024-06-11 NOTE — OR NURSING
0943 Patient arrived to unit from IR.  Report from RN.  Patient is awake and alert, denies pain or shortness of breath.  Dressing to left anterior chest is clean, dry and soft.  Patient updated on plan of care.  0950 Patient called friend and updated him.  Belongings returned to patient.  1030 Patient resting in gurney, denies discomfort.  1045 Chest xray complete, results pending.  1205 Dr. Hewitt called and updated, able to read chest xray and reveals no pneumothorax.  1245 Second xray completed, results pending.  1400 Second xray reveals no pneumothorax.  1415 Patient up to edge of bed, denies discomfort.  Access site clean, dry and soft.  All lines and monitors discontinued.  1420 Reviewed discharge paperwork with pt. Discussed diet, activity, medications, follow up care and worsening symptoms. No questions at this time.    1430 Patient taken down via wheelchair to meet friend Ed.  Pt discharged home with Ed.

## 2024-06-11 NOTE — DISCHARGE INSTRUCTIONS
CALL 911 FOR ANY CHEST PAIN OR SHORTNESS OF BREATH    RESUME ASPIRIN WEDNESDAY NIGHT    What to Expect Post Sedation    Rest and take it easy for the first 24 hours.  A responsible adult is recommended to remain with you during that time.  It is normal to feel sleepy.  We encourage you to not do anything that requires balance, judgment or coordination.    FOR 24 HOURS DO NOT:  Drive, operate machinery or run household appliances.  Drink beer or alcoholic beverages.  Make important decisions or sign legal documents.    To avoid nausea, slowly advance diet as tolerated, avoiding spicy or greasy foods for the first day.  Add more substantial food to your diet according to your provider's instructions.  Babies can be fed formula or breast milk as soon as they are hungry.  INCREASE FLUIDS AND FIBER TO AVOID CONSTIPATION.    MILD FLU-LIKE SYMPTOMS ARE NORMAL.  YOU MAY EXPERIENCE GENERALIZED MUSCLE ACHES, THROAT IRRITATION, HEADACHE AND/OR SOME NAUSEA.  If any questions arise, call your provider.  If your provider is not available, please feel free to call the Surgical Center at [phone].    MEDICATIONS: Resume taking daily medication.  Take prescribed pain medication with food.  If no medication is prescribed, you may take non-aspirin pain medication if needed.  PAIN MEDICATION CAN BE VERY CONSTIPATING.  Take a stool softener or laxative such as senokot, pericolace, or milk of magnesia if needed.    Diet    Resume your normal diet as tolerated.  A diet low in cholesterol, fat, and sodium is recommended for good health.     Discharge Instructions:  Needle Biopsy, Care After  These instructions tell you how to care for yourself after your procedure. Your doctor may give you more instructions. Call your doctor if you have any problems or questions.  What can I expect after the procedure?  After a needle biopsy, it is common to have these things at the puncture site:  Soreness.  Bruising.  Mild pain.  These things  should go away after a few days.  Follow these instructions at home:  Puncture site care  Wash your hands with soap and water for at least 20 seconds before and after you change your bandage (dressing). If you cannot use soap and water, use hand .  Follow instructions from your doctor about how to take care of your puncture site. This includes:  When and how to change your bandage.  When to take off your bandage.  Check your puncture site every day for signs of infection. Check for:  Redness, swelling, or more pain.  Fluid or blood.  Warmth.  Pus or a bad smell.  General instructions  Go back to your normal activities when your doctor says that it is safe. Ask if there is anything you cannot do as you heal.  Take over-the-counter and prescription medicines only as told by your doctor.  Do not take baths, swim, or use a hot tub. Ask your doctor when you can shower.  Keep all follow-up visits. Ask if you need an appointment to get your biopsy results.  Contact a doctor if:  You have a fever.  You have redness, swelling, or more pain at the puncture site, and it lasts longer than a few days.  You have fluid, blood, or pus coming from the puncture site.  Your puncture site feels warm.  Get help right away if:  You have very bad bleeding from the puncture site.  Summary  After the procedure, it is common to have soreness, bruising, or mild pain at the puncture site.  Check your puncture site every day for signs of infection, such as redness, swelling, or more pain.  Get help right away if you have very bad bleeding from your puncture site.  This information is not intended to replace advice given to you by your health care provider. Make sure you discuss any questions you have with your health care provider.  Document Revised: 06/08/2022 Document Reviewed: 06/08/2022  Elsevier Patient Education © 2023 Elsevier Inc.    DRESSING: Keep dressing and incision dry and intact for 24 hours, may remove dressing and  shower after 10 AM on 6/12, do not need to replace.  Do not submerge site in water for 7 days.     BOWEL FUNCTION:  Prescription pain medication may cause constipation. If you are having problems, use what you normally would or call your provider for suggestions. It also helps to stay regular by including fiber in your diet (for example: bran or fruits and vegetables) and drink plenty of liquids (water, juice, etc.).

## 2024-06-11 NOTE — PROGRESS NOTES
Pt presents to CT 4. Mr. Shirley was consented by MD at bedside, confirmed by this RN and consent at bedside. Pt transferred to CT 4 table in supine position. Patient underwent a left lung mass biopsy by Dr. Hewitt. Procedure site was marked by MD and verified using imaging guidance. Pt placed on monitor, prepped and draped in a sterile fashion. Vitals were taken every 5 minutes and remained stable during procedure (see doc flow sheet for results). CO2 waveform capnography was monitored and remained WNL throughout procedure. Report called to JOYCELYN Dobson. Pt transported by stretcher with RN to PPU 6.     Specimen: 5 left lung mass cores in Formalin hand delivered to lab.

## 2024-06-12 ENCOUNTER — PHARMACY VISIT (OUTPATIENT)
Dept: PHARMACY | Facility: MEDICAL CENTER | Age: 74
End: 2024-06-12
Payer: COMMERCIAL

## 2024-06-13 ENCOUNTER — HOSPITAL ENCOUNTER (OUTPATIENT)
Dept: HEMATOLOGY ONCOLOGY | Facility: MEDICAL CENTER | Age: 74
End: 2024-06-13
Attending: NURSE PRACTITIONER
Payer: MEDICARE

## 2024-06-13 VITALS
SYSTOLIC BLOOD PRESSURE: 120 MMHG | WEIGHT: 160.5 LBS | DIASTOLIC BLOOD PRESSURE: 64 MMHG | RESPIRATION RATE: 20 BRPM | BODY MASS INDEX: 27.4 KG/M2 | TEMPERATURE: 97.7 F | OXYGEN SATURATION: 92 % | HEIGHT: 64 IN | HEART RATE: 76 BPM

## 2024-06-13 DIAGNOSIS — C34.12 SQUAMOUS CELL CARCINOMA OF UPPER LOBE OF LEFT LUNG (HCC): ICD-10-CM

## 2024-06-13 PROCEDURE — 99214 OFFICE O/P EST MOD 30 MIN: CPT | Performed by: NURSE PRACTITIONER

## 2024-06-13 PROCEDURE — 99212 OFFICE O/P EST SF 10 MIN: CPT | Performed by: NURSE PRACTITIONER

## 2024-06-13 ASSESSMENT — FIBROSIS 4 INDEX: FIB4 SCORE: 1.06

## 2024-06-13 ASSESSMENT — ENCOUNTER SYMPTOMS
COUGH: 0
PALPITATIONS: 0
FEVER: 0
CHILLS: 0
WEIGHT LOSS: 0
SHORTNESS OF BREATH: 1

## 2024-06-13 ASSESSMENT — PAIN SCALES - GENERAL: PAINLEVEL: NO PAIN

## 2024-06-13 NOTE — PROGRESS NOTES
Subjective     Bright Shirley is a 73 y.o. male who presents with Results (C Est/ follow up after PET scan )          HPI    Patient seen today in follow-up for biopsy and PET/CT results.  He presents unaccompanied for today's visit.    Patient originally seen back on 5/20/2024 after referral from hospital team for lung nodule. Patient was not feeling well and experiencing shortness of breath as well as noticing his oxygen saturation levels were becoming quite low in the 60% range. He presented to the emergency department with these complaints and ultimately was found to be COVID-positive. During his hospitalization he did have a CT scan of the chest without contrast on 5/3/2024 which did show a 6.2 x 4.8 x 4.8 cm left upper lobe mass consistent with a lung primary. Findings are consistent also with chronic interstitial lung disease/fibrosis. He also had patchy groundglass opacities bilaterally suggesting infection/inflammation. Bilateral adrenal masses were noted to measure up to 3 cm on the right and 3.5 cm on the left with a -8 Hounsfield unit consistent with a benign adrenal adenoma.     Patient was scheduled for a lung biopsy on 6/11/2024, and I requested patient undergo PET/CT to have full evaluation and completion of staging workup.  PET/CT completed on 5/29/2024 showed hypermetabolic, centrally necrotic left upper lobe mass highly concerning for malignancy.  There was no FDG avid metastatic disease noted.  There is no hypermetabolic hilar, mediastinal or axillary lymphadenopathy noted.  Did personally review the imaging report and images in detail, and reviewed the report in detail with the patient today.    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  I did personally review the    Clinically patient is doing well.  He stated he tolerated the biopsy well.  He had some mild discomfort the evening after the biopsy but took some Tylenol with  positive results.  Patient continues to be on oxygen at 3 L since his hospitalization in April 2024.  - At rest without - 94%  - Walking without - 84%  - Walking with 5L - 91%  - At rest with 3L - 93%    Confirmed patient did complete pulmonary function tests.      Allergies   Allergen Reactions    Seasonal      Current Outpatient Medications on File Prior to Encounter   Medication Sig Dispense Refill    Fexofenadine HCl (MUCINEX ALLERGY PO) Take  by mouth every day.      Home Care Oxygen Inhale 5 L/min continuous. O2 LPM @: 5 LPM  Specify Usage: Continuous  Type of O2: Gas  Oxygen via: N/C  Oxygen Modality: Concentrator and Portable Tank  Humidification: Yes      fluticasone-salmeterol (ADVAIR) 100-50 MCG/ACT AEROSOL POWDER, BREATH ACTIVATED Inhale 1 Puff every 12 hours. 60 Each 0    acetaminophen (TYLENOL) 325 MG Tab Take 500 mg by mouth every 6 hours as needed for Mild Pain.      Melatonin 10 MG Tab Take 30 mg by mouth every evening.      rosuvastatin (CRESTOR) 10 MG Tab Take 1 Tablet by mouth every evening. 90 Tablet 2    amLODIPine (NORVASC) 10 MG Tab Take 1 Tablet by mouth every day. 90 Tablet 2    lisinopril (PRINIVIL) 20 MG Tab Take 1 Tablet by mouth 2 times a day. 200 Tablet 2    traZODone (DESYREL) 50 MG Tab Take 1 tablet by mouth every evening. 90 Tablet 3    aspirin (ASA) 81 MG Chew Tab chewable tablet Chew 81 mg every evening. 100 Tablet     docosahexanoic acid (OMEGA 3 FA) 1000 MG Cap Take 1,000 mg by mouth every day.      Multiple Vitamin (MULTIVITAMIN ADULT PO) Take 1 Tablet by mouth every day.      calcium polycarbophil (FIBERCON) 625 MG Tab Take 625 mg by mouth every day.       No current facility-administered medications on file prior to encounter.         Review of Systems   Constitutional:  Negative for chills, fever and weight loss.   Respiratory:  Positive for shortness of breath. Negative for cough.    Cardiovascular:  Negative for chest pain and palpitations.              Objective     BP  "120/64 (BP Location: Right arm, Patient Position: Sitting, BP Cuff Size: Adult)   Pulse 76   Temp 36.5 °C (97.7 °F) (Temporal)   Resp 20   Ht 1.626 m (5' 4\")   Wt 72.8 kg (160 lb 7.9 oz)   SpO2 92% Comment: 3L ox  BMI 27.55 kg/m²      Physical Exam  Vitals reviewed.   Constitutional:       General: He is not in acute distress.     Appearance: Normal appearance. He is not diaphoretic.   Cardiovascular:      Rate and Rhythm: Normal rate and regular rhythm.      Heart sounds: Normal heart sounds. No murmur heard.     No friction rub. No gallop.   Pulmonary:      Effort: Pulmonary effort is normal. No respiratory distress.      Breath sounds: No wheezing.      Comments: Diminished in the left upper lobe.  Skin:     General: Skin is warm and dry.   Neurological:      Mental Status: He is alert and oriented to person, place, and time.   Psychiatric:         Mood and Affect: Mood normal.         Behavior: Behavior normal.                PATHOLOGY  FINAL DIAGNOSIS:     A. Left lung mass biopsy:          Moderately to poorly differentiated squamous cell carcinoma in a           background of abundant necrosis.     Comment: There is an adequate tumor for any additional testing.       IMAGING  [id number] BASE TO MID-THIGH    Result Date: 5/29/2024 5/29/2024 3:19 PM HISTORY/REASON FOR EXAM:  Left upper lobe pulmonary nodule concerning for malignancy. Further evaluation TECHNIQUE/EXAM DESCRIPTION AND NUMBER OF VIEWS: PET body imaging. Initially, 8.6 mCi F-18 FDG was administered intravenously under standardized conditions. Approximately 45 minutes after FDG administration, the patient was placed in the supine position on the PET CT table. Blood glucose level was 89 mg/dL. Low dose spiral CT imaging was performed from the skull base to the mid thighs. PET imaging was then performed from the skull base to the mid thighs. CT images, PET images, and PET/CT fused images were reviewed on a PACS 3D workstation. The limited " non-contrast CT data are used primarily for attenuation correction and anatomic correlation.  Evaluation of solid organs and bowel are especially limited utilizing this technique. A low dose CT was obtained of the same area without IV contrast for attenuation correction and coregistration, not for a diagnostic scan. COMPARISON: 5/3/2024. FINDINGS: VISUALIZED BRAIN: Normal metabolic activity. HEAD AND NECK: Normal physiologic uptake. Nonhypermetabolic right thyroid nodule, statistically benign. CHEST: Left upper lobe mass measures 6.4 x 4.8 cm on today's study. It is markedly hypermetabolic, with an SUV max of 18.63. It also demonstrates some central necrosis. There is no hypermetabolic hilar, mediastinal, or axillary lymphadenopathy. Atherosclerosis. Coronary calcifications. Emphysema. Multiple rows of subpleural cysts in the posterior lungs, likely associated interstitial lung disease. Tiny hiatal hernia. ABDOMEN AND PELVIS: There is normal, uniform metabolic activity in the liver, spleen, adrenal glands, kidneys. There is no hypermetabolic mesenteric, retroperitoneal, iliac, or inguinal lymphadenopathy. Nonspecific physiologic activity in the colon and intestine is noted. Benign renal cysts, which do not require imaging follow-up. Colonic diverticulosis. VISUALIZED MUSCULOSKELETAL SYSTEM: No hypermetabolic osseous lesions. Transfixed right and left hips.     1.  Hypermetabolic, centrally necrotic left upper lobe mass, highly concerning for malignancy. 2.  No FDG avid metastatic disease. 3.  Emphysema and suspected underlying interstitial lung disease.         CT-CHEST (THORAX) W/O     Result Date: 5/3/2024  5/3/2024 3:48 PM HISTORY/REASON FOR EXAM:  VANESA mass. Shortness of breath. Chest pain TECHNIQUE/EXAM DESCRIPTION: CT scan of the chest without contrast. Noncontrast helical scanning of the chest was obtained from the lung apices through the adrenal glands. Low dose optimization technique was utilized for this CT  exam including automated exposure control and adjustment of the mA and/or kV according to patient size. COMPARISON: CT angiogram of the chest 6/6/2021 FINDINGS: Lungs: Left upper lobe mass measuring 6.2 x 4.8 cm with small central cavitation. It is most suspicious for lung malignancy and should be amenable to CT-guided biopsy. There is likely a combination of emphysematous changes and chronic interstitial lung disease/fibrosis. There is extensive honeycombing with a peripheral, basilar predominance. There is bilateral mild bronchiectasis. Patchy groundglass opacities are nonspecific but could represent areas of pneumonitis.. Mediastinum/Ariella: No significant adenopathy. Pleura: No pleural effusion. Cardiac: Heart normal in size without pericardial effusion. There is coronary artery calcification. Vascular: Atherosclerosis. Soft tissues: Unremarkable. Bones: No acute or destructive process. There are bilateral adrenal masses, grossly stable from prior CT, measuring 3 cm on the right and 3.5 cm on the left. The right demonstrates -8 Hounsfield units and is consistent with benign adrenal adenoma. The left is indeterminate but given the stability is also presumably benign.      1.  6.2 x 4.8 x 4.8 cm left upper lobe mass is most consistent with lung malignancy. 2.  Findings consistent with chronic interstitial lung disease/fibrosis in a UIP/IPF pattern. 3.  Patchy groundglass opacities bilaterally, nonspecific but may suggest infection/inflammation. 4.  Stable bilateral adrenal lesions. Fleischner Society pulmonary nodule recommendations: Not Applicable       PULMONARY FUNCTION TEST  Tech: Cintia Collado, SABINE  Good patient effort & cooperation.  Test was performed on the [employer] Body Plethysmograph- Elite DX system.  The predicted sets used for Spirometry are GLI-2012, for Lung Volumes are ITS, and for DLCO is GLI 2017.  The results of this test meet the ATS standards for acceptability and repeatability.  The DLCO was  uncorrected for Hb.  A bronchodilator of Ventolin HFA 2 puffs via spacer was administered.  DLCO was performed during dilation period.  Pre FVC's are back extrapolated.     Interpretation:  There is no significant obstructive ventilatory defect on spirometry, however there is a markedly reduced peak expiratory flow with a significant bronchodilator response.  This may be consistent with asthma/reactive airways disease.  Clinically correlate.    More pronounced is a moderate restrictive ventilatory defect evidenced by the total lung capacity measuring 62% predicted.    There is severe diffusion impairment.  The reduced DLCO and DL/VA ratio is consistent with pulmonary emphysema, pulmonary vascular disease or interstitial lung disease.    Flow-volume loop is consistent with the above interpretation of likely mixed obstructive and restrictive ventilatory defect with a partial bronchodilator response.    No prior PFTs for comparison.             Assessment & Plan       1. Squamous cell carcinoma of upper lobe of left lung (HCC)  Referral to General Surgery    Referral to Hematology Oncology    REFERRAL TO ONCOLOGY NURSE NAVIGATOR              1. Patient with new diagnosis of lung cancer - T3NoMo - Stage IIB squamous cell carcinoma.  Based on these findings and PET/CT results I will go ahead and proceed with the following testing and referral:    A.  Referral to hematology/oncology with Dr. Orona  B.  Referral to thoracic surgery with Dr. Ganser or Dr. Cano  C.  Referral to oncology nurse navigator  D.  Will send to pathology tissue for [facility] NGS testing  E.  Will also plan to present patient's case at the next tumor board    Discussed this plan of care in detail with the patient today.  He did verbalize understanding's and agreement with the plan.    2.  Patient stated that he has been denied for his home oxygen.  I did perform walking oxygen testing.  Confirmed that without oxygen during walking patient's  oxygen does decrease down to 84%.  Required to increase in back up to 5 L in which he was at 91% while walking.      Please note that this dictation was created using voice recognition software. I have made every reasonable attempt to correct obvious errors, but I expect that there are errors of grammar and possibly content that I did not discover before finalizing the note.

## 2024-06-13 NOTE — ADDENDUM NOTE
Encounter addended by: Sara Fatima, Med Ass't on: 6/13/2024 10:27 AM   Actions taken: Charge Capture section accepted

## 2024-06-14 ENCOUNTER — PHARMACY VISIT (OUTPATIENT)
Dept: PHARMACY | Facility: MEDICAL CENTER | Age: 74
End: 2024-06-14
Payer: COMMERCIAL

## 2024-06-14 ENCOUNTER — PATIENT OUTREACH (OUTPATIENT)
Dept: HEALTH INFORMATION MANAGEMENT | Facility: OTHER | Age: 74
End: 2024-06-14
Payer: MEDICARE

## 2024-06-14 ENCOUNTER — PATIENT MESSAGE (OUTPATIENT)
Dept: HEALTH INFORMATION MANAGEMENT | Facility: OTHER | Age: 74
End: 2024-06-14

## 2024-06-14 DIAGNOSIS — I10 PRIMARY HYPERTENSION: ICD-10-CM

## 2024-06-14 PROCEDURE — RXMED WILLOW AMBULATORY MEDICATION CHARGE: Performed by: INTERNAL MEDICINE

## 2024-06-14 RX ORDER — ZOSTER VACCINE RECOMBINANT, ADJUVANTED 50 MCG/0.5
KIT INTRAMUSCULAR
Qty: 0.5 ML | Refills: 0 | OUTPATIENT
Start: 2024-06-14

## 2024-06-14 NOTE — PROGRESS NOTES
Community Health Worker Follow-Up    Reason for outreach: CHW received a call back from the pt    CHW Interventions: CHW and the pt discussed the PCM program and the benefits of the program for the pt.     Specific Resources Provided:  Housing/Shelter: n/a  Transportation: n/a  Food: n/a  Financial: n/a  Social Supports: n/a  Other: MyChart message with program details and times for the enrollment    Plan: Pt accepted the program and is set for enrollment on 6/17 @2pm with PCM nurse Jacquelyn

## 2024-06-17 ENCOUNTER — PATIENT MESSAGE (OUTPATIENT)
Dept: HEMATOLOGY ONCOLOGY | Facility: MEDICAL CENTER | Age: 74
End: 2024-06-17
Payer: MEDICARE

## 2024-06-17 ENCOUNTER — PATIENT OUTREACH (OUTPATIENT)
Dept: ONCOLOGY | Facility: MEDICAL CENTER | Age: 74
End: 2024-06-17
Payer: MEDICARE

## 2024-06-17 ENCOUNTER — TELEPHONE (OUTPATIENT)
Dept: HEMATOLOGY ONCOLOGY | Facility: MEDICAL CENTER | Age: 74
End: 2024-06-17
Payer: MEDICARE

## 2024-06-17 ENCOUNTER — PATIENT OUTREACH (OUTPATIENT)
Dept: HEALTH INFORMATION MANAGEMENT | Facility: OTHER | Age: 74
End: 2024-06-17
Payer: MEDICARE

## 2024-06-17 DIAGNOSIS — C34.12 SQUAMOUS CELL CARCINOMA OF UPPER LOBE OF LEFT LUNG (HCC): ICD-10-CM

## 2024-06-17 DIAGNOSIS — I10 PRIMARY HYPERTENSION: ICD-10-CM

## 2024-06-17 NOTE — PROGRESS NOTES
Left a voice message for patient to call back.    Unfortunately, there is not enough tissue to proceed with [facility] testing therefore I am requesting for patient to come in for foundation liquid test.

## 2024-06-17 NOTE — LETTER
[facility]    [address]  [place] [address]  Phone: [phone] - Fax: [phone]              Bright Fern,  [address]  [place] [address]     Date: 06/17/24    Dear Bright,    I am a Cancer Nurse Navigator, a certified oncology nurse. My role is to assess any needs you may have with education, guidance and support. I am available to you and your family through your treatment at [facility].       I am available to address your needs during your journey with the following services:     Care Coordination  I can assist you in facilitating communication between your cancer care treatment team to ensure timely treatment and follow-up.  I can also assist with transition of care back to your primary care provider, or other specialist, as needed.  My goal is to bridge gaps for you throughout the course of your active treatment.       Education Services  Understanding the recommended treatment course by your physician is key. I can provide educational resources personalized to your cancer diagnosis to help you understand your diagnosis and treatment. Please let me know if you would like to receive information about your diagnosis and treatment plan.  I am here to help.     Support Services/Resource Information  [facility] we offer a full scope of support services.  I can assist you with referral information to:  Cancer Clinical Trials & Research  Nutrition counseling  Support groups  Complementary Therapies such as Mind-Body Techniques Meditation  Patient Financial Advocates    [facility], an [employer] affiliate office, our volunteers can assist you with accessing our lending library, support services information, head coverings and comfort items  Community and national resources, included eligibility based myles assistance and pharmaceutical access programs, if you are in need of additional information.     [facility]  [facility] offers services that include:  Behavioral Health  Genetic counseling & testing  Acupuncture  Lymphedema prevention/treatment program  Palliative care services.        I hope you have an excellent patient experience.  Please feel free to share with me your comments regarding the care you have received- we value your feedback.    Sincerely,     Sophie Moore R.N.  Cancer Nurse Navigator    Office: [phone]  Main:  [phone]  Email:  [email or web address]

## 2024-06-17 NOTE — TELEPHONE ENCOUNTER
Multiple calls placed last week with voice messages left.  Patient did return phone call this morning.    Unfortunately patient's pathology did not have adequate specimen to proceed with [facility].  Therefore requesting patient to proceed with Foundation liquid biopsy.  Discussed with patient and he did state that a representative from [place] [address] did go out to his house last week and gerda his foundation 1 liquid biopsy.  This is currently pending.    Requested to see if pathology could get enough tissue to send for PDL-1.  This was completed and results did come back showing 90%.    Patient also has been scheduled with Dr. Orona this coming Friday, 6/28/2024.    Patient's case was presented at tumor board last week and based on patient's current lung function he is not a surgical candidate.  Therefore we will place referral to radiation oncology.  I discussed that with patient on the phone today and he did agree with the plan.      Please note that this dictation was created using voice recognition software. I have made every reasonable attempt to correct obvious errors, but I expect that there are errors of grammar and possibly content that I did not discover before finalizing the note.

## 2024-06-17 NOTE — PROGRESS NOTES
Referral received, call placed to patient, left message.    Introduction letter, June cancer support calendar and message sent via [employer].

## 2024-06-21 NOTE — PROGRESS NOTES
Three attempts to contact Bright for enrollment to PCM program. No answer, left messages. Unable to contact. PCM closed at this time.

## 2024-06-27 ENCOUNTER — PATIENT OUTREACH (OUTPATIENT)
Dept: ONCOLOGY | Facility: MEDICAL CENTER | Age: 74
End: 2024-06-27
Payer: MEDICARE

## 2024-06-28 ENCOUNTER — HOSPITAL ENCOUNTER (OUTPATIENT)
Dept: HEMATOLOGY ONCOLOGY | Facility: MEDICAL CENTER | Age: 74
End: 2024-06-28
Attending: STUDENT IN AN ORGANIZED HEALTH CARE EDUCATION/TRAINING PROGRAM
Payer: MEDICARE

## 2024-06-28 VITALS
WEIGHT: 158.29 LBS | TEMPERATURE: 98.1 F | SYSTOLIC BLOOD PRESSURE: 118 MMHG | OXYGEN SATURATION: 85 % | BODY MASS INDEX: 27.02 KG/M2 | DIASTOLIC BLOOD PRESSURE: 62 MMHG | HEART RATE: 74 BPM | HEIGHT: 64 IN

## 2024-06-28 DIAGNOSIS — C34.12 SQUAMOUS CELL CARCINOMA OF UPPER LOBE OF LEFT LUNG (HCC): ICD-10-CM

## 2024-06-28 ASSESSMENT — PAIN SCALES - GENERAL: PAINLEVEL: NO PAIN

## 2024-06-28 ASSESSMENT — FIBROSIS 4 INDEX: FIB4 SCORE: 1.07

## 2024-06-30 ASSESSMENT — ENCOUNTER SYMPTOMS
PALPITATIONS: 0
DEPRESSION: 0
FEVER: 0
ORTHOPNEA: 0
TREMORS: 0
WHEEZING: 0
NECK PAIN: 0
BLURRED VISION: 0
BRUISES/BLEEDS EASILY: 0
WEIGHT LOSS: 0
TINGLING: 0
SHORTNESS OF BREATH: 1
SORE THROAT: 0
ABDOMINAL PAIN: 0
VOMITING: 0
COUGH: 1
DIZZINESS: 0
FOCAL WEAKNESS: 0
CHILLS: 0
HEADACHES: 0
NAUSEA: 0
MEMORY LOSS: 0
HEARTBURN: 0
SENSORY CHANGE: 0
SPUTUM PRODUCTION: 0

## 2024-06-30 NOTE — PROGRESS NOTES
Consult Note: Hematology/Oncology     Referring Provider:Jennifer GARCIA  Primary Care:  Nella Ramirez M.D.    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 male who presents today with a new diagnosis of squamous cell carcinoma of the lung.    Patients history dates back to May 3/2024 when he was experiencing SOB and presented to the ER and found to be COVID+.  He had a CT scan of the chest on 5/3/24 which showed show a 6.2 x 4.8 x 4.8 cm left upper lobe mass consistent with a lung primary. Findings are consistent also with chronic interstitial lung disease/fibrosis. He also had patchy groundglass opacities bilaterally suggesting infection/inflammation. Bilateral adrenal masses were noted to measure up to 3 cm on the right and 3.5 cm on the left with a -8 Hounsfield unit consistent with a benign adrenal adenoma.     He saw Jennifer Brown on 5/20/24.  A PET scan was done on 5/29/24, which again showed a hypermetabolic VANESA mass. No other metastatic disease. She ordered a lung biopsy which was completed on 6/11/24.  This showed a poorly differentiated squamous cell carcinoma.    He is here to discuss treatment options going forward.    Of note, he was a smoker but quit when he entered the hospital on 5/3/24.     He lives in [place] and has a roommate.       Past Medical History:   Diagnosis Date    Acute hypoxemic respiratory failure (HCC) 6/8/2021    Acute urinary retention 6/7/2021    Aortic atherosclerosis (HCC)     Carotid stenosis, bilateral - mild     Cerebral infarction (HCC) - based on CT head 7/2022     Chronic obstructive pulmonary disease (HCC)     Hypercholesteremia     Hypertension     Intertrochanteric fracture of femur (HCC) 6/6/2021    Other emphysema (HCC) 8/7/2019    Supplemental  oxygen dependent 2021        Past Surgical History:   Procedure Laterality Date    PB TREAT INTER/SUBTROCH FX,W/PLATE/SCREW Left 2021    Procedure: FIXATION, FRACTURE, HIP, USING DYNAMIC HIP SCREW, WITH COMPRESSION;  Surgeon: Avinash Suazo M.D.;  Location: SURGERY [facility];  Service: Orthopedics    HERNIA REPAIR Right     ORIF, FEMUR Right     15 years ago    OTHER      right inguinal hernia repair    OTHER ORTHOPEDIC SURGERY      right hip       Social History     Tobacco Use    Smoking status: Former     Current packs/day: 0.00     Average packs/day: 0.5 packs/day for 49.3 years (24.7 ttl pk-yrs)     Types: Cigarettes     Start date:      Quit date: 2024     Years since quittin.1    Smokeless tobacco: Never    Tobacco comments:     vape pen & cigarettes     49 years total andria an average of 1 ppd   Vaping Use    Vaping status: Former    Substances: Nicotine   Substance Use Topics    Alcohol use: Yes     Alcohol/week: 1.8 oz     Types: 1 Glasses of wine, 2 Cans of beer per week     Comment: weekend/social - not often    Drug use: Yes     Types: Marijuana     Comment: THC edible occasional        Family History   Problem Relation Age of Onset    Cancer Mother         breast cancer    Stroke Mother     Other Father         cirrhosis form alcohol    Stroke Sister     Heart Disease Brother         cardiac aneurysm    Hypertension Maternal Grandmother     Hyperlipidemia Maternal Grandmother        Allergies   Allergen Reactions    Seasonal        Current Outpatient Medications   Medication Sig Dispense Refill    Zoster Vac Recomb Adjuvanted (SHINGRIX) 50 MCG/0.5ML Recon Susp Inject  into the shoulder, thigh, or buttocks. 0.5 mL 0    Fexofenadine HCl (MUCINEX ALLERGY PO) Take  by mouth every day.      Home Care Oxygen Inhale 5 L/min continuous. O2 LPM @: 5 LPM  Specify Usage: Continuous  Type of O2: Gas  Oxygen via: N/C  Oxygen Modality: Concentrator and Portable Tank  Humidification: Yes       fluticasone-salmeterol (ADVAIR) 100-50 MCG/ACT AEROSOL POWDER, BREATH ACTIVATED Inhale 1 Puff every 12 hours. (Patient taking differently: Inhale 1 Puff every 12 hours. Pt ran out. Has not been refilled.) 60 Each 0    acetaminophen (TYLENOL) 325 MG Tab Take 500 mg by mouth every 6 hours as needed for Mild Pain.      Melatonin 10 MG Tab Take 30 mg by mouth every evening.      rosuvastatin (CRESTOR) 10 MG Tab Take 1 Tablet by mouth every evening. 90 Tablet 2    amLODIPine (NORVASC) 10 MG Tab Take 1 Tablet by mouth every day. 90 Tablet 2    lisinopril (PRINIVIL) 20 MG Tab Take 1 Tablet by mouth 2 times a day. 200 Tablet 2    traZODone (DESYREL) 50 MG Tab Take 1 tablet by mouth every evening. 90 Tablet 3    aspirin (ASA) 81 MG Chew Tab chewable tablet Chew 81 mg every evening. 100 Tablet     docosahexanoic acid (OMEGA 3 FA) 1000 MG Cap Take 1,000 mg by mouth every day.      Multiple Vitamin (MULTIVITAMIN ADULT PO) Take 1 Tablet by mouth every day.      calcium polycarbophil (FIBERCON) 625 MG Tab Take 625 mg by mouth every day.       No current facility-administered medications for this encounter.       Review of Systems   Constitutional:  Positive for malaise/fatigue. Negative for chills, fever and weight loss.   HENT:  Negative for congestion, ear pain, nosebleeds and sore throat.    Eyes:  Negative for blurred vision.   Respiratory:  Positive for cough and shortness of breath. Negative for sputum production and wheezing.    Cardiovascular:  Negative for chest pain, palpitations, orthopnea and leg swelling.   Gastrointestinal:  Negative for abdominal pain, heartburn, nausea and vomiting.   Genitourinary:  Negative for dysuria, frequency and urgency.   Musculoskeletal:  Negative for neck pain.   Neurological:  Negative for dizziness, tingling, tremors, sensory change, focal weakness and headaches.   Endo/Heme/Allergies:  Does not bruise/bleed easily.   Psychiatric/Behavioral:  Negative for depression, memory loss and  "suicidal ideas.    All other systems reviewed and are negative.      Problem list, medications, and allergies reviewed by myself today in Epic.     Objective:     Vitals:    06/28/24 1104   BP: 118/62   BP Location: Right arm   Patient Position: Sitting   BP Cuff Size: Adult   Pulse: 74   Temp: 36.7 °C (98.1 °F)   TempSrc: Temporal   SpO2: (!) 85%   Weight: 71.8 kg (158 lb 4.6 oz)   Height: 1.626 m (5' 4.02\")       DESC; KARNOFSKY SCALE WITH ECOG EQUIVALENT: 60, Requires occasional assistance, but is able to care for most of his personal needs (ECOG equivalent 2)    DISTRESS LEVEL: no acute distress    Physical Exam  Constitutional:       General: He is not in acute distress.     Appearance: Normal appearance.   HENT:      Head: Normocephalic and atraumatic.      Nose: Nose normal. No congestion.      Mouth/Throat:      Mouth: Mucous membranes are moist.      Pharynx: Oropharynx is clear.   Eyes:      General: No scleral icterus.     Conjunctiva/sclera: Conjunctivae normal.      Pupils: Pupils are equal, round, and reactive to light.   Cardiovascular:      Rate and Rhythm: Normal rate and regular rhythm.      Pulses: Normal pulses.      Heart sounds: No murmur heard.     No friction rub.   Pulmonary:      Effort: Pulmonary effort is normal. No respiratory distress.      Breath sounds: No stridor. No wheezing or rales.      Comments: On oxygen  Decreased breath sounds VANESA  Chest:      Chest wall: No tenderness.   Abdominal:      General: Abdomen is flat. Bowel sounds are normal. There is no distension.      Palpations: Abdomen is soft. There is no mass.      Tenderness: There is no abdominal tenderness. There is no guarding or rebound.   Musculoskeletal:         General: No swelling, tenderness or deformity. Normal range of motion.      Cervical back: Normal range of motion and neck supple. No rigidity or tenderness.      Right lower leg: No edema.      Left lower leg: No edema.   Skin:     General: Skin is warm.     "  Coloration: Skin is not jaundiced or pale.      Findings: No bruising or rash.   Neurological:      General: No focal deficit present.      Mental Status: He is alert and oriented to person, place, and time. Mental status is at baseline.      Motor: No weakness.   Psychiatric:         Mood and Affect: Mood normal.         Behavior: Behavior normal.         Thought Content: Thought content normal.         Judgment: Judgment normal.         Labs:   Most recent labs reviewed.      Mildly anemic.  AST and ALT elevated, but this was one month ago.     Imaging:   Most recent images below have been independently reviewed by me.      1.  Hypermetabolic, centrally necrotic left upper lobe mass, highly concerning for malignancy.  2.  No FDG avid metastatic disease.  3.  Emphysema and suspected underlying interstitial lung disease.     Pathology:    A. Left lung mass biopsy:          Moderately to poorly differentiated squamous cell carcinoma in a           background of abundant necrosis.     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 on 6/30/2024      Mr. Shirley is a 73 yo M with a new diagnosis of squamous cell carcinoma of the VANESA, PDL1 90%.     Today we reviewed his pathology findings, which demonstrate a squamous cell carcinoma.    We next moved onto staging. I informed him how we stage lung cancer with the TNM staging.  Based on his PET scan and CT chest, he is stage IIB. We will need a brain MRI to complete staging.    We then moved on to discuss treatment options for stage IIB.  This is either NAC+IO (if EGFR and ALK negative) followed by surgery vs radiation therapy followed by adjuvant chemo (since he has PD cancer) and immunotherapy (unless he has an actionable mutation).    After reviewing his PFTs and discussing the option with Mr. Shirley, he is  leaning towards Radiation treatment.    Plan  -f/u with Rad Onc  -will await for NGS to result.  He will likely need adjuvant treatment (either IO or targeted therapy, but will discuss chemo, given toxicity, he may benefit from only IO vs targeted)  -Brain MRI  -will see patient back after NGS results (estimated date is 7/5).        Any questions and concerns raised by the patient were addressed and answered. Patient denies any further questions.  Patient encouraged to call the office with any concerns or issues.     Candice Orona M.D.  Hematology/Oncology

## 2024-07-01 ENCOUNTER — PATIENT OUTREACH (OUTPATIENT)
Dept: ONCOLOGY | Facility: MEDICAL CENTER | Age: 74
End: 2024-07-01
Payer: MEDICARE

## 2024-07-01 PROCEDURE — RXMED WILLOW AMBULATORY MEDICATION CHARGE: Performed by: FAMILY MEDICINE

## 2024-07-03 ENCOUNTER — HOSPITAL ENCOUNTER (OUTPATIENT)
Dept: RADIOLOGY | Facility: MEDICAL CENTER | Age: 74
End: 2024-07-03
Attending: STUDENT IN AN ORGANIZED HEALTH CARE EDUCATION/TRAINING PROGRAM
Payer: MEDICARE

## 2024-07-03 ENCOUNTER — PHARMACY VISIT (OUTPATIENT)
Dept: PHARMACY | Facility: MEDICAL CENTER | Age: 74
End: 2024-07-03
Payer: COMMERCIAL

## 2024-07-03 DIAGNOSIS — C34.12 SQUAMOUS CELL CARCINOMA OF UPPER LOBE OF LEFT LUNG (HCC): ICD-10-CM

## 2024-07-03 PROCEDURE — 70553 MRI BRAIN STEM W/O & W/DYE: CPT

## 2024-07-03 PROCEDURE — 700117 HCHG RX CONTRAST REV CODE 255: Mod: JZ | Performed by: STUDENT IN AN ORGANIZED HEALTH CARE EDUCATION/TRAINING PROGRAM

## 2024-07-03 PROCEDURE — A9579 GAD-BASE MR CONTRAST NOS,1ML: HCPCS | Mod: JZ | Performed by: STUDENT IN AN ORGANIZED HEALTH CARE EDUCATION/TRAINING PROGRAM

## 2024-07-03 RX ADMIN — GADOTERIDOL 15 ML: 279.3 INJECTION, SOLUTION INTRAVENOUS at 11:15

## 2024-07-08 PROCEDURE — RXMED WILLOW AMBULATORY MEDICATION CHARGE: Performed by: FAMILY MEDICINE

## 2024-07-09 ENCOUNTER — PHARMACY VISIT (OUTPATIENT)
Dept: PHARMACY | Facility: MEDICAL CENTER | Age: 74
End: 2024-07-09
Payer: COMMERCIAL

## 2024-07-11 ENCOUNTER — HOSPITAL ENCOUNTER (OUTPATIENT)
Dept: HEMATOLOGY ONCOLOGY | Facility: MEDICAL CENTER | Age: 74
End: 2024-07-11
Attending: STUDENT IN AN ORGANIZED HEALTH CARE EDUCATION/TRAINING PROGRAM
Payer: MEDICARE

## 2024-07-11 VITALS
HEIGHT: 64 IN | BODY MASS INDEX: 26.95 KG/M2 | OXYGEN SATURATION: 92 % | SYSTOLIC BLOOD PRESSURE: 104 MMHG | DIASTOLIC BLOOD PRESSURE: 68 MMHG | HEART RATE: 79 BPM | TEMPERATURE: 98 F | WEIGHT: 157.85 LBS

## 2024-07-11 DIAGNOSIS — Z79.899 ENCOUNTER FOR LONG-TERM (CURRENT) USE OF HIGH-RISK MEDICATION: ICD-10-CM

## 2024-07-11 DIAGNOSIS — C34.12 SQUAMOUS CELL CARCINOMA OF UPPER LOBE OF LEFT LUNG (HCC): ICD-10-CM

## 2024-07-11 PROCEDURE — 99214 OFFICE O/P EST MOD 30 MIN: CPT | Performed by: STUDENT IN AN ORGANIZED HEALTH CARE EDUCATION/TRAINING PROGRAM

## 2024-07-11 PROCEDURE — 99212 OFFICE O/P EST SF 10 MIN: CPT | Performed by: STUDENT IN AN ORGANIZED HEALTH CARE EDUCATION/TRAINING PROGRAM

## 2024-07-11 ASSESSMENT — ENCOUNTER SYMPTOMS
SHORTNESS OF BREATH: 1
WEIGHT LOSS: 0
WHEEZING: 0
HEADACHES: 0
ABDOMINAL PAIN: 0
TREMORS: 0
BRUISES/BLEEDS EASILY: 0
TINGLING: 0
PALPITATIONS: 0
SORE THROAT: 0
COUGH: 1
VOMITING: 0
DEPRESSION: 0
FEVER: 0
SENSORY CHANGE: 0
HEARTBURN: 0
ORTHOPNEA: 0
BLURRED VISION: 0
CHILLS: 0
NECK PAIN: 0
NAUSEA: 0
FOCAL WEAKNESS: 0
SPUTUM PRODUCTION: 0
DIZZINESS: 0
MEMORY LOSS: 0

## 2024-07-11 ASSESSMENT — FIBROSIS 4 INDEX: FIB4 SCORE: 1.07

## 2024-07-12 ENCOUNTER — PATIENT OUTREACH (OUTPATIENT)
Dept: ONCOLOGY | Facility: MEDICAL CENTER | Age: 74
End: 2024-07-12
Payer: MEDICARE

## 2024-07-18 PROBLEM — J96.01 ACUTE RESPIRATORY FAILURE WITH HYPOXIA (HCC): Status: RESOLVED | Noted: 2024-04-29 | Resolved: 2024-07-18

## 2024-07-18 PROBLEM — J44.1 ACUTE EXACERBATION OF CHRONIC OBSTRUCTIVE PULMONARY DISEASE (COPD) (HCC): Status: RESOLVED | Noted: 2021-07-01 | Resolved: 2024-07-18

## 2024-07-19 ASSESSMENT — ENCOUNTER SYMPTOMS: GENERAL WELL-BEING: FAIR

## 2024-07-19 ASSESSMENT — ACTIVITIES OF DAILY LIVING (ADL): BATHING_REQUIRES_ASSISTANCE: 0

## 2024-07-19 ASSESSMENT — PATIENT HEALTH QUESTIONNAIRE - PHQ9
2. FEELING DOWN, DEPRESSED, IRRITABLE, OR HOPELESS: NOT AT ALL
1. LITTLE INTEREST OR PLEASURE IN DOING THINGS: NOT AT ALL

## 2024-07-24 ENCOUNTER — APPOINTMENT (OUTPATIENT)
Dept: MEDICAL GROUP | Facility: MEDICAL CENTER | Age: 74
End: 2024-07-24
Payer: MEDICARE

## 2024-07-24 VITALS
SYSTOLIC BLOOD PRESSURE: 102 MMHG | WEIGHT: 160 LBS | DIASTOLIC BLOOD PRESSURE: 54 MMHG | RESPIRATION RATE: 16 BRPM | BODY MASS INDEX: 25.71 KG/M2 | HEART RATE: 75 BPM | HEIGHT: 66 IN | OXYGEN SATURATION: 91 %

## 2024-07-24 DIAGNOSIS — J98.4 RESTRICTIVE LUNG DISEASE: ICD-10-CM

## 2024-07-24 DIAGNOSIS — J96.11 CHRONIC RESPIRATORY FAILURE WITH HYPOXIA (HCC): ICD-10-CM

## 2024-07-24 DIAGNOSIS — J43.9 PULMONARY EMPHYSEMA, UNSPECIFIED EMPHYSEMA TYPE (HCC): ICD-10-CM

## 2024-07-24 DIAGNOSIS — C34.12 SQUAMOUS CELL CARCINOMA OF UPPER LOBE OF LEFT LUNG (HCC): ICD-10-CM

## 2024-07-24 PROBLEM — R91.8 MASS OF LEFT LUNG: Status: RESOLVED | Noted: 2024-04-30 | Resolved: 2024-07-24

## 2024-07-24 PROCEDURE — 3074F SYST BP LT 130 MM HG: CPT | Performed by: FAMILY MEDICINE

## 2024-07-24 PROCEDURE — 3078F DIAST BP <80 MM HG: CPT | Performed by: FAMILY MEDICINE

## 2024-07-24 PROCEDURE — 99214 OFFICE O/P EST MOD 30 MIN: CPT | Performed by: FAMILY MEDICINE

## 2024-07-24 ASSESSMENT — FIBROSIS 4 INDEX: FIB4 SCORE: 1.07

## 2024-07-25 ENCOUNTER — PHARMACY VISIT (OUTPATIENT)
Dept: PHARMACY | Facility: MEDICAL CENTER | Age: 74
End: 2024-07-25
Payer: COMMERCIAL

## 2024-07-25 ENCOUNTER — APPOINTMENT (OUTPATIENT)
Dept: FAMILY PLANNING/WOMEN'S HEALTH CLINIC | Facility: PHYSICIAN GROUP | Age: 74
End: 2024-07-25
Attending: FAMILY MEDICINE
Payer: MEDICARE

## 2024-07-25 VITALS
WEIGHT: 160 LBS | DIASTOLIC BLOOD PRESSURE: 60 MMHG | HEIGHT: 66 IN | SYSTOLIC BLOOD PRESSURE: 120 MMHG | BODY MASS INDEX: 25.71 KG/M2

## 2024-07-25 DIAGNOSIS — J43.9 PULMONARY EMPHYSEMA, UNSPECIFIED EMPHYSEMA TYPE (HCC): ICD-10-CM

## 2024-07-25 DIAGNOSIS — C34.12 SQUAMOUS CELL CARCINOMA OF UPPER LOBE OF LEFT LUNG (HCC): ICD-10-CM

## 2024-07-25 DIAGNOSIS — I73.9 PERIPHERAL ARTERIAL DISEASE (HCC): ICD-10-CM

## 2024-07-25 DIAGNOSIS — J96.11 CHRONIC HYPOXEMIC RESPIRATORY FAILURE (HCC): ICD-10-CM

## 2024-07-25 DIAGNOSIS — I70.0 AORTIC ATHEROSCLEROSIS (HCC): Chronic | ICD-10-CM

## 2024-07-25 DIAGNOSIS — E78.5 DYSLIPIDEMIA: ICD-10-CM

## 2024-07-25 DIAGNOSIS — I10 PRIMARY HYPERTENSION: ICD-10-CM

## 2024-07-25 PROBLEM — E44.1 MILD PROTEIN-CALORIE MALNUTRITION (HCC): Status: RESOLVED | Noted: 2022-05-26 | Resolved: 2024-07-25

## 2024-07-25 PROCEDURE — 3074F SYST BP LT 130 MM HG: CPT | Performed by: PHYSICIAN ASSISTANT

## 2024-07-25 PROCEDURE — 3078F DIAST BP <80 MM HG: CPT | Performed by: PHYSICIAN ASSISTANT

## 2024-07-25 PROCEDURE — 1126F AMNT PAIN NOTED NONE PRSNT: CPT | Performed by: PHYSICIAN ASSISTANT

## 2024-07-25 PROCEDURE — RXMED WILLOW AMBULATORY MEDICATION CHARGE: Performed by: FAMILY MEDICINE

## 2024-07-25 PROCEDURE — G0439 PPPS, SUBSEQ VISIT: HCPCS | Performed by: PHYSICIAN ASSISTANT

## 2024-07-25 RX ORDER — FLUTICASONE PROPIONATE AND SALMETEROL 100; 50 UG/1; UG/1
1 POWDER RESPIRATORY (INHALATION) EVERY 12 HOURS
Qty: 60 EACH | Refills: 0 | Status: SHIPPED | OUTPATIENT
Start: 2024-07-25

## 2024-07-25 SDOH — ECONOMIC STABILITY: FOOD INSECURITY: WITHIN THE PAST 12 MONTHS, YOU WORRIED THAT YOUR FOOD WOULD RUN OUT BEFORE YOU GOT MONEY TO BUY MORE.: NEVER TRUE

## 2024-07-25 SDOH — ECONOMIC STABILITY: FOOD INSECURITY: WITHIN THE PAST 12 MONTHS, THE FOOD YOU BOUGHT JUST DIDN'T LAST AND YOU DIDN'T HAVE MONEY TO GET MORE.: NEVER TRUE

## 2024-07-25 SDOH — ECONOMIC STABILITY: INCOME INSECURITY: HOW HARD IS IT FOR YOU TO PAY FOR THE VERY BASICS LIKE FOOD, HOUSING, MEDICAL CARE, AND HEATING?: NOT HARD AT ALL

## 2024-07-25 ASSESSMENT — FIBROSIS 4 INDEX: FIB4 SCORE: 1.07

## 2024-07-25 ASSESSMENT — PAIN SCALES - GENERAL: PAINLEVEL: NO PAIN

## 2024-07-25 ASSESSMENT — PATIENT HEALTH QUESTIONNAIRE - PHQ9: CLINICAL INTERPRETATION OF PHQ2 SCORE: 0

## 2024-07-26 ENCOUNTER — HOSPITAL ENCOUNTER (OUTPATIENT)
Dept: HEMATOLOGY ONCOLOGY | Facility: MEDICAL CENTER | Age: 74
End: 2024-07-26
Attending: STUDENT IN AN ORGANIZED HEALTH CARE EDUCATION/TRAINING PROGRAM
Payer: MEDICARE

## 2024-07-26 VITALS
OXYGEN SATURATION: 90 % | HEIGHT: 66 IN | WEIGHT: 163 LBS | TEMPERATURE: 99 F | SYSTOLIC BLOOD PRESSURE: 114 MMHG | BODY MASS INDEX: 26.2 KG/M2 | DIASTOLIC BLOOD PRESSURE: 60 MMHG | HEART RATE: 77 BPM

## 2024-07-26 DIAGNOSIS — C34.12 SQUAMOUS CELL CARCINOMA OF UPPER LOBE OF LEFT LUNG (HCC): ICD-10-CM

## 2024-07-26 PROCEDURE — 99214 OFFICE O/P EST MOD 30 MIN: CPT | Performed by: STUDENT IN AN ORGANIZED HEALTH CARE EDUCATION/TRAINING PROGRAM

## 2024-07-26 PROCEDURE — 99212 OFFICE O/P EST SF 10 MIN: CPT | Performed by: STUDENT IN AN ORGANIZED HEALTH CARE EDUCATION/TRAINING PROGRAM

## 2024-07-26 ASSESSMENT — ENCOUNTER SYMPTOMS
WHEEZING: 0
NECK PAIN: 0
BLURRED VISION: 0
SORE THROAT: 0
SPUTUM PRODUCTION: 0
VOMITING: 0
WEIGHT LOSS: 0
CHILLS: 0
PALPITATIONS: 0
TREMORS: 0
FOCAL WEAKNESS: 0
HEADACHES: 0
BRUISES/BLEEDS EASILY: 0
SENSORY CHANGE: 0
HEARTBURN: 0
DIZZINESS: 0
MEMORY LOSS: 0
ABDOMINAL PAIN: 0
DEPRESSION: 0
COUGH: 1
FEVER: 0
NAUSEA: 0
SHORTNESS OF BREATH: 1
TINGLING: 0
ORTHOPNEA: 0

## 2024-07-26 ASSESSMENT — FIBROSIS 4 INDEX: FIB4 SCORE: 1.07

## 2024-07-31 ASSESSMENT — LIFESTYLE VARIABLES
TOBACCO_USE: NO
SMOKING_STATUS: NO
SMOKING_YEARS: 40

## 2024-08-01 ENCOUNTER — APPOINTMENT (OUTPATIENT)
Dept: RADIATION ONCOLOGY | Facility: MEDICAL CENTER | Age: 74
End: 2024-08-01
Attending: RADIOLOGY
Payer: MEDICARE

## 2024-08-01 VITALS
DIASTOLIC BLOOD PRESSURE: 69 MMHG | HEIGHT: 66 IN | SYSTOLIC BLOOD PRESSURE: 126 MMHG | WEIGHT: 157.41 LBS | RESPIRATION RATE: 20 BRPM | HEART RATE: 78 BPM | BODY MASS INDEX: 25.3 KG/M2 | TEMPERATURE: 97.6 F | OXYGEN SATURATION: 89 %

## 2024-08-01 DIAGNOSIS — C34.12 SQUAMOUS CELL CARCINOMA OF UPPER LOBE OF LEFT LUNG (HCC): ICD-10-CM

## 2024-08-01 PROCEDURE — 99205 OFFICE O/P NEW HI 60 MIN: CPT | Performed by: RADIOLOGY

## 2024-08-01 PROCEDURE — 99214 OFFICE O/P EST MOD 30 MIN: CPT | Performed by: RADIOLOGY

## 2024-08-01 ASSESSMENT — FIBROSIS 4 INDEX: FIB4 SCORE: 1.07

## 2024-08-01 ASSESSMENT — PAIN SCALES - GENERAL: PAINLEVEL: NO PAIN

## 2024-08-01 NOTE — CONSULTS
RADIATION ONCOLOGY CONSULT    Patient name:  Bright Shirley    Primary Physician:  Nella Ramirez M.D. MRN: [id number]  [facility]: [id number]   Referring physician:  Candice Orona M.*  : 1950, 74 y.o.     DATE OF SERVICE: 2024    IDENTIFICATION: A 74 y.o. 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 on 2024  Stage prefix: Initial diagnosis        He is here at the kind request of Candice Nuñez M.*        HISTORY OF PRESENT ILLNESS:  Subjective     This is a 74-year-old gentleman who I am seeing for discussion about his recently diagnosed lung cancer.  His recent history dates back to May of this year when he had progressively worsening dyspnea, and tested positive for COVID.  He had a hospital visit at that time, and his CT is of the chest in early May showed a 6.2 cm left upper lobe mass concerning for primary lung malignancy.  Since then, of note he has been on about 4 L of pulsed oxygen.  He previously carried a diagnosis of COPD, over 50-60-pack-year smoking history, and was briefly on oxygen prior to this acute episode, but since then he has remained on O2.  The scan also showed findings consistent with chronic interstitial lung disease and fibrosis.    He then saw medical oncology, underwent a PET scan that confirmed no distant PET avid metastatic disease or any local adenopathy.  He had a follow-up lung biopsy performed on  that confirmed moderately poorly differentiated squamous cell carcinoma.  An MRI brain was negative.  He saw medical oncology back for follow-up, had PFTs performed that showed a markedly reduced DLCO of 33%, saw thoracic surgery and started is certainly not a surgical candidate, and now comes to review radiation therapy.    Currently, he feels relatively well, has a very mild cough intermittently.  He is  on 4 L of pulsed oxygen.  He is able to maintain most of his daily life.  He is continuing to work part-time.        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 (HCC)        PAST SURGICAL HISTORY:  Past Surgical History:   Procedure Laterality Date    PB TREAT INTER/SUBTROCH FX,W/PLATE/SCREW Left 6/6/2021    Procedure: FIXATION, FRACTURE, HIP, USING DYNAMIC HIP SCREW, WITH COMPRESSION;  Surgeon: Avinash Suazo M.D.;  Location: SURGERY [facility];  Service: Orthopedics    HERNIA REPAIR Right     ORIF, FEMUR Right     15 years ago    OTHER      right inguinal hernia repair    OTHER ORTHOPEDIC SURGERY      right hip       CURRENT MEDICATIONS:  Current Outpatient Medications   Medication Sig Dispense Refill    fluticasone-salmeterol (ADVAIR) 250-50 MCG/ACT AEROSOL POWDER, BREATH ACTIVATED Inhale 1 Puff every 12 hours. 60 Each 2    Fexofenadine HCl (MUCINEX ALLERGY PO) Take  by mouth every day.      Home Care Oxygen Inhale 5 L/min continuous. O2 LPM @: 5 LPM  Specify Usage: Continuous  Type of O2: Gas  Oxygen via: N/C  Oxygen Modality: Concentrator and Portable Tank  Humidification: Yes      acetaminophen (TYLENOL) 325 MG Tab Take 500 mg by mouth every 6 hours as needed for Mild Pain.      Melatonin 10 MG Tab Take 30 mg by mouth every evening.      rosuvastatin (CRESTOR) 10 MG Tab Take 1 Tablet by mouth every evening. 90 Tablet 2    amLODIPine  (NORVASC) 10 MG Tab Take 1 Tablet by mouth every day. 90 Tablet 2    lisinopril (PRINIVIL) 20 MG Tab Take 1 Tablet by mouth 2 times a day. 200 Tablet 2    traZODone (DESYREL) 50 MG Tab Take 1 tablet by mouth every evening. 90 Tablet 3    aspirin (ASA) 81 MG Chew Tab chewable tablet Chew 81 mg every evening. 100 Tablet     docosahexanoic acid (OMEGA 3 FA) 1000 MG Cap Take 1,000 mg by mouth every day.      Multiple Vitamin (MULTIVITAMIN ADULT PO) Take 1 Tablet by mouth every day.      calcium polycarbophil (FIBERCON) 625 MG Tab Take 625 mg by mouth every day.      fluticasone-salmeterol (ADVAIR) 100-50 MCG/ACT AEROSOL POWDER, BREATH ACTIVATED Inhale 1 Puff every 12 hours. 60 Each 0     No current facility-administered medications for this encounter.       ALLERGIES:    Seasonal    FAMILY HISTORY:    family history includes Cancer in his mother; Heart Disease in his brother; Hyperlipidemia in his maternal grandmother; Hypertension in his maternal grandmother; Other in his father; Stroke in his mother and sister.    SOCIAL HISTORY:     reports that he quit smoking about 3 months ago. His smoking use included cigarettes. He started smoking about 49 years ago. He has a 49.3 pack-year smoking history. He has never used smokeless tobacco. He reports current alcohol use of about 1.8 oz of alcohol per week. He reports that he does not currently use drugs after having used the following drugs: Marijuana. He states he lives in [place] with a roommate. He is a retired construction .     REVIEW OF SYSTEMS:    A complete review of systems taken. Pertinent items in HPI. All others negative.    PHYSICAL EXAM:    PERFORMANCE STATUS:      8/1/2024     9:19 AM   ECOG Performance Review   ECOG Performance Status Fully active, able to carry on all pre-disease performance without restriction         8/1/2024     9:18 AM   Karnofsky Score   Karnofsky Score 90     /69 (BP Location: Right arm, Patient Position: Sitting, BP  "Cuff Size: Adult)   Pulse 78   Temp 36.4 °C (97.6 °F)   Resp 20   Ht 1.676 m (5' 6\")   Wt 71.4 kg (157 lb 6.5 oz)   SpO2 89%   PF (!) 4 L/min   BMI 25.41 kg/m²   Physical Exam  Constitutional:       Appearance: Normal appearance.   HENT:      Head: Normocephalic and atraumatic.   Eyes:      Extraocular Movements: Extraocular movements intact.      Conjunctiva/sclera: Conjunctivae normal.   Cardiovascular:      Rate and Rhythm: Normal rate.   Pulmonary:      Effort: Pulmonary effort is normal. No respiratory distress.      Breath sounds: Normal breath sounds.      Comments: Baseline 4 L pulsed O2.  Abdominal:      General: Abdomen is flat.      Palpations: Abdomen is soft.   Neurological:      General: No focal deficit present.      Mental Status: He is alert and oriented to person, place, and time.          LABORATORY DATA:   Lab Results   Component Value Date/Time    WBC 8.4 06/11/2024 07:55 AM    RBC 4.42 (L) 06/11/2024 07:55 AM    HEMOGLOBIN 12.2 (L) 06/11/2024 07:55 AM    HEMATOCRIT 37.8 (L) 06/11/2024 07:55 AM    MCV 85.5 06/11/2024 07:55 AM    MCH 27.6 06/11/2024 07:55 AM    MCHC 32.3 06/11/2024 07:55 AM    RDW 43.8 06/11/2024 07:55 AM    PLATELETCT 316 06/11/2024 07:55 AM    MPV 9.1 06/11/2024 07:55 AM    NEUTSPOLYS 54.70 05/05/2024 03:50 AM    LYMPHOCYTES 34.10 05/05/2024 03:50 AM    MONOCYTES 9.30 05/05/2024 03:50 AM    EOSINOPHILS 0.50 05/05/2024 03:50 AM    BASOPHILS 0.10 05/05/2024 03:50 AM      Lab Results   Component Value Date/Time    SODIUM 135 05/05/2024 03:50 AM    POTASSIUM 4.4 05/05/2024 03:50 AM    CHLORIDE 100 05/05/2024 03:50 AM    CO2 25 05/05/2024 03:50 AM    GLUCOSE 90 05/05/2024 03:50 AM    BUN 27 (H) 05/05/2024 03:50 AM    CREATININE 0.89 05/05/2024 03:50 AM           RADIOLOGY DATA:  CT-BIOPSY-LUNG/MEDIASTINUM W/GUIDE LEFT    Result Date: 6/11/2024  1.  CT guided left upper lobe lung mass biopsy. 2.  A follow up chest radiograph is forthcoming in 3 hours..    DX-CHEST-PORTABLE (1 " VIEW)    Result Date: 6/11/2024  There has been no significant change from the prior exam.    DX-CHEST-PORTABLE (1 VIEW)    Result Date: 6/11/2024  1.  No evidence of left-sided pneumothorax status post lung biopsy. 2.  Large left upper lobe pulmonary mass. 3.  Diffuse interstitial parenchymal scarring throughout the lung fields.    MR-BRAIN-WITH & W/O    Result Date: 7/3/2024  No acute process. No evidence of intracranial metastatic disease      IMPRESSION:    A 74 y.o. 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 on 6/30/2024  Stage prefix: Initial diagnosis        RECOMMENDATIONS:   I long discussion with Bright today regarding his diagnosis of lung cancer.  He has T3 N0, stage IIb disease.  We discussed the natural history and risk factors for lung cancer, his diagnosis of stage II disease, and definitive treatment options including neoadjuvant treatment followed by resection versus definitive SBRT.  He is currently not a surgical candidate, therefore I recommend we proceed with definitive SBRT.  He has quite a sizable tumor in the left upper lobe in the background of quite significant pulmonary fibrosis.  With a very terri discussion that unfortunately he is in a tough spot with quite poor DLCO.  While certainly I favor proceeding with treatment, he stands to have significant risk of worsening of his pulmonary function from treatment, worsening fibrosis, and worsening oxygen needs.  He has a significant risk of pneumonitis particularly with adjuvant immunotherapy on top of radiation as well..  On the other hand, if he does not undergo treatment, then certainly the tumor will continue to grow and ultimately cause pulmonary compromise.  We discussed all these issues frankly, and he seems to have a good understanding.  Following a thorough discussion, ultimately we have agreed to  proceed with treatment.  CT simulation will be scheduled for the coming days, followed by anticipated 5-8 fractions of stereotactic radiation depending on exact size of tumor at the time of simulation.  We discussed the surveillance schedule with scans every 3 to 4 months at least for the first 2 years.  Following the completion of radiation, he will see medical oncology back for possible immunotherapy.    In the meantime, he also needs to be established with pulmonology for ongoing follow-up and care of his pulmonary function.  This is going to be particularly important as he continues to have evolving changes following therapy.    Thank you for the opportunity to participate in his care.  Approximately 65 minutes were spent on this visit, including face-to-face visit, review of records and imaging, and postvisit coordination.  If any questions or comments, please do not hesitate in calling.    Orders Placed This Encounter    Rad Onc Treatment Planning CT Simulation    REFERRAL TO ONCOLOGY NURSE NAVIGATOR    Referral to Pulmonary and Sleep Medicine

## 2024-08-01 NOTE — PROGRESS NOTES
"Patient was seen today in clinic with Dr. London for consult.  Vitals signs and weight were obtained and pain assessment was completed.  Allergies and medications were reviewed with the patient.       Vitals/Pain:  Vitals:    08/01/24 0911   BP: 126/69   BP Location: Right arm   Patient Position: Sitting   BP Cuff Size: Adult   Pulse: 78   Resp: 20   Temp: 36.4 °C (97.6 °F)   SpO2: 89%   Weight: 71.4 kg (157 lb 6.5 oz)   Height: 1.676 m (5' 6\")   PF: (!) 4 L/min   Pain Score: No pain        Allergies:   Seasonal    Current Medications:  Current Outpatient Medications   Medication Sig Dispense Refill    fluticasone-salmeterol (ADVAIR) 250-50 MCG/ACT AEROSOL POWDER, BREATH ACTIVATED Inhale 1 Puff every 12 hours. 60 Each 2    Fexofenadine HCl (MUCINEX ALLERGY PO) Take  by mouth every day.      Home Care Oxygen Inhale 5 L/min continuous. O2 LPM @: 5 LPM  Specify Usage: Continuous  Type of O2: Gas  Oxygen via: N/C  Oxygen Modality: Concentrator and Portable Tank  Humidification: Yes      acetaminophen (TYLENOL) 325 MG Tab Take 500 mg by mouth every 6 hours as needed for Mild Pain.      Melatonin 10 MG Tab Take 30 mg by mouth every evening.      rosuvastatin (CRESTOR) 10 MG Tab Take 1 Tablet by mouth every evening. 90 Tablet 2    amLODIPine (NORVASC) 10 MG Tab Take 1 Tablet by mouth every day. 90 Tablet 2    lisinopril (PRINIVIL) 20 MG Tab Take 1 Tablet by mouth 2 times a day. 200 Tablet 2    traZODone (DESYREL) 50 MG Tab Take 1 tablet by mouth every evening. 90 Tablet 3    aspirin (ASA) 81 MG Chew Tab chewable tablet Chew 81 mg every evening. 100 Tablet     docosahexanoic acid (OMEGA 3 FA) 1000 MG Cap Take 1,000 mg by mouth every day.      Multiple Vitamin (MULTIVITAMIN ADULT PO) Take 1 Tablet by mouth every day.      calcium polycarbophil (FIBERCON) 625 MG Tab Take 625 mg by mouth every day.      fluticasone-salmeterol (ADVAIR) 100-50 MCG/ACT AEROSOL POWDER, BREATH ACTIVATED Inhale 1 Puff every 12 hours. 60 Each 0     No " current facility-administered medications for this encounter.           Anna Mayen R.N.

## 2024-08-01 NOTE — CT SIMULATION
PATIENT NAME Bright Shirley   PRIMARY PHYSICIAN Nella Hernandez [id number]   REFERRING PHYSICIAN Candice Orona M.* 1950     Squamous cell carcinoma of upper lobe of left lung (HCC)  Staging form: Lung, AJCC 8th Edition  - Clinical: Stage IIB (cT3, cN0, cM0) - Signed by Candice Orona M.D. on 6/30/2024  Stage prefix: Initial diagnosis         Treatment Planning CT Simulation      Order Questions     Question Answer    Is this for a new course of treatment? Yes    Is this an Addendum? No    Implanted Device/Pacemaker No    Simulation Status Initial    Planned Start Date 8/19/2024    Treatment Site Lung    Laterality Left    Treatment Technique SBRT    Treatment Pattern/Frequency Daily    Number of fractions: 5    Concurrent Chemotherapy No    CT Technique 4D    Slice Thickness 2mm    Scan Extent Chest    Treatment Device(s) OmniBoard    Patient Attire Gown    Patient Position Supine    Patient Orientation Head First    Arm Position Up    Treatment Machine TB1 - STx    Treatment Image Guidance CBCT    Frequency (CBCT) Daily    Image Guidance Match PTV - Soft Tissue    Fiducial Tracking 4D CBCT    Treatment Planning Image Fusion CT/PET    Special Physics Consult Stereotactic    Other Orders Weekly Physics Check     Special Tx Procedure    Release to patient Immediate

## 2024-08-02 DIAGNOSIS — I10 ESSENTIAL HYPERTENSION: ICD-10-CM

## 2024-08-02 DIAGNOSIS — E78.5 DYSLIPIDEMIA: ICD-10-CM

## 2024-08-02 DIAGNOSIS — E78.5 DYSLIPIDEMIA: Primary | ICD-10-CM

## 2024-08-02 RX ORDER — LISINOPRIL 20 MG/1
20 TABLET ORAL 2 TIMES DAILY
Qty: 200 TABLET | Refills: 2 | Status: CANCELLED | OUTPATIENT
Start: 2024-08-02

## 2024-08-02 RX ORDER — ROSUVASTATIN CALCIUM 10 MG/1
10 TABLET, COATED ORAL EVERY EVENING
Qty: 90 TABLET | Refills: 2 | Status: CANCELLED | OUTPATIENT
Start: 2024-08-02

## 2024-08-07 ENCOUNTER — PATIENT OUTREACH (OUTPATIENT)
Dept: ONCOLOGY | Facility: MEDICAL CENTER | Age: 74
End: 2024-08-07
Payer: MEDICARE

## 2024-08-07 NOTE — PROGRESS NOTES
Oncology Nurse Navigation Assessment    DX: Lung cancer     POC: radiation       Assessment/Discussion: Call placed to patient, lvm      DST: 4 previously administered by radiation oncology

## 2024-08-08 DIAGNOSIS — J96.11 CHRONIC HYPOXEMIC RESPIRATORY FAILURE (HCC): ICD-10-CM

## 2024-08-08 PROCEDURE — RXMED WILLOW AMBULATORY MEDICATION CHARGE: Performed by: FAMILY MEDICINE

## 2024-08-08 RX ORDER — LISINOPRIL 20 MG/1
20 TABLET ORAL 2 TIMES DAILY
Qty: 200 TABLET | Refills: 1 | Status: SHIPPED | OUTPATIENT
Start: 2024-08-08

## 2024-08-08 RX ORDER — ROSUVASTATIN CALCIUM 10 MG/1
10 TABLET, COATED ORAL EVERY EVENING
Qty: 100 TABLET | Refills: 0 | Status: SHIPPED | OUTPATIENT
Start: 2024-08-08

## 2024-08-08 NOTE — TELEPHONE ENCOUNTER
Please advise pt is due for fasting lipid labs as they have not been done in a year. Will send 3 months to pharmacy on statin. Refill X 6 months on BP med, sent to pharmacy.Pt. Seen in the last 6 months per protocol.   Lab Results   Component Value Date/Time    SODIUM 135 05/05/2024 03:50 AM    POTASSIUM 4.4 05/05/2024 03:50 AM    CHLORIDE 100 05/05/2024 03:50 AM    CO2 25 05/05/2024 03:50 AM    GLUCOSE 90 05/05/2024 03:50 AM    BUN 27 (H) 05/05/2024 03:50 AM    CREATININE 0.89 05/05/2024 03:50 AM

## 2024-08-09 ENCOUNTER — HOSPITAL ENCOUNTER (OUTPATIENT)
Dept: RADIATION ONCOLOGY | Facility: MEDICAL CENTER | Age: 74
End: 2024-08-09

## 2024-08-09 PROCEDURE — 77334 RADIATION TREATMENT AID(S): CPT | Performed by: RADIOLOGY

## 2024-08-09 PROCEDURE — 77470 SPECIAL RADIATION TREATMENT: CPT | Performed by: RADIOLOGY

## 2024-08-09 PROCEDURE — 77263 THER RADIOLOGY TX PLNG CPLX: CPT | Performed by: RADIOLOGY

## 2024-08-09 PROCEDURE — 77334 RADIATION TREATMENT AID(S): CPT | Mod: 26 | Performed by: RADIOLOGY

## 2024-08-09 PROCEDURE — 77290 THER RAD SIMULAJ FIELD CPLX: CPT | Performed by: RADIOLOGY

## 2024-08-09 PROCEDURE — 77470 SPECIAL RADIATION TREATMENT: CPT | Mod: 26 | Performed by: RADIOLOGY

## 2024-08-09 PROCEDURE — 77290 THER RAD SIMULAJ FIELD CPLX: CPT | Mod: 26 | Performed by: RADIOLOGY

## 2024-08-09 NOTE — RADIATION PLANNING NOTES
PATIENT NAME Bright Shirley   PRIMARY PHYSICIAN Nella Hernandez [id number]   REFERRING PHYSICIAN No ref. provider found 1950     DATE OF SERVICE: 8/9/2024    DIAGNOSIS:  Squamous cell carcinoma of upper lobe of left lung (HCC)  Staging form: Lung, AJCC 8th Edition  - Clinical: Stage IIB (cT3, cN0, cM0) - Signed by Candice Orona M.D. on 6/30/2024  Stage prefix: Initial diagnosis         SPECIAL TREATMENT PROCEDURE NOTE:  Considerable additional effort required in the management of this case because of administration of Stereotactic Radiotherapy, which may result in increased normal tissue toxicity and require greater effort in contouring and treatment because of greater precision.

## 2024-08-09 NOTE — RADIATION PLANNING NOTES
DATE OF SERVICE: 8/9/2024    DIAGNOSIS:  Squamous cell carcinoma of upper lobe of left lung (HCC)  Staging form: Lung, AJCC 8th Edition  - Clinical: Stage IIB (cT3, cN0, cM0) - Signed by Candice Orona M.D. on 6/30/2024  Stage prefix: Initial diagnosis       DATE OF SERVICE: 8/9/2024    TYPE OF SIMULATION: SBRT    GOAL OF TREATMENT:   [x] Curative  [] Palliative  [] Oligometastatic    CONTRAST:    [] IV Contrast*  [] Oral Contrast               POSITION:    [x]  Supine  [] Prone     IMMOBILIZATION DEVICE: [x]  Body-Fix  [] Omniboard  []  Bellyboard    PROCEDURE: Patient placed in immobilization device. 4D gated and non-gated CT obtained to assess organ motion.  Images viewed and approved.  Images reconstructed as need.  Image data sets transferred to Web and Rank for planning.    I have personally reviewed the relevant data, performed the target localization, and determined all relevant factors for this patient’s simulation.    *Omnipaque 80 -100cc IVP in conjunction with 500cc NS

## 2024-08-09 NOTE — RADIATION PLANNING NOTES
Clinical Treatment Planning Note    DATE OF SERVICE: 8/9/2024    DIAGNOSIS:  Squamous cell carcinoma of upper lobe of left lung (HCC)  Staging form: Lung, AJCC 8th Edition  - Clinical: Stage IIB (cT3, cN0, cM0) - Signed by Candice Orona M.D. on 6/30/2024  Stage prefix: Initial diagnosis         IMAGING REVIEWED:  [x] CT     [] MRI     [x] PET/CT     [] BONE SCAN     [] MAMMO     [] OTHER      TREATMENT INTENT:   [x] CURATIVE     [] MAINTENANCE     []  PALLIATIVE      []  SUPPORTIVE     []  PROPHYLACTIC     [] BENIGN     []  CONSOLIDATIVE      [] DEFINITIVE   []  OLOGIMETASTATIC      LINE OF TREATMENT:  [] ADJUVANT   [x] DEFINITIVE   [] NEOADJUVANT   [] RE-TREATMENT      TECHNIQUE PLANNED:  [] IMRT   [] 3D   [x] SBRT   [] SRS/SRT   [] HDR   [] ELECTRON       IMRT JUSTIFICATION:  []   An immediately adjacent area has been previously irradiated and abutting portals must be established with high precision.    []  Dose escalation is planned to deliver radiation doses in excess of those commonly utilized for similar tumors with conventional treatment.    []  The target volume is concave or convex, and the critical normal tissues are within or around that convexity or concavity.    []  The target volume is in close proximity to critical structures that must be protected.    []  The volume of interest must be covered with narrow margins to adequately protect  immediately adjacent structures.      FIELDS & BLOCKING:  [x] COMPLEX BLOCKS     []  = 3 TX AREAS     []  ARCS     []  CUSTOM SHEILD        []  SIMPLE BLOCK      CHEMOTHERAPY:  []  CONCURRENT     []  INDUCTION     [] SEQUENTIAL     []  <30 DAYS FROM XRT      NOTES:  OAR CONSTRAINTS: (GUIDELINES ONLY NOT ABSOLUTE)  Target Prescribed Coverage   PTV 95% of PTV covered by 100% (Gy) of RX Dose     PTV 99% of PTV covered by 90% (Gy) of RX Dose       CHAPIN Goal   Total Lung Vol. (cc) critical structure   Total Lung (cc) V12.5Gy < 1500cc   Total Lung (cc) V13.5Gy < 1000cc    Liver V21Gy < 700cc   Cord V22Gy < 0.35cc   Cord V14.5Gy <1.2cc   Cord Max Dose < 28Gy   Ipsilateral Brachial Plexus V27Gy < 3cc   Ipsilateral Brachial Plexus Max Dose < 32.5Gy   Esophagus V19.5Gy < 5cc   Esophagus Max Dose < 35Gy   Heart V32Gy < 15cc   Heart Max Dose < 38Gy   Great Vessels V47Gy < 10cc   Great Vessels Max Dose < 53Gy   Trachea/Large Bronchus V32Gy < 5cc   Trachea/Large Bronchus Max Dose < 40Gy   Small Bronchus V21Gy < 0.5cc   Small Bronchus Max Dose < 33Gy   Skin V36.5Gy < 10cc   Skin Max Dose < 38.5Gy   Ribs V45Gy < 5cc   Ribs Max Dose < 57Gy   *RTOG [id number]

## 2024-08-12 ENCOUNTER — PHARMACY VISIT (OUTPATIENT)
Dept: PHARMACY | Facility: MEDICAL CENTER | Age: 74
End: 2024-08-12
Payer: COMMERCIAL

## 2024-08-15 PROCEDURE — 77300 RADIATION THERAPY DOSE PLAN: CPT | Performed by: RADIOLOGY

## 2024-08-15 PROCEDURE — 77300 RADIATION THERAPY DOSE PLAN: CPT | Mod: 26 | Performed by: RADIOLOGY

## 2024-08-15 PROCEDURE — 77334 RADIATION TREATMENT AID(S): CPT | Performed by: RADIOLOGY

## 2024-08-15 PROCEDURE — 77293 RESPIRATOR MOTION MGMT SIMUL: CPT | Mod: 26 | Performed by: RADIOLOGY

## 2024-08-15 PROCEDURE — 77295 3-D RADIOTHERAPY PLAN: CPT | Mod: 26 | Performed by: RADIOLOGY

## 2024-08-15 PROCEDURE — 77295 3-D RADIOTHERAPY PLAN: CPT | Performed by: RADIOLOGY

## 2024-08-15 PROCEDURE — 77334 RADIATION TREATMENT AID(S): CPT | Mod: 26 | Performed by: RADIOLOGY

## 2024-08-15 PROCEDURE — 77293 RESPIRATOR MOTION MGMT SIMUL: CPT | Performed by: RADIOLOGY

## 2024-08-16 PROCEDURE — 77370 RADIATION PHYSICS CONSULT: CPT | Performed by: RADIOLOGY

## 2024-08-19 ENCOUNTER — APPOINTMENT (OUTPATIENT)
Dept: MEDICAL GROUP | Facility: MEDICAL CENTER | Age: 74
End: 2024-08-19
Payer: MEDICARE

## 2024-08-19 ENCOUNTER — HOSPITAL ENCOUNTER (OUTPATIENT)
Dept: RADIATION ONCOLOGY | Facility: MEDICAL CENTER | Age: 74
End: 2024-08-19

## 2024-08-19 LAB
CHEMOTHERAPY INFUSION START DATE: NORMAL
CHEMOTHERAPY RECORDS: 10
CHEMOTHERAPY RECORDS: 5000
CHEMOTHERAPY RECORDS: NORMAL
CHEMOTHERAPY RX CANCER: NORMAL
DATE 1ST CHEMO CANCER: NORMAL
RAD ONC ARIA COURSE LAST TREATMENT DATE: NORMAL
RAD ONC ARIA COURSE TREATMENT ELAPSED DAYS: NORMAL
RAD ONC ARIA REFERENCE POINT DOSAGE GIVEN TO DATE: 10
RAD ONC ARIA REFERENCE POINT ID: NORMAL
RAD ONC ARIA REFERENCE POINT SESSION DOSAGE GIVEN: 10

## 2024-08-19 PROCEDURE — 77373 STRTCTC BDY RAD THER TX DLVR: CPT | Performed by: RADIOLOGY

## 2024-08-19 PROCEDURE — 77280 THER RAD SIMULAJ FIELD SMPL: CPT | Performed by: RADIOLOGY

## 2024-08-19 PROCEDURE — 77435 SBRT MANAGEMENT: CPT | Performed by: RADIOLOGY

## 2024-08-19 PROCEDURE — 77280 THER RAD SIMULAJ FIELD SMPL: CPT | Mod: 26 | Performed by: RADIOLOGY

## 2024-08-19 NOTE — PROCEDURES
DATE OF SERVICE: 8/19/2024    DIAGNOSIS:  Squamous cell carcinoma of upper lobe of left lung (HCC)  Staging form: Lung, AJCC 8th Edition  - Clinical: Stage IIB (cT3, cN0, cM0) - Signed by Candice Orona M.D. on 6/30/2024  Stage prefix: Initial diagnosis       TREATMENT:  Radiation Therapy Episodes       Active Episodes       Radiation Therapy: SBRT (8/19/2024)                   Radiation Treatments         Plan Last Treated On Elapsed Days Fractions Treated Prescribed Fraction Dose (cGy) Prescribed Total Dose (cGy)    SBRT VANESA 8/19/2024 0 @ [id number] 1 of 5 1,000 5,000                  Reference Point Last Treated On Elapsed Days Most Recent Session Dose (cGy) Total Dose (cGy)    SBRT VANESA 8/19/2024 0 @ [id number] 1,000 1,000                            STEREOTACTIC PROCEDURE NOTE:    Called by Truebeam machine to verify treatment parameters including:  treatment site, treatment dose, and treatment setup prior to stereotactic treatment..    Patient was placed in the treatment position with use of immobilization device and  laser guidance. CBCT images were acquired for target localization.  Images were reviewed in the axial, coronal, and saggital views and shifts were made as necessary to ensure that patient position matched simulation position.      Treatment delivered per  prescription.  The medical physicist was present throughout the set-up, verification and treatment delivery to oversee the procedure and ensure all parameters agreed with the computerized plan.    I have personally reviewed the relevant data, performed the target localization, and determined all relevant factors for this patient’s simulation.

## 2024-08-20 ENCOUNTER — HOSPITAL ENCOUNTER (OUTPATIENT)
Dept: RADIATION ONCOLOGY | Facility: MEDICAL CENTER | Age: 74
End: 2024-08-20

## 2024-08-20 LAB
CHEMOTHERAPY INFUSION START DATE: NORMAL
CHEMOTHERAPY RECORDS: 10
CHEMOTHERAPY RECORDS: 5000
CHEMOTHERAPY RECORDS: NORMAL
CHEMOTHERAPY RX CANCER: NORMAL
DATE 1ST CHEMO CANCER: NORMAL
RAD ONC ARIA COURSE LAST TREATMENT DATE: NORMAL
RAD ONC ARIA COURSE TREATMENT ELAPSED DAYS: NORMAL
RAD ONC ARIA REFERENCE POINT DOSAGE GIVEN TO DATE: 20
RAD ONC ARIA REFERENCE POINT ID: NORMAL
RAD ONC ARIA REFERENCE POINT SESSION DOSAGE GIVEN: 10

## 2024-08-20 PROCEDURE — 77280 THER RAD SIMULAJ FIELD SMPL: CPT | Mod: 26 | Performed by: RADIOLOGY

## 2024-08-20 PROCEDURE — 77280 THER RAD SIMULAJ FIELD SMPL: CPT | Performed by: RADIOLOGY

## 2024-08-20 PROCEDURE — 77373 STRTCTC BDY RAD THER TX DLVR: CPT | Performed by: RADIOLOGY

## 2024-08-20 NOTE — PROCEDURES
DATE OF SERVICE: 8/20/2024    DIAGNOSIS:  Squamous cell carcinoma of upper lobe of left lung (HCC)  Staging form: Lung, AJCC 8th Edition  - Clinical: Stage IIB (cT3, cN0, cM0) - Signed by Candice Orona M.D. on 6/30/2024  Stage prefix: Initial diagnosis       TREATMENT:  Radiation Therapy Episodes       Active Episodes       Radiation Therapy: SBRT (8/19/2024)                   Radiation Treatments         Plan Last Treated On Elapsed Days Fractions Treated Prescribed Fraction Dose (cGy) Prescribed Total Dose (cGy)    SBRT VANESA 8/20/2024 1 @ [id number] 2 of 5 1,000 5,000                  Reference Point Last Treated On Elapsed Days Most Recent Session Dose (cGy) Total Dose (cGy)    SBRT VANESA 8/20/2024 1 @ [id number] 1,000 2,000                            STEREOTACTIC PROCEDURE NOTE:    Called by Truebeam machine to verify treatment parameters including:  treatment site, treatment dose, and treatment setup prior to stereotactic treatment..    Patient was placed in the treatment position with use of immobilization device and  laser guidance. CBCT images were acquired for target localization.  Images were reviewed in the axial, coronal, and saggital views and shifts were made as necessary to ensure that patient position matched simulation position.      Treatment delivered per  prescription.  The medical physicist was present throughout the set-up, verification and treatment delivery to oversee the procedure and ensure all parameters agreed with the computerized plan.    I have personally reviewed the relevant data, performed the target localization, and determined all relevant factors for this patient’s simulation.

## 2024-08-21 ENCOUNTER — HOSPITAL ENCOUNTER (OUTPATIENT)
Dept: RADIATION ONCOLOGY | Facility: MEDICAL CENTER | Age: 74
End: 2024-08-21

## 2024-08-21 LAB
CHEMOTHERAPY INFUSION START DATE: NORMAL
CHEMOTHERAPY RECORDS: 10
CHEMOTHERAPY RECORDS: 5000
CHEMOTHERAPY RECORDS: NORMAL
CHEMOTHERAPY RX CANCER: NORMAL
DATE 1ST CHEMO CANCER: NORMAL
RAD ONC ARIA COURSE LAST TREATMENT DATE: NORMAL
RAD ONC ARIA COURSE TREATMENT ELAPSED DAYS: NORMAL
RAD ONC ARIA REFERENCE POINT DOSAGE GIVEN TO DATE: 30
RAD ONC ARIA REFERENCE POINT ID: NORMAL
RAD ONC ARIA REFERENCE POINT SESSION DOSAGE GIVEN: 10

## 2024-08-21 PROCEDURE — 77280 THER RAD SIMULAJ FIELD SMPL: CPT | Performed by: RADIOLOGY

## 2024-08-21 PROCEDURE — 77373 STRTCTC BDY RAD THER TX DLVR: CPT | Performed by: RADIOLOGY

## 2024-08-21 PROCEDURE — 77336 RADIATION PHYSICS CONSULT: CPT | Performed by: RADIOLOGY

## 2024-08-21 PROCEDURE — 77280 THER RAD SIMULAJ FIELD SMPL: CPT | Mod: 26 | Performed by: RADIOLOGY

## 2024-08-22 ENCOUNTER — HOSPITAL ENCOUNTER (OUTPATIENT)
Dept: RADIATION ONCOLOGY | Facility: MEDICAL CENTER | Age: 74
End: 2024-08-22

## 2024-08-22 LAB
CHEMOTHERAPY INFUSION START DATE: NORMAL
CHEMOTHERAPY RECORDS: 10
CHEMOTHERAPY RECORDS: 5000
CHEMOTHERAPY RECORDS: NORMAL
CHEMOTHERAPY RX CANCER: NORMAL
DATE 1ST CHEMO CANCER: NORMAL
RAD ONC ARIA COURSE LAST TREATMENT DATE: NORMAL
RAD ONC ARIA COURSE TREATMENT ELAPSED DAYS: NORMAL
RAD ONC ARIA REFERENCE POINT DOSAGE GIVEN TO DATE: 40
RAD ONC ARIA REFERENCE POINT ID: NORMAL
RAD ONC ARIA REFERENCE POINT SESSION DOSAGE GIVEN: 10

## 2024-08-22 PROCEDURE — 77280 THER RAD SIMULAJ FIELD SMPL: CPT | Performed by: RADIOLOGY

## 2024-08-22 PROCEDURE — 77280 THER RAD SIMULAJ FIELD SMPL: CPT | Mod: 26 | Performed by: RADIOLOGY

## 2024-08-22 PROCEDURE — 77373 STRTCTC BDY RAD THER TX DLVR: CPT | Performed by: RADIOLOGY

## 2024-08-22 NOTE — PROCEDURES
DATE OF SERVICE: 8/21/2024    DIAGNOSIS:  Squamous cell carcinoma of upper lobe of left lung (HCC)  Staging form: Lung, AJCC 8th Edition  - Clinical: Stage IIB (cT3, cN0, cM0) - Signed by Candice Orona M.D. on 6/30/2024  Stage prefix: Initial diagnosis       TREATMENT:  Radiation Therapy Episodes       Active Episodes       Radiation Therapy: SBRT (8/19/2024)                   Radiation Treatments         Plan Last Treated On Elapsed Days Fractions Treated Prescribed Fraction Dose (cGy) Prescribed Total Dose (cGy)    SBRT VANESA 8/21/2024 2 @ [id number] 3 of 5 1,000 5,000                  Reference Point Last Treated On Elapsed Days Most Recent Session Dose (cGy) Total Dose (cGy)    SBRT VANESA 8/21/2024 2 @ [id number] 1,000 3,000                            STEREOTACTIC PROCEDURE NOTE:    Called by Truebeam machine to verify treatment parameters including:  treatment site, treatment dose, and treatment setup prior to stereotactic treatment..    Patient was placed in the treatment position with use of immobilization device and  laser guidance. CBCT images were acquired for target localization.  Images were reviewed in the axial, coronal, and saggital views and shifts were made as necessary to ensure that patient position matched simulation position.      Treatment delivered per  prescription.  The medical physicist was present throughout the set-up, verification and treatment delivery to oversee the procedure and ensure all parameters agreed with the computerized plan.    I have personally reviewed the relevant data, performed the target localization, and determined all relevant factors for this patient’s simulation.

## 2024-08-22 NOTE — PROCEDURES
DATE OF SERVICE: 8/22/2024    DIAGNOSIS:  Squamous cell carcinoma of upper lobe of left lung (HCC)  Staging form: Lung, AJCC 8th Edition  - Clinical: Stage IIB (cT3, cN0, cM0) - Signed by Candice Orona M.D. on 6/30/2024  Stage prefix: Initial diagnosis       TREATMENT:  Radiation Therapy Episodes       Active Episodes       Radiation Therapy: SBRT (8/19/2024)                   Radiation Treatments         Plan Last Treated On Elapsed Days Fractions Treated Prescribed Fraction Dose (cGy) Prescribed Total Dose (cGy)    SBRT VANESA 8/22/2024 3 @ [id number] 4 of 5 1,000 5,000                  Reference Point Last Treated On Elapsed Days Most Recent Session Dose (cGy) Total Dose (cGy)    SBRT VANESA 8/22/2024 3 @ [id number] 1,000 4,000                            STEREOTACTIC PROCEDURE NOTE:    Called by Truebeam machine to verify treatment parameters including:  treatment site, treatment dose, and treatment setup prior to stereotactic treatment..    Patient was placed in the treatment position with use of immobilization device and  laser guidance. CBCT images were acquired for target localization.  Images were reviewed in the axial, coronal, and saggital views and shifts were made as necessary to ensure that patient position matched simulation position.      Treatment delivered per  prescription.  The medical physicist was present throughout the set-up, verification and treatment delivery to oversee the procedure and ensure all parameters agreed with the computerized plan.    I have personally reviewed the relevant data, performed the target localization, and determined all relevant factors for this patient’s simulation.

## 2024-08-23 ENCOUNTER — HOSPITAL ENCOUNTER (OUTPATIENT)
Dept: RADIATION ONCOLOGY | Facility: MEDICAL CENTER | Age: 74
End: 2024-08-23

## 2024-08-23 LAB
CHEMOTHERAPY INFUSION START DATE: NORMAL
CHEMOTHERAPY INFUSION START DATE: NORMAL
CHEMOTHERAPY INFUSION STOP DATE: NORMAL
CHEMOTHERAPY RECORDS: 10
CHEMOTHERAPY RECORDS: 10
CHEMOTHERAPY RECORDS: 5000
CHEMOTHERAPY RECORDS: 5000
CHEMOTHERAPY RECORDS: NORMAL
CHEMOTHERAPY RX CANCER: NORMAL
CHEMOTHERAPY RX CANCER: NORMAL
DATE 1ST CHEMO CANCER: NORMAL
DATE 1ST CHEMO CANCER: NORMAL
RAD ONC ARIA COURSE LAST TREATMENT DATE: NORMAL
RAD ONC ARIA COURSE LAST TREATMENT DATE: NORMAL
RAD ONC ARIA COURSE TREATMENT ELAPSED DAYS: NORMAL
RAD ONC ARIA COURSE TREATMENT ELAPSED DAYS: NORMAL
RAD ONC ARIA REFERENCE POINT DOSAGE GIVEN TO DATE: 50
RAD ONC ARIA REFERENCE POINT DOSAGE GIVEN TO DATE: 50
RAD ONC ARIA REFERENCE POINT ID: NORMAL
RAD ONC ARIA REFERENCE POINT ID: NORMAL
RAD ONC ARIA REFERENCE POINT SESSION DOSAGE GIVEN: 10

## 2024-08-23 PROCEDURE — 77280 THER RAD SIMULAJ FIELD SMPL: CPT | Mod: 26 | Performed by: RADIOLOGY

## 2024-08-23 PROCEDURE — 77280 THER RAD SIMULAJ FIELD SMPL: CPT | Performed by: RADIOLOGY

## 2024-08-23 PROCEDURE — 77373 STRTCTC BDY RAD THER TX DLVR: CPT | Performed by: RADIOLOGY

## 2024-08-23 NOTE — PROCEDURES
DATE OF SERVICE: 8/23/2024    DIAGNOSIS:  Squamous cell carcinoma of upper lobe of left lung (HCC)  Staging form: Lung, AJCC 8th Edition  - Clinical: Stage IIB (cT3, cN0, cM0) - Signed by Candice Orona M.D. on 6/30/2024  Stage prefix: Initial diagnosis       TREATMENT:  Radiation Therapy Episodes       Active Episodes       Radiation Therapy: SBRT (8/19/2024)                   Radiation Treatments         Plan Last Treated On Elapsed Days Fractions Treated Prescribed Fraction Dose (cGy) Prescribed Total Dose (cGy)    SBRT VANESA 8/23/2024 4 @ [id number] 5 of 5 1,000 5,000                  Reference Point Last Treated On Elapsed Days Most Recent Session Dose (cGy) Total Dose (cGy)    SBRT VANESA 8/23/2024 4 @ [id number] 1,000 5,000                            STEREOTACTIC PROCEDURE NOTE:    Called by Truebeam machine to verify treatment parameters including:  treatment site, treatment dose, and treatment setup prior to stereotactic treatment..    Patient was placed in the treatment position with use of immobilization device and  laser guidance. CBCT images were acquired for target localization.  Images were reviewed in the axial, coronal, and saggital views and shifts were made as necessary to ensure that patient position matched simulation position.      Treatment delivered per  prescription.  The medical physicist was present throughout the set-up, verification and treatment delivery to oversee the procedure and ensure all parameters agreed with the computerized plan.    I have personally reviewed the relevant data, performed the target localization, and determined all relevant factors for this patient’s simulation.

## 2024-08-26 PROCEDURE — RXMED WILLOW AMBULATORY MEDICATION CHARGE: Performed by: FAMILY MEDICINE

## 2024-08-27 NOTE — RADIATION COMPLETION NOTES
"  END OF TREATMENT SUMMARY    Patient name:  Bright Shirley    Primary Physician:  Nella Ramirez M.D. MRN: [id number]  CSN: [id number]   Referring physician:  No ref. provider found  : 1950, 74 y.o.       TREATMENT SUMMARY:        Course First Treatment Date 2024    Course Last Treatment Date 2024   Course Elapsed Days 4 @ [id number]   Course Intent Curative     Radiation Therapy Episodes       Active Episodes       Radiation Therapy: SBRT (2024)                   Radiation Treatments         Plan Last Treated On Elapsed Days Fractions Treated Prescribed Fraction Dose (cGy) Prescribed Total Dose (cGy)    SBRT VANESA 2024 4 @ [id number] 5 of 5 1,000 5,000                  Reference Point Last Treated On Elapsed Days Most Recent Session Dose (cGy) Total Dose (cGy)    SBRT VANESA 2024 4 @ [id number] -- 5,000                                     STAGE:   Squamous cell carcinoma of upper lobe of left lung (HCC)  Staging form: Lung, AJCC 8th Edition  - Clinical: Stage IIB (cT3, cN0, cM0) - Signed by Candice Orona M.D. on 2024  Stage prefix: Initial diagnosis       TREATMENT INDICATION:   ***     CONCURRENT SYSTEMIC TREATMENT:   ***     RT COURSE DISCONTINUED EARLY:   No     PATIENT EXPERIENCE:        No data to display                 FOLLOW-UP PLAN:   {avpfollowuplist:95011::\"8 Weeks\"}     COMMENT:          ANATOMIC TARGET SUMMARY    ANATOMIC TARGET MODALITY TECHNIQUE   ***   {RAD ONC MODALITY:35754} {RAD ONC Technique:77337::\"IMRT\"}            COMMENT:         DIAGRAMS:      DOSE VOLUME HISTOGRAMS:              "

## 2024-08-27 NOTE — ADDENDUM NOTE
Encounter addended by: Estella Quintana, Med Ass't on: 8/27/2024 3:57 PM   Actions taken: Pend clinical note

## 2024-08-28 ENCOUNTER — PHARMACY VISIT (OUTPATIENT)
Dept: PHARMACY | Facility: MEDICAL CENTER | Age: 74
End: 2024-08-28
Payer: COMMERCIAL

## 2024-08-30 ENCOUNTER — HOSPITAL ENCOUNTER (OUTPATIENT)
Dept: HEMATOLOGY ONCOLOGY | Facility: MEDICAL CENTER | Age: 74
End: 2024-08-30
Attending: INTERNAL MEDICINE
Payer: MEDICARE

## 2024-08-30 VITALS
BODY MASS INDEX: 25.72 KG/M2 | TEMPERATURE: 99.1 F | SYSTOLIC BLOOD PRESSURE: 98 MMHG | DIASTOLIC BLOOD PRESSURE: 56 MMHG | HEART RATE: 77 BPM | OXYGEN SATURATION: 90 % | HEIGHT: 66 IN | WEIGHT: 160.05 LBS

## 2024-08-30 DIAGNOSIS — E03.9 HYPOTHYROIDISM, UNSPECIFIED TYPE: ICD-10-CM

## 2024-08-30 DIAGNOSIS — E78.5 DYSLIPIDEMIA: ICD-10-CM

## 2024-08-30 DIAGNOSIS — C34.12 SQUAMOUS CELL CARCINOMA OF UPPER LOBE OF LEFT LUNG (HCC): ICD-10-CM

## 2024-08-30 PROCEDURE — 99212 OFFICE O/P EST SF 10 MIN: CPT | Performed by: INTERNAL MEDICINE

## 2024-08-30 RX ORDER — 0.9 % SODIUM CHLORIDE 0.9 %
10 VIAL (ML) INJECTION PRN
OUTPATIENT
Start: 2024-09-06

## 2024-08-30 RX ORDER — 0.9 % SODIUM CHLORIDE 0.9 %
3 VIAL (ML) INJECTION PRN
OUTPATIENT
Start: 2024-09-05

## 2024-08-30 RX ORDER — EPINEPHRINE 1 MG/ML(1)
0.5 AMPUL (ML) INJECTION PRN
OUTPATIENT
Start: 2024-09-06

## 2024-08-30 RX ORDER — SODIUM CHLORIDE 9 MG/ML
INJECTION, SOLUTION INTRAVENOUS CONTINUOUS
OUTPATIENT
Start: 2024-09-06

## 2024-08-30 RX ORDER — ONDANSETRON 8 MG/1
8 TABLET, ORALLY DISINTEGRATING ORAL PRN
OUTPATIENT
Start: 2024-09-06

## 2024-08-30 RX ORDER — 0.9 % SODIUM CHLORIDE 0.9 %
10 VIAL (ML) INJECTION PRN
OUTPATIENT
Start: 2024-09-05

## 2024-08-30 RX ORDER — 0.9 % SODIUM CHLORIDE 0.9 %
VIAL (ML) INJECTION PRN
OUTPATIENT
Start: 2024-09-05

## 2024-08-30 RX ORDER — ONDANSETRON 2 MG/ML
4 INJECTION INTRAMUSCULAR; INTRAVENOUS PRN
OUTPATIENT
Start: 2024-09-06

## 2024-08-30 RX ORDER — METHYLPREDNISOLONE SODIUM SUCCINATE 125 MG/2ML
125 INJECTION, POWDER, LYOPHILIZED, FOR SOLUTION INTRAMUSCULAR; INTRAVENOUS PRN
OUTPATIENT
Start: 2024-09-06

## 2024-08-30 RX ORDER — 0.9 % SODIUM CHLORIDE 0.9 %
VIAL (ML) INJECTION PRN
OUTPATIENT
Start: 2024-09-06

## 2024-08-30 RX ORDER — DIPHENHYDRAMINE HYDROCHLORIDE 50 MG/ML
50 INJECTION INTRAMUSCULAR; INTRAVENOUS PRN
OUTPATIENT
Start: 2024-09-06

## 2024-08-30 RX ORDER — PROCHLORPERAZINE MALEATE 10 MG
10 TABLET ORAL EVERY 6 HOURS PRN
OUTPATIENT
Start: 2024-09-06

## 2024-08-30 RX ORDER — 0.9 % SODIUM CHLORIDE 0.9 %
3 VIAL (ML) INJECTION PRN
OUTPATIENT
Start: 2024-09-06

## 2024-08-30 ASSESSMENT — ENCOUNTER SYMPTOMS
SPUTUM PRODUCTION: 0
ABDOMINAL PAIN: 0
PALPITATIONS: 0
SHORTNESS OF BREATH: 1
WEIGHT LOSS: 0
COUGH: 1
TREMORS: 0
CHILLS: 0
SENSORY CHANGE: 0
DIZZINESS: 0
HEADACHES: 0
ORTHOPNEA: 0
FOCAL WEAKNESS: 0
FEVER: 0
DEPRESSION: 0
BLURRED VISION: 0
BRUISES/BLEEDS EASILY: 0
NECK PAIN: 0
MEMORY LOSS: 0
HEARTBURN: 0
SORE THROAT: 0
WHEEZING: 0
TINGLING: 0
NAUSEA: 0
VOMITING: 0

## 2024-08-30 ASSESSMENT — FIBROSIS 4 INDEX: FIB4 SCORE: 1.07

## 2024-08-30 NOTE — PROGRESS NOTES
Consult Note: Hematology/Oncology     Referring Provider:Jennifer GARCIA  Primary Care:  Nella Ramirez M.D.    Chief Complaint   Patient presents with    Lung Cancer     Post PRRT follow up        Current Treatment: None    Prior Treatment: None    Subjective:     History of Presenting Illness:  Bright Shirley is a 74 y.o. male who presents today with a new diagnosis of squamous cell carcinoma of the lung.    Patients history dates back to May 3/2024 when he was experiencing SOB and presented to the ER and found to be COVID+.  He had a CT scan of the chest on 5/3/24 which showed show a 6.2 x 4.8 x 4.8 cm left upper lobe mass consistent with a lung primary. Findings are consistent also with chronic interstitial lung disease/fibrosis. He also had patchy groundglass opacities bilaterally suggesting infection/inflammation. Bilateral adrenal masses were noted to measure up to 3 cm on the right and 3.5 cm on the left with a -8 Hounsfield unit consistent with a benign adrenal adenoma.     He saw Jennifer Brown on 5/20/24.  A PET scan was done on 5/29/24, which again showed a hypermetabolic VANESA mass. No other metastatic disease. She ordered a lung biopsy which was completed on 6/11/24.  This showed a poorly differentiated squamous cell carcinoma.    Of note, he was a smoker but quit when he entered the hospital on 5/3/24.     He lives in [place] and has a roommate.     Interval History    Patient reports that he is doing well since I last saw him.    Patient met with thoracic surgery and no surgical recommendations.     He saw Dr. London on 8/1/24 and had radiosurgery.    Past Medical History:   Diagnosis Date    Acute exacerbation of chronic obstructive pulmonary disease (COPD) (HCC) 07/01/2021    Acute hypoxemic respiratory failure (HCC) 06/08/2021    Acute respiratory failure with hypoxia (HCC) 04/29/2024    Acute urinary retention 06/07/2021    Aortic atherosclerosis (HCC)     Carotid stenosis,  bilateral - mild     Cerebral infarction (HCC) - based on CT head 2022     Cerebral infarction (HCC) - based on CT head 2022     Chronic obstructive pulmonary disease (HCC)     Hypercholesteremia     Hypertension     Intertrochanteric fracture of femur (HCC) 2021    Other emphysema (HCC) 2019    Supplemental oxygen dependent 2021        Past Surgical History:   Procedure Laterality Date    PB TREAT INTER/SUBTROCH FX,W/PLATE/SCREW Left 2021    Procedure: FIXATION, FRACTURE, HIP, USING DYNAMIC HIP SCREW, WITH COMPRESSION;  Surgeon: Avinash Suazo M.D.;  Location: SURGERY [facility];  Service: Orthopedics    HERNIA REPAIR Right     ORIF, FEMUR Right     15 years ago    OTHER      right inguinal hernia repair    OTHER ORTHOPEDIC SURGERY      right hip       Social History     Tobacco Use    Smoking status: Former     Current packs/day: 0.00     Average packs/day: 1 pack/day for 49.3 years (49.3 ttl pk-yrs)     Types: Cigarettes     Start date:      Quit date: 2024     Years since quittin.3    Smokeless tobacco: Never    Tobacco comments:     vape pen & cigarettes     49 years total andria an average of 1 ppd   Vaping Use    Vaping status: Former    Substances: Nicotine   Substance Use Topics    Alcohol use: Yes     Alcohol/week: 1.8 oz     Types: 1 Glasses of wine, 2 Cans of beer per week     Comment: weekend/social - not often    Drug use: Not Currently     Types: Marijuana     Comment: THC edibles - rarely        Family History   Problem Relation Age of Onset    Cancer Mother         breast cancer    Stroke Mother     Other Father         cirrhosis form alcohol    Stroke Sister     Heart Disease Brother         cardiac aneurysm    Hypertension Maternal Grandmother     Hyperlipidemia Maternal Grandmother        Allergies   Allergen Reactions    Seasonal        Current Outpatient Medications   Medication Sig Dispense Refill    Misc. Devices Misc Portable oxygen concentrator (POC),  at 2L/min via NC. 1 Each 0    lisinopril (PRINIVIL) 20 MG Tab Take 1 Tablet by mouth 2 times a day. 200 Tablet 1    rosuvastatin (CRESTOR) 10 MG Tab Take 1 Tablet by mouth every evening. 100 Tablet 0    fluticasone-salmeterol (ADVAIR) 100-50 MCG/ACT AEROSOL POWDER, BREATH ACTIVATED Inhale 1 Puff every 12 hours. 60 Each 0    Fexofenadine HCl (MUCINEX ALLERGY PO) Take  by mouth every day.      Home Care Oxygen Inhale 5 L/min continuous. O2 LPM @: 5 LPM  Specify Usage: Continuous  Type of O2: Gas  Oxygen via: N/C  Oxygen Modality: Concentrator and Portable Tank  Humidification: Yes      acetaminophen (TYLENOL) 325 MG Tab Take 500 mg by mouth every 6 hours as needed for Mild Pain.      Melatonin 10 MG Tab Take 30 mg by mouth every evening.      amLODIPine (NORVASC) 10 MG Tab Take 1 Tablet by mouth every day. 90 Tablet 2    traZODone (DESYREL) 50 MG Tab Take 1 tablet by mouth every evening. 90 Tablet 3    aspirin (ASA) 81 MG Chew Tab chewable tablet Chew 81 mg every evening. 100 Tablet     docosahexanoic acid (OMEGA 3 FA) 1000 MG Cap Take 1,000 mg by mouth every day.      Multiple Vitamin (MULTIVITAMIN ADULT PO) Take 1 Tablet by mouth every day.      calcium polycarbophil (FIBERCON) 625 MG Tab Take 625 mg by mouth every day.      fluticasone-salmeterol (ADVAIR) 250-50 MCG/ACT AEROSOL POWDER, BREATH ACTIVATED Inhale 1 Puff every 12 hours. 60 Each 2     No current facility-administered medications for this encounter.       Review of Systems   Constitutional:  Positive for malaise/fatigue. Negative for chills, fever and weight loss.   HENT:  Negative for congestion, ear pain, nosebleeds and sore throat.    Eyes:  Negative for blurred vision.   Respiratory:  Positive for cough and shortness of breath. Negative for sputum production and wheezing.    Cardiovascular:  Negative for chest pain, palpitations, orthopnea and leg swelling.   Gastrointestinal:  Negative for abdominal pain, heartburn, nausea and vomiting.  "  Genitourinary:  Negative for dysuria, frequency and urgency.   Musculoskeletal:  Negative for neck pain.   Neurological:  Negative for dizziness, tingling, tremors, sensory change, focal weakness and headaches.   Endo/Heme/Allergies:  Does not bruise/bleed easily.   Psychiatric/Behavioral:  Negative for depression, memory loss and suicidal ideas.    All other systems reviewed and are negative.      Problem list, medications, and allergies reviewed by myself today in Epic.     Objective:     Vitals:    08/30/24 1256   BP: 98/56   BP Location: Left arm   Patient Position: Sitting   BP Cuff Size: Adult   Pulse: 77   Temp: 37.3 °C (99.1 °F)   TempSrc: Temporal   SpO2: 90%   Weight: 72.6 kg (160 lb 0.9 oz)   Height: 1.676 m (5' 5.98\")       DESC; KARNOFSKY SCALE WITH ECOG EQUIVALENT: 60, Requires occasional assistance, but is able to care for most of his personal needs (ECOG equivalent 2)    DISTRESS LEVEL: no acute distress    Physical Exam  Constitutional:       General: He is not in acute distress.     Appearance: Normal appearance.   HENT:      Head: Normocephalic and atraumatic.      Nose: Nose normal. No congestion.      Mouth/Throat:      Mouth: Mucous membranes are moist.      Pharynx: Oropharynx is clear.   Eyes:      General: No scleral icterus.     Conjunctiva/sclera: Conjunctivae normal.      Pupils: Pupils are equal, round, and reactive to light.   Cardiovascular:      Rate and Rhythm: Normal rate and regular rhythm.      Pulses: Normal pulses.      Heart sounds: No murmur heard.     No friction rub.   Pulmonary:      Effort: Pulmonary effort is normal. No respiratory distress.      Breath sounds: No stridor. No wheezing or rales.      Comments: On oxygen  Decreased breath sounds VANESA  Chest:      Chest wall: No tenderness.   Abdominal:      General: Abdomen is flat. Bowel sounds are normal. There is no distension.      Palpations: Abdomen is soft. There is no mass.      Tenderness: There is no abdominal " tenderness. There is no guarding or rebound.   Musculoskeletal:         General: No swelling, tenderness or deformity. Normal range of motion.      Cervical back: Normal range of motion and neck supple. No rigidity or tenderness.      Right lower leg: No edema.      Left lower leg: No edema.   Skin:     General: Skin is warm.      Coloration: Skin is not jaundiced or pale.      Findings: No bruising or rash.   Neurological:      General: No focal deficit present.      Mental Status: He is alert and oriented to person, place, and time. Mental status is at baseline.      Motor: No weakness.   Psychiatric:         Mood and Affect: Mood normal.         Behavior: Behavior normal.         Thought Content: Thought content normal.         Judgment: Judgment normal.       Pathology:    A. Left lung mass biopsy:          Moderately to poorly differentiated squamous cell carcinoma in a           background of abundant necrosis.     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 on 6/30/2024    Squamous cell carcinoma of the VANESA, PDL1 90%. NGS: DNMT3A, STAG2, TERT, TP53.   As above, patient saw Dr London and had SRS for his lung cancer. He does not wish to proceed with chemotherapy due to his comorbid condition.   I discussed with him in detail regarding further management and care.  I will recommend 1 year of Keytruda 400 mg IV every 6 hours and I discussed with him in detail regarding benefit and side effects including but not limited to immune colitis, immune hepatitis, and the immune pneumonitis along with rare myriad of side effects due to increased immunity.  I will start Keytruda as soon as possible.    I will follow this up in in about 5 weeks for continued care.    He has underlying pulmonary fibrosis and he will be followed up by pulmonary to optimize his of  breathing.  He is currently taking 4 L/min oxygen    Any questions and concerns raised by the patient were addressed and answered. Patient denies any further questions.  Patient encouraged to call the office with any concerns or issues.

## 2024-08-30 NOTE — PATIENT INSTRUCTIONS
Blood test anytime    Schedule Keytruda  ASAP    My appointmtn in 5 weeks   Occupational Therapy    Visit Type: initial evaluation and treatment    Relevant History/Co-morbidities: Patient admit s/p fall at home with LLE pain, workup revealing COVID+    PMH: GBM s/p craniotomy with tumor resection (Feb. 2022), inflammatory arthritis, myositis    SUBJECTIVE  Patient agreed to participate in therapy this date. RN in agreement to work with patient for therapy session. Patient / Family Goal: return to previous functional status, maximize function and return home    Pain   Patient reports pain is not an issue/concern., Patient does not demonstrate pain behaviors. At onset of session (out of 10): 0    OBJECTIVE     Cognitive Status   Level of Consciousness   - alert  Arousal Alertness   - delayed responses to stimuli  Affect/Behavior    - calm, cooperative and confused  Orientation    - Oriented to: person and place (Patient able to identify place with choices provided)   - Disoriented to: time and situation  Functional Communication   - Overall Status: impaired   - Forms of Communication: verbal, expressive deficits and delayed responses  Attention Span    - Attention: impaired - attends with cues to redirect   - Attention impairment: reduced memory, internal factors and fatigue  Following Direction   - follows one step commands with increased time and follows one step commands with repetition  Transition Between Tasks   - transitions with cues  Memory   - impaired - decreased recall of biographical information  Safety Awareness/Insight   - unable to assess  Awareness of Deficits   - decreased awareness of deficits and assistance required to compensate for deficits  [facility] Cognition Screen:    1. Following/understanding a 10 to 15 minute speech or presentation (e.g., lesson at a place of [employer], guest lecturer at a senior center? A lot (2)    2. Understanding familiar people during ordinary conversations? A little (3)    3. Remembering to take medications at the appropriate time?   A lot (2)    4. Remembering where things were placed or put away (e.g., keys)? A lot (2)    5. Remembering a list of 4 or 5 errands without writing it down? A lot (2)    6. Taking care of complicated tasks like managing a checking account or getting appliances fixed? Total (1)    AM-PAC Cognition Screen:  Cognition Score: 12/24  Cognition Interpretation: suspected impairment, question patient's cognitive baseline due to h/o GBM, anticipate patient close to her baseline and requires assist for med mgmt        Vitals:  Rest  â¢ HR: 61  â¢ Supplemental O2 (in L): Room air    Activity  â¢ HR: 104  â¢ Supplemental O2 (in L): Room air      Post Activity    HR: 72  â¢ Supplemental O2 (in L): Room air         Range of Motion (ROM)   (degrees unless noted; active unless noted; norms in ( ); negative=lacking to 0, positive=beyond 0)  Shoulder:    - Flexion (180):        â¢ Left:90         â¢ Right: 90  Elbow/Forearm:    - Flexion (140-150):      â¢ Left:  WFL       â¢ Right:  WFL     - Extension (0):      â¢ Left:  WFL      â¢ Right:  WFL  Comments: Patient unable to flex extend DIP/PIP of digits resulting in decr coordination and grasp. L thumb w/ IP hyperextension          Sitting Balance  (NORMAN = base of support)  Static      - Trial 1 details: stand by assist, with verbal cues, with double UE support, with double LE support and with back unsupported  Patient w/ progressive retrolean w/ fatigue w/ prolonged unsupported sitting at EOB - no overt LOB and able to correct w/ verbal cues.        Standing Balance  (NORMAN = base of support)  Firm Surface: Double Leg      - Static, Eyes Open       - Trial 1 details: minimal assist and with double UE support      Coordination  LUE: impaired   RUE: impaired      Motor Planning: hand over hand to sequence tasks    Bed Mobility  - Repositioning in bed: total assist - non-dependent, 2 persons  - Supine to sit: moderate assist, with tactile cues, with verbal cues (w/ HOB elevated)  - Sit to supine: maximal assist, with tactile cues, with verbal cues  Transfers  Assistive devices: 2-wheeled walker  - Sit to stand: moderate assist, with tactile cues, with verbal cues (x2 reps (from EOB, from toilet))  - Stand to sit: moderate assist, with tactile cues, with verbal cues  - Toilet: moderate assist, with tactile cues, with verbal cues      Functional Ambulation  - Assistance: moderate assist  - Assistive device: 2-wheeled walker  - Surface: even  Patient engaged in functional mobility x15 ft x2 to in room toilet. Patient incontinent of urine on way to toilet and requires max cues for sequencing, posture, RW advancement. Activities of Daily Living (ADLs)  Grooming/Oral Hygiene:   - Grooming assist: maximal assist  - Position: edge of bed  - Assist needed for: brushing hair  Lower Body Dressing:   - Footwear:       - Assistance: total assist - non-dependent       - Position: chair and sitting on toilet       - Type: socks  Toileting:   - Toilet transfer:        - Assist: moderate assist       - Device: 2-wheeled walker  - Assist: total assist - non-dependent  - Assist needed for: perineal hygiene and increased time to complete  Bathing:   - Assist: total assist - non-dependent (to ocmplete LE bathing due to urinary incontinence)  Bathing position: sitting on toilet.      Interventions    Training provided: activity tolerance, ADL training, balance retraining, bed mobility training, transfer training and safety training  Skilled input: verbal instruction/cues  Verbal Consent: Writer verbally educated and received verbal consent for hand placement, positioning of patient, and techniques to be performed today from patient          Education:   - Present and not ready to learn: patient  Education provided during session:  - Results of above outlined education: Needs reinforcement    ASSESSMENT   Patient will benefit from inpatient skilled therapy to address current assessed functional limitations and impairments. Interferring components: cognitive deficits and decreased activity tolerance    Discharge needs based on today's assessment:  - Current level of function: significantly below baseline level of function  - Therapy needs at discharge: therapy 5 or more times per week  - Activities of daily living (ADLs) requiring support at discharge: bed mobility, bathing, transfers, toileting, ambulation, feeding, dressing and grooming  - Impairments that require further therapy intervention: strength, activity tolerance, balance, safety awareness, cognition, executive functioning, coordination and motor planning  AM-PAC  - Prior Level of Function: Needs a little help ([facility] 12-21)       Key: MOD A=moderate assistance, IND/MOD I=independent/modified independent  - Generalized Current Level of Function     - Current Self-Cares: 8       Scoring Key= >21 Modified Independent; 20-21 Supervision; 18-19 Minimal assist; 13-17 Moderate assist; 9-12 Max assist; <9 Total assist       â¢ Personal Occupations Profile Affected: bathing/showering, lower body dressing, personal hygiene/grooming, upper body dressing, toileting/toilet hygiene, functional mobility/transfers, feeding     â¢ Clinical decision making: Moderate - Patient has several limitations (3-5), comorbidities and/or complexities, as noted in detailed assessment above, that impact their occupational profile. Resulting in several treatment options and minimal to moderate task modification consistent with moderate clinical decision making complexity.     PLAN (while hospitalized)  Suggestions for next session as indicated:   OT Frequency: 3-5 x per week      PT/OT Mobility Equipment for Discharge: TBD at [address] [place] [address], If home: Manual W/C, Hospital bed, RW  PT/OT ADL Equipment for Discharge: commode, hospital bed for bed level ADLs  Interventions: ADL retraining, upper extremity strengthening/ROM, balance, safety training, therapeutic exercise, patient education, activity tolerance training, compensatory technique education, bed mobility training, transfer training, functional transfer training, neuromuscular reeducation, compensatory techniques and therapeutic activity  Agreement to plan and goals: patient unable to agree with goals and treatment plan      GOALS  Long Term Goals: (to be met by time of discharge from hospital)  Grooming: Patient will complete grooming tasks at sink minimal assist.  Lower body dressing: Patient will complete lower body dressing minimal assist.  Toileting: Patient will complete toileting minimal assist.  Toilet transfer: Patient will complete toilet transfer with least restrictive device, contact guard or touching/steadying. Documented in the chart in the following areas: Prior Level of Function. Assessment/Plan.     Patient at End of Session:   Location: in bed  Safety measures: alarm system in place/re-engaged, bed rails x4, call light within reach, equipment intact and lines intact  Handoff to: nurse      Therapy procedure time and total treatment time can be found documented on the Time Entry flowsheet

## 2024-09-04 ENCOUNTER — HOSPITAL ENCOUNTER (OUTPATIENT)
Dept: HEMATOLOGY ONCOLOGY | Facility: MEDICAL CENTER | Age: 74
End: 2024-09-04
Attending: INTERNAL MEDICINE
Payer: MEDICARE

## 2024-09-04 DIAGNOSIS — C34.12 SQUAMOUS CELL CARCINOMA OF UPPER LOBE OF LEFT LUNG (HCC): ICD-10-CM

## 2024-09-04 DIAGNOSIS — Z79.899 ENCOUNTER FOR LONG-TERM (CURRENT) USE OF HIGH-RISK MEDICATION: ICD-10-CM

## 2024-09-04 NOTE — PROGRESS NOTES
The following appointments, labs, and medications have been provided to the patient:  Line: PIV  ECHO: TBD  WBC Support (g-csf): TBD  Labs: CBC, CMP, TSH  Follow up appts: 10/18/2024  Chemo/immunotherapy class: 9/4/2024  Chemotherapy Regimen NSCLC Pembrolizumab  Nausea medication: TBD  Other treatment specific meds: Imodium PRN  Oral chemo follow up: N/A  Pain medication: Per provider discretion  Nurse christina: Established with Sophie BUTTERFIELD  Dietician:  MARIETTA  SW: MARIETTA  FRA: Referred on TBD    Additional info/teaching for immunotherapy patients:  If hospitalized, inform staff of immunotherapy treatment and have them contact oncologist - immunotherapy alert card provided.    Additional info/teaching for chemotherapy patients:  Neutropenia reminder card provided to patient      Additional teaching:  Patient was provided with drug specific handouts and [facility] side effects sheet. Patient verbalized that questions and concerns regarding treatment have been addressed. Consent to proceed with treatment was reviewed and signed during this visit.    Spent 50 minutes of continuous, non-interrupted, face-to-face patient contact in which greater than 50% of the visit was spent counseling and coordinating of care.

## 2024-09-05 NOTE — PROGRESS NOTES
"Pharmacy Chemotherapy Calculation:    Dx: NSCLC        Protocol: Pembrolizumab     *Dosing Reference*  Pembrolizumab 400 mg IV over 30 minutes on Day 1 400 mg IV over 30 minutes on Day 1  42-day cycle until disease progression or unacceptable toxicity or until up to 24 months of therapy has been completed     NCCNGuidelines® for Non-Small Cell Lung Cancer V.1.2024.    Bassem KOTHARI , et al. J Clin Oncol. 2019;37(28):4221-0989.c   Venkatesh M , et al. N Engl J Med. 2016;375(09):0581-8791.a   David WONG et al. Lancet. 2019;393(09870):2919-4621.a   Isabella M , et al. Eur J Cancer. 2020;131:68-75.b    Allergies:  Seasonal     /65   Pulse 71   Temp 36.6 °C (97.9 °F) (Temporal)   Resp 18   Ht 1.63 m (5' 4.17\")   Wt 70.8 kg (156 lb 1.4 oz)   SpO2 94%   BMI 26.65 kg/m²  Body surface area is 1.79 meters squared.    Labs 9/6/24:  ANC~ 4600 Plt = 315k   Hgb = 10.6     SCr = 0.72 mg/dL CrCl ~ 89 mL/min   AST/ALT/AP = WNL TBili = 0.2  K+ = 4.1  TSH/FREE T4 = pending       Drug Order   (Drug name, dose, route, IV Fluid & volume, frequency, number of doses) Cycle 1      Previous treatment: NA     Medication = Pembrolizumab  Base Dose = 400 mg  Fixed dose, no calculation needed  Final Dose = 400 mg  Route = IV  Fluid & Volume = NS 50 mL  Admin Duration = Over 30 mins          Fixed dose, no calculation needed       By my signature below, I confirm this process was performed independently with the BSA and all final chemotherapy dosing calculations congruent. I have reviewed the above chemotherapy order and that my calculation of the final dose and BSA (when applicable) corroborate those calculations of the  pharmacist. Discrepancies of 10% or greater in the written dose have been addressed and documented within the [facility] Progress notes.      Margarette Cabrera, PharmD    "

## 2024-09-06 ENCOUNTER — OUTPATIENT INFUSION SERVICES (OUTPATIENT)
Dept: ONCOLOGY | Facility: MEDICAL CENTER | Age: 74
End: 2024-09-06
Attending: INTERNAL MEDICINE
Payer: MEDICARE

## 2024-09-06 VITALS
OXYGEN SATURATION: 94 % | HEIGHT: 64 IN | SYSTOLIC BLOOD PRESSURE: 129 MMHG | TEMPERATURE: 97.9 F | DIASTOLIC BLOOD PRESSURE: 65 MMHG | HEART RATE: 71 BPM | BODY MASS INDEX: 26.65 KG/M2 | WEIGHT: 156.09 LBS | RESPIRATION RATE: 18 BRPM

## 2024-09-06 DIAGNOSIS — C34.12 SQUAMOUS CELL CARCINOMA OF UPPER LOBE OF LEFT LUNG (HCC): ICD-10-CM

## 2024-09-06 LAB
ALBUMIN SERPL BCP-MCNC: 3.6 G/DL (ref 3.2–4.9)
ALBUMIN/GLOB SERPL: 0.9 G/DL
ALP SERPL-CCNC: 78 U/L (ref 30–99)
ALT SERPL-CCNC: 17 U/L (ref 2–50)
ANION GAP SERPL CALC-SCNC: 12 MMOL/L (ref 7–16)
AST SERPL-CCNC: 18 U/L (ref 12–45)
BASOPHILS # BLD AUTO: 0.8 % (ref 0–1.8)
BASOPHILS # BLD: 0.05 K/UL (ref 0–0.12)
BILIRUB SERPL-MCNC: 0.2 MG/DL (ref 0.1–1.5)
BUN SERPL-MCNC: 13 MG/DL (ref 8–22)
CALCIUM ALBUM COR SERPL-MCNC: 9.7 MG/DL (ref 8.5–10.5)
CALCIUM SERPL-MCNC: 9.4 MG/DL (ref 8.5–10.5)
CHLORIDE SERPL-SCNC: 103 MMOL/L (ref 96–112)
CO2 SERPL-SCNC: 23 MMOL/L (ref 20–33)
CREAT SERPL-MCNC: 0.72 MG/DL (ref 0.5–1.4)
EOSINOPHIL # BLD AUTO: 0.09 K/UL (ref 0–0.51)
EOSINOPHIL NFR BLD: 1.4 % (ref 0–6.9)
ERYTHROCYTE [DISTWIDTH] IN BLOOD BY AUTOMATED COUNT: 42.8 FL (ref 35.9–50)
GFR SERPLBLD CREATININE-BSD FMLA CKD-EPI: 96 ML/MIN/1.73 M 2
GLOBULIN SER CALC-MCNC: 4.1 G/DL (ref 1.9–3.5)
GLUCOSE SERPL-MCNC: 91 MG/DL (ref 65–99)
HCT VFR BLD AUTO: 33.3 % (ref 42–52)
HGB BLD-MCNC: 10.6 G/DL (ref 14–18)
IMM GRANULOCYTES # BLD AUTO: 0.02 K/UL (ref 0–0.11)
IMM GRANULOCYTES NFR BLD AUTO: 0.3 % (ref 0–0.9)
LYMPHOCYTES # BLD AUTO: 1.05 K/UL (ref 1–4.8)
LYMPHOCYTES NFR BLD: 16.3 % (ref 22–41)
MCH RBC QN AUTO: 27.1 PG (ref 27–33)
MCHC RBC AUTO-ENTMCNC: 31.8 G/DL (ref 32.3–36.5)
MCV RBC AUTO: 85.2 FL (ref 81.4–97.8)
MONOCYTES # BLD AUTO: 0.62 K/UL (ref 0–0.85)
MONOCYTES NFR BLD AUTO: 9.6 % (ref 0–13.4)
NEUTROPHILS # BLD AUTO: 4.6 K/UL (ref 1.82–7.42)
NEUTROPHILS NFR BLD: 71.6 % (ref 44–72)
NRBC # BLD AUTO: 0 K/UL
NRBC BLD-RTO: 0 /100 WBC (ref 0–0.2)
OUTPT INFUS CBC COMMENT OICOM: ABNORMAL
PLATELET # BLD AUTO: 315 K/UL (ref 164–446)
PMV BLD AUTO: 9.3 FL (ref 9–12.9)
POTASSIUM SERPL-SCNC: 4.1 MMOL/L (ref 3.6–5.5)
PROT SERPL-MCNC: 7.7 G/DL (ref 6–8.2)
RBC # BLD AUTO: 3.91 M/UL (ref 4.7–6.1)
SODIUM SERPL-SCNC: 138 MMOL/L (ref 135–145)
TSH SERPL-ACNC: 4.48 UIU/ML (ref 0.35–5.5)
WBC # BLD AUTO: 6.4 K/UL (ref 4.8–10.8)

## 2024-09-06 PROCEDURE — 84443 ASSAY THYROID STIM HORMONE: CPT

## 2024-09-06 PROCEDURE — 700105 HCHG RX REV CODE 258: Performed by: INTERNAL MEDICINE

## 2024-09-06 PROCEDURE — 80053 COMPREHEN METABOLIC PANEL: CPT

## 2024-09-06 PROCEDURE — 85025 COMPLETE CBC W/AUTO DIFF WBC: CPT

## 2024-09-06 PROCEDURE — 700111 HCHG RX REV CODE 636 W/ 250 OVERRIDE (IP): Mod: JZ,JG | Performed by: INTERNAL MEDICINE

## 2024-09-06 PROCEDURE — 96413 CHEMO IV INFUSION 1 HR: CPT

## 2024-09-06 RX ORDER — LIDOCAINE/PRILOCAINE 2.5 %-2.5%
CREAM (GRAM) TOPICAL
Qty: 30 G | Refills: 3 | Status: SHIPPED | OUTPATIENT
Start: 2024-09-06 | End: 2024-09-10 | Stop reason: CLARIF

## 2024-09-06 RX ADMIN — SODIUM CHLORIDE 400 MG: 9 INJECTION, SOLUTION INTRAVENOUS at 17:37

## 2024-09-06 ASSESSMENT — FIBROSIS 4 INDEX: FIB4 SCORE: 1.07

## 2024-09-06 NOTE — PROGRESS NOTES
"Pharmacy Chemotherapy calculation:    DX: Squamous cell lung cancer    Cycle 1  Previous treatment = n/a    Regimen: Pembrolizumab    *Dosing Reference*  Pembrolizumab 400 mg IV over 30 minutes on Day 1  42-day cycle until disease progression or unacceptable toxicity or until up to 24 months of therapy has been completed  NCCN Guidelines for Non-Small Cell Lung Cancer V.1.2024.  Eduardoon EB, et al. J Clin Oncol. 2019;37(28):2668-7782.  Venkatesh M, et al. N Engl J Med. 2016;375(42)[id number].    Allergies: Seasonal  /65   Pulse 71   Temp 36.6 °C (97.9 °F) (Temporal)   Resp 18   Ht 1.63 m (5' 4.17\")   Wt 70.8 kg (156 lb 1.4 oz)   SpO2 94%   BMI 26.65 kg/m²   Body surface area is 1.79 meters squared.    Labs 9/6/24  ANC~ 4600 Plt = 315k   Hgb = 10.6     SCr = 0.72 mg/dL CrCl ~ 89.8 mL/min   LFT's = WNLs  TBili = 0.2   TSH = pending Free T4 = pending    Pembrolizumab 400 mg fixed dose = 400 mg   <10% difference, okay to treat with final dose = 400 mg IV    Alton Koch, PharmD    "

## 2024-09-07 NOTE — PROGRESS NOTES
Pt presented to Infusion for D1C1 OP NSC lung pembrolizumab. POC discussed and pt verbalized understanding. PIV established with difficulty after 3 attempts in the right forearm, brisk blood return noted, line flushes with ease and labs drawn.  Pt tolerated well. Labs reviewed. Pembrolizumab administered per MAR. Pt tolerated well. No s/s of adverse reactions or complications noted. PIV flushed, removed with tip intact and site covered with sterile gauze/coban. Educated pt on when to contact provider including fever, s/s of infection, worsening symptoms and/or defer judgment to ED for provider evaluation. Pt verbalized understanding. Pt confirmed next appt and discharged to self care, accompanied by friend. Pt in NAD at time of discharge.

## 2024-09-07 NOTE — PROGRESS NOTES
Chemotherapy Verification - SECONDARY RN       Height = 163 cm  Weight = 70.8 kg  BSA = 1.79 m2       Medication: Keytruda  Dose: 400 mg (set dose)  Calculated Dose: 400 mg                             (In mg/m2, AUC, mg/kg)       I confirm that this process was performed independently.

## 2024-09-07 NOTE — PROGRESS NOTES
Chemotherapy Verification - PRIMARY RN      Height = 1.63 m  Weight = 70.8 kg  BSA = 1.79 m^2       Medication: pembrolizumab (Keytruda)  Dose: 400 mg (Set dose)  Calculated Dose: 400 mg                             (In mg/m2, AUC, mg/kg)       I confirm this process was performed independently with the BSA and all final chemotherapy dosing calculations congruent.  Any discrepancies of 10% or greater have been addressed with the chemotherapy pharmacist. The resolution of the discrepancy has been documented in the EPIC progress notes.

## 2024-09-09 ENCOUNTER — PATIENT OUTREACH (OUTPATIENT)
Dept: ONCOLOGY | Facility: MEDICAL CENTER | Age: 74
End: 2024-09-09
Payer: MEDICARE

## 2024-09-09 NOTE — PROGRESS NOTES
Outbound call to patient for [facility] intake, no answer, left voice message requesting a call back.

## 2024-09-10 ENCOUNTER — TELEPHONE (OUTPATIENT)
Dept: HEMATOLOGY ONCOLOGY | Facility: MEDICAL CENTER | Age: 74
End: 2024-09-10
Payer: MEDICARE

## 2024-09-10 NOTE — TELEPHONE ENCOUNTER
Returned Pts call and left a message that he can ignore the script for th e EMLA cream as he will not have a port placed and that would be the only reason for the cream.

## 2024-09-12 PROCEDURE — RXMED WILLOW AMBULATORY MEDICATION CHARGE: Performed by: FAMILY MEDICINE

## 2024-09-13 ENCOUNTER — PHARMACY VISIT (OUTPATIENT)
Dept: PHARMACY | Facility: MEDICAL CENTER | Age: 74
End: 2024-09-13
Payer: COMMERCIAL

## 2024-09-17 ENCOUNTER — PATIENT OUTREACH (OUTPATIENT)
Dept: ONCOLOGY | Facility: MEDICAL CENTER | Age: 74
End: 2024-09-17
Payer: MEDICARE

## 2024-09-17 NOTE — PROGRESS NOTES
Outbound call to patient for [facility] intake, no answer and left voice message requesting a call back.

## 2024-09-19 ENCOUNTER — PATIENT OUTREACH (OUTPATIENT)
Dept: ONCOLOGY | Facility: MEDICAL CENTER | Age: 74
End: 2024-09-19
Payer: MEDICARE

## 2024-09-19 NOTE — PROGRESS NOTES
Outbound call to patient for [facility] intake, no answer, and left a voice message requesting a call back. Unable to reach patient after three phone call attempts. Will offer patient [facility] intake when he returns voice message.

## 2024-09-20 ENCOUNTER — TELEPHONE (OUTPATIENT)
Dept: PHARMACY | Facility: MEDICAL CENTER | Age: 74
End: 2024-09-20

## 2024-09-20 ENCOUNTER — OFFICE VISIT (OUTPATIENT)
Dept: SLEEP MEDICINE | Facility: MEDICAL CENTER | Age: 74
End: 2024-09-20
Payer: MEDICARE

## 2024-09-20 VITALS
WEIGHT: 152 LBS | HEART RATE: 76 BPM | SYSTOLIC BLOOD PRESSURE: 90 MMHG | HEIGHT: 66 IN | DIASTOLIC BLOOD PRESSURE: 60 MMHG | BODY MASS INDEX: 24.43 KG/M2 | RESPIRATION RATE: 19 BRPM | OXYGEN SATURATION: 91 %

## 2024-09-20 DIAGNOSIS — J96.11 CHRONIC HYPOXEMIC RESPIRATORY FAILURE (HCC): ICD-10-CM

## 2024-09-20 DIAGNOSIS — J96.11 CHRONIC RESPIRATORY FAILURE WITH HYPOXIA (HCC): ICD-10-CM

## 2024-09-20 DIAGNOSIS — C34.12 SQUAMOUS CELL CARCINOMA OF UPPER LOBE OF LEFT LUNG (HCC): ICD-10-CM

## 2024-09-20 DIAGNOSIS — Z23 NEED FOR VACCINATION: ICD-10-CM

## 2024-09-20 DIAGNOSIS — J44.9 CHRONIC OBSTRUCTIVE PULMONARY DISEASE, UNSPECIFIED COPD TYPE (HCC): ICD-10-CM

## 2024-09-20 DIAGNOSIS — I95.9 HYPOTENSION, UNSPECIFIED HYPOTENSION TYPE: ICD-10-CM

## 2024-09-20 PROCEDURE — 99213 OFFICE O/P EST LOW 20 MIN: CPT

## 2024-09-20 PROCEDURE — RXMED WILLOW AMBULATORY MEDICATION CHARGE

## 2024-09-20 PROCEDURE — 90662 IIV NO PRSV INCREASED AG IM: CPT

## 2024-09-20 PROCEDURE — 94761 N-INVAS EAR/PLS OXIMETRY MLT: CPT

## 2024-09-20 RX ORDER — TIOTROPIUM BROMIDE AND OLODATEROL 3.124; 2.736 UG/1; UG/1
2 SPRAY, METERED RESPIRATORY (INHALATION) DAILY
Qty: 12 G | Refills: 3 | Status: SHIPPED | OUTPATIENT
Start: 2024-09-20

## 2024-09-20 ASSESSMENT — ENCOUNTER SYMPTOMS
COUGH: 1
SHORTNESS OF BREATH: 1
VOMITING: 0
HEARTBURN: 0
DIAPHORESIS: 0
WEAKNESS: 0
DIARRHEA: 0
HEADACHES: 0
DIZZINESS: 0
SPUTUM PRODUCTION: 1
NAUSEA: 0
FALLS: 0
CHILLS: 0
PALPITATIONS: 0
HEMOPTYSIS: 0
MYALGIAS: 0
WHEEZING: 1
FEVER: 0
SINUS PAIN: 0

## 2024-09-20 ASSESSMENT — FIBROSIS 4 INDEX: FIB4 SCORE: 1.03

## 2024-09-20 NOTE — ASSESSMENT & PLAN NOTE
Blood pressure today in the office today is 90/60.  He does state that he has been taking his blood pressure at home and it has been running in the 90s.  He denies any dizziness or lightheadedness.  He is currently on lisinopril.  --Advised patient to follow-up with primary care  --Provided patient with ED parameters

## 2024-09-20 NOTE — ASSESSMENT & PLAN NOTE
PFTs in 2024 show an FEV1 of 2.16 L or 84%, FEV1/FVC 80%, RV 49%, DLCO 33% predicted, without positive bronchodilator response but positive mid flow rate response.  Patient was hospitalized in May 2024 for COPD exacerbation.  Imaging shows emphysema.  Patient is currently on Advair 100.  Patient is symptomatic but is not in exacerbation.  --Switch from Advair 100 to Stiolto  --Advised patient increase exercise as tolerated  --Advised patient to remain up-to-date on all vaccines

## 2024-09-20 NOTE — PROCEDURES
Multi-Ox Readings  Multi Ox #1 Room air   O2 sat % at rest 77   O2 sat % on exertion     O2 sat average on exertion     Multi Ox #2 2 LPM   O2 sat % at rest 88   O2 sat % on exertion     O2 sat average on exertion          Multi Ox #3 3 LPM   O2 sat % at rest 96   O2 sat % on exertion     O2 sat average on exertion       Oxygen Use 4   Oxygen Frequency 24/7   Duration of need     Is the patient mobile within the home?     CPAP Use?     BIPAP Use?     Servo Titration

## 2024-09-20 NOTE — TELEPHONE ENCOUNTER
Received [facility] Start  request via [employer]  for STIOLTO RESPIMAT 2.5-2.5 MCG/ACT Aero Soln . (Quantity:12 g, Day Supply:90)     Insurance: [place] [employer]  Member ID:  [id number]  BIN: [id number]  PCN: [id number]  Group: [facility]     Ran Test claim via [place] & medication Pays for a #117.50 copay. Will outreach to patient to offer specialty pharmacy services and or release to preferred pharmacy    Thank you,   Howard Jackson [facility]  Pharmacy Liaison

## 2024-09-20 NOTE — ASSESSMENT & PLAN NOTE
CT chest in May 2024 shows a 6.2 x 4.8 x 4.8 cm left upper lobe mass and a combination of emphysema and chronic interstitial lung disease with extensive honeycombing in the periphery and mild bronchiectasis.  PET/CT in May 2024 shows the VANESA mass being hypermetabolic with an SUV of 18.63.  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  Patient was deemed a poor surgical candidate and was treated with SBRT and is on Keytruda.  Radiation oncology and Dr. Orona at oncology.  -- Continue to follow-up with Dr. London and oncology.  -- Continue surveillance CTs per RADOnc/Onc

## 2024-09-20 NOTE — ASSESSMENT & PLAN NOTE
Patient is currently on 3-4 LPM of oxygen 24/7.  Multi ox today in the office shows need for 3 LPM of oxygen on exertion.

## 2024-09-25 ENCOUNTER — APPOINTMENT (OUTPATIENT)
Dept: MEDICAL GROUP | Facility: MEDICAL CENTER | Age: 74
End: 2024-09-25
Payer: MEDICARE

## 2024-09-25 VITALS
WEIGHT: 152 LBS | HEIGHT: 66 IN | HEART RATE: 70 BPM | SYSTOLIC BLOOD PRESSURE: 98 MMHG | BODY MASS INDEX: 24.43 KG/M2 | DIASTOLIC BLOOD PRESSURE: 52 MMHG | TEMPERATURE: 98 F | RESPIRATION RATE: 18 BRPM | OXYGEN SATURATION: 90 %

## 2024-09-25 DIAGNOSIS — I10 ESSENTIAL HYPERTENSION: ICD-10-CM

## 2024-09-25 DIAGNOSIS — J96.11 CHRONIC RESPIRATORY FAILURE WITH HYPOXIA (HCC): ICD-10-CM

## 2024-09-25 DIAGNOSIS — E78.5 DYSLIPIDEMIA: ICD-10-CM

## 2024-09-25 DIAGNOSIS — J98.4 RESTRICTIVE LUNG DISEASE: ICD-10-CM

## 2024-09-25 DIAGNOSIS — J43.9 PULMONARY EMPHYSEMA, UNSPECIFIED EMPHYSEMA TYPE (HCC): ICD-10-CM

## 2024-09-25 DIAGNOSIS — C34.12 MALIGNANT NEOPLASM OF UPPER LOBE OF LEFT LUNG (HCC): ICD-10-CM

## 2024-09-25 PROCEDURE — 99214 OFFICE O/P EST MOD 30 MIN: CPT | Performed by: FAMILY MEDICINE

## 2024-09-25 PROCEDURE — 3078F DIAST BP <80 MM HG: CPT | Performed by: FAMILY MEDICINE

## 2024-09-25 PROCEDURE — 3074F SYST BP LT 130 MM HG: CPT | Performed by: FAMILY MEDICINE

## 2024-09-25 ASSESSMENT — FIBROSIS 4 INDEX: FIB4 SCORE: 1.03

## 2024-09-25 NOTE — PROGRESS NOTES
CC: Hypertension, hyperlipidemia, COPD, restrictive lung disease, respiratory failure    HPI:   Bright presents today to discuss the following:    Essential hypertension  Blood pressure has been slightly low.  Systolic blood pressure is always less than 100.  Patient has been on lisinopril 20 mg twice a day, and amlodipine 10 mg daily.    Dyslipidemia  He has been tolerating the statin. Denies muscle pain LFTs has been normal, currently on rosuvastatin 20 mg daily    Pulmonary emphysema, unspecified emphysema type (HCC)  Restrictive lung disease  Chronic respiratory failure with hypoxia (HCC)   Patient is currently symptomatic with exertion.  He has been on Stiolto Respimat as well as oxygen at 2L/min    Malignant neoplasm of upper lobe of left lung (HCC)  Patient had radiation therapy.  Currently with the Keytruda.  Has been following up with oncology, and radiation oncologist.      Patient Active Problem List    Diagnosis Date Noted    Hypotension 09/20/2024    Squamous cell carcinoma of upper lobe of left lung (HCC) 06/13/2024    Pneumonia due to COVID-19 virus 04/30/2024    Tobacco abuse 04/29/2024    Carotid stenosis, bilateral - mild     Aortic atherosclerosis (HCC)     Coronary artery calcification seen on CAT scan     Hypocalcemia 05/26/2022    BMI 24.0-24.9, adult 05/26/2022    Peripheral arterial disease (HCC) 08/09/2021    Hyperglycemia 08/09/2021    Dysmetabolic syndrome 08/09/2021    Chronic hypoxemic respiratory failure (HCC) 08/09/2021    Chronic obstructive pulmonary disease (HCC) 07/01/2021    Hypokalemia 06/09/2021    Anemia 06/07/2021    Syncopal episodes 06/06/2021    Dyslipidemia 08/07/2019    Cigarette nicotine dependence, uncomplicated  08/07/2019    Primary hypertension 01/10/2011       Current Outpatient Medications   Medication Sig Dispense Refill    Tiotropium Bromide-Olodaterol (STIOLTO RESPIMAT) 2.5-2.5 MCG/ACT Aero Soln Take 2 puffs by mouth every day. 12 g 3    Misc. Devices Misc  "Portable oxygen concentrator (POC), at 2L/min via NC. 1 Each 0    lisinopril (PRINIVIL) 20 MG Tab Take 1 Tablet by mouth 2 times a day. 200 Tablet 1    rosuvastatin (CRESTOR) 10 MG Tab Take 1 Tablet by mouth every evening. 100 Tablet 0    Fexofenadine HCl (MUCINEX ALLERGY PO) Take  by mouth every day.      Home Care Oxygen Inhale 5 L/min continuous. O2 LPM @: 5 LPM  Specify Usage: Continuous  Type of O2: Gas  Oxygen via: N/C  Oxygen Modality: Concentrator and Portable Tank  Humidification: Yes      acetaminophen (TYLENOL) 325 MG Tab Take 500 mg by mouth every 6 hours as needed for Mild Pain.      Melatonin 10 MG Tab Take 30 mg by mouth every evening.      amLODIPine (NORVASC) 10 MG Tab Take 1 Tablet by mouth every day. 90 Tablet 2    traZODone (DESYREL) 50 MG Tab Take 1 tablet by mouth every evening. 90 Tablet 3    aspirin (ASA) 81 MG Chew Tab chewable tablet Chew 81 mg every evening. 100 Tablet     docosahexanoic acid (OMEGA 3 FA) 1000 MG Cap Take 1,000 mg by mouth every day.      Multiple Vitamin (MULTIVITAMIN ADULT PO) Take 1 Tablet by mouth every day.      calcium polycarbophil (FIBERCON) 625 MG Tab Take 625 mg by mouth every day.       No current facility-administered medications for this visit.         Allergies as of 09/25/2024 - Reviewed 09/25/2024   Allergen Reaction Noted    Seasonal  08/07/2019        ROS: Denies any chest pain, Shortness of breath, Changes bowel or bladder, Lower extremity edema.    Physical Exam:  BP 98/52   Pulse 70   Temp 36.7 °C (98 °F)   Resp 18   Ht 1.676 m (5' 6\")   Wt 68.9 kg (152 lb)   SpO2 90% Comment: 4 Lt o2  BMI 24.53 kg/m²   Gen.: Well-developed, well-nourished, no apparent distress,pleasant and cooperative with the examination  Skin:  Warm and dry with good turgor. No rashes or suspicious lesions in visible areas  HEENT:Sinuses nontender with palpation, TMs clear, nares patent with pink mucosa and clear rhinorrhea,no septal deviation ,polyps or lesions. lips without " lesions, oropharynx clear.  Neck: Trachea midline,no masses or adenopathy. No JVD.  Cor: Regular rate and rhythm without murmur, gallop or rub.  Lungs: Respirations unlabored.Clear to auscultation with equal breath sounds bilaterally. No wheezes, rhonchi.  Extremities: No cyanosis, clubbing or edema.          Assessment and Plan.   74 y.o. male     1. Essential hypertension  The patient has been slightly low.  Advised to stop taking amlodipine.    Continue on lisinopril 20 mg twice a day.    Advised to check her blood pressure 3 times a day for a week and send it to me for evaluation    2. Dyslipidemia  He has been tolerating the statin. Denies muscle pain LFTs has been normal  Continue on rosuvastatin 20 mg daily    3. Pulmonary emphysema, unspecified emphysema type (HCC)  4. Restrictive lung disease  5. Chronic respiratory failure with hypoxia (HCC)  Symptomatic with exertion.   Continue on Stiolto Respimat  Continue oxygen almost oxygen is on 2L/min    6. Malignant neoplasm of upper lobe of left lung (HCC)  Status postradiation therapy, currently on Keytruda

## 2024-09-26 ENCOUNTER — PHARMACY VISIT (OUTPATIENT)
Dept: PHARMACY | Facility: MEDICAL CENTER | Age: 74
End: 2024-09-26
Payer: COMMERCIAL

## 2024-10-01 ASSESSMENT — LIFESTYLE VARIABLES
SMOKING_STATUS: NO
SMOKING_YEARS: 40
TOBACCO_USE: NO

## 2024-10-04 ENCOUNTER — HOSPITAL ENCOUNTER (OUTPATIENT)
Dept: RADIATION ONCOLOGY | Facility: MEDICAL CENTER | Age: 74
End: 2024-10-04
Attending: RADIOLOGY
Payer: MEDICARE

## 2024-10-16 ENCOUNTER — HOSPITAL ENCOUNTER (OUTPATIENT)
Dept: LAB | Facility: MEDICAL CENTER | Age: 74
End: 2024-10-16
Attending: INTERNAL MEDICINE
Payer: MEDICARE

## 2024-10-16 DIAGNOSIS — E78.5 DYSLIPIDEMIA: ICD-10-CM

## 2024-10-16 DIAGNOSIS — Z79.899 ENCOUNTER FOR LONG-TERM (CURRENT) USE OF HIGH-RISK MEDICATION: ICD-10-CM

## 2024-10-16 DIAGNOSIS — C34.12 SQUAMOUS CELL CARCINOMA OF UPPER LOBE OF LEFT LUNG (HCC): ICD-10-CM

## 2024-10-16 LAB
ALBUMIN SERPL BCP-MCNC: 3.9 G/DL (ref 3.2–4.9)
ALBUMIN/GLOB SERPL: 1 G/DL
ALP SERPL-CCNC: 79 U/L (ref 30–99)
ALT SERPL-CCNC: 22 U/L (ref 2–50)
ANION GAP SERPL CALC-SCNC: 11 MMOL/L (ref 7–16)
AST SERPL-CCNC: 24 U/L (ref 12–45)
BASOPHILS # BLD AUTO: 0.9 % (ref 0–1.8)
BASOPHILS # BLD: 0.07 K/UL (ref 0–0.12)
BILIRUB SERPL-MCNC: 0.2 MG/DL (ref 0.1–1.5)
BUN SERPL-MCNC: 14 MG/DL (ref 8–22)
CALCIUM ALBUM COR SERPL-MCNC: 9.8 MG/DL (ref 8.5–10.5)
CALCIUM SERPL-MCNC: 9.7 MG/DL (ref 8.5–10.5)
CEA SERPL-MCNC: 5.4 NG/ML (ref 0–3)
CHLORIDE SERPL-SCNC: 99 MMOL/L (ref 96–112)
CHOLEST SERPL-MCNC: 161 MG/DL (ref 100–199)
CO2 SERPL-SCNC: 26 MMOL/L (ref 20–33)
CREAT SERPL-MCNC: 0.88 MG/DL (ref 0.5–1.4)
EOSINOPHIL # BLD AUTO: 0.17 K/UL (ref 0–0.51)
EOSINOPHIL NFR BLD: 2.2 % (ref 0–6.9)
ERYTHROCYTE [DISTWIDTH] IN BLOOD BY AUTOMATED COUNT: 47 FL (ref 35.9–50)
GFR SERPLBLD CREATININE-BSD FMLA CKD-EPI: 90 ML/MIN/1.73 M 2
GLOBULIN SER CALC-MCNC: 3.8 G/DL (ref 1.9–3.5)
GLUCOSE SERPL-MCNC: 85 MG/DL (ref 65–99)
HCT VFR BLD AUTO: 38.7 % (ref 42–52)
HGB BLD-MCNC: 11.8 G/DL (ref 14–18)
IMM GRANULOCYTES # BLD AUTO: 0.02 K/UL (ref 0–0.11)
IMM GRANULOCYTES NFR BLD AUTO: 0.3 % (ref 0–0.9)
LYMPHOCYTES # BLD AUTO: 2.47 K/UL (ref 1–4.8)
LYMPHOCYTES NFR BLD: 31.4 % (ref 22–41)
MCH RBC QN AUTO: 26.4 PG (ref 27–33)
MCHC RBC AUTO-ENTMCNC: 30.5 G/DL (ref 32.3–36.5)
MCV RBC AUTO: 86.6 FL (ref 81.4–97.8)
MONOCYTES # BLD AUTO: 0.73 K/UL (ref 0–0.85)
MONOCYTES NFR BLD AUTO: 9.3 % (ref 0–13.4)
NEUTROPHILS # BLD AUTO: 4.41 K/UL (ref 1.82–7.42)
NEUTROPHILS NFR BLD: 55.9 % (ref 44–72)
NRBC # BLD AUTO: 0 K/UL
NRBC BLD-RTO: 0 /100 WBC (ref 0–0.2)
PLATELET # BLD AUTO: 366 K/UL (ref 164–446)
PMV BLD AUTO: 9.3 FL (ref 9–12.9)
POTASSIUM SERPL-SCNC: 4.8 MMOL/L (ref 3.6–5.5)
PROT SERPL-MCNC: 7.7 G/DL (ref 6–8.2)
RBC # BLD AUTO: 4.47 M/UL (ref 4.7–6.1)
SODIUM SERPL-SCNC: 136 MMOL/L (ref 135–145)
T4 FREE SERPL-MCNC: 0.45 NG/DL (ref 0.93–1.7)
TRIGL SERPL-MCNC: 159 MG/DL (ref 0–149)
TSH SERPL-ACNC: 29.8 UIU/ML (ref 0.35–5.5)
WBC # BLD AUTO: 7.9 K/UL (ref 4.8–10.8)

## 2024-10-16 PROCEDURE — 84478 ASSAY OF TRIGLYCERIDES: CPT

## 2024-10-16 PROCEDURE — 82378 CARCINOEMBRYONIC ANTIGEN: CPT

## 2024-10-16 PROCEDURE — 82465 ASSAY BLD/SERUM CHOLESTEROL: CPT

## 2024-10-16 PROCEDURE — 36415 COLL VENOUS BLD VENIPUNCTURE: CPT

## 2024-10-16 PROCEDURE — 84439 ASSAY OF FREE THYROXINE: CPT

## 2024-10-16 PROCEDURE — 85025 COMPLETE CBC W/AUTO DIFF WBC: CPT

## 2024-10-16 PROCEDURE — 80053 COMPREHEN METABOLIC PANEL: CPT

## 2024-10-16 PROCEDURE — 84443 ASSAY THYROID STIM HORMONE: CPT

## 2024-10-17 RX ORDER — ONDANSETRON 8 MG/1
8 TABLET, ORALLY DISINTEGRATING ORAL PRN
Status: CANCELLED | OUTPATIENT
Start: 2024-10-19

## 2024-10-17 RX ORDER — EPINEPHRINE 1 MG/ML(1)
0.5 AMPUL (ML) INJECTION PRN
Status: CANCELLED | OUTPATIENT
Start: 2024-10-19

## 2024-10-17 RX ORDER — ONDANSETRON 2 MG/ML
4 INJECTION INTRAMUSCULAR; INTRAVENOUS PRN
Status: CANCELLED | OUTPATIENT
Start: 2024-10-19

## 2024-10-17 RX ORDER — 0.9 % SODIUM CHLORIDE 0.9 %
10 VIAL (ML) INJECTION PRN
Status: CANCELLED | OUTPATIENT
Start: 2024-10-19

## 2024-10-17 RX ORDER — METHYLPREDNISOLONE SODIUM SUCCINATE 125 MG/2ML
125 INJECTION, POWDER, LYOPHILIZED, FOR SOLUTION INTRAMUSCULAR; INTRAVENOUS PRN
Status: CANCELLED | OUTPATIENT
Start: 2024-10-19

## 2024-10-17 RX ORDER — SODIUM CHLORIDE 9 MG/ML
INJECTION, SOLUTION INTRAVENOUS CONTINUOUS
Status: CANCELLED | OUTPATIENT
Start: 2024-10-19

## 2024-10-17 RX ORDER — 0.9 % SODIUM CHLORIDE 0.9 %
VIAL (ML) INJECTION PRN
Status: CANCELLED | OUTPATIENT
Start: 2024-10-18

## 2024-10-17 RX ORDER — 0.9 % SODIUM CHLORIDE 0.9 %
10 VIAL (ML) INJECTION PRN
Status: CANCELLED | OUTPATIENT
Start: 2024-10-18

## 2024-10-17 RX ORDER — DIPHENHYDRAMINE HYDROCHLORIDE 50 MG/ML
50 INJECTION INTRAMUSCULAR; INTRAVENOUS PRN
Status: CANCELLED | OUTPATIENT
Start: 2024-10-19

## 2024-10-17 RX ORDER — 0.9 % SODIUM CHLORIDE 0.9 %
VIAL (ML) INJECTION PRN
Status: CANCELLED | OUTPATIENT
Start: 2024-10-19

## 2024-10-17 RX ORDER — 0.9 % SODIUM CHLORIDE 0.9 %
3 VIAL (ML) INJECTION PRN
Status: CANCELLED | OUTPATIENT
Start: 2024-10-19

## 2024-10-17 RX ORDER — PROCHLORPERAZINE MALEATE 10 MG
10 TABLET ORAL EVERY 6 HOURS PRN
Status: CANCELLED | OUTPATIENT
Start: 2024-10-19

## 2024-10-17 RX ORDER — 0.9 % SODIUM CHLORIDE 0.9 %
3 VIAL (ML) INJECTION PRN
Status: CANCELLED | OUTPATIENT
Start: 2024-10-18

## 2024-10-18 ENCOUNTER — PHARMACY VISIT (OUTPATIENT)
Dept: PHARMACY | Facility: MEDICAL CENTER | Age: 74
End: 2024-10-18
Payer: COMMERCIAL

## 2024-10-18 ENCOUNTER — OUTPATIENT INFUSION SERVICES (OUTPATIENT)
Dept: ONCOLOGY | Facility: MEDICAL CENTER | Age: 74
End: 2024-10-18
Attending: INTERNAL MEDICINE
Payer: MEDICARE

## 2024-10-18 ENCOUNTER — HOSPITAL ENCOUNTER (OUTPATIENT)
Dept: HEMATOLOGY ONCOLOGY | Facility: MEDICAL CENTER | Age: 74
End: 2024-10-18
Attending: INTERNAL MEDICINE
Payer: MEDICARE

## 2024-10-18 VITALS
OXYGEN SATURATION: 90 % | DIASTOLIC BLOOD PRESSURE: 74 MMHG | SYSTOLIC BLOOD PRESSURE: 98 MMHG | RESPIRATION RATE: 18 BRPM | TEMPERATURE: 97.4 F | HEART RATE: 70 BPM | BODY MASS INDEX: 24.8 KG/M2 | HEIGHT: 66 IN | WEIGHT: 154.32 LBS

## 2024-10-18 VITALS
HEART RATE: 62 BPM | DIASTOLIC BLOOD PRESSURE: 64 MMHG | HEIGHT: 64 IN | RESPIRATION RATE: 18 BRPM | TEMPERATURE: 97.9 F | SYSTOLIC BLOOD PRESSURE: 126 MMHG | OXYGEN SATURATION: 94 % | BODY MASS INDEX: 26.5 KG/M2 | WEIGHT: 155.2 LBS

## 2024-10-18 DIAGNOSIS — C34.12 SQUAMOUS CELL CARCINOMA OF UPPER LOBE OF LEFT LUNG (HCC): ICD-10-CM

## 2024-10-18 DIAGNOSIS — E03.2 HYPOTHYROIDISM DUE TO MEDICATION: ICD-10-CM

## 2024-10-18 PROCEDURE — 99212 OFFICE O/P EST SF 10 MIN: CPT | Performed by: INTERNAL MEDICINE

## 2024-10-18 PROCEDURE — RXMED WILLOW AMBULATORY MEDICATION CHARGE: Performed by: INTERNAL MEDICINE

## 2024-10-18 PROCEDURE — 700105 HCHG RX REV CODE 258: Performed by: NURSE PRACTITIONER

## 2024-10-18 PROCEDURE — 96413 CHEMO IV INFUSION 1 HR: CPT

## 2024-10-18 PROCEDURE — 700111 HCHG RX REV CODE 636 W/ 250 OVERRIDE (IP): Mod: JZ,JG | Performed by: NURSE PRACTITIONER

## 2024-10-18 PROCEDURE — 99214 OFFICE O/P EST MOD 30 MIN: CPT | Performed by: INTERNAL MEDICINE

## 2024-10-18 RX ORDER — LEVOTHYROXINE SODIUM 75 UG/1
75 TABLET ORAL
Qty: 30 TABLET | Refills: 11 | Status: SHIPPED | OUTPATIENT
Start: 2024-10-18

## 2024-10-18 RX ADMIN — SODIUM CHLORIDE 400 MG: 9 INJECTION, SOLUTION INTRAVENOUS at 13:57

## 2024-10-18 ASSESSMENT — ENCOUNTER SYMPTOMS
PALPITATIONS: 0
VOMITING: 0
MEMORY LOSS: 0
DIZZINESS: 0
NAUSEA: 0
ORTHOPNEA: 0
DEPRESSION: 0
SPUTUM PRODUCTION: 0
WHEEZING: 0
FEVER: 0
HEARTBURN: 0
WEIGHT LOSS: 0
COUGH: 1
CHILLS: 0
SHORTNESS OF BREATH: 1
BRUISES/BLEEDS EASILY: 0
FOCAL WEAKNESS: 0
NECK PAIN: 0
ABDOMINAL PAIN: 0
HEADACHES: 0
TREMORS: 0
BLURRED VISION: 0
TINGLING: 0
SORE THROAT: 0
SENSORY CHANGE: 0

## 2024-10-18 ASSESSMENT — FIBROSIS 4 INDEX
FIB4 SCORE: 1.03
FIB4 SCORE: 1.03

## 2024-10-18 ASSESSMENT — PAIN SCALES - GENERAL: PAINLEVEL: NO PAIN

## 2024-10-24 ENCOUNTER — PATIENT MESSAGE (OUTPATIENT)
Dept: SLEEP MEDICINE | Facility: MEDICAL CENTER | Age: 74
End: 2024-10-24
Payer: MEDICARE

## 2024-11-05 DIAGNOSIS — I10 ESSENTIAL HYPERTENSION: ICD-10-CM

## 2024-11-05 RX ORDER — AMLODIPINE BESYLATE 10 MG/1
10 TABLET ORAL DAILY
Qty: 90 TABLET | Refills: 2 | OUTPATIENT
Start: 2024-11-05

## 2024-11-05 RX ORDER — LISINOPRIL 20 MG/1
20 TABLET ORAL 2 TIMES DAILY
Qty: 200 TABLET | Refills: 1 | OUTPATIENT
Start: 2024-11-05

## 2024-11-15 ENCOUNTER — PHARMACY VISIT (OUTPATIENT)
Dept: PHARMACY | Facility: MEDICAL CENTER | Age: 74
End: 2024-11-15
Payer: COMMERCIAL

## 2024-11-15 DIAGNOSIS — E78.5 DYSLIPIDEMIA: ICD-10-CM

## 2024-11-15 PROCEDURE — RXMED WILLOW AMBULATORY MEDICATION CHARGE: Performed by: INTERNAL MEDICINE

## 2024-11-15 PROCEDURE — RXMED WILLOW AMBULATORY MEDICATION CHARGE: Performed by: FAMILY MEDICINE

## 2024-11-15 RX ORDER — ROSUVASTATIN CALCIUM 10 MG/1
10 TABLET, COATED ORAL EVERY EVENING
Qty: 100 TABLET | Refills: 0 | Status: SHIPPED | OUTPATIENT
Start: 2024-11-15

## 2024-11-15 NOTE — TELEPHONE ENCOUNTER
Received request via: Pharmacy    Was the patient seen in the last year in this department? Yes    Does the patient have an active prescription (recently filled or refills available) for medication(s) requested? No    Pharmacy Name: [facility]    Does the patient have detention Plus and need 100-day supply? (This applies to ALL medications) Yes, quantity updated to 100 days

## 2024-11-15 NOTE — TELEPHONE ENCOUNTER
PT called and LVM stating that he almost out of his statin medication and will be establishing with Michelle on 12/18/24

## 2024-11-18 ENCOUNTER — HOSPITAL ENCOUNTER (OUTPATIENT)
Dept: RADIOLOGY | Facility: MEDICAL CENTER | Age: 74
End: 2024-11-18
Attending: INTERNAL MEDICINE
Payer: MEDICARE

## 2024-11-18 DIAGNOSIS — C34.12 SQUAMOUS CELL CARCINOMA OF UPPER LOBE OF LEFT LUNG (HCC): ICD-10-CM

## 2024-11-18 DIAGNOSIS — E03.2 HYPOTHYROIDISM DUE TO MEDICATION: ICD-10-CM

## 2024-11-18 PROCEDURE — 71250 CT THORAX DX C-: CPT

## 2024-11-24 RX ORDER — SODIUM CHLORIDE 9 MG/ML
INJECTION, SOLUTION INTRAVENOUS CONTINUOUS
Status: CANCELLED | OUTPATIENT
Start: 2024-11-30

## 2024-11-24 RX ORDER — PROCHLORPERAZINE MALEATE 10 MG
10 TABLET ORAL EVERY 6 HOURS PRN
Status: CANCELLED | OUTPATIENT
Start: 2024-11-30

## 2024-11-24 RX ORDER — 0.9 % SODIUM CHLORIDE 0.9 %
VIAL (ML) INJECTION PRN
Status: CANCELLED | OUTPATIENT
Start: 2024-11-30

## 2024-11-24 RX ORDER — 0.9 % SODIUM CHLORIDE 0.9 %
3 VIAL (ML) INJECTION PRN
Status: CANCELLED | OUTPATIENT
Start: 2024-11-29

## 2024-11-24 RX ORDER — 0.9 % SODIUM CHLORIDE 0.9 %
10 VIAL (ML) INJECTION PRN
Status: CANCELLED | OUTPATIENT
Start: 2024-11-30

## 2024-11-24 RX ORDER — 0.9 % SODIUM CHLORIDE 0.9 %
VIAL (ML) INJECTION PRN
Status: CANCELLED | OUTPATIENT
Start: 2024-11-29

## 2024-11-24 RX ORDER — ONDANSETRON 8 MG/1
8 TABLET, ORALLY DISINTEGRATING ORAL PRN
Status: CANCELLED | OUTPATIENT
Start: 2024-11-30

## 2024-11-24 RX ORDER — DIPHENHYDRAMINE HYDROCHLORIDE 50 MG/ML
50 INJECTION INTRAMUSCULAR; INTRAVENOUS PRN
Status: CANCELLED | OUTPATIENT
Start: 2024-11-30

## 2024-11-24 RX ORDER — 0.9 % SODIUM CHLORIDE 0.9 %
10 VIAL (ML) INJECTION PRN
Status: CANCELLED | OUTPATIENT
Start: 2024-11-29

## 2024-11-24 RX ORDER — EPINEPHRINE 1 MG/ML(1)
0.5 AMPUL (ML) INJECTION PRN
Status: CANCELLED | OUTPATIENT
Start: 2024-11-30

## 2024-11-24 RX ORDER — METHYLPREDNISOLONE SODIUM SUCCINATE 125 MG/2ML
125 INJECTION, POWDER, LYOPHILIZED, FOR SOLUTION INTRAMUSCULAR; INTRAVENOUS PRN
Status: CANCELLED | OUTPATIENT
Start: 2024-11-30

## 2024-11-24 RX ORDER — ONDANSETRON 2 MG/ML
4 INJECTION INTRAMUSCULAR; INTRAVENOUS PRN
Status: CANCELLED | OUTPATIENT
Start: 2024-11-30

## 2024-11-24 RX ORDER — 0.9 % SODIUM CHLORIDE 0.9 %
3 VIAL (ML) INJECTION PRN
Status: CANCELLED | OUTPATIENT
Start: 2024-11-30

## 2024-11-25 ENCOUNTER — HOSPITAL ENCOUNTER (OUTPATIENT)
Dept: LAB | Facility: MEDICAL CENTER | Age: 74
End: 2024-11-25
Attending: INTERNAL MEDICINE
Payer: MEDICARE

## 2024-11-25 DIAGNOSIS — C34.12 SQUAMOUS CELL CARCINOMA OF UPPER LOBE OF LEFT LUNG (HCC): ICD-10-CM

## 2024-11-25 DIAGNOSIS — Z79.899 ENCOUNTER FOR LONG-TERM (CURRENT) USE OF HIGH-RISK MEDICATION: ICD-10-CM

## 2024-11-25 LAB
ALBUMIN SERPL BCP-MCNC: 4.1 G/DL (ref 3.2–4.9)
ALBUMIN/GLOB SERPL: 1.1 G/DL
ALP SERPL-CCNC: 78 U/L (ref 30–99)
ALT SERPL-CCNC: 14 U/L (ref 2–50)
ANION GAP SERPL CALC-SCNC: 10 MMOL/L (ref 7–16)
AST SERPL-CCNC: 21 U/L (ref 12–45)
BASOPHILS # BLD AUTO: 0.9 % (ref 0–1.8)
BASOPHILS # BLD: 0.07 K/UL (ref 0–0.12)
BILIRUB SERPL-MCNC: 0.2 MG/DL (ref 0.1–1.5)
BUN SERPL-MCNC: 17 MG/DL (ref 8–22)
CALCIUM ALBUM COR SERPL-MCNC: 9.1 MG/DL (ref 8.5–10.5)
CALCIUM SERPL-MCNC: 9.2 MG/DL (ref 8.5–10.5)
CHLORIDE SERPL-SCNC: 105 MMOL/L (ref 96–112)
CO2 SERPL-SCNC: 27 MMOL/L (ref 20–33)
CREAT SERPL-MCNC: 0.93 MG/DL (ref 0.5–1.4)
EOSINOPHIL # BLD AUTO: 0.11 K/UL (ref 0–0.51)
EOSINOPHIL NFR BLD: 1.5 % (ref 0–6.9)
ERYTHROCYTE [DISTWIDTH] IN BLOOD BY AUTOMATED COUNT: 51 FL (ref 35.9–50)
GFR SERPLBLD CREATININE-BSD FMLA CKD-EPI: 86 ML/MIN/1.73 M 2
GLOBULIN SER CALC-MCNC: 3.8 G/DL (ref 1.9–3.5)
GLUCOSE SERPL-MCNC: 107 MG/DL (ref 65–99)
HCT VFR BLD AUTO: 41.3 % (ref 42–52)
HGB BLD-MCNC: 12.8 G/DL (ref 14–18)
IMM GRANULOCYTES # BLD AUTO: 0.01 K/UL (ref 0–0.11)
IMM GRANULOCYTES NFR BLD AUTO: 0.1 % (ref 0–0.9)
LYMPHOCYTES # BLD AUTO: 1.86 K/UL (ref 1–4.8)
LYMPHOCYTES NFR BLD: 24.7 % (ref 22–41)
MCH RBC QN AUTO: 27.4 PG (ref 27–33)
MCHC RBC AUTO-ENTMCNC: 31 G/DL (ref 32.3–36.5)
MCV RBC AUTO: 88.4 FL (ref 81.4–97.8)
MONOCYTES # BLD AUTO: 0.69 K/UL (ref 0–0.85)
MONOCYTES NFR BLD AUTO: 9.2 % (ref 0–13.4)
NEUTROPHILS # BLD AUTO: 4.79 K/UL (ref 1.82–7.42)
NEUTROPHILS NFR BLD: 63.6 % (ref 44–72)
NRBC # BLD AUTO: 0 K/UL
NRBC BLD-RTO: 0 /100 WBC (ref 0–0.2)
PLATELET # BLD AUTO: 364 K/UL (ref 164–446)
PMV BLD AUTO: 9.4 FL (ref 9–12.9)
POTASSIUM SERPL-SCNC: 4.7 MMOL/L (ref 3.6–5.5)
PROT SERPL-MCNC: 7.9 G/DL (ref 6–8.2)
RBC # BLD AUTO: 4.67 M/UL (ref 4.7–6.1)
SODIUM SERPL-SCNC: 142 MMOL/L (ref 135–145)
T4 FREE SERPL-MCNC: 0.87 NG/DL (ref 0.93–1.7)
TSH SERPL-ACNC: 18.3 UIU/ML (ref 0.35–5.5)
WBC # BLD AUTO: 7.5 K/UL (ref 4.8–10.8)

## 2024-11-25 PROCEDURE — 84443 ASSAY THYROID STIM HORMONE: CPT

## 2024-11-25 PROCEDURE — 85025 COMPLETE CBC W/AUTO DIFF WBC: CPT

## 2024-11-25 PROCEDURE — 36415 COLL VENOUS BLD VENIPUNCTURE: CPT

## 2024-11-25 PROCEDURE — 84439 ASSAY OF FREE THYROXINE: CPT

## 2024-11-25 PROCEDURE — 80053 COMPREHEN METABOLIC PANEL: CPT

## 2024-11-26 ENCOUNTER — HOSPITAL ENCOUNTER (OUTPATIENT)
Dept: HEMATOLOGY ONCOLOGY | Facility: MEDICAL CENTER | Age: 74
End: 2024-11-26
Attending: INTERNAL MEDICINE
Payer: MEDICARE

## 2024-11-26 VITALS — HEIGHT: 64 IN | BODY MASS INDEX: 26.5 KG/M2

## 2024-11-26 DIAGNOSIS — I15.9 SECONDARY HYPERTENSION: ICD-10-CM

## 2024-11-26 DIAGNOSIS — C34.12 SQUAMOUS CELL CARCINOMA OF UPPER LOBE OF LEFT LUNG (HCC): ICD-10-CM

## 2024-11-26 RX ORDER — 0.9 % SODIUM CHLORIDE 0.9 %
VIAL (ML) INJECTION PRN
OUTPATIENT
Start: 2025-01-11

## 2024-11-26 RX ORDER — 0.9 % SODIUM CHLORIDE 0.9 %
3 VIAL (ML) INJECTION PRN
OUTPATIENT
Start: 2025-01-11

## 2024-11-26 RX ORDER — DIPHENHYDRAMINE HYDROCHLORIDE 50 MG/ML
50 INJECTION INTRAMUSCULAR; INTRAVENOUS PRN
OUTPATIENT
Start: 2025-01-11

## 2024-11-26 RX ORDER — 0.9 % SODIUM CHLORIDE 0.9 %
10 VIAL (ML) INJECTION PRN
OUTPATIENT
Start: 2025-01-10

## 2024-11-26 RX ORDER — LISINOPRIL 20 MG/1
20 TABLET ORAL 2 TIMES DAILY
Qty: 60 TABLET | Refills: 11 | Status: SHIPPED | OUTPATIENT
Start: 2024-11-26

## 2024-11-26 RX ORDER — ONDANSETRON 8 MG/1
8 TABLET, ORALLY DISINTEGRATING ORAL PRN
OUTPATIENT
Start: 2025-01-11

## 2024-11-26 RX ORDER — 0.9 % SODIUM CHLORIDE 0.9 %
10 VIAL (ML) INJECTION PRN
OUTPATIENT
Start: 2025-01-11

## 2024-11-26 RX ORDER — SODIUM CHLORIDE 9 MG/ML
INJECTION, SOLUTION INTRAVENOUS CONTINUOUS
OUTPATIENT
Start: 2025-01-11

## 2024-11-26 RX ORDER — 0.9 % SODIUM CHLORIDE 0.9 %
VIAL (ML) INJECTION PRN
OUTPATIENT
Start: 2025-01-10

## 2024-11-26 RX ORDER — EPINEPHRINE 1 MG/ML(1)
0.5 AMPUL (ML) INJECTION PRN
OUTPATIENT
Start: 2025-01-11

## 2024-11-26 RX ORDER — PROCHLORPERAZINE MALEATE 10 MG
10 TABLET ORAL EVERY 6 HOURS PRN
OUTPATIENT
Start: 2025-01-11

## 2024-11-26 RX ORDER — ONDANSETRON 2 MG/ML
4 INJECTION INTRAMUSCULAR; INTRAVENOUS PRN
OUTPATIENT
Start: 2025-01-11

## 2024-11-26 RX ORDER — METHYLPREDNISOLONE SODIUM SUCCINATE 125 MG/2ML
125 INJECTION, POWDER, LYOPHILIZED, FOR SOLUTION INTRAMUSCULAR; INTRAVENOUS PRN
OUTPATIENT
Start: 2025-01-11

## 2024-11-26 RX ORDER — 0.9 % SODIUM CHLORIDE 0.9 %
3 VIAL (ML) INJECTION PRN
OUTPATIENT
Start: 2025-01-10

## 2024-11-26 ASSESSMENT — ENCOUNTER SYMPTOMS
WEIGHT LOSS: 0
TINGLING: 0
ABDOMINAL PAIN: 0
COUGH: 1
PALPITATIONS: 0
CHILLS: 0
FEVER: 0
FOCAL WEAKNESS: 0
DIZZINESS: 0
SORE THROAT: 0
BLURRED VISION: 0
SENSORY CHANGE: 0
NAUSEA: 0
NECK PAIN: 0
SHORTNESS OF BREATH: 1
WHEEZING: 0
BRUISES/BLEEDS EASILY: 0
VOMITING: 0
HEADACHES: 0
MEMORY LOSS: 0
HEARTBURN: 0
SPUTUM PRODUCTION: 0
TREMORS: 0
DEPRESSION: 0
ORTHOPNEA: 0

## 2024-11-26 NOTE — PROGRESS NOTES
PROGRESS NOTE: HEMATOLOGY/ONCOLOGY     This evaluation was conducted via Teams using secure and encrypted videoconferencing technology. The patient was in their home in the [facility].    The patient's identity was confirmed and verbal consent was obtained for this virtual visit.         Primary Care:  Nella Ramirez M.D.    Chief Complaint   Patient presents with    Cancer     [place] Prechemo       Current Treatment:      8/1/2024 : Radiosurgery to left upper lobe lung cancer.  9/6/2024 : C 1 Keytruda  10/18/24 : C 2 Keytruda      Subjective:     History of Presenting Illness:  Bright Shirley is a 74 y.o. 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    Patients history dates back to May 3/2024 when he was experiencing SOB and presented to the ER and found to be COVID+.  He had a CT scan of the chest on 5/3/24 which showed show a 6.2 x 4.8 x 4.8 cm left upper lobe mass consistent with a lung primary. Findings are consistent also with chronic interstitial lung disease/fibrosis. He also had patchy groundglass opacities bilaterally suggesting infection/inflammation. Bilateral adrenal masses were noted to measure up to 3 cm on the right and 3.5 cm on the left with a -8 Hounsfield unit consistent with a benign adrenal adenoma.     He saw Jennifer Brown on 5/20/24.  A PET scan was done on 5/29/24, which again showed a hypermetabolic VANESA mass. No other metastatic disease. She ordered a lung biopsy which was completed on 6/11/24.  This showed a poorly differentiated squamous cell carcinoma.    Of note, he was a smoker but quit when he entered the hospital on 5/3/24.     He lives in [place] and has a roommate.     Interval History  He is feeling relatively well and he is relatively asymptomatic.  He saw Dr. London on 8/1/24 and had radiosurgery to left upper lobe.    Past Medical History:   Diagnosis Date    Acute exacerbation of  chronic obstructive pulmonary disease (COPD) ([facility]) 2021    Acute hypoxemic respiratory failure (HCC) 2021    Acute respiratory failure with hypoxia ([facility]) 2024    Acute urinary retention 2021    Aortic atherosclerosis ([facility])     Carotid stenosis, bilateral - mild     Cerebral infarction (HCC) - based on CT head 2022     Cerebral infarction (HCC) - based on CT head 2022     Chronic obstructive pulmonary disease (HCC)     Hypercholesteremia     Hypertension     Intertrochanteric fracture of femur (HCC) 2021    Other emphysema ([facility]) 2019    Supplemental oxygen dependent 2021        Past Surgical History:   Procedure Laterality Date    PB TREAT INTER/SUBTROCH FX,W/PLATE/SCREW Left 2021    Procedure: FIXATION, FRACTURE, HIP, USING DYNAMIC HIP SCREW, WITH COMPRESSION;  Surgeon: Avinash Suazo M.D.;  Location: SURGERY [facility];  Service: Orthopedics    HERNIA REPAIR Right     ORIF, FEMUR Right     15 years ago    OTHER      right inguinal hernia repair    OTHER ORTHOPEDIC SURGERY      right hip       Social History     Tobacco Use    Smoking status: Former     Current packs/day: 0.00     Average packs/day: 1 pack/day for 49.3 years (49.3 ttl pk-yrs)     Types: Cigarettes     Start date:      Quit date: 2024     Years since quittin.5    Smokeless tobacco: Never    Tobacco comments:     vape pen & cigarettes     49 years total andria an average of 1 ppd   Vaping Use    Vaping status: Former    Substances: Nicotine   Substance Use Topics    Alcohol use: Yes     Alcohol/week: 1.8 oz     Types: 1 Glasses of wine, 2 Cans of beer per week     Comment: weekend/social - not often    Drug use: Not Currently     Types: Marijuana     Comment: THC edibles - rarely        Family History   Problem Relation Age of Onset    Cancer Mother         breast cancer    Stroke Mother     Other Father         cirrhosis form alcohol    Stroke Sister     Heart Disease Brother          cardiac aneurysm    Hypertension Maternal Grandmother     Hyperlipidemia Maternal Grandmother        Allergies   Allergen Reactions    Seasonal        Current Outpatient Medications   Medication Sig Dispense Refill    lisinopril (PRINIVIL) 20 MG Tab Take 1 Tablet by mouth 2 times a day. 60 Tablet 11    rosuvastatin (CRESTOR) 10 MG Tab Take 1 Tablet by mouth every evening. 100 Tablet 0    levothyroxine (SYNTHROID) 75 MCG Tab Take 1 Tablet by mouth every morning on an empty stomach. 30 Tablet 11    Tiotropium Bromide-Olodaterol (STIOLTO RESPIMAT) 2.5-2.5 MCG/ACT Aero Soln Take 2 puffs by mouth every day. 12 g 3    Misc. Devices Misc Portable oxygen concentrator (POC), at 2L/min via NC. 1 Each 0    Fexofenadine HCl (MUCINEX ALLERGY PO) Take  by mouth every day.      Home Care Oxygen Inhale 5 L/min continuous. O2 LPM @: 5 LPM  Specify Usage: Continuous  Type of O2: Gas  Oxygen via: N/C  Oxygen Modality: Concentrator and Portable Tank  Humidification: Yes      acetaminophen (TYLENOL) 325 MG Tab Take 500 mg by mouth every 6 hours as needed for Mild Pain.      Melatonin 10 MG Tab Take 30 mg by mouth every evening.      traZODone (DESYREL) 50 MG Tab Take 1 tablet by mouth every evening. 90 Tablet 3    aspirin (ASA) 81 MG Chew Tab chewable tablet Chew 81 mg every evening. 100 Tablet     docosahexanoic acid (OMEGA 3 FA) 1000 MG Cap Take 1,000 mg by mouth every day.      Multiple Vitamin (MULTIVITAMIN ADULT PO) Take 1 Tablet by mouth every day.      calcium polycarbophil (FIBERCON) 625 MG Tab Take 625 mg by mouth every day.       No current facility-administered medications for this encounter.       Review of Systems   Constitutional:  Positive for malaise/fatigue. Negative for chills, fever and weight loss.   HENT:  Negative for congestion, ear pain, nosebleeds and sore throat.    Eyes:  Negative for blurred vision.   Respiratory:  Positive for cough and shortness of breath. Negative for sputum production and wheezing.   "  Cardiovascular:  Negative for chest pain, palpitations, orthopnea and leg swelling.   Gastrointestinal:  Negative for abdominal pain, heartburn, nausea and vomiting.   Genitourinary:  Negative for dysuria, frequency and urgency.   Musculoskeletal:  Negative for neck pain.   Neurological:  Negative for dizziness, tingling, tremors, sensory change, focal weakness and headaches.   Endo/Heme/Allergies:  Does not bruise/bleed easily.   Psychiatric/Behavioral:  Negative for depression, memory loss and suicidal ideas.    All other systems reviewed and are negative.      Problem list, medications, and allergies reviewed by myself today in Epic.     Objective:     Vitals:    11/26/24 1419   Height: 1.63 m (5' 4.17\")         DESC; KARNOFSKY SCALE WITH ECOG EQUIVALENT: 60, Requires occasional assistance, but is able to care for most of his personal needs (ECOG equivalent 2)    DISTRESS LEVEL: no acute distress    PE- deferred.      Pathology:    A. Left lung mass biopsy:          Moderately to poorly differentiated squamous cell carcinoma in a           background of abundant necrosis.     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 on 6/30/2024    #1.  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 NGS: DNMT3A, STAG2, TERT, TP53.   He had SRS for his lung cancer. He is on Keytruda 400 mg IV every 6 weeks, patient will have Keytruda 400 mg on November 29 and the January 10, 2025.      Patient had a follow-up CT scan of the chest on November 18, 2024 which he showed left upper lung mass size grew to 7.4 cm from 6.2 cm.  I reviewed 2 CT scan film carefully but I am not sure that he is truly progressing or not.  There has been significant changes with mass configuration, probably because of a previous radiosurgery.      I discussed with him regarding " further management and care.    I will go ahead with Keytruda this Friday on November 29, 2024.  I will follow this patient on January 10, 2025 for continuous Keytruda treatment.    I will schedule follow-up PET scan before January 10 to see he is truly progressing or not.    #2.  Low blood pressure  Patient's Norvasc and lisinopril were discontinued because of low blood pressure.  Now blood pressure is going up again and I recommended for the patient to take lisinopril 20 mg p.o. daily for next 1 week and adjust lisinopril to 20 mg twice a day if his blood pressure is higher than 140/80 after 1 week.    #3.  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  His TSH went up to 29.8.  I gave him Synthroid 75 mcg p.o. daily.  He is follow-up TSH it was 18, he took Synthroid 75 mcg for only 4 weeks, I will check his TSH again in 6 weeks    Any questions and concerns raised by the patient were addressed and answered. Patient denies any further questions.  Patient encouraged to call the office with any concerns or issues.   Thank you for allowing me to participate in your patient care.

## 2024-11-29 ENCOUNTER — OUTPATIENT INFUSION SERVICES (OUTPATIENT)
Dept: ONCOLOGY | Facility: MEDICAL CENTER | Age: 74
End: 2024-11-29
Attending: INTERNAL MEDICINE
Payer: MEDICARE

## 2024-11-29 VITALS
DIASTOLIC BLOOD PRESSURE: 71 MMHG | SYSTOLIC BLOOD PRESSURE: 142 MMHG | HEIGHT: 64 IN | HEART RATE: 87 BPM | RESPIRATION RATE: 18 BRPM | TEMPERATURE: 97.8 F | BODY MASS INDEX: 27.4 KG/M2 | OXYGEN SATURATION: 94 % | WEIGHT: 160.5 LBS

## 2024-11-29 DIAGNOSIS — C34.12 SQUAMOUS CELL CARCINOMA OF UPPER LOBE OF LEFT LUNG (HCC): ICD-10-CM

## 2024-11-29 PROCEDURE — 700105 HCHG RX REV CODE 258: Performed by: NURSE PRACTITIONER

## 2024-11-29 PROCEDURE — 96413 CHEMO IV INFUSION 1 HR: CPT

## 2024-11-29 RX ADMIN — SODIUM CHLORIDE 400 MG: 9 INJECTION, SOLUTION INTRAVENOUS at 12:02

## 2024-11-29 ASSESSMENT — FIBROSIS 4 INDEX: FIB4 SCORE: 1.14

## 2024-11-29 NOTE — PROGRESS NOTES
Chemotherapy Verification - SECONDARY RN       Height = 1.63m  Weight = 72.8kg  BSA = 1.82m2       Medication: pembrolizumab (Keytruda)  Dose: 400mg set dose  Calculated Dose: 400mg set dose                             (In mg/m2, AUC, mg/kg)         I confirm that this process was performed independently.

## 2024-11-29 NOTE — PROGRESS NOTES
Chemotherapy Verification - PRIMARY RN      Height = 163 cm  Weight = 72.8 kg  BSA = 1.82 m^2       Medication: pembrolizumab  Dose: 400 mg-set dose  Calculated Dose: 400 mg-set dose                             (In mg/m2, AUC, mg/kg)           I confirm this process was performed independently with the BSA and all final chemotherapy dosing calculations congruent.  Any discrepancies of 10% or greater have been addressed with the chemotherapy pharmacist. The resolution of the discrepancy has been documented in the EPIC progress notes.

## 2024-11-29 NOTE — PROGRESS NOTES
Bright into [facility] for Day 1/ Cycle 3 for keytruda. Pt denied having any new or acute complaints today, reports tolerating past treatments well. PIV started, had + blood return flushed briskly. Pt given keytruda via filter as prescribed, tolerated well, denied having any complaints during or after infusion. PIV discontinued, bleeding controlled with gauze and coban. Next appointment scheduled, Bright discharged home to self care.

## 2024-11-29 NOTE — PROGRESS NOTES
"Pharmacy Chemotherapy Calculation:    Dx: NSCLC        Protocol: Pembrolizumab     *Dosing Reference*  Pembrolizumab 400 mg IV over 30 minutes on Day 1 400 mg IV over 30 minutes on Day 1  42-day cycle until disease progression or unacceptable toxicity or until up to 24 months of therapy has been completed     NCCN Guidelines® for Non-Small Cell Lung Cancer V.1.2024.    Bassem KOTHARI, et al. J Clin Oncol. 2019;37(28):9620-6857.c   Venkatesh JOHNS et al. N Engl J Med. 2016;375(47):4337-1901.a   David WONG et al. Lancet. 2019;393(94396):1992-4062.a   Isabella M, et al. Eur J Cancer. 2020;131:68-75.b    Allergies:  Seasonal     BP (!) 142/71   Pulse 87   Temp 36.6 °C (97.8 °F) (Temporal)   Resp 18   Ht 1.63 m (5' 4.17\")   Wt 72.8 kg (160 lb 7.9 oz)   SpO2 94%   BMI 27.40 kg/m²  Body surface area is 1.82 meters squared.    Labs 11/25/24:  ANC~ 4790 Plt = 364k   Hgb = 12.8     SCr = 0.93 mg/dL CrCl ~ 71.2 mL/min   AST/ALT/AP = 21/14/78 TBili = 0.2  TSH = 18.3   Free T4 = 0.87 (to be managed outpt)    Drug Order   (Drug name, dose, route, IV Fluid & volume, frequency, number of doses) Cycle 3   Previous treatment: C2 10/18/24     Medication = Pembrolizumab  Base Dose = 400 mg  Fixed dose, no calc required  Final Dose = 400 mg  Route = IV  Fluid & Volume = NS 50 mL  Admin Duration = Over 30 minutes          Fixed dose, ok to treat with final dose       By my signature below, I confirm this process was performed independently with the BSA and all final chemotherapy dosing calculations congruent. I have reviewed the above chemotherapy order and that my calculation of the final dose and BSA (when applicable) corroborate those calculations of the  pharmacist. Discrepancies of 10% or greater in the written dose have been addressed and documented within the [facility] Progress notes.    Maritza AlegriaD  "

## 2024-11-29 NOTE — PROGRESS NOTES
"Pharmacy Chemotherapy calculation:    DX: Squamous cell lung cancer    Cycle 3  Previous treatment = C2 on 10/18/24    Regimen: Pembrolizumab    *Dosing Reference*  Pembrolizumab 400 mg IV over 30 minutes on Day 1  42-day cycle until disease progression or unacceptable toxicity or until up to 24 months of therapy has been completed  NCCN Guidelines for Non-Small Cell Lung Cancer V.1.2024.  Eduardoon EB, et al. J Clin Oncol. 2019;37(28):5041-9153.  Venkatesh M, et al. N Engl J Med. 2016;375(18)[id number].    Allergies: Seasonal  BP (!) 142/71   Pulse 87   Temp 36.6 °C (97.8 °F) (Temporal)   Resp 18   Ht 1.63 m (5' 4.17\")   Wt 72.8 kg (160 lb 7.9 oz)   SpO2 94%   BMI 27.40 kg/m²   Body surface area is 1.82 meters squared.    Labs 11/25/24  ANC~ 4790 Plt = 364k   Hgb = 12.8     SCr = 0.93 mg/dL CrCl ~ 71.3 mL/min   LFT's = WNLs  TBili = 0.2   TSH = 18.3 Free T4 = 0.87    Pembrolizumab 400 mg fixed dose = 400 mg   <10% difference, okay to treat with final dose = 400 mg IV    Alton Koch, PharmD    "

## 2024-12-13 ENCOUNTER — PHARMACY VISIT (OUTPATIENT)
Dept: PHARMACY | Facility: MEDICAL CENTER | Age: 74
End: 2024-12-13
Payer: COMMERCIAL

## 2024-12-13 DIAGNOSIS — G47.00 INSOMNIA, UNSPECIFIED TYPE: ICD-10-CM

## 2024-12-13 PROCEDURE — RXMED WILLOW AMBULATORY MEDICATION CHARGE: Performed by: FAMILY MEDICINE

## 2024-12-13 RX ORDER — TRAZODONE HYDROCHLORIDE 50 MG/1
50 TABLET, FILM COATED ORAL NIGHTLY
Qty: 30 TABLET | Refills: 0 | Status: SHIPPED | OUTPATIENT
Start: 2024-12-13 | End: 2024-12-13 | Stop reason: SDUPTHER

## 2024-12-13 NOTE — TELEPHONE ENCOUNTER
Received request via: Pharmacy    Was the patient seen in the last year in this department? Yes      traZODone (DESYREL) 50 MG Tab     Does the patient have an active prescription (recently filled or refills available) for medication(s) requested? No    Pharmacy Name: [facility] [place]     Does the patient have FDC Plus and need 100-day supply? (This applies to ALL medications) Yes, quantity updated to 100 days

## 2024-12-13 NOTE — TELEPHONE ENCOUNTER
Received request via: Patient    Was the patient seen in the last year in this department? Yes, establishing with you 12/18/2024    Does the patient have an active prescription (recently filled or refills available) for medication(s) requested? No    Pharmacy Name: [facility] [place]    Does the patient have care home Plus and need 100-day supply? (This applies to ALL medications) Yes, quantity updated to 100 days

## 2024-12-15 SDOH — ECONOMIC STABILITY: FOOD INSECURITY: WITHIN THE PAST 12 MONTHS, THE FOOD YOU BOUGHT JUST DIDN'T LAST AND YOU DIDN'T HAVE MONEY TO GET MORE.: NEVER TRUE

## 2024-12-15 SDOH — HEALTH STABILITY: PHYSICAL HEALTH: ON AVERAGE, HOW MANY DAYS PER WEEK DO YOU ENGAGE IN MODERATE TO STRENUOUS EXERCISE (LIKE A BRISK WALK)?: 0 DAYS

## 2024-12-15 SDOH — ECONOMIC STABILITY: FOOD INSECURITY: WITHIN THE PAST 12 MONTHS, YOU WORRIED THAT YOUR FOOD WOULD RUN OUT BEFORE YOU GOT MONEY TO BUY MORE.: NEVER TRUE

## 2024-12-15 SDOH — ECONOMIC STABILITY: INCOME INSECURITY: HOW HARD IS IT FOR YOU TO PAY FOR THE VERY BASICS LIKE FOOD, HOUSING, MEDICAL CARE, AND HEATING?: NOT VERY HARD

## 2024-12-15 SDOH — ECONOMIC STABILITY: INCOME INSECURITY: IN THE LAST 12 MONTHS, WAS THERE A TIME WHEN YOU WERE NOT ABLE TO PAY THE MORTGAGE OR RENT ON TIME?: NO

## 2024-12-15 SDOH — HEALTH STABILITY: PHYSICAL HEALTH: ON AVERAGE, HOW MANY MINUTES DO YOU ENGAGE IN EXERCISE AT THIS LEVEL?: 0 MIN

## 2024-12-15 SDOH — HEALTH STABILITY: MENTAL HEALTH
STRESS IS WHEN SOMEONE FEELS TENSE, NERVOUS, ANXIOUS, OR CAN'T SLEEP AT NIGHT BECAUSE THEIR MIND IS TROUBLED. HOW STRESSED ARE YOU?: TO SOME EXTENT

## 2024-12-15 ASSESSMENT — SOCIAL DETERMINANTS OF HEALTH (SDOH)
HOW MANY DRINKS CONTAINING ALCOHOL DO YOU HAVE ON A TYPICAL DAY WHEN YOU ARE DRINKING: 1 OR 2
HOW OFTEN DO YOU ATTEND CHURCH OR RELIGIOUS SERVICES?: NEVER
WITHIN THE PAST 12 MONTHS, YOU WORRIED THAT YOUR FOOD WOULD RUN OUT BEFORE YOU GOT THE MONEY TO BUY MORE: NEVER TRUE
HOW HARD IS IT FOR YOU TO PAY FOR THE VERY BASICS LIKE FOOD, HOUSING, MEDICAL CARE, AND HEATING?: NOT VERY HARD
HOW OFTEN DO YOU HAVE A DRINK CONTAINING ALCOHOL: 2-3 TIMES A WEEK
IN THE PAST 12 MONTHS, HAS THE ELECTRIC, GAS, OIL, OR WATER COMPANY THREATENED TO SHUT OFF SERVICE IN YOUR HOME?: NO
DO YOU BELONG TO ANY CLUBS OR ORGANIZATIONS SUCH AS CHURCH GROUPS UNIONS, FRATERNAL OR ATHLETIC GROUPS, OR SCHOOL GROUPS?: NO
DO YOU BELONG TO ANY CLUBS OR ORGANIZATIONS SUCH AS CHURCH GROUPS UNIONS, FRATERNAL OR ATHLETIC GROUPS, OR SCHOOL GROUPS?: NO
HOW OFTEN DO YOU HAVE SIX OR MORE DRINKS ON ONE OCCASION: NEVER
HOW OFTEN DO YOU ATTENT MEETINGS OF THE CLUB OR ORGANIZATION YOU BELONG TO?: NEVER
HOW OFTEN DO YOU GET TOGETHER WITH FRIENDS OR RELATIVES?: ONCE A WEEK
HOW OFTEN DO YOU ATTENT MEETINGS OF THE CLUB OR ORGANIZATION YOU BELONG TO?: NEVER
HOW OFTEN DO YOU ATTEND CHURCH OR RELIGIOUS SERVICES?: NEVER
IN A TYPICAL WEEK, HOW MANY TIMES DO YOU TALK ON THE PHONE WITH FAMILY, FRIENDS, OR NEIGHBORS?: ONCE A WEEK
HOW OFTEN DO YOU GET TOGETHER WITH FRIENDS OR RELATIVES?: ONCE A WEEK
IN A TYPICAL WEEK, HOW MANY TIMES DO YOU TALK ON THE PHONE WITH FAMILY, FRIENDS, OR NEIGHBORS?: ONCE A WEEK

## 2024-12-15 ASSESSMENT — LIFESTYLE VARIABLES
HOW OFTEN DO YOU HAVE SIX OR MORE DRINKS ON ONE OCCASION: NEVER
SKIP TO QUESTIONS 9-10: 1
HOW MANY STANDARD DRINKS CONTAINING ALCOHOL DO YOU HAVE ON A TYPICAL DAY: 1 OR 2
HOW OFTEN DO YOU HAVE A DRINK CONTAINING ALCOHOL: 2-3 TIMES A WEEK
AUDIT-C TOTAL SCORE: 3

## 2024-12-16 RX ORDER — TRAZODONE HYDROCHLORIDE 50 MG/1
50 TABLET, FILM COATED ORAL NIGHTLY
Qty: 30 TABLET | Refills: 0 | Status: SHIPPED | OUTPATIENT
Start: 2024-12-16

## 2024-12-18 ENCOUNTER — OFFICE VISIT (OUTPATIENT)
Dept: MEDICAL GROUP | Facility: MEDICAL CENTER | Age: 74
End: 2024-12-18
Payer: MEDICARE

## 2024-12-18 ENCOUNTER — PHARMACY VISIT (OUTPATIENT)
Dept: PHARMACY | Facility: MEDICAL CENTER | Age: 74
End: 2024-12-18
Payer: COMMERCIAL

## 2024-12-18 VITALS
TEMPERATURE: 97.9 F | HEART RATE: 79 BPM | BODY MASS INDEX: 28.17 KG/M2 | DIASTOLIC BLOOD PRESSURE: 72 MMHG | RESPIRATION RATE: 17 BRPM | HEIGHT: 64 IN | SYSTOLIC BLOOD PRESSURE: 150 MMHG | OXYGEN SATURATION: 92 % | WEIGHT: 165 LBS

## 2024-12-18 DIAGNOSIS — I95.9 HYPOTENSION, UNSPECIFIED HYPOTENSION TYPE: ICD-10-CM

## 2024-12-18 DIAGNOSIS — F17.210 CIGARETTE NICOTINE DEPENDENCE, UNCOMPLICATED: ICD-10-CM

## 2024-12-18 DIAGNOSIS — I15.9 SECONDARY HYPERTENSION: ICD-10-CM

## 2024-12-18 DIAGNOSIS — C34.12 SQUAMOUS CELL CARCINOMA OF UPPER LOBE OF LEFT LUNG (HCC): ICD-10-CM

## 2024-12-18 DIAGNOSIS — J43.9 PULMONARY EMPHYSEMA, UNSPECIFIED EMPHYSEMA TYPE (HCC): ICD-10-CM

## 2024-12-18 DIAGNOSIS — E78.5 DYSLIPIDEMIA: ICD-10-CM

## 2024-12-18 DIAGNOSIS — D64.9 ANEMIA, UNSPECIFIED TYPE: ICD-10-CM

## 2024-12-18 DIAGNOSIS — I70.0 AORTIC ATHEROSCLEROSIS (HCC): Chronic | ICD-10-CM

## 2024-12-18 DIAGNOSIS — J96.11 CHRONIC HYPOXEMIC RESPIRATORY FAILURE (HCC): ICD-10-CM

## 2024-12-18 DIAGNOSIS — R73.01 IFG (IMPAIRED FASTING GLUCOSE): ICD-10-CM

## 2024-12-18 DIAGNOSIS — I65.23 CAROTID STENOSIS, BILATERAL: Chronic | ICD-10-CM

## 2024-12-18 DIAGNOSIS — I10 PRIMARY HYPERTENSION: ICD-10-CM

## 2024-12-18 PROBLEM — U07.1 PNEUMONIA DUE TO COVID-19 VIRUS: Status: RESOLVED | Noted: 2024-04-30 | Resolved: 2024-12-18

## 2024-12-18 PROBLEM — R55 SYNCOPAL EPISODES: Status: RESOLVED | Noted: 2021-06-06 | Resolved: 2024-12-18

## 2024-12-18 PROBLEM — E87.6 HYPOKALEMIA: Status: RESOLVED | Noted: 2021-06-09 | Resolved: 2024-12-18

## 2024-12-18 PROBLEM — E83.51 HYPOCALCEMIA: Status: RESOLVED | Noted: 2022-05-26 | Resolved: 2024-12-18

## 2024-12-18 PROBLEM — J12.82 PNEUMONIA DUE TO COVID-19 VIRUS: Status: RESOLVED | Noted: 2024-04-30 | Resolved: 2024-12-18

## 2024-12-18 PROBLEM — Z72.0 TOBACCO USE: Status: RESOLVED | Noted: 2024-04-29 | Resolved: 2024-12-18

## 2024-12-18 PROBLEM — E88.810 DYSMETABOLIC SYNDROME: Status: RESOLVED | Noted: 2021-08-09 | Resolved: 2024-12-18

## 2024-12-18 PROCEDURE — 3077F SYST BP >= 140 MM HG: CPT | Performed by: STUDENT IN AN ORGANIZED HEALTH CARE EDUCATION/TRAINING PROGRAM

## 2024-12-18 PROCEDURE — 99214 OFFICE O/P EST MOD 30 MIN: CPT | Performed by: STUDENT IN AN ORGANIZED HEALTH CARE EDUCATION/TRAINING PROGRAM

## 2024-12-18 PROCEDURE — RXMED WILLOW AMBULATORY MEDICATION CHARGE: Performed by: INTERNAL MEDICINE

## 2024-12-18 PROCEDURE — RXMED WILLOW AMBULATORY MEDICATION CHARGE: Performed by: STUDENT IN AN ORGANIZED HEALTH CARE EDUCATION/TRAINING PROGRAM

## 2024-12-18 PROCEDURE — 3078F DIAST BP <80 MM HG: CPT | Performed by: STUDENT IN AN ORGANIZED HEALTH CARE EDUCATION/TRAINING PROGRAM

## 2024-12-18 RX ORDER — COVID-19 VACCINE, MRNA 0.04 MG/.418ML
INJECTION, SUSPENSION INTRAMUSCULAR
Qty: 0.3 ML | Refills: 0 | OUTPATIENT
Start: 2024-12-18

## 2024-12-18 RX ORDER — ZOSTER VACCINE RECOMBINANT, ADJUVANTED 50 MCG/0.5
KIT INTRAMUSCULAR
Qty: 0.5 ML | Refills: 0 | OUTPATIENT
Start: 2024-12-18

## 2024-12-18 RX ORDER — ALBUTEROL SULFATE 90 UG/1
2 INHALANT RESPIRATORY (INHALATION) EVERY 4 HOURS PRN
Qty: 18 G | Refills: 11 | Status: SHIPPED | OUTPATIENT
Start: 2024-12-18

## 2024-12-18 RX ORDER — AMLODIPINE BESYLATE 5 MG/1
5 TABLET ORAL DAILY
Qty: 100 TABLET | Refills: 3 | Status: SHIPPED | OUTPATIENT
Start: 2024-12-18

## 2024-12-18 ASSESSMENT — ENCOUNTER SYMPTOMS
HEADACHES: 0
CHILLS: 0
BLOOD IN STOOL: 0
DIARRHEA: 0
DIZZINESS: 0
WHEEZING: 0
ABDOMINAL PAIN: 0
PALPITATIONS: 0
WEIGHT LOSS: 0
NAUSEA: 0
VOMITING: 0
SHORTNESS OF BREATH: 0
CONSTIPATION: 0
FEVER: 0

## 2024-12-18 ASSESSMENT — FIBROSIS 4 INDEX: FIB4 SCORE: 1.14

## 2024-12-18 ASSESSMENT — PATIENT HEALTH QUESTIONNAIRE - PHQ9: CLINICAL INTERPRETATION OF PHQ2 SCORE: 0

## 2024-12-18 NOTE — PROGRESS NOTES
Subjective:     CC:     HPI:   Bright presents today with    Former patient of Ashley STAPLETON  [facility] of HTN, HLD, carotid stenosis, pulmonary emphysema/restrictive lung disease/chronic respiratory failure on nasal cannula 3-5 L, SCC VANESA s/p radiosurgery, on Keytruda, hypothyroidism secondary to Keytruda  Specialist  Pulmonology  Oncology- Kelton STAPLETON    Last oncology note 11/26/2024-reviewed-Keytruda every 6 week follow-up with oncology 1/10/2025, follow-up PET  - Secondary hypothyroidism due to Keytruda, he was started on Synthroid 75 mcg with plans to follow-up with TSH    Report BP   - previously on amlodipine and lisinopril was taken off due to low blood pressure. Restarted on lisinopril 20mg then increased to 20mg bid.   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 He presents today to reestablish with a primary care provider.    His blood pressure was noted to be elevated. He has been adhering to a regimen of lisinopril 20 mg twice daily for the past 3 to 5 days, with home blood pressure readings ranging from 120s to 150s, averaging in the 130s. He is not currently on amlodipine. He reports no recent episodes of syncope. He had an office visit with Dr. Linton in November 2024, usually right before his chemotherapy. He has experienced hypotensive episodes in the past, with readings as low as 90 systolic and diastolic readings of 70.    He is on levothyroxine and is being followed by Dr. Linton. He thinks his thyroid is out of whack because of Keytruda. He has follow-up labs to do.    He is on Tylenol as needed, aspirin 81 mg, calcifediol, omega-3, allergy pill, and home oxygen 3 to 5 L. He is not following with radiation  oncology. He has finished with the radiation. He is not feeling depressed or hopeless. He is eating well, doing well, not losing too much weight, having regular bowel movements, and no urinary issues. He has been careful though. 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 He also has a bone-on-bone issue going on with his hips, but he has been putting that off. They wanted to do hip surgery on him and he does not want to do with that right now. He is taking Tylenol to help with the pain.    He has a history of anemia, which has been ongoing since starting Keytruda.    He has a history of mild stroke about 3 or 4 years ago. It showed up on a CT scan or MRI. He was told to take aspirin 81 mg. He is not supposed to be taking any NSAID products while he is on Keytruda.    He has a history of elevated blood sugar levels, which have been well-controlled. He adds sugar in his coffee.    He has a history of carotid stenosis, identified during the workup for his stroke, and is currently on aspirin and rosuvastatin for this condition.    He has a history of coronary calcium, as evidenced by a CT scan of the aorta, and is currently on aspirin and rosuvastatin for this condition.    He has a history of tobacco use, having significantly reduced his consumption to 1 cigarette per week.    Supplemental Information  He has a history of falls, one approximately 20 years ago resulting in a fractured upper femur, and another incident 3 years ago where he blacked out at home and fell, causing a fracture in his right upper femur. He has a history of leg and calf pain, attributed to arthritis rather than arterial issues. He has a history of colon polyps, with the last colonoscopy performed 3 to 4 years ago. He is scheduled for a follow-up colonoscopy in 3 years.    SOCIAL HISTORY  He smokes approximately 1 to 2 cigarettes per  "week.    FAMILY HISTORY  His mother had breast cancer.    MEDICATIONS  Current: Lisinopril, levothyroxine, Tylenol, aspirin, calcifediol, omega-3, home oxygen.    IMMUNIZATIONS  He is getting his second shingles shot and COVID-19 vaccine today. He already received his influenza vaccine.      Problem   Ifg (Impaired Fasting Glucose)   Pneumonia Due to Covid-19 Virus (Resolved)   Tobacco Use (Resolved)   Hypocalcemia (Resolved)   Bmi 24.0-24.9, Adult (Resolved)   Dysmetabolic Syndrome (Resolved)   Hypokalemia (Resolved)   Syncopal Episodes (Resolved)       Health Maintenance:     ROS:  Review of Systems   Constitutional:  Negative for chills, fever and weight loss.   HENT:  Negative for hearing loss.    Respiratory:  Negative for shortness of breath and wheezing.    Cardiovascular:  Negative for chest pain and palpitations.   Gastrointestinal:  Negative for abdominal pain, blood in stool, constipation, diarrhea, melena, nausea and vomiting.   Genitourinary:  Negative for frequency and urgency.   Skin:  Negative for rash.   Neurological:  Negative for dizziness and headaches.       Objective:     Exam:  BP (!) 150/72 (BP Location: Left arm)   Pulse 79   Temp 36.6 °C (97.9 °F)   Resp 17 Comment: 3L O2  Ht 1.626 m (5' 4\")   Wt 74.8 kg (165 lb)   SpO2 92%   BMI 28.32 kg/m²  Body mass index is 28.32 kg/m².    Physical Exam  Constitutional:       Appearance: Normal appearance.   Cardiovascular:      Rate and Rhythm: Normal rate and regular rhythm.   Pulmonary:      Effort: Pulmonary effort is normal.      Breath sounds: Normal breath sounds.   Musculoskeletal:      Cervical back: Normal range of motion and neck supple.   Lymphadenopathy:      Cervical: No cervical adenopathy.   Neurological:      Mental Status: He is alert.           Labs:     Assessment & Plan:     74 y.o. male with the following -     1. Primary hypertension  2. Secondary hypertension  3. Hypotension, unspecified hypotension type    - amLODIPine " (NORVASC) 5 MG Tab; Take 1 Tablet by mouth every day.  Dispense: 100 Tablet; Refill: 3    4. Dyslipidemia      5. Cigarette nicotine dependence, uncomplicated       6. IFG (impaired fasting glucose)    7. Pulmonary emphysema, unspecified emphysema type (HCC)  - albuterol refilled    8. Chronic hypoxemic respiratory failure (HCC)      9. Carotid stenosis, bilateral - mild      10. Aortic atherosclerosis (HCC)      Assessment & Plan  1. Primary hypertension.  Given his history of hypertension, it is deemed appropriate to reintroduce amlodipine 5 mg into his treatment regimen, with the objective of achieving a target blood pressure in the range of 120s to 130s. His blood pressure is 150/72 today. Home blood pressure is ranging anywhere from 120s to 150s. He is advised to halve the tablet if it results in an excessive reduction in blood pressure or induces dizziness. A prescription for amlodipine 5 mg will be sent to his pharmacy at [facility].    2. Dyslipidemia.  His dyslipidemia is chronic and stable, occurring in the context of carotid stenosis and previous microvascular changes observed on a CT head scan. His last LDL cholesterol level was recorded at less than 100. He is currently on a regimen of aspirin and rosuvastatin 10 mg.    3. Nicotine dependence.  He has made commendable progress in reducing his cigarette consumption, currently at 1 cigarette per week. He is strongly encouraged to cease smoking entirely.    4. Impaired fasting glucose.  He has a history of an A1c level at 6.1, indicative of prediabetes. He is advised to eliminate sugary drinks from his diet and consider using monk fruit sweetener as a substitute. Additionally, he is recommended to consume whole fruits and foods and avoid immediate sources of sugar.    5. Pulmonary emphysema.  His pulmonary emphysema is chronic and stable. He is currently using inhalers and chronic oxygen therapy, with oxygen levels ranging from 3 to 5 L depending on his  activity level. He is also under the care of a pulmonologist.    6. Carotid stenosis.  He is currently on a regimen of aspirin and rosuvastatin 10 mg.    7. History of plaque on aorta.  He is currently on a regimen of aspirin and statins.    8. Anemia.  His anemia has been ongoing since starting Keytruda.      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        Return in about 3 months (around 3/18/2025) for Lab review, Med check.    Please note that this dictation was created using voice recognition software. I have made every reasonable attempt to correct obvious errors, but I expect that there are errors of grammar and possibly content that I did not discover before finalizing the note.

## 2024-12-20 ENCOUNTER — PHARMACY VISIT (OUTPATIENT)
Dept: PHARMACY | Facility: MEDICAL CENTER | Age: 74
End: 2024-12-20

## 2025-01-02 ENCOUNTER — HOSPITAL ENCOUNTER (OUTPATIENT)
Dept: RADIOLOGY | Facility: MEDICAL CENTER | Age: 75
End: 2025-01-02
Attending: INTERNAL MEDICINE
Payer: MEDICARE

## 2025-01-02 DIAGNOSIS — C34.12 SQUAMOUS CELL CARCINOMA OF UPPER LOBE OF LEFT LUNG (HCC): ICD-10-CM

## 2025-01-02 LAB — GLUCOSE BLD-MCNC: 104 MG/DL (ref 65–99)

## 2025-01-02 PROCEDURE — A9552 F18 FDG: HCPCS

## 2025-01-06 PROCEDURE — RXMED WILLOW AMBULATORY MEDICATION CHARGE: Performed by: STUDENT IN AN ORGANIZED HEALTH CARE EDUCATION/TRAINING PROGRAM

## 2025-01-08 ENCOUNTER — HOSPITAL ENCOUNTER (OUTPATIENT)
Dept: LAB | Facility: MEDICAL CENTER | Age: 75
End: 2025-01-08
Attending: INTERNAL MEDICINE
Payer: MEDICARE

## 2025-01-08 DIAGNOSIS — C34.12 SQUAMOUS CELL CARCINOMA OF UPPER LOBE OF LEFT LUNG (HCC): ICD-10-CM

## 2025-01-08 DIAGNOSIS — E03.2 HYPOTHYROIDISM DUE TO MEDICATION: ICD-10-CM

## 2025-01-08 LAB
BASOPHILS # BLD AUTO: 1.2 % (ref 0–1.8)
BASOPHILS # BLD: 0.11 K/UL (ref 0–0.12)
EOSINOPHIL # BLD AUTO: 0.21 K/UL (ref 0–0.51)
EOSINOPHIL NFR BLD: 2.2 % (ref 0–6.9)
ERYTHROCYTE [DISTWIDTH] IN BLOOD BY AUTOMATED COUNT: 48.2 FL (ref 35.9–50)
HCT VFR BLD AUTO: 43.3 % (ref 42–52)
HGB BLD-MCNC: 13.3 G/DL (ref 14–18)
IMM GRANULOCYTES # BLD AUTO: 0.03 K/UL (ref 0–0.11)
IMM GRANULOCYTES NFR BLD AUTO: 0.3 % (ref 0–0.9)
LYMPHOCYTES # BLD AUTO: 1.96 K/UL (ref 1–4.8)
LYMPHOCYTES NFR BLD: 20.8 % (ref 22–41)
MCH RBC QN AUTO: 27.6 PG (ref 27–33)
MCHC RBC AUTO-ENTMCNC: 30.7 G/DL (ref 32.3–36.5)
MCV RBC AUTO: 89.8 FL (ref 81.4–97.8)
MONOCYTES # BLD AUTO: 0.78 K/UL (ref 0–0.85)
MONOCYTES NFR BLD AUTO: 8.3 % (ref 0–13.4)
NEUTROPHILS # BLD AUTO: 6.35 K/UL (ref 1.82–7.42)
NEUTROPHILS NFR BLD: 67.2 % (ref 44–72)
NRBC # BLD AUTO: 0 K/UL
NRBC BLD-RTO: 0 /100 WBC (ref 0–0.2)
PLATELET # BLD AUTO: 392 K/UL (ref 164–446)
PMV BLD AUTO: 9.3 FL (ref 9–12.9)
RBC # BLD AUTO: 4.82 M/UL (ref 4.7–6.1)
WBC # BLD AUTO: 9.4 K/UL (ref 4.8–10.8)

## 2025-01-08 PROCEDURE — 84443 ASSAY THYROID STIM HORMONE: CPT

## 2025-01-08 PROCEDURE — 36415 COLL VENOUS BLD VENIPUNCTURE: CPT

## 2025-01-08 PROCEDURE — 82378 CARCINOEMBRYONIC ANTIGEN: CPT

## 2025-01-08 PROCEDURE — 80053 COMPREHEN METABOLIC PANEL: CPT

## 2025-01-08 PROCEDURE — 85025 COMPLETE CBC W/AUTO DIFF WBC: CPT

## 2025-01-09 LAB
ALBUMIN SERPL BCP-MCNC: 4 G/DL (ref 3.2–4.9)
ALBUMIN/GLOB SERPL: 1.1 G/DL
ALP SERPL-CCNC: 76 U/L (ref 30–99)
ALT SERPL-CCNC: 18 U/L (ref 2–50)
ANION GAP SERPL CALC-SCNC: 8 MMOL/L (ref 7–16)
AST SERPL-CCNC: 24 U/L (ref 12–45)
BILIRUB SERPL-MCNC: <0.2 MG/DL (ref 0.1–1.5)
BUN SERPL-MCNC: 11 MG/DL (ref 8–22)
CALCIUM ALBUM COR SERPL-MCNC: 9 MG/DL (ref 8.5–10.5)
CALCIUM SERPL-MCNC: 9 MG/DL (ref 8.5–10.5)
CEA SERPL-MCNC: 3.8 NG/ML (ref 0–3)
CHLORIDE SERPL-SCNC: 102 MMOL/L (ref 96–112)
CO2 SERPL-SCNC: 30 MMOL/L (ref 20–33)
CREAT SERPL-MCNC: 0.89 MG/DL (ref 0.5–1.4)
GFR SERPLBLD CREATININE-BSD FMLA CKD-EPI: 90 ML/MIN/1.73 M 2
GLOBULIN SER CALC-MCNC: 3.7 G/DL (ref 1.9–3.5)
GLUCOSE SERPL-MCNC: 82 MG/DL (ref 65–99)
POTASSIUM SERPL-SCNC: 5.2 MMOL/L (ref 3.6–5.5)
PROT SERPL-MCNC: 7.7 G/DL (ref 6–8.2)
SODIUM SERPL-SCNC: 140 MMOL/L (ref 135–145)
TSH SERPL-ACNC: 27.5 UIU/ML (ref 0.35–5.5)

## 2025-01-10 ENCOUNTER — HOSPITAL ENCOUNTER (OUTPATIENT)
Dept: HEMATOLOGY ONCOLOGY | Facility: MEDICAL CENTER | Age: 75
End: 2025-01-10
Attending: INTERNAL MEDICINE
Payer: MEDICARE

## 2025-01-10 ENCOUNTER — OUTPATIENT INFUSION SERVICES (OUTPATIENT)
Dept: ONCOLOGY | Facility: MEDICAL CENTER | Age: 75
End: 2025-01-10
Attending: INTERNAL MEDICINE
Payer: MEDICARE

## 2025-01-10 ENCOUNTER — PHARMACY VISIT (OUTPATIENT)
Dept: PHARMACY | Facility: MEDICAL CENTER | Age: 75
End: 2025-01-10
Payer: COMMERCIAL

## 2025-01-10 VITALS
HEIGHT: 64 IN | SYSTOLIC BLOOD PRESSURE: 102 MMHG | HEART RATE: 81 BPM | WEIGHT: 159 LBS | OXYGEN SATURATION: 94 % | TEMPERATURE: 98.1 F | BODY MASS INDEX: 27.14 KG/M2 | DIASTOLIC BLOOD PRESSURE: 60 MMHG

## 2025-01-10 VITALS
HEART RATE: 80 BPM | HEIGHT: 64 IN | SYSTOLIC BLOOD PRESSURE: 132 MMHG | RESPIRATION RATE: 16 BRPM | TEMPERATURE: 97.6 F | OXYGEN SATURATION: 91 % | DIASTOLIC BLOOD PRESSURE: 64 MMHG | WEIGHT: 160.05 LBS | BODY MASS INDEX: 27.33 KG/M2

## 2025-01-10 DIAGNOSIS — E03.2 HYPOTHYROIDISM DUE TO MEDICATION: ICD-10-CM

## 2025-01-10 DIAGNOSIS — C34.12 SQUAMOUS CELL CARCINOMA OF UPPER LOBE OF LEFT LUNG (HCC): ICD-10-CM

## 2025-01-10 PROCEDURE — 99214 OFFICE O/P EST MOD 30 MIN: CPT | Performed by: INTERNAL MEDICINE

## 2025-01-10 PROCEDURE — 96413 CHEMO IV INFUSION 1 HR: CPT

## 2025-01-10 PROCEDURE — 700105 HCHG RX REV CODE 258: Performed by: INTERNAL MEDICINE

## 2025-01-10 PROCEDURE — 700111 HCHG RX REV CODE 636 W/ 250 OVERRIDE (IP): Mod: JZ,TB | Performed by: INTERNAL MEDICINE

## 2025-01-10 PROCEDURE — RXMED WILLOW AMBULATORY MEDICATION CHARGE: Performed by: INTERNAL MEDICINE

## 2025-01-10 PROCEDURE — 99212 OFFICE O/P EST SF 10 MIN: CPT | Performed by: INTERNAL MEDICINE

## 2025-01-10 RX ORDER — LEVOTHYROXINE SODIUM 150 UG/1
150 TABLET ORAL
Qty: 30 TABLET | Refills: 11 | Status: SHIPPED | OUTPATIENT
Start: 2025-01-10

## 2025-01-10 RX ADMIN — SODIUM CHLORIDE 400 MG: 9 INJECTION, SOLUTION INTRAVENOUS at 14:02

## 2025-01-10 ASSESSMENT — ENCOUNTER SYMPTOMS
VOMITING: 0
NAUSEA: 0
SPUTUM PRODUCTION: 0
ABDOMINAL PAIN: 0
HEADACHES: 0
COUGH: 1
HEARTBURN: 0
NECK PAIN: 0
WEIGHT LOSS: 0
DEPRESSION: 0
FOCAL WEAKNESS: 0
TREMORS: 0
PALPITATIONS: 0
SHORTNESS OF BREATH: 1
CHILLS: 0
ORTHOPNEA: 0
DIZZINESS: 0
TINGLING: 0
MEMORY LOSS: 0
BLURRED VISION: 0
SENSORY CHANGE: 0
BRUISES/BLEEDS EASILY: 0
WHEEZING: 0
FEVER: 0
SORE THROAT: 0

## 2025-01-10 ASSESSMENT — PAIN SCALES - GENERAL: PAINLEVEL_OUTOF10: NO PAIN

## 2025-01-10 ASSESSMENT — FIBROSIS 4 INDEX
FIB4 SCORE: 1.07
FIB4 SCORE: 1.07

## 2025-01-10 NOTE — PROGRESS NOTES
"Pharmacy Chemotherapy calculation:    DX: Squamous cell lung cancer    Cycle 3  Previous treatment = C2 11/29/24    Regimen: Pembrolizumab    *Dosing Reference*  Pembrolizumab 400 mg IV over 30 minutes on Day 1  42-day cycle until disease progression or unacceptable toxicity or until up to 24 months of therapy has been completed  NCCN Guidelines for Non-Small Cell Lung Cancer V.1.2024.  Eduardoon EB, et al. J Clin Oncol. 2019;37(28):2533-3103.  Venkatesh M, et al. N Engl J Med. 2016;375(03)[id number].    Allergies: Seasonal  BP (P) 132/64   Pulse (P) 80   Temp (P) 36.4 °C (97.6 °F) (Temporal)   Resp (P) 16   Ht (P) 1.63 m (5' 4.17\")   Wt (P) 72.6 kg (160 lb 0.9 oz)   BMI (P) 27.33 kg/m²   Body surface area is 1.81 meters squared (pended).    Labs 1/8/25:  ANC~ 6350 Plt = 392k   Hgb = 13.3     SCr = 0.89 mg/dL CrCl ~ 74mL/min   AST/ALT/AP = WNL TBili = <0.2    TSH = 27.5        Pembrolizumab 400 mg fixed dose = 400 mg   <10% difference, okay to treat with final dose = 400 mg IV    Margarette Cabrera, PharmD    "

## 2025-01-10 NOTE — PROGRESS NOTES
PROGRESS NOTE: HEMATOLOGY/ONCOLOGY       Primary Care:  Nella Ramirez M.D.    No chief complaint on file.      Current Treatment:      8/1/2024 : Radiosurgery to left upper lobe lung cancer.  9/6/2024 : C 1 Keytruda  10/18/24 : C 2 Keytruda  11/29/24 : C3  1/10/25 : C 4      Subjective:     History of Presenting Illness:  Bright Shirley is a 74 y.o. 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    Patients history dates back to May 3/2024 when he was experiencing SOB and presented to the ER and found to be COVID+.  He had a CT scan of the chest on 5/3/24 which showed show a 6.2 x 4.8 x 4.8 cm left upper lobe mass consistent with a lung primary. Findings are consistent also with chronic interstitial lung disease/fibrosis. He also had patchy groundglass opacities bilaterally suggesting infection/inflammation. Bilateral adrenal masses were noted to measure up to 3 cm on the right and 3.5 cm on the left with a -8 Hounsfield unit consistent with a benign adrenal adenoma.     He saw Jennifer Brown on 5/20/24.  A PET scan was done on 5/29/24, which again showed a hypermetabolic VANESA mass. No other metastatic disease. She ordered a lung biopsy which was completed on 6/11/24.  This showed a poorly differentiated squamous cell carcinoma.    Of note, he was a smoker but quit when he entered the hospital on 5/3/24.     He lives in [place] and has a roommate.     Interval History  He is feeling relatively well and he is relatively asymptomatic.  He saw Dr. London on 8/1/24 and had radiosurgery to left upper lobe.    Past Medical History:   Diagnosis Date    Acute exacerbation of chronic obstructive pulmonary disease (COPD) (HCC) 07/01/2021    Acute hypoxemic respiratory failure (HCC) 06/08/2021    Acute respiratory failure with hypoxia (HCC) 04/29/2024    Acute urinary retention 06/07/2021    Aortic atherosclerosis (HCC)     Carotid stenosis,  bilateral - mild     Cerebral infarction (HCC) - based on CT head 2022     Cerebral infarction (HCC) - based on CT head 2022     Chronic obstructive pulmonary disease (HCC)     Hypercholesteremia     Hypertension     Intertrochanteric fracture of femur (HCC) 2021    Other emphysema (HCC) 2019    Supplemental oxygen dependent 2021        Past Surgical History:   Procedure Laterality Date    PB TREAT INTER/SUBTROCH FX,W/PLATE/SCREW Left 2021    Procedure: FIXATION, FRACTURE, HIP, USING DYNAMIC HIP SCREW, WITH COMPRESSION;  Surgeon: Avinash Suazo M.D.;  Location: SURGERY [facility];  Service: Orthopedics    HERNIA REPAIR Right     ORIF, FEMUR Right     15 years ago    OTHER      right inguinal hernia repair    OTHER ORTHOPEDIC SURGERY      right hip       Social History     Tobacco Use    Smoking status: Former     Current packs/day: 0.00     Average packs/day: 1 pack/day for 49.3 years (49.3 ttl pk-yrs)     Types: Cigarettes     Start date:      Quit date: 2024     Years since quittin.7    Smokeless tobacco: Never    Tobacco comments:     vape pen & cigarettes     49 years total andria an average of 1 ppd   Vaping Use    Vaping status: Former    Substances: Nicotine   Substance Use Topics    Alcohol use: Not Currently     Comment: not often    Drug use: Not Currently     Types: Marijuana     Comment: THC edibles - rarely/ NOT IN MONTHS        Family History   Problem Relation Age of Onset    Breast Cancer Mother     Cancer Mother         breast cancer    Stroke Mother     Other Father         cirrhosis form alcohol    Stroke Sister     Heart Disease Brother         cardiac aneurysm    Hypertension Maternal Grandmother     Hyperlipidemia Maternal Grandmother     Ovarian Cancer Neg Hx     Tubal Cancer Neg Hx     Colorectal Cancer Neg Hx     Peritoneal Cancer Neg Hx        Allergies   Allergen Reactions    Seasonal        Current Outpatient Medications   Medication Sig Dispense  Refill    amLODIPine (NORVASC) 5 MG Tab Take 1 Tablet by mouth every day. 100 Tablet 3    albuterol 108 (90 Base) MCG/ACT Aero Soln inhalation aerosol Inhale 2 Puffs every four hours as needed for Shortness of Breath. 18 g 11    Zoster Vac Recomb Adjuvanted (SHINGRIX) 50 MCG/0.5ML Recon Susp Inject  into the shoulder, thigh, or buttocks. 0.5 mL 0    COVID-19 mRNA Vac-[place],Pfizer, (COMIRNATY) 30 MCG/0.3ML Suspension Prefilled Syringe injection Inject  into the shoulder, thigh, or buttocks. 0.3 mL 0    traZODone (DESYREL) 50 MG Tab Take 1 tablet by mouth every evening. 30 Tablet 0    lisinopril (PRINIVIL) 20 MG Tab Take 1 Tablet by mouth 2 times a day. 60 Tablet 11    rosuvastatin (CRESTOR) 10 MG Tab Take 1 Tablet by mouth every evening. 100 Tablet 0    levothyroxine (SYNTHROID) 75 MCG Tab Take 1 Tablet by mouth every morning on an empty stomach. 30 Tablet 11    Tiotropium Bromide-Olodaterol (STIOLTO RESPIMAT) 2.5-2.5 MCG/ACT Aero Soln Take 2 puffs by mouth every day. 12 g 3    Misc. Devices Misc Portable oxygen concentrator (POC), at 2L/min via NC. 1 Each 0    Fexofenadine HCl (MUCINEX ALLERGY PO) Take  by mouth every day.      Home Care Oxygen Inhale 5 L/min continuous. O2 LPM @: 5 LPM  Specify Usage: Continuous  Type of O2: Gas  Oxygen via: N/C  Oxygen Modality: Concentrator and Portable Tank  Humidification: Yes      acetaminophen (TYLENOL) 325 MG Tab Take 500 mg by mouth every 6 hours as needed for Mild Pain.      Melatonin 10 MG Tab Take 30 mg by mouth every evening.      aspirin (ASA) 81 MG Chew Tab chewable tablet Chew 81 mg every evening. 100 Tablet     docosahexanoic acid (OMEGA 3 FA) 1000 MG Cap Take 1,000 mg by mouth every day.      Multiple Vitamin (MULTIVITAMIN ADULT PO) Take 1 Tablet by mouth every day.      calcium polycarbophil (FIBERCON) 625 MG Tab Take 625 mg by mouth every day.       No current facility-administered medications for this encounter.       Review of Systems   Constitutional:  Positive  "for malaise/fatigue. Negative for chills, fever and weight loss.   HENT:  Negative for congestion, ear pain, nosebleeds and sore throat.    Eyes:  Negative for blurred vision.   Respiratory:  Positive for cough and shortness of breath. Negative for sputum production and wheezing.    Cardiovascular:  Negative for chest pain, palpitations, orthopnea and leg swelling.   Gastrointestinal:  Negative for abdominal pain, heartburn, nausea and vomiting.   Genitourinary:  Negative for dysuria, frequency and urgency.   Musculoskeletal:  Negative for neck pain.   Neurological:  Negative for dizziness, tingling, tremors, sensory change, focal weakness and headaches.   Endo/Heme/Allergies:  Does not bruise/bleed easily.   Psychiatric/Behavioral:  Negative for depression, memory loss and suicidal ideas.    All other systems reviewed and are negative.      Problem list, medications, and allergies reviewed by myself today in Epic.     Objective:     Vitals:    01/10/25 1259   BP: 102/60   BP Location: Left arm   Patient Position: Sitting   Pulse: 81   Temp: 36.7 °C (98.1 °F)   Weight: 72.1 kg (159 lb)   Height: 1.626 m (5' 4\")         DESC; KARNOFSKY SCALE WITH ECOG EQUIVALENT: 60, Requires occasional assistance, but is able to care for most of his personal needs (ECOG equivalent 2)    DISTRESS LEVEL: no acute distress    PE- deferred.      Pathology:    A. Left lung mass biopsy:          Moderately to poorly differentiated squamous cell carcinoma in a           background of abundant necrosis.     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 on 6/30/2024    #1.  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 NGS: DNMT3A, STAG2, TERT, TP53.   He had SRS for his lung cancer. He is on Keytruda 400 mg IV every 6 weeks and that he is tolerating very well.  I " will go ahead with cycle 4 Keytruda 400 mg today and every 4 weeks.      Patient had a follow-up PET scan on January 2, 2025 which shows a significant improvement but the left over hypermetabolic activity in the left upper lung.  I reviewed PET scan film and I showed him his previous PET scan film which showed a significant response.    I will follow him in 6 weeks for continuous care.    #2.  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  His TSH went up to 27.50 on 1/8/2024.  He is on Synthroid 75 mcg p.o. daily and I will increase his dose to Synthroid 150 mcg p.o. daily.    Any questions and concerns raised by the patient were addressed and answered. Patient denies any further questions.  Patient encouraged to call the office with any concerns or issues.   Thank you for allowing me to participate in your patient care.

## 2025-01-10 NOTE — PROGRESS NOTES
Bright presents to infusion for cycle 4/ day 1 of Keytruda for NSC lung. He denies having any new or acute complaints today, reports tolerating past treatments well. PIV started with positive return.     Labs reviewed by RN, within parameters for treatment today. TSH addressed by Dr. Melara in the office with patient.     Pre-treatment meds given as ordered.     keytruda given over 30 minutes.  Patient tolerated well with no adverse effects.     PIV flushed post infusion with positive blood return, removed with tip intact. Patient left in stable condition, knows when to return for next appointment.

## 2025-01-10 NOTE — PROGRESS NOTES
Chemotherapy Verification - PRIMARY RN      Height = 1.626 m  Weight = 72.1 kg  BSA = 1.8 m2       Medication: pembrolizumab (Keytruda)  Dose: 400 mg set dose  Calculated Dose: 400 mg                             (In mg/m2, AUC, mg/kg)     I confirm this process was performed independently with the BSA and all final chemotherapy dosing calculations congruent.  Any discrepancies of 10% or greater have been addressed with the chemotherapy pharmacist. The resolution of the discrepancy has been documented in the EPIC progress notes.

## 2025-01-10 NOTE — PROGRESS NOTES
Chemotherapy Verification - SECONDARY RN       Height = 1.626m  Weight = 72.1kg  BSA = 1.8m2       Medication: pembrolizumab (Keytruda)  Dose: 400mg set dose  Calculated Dose: 400mg set dose                             (In mg/m2, AUC, mg/kg)       I confirm that this process was performed independently.

## 2025-01-10 NOTE — PROGRESS NOTES
"Pharmacy Chemotherapy Calculation:    Dx: NSCLC        Protocol: Pembrolizumab     *Dosing Reference*  Pembrolizumab 400 mg IV over 30 minutes on Day 1  42-day cycle until disease progression or unacceptable toxicity or until up to 24 months of therapy has been completed   NCCN Guidelines® for Non-Small Cell Lung Cancer V.1.2024.    Bassem KOTHARI et al. J Clin Oncol. 2019;37(28):1276-3567.c   Venkatesh M, et al. N Engl J Med. 2016;375(19):9282-4065.a   David WONG et al. Lancet. 2019;393(81169):3315-1488.a   Isabella M, et al. Eur J Cancer. 2020;131:68-75.b    Allergies:  Seasonal     /64   Pulse 80   Temp 36.4 °C (97.6 °F) (Temporal)   Resp 16   Ht 1.63 m (5' 4.17\")   Wt 72.6 kg (160 lb 0.9 oz)   SpO2 91%   BMI 27.33 kg/m²  Body surface area is 1.81 meters squared.    Labs 1/8/25  ANC~ 6350 Plt = 392k   Hgb = 13.3     SCr = 0.89 mg/dL CrCl ~ 74.1 mL/min   LFT's = WNLs  TBili = <0.2   TSH = 27.5    Drug Order   (Drug name, dose, route, IV Fluid & volume, frequency, number of doses) Cycle 4  Previous treatment: C3 11/29/24     Medication = Pembrolizumab  Base Dose = 400 mg  Fixed dose, no calc required  Final Dose = 400 mg  Route = IV  Fluid & Volume = NS 50 mL  Admin Duration = Over 30 minutes          Fixed dose, ok to treat with final dose     By my signature below, I confirm this process was performed independently with the BSA and all final chemotherapy dosing calculations congruent. I have reviewed the above chemotherapy order and that my calculation of the final dose and BSA (when applicable) corroborate those calculations of the  pharmacist. Discrepancies of 10% or greater in the written dose have been addressed and documented within the [facility] Progress notes.    Alton Koch, PharmD  "

## 2025-02-06 DIAGNOSIS — G47.00 INSOMNIA, UNSPECIFIED TYPE: ICD-10-CM

## 2025-02-06 DIAGNOSIS — E78.5 DYSLIPIDEMIA: ICD-10-CM

## 2025-02-06 PROCEDURE — RXMED WILLOW AMBULATORY MEDICATION CHARGE

## 2025-02-06 PROCEDURE — RXMED WILLOW AMBULATORY MEDICATION CHARGE: Performed by: STUDENT IN AN ORGANIZED HEALTH CARE EDUCATION/TRAINING PROGRAM

## 2025-02-06 PROCEDURE — RXMED WILLOW AMBULATORY MEDICATION CHARGE: Performed by: INTERNAL MEDICINE

## 2025-02-06 RX ORDER — ROSUVASTATIN CALCIUM 10 MG/1
10 TABLET, COATED ORAL EVERY EVENING
Qty: 100 TABLET | Refills: 0 | Status: SHIPPED | OUTPATIENT
Start: 2025-02-06

## 2025-02-07 ENCOUNTER — TELEPHONE (OUTPATIENT)
Dept: HEMATOLOGY ONCOLOGY | Facility: MEDICAL CENTER | Age: 75
End: 2025-02-07
Payer: MEDICARE

## 2025-02-07 DIAGNOSIS — C34.12 SQUAMOUS CELL CARCINOMA OF UPPER LOBE OF LEFT LUNG (HCC): ICD-10-CM

## 2025-02-07 DIAGNOSIS — E03.2 HYPOTHYROIDISM DUE TO MEDICATION: ICD-10-CM

## 2025-02-07 DIAGNOSIS — R91.1 LUNG NODULE: ICD-10-CM

## 2025-02-07 DIAGNOSIS — Z79.899 ENCOUNTER FOR LONG-TERM (CURRENT) USE OF HIGH-RISK MEDICATION: ICD-10-CM

## 2025-02-07 PROCEDURE — RXMED WILLOW AMBULATORY MEDICATION CHARGE: Performed by: NURSE PRACTITIONER

## 2025-02-07 RX ORDER — TRAZODONE HYDROCHLORIDE 50 MG/1
50 TABLET ORAL NIGHTLY
Qty: 30 TABLET | Refills: 0 | Status: SHIPPED | OUTPATIENT
Start: 2025-02-07

## 2025-02-07 NOTE — TELEPHONE ENCOUNTER
Returned Pt call r/t increase WOB over the past 2 weeks.with increasing pain in the left upper lung,  Consulted with Dr Orona with Dr Melara out.  Last CT fof the chest on 11/18/24. Dr Orona stated to get another CT of the chest next week and see about having the Pt is to see her next week as the Pts next appt with Dr Melara is 2/21.  Letting Dr Melara know and he and Dr Orona can discuss. Jesus will set up the CT.  
45-year-old male with history of diabetes and depression/anxiety brought in by EMS and police for evaluation of agitation with SI and HI.  The patient also notes that he has been having difficulty controlling his glucose over the past several days so he took 2 extra units of long-acting insulin tonight.  Patient states that he also took some Klonopin approximately 2 tablets and his usual dose of Seroquel.  Patient does admit to drinking a lot of alcohol tonight.  The patient was apparently making threats towards himself and his wife.  He is currently living 3 hours away from his wife and notes that he had a visit with her over the weekend and took his Klonopin more often due to stress.

## 2025-02-14 ENCOUNTER — PHARMACY VISIT (OUTPATIENT)
Dept: PHARMACY | Facility: MEDICAL CENTER | Age: 75
End: 2025-02-14
Payer: COMMERCIAL

## 2025-02-18 ENCOUNTER — HOSPITAL ENCOUNTER (OUTPATIENT)
Dept: LAB | Facility: MEDICAL CENTER | Age: 75
End: 2025-02-18
Attending: INTERNAL MEDICINE
Payer: MEDICARE

## 2025-02-18 DIAGNOSIS — E03.2 HYPOTHYROIDISM DUE TO MEDICATION: ICD-10-CM

## 2025-02-18 DIAGNOSIS — Z79.899 ENCOUNTER FOR LONG-TERM (CURRENT) USE OF HIGH-RISK MEDICATION: ICD-10-CM

## 2025-02-18 DIAGNOSIS — C34.12 SQUAMOUS CELL CARCINOMA OF UPPER LOBE OF LEFT LUNG (HCC): ICD-10-CM

## 2025-02-18 LAB
BASOPHILS # BLD AUTO: 0.8 % (ref 0–1.8)
BASOPHILS # BLD: 0.06 K/UL (ref 0–0.12)
EOSINOPHIL # BLD AUTO: 0.2 K/UL (ref 0–0.51)
EOSINOPHIL NFR BLD: 2.6 % (ref 0–6.9)
ERYTHROCYTE [DISTWIDTH] IN BLOOD BY AUTOMATED COUNT: 44.8 FL (ref 35.9–50)
HCT VFR BLD AUTO: 41 % (ref 42–52)
HGB BLD-MCNC: 12.6 G/DL (ref 14–18)
IMM GRANULOCYTES # BLD AUTO: 0.03 K/UL (ref 0–0.11)
IMM GRANULOCYTES NFR BLD AUTO: 0.4 % (ref 0–0.9)
LYMPHOCYTES # BLD AUTO: 1.88 K/UL (ref 1–4.8)
LYMPHOCYTES NFR BLD: 24.3 % (ref 22–41)
MCH RBC QN AUTO: 27 PG (ref 27–33)
MCHC RBC AUTO-ENTMCNC: 30.7 G/DL (ref 32.3–36.5)
MCV RBC AUTO: 87.8 FL (ref 81.4–97.8)
MONOCYTES # BLD AUTO: 0.78 K/UL (ref 0–0.85)
MONOCYTES NFR BLD AUTO: 10.1 % (ref 0–13.4)
NEUTROPHILS # BLD AUTO: 4.79 K/UL (ref 1.82–7.42)
NEUTROPHILS NFR BLD: 61.8 % (ref 44–72)
NRBC # BLD AUTO: 0 K/UL
NRBC BLD-RTO: 0 /100 WBC (ref 0–0.2)
PLATELET # BLD AUTO: 360 K/UL (ref 164–446)
PMV BLD AUTO: 9.5 FL (ref 9–12.9)
RBC # BLD AUTO: 4.67 M/UL (ref 4.7–6.1)
WBC # BLD AUTO: 7.7 K/UL (ref 4.8–10.8)

## 2025-02-18 PROCEDURE — 80053 COMPREHEN METABOLIC PANEL: CPT

## 2025-02-18 PROCEDURE — 36415 COLL VENOUS BLD VENIPUNCTURE: CPT

## 2025-02-18 PROCEDURE — 82378 CARCINOEMBRYONIC ANTIGEN: CPT

## 2025-02-18 PROCEDURE — 84439 ASSAY OF FREE THYROXINE: CPT

## 2025-02-18 PROCEDURE — 84443 ASSAY THYROID STIM HORMONE: CPT

## 2025-02-18 PROCEDURE — 85025 COMPLETE CBC W/AUTO DIFF WBC: CPT

## 2025-02-19 LAB
ALBUMIN SERPL BCP-MCNC: 3.9 G/DL (ref 3.2–4.9)
ALBUMIN/GLOB SERPL: 1 G/DL
ALP SERPL-CCNC: 79 U/L (ref 30–99)
ALT SERPL-CCNC: 15 U/L (ref 2–50)
ANION GAP SERPL CALC-SCNC: 11 MMOL/L (ref 7–16)
AST SERPL-CCNC: 24 U/L (ref 12–45)
BILIRUB SERPL-MCNC: 0.2 MG/DL (ref 0.1–1.5)
BUN SERPL-MCNC: 17 MG/DL (ref 8–22)
CALCIUM ALBUM COR SERPL-MCNC: 9.5 MG/DL (ref 8.5–10.5)
CALCIUM SERPL-MCNC: 9.4 MG/DL (ref 8.5–10.5)
CEA SERPL-MCNC: 3.5 NG/ML (ref 0–3)
CHLORIDE SERPL-SCNC: 102 MMOL/L (ref 96–112)
CO2 SERPL-SCNC: 23 MMOL/L (ref 20–33)
CREAT SERPL-MCNC: 0.92 MG/DL (ref 0.5–1.4)
GFR SERPLBLD CREATININE-BSD FMLA CKD-EPI: 87 ML/MIN/1.73 M 2
GLOBULIN SER CALC-MCNC: 4.1 G/DL (ref 1.9–3.5)
GLUCOSE SERPL-MCNC: 117 MG/DL (ref 65–99)
POTASSIUM SERPL-SCNC: 5 MMOL/L (ref 3.6–5.5)
PROT SERPL-MCNC: 8 G/DL (ref 6–8.2)
SODIUM SERPL-SCNC: 136 MMOL/L (ref 135–145)
T4 FREE SERPL-MCNC: 1.8 NG/DL (ref 0.93–1.7)
TSH SERPL-ACNC: 0.1 UIU/ML (ref 0.35–5.5)

## 2025-02-19 RX ORDER — PROCHLORPERAZINE MALEATE 10 MG
10 TABLET ORAL EVERY 6 HOURS PRN
Status: CANCELLED | OUTPATIENT
Start: 2025-02-21

## 2025-02-19 RX ORDER — ONDANSETRON 8 MG/1
8 TABLET, ORALLY DISINTEGRATING ORAL PRN
Status: CANCELLED | OUTPATIENT
Start: 2025-02-21

## 2025-02-19 RX ORDER — 0.9 % SODIUM CHLORIDE 0.9 %
3 VIAL (ML) INJECTION PRN
Status: CANCELLED | OUTPATIENT
Start: 2025-02-21

## 2025-02-19 RX ORDER — 0.9 % SODIUM CHLORIDE 0.9 %
10 VIAL (ML) INJECTION PRN
Status: CANCELLED | OUTPATIENT
Start: 2025-02-21

## 2025-02-19 RX ORDER — ONDANSETRON 2 MG/ML
4 INJECTION INTRAMUSCULAR; INTRAVENOUS PRN
Status: CANCELLED | OUTPATIENT
Start: 2025-02-21

## 2025-02-19 RX ORDER — 0.9 % SODIUM CHLORIDE 0.9 %
VIAL (ML) INJECTION PRN
Status: CANCELLED | OUTPATIENT
Start: 2025-02-21

## 2025-02-19 RX ORDER — EPINEPHRINE 1 MG/ML(1)
0.5 AMPUL (ML) INJECTION PRN
Status: CANCELLED | OUTPATIENT
Start: 2025-02-21

## 2025-02-19 RX ORDER — DIPHENHYDRAMINE HYDROCHLORIDE 50 MG/ML
50 INJECTION INTRAMUSCULAR; INTRAVENOUS PRN
Status: CANCELLED | OUTPATIENT
Start: 2025-02-21

## 2025-02-19 RX ORDER — SODIUM CHLORIDE 9 MG/ML
INJECTION, SOLUTION INTRAVENOUS CONTINUOUS
Status: CANCELLED | OUTPATIENT
Start: 2025-02-21

## 2025-02-19 RX ORDER — METHYLPREDNISOLONE SODIUM SUCCINATE 125 MG/2ML
125 INJECTION, POWDER, LYOPHILIZED, FOR SOLUTION INTRAMUSCULAR; INTRAVENOUS PRN
Status: CANCELLED | OUTPATIENT
Start: 2025-02-21

## 2025-02-19 NOTE — Clinical Note
[facility]  [address] [place] [address] [place] [address]    [id number]    Bright Shirley  [address]  [place] [address]    February 19, 2025    Member Name: Bright Shirley   Member Number: [id number]   Reference Number: [id number]   Approved Services: MRI/CAT Scan   Approved Service Dates: 02/07/2025 - 06/19/2025   Requesting Provider: Candice Orona   Requested Provider: [facility]     Dear Bright Shirley:    The following medical service(s) requested by Candice Orona have been approved:    Procedure Code Procedure Code Name Approved Quantity Status   27950 (CPT®) CHG DIAGNOSTIC COMPUTED TOMOGRAPHY THORAX W/O CNTRST 1 Authorized       The services should be provided by [facility] no later than 06/19/2025. Please contact the provider listed below with any questions.     Provider Information:  [facility]  [phone]    Your plan benefit may require a deductible, co-payment or coinsurance for these services. This authorization does not guarantee [facility] will pay the claim for services that you receive. Payment by [facility] for these services is subject to the terms of your Evidence of Coverage, your eligibility at the time of service, and confirmation of benefit coverage.    For any questions or additional information, please contact Customer Service:    [employer] Toll Free: [phone]  TTY users dial: 711   Call Center Hours:  Oct 1 - Mar 31, Mon - Fri 7 AM to 8 PM PST  Oct 1 - Mar 31, Sat - Sun 8 AM to 8 PM PST  Apr 1 - Sep 30, Mon - Fri 7 AM to 8 PM PST   Office Hours: Mon - Fri 8 AM to 5 PM PST   E-mail: [email or web address]   Website:  [email or web address]      This information is available for free in other languages. Please contact Customer Service at the phone number above for more information. [facility] complies with applicable Federal civil  rights laws and does not discriminate on the basis of race, color, national origin, age, disability or sex.    Sincerely,     Healthcare Utilization Management Department     Cc: [facility]   Candice Orona    MultiSeraLanguage Insert  Multi- Services  English: We have free  services to answer any questions you may have about our health or drug plan.  To get an , just call us at [phone].  Someone who speaks English/Language can help you.  This is a free service.  Malay: Tenemos servicios de intérprete sin costo alguno  para responder cualquier pregunta que pueda tener sobre nuestro plan de sam o medicamentos. Para hablar con un intérprete, por favor llame al [phone]. Alguien que hable español le podrá ayudar. Dalila es un servicio gratuito.  Chinese Mandarin: ?????????????????????????????? ???????????????? [phone]????????????????? ?????????  Chinese Cantonese: ?????????????????????????????? ????????????? [phone]???????????????????? ????????  Tagalog:  Mayroon kaming libreng serbisyo sa pagsasaling-alicia perdomoumang a shirley rollengjanina sa binug koreyong emilgmario o panggamot.  David guerrero tagasaling-rudolph vargas sonam sa [phone]. Hina gunderson  Tagalog.  Van ay libreng serbisyo.  Occitan:  Nous proposons edison services gratuits d'interprétation pour répondre à toutes merced questions relatives à notre régime de santé ou d'assurance-médicaments. Pour accéder au service d'interprétation, il vous suffit de nous appeler au [phone]. Un interlocuteur [facility] pourra vous aider. Ce service est gratuit.  Rwandan:  Tanja mae có d?ch v? thông d?ch mi?n phí ð? tr? l?i các câu h?i v? chýõng s?c kh?e và chýõng trìn thu?c men. N?u quí v? c?n thông d?ch viên barbara g?i [phone] s? có nhân viên nói ti?ng Vi?t giúp ð? quí v?. Ðây là d?ch v? mi?n  phí .  Yemeni:  Unser [facility] Dolmetscherservice beantwortet Ihren Fragen zu unserem Gesundheits- und Arzneimittelplan. Unsere Dolmetscher erreichen Sie [phone]. Man penny Ihnen pepito auf [facility]. Dieser Service ist [facility].  Japanese:  ??? ?? ?? ?? ?? ??? ?? ??? ?? ???? ?? ?? ???? ???? ????. ?? ???? ????? ?? [phone] ??? ??? ????.  ???? ?? ???? ?? ?? ????. ? ???? ??? ?????.   Dominican: Åñëè ó âàñ âîçíèêíóò âîïðîñû îòíîñèòåëüíî ñòðàõîâîãî èëè ìåäèêàìåíòíîãî ïëàíà, âû ìîæåòå âîñïîëüçîâàòüñÿ íàøèìè áåñïëàòíûìè óñëóãàìè ïåðåâîä÷èêîâ. ×òîáû âîñïîëüçîâàòüñÿ óñëóãàìè ïåðåâîä÷èêà, ïîçâîíèòå íàì ïî òåëåôîíó [phone]. Âàì îêàæåò ïîìîùü ñîòðóäíèê, êîòîðûé ãîâîðèò ïî-póññêè. Äàííàÿ óñëóãà áåñïëàòíàÿ.  Occitan: ÅääÇ äÞÏã ÎÏãÇÊ ÇáãÊÑÌã ÇáÝæÑí ÇáãÌÇäíÉ ááÅÌÇÈÉ Úä Ãí ÃÓÆáÉ ÊÊÚáÞ ÈÇáÕÍÉ Ãæ ÌÏæá ÇáÃÏæíÉ áÏíäÇ. ááÍÕæá Úáì ãÊÑÌã ÝæÑí¡ áíÓ Úáíß Óæì ÇáÇÊÕÇá ÈäÇ Úáì [phone] . ÓíÞæã ÔÎÕ ãÇ íÊÍÏË ÇáÚÑÈíÉ ÈãÓÇÚÏÊß. åÐå ÎÏãÉ ãÌÇäíÉ.  Abbie: ????? ????????? ?? ??? ?? ????? ?? ???? ??? ???? ???? ?? ?????? ?? ???? ???? ?? ??? ????? ??? ????? ???????? ?????? ?????? ???. ?? ???????? ??????? ???? ?? ???, ?? ???? [phone] ?? ??? ????. ??? ??????? ?? ?????? ????? ?? ???? ??? ?? ???? ??. ?? ?? ????? ???? ??.   Macedonian:  È disponibile un servizio di interpretariato gratuito per rispondere a eventuali domande sul nostro piano sanitario e farmaceutico. Per un interprete, contattare il glenn [phone]. Un nostro incaricato delfina parla Italianovi fornirà l'assistenza necessaria. È un servizio gratuito.  Portugués:  Dispomos de serviços de interpretação gratuitos para responder a qualquer questão que tenha acerca do nosso plano de saúde ou de medicação. Para obter um intérprete, contacte-nos através do número [phone]. Irá encontrar alguém que fale o idioma  Português para o ajudar. Dalila serviço é gratuito.  Telugu Creole:  Nou genyen sèvis entèprèt gratis glenny reponn todenise smith ou ta genyetravis sharma  plan medikal oswa dwòg nou an.  David jwenn yon entèprèt, jis rele nou nan [phone]. Edinson winn pale Kreyòl kapab drew w.  Sa a se yon sèvis ki gratis.  Polish:  Umo¿liwiamy bezp³atne skorzystanie z us³ug t³umacza ustnego, który pomo¿e w uzyskaniu odpowiedzi na temat planu zdrowotnego lub dawkowania leków. Lana skorzystaæ z pomocy t³umacza znaj¹cego ronna self¿y zadzwoniæ pod numer [phone]. Ta us³uga jest bezp³atna.  Marshallese: ????? ??????? ????????????????????? ??????????????????????????????????[phone] ???????????????? ? ????????????????? ?????

## 2025-02-19 NOTE — PROGRESS NOTES
"Pharmacy Chemotherapy calculation:    DX: Squamous cell lung cancer    Cycle 5  Previous treatment = C4 1/10/25    Regimen: Pembrolizumab    *Dosing Reference*  Pembrolizumab 400 mg IV over 30 minutes on Day 1  42-day cycle until disease progression or unacceptable toxicity or until up to 24 months of therapy has been completed  NCCN Guidelines for Non-Small Cell Lung Cancer V.1.2024.  Eduardoon EB, et al. J Clin Oncol. 2019;37(28):5254-1416.  Venkatesh M, et al. N Engl J Med. 2016;375(82)3770014.    Allergies: Seasonal  /64   Pulse 84   Temp 36.6 °C (97.9 °F) (Temporal)   Resp 17   Ht 1.63 m (5' 4.17\")   Wt 70.9 kg (156 lb 4.9 oz)   SpO2 91%   BMI 26.69 kg/m²   Body surface area is 1.79 meters squared.    Labs 2/18/25:  ANC~ 4790 Plt = 360k   Hgb = 12.6     SCr = 0.92 mg/dL CrCl ~ 70 mL/min   AST/ALT/AP = WNL TBili = 0.2  TSH = 0.1 Free T4 = 1.8         Pembrolizumab 400 mg fixed dose = 400 mg   <10% difference, okay to treat with final dose = 400 mg IV    Holly Morales, PharmD    "

## 2025-02-21 ENCOUNTER — HOSPITAL ENCOUNTER (OUTPATIENT)
Dept: HEMATOLOGY ONCOLOGY | Facility: MEDICAL CENTER | Age: 75
End: 2025-02-21
Attending: INTERNAL MEDICINE
Payer: MEDICARE

## 2025-02-21 ENCOUNTER — OUTPATIENT INFUSION SERVICES (OUTPATIENT)
Dept: ONCOLOGY | Facility: MEDICAL CENTER | Age: 75
End: 2025-02-21
Attending: INTERNAL MEDICINE
Payer: MEDICARE

## 2025-02-21 VITALS
DIASTOLIC BLOOD PRESSURE: 72 MMHG | SYSTOLIC BLOOD PRESSURE: 146 MMHG | BODY MASS INDEX: 29.72 KG/M2 | TEMPERATURE: 97.7 F | OXYGEN SATURATION: 83 % | WEIGHT: 174.05 LBS | HEART RATE: 102 BPM | HEIGHT: 64 IN

## 2025-02-21 VITALS
HEIGHT: 64 IN | SYSTOLIC BLOOD PRESSURE: 118 MMHG | OXYGEN SATURATION: 91 % | RESPIRATION RATE: 17 BRPM | BODY MASS INDEX: 26.69 KG/M2 | TEMPERATURE: 97.9 F | WEIGHT: 156.31 LBS | DIASTOLIC BLOOD PRESSURE: 64 MMHG | HEART RATE: 84 BPM

## 2025-02-21 DIAGNOSIS — C34.12 SQUAMOUS CELL CARCINOMA OF UPPER LOBE OF LEFT LUNG (HCC): ICD-10-CM

## 2025-02-21 DIAGNOSIS — E03.2 HYPOTHYROIDISM DUE TO MEDICATION: ICD-10-CM

## 2025-02-21 PROCEDURE — RXMED WILLOW AMBULATORY MEDICATION CHARGE: Performed by: INTERNAL MEDICINE

## 2025-02-21 PROCEDURE — 700111 HCHG RX REV CODE 636 W/ 250 OVERRIDE (IP): Mod: JZ,TB | Performed by: NURSE PRACTITIONER

## 2025-02-21 PROCEDURE — 99212 OFFICE O/P EST SF 10 MIN: CPT | Performed by: INTERNAL MEDICINE

## 2025-02-21 PROCEDURE — 700105 HCHG RX REV CODE 258: Performed by: NURSE PRACTITIONER

## 2025-02-21 PROCEDURE — 96413 CHEMO IV INFUSION 1 HR: CPT

## 2025-02-21 PROCEDURE — 99214 OFFICE O/P EST MOD 30 MIN: CPT | Performed by: INTERNAL MEDICINE

## 2025-02-21 RX ORDER — 0.9 % SODIUM CHLORIDE 0.9 %
3 VIAL (ML) INJECTION PRN
OUTPATIENT
Start: 2025-05-16

## 2025-02-21 RX ORDER — PROCHLORPERAZINE MALEATE 10 MG
10 TABLET ORAL EVERY 6 HOURS PRN
OUTPATIENT
Start: 2025-04-04

## 2025-02-21 RX ORDER — 0.9 % SODIUM CHLORIDE 0.9 %
10 VIAL (ML) INJECTION PRN
OUTPATIENT
Start: 2025-04-03

## 2025-02-21 RX ORDER — ONDANSETRON 2 MG/ML
4 INJECTION INTRAMUSCULAR; INTRAVENOUS PRN
OUTPATIENT
Start: 2025-05-16

## 2025-02-21 RX ORDER — METHYLPREDNISOLONE SODIUM SUCCINATE 125 MG/2ML
125 INJECTION, POWDER, LYOPHILIZED, FOR SOLUTION INTRAMUSCULAR; INTRAVENOUS PRN
OUTPATIENT
Start: 2025-05-16

## 2025-02-21 RX ORDER — 0.9 % SODIUM CHLORIDE 0.9 %
10 VIAL (ML) INJECTION PRN
OUTPATIENT
Start: 2025-05-16

## 2025-02-21 RX ORDER — 0.9 % SODIUM CHLORIDE 0.9 %
3 VIAL (ML) INJECTION PRN
OUTPATIENT
Start: 2025-04-04

## 2025-02-21 RX ORDER — ONDANSETRON 2 MG/ML
4 INJECTION INTRAMUSCULAR; INTRAVENOUS PRN
OUTPATIENT
Start: 2025-04-04

## 2025-02-21 RX ORDER — 0.9 % SODIUM CHLORIDE 0.9 %
10 VIAL (ML) INJECTION PRN
OUTPATIENT
Start: 2025-05-15

## 2025-02-21 RX ORDER — 0.9 % SODIUM CHLORIDE 0.9 %
3 VIAL (ML) INJECTION PRN
OUTPATIENT
Start: 2025-04-03

## 2025-02-21 RX ORDER — 0.9 % SODIUM CHLORIDE 0.9 %
10 VIAL (ML) INJECTION PRN
OUTPATIENT
Start: 2025-04-04

## 2025-02-21 RX ORDER — ONDANSETRON 8 MG/1
8 TABLET, ORALLY DISINTEGRATING ORAL PRN
OUTPATIENT
Start: 2025-05-16

## 2025-02-21 RX ORDER — SODIUM CHLORIDE 9 MG/ML
INJECTION, SOLUTION INTRAVENOUS CONTINUOUS
OUTPATIENT
Start: 2025-05-16

## 2025-02-21 RX ORDER — SODIUM CHLORIDE 9 MG/ML
INJECTION, SOLUTION INTRAVENOUS CONTINUOUS
OUTPATIENT
Start: 2025-04-04

## 2025-02-21 RX ORDER — 0.9 % SODIUM CHLORIDE 0.9 %
VIAL (ML) INJECTION PRN
OUTPATIENT
Start: 2025-05-16

## 2025-02-21 RX ORDER — 0.9 % SODIUM CHLORIDE 0.9 %
3 VIAL (ML) INJECTION PRN
OUTPATIENT
Start: 2025-05-15

## 2025-02-21 RX ORDER — PROCHLORPERAZINE MALEATE 10 MG
10 TABLET ORAL EVERY 6 HOURS PRN
OUTPATIENT
Start: 2025-05-16

## 2025-02-21 RX ORDER — DIPHENHYDRAMINE HYDROCHLORIDE 50 MG/ML
50 INJECTION INTRAMUSCULAR; INTRAVENOUS PRN
OUTPATIENT
Start: 2025-05-16

## 2025-02-21 RX ORDER — METHYLPREDNISOLONE SODIUM SUCCINATE 125 MG/2ML
125 INJECTION, POWDER, LYOPHILIZED, FOR SOLUTION INTRAMUSCULAR; INTRAVENOUS PRN
OUTPATIENT
Start: 2025-04-04

## 2025-02-21 RX ORDER — 0.9 % SODIUM CHLORIDE 0.9 %
VIAL (ML) INJECTION PRN
OUTPATIENT
Start: 2025-05-15

## 2025-02-21 RX ORDER — 0.9 % SODIUM CHLORIDE 0.9 %
VIAL (ML) INJECTION PRN
OUTPATIENT
Start: 2025-04-03

## 2025-02-21 RX ORDER — EPINEPHRINE 1 MG/ML(1)
0.5 AMPUL (ML) INJECTION PRN
OUTPATIENT
Start: 2025-04-04

## 2025-02-21 RX ORDER — ONDANSETRON 8 MG/1
8 TABLET, ORALLY DISINTEGRATING ORAL PRN
OUTPATIENT
Start: 2025-04-04

## 2025-02-21 RX ORDER — DIPHENHYDRAMINE HYDROCHLORIDE 50 MG/ML
50 INJECTION INTRAMUSCULAR; INTRAVENOUS PRN
OUTPATIENT
Start: 2025-04-04

## 2025-02-21 RX ORDER — 0.9 % SODIUM CHLORIDE 0.9 %
VIAL (ML) INJECTION PRN
OUTPATIENT
Start: 2025-04-04

## 2025-02-21 RX ORDER — EPINEPHRINE 1 MG/ML(1)
0.5 AMPUL (ML) INJECTION PRN
OUTPATIENT
Start: 2025-05-16

## 2025-02-21 RX ORDER — LEVOTHYROXINE SODIUM 100 UG/1
100 TABLET ORAL
Qty: 30 TABLET | Refills: 11 | Status: SHIPPED | OUTPATIENT
Start: 2025-02-21

## 2025-02-21 RX ADMIN — SODIUM CHLORIDE 400 MG: 9 INJECTION, SOLUTION INTRAVENOUS at 14:26

## 2025-02-21 ASSESSMENT — FIBROSIS 4 INDEX
FIB4 SCORE: 1.27
FIB4 SCORE: 1.27

## 2025-02-21 NOTE — PROGRESS NOTES
Chemotherapy Verification - PRIMARY RN      Height = 1.63m  Weight = 70.9 kg  BSA = 1.79m2       Medication: pembrolizumab  Dose: 400 mg set dose  Calculated Dose: 400 mg                             (In mg/m2, AUC, mg/kg)         I confirm this process was performed independently with the BSA and all final chemotherapy dosing calculations congruent.  Any discrepancies of 10% or greater have been addressed with the chemotherapy pharmacist. The resolution of the discrepancy has been documented in the EPIC progress notes.

## 2025-02-21 NOTE — PROGRESS NOTES
Chemotherapy Verification - SECONDARY RN       Height = 1.63 m  Weight = 70.9 kg  BSA = 1.79 m2       Medication: pembrolizumab (Keytruda)  Dose: 400 mg set dose  Calculated Dose: 400 mg                             (In mg/m2, AUC, mg/kg)     I confirm that this process was performed independently.

## 2025-02-21 NOTE — ADDENDUM NOTE
Encounter addended by: Marky Cervantes, Med Ass't on: 2/21/2025 1:42 PM   Actions taken: Charge Capture section accepted

## 2025-02-21 NOTE — PROGRESS NOTES
PROGRESS NOTE: HEMATOLOGY/ONCOLOGY       Primary Care:  Nella Ramirez M.D.    Chief Complaint   Patient presents with    Cancer     Kelton Prechemo       Current Treatment:      8/1/2024 : Radiosurgery to left upper lobe lung cancer.  9/6/2024 : C 1 Keytruda  10/18/24 : C 2 Keytruda  11/29/24 : C3  1/10/25 : C 4  2/21/25 : C 5 keytruda      Subjective:     History of Presenting Illness:  Bright Shirley is a 74 y.o. 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    Patients history dates back to May 3/2024 when he was experiencing SOB and presented to the ER and found to be COVID+.  He had a CT scan of the chest on 5/3/24 which showed show a 6.2 x 4.8 x 4.8 cm left upper lobe mass consistent with a lung primary. Findings are consistent also with chronic interstitial lung disease/fibrosis. He also had patchy groundglass opacities bilaterally suggesting infection/inflammation. Bilateral adrenal masses were noted to measure up to 3 cm on the right and 3.5 cm on the left with a -8 Hounsfield unit consistent with a benign adrenal adenoma.     He saw Jennifer Brown on 5/20/24.  A PET scan was done on 5/29/24, which again showed a hypermetabolic VANESA mass. No other metastatic disease. She ordered a lung biopsy which was completed on 6/11/24.  This showed a poorly differentiated squamous cell carcinoma.    Of note, he was a smoker but quit when he entered the hospital on 5/3/24.     He lives in [place] and has a roommate.     Interval History  He is feeling relatively well and he is relatively asymptomatic. He saw Dr. London on 8/1/24 and had radiosurgery to left upper lobe.  His oxygen saturations is about 95% resting but goes down significantly with activity.    Past Medical History:   Diagnosis Date    Acute exacerbation of chronic obstructive pulmonary disease (COPD) (HCC) 07/01/2021    Acute hypoxemic respiratory failure (HCC)  2021    Acute respiratory failure with hypoxia (HCC) 2024    Acute urinary retention 2021    Aortic atherosclerosis (HCC)     Carotid stenosis, bilateral - mild     Cerebral infarction (HCC) - based on CT head 2022     Cerebral infarction (HCC) - based on CT head 2022     Chronic obstructive pulmonary disease (HCC)     Hypercholesteremia     Hypertension     Intertrochanteric fracture of femur (HCC) 2021    Other emphysema (HCC) 2019    Supplemental oxygen dependent 2021        Past Surgical History:   Procedure Laterality Date    PB TREAT INTER/SUBTROCH FX,W/PLATE/SCREW Left 2021    Procedure: FIXATION, FRACTURE, HIP, USING DYNAMIC HIP SCREW, WITH COMPRESSION;  Surgeon: Avinash Suazo M.D.;  Location: SURGERY [facility];  Service: Orthopedics    HERNIA REPAIR Right     ORIF, FEMUR Right     15 years ago    OTHER      right inguinal hernia repair    OTHER ORTHOPEDIC SURGERY      right hip       Social History     Tobacco Use    Smoking status: Former     Current packs/day: 0.00     Average packs/day: 1 pack/day for 49.3 years (49.3 ttl pk-yrs)     Types: Cigarettes     Start date:      Quit date: 2024     Years since quittin.8    Smokeless tobacco: Never    Tobacco comments:     vape pen & cigarettes     49 years total andria an average of 1 ppd   Vaping Use    Vaping status: Former    Substances: Nicotine   Substance Use Topics    Alcohol use: Not Currently     Comment: not often    Drug use: Not Currently     Types: Marijuana     Comment: THC edibles - rarely/ NOT IN MONTHS        Family History   Problem Relation Age of Onset    Breast Cancer Mother     Cancer Mother         breast cancer    Stroke Mother     Other Father         cirrhosis form alcohol    Stroke Sister     Heart Disease Brother         cardiac aneurysm    Hypertension Maternal Grandmother     Hyperlipidemia Maternal Grandmother     Ovarian Cancer Neg Hx     Tubal Cancer Neg Hx      Colorectal Cancer Neg Hx     Peritoneal Cancer Neg Hx        Allergies   Allergen Reactions    Seasonal        Current Outpatient Medications   Medication Sig Dispense Refill    levothyroxine (SYNTHROID) 100 MCG Tab Take 1 Tablet by mouth every morning on an empty stomach. 30 Tablet 11    traZODone (DESYREL) 50 MG Tab Take 1 tablet by mouth every evening. 30 Tablet 0    rosuvastatin (CRESTOR) 10 MG Tab Take 1 Tablet by mouth every evening. 100 Tablet 0    levothyroxine (SYNTHROID) 150 MCG Tab Take 1 tablet by mouth every morning on an empty stomach. 30 Tablet 11    amLODIPine (NORVASC) 5 MG Tab Take 1 Tablet by mouth every day. 100 Tablet 3    albuterol 108 (90 Base) MCG/ACT Aero Soln inhalation aerosol Inhale 2 Puffs every four hours as needed for Shortness of Breath. 18 g 11    Zoster Vac Recomb Adjuvanted (SHINGRIX) 50 MCG/0.5ML Recon Susp Inject  into the shoulder, thigh, or buttocks. 0.5 mL 0    COVID-19 mRNA Vac-Colton,Pfizer, (COMIRNATY) 30 MCG/0.3ML Suspension Prefilled Syringe injection Inject  into the shoulder, thigh, or buttocks. 0.3 mL 0    lisinopril (PRINIVIL) 20 MG Tab Take 1 Tablet by mouth 2 times a day. 60 Tablet 11    levothyroxine (SYNTHROID) 75 MCG Tab Take 1 Tablet by mouth every morning on an empty stomach. 30 Tablet 11    Tiotropium Bromide-Olodaterol (STIOLTO RESPIMAT) 2.5-2.5 MCG/ACT Aero Soln Take 2 puffs by mouth every day. 12 g 3    Misc. Devices Misc Portable oxygen concentrator (POC), at 2L/min via NC. 1 Each 0    Fexofenadine HCl (MUCINEX ALLERGY PO) Take  by mouth every day.      Home Care Oxygen Inhale 5 L/min continuous. O2 LPM @: 5 LPM  Specify Usage: Continuous  Type of O2: Gas  Oxygen via: N/C  Oxygen Modality: Concentrator and Portable Tank  Humidification: Yes      acetaminophen (TYLENOL) 325 MG Tab Take 500 mg by mouth every 6 hours as needed for Mild Pain.      Melatonin 10 MG Tab Take 30 mg by mouth every evening.      aspirin (ASA) 81 MG Chew Tab chewable tablet Chew 81 mg  "every evening. 100 Tablet     docosahexanoic acid (OMEGA 3 FA) 1000 MG Cap Take 1,000 mg by mouth every day.      Multiple Vitamin (MULTIVITAMIN ADULT PO) Take 1 Tablet by mouth every day.      calcium polycarbophil (FIBERCON) 625 MG Tab Take 625 mg by mouth every day.       No current facility-administered medications for this encounter.       Review of systems  There is no complaint of fever headache or short of breath chest pain nausea vomiting abdominal pain diarrhea reported.   He is complaining of significant pain in left upper lung due to tumor infiltration.    Problem list, medications, and allergies reviewed by myself today in Epic.     Objective:     Vitals:    02/21/25 1305   BP: (!) 146/72   BP Location: Right arm   Patient Position: Sitting   BP Cuff Size: Adult   Pulse: (!) 102   Temp: 36.5 °C (97.7 °F)   TempSrc: Temporal   SpO2: (!) 83%   Weight: 78.9 kg (174 lb 0.9 oz)   Height: 1.63 m (5' 4.17\")         DESC; KARNOFSKY SCALE WITH ECOG EQUIVALENT: 60, Requires occasional assistance, but is able to care for most of his personal needs (ECOG equivalent 2)    DISTRESS LEVEL: no acute distress    PE- deferred.      Pathology:    A. Left lung mass biopsy:          Moderately to poorly differentiated squamous cell carcinoma in a           background of abundant necrosis.     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 on 6/30/2024    #1.  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 NGS: DNMT3A, STAG2, TERT, TP53.   He had SRS for his lung cancer. He is on Keytruda 400 mg IV every 6 weeks and he is tolerating very well.  I will go ahead with cycle 5 Keytruda 400 mg today and every 6 weeks.  Patient had a follow-up PET scan on January 2, 2025 which shows a significant improvement but there is left over hypermetabolic activity in " the left upper lung. I will follow him in 6 weeks for continuous care.  He is a scheduled to have a CT scan of the chest next week and I will push his CT scan follow-up before I see him in 6 weeks.    #2.  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 daily.  His follow-up TSH is 0.1 and I gave him Synthroid 100 mcg p.o. daily.    #3.  Significant left upper chest wall pain.  I reviewed the CT scan of the chest film with him.  Most likely this was due to previous infiltration of the lung tumor to the chest wall.  He is on Tylenol and I recommended he could take Motrin and Aleve intermittently also.    Any questions and concerns raised by the patient were addressed and answered. Patient denies any further questions.  Patient encouraged to call the office with any concerns or issues.   Thank you for allowing me to participate in your patient care.

## 2025-02-21 NOTE — PROGRESS NOTES
Bright presents to infusion for cycle 5 of pembrolizumab for squamous cell carcinoma of left lung. Patient reports feeling okay today, reports increased pain in L lung as well as increased O2 needs, states he discussed with MD at appt today and he is aware.     PIV started with positive return.    Labs from 2/18 reviewed by RN, within parameters for treatment today.     Pembrolizumab given over 30 minutes with 0.2 micron filter, patient tolerated well with no adverse effects.     PIV flushed post infusion with positive blood return, d/sarah with tip intact.    Patient left in stable condition, knows when to return for next appt.

## 2025-02-21 NOTE — PROGRESS NOTES
Pharmacy Chemotherapy Calculation:    Dx: NSCLC        Protocol: Pembrolizumab     *Dosing Reference*  Pembrolizumab 400 mg IV over 30 minutes on Day 1  42-day cycle until disease progression or unacceptable toxicity or until up to 24 months of therapy has been completed   NCCN Guidelines® for Non-Small Cell Lung Cancer V.1.2024.    Bassem KOTHARI et al. J Clin Oncol. 2019;37(28):4867-0213.c   Venkatesh M, et al. N Engl J Med. 2016;375(19):7800-7858.a   David WONG et al. Lancet. 2019;393(68990):4854-9350.a   Isabella M, et al. Eur J Cancer. 2020;131:68-75.b    Allergies:  Seasonal     There were no vitals taken for this visit. There is no height or weight on file to calculate BSA.    All labs (2/18/25) and thyroid panel (MD to review abnormal result) within treatment plan parameters.      Drug Order   (Drug name, dose, route, IV Fluid & volume, frequency, number of doses) Cycle 5  Previous treatment: C4 on 1/10/25   Medication = Pembrolizumab  Base Dose = 400 mg  Fixed dose, no calc required  Final Dose = 400 mg  Route = IV  Fluid & Volume = NS 50 mL  Admin Duration = Over 30 minutes          Fixed dose, ok to treat with final dose   By my signature below, I confirm this process was performed independently with the BSA and all final chemotherapy dosing calculations congruent. I have reviewed the above chemotherapy order and that my calculation of the final dose and BSA (when applicable) corroborate those calculations of the  pharmacist. Discrepancies of 10% or greater in the written dose have been addressed and documented within the [facility] Progress notes.    Brooke Holcomb, MaritzaD

## 2025-02-26 PROCEDURE — RXMED WILLOW AMBULATORY MEDICATION CHARGE: Performed by: STUDENT IN AN ORGANIZED HEALTH CARE EDUCATION/TRAINING PROGRAM

## 2025-02-27 ENCOUNTER — PATIENT MESSAGE (OUTPATIENT)
Dept: HEMATOLOGY ONCOLOGY | Facility: MEDICAL CENTER | Age: 75
End: 2025-02-27
Payer: MEDICARE

## 2025-02-27 ENCOUNTER — PHARMACY VISIT (OUTPATIENT)
Dept: PHARMACY | Facility: MEDICAL CENTER | Age: 75
End: 2025-02-27
Payer: COMMERCIAL

## 2025-03-04 ENCOUNTER — HOSPITAL ENCOUNTER (OUTPATIENT)
Dept: RADIOLOGY | Facility: MEDICAL CENTER | Age: 75
End: 2025-03-04
Attending: STUDENT IN AN ORGANIZED HEALTH CARE EDUCATION/TRAINING PROGRAM
Payer: MEDICARE

## 2025-03-04 DIAGNOSIS — Z79.899 ENCOUNTER FOR LONG-TERM (CURRENT) USE OF HIGH-RISK MEDICATION: ICD-10-CM

## 2025-03-04 DIAGNOSIS — R91.1 LUNG NODULE: ICD-10-CM

## 2025-03-04 DIAGNOSIS — C34.12 SQUAMOUS CELL CARCINOMA OF UPPER LOBE OF LEFT LUNG (HCC): ICD-10-CM

## 2025-03-04 PROCEDURE — 71250 CT THORAX DX C-: CPT

## 2025-03-05 ENCOUNTER — TELEPHONE (OUTPATIENT)
Dept: HEMATOLOGY ONCOLOGY | Facility: MEDICAL CENTER | Age: 75
End: 2025-03-05
Payer: MEDICARE

## 2025-03-06 ENCOUNTER — HOSPITAL ENCOUNTER (OUTPATIENT)
Dept: HEMATOLOGY ONCOLOGY | Facility: MEDICAL CENTER | Age: 75
End: 2025-03-06
Attending: INTERNAL MEDICINE
Payer: MEDICARE

## 2025-03-06 DIAGNOSIS — R06.02 SOB (SHORTNESS OF BREATH): ICD-10-CM

## 2025-03-06 PROCEDURE — 99214 OFFICE O/P EST MOD 30 MIN: CPT | Mod: 95 | Performed by: INTERNAL MEDICINE

## 2025-03-06 NOTE — TELEPHONE ENCOUNTER
Returned Pts call.  Pt c/o deterioration of oxygen levels similar to what was discussed on 2/27 however Pt feels it is getting worse. Pt also wanted to go over the results of his CT today.   Can not get the Pt in for virtual with Dr Orona until 3/11 but was able to get the Pt in for a virtual with Dr Melara 3/7 at 1430.  Notified Pt. Educated that if anything happens tonight or tomorrow morning for the Pt to go to the nearest ED.

## 2025-03-07 NOTE — PROGRESS NOTES
PROGRESS NOTE: HEMATOLOGY/ONCOLOGY     This evaluation was conducted via Teams using secure and encrypted videoconferencing technology. The patient was in their home in the [facility].    The patient's identity was confirmed and verbal consent was obtained for this virtual visit.       Primary Care:  Nella Ramirez M.D.    Chief Complaint   Patient presents with    Lung Cancer     CT review       Current Treatment:      8/1/2024 : Radiosurgery to left upper lobe lung cancer.  9/6/2024 : C 1 Keytruda  10/18/24 : C 2 Keytruda  11/29/24 : C3  1/10/25 : C 4  2/21/25 : C 5 keytruda      Subjective:     History of Presenting Illness:  Birght Shirley is a 74 y.o. 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    Patients history dates back to May 3/2024 when he was experiencing SOB and presented to the ER and found to be COVID+.  He had a CT scan of the chest on 5/3/24 which showed show a 6.2 x 4.8 x 4.8 cm left upper lobe mass consistent with a lung primary. Findings are consistent also with chronic interstitial lung disease/fibrosis. He also had patchy groundglass opacities bilaterally suggesting infection/inflammation. Bilateral adrenal masses were noted to measure up to 3 cm on the right and 3.5 cm on the left with a -8 Hounsfield unit consistent with a benign adrenal adenoma.     He saw Jennifer Brown on 5/20/24.  A PET scan was done on 5/29/24, which again showed a hypermetabolic VANESA mass. No other metastatic disease. She ordered a lung biopsy which was completed on 6/11/24.  This showed a poorly differentiated squamous cell carcinoma.    Of note, he was a smoker but quit when he entered the hospital on 5/3/24.     He lives in [place] and has a roommate.     Interval History  His oxygen saturations is about 95% resting but goes down significantly with activity to almost 65%.  He is complaining of a increasing short of breath  which limits his activity    Past Medical History:   Diagnosis Date    Acute exacerbation of chronic obstructive pulmonary disease (COPD) ([facility]) 2021    Acute hypoxemic respiratory failure (HCC) 2021    Acute respiratory failure with hypoxia ([facility]) 2024    Acute urinary retention 2021    Aortic atherosclerosis (HCC)     Carotid stenosis, bilateral - mild     Cerebral infarction (HCC) - based on CT head 2022     Cerebral infarction (HCC) - based on CT head 2022     Chronic obstructive pulmonary disease (HCC)     Hypercholesteremia     Hypertension     Intertrochanteric fracture of femur (HCC) 2021    Other emphysema ([facility]) 2019    Supplemental oxygen dependent 2021        Past Surgical History:   Procedure Laterality Date    PB TREAT INTER/SUBTROCH FX,W/PLATE/SCREW Left 2021    Procedure: FIXATION, FRACTURE, HIP, USING DYNAMIC HIP SCREW, WITH COMPRESSION;  Surgeon: Avinash Suazo M.D.;  Location: SURGERY [facility];  Service: Orthopedics    HERNIA REPAIR Right     ORIF, FEMUR Right     15 years ago    OTHER      right inguinal hernia repair    OTHER ORTHOPEDIC SURGERY      right hip       Social History     Tobacco Use    Smoking status: Former     Current packs/day: 0.00     Average packs/day: 1 pack/day for 49.3 years (49.3 ttl pk-yrs)     Types: Cigarettes     Start date:      Quit date: 2024     Years since quittin.8    Smokeless tobacco: Never    Tobacco comments:     vape pen & cigarettes     49 years total andria an average of 1 ppd   Vaping Use    Vaping status: Former    Substances: Nicotine   Substance Use Topics    Alcohol use: Not Currently     Comment: not often    Drug use: Not Currently     Types: Marijuana     Comment: THC edibles - rarely/ NOT IN MONTHS        Family History   Problem Relation Age of Onset    Breast Cancer Mother     Cancer Mother         breast cancer    Stroke Mother     Other Father         cirrhosis form alcohol     Stroke Sister     Heart Disease Brother         cardiac aneurysm    Hypertension Maternal Grandmother     Hyperlipidemia Maternal Grandmother     Ovarian Cancer Neg Hx     Tubal Cancer Neg Hx     Colorectal Cancer Neg Hx     Peritoneal Cancer Neg Hx        Allergies   Allergen Reactions    Seasonal        Current Outpatient Medications   Medication Sig Dispense Refill    levothyroxine (SYNTHROID) 100 MCG Tab Take 1 Tablet by mouth every morning on an empty stomach. 30 Tablet 11    traZODone (DESYREL) 50 MG Tab Take 1 tablet by mouth every evening. 30 Tablet 0    rosuvastatin (CRESTOR) 10 MG Tab Take 1 Tablet by mouth every evening. 100 Tablet 0    levothyroxine (SYNTHROID) 150 MCG Tab Take 1 tablet by mouth every morning on an empty stomach. 30 Tablet 11    amLODIPine (NORVASC) 5 MG Tab Take 1 Tablet by mouth every day. 100 Tablet 3    albuterol 108 (90 Base) MCG/ACT Aero Soln inhalation aerosol Inhale 2 Puffs every four hours as needed for Shortness of Breath. 18 g 11    Zoster Vac Recomb Adjuvanted (SHINGRIX) 50 MCG/0.5ML Recon Susp Inject  into the shoulder, thigh, or buttocks. 0.5 mL 0    COVID-19 mRNA Vac-Colton,Pfizer, (COMIRNATY) 30 MCG/0.3ML Suspension Prefilled Syringe injection Inject  into the shoulder, thigh, or buttocks. 0.3 mL 0    lisinopril (PRINIVIL) 20 MG Tab Take 1 Tablet by mouth 2 times a day. 60 Tablet 11    Tiotropium Bromide-Olodaterol (STIOLTO RESPIMAT) 2.5-2.5 MCG/ACT Aero Soln Take 2 puffs by mouth every day. 12 g 3    Misc. Devices Misc Portable oxygen concentrator (POC), at 2L/min via NC. 1 Each 0    Fexofenadine HCl (MUCINEX ALLERGY PO) Take  by mouth every day.      Home Care Oxygen Inhale 5 L/min continuous. O2 LPM @: 5 LPM  Specify Usage: Continuous  Type of O2: Gas  Oxygen via: N/C  Oxygen Modality: Concentrator and Portable Tank  Humidification: Yes      acetaminophen (TYLENOL) 325 MG Tab Take 500 mg by mouth every 6 hours as needed for Mild Pain.      Melatonin 10 MG Tab Take 30 mg  by mouth every evening.      aspirin (ASA) 81 MG Chew Tab chewable tablet Chew 81 mg every evening. 100 Tablet     docosahexanoic acid (OMEGA 3 FA) 1000 MG Cap Take 1,000 mg by mouth every day.      Multiple Vitamin (MULTIVITAMIN ADULT PO) Take 1 Tablet by mouth every day.      calcium polycarbophil (FIBERCON) 625 MG Tab Take 625 mg by mouth every day.      levothyroxine (SYNTHROID) 75 MCG Tab Take 1 Tablet by mouth every morning on an empty stomach. 30 Tablet 11     No current facility-administered medications for this encounter.       Review of systems  There is no complaint of fever headache or short of breath chest pain nausea vomiting abdominal pain diarrhea reported.   He is complaining of significant increasing short of breath with activity.    Problem list, medications, and allergies reviewed by myself today in Epic.     Objective:     There were no vitals filed for this visit.        DESC; KARNOFSKY SCALE WITH ECOG EQUIVALENT: 60, Requires occasional assistance, but is able to care for most of his personal needs (ECOG equivalent 2)    DISTRESS LEVEL: no acute distress    PE- deferred.      Pathology:    A. Left lung mass biopsy:          Moderately to poorly differentiated squamous cell carcinoma in a           background of abundant necrosis.     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 on 6/30/2024    #1.  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 NGS: DNMT3A, STAG2, TERT, TP53.   He had SRS for his lung cancer. He is on Keytruda 400 mg IV every 6 weeks and he is tolerating very well.  I will continue Keytruda 400 mg every 6 weeks.  Patient had a follow-up PET scan on January 2, 2025 which shows a significant improvement but there is left over hypermetabolic activity in the left upper lung. I did a follow-up CT scan of  the chest on 3/5/2025 which showed unchanged in size 7 cm left upper lobe mass compared with 11/18/2024. Increasing significant left upper lobe collapse. There is extensive emphysema and honeycombing fibrosis as well as subtle diffuse groundglass fibrosis.  infiltrates.    He is complaining of significant short of breath and I counseled him.  I reviewed his CT scan film which shows a lot of lung  destruction due to previous smoking.  He told me that he smoked around 10 days ago last time and I told him that he should not smoke at all.  I will also refer this patient to pulmonary for evaluation.  I am not sure what can they do but he needs urgent evaluation by pulmonary service.  He told me that he saw pulmonologist a more than 1 year ago.  I will follow her continuously.      #2.  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 daily.  His follow-up TSH is 0.1 and I gave him Synthroid 100 mcg p.o. daily.        Any questions and concerns raised by the patient were addressed and answered. Patient denies any further questions.  Patient encouraged to call the office with any concerns or issues.   Thank you for allowing me to participate in your patient care.

## 2025-03-09 DIAGNOSIS — G47.00 INSOMNIA, UNSPECIFIED TYPE: ICD-10-CM

## 2025-03-09 PROCEDURE — RXMED WILLOW AMBULATORY MEDICATION CHARGE: Performed by: INTERNAL MEDICINE

## 2025-03-10 PROCEDURE — RXMED WILLOW AMBULATORY MEDICATION CHARGE: Performed by: STUDENT IN AN ORGANIZED HEALTH CARE EDUCATION/TRAINING PROGRAM

## 2025-03-10 RX ORDER — TRAZODONE HYDROCHLORIDE 50 MG/1
50 TABLET ORAL NIGHTLY
Qty: 30 TABLET | Refills: 0 | Status: SHIPPED | OUTPATIENT
Start: 2025-03-10 | End: 2025-03-18 | Stop reason: SDUPTHER

## 2025-03-12 ENCOUNTER — PHARMACY VISIT (OUTPATIENT)
Dept: PHARMACY | Facility: MEDICAL CENTER | Age: 75
End: 2025-03-12
Payer: COMMERCIAL

## 2025-03-12 ENCOUNTER — OFFICE VISIT (OUTPATIENT)
Dept: SLEEP MEDICINE | Facility: MEDICAL CENTER | Age: 75
End: 2025-03-12
Payer: MEDICARE

## 2025-03-12 VITALS
BODY MASS INDEX: 26.63 KG/M2 | HEIGHT: 64 IN | OXYGEN SATURATION: 90 % | WEIGHT: 156 LBS | HEART RATE: 92 BPM | DIASTOLIC BLOOD PRESSURE: 58 MMHG | SYSTOLIC BLOOD PRESSURE: 126 MMHG

## 2025-03-12 DIAGNOSIS — J84.9 INTERSTITIAL LUNG DISEASE (HCC): Primary | ICD-10-CM

## 2025-03-12 DIAGNOSIS — J43.9 PULMONARY EMPHYSEMA, UNSPECIFIED EMPHYSEMA TYPE (HCC): ICD-10-CM

## 2025-03-12 PROCEDURE — 99213 OFFICE O/P EST LOW 20 MIN: CPT

## 2025-03-12 PROCEDURE — 99215 OFFICE O/P EST HI 40 MIN: CPT

## 2025-03-12 PROCEDURE — RXMED WILLOW AMBULATORY MEDICATION CHARGE

## 2025-03-12 RX ORDER — FUROSEMIDE 40 MG/1
40 TABLET ORAL DAILY
Qty: 3 TABLET | Refills: 0 | Status: SHIPPED | OUTPATIENT
Start: 2025-03-12 | End: 2025-03-18

## 2025-03-12 RX ORDER — FLUTICASONE FUROATE, UMECLIDINIUM BROMIDE AND VILANTEROL TRIFENATATE 200; 62.5; 25 UG/1; UG/1; UG/1
1 POWDER RESPIRATORY (INHALATION) DAILY
Qty: 180 EACH | Refills: 3 | Status: SHIPPED | OUTPATIENT
Start: 2025-03-12 | End: 2025-03-13 | Stop reason: SDUPTHER

## 2025-03-12 RX ORDER — IPRATROPIUM BROMIDE AND ALBUTEROL SULFATE 2.5; .5 MG/3ML; MG/3ML
3 SOLUTION RESPIRATORY (INHALATION) EVERY 4 HOURS PRN
Qty: 90 ML | Refills: 11 | Status: SHIPPED | OUTPATIENT
Start: 2025-03-12

## 2025-03-12 RX ORDER — SULFAMETHOXAZOLE AND TRIMETHOPRIM 800; 160 MG/1; MG/1
1 TABLET ORAL
Qty: 12 TABLET | Refills: 2 | Status: SHIPPED | OUTPATIENT
Start: 2025-03-12 | End: 2025-03-26 | Stop reason: SDUPTHER

## 2025-03-12 RX ORDER — FLUTICASONE FUROATE, UMECLIDINIUM BROMIDE AND VILANTEROL TRIFENATATE 100; 62.5; 25 UG/1; UG/1; UG/1
1 POWDER RESPIRATORY (INHALATION) DAILY
Qty: 1 EACH | Refills: 11 | Status: SHIPPED | OUTPATIENT
Start: 2025-03-12 | End: 2025-03-12

## 2025-03-12 RX ORDER — PREDNISONE 20 MG/1
TABLET ORAL
Qty: 63 TABLET | Refills: 1 | Status: SHIPPED | OUTPATIENT
Start: 2025-03-12 | End: 2025-03-26

## 2025-03-12 ASSESSMENT — ENCOUNTER SYMPTOMS
SHORTNESS OF BREATH: 1
STRIDOR: 0
DIZZINESS: 0
HEMOPTYSIS: 0
COUGH: 1
FEVER: 0
HEARTBURN: 0
WEIGHT LOSS: 0
PALPITATIONS: 0
CHILLS: 0
SPUTUM PRODUCTION: 0
DEPRESSION: 0
WHEEZING: 0

## 2025-03-12 ASSESSMENT — FIBROSIS 4 INDEX: FIB4 SCORE: 1.27

## 2025-03-12 NOTE — PROGRESS NOTES
Pulmonary Clinic follow up    Date of Service: 3/12/2025    Reason for follow up:  Follow-Up (Last seen 09/20/24 w/ KANIKA Sommers, APRN/Dx: COPD, Chronic hypoxemic respiratory failure), Results (CT CHEST 03/04/25, 11/18/2, CT-PET 01/02/25), and Shortness of Breath (Pt states excessive SOB for 3 weeks)    History of Present Illness:     Bright Shirley is a 74 y.o. male being evaluated in clinic today for worsening shortness of breathing. Patient reports that over the last 3 weeks he has had worsening shortness of breath, around the time that he had his fourth or fifth Keytruda injection for lung cancer.  Patient denies any illnesses around the time that the shortness of breath began.  Patient also denies any fever, chills, night sweats.  Patient currently follows with oncology, CT scan on 3/4/2025 with persistent fibrosis and possible honeycombing in the lung peripheries, primarily in the bases without acute pneumonia infiltrate noted.  Patient's left upper lobe mass was persistent with increasing left upper lobe collapse, likely causing his worsening shortness of breath versus delayed hypersensitivity reaction from Keytruda.  Unlikely that this is a reaction from Keytruda, but patient would like to trial high-dose of steroids at home with PJP prophylaxis, and agrees that he will go to the emergency department with any worsening shortness of breath or if he does not respond to steroids. Discussed case with Dr. Love.     PMH: squamous cell carcinoma of upper left lobe of lung, HTN, HLD, bilateral carotid stenosis    Pulmonary Hx: smoking history of 49 pack years, quit in 2024    MMRC Grade:   0- Breathless only during strenuous exercise  1- Short of breath when hurrying or going up a small hill  2- Walks slower than friends due to breathlessness, has to stop at own pace  3- Stops to catch breath on level ground after 100m  4- Breathless with ambulating around house or ADLs     Review of Systems   Constitutional:   Negative for chills, fever and weight loss.   Respiratory:  Positive for cough and shortness of breath. Negative for hemoptysis, sputum production, wheezing and stridor.    Cardiovascular:  Negative for chest pain, palpitations and leg swelling.   Gastrointestinal:  Negative for heartburn.   Musculoskeletal:  Negative for joint pain.   Skin:  Negative for rash.   Neurological:  Negative for dizziness.   Endo/Heme/Allergies:  Negative for environmental allergies.   Psychiatric/Behavioral:  Negative for depression.        Current Outpatient Medications on File Prior to Visit   Medication Sig Dispense Refill    traZODone (DESYREL) 50 MG Tab Take 1 tablet by mouth every evening. 30 Tablet 0    levothyroxine (SYNTHROID) 100 MCG Tab Take 1 Tablet by mouth every morning on an empty stomach. 30 Tablet 11    rosuvastatin (CRESTOR) 10 MG Tab Take 1 Tablet by mouth every evening. 100 Tablet 0    levothyroxine (SYNTHROID) 150 MCG Tab Take 1 tablet by mouth every morning on an empty stomach. 30 Tablet 11    amLODIPine (NORVASC) 5 MG Tab Take 1 Tablet by mouth every day. 100 Tablet 3    albuterol 108 (90 Base) MCG/ACT Aero Soln inhalation aerosol Inhale 2 Puffs every four hours as needed for Shortness of Breath. 18 g 11    Zoster Vac Recomb Adjuvanted (SHINGRIX) 50 MCG/0.5ML Recon Susp Inject  into the shoulder, thigh, or buttocks. 0.5 mL 0    COVID-19 mRNA Vac-Colton,Pfizer, (COMIRNATY) 30 MCG/0.3ML Suspension Prefilled Syringe injection Inject  into the shoulder, thigh, or buttocks. 0.3 mL 0    lisinopril (PRINIVIL) 20 MG Tab Take 1 Tablet by mouth 2 times a day. 60 Tablet 11    Misc. Devices Misc Portable oxygen concentrator (POC), at 2L/min via NC. 1 Each 0    Fexofenadine HCl (MUCINEX ALLERGY PO) Take 400 Capsules by mouth every day.      Home Care Oxygen Inhale 5 L/min continuous. O2 LPM @: 5 LPM  Specify Usage: Continuous  Type of O2: Gas  Oxygen via: N/C  Oxygen Modality: Concentrator and Portable Tank  Humidification: Yes       acetaminophen (TYLENOL) 325 MG Tab Take 500 mg by mouth every 6 hours as needed for Mild Pain.      Melatonin 10 MG Tab Take 30 mg by mouth every evening.      aspirin (ASA) 81 MG Chew Tab chewable tablet Chew 81 mg every evening. 100 Tablet     docosahexanoic acid (OMEGA 3 FA) 1000 MG Cap Take 1,000 mg by mouth every day.      Multiple Vitamin (MULTIVITAMIN ADULT PO) Take 1 Tablet by mouth every day.      calcium polycarbophil (FIBERCON) 625 MG Tab Take 625 mg by mouth every day.      levothyroxine (SYNTHROID) 75 MCG Tab Take 1 Tablet by mouth every morning on an empty stomach. 30 Tablet 11     No current facility-administered medications on file prior to visit.     Social History     Tobacco Use    Smoking status: Former     Current packs/day: 0.00     Average packs/day: 1 pack/day for 49.3 years (49.3 ttl pk-yrs)     Types: Cigarettes     Start date:      Quit date: 2024     Years since quittin.8    Smokeless tobacco: Never    Tobacco comments:     vape pen & cigarettes     49 years total andria an average of 1 ppd   Vaping Use    Vaping status: Former    Substances: Nicotine   Substance Use Topics    Alcohol use: Not Currently     Comment: not often    Drug use: Not Currently     Types: Marijuana     Comment: THC edibles - rarely/ NOT IN MONTHS      Past Medical History:   Diagnosis Date    Acute exacerbation of chronic obstructive pulmonary disease (COPD) ([facility]) 2021    Acute hypoxemic respiratory failure ([facility]) 2021    Acute respiratory failure with hypoxia ([facility]) 2024    Acute urinary retention 2021    Aortic atherosclerosis (HCC)     Carotid stenosis, bilateral - mild     Cerebral infarction (HCC) - based on CT head 2022     Cerebral infarction (HCC) - based on CT head 2022     Chronic obstructive pulmonary disease (HCC)     Hypercholesteremia     Hypertension     Intertrochanteric fracture of femur (HCC) 2021    Other emphysema ([facility]) 2019    Supplemental  "oxygen dependent 06/30/2021     Past Surgical History:   Procedure Laterality Date    PB TREAT INTER/SUBTROCH FX,W/PLATE/SCREW Left 6/6/2021    Procedure: FIXATION, FRACTURE, HIP, USING DYNAMIC HIP SCREW, WITH COMPRESSION;  Surgeon: Avinash Suazo M.D.;  Location: SURGERY [facility];  Service: Orthopedics    HERNIA REPAIR Right     ORIF, FEMUR Right     15 years ago    OTHER      right inguinal hernia repair    OTHER ORTHOPEDIC SURGERY      right hip     Seasonal  Family History   Problem Relation Age of Onset    Breast Cancer Mother     Cancer Mother         breast cancer    Stroke Mother     Other Father         cirrhosis form alcohol    Stroke Sister     Heart Disease Brother         cardiac aneurysm    Hypertension Maternal Grandmother     Hyperlipidemia Maternal Grandmother     Ovarian Cancer Neg Hx     Tubal Cancer Neg Hx     Colorectal Cancer Neg Hx     Peritoneal Cancer Neg Hx      Ambulatory Vitals  Encounter Vitals  Blood Pressure : 126/58  Pulse: 92  Pulse Oximetry: 90 % (4 LPM NC)  Weight: 70.8 kg (156 lb)  Height: 162.6 cm (5' 4\")  BMI (Calculated): 26.78     Physical Exam  Constitutional:       General: He is not in acute distress.  HENT:      Head: Normocephalic.      Nose: Nose normal.      Mouth/Throat:      Mouth: Mucous membranes are moist.   Eyes:      Conjunctiva/sclera: Conjunctivae normal.   Cardiovascular:      Rate and Rhythm: Normal rate and regular rhythm.      Heart sounds: No murmur heard.  Pulmonary:      Effort: Prolonged expiration and respiratory distress present.      Breath sounds: Decreased breath sounds present. No wheezing.   Abdominal:      General: There is no distension.   Musculoskeletal:      Right lower leg: No edema.      Left lower leg: No edema.   Skin:     General: Skin is warm and dry.      Capillary Refill: Capillary refill takes less than 2 seconds.      Nails: There is no clubbing.   Neurological:      General: No focal deficit present.      Mental Status: He is " alert and oriented to person, place, and time.   Psychiatric:         Mood and Affect: Mood normal.     Results:    PFT 5/24/2024:    Interpretation:  There is no significant obstructive ventilatory defect on spirometry, however there is a markedly reduced peak expiratory flow with a significant bronchodilator response.  This may be consistent with asthma/reactive airways disease.  Clinically correlate.    More pronounced is a moderate restrictive ventilatory defect evidenced by the total lung capacity measuring 62% predicted.    There is severe diffusion impairment.  The reduced DLCO and DL/VA ratio is consistent with pulmonary emphysema, pulmonary vascular disease or interstitial lung disease.    Flow-volume loop is consistent with the above interpretation of likely mixed obstructive and restrictive ventilatory defect with a partial bronchodilator response.    No prior PFTs for comparison.    CT chest 3/4/2025:    Lungs: Roughly similar in size 7 x 5.4 cm left upper lobe mass anteriorly. Configuration is changed because of the increasing left upper lobe collapse.     Severe emphysema with fibrosis and honeycombing in the lung peripheries remains. Mild diffuse groundglass densities remain. No new nodules. No acute large pneumonia infiltrate.    Echocardiogram 6/6/2021:    CONCLUSIONS  No prior study is available for comparison.   Technically difficult study - adequate information is obtained.   Technically difficult study - adequate information is obtained.   Hyperdynamic systolic function.  Left ventricular ejection fraction is visually estimated to be 70%.  The left atrium is normal in size.  Structurally normal mitral valve without significant stenosis or regurgitation.  Mild aortic valve sclerosis without significant stenosis or regurgitation.  Structurally normal tricuspid valve without significant stenosis or regurgitation.  The pulmonic valve is not well visualized.  Normal pericardium without  effusion.    Vaccinations:    Covid: complete 2024  Flu: complete 2024  Pneumonia: complete 2023   RSV: complete 2023  Assessment & Plan  Interstitial lung disease (HCC)    CT from 2021 had emphysematous changes although no suggestion of honeycombing. CT's from 2024 and 2025 had suggested honeycombing with a GGO component although hard to differentiate between paraseptal emphysema vs honeycombing on the imaging. Patient has no hx of autoimmune or exposures other than Keytruda injections. 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 Lee in ILD clinic after next appointment with Sohail     Orders:    predniSONE (DELTASONE) 20 MG Tab; Take 3 tablets by mouth daily for 1 week, then take 2 tablets by mouth daily for the next 3 weeks.    sulfamethoxazole-trimethoprim (BACTRIM DS) 800-160 MG tablet; Take 1 Tablet by mouth every Monday, Wednesday, and Friday. Take until gone.    RONALD IGG RAFAELA W/RFLX TO RONALD IGG IFA; Future    ANGIOTENSIN I CONVERTING ENZYME; Future    ANTI-JO01 ABS    ANTI SCL-70 ABS; Future    ANTI-LA (SS-B) AB (RDL)    ANTI-RO (SS-A) AB (RDL)    CCP    MPO AND PR3 WITH REFLEX TO ANCA; Future    RHEUMATOID ARTHRITIS FACTOR; Future    HYPERSENSITIVITY PNEUMONITIS ANTIBODIES; Future    furosemide (LASIX) 40 MG Tab; Take 1 Tablet by mouth every day.    Pulmonary emphysema, unspecified emphysema type (HCC)  Chronic 2/2 smoking history   Worsened, likely exacerbated by Keytrude vs COPD exacerbation although no changes in sputum, fever, chills, or HR.     GOLD Stage: E  MMRC Group: 4  Exacerbations in the last year: 2 w/ hospitalization  Change in dyspnea, sputum, or cough: denies     Routine Tx: Trelegy 100 + 4-6 LPM nasal cannula   PRN Tx: Duoneb + albuterol  Encouraged regular exercise as tolerated  Annual  covid and influenza vaccinations encouraged  Monitor for hypoxemia of oxygen <88% at home and schedule visit with any changes    Notify clinic with any early signs of exacerbation including change in sputum production or color and increased dyspnea.     Smoking cessation: discussed pharmacotherapy available for treatment and/or nicotine replacement therapy.     Orders:    fluticasone-umeclidinium-vilanterol (TRELEGY ELLIPTA) 200-62.5-25 mcg/act inhaler; Inhale 1 Puff every day.    ipratropium-albuterol (DUONEB) 0.5-2.5 (3) MG/3ML nebulizer solution; Take 3 mL by nebulization every four hours as needed for Shortness of Breath.    DME Nebulizer      Return in about 2 weeks (around 3/26/2025), or if symptoms worsen or fail to improve, for f/u with Sohail GARCIA in ILD clinic .     This note was generated using voice recognition software which has a chance of producing errors of grammar and possibly content.  I have made every reasonable attempt to find and correct any obvious errors, but it should be expected that some may not be found prior to finalization of this note.    Time spent in record review prior to patient arrival, reviewing results, and in face-to-face encounter totaled 55 min, excluding any procedures if performed.    Sohail GARCIA  [facility]

## 2025-03-12 NOTE — Clinical Note
Tonja Melara,   I saw this patient today in clinic. 3 weeks ago he had a worsening dyspnea and is desating to 68-70% on 5-6 LPM. His CT does have some ground glass persisting, I talked with Dr. Love and didn't believe it was from Keytruda, more likely the worsening collapsed upper lobe. We are trying high dose steroids to see if the patient responds and working up ILD causes of suspected fibrosis. He may get admitted if he doesn't respond to the steroids and need a bronch if that is the case. Wanted to send you the chart to update you on the patient.   Thanks,  Sohail

## 2025-03-12 NOTE — ASSESSMENT & PLAN NOTE
Chronic 2/2 smoking history   Worsened, likely exacerbated by Keytrude vs COPD exacerbation although no changes in sputum, fever, chills, or HR.     GOLD Stage: E  MMRC Group: 4  Exacerbations in the last year: 2 w/ hospitalization  Change in dyspnea, sputum, or cough: denies     Routine Tx: Trelegy 100 + 4-6 LPM nasal cannula   PRN Tx: Duoneb + albuterol  Encouraged regular exercise as tolerated  Annual covid and influenza vaccinations encouraged  Monitor for hypoxemia of oxygen <88% at home and schedule visit with any changes    Notify clinic with any early signs of exacerbation including change in sputum production or color and increased dyspnea.     Smoking cessation: discussed pharmacotherapy available for treatment and/or nicotine replacement therapy.     Orders:    fluticasone-umeclidinium-vilanterol (TRELEGY ELLIPTA) 200-62.5-25 mcg/act inhaler; Inhale 1 Puff every day.    ipratropium-albuterol (DUONEB) 0.5-2.5 (3) MG/3ML nebulizer solution; Take 3 mL by nebulization every four hours as needed for Shortness of Breath.    DME Nebulizer

## 2025-03-13 ENCOUNTER — TELEPHONE (OUTPATIENT)
Dept: SLEEP MEDICINE | Facility: MEDICAL CENTER | Age: 75
End: 2025-03-13
Payer: MEDICARE

## 2025-03-13 DIAGNOSIS — J43.9 PULMONARY EMPHYSEMA, UNSPECIFIED EMPHYSEMA TYPE (HCC): ICD-10-CM

## 2025-03-13 RX ORDER — FLUTICASONE FUROATE, UMECLIDINIUM BROMIDE AND VILANTEROL TRIFENATATE 200; 62.5; 25 UG/1; UG/1; UG/1
1 POWDER RESPIRATORY (INHALATION) DAILY
Qty: 180 EACH | Refills: 3 | Status: SHIPPED | OUTPATIENT
Start: 2025-03-13

## 2025-03-18 ENCOUNTER — APPOINTMENT (OUTPATIENT)
Dept: MEDICAL GROUP | Facility: MEDICAL CENTER | Age: 75
End: 2025-03-18
Payer: MEDICARE

## 2025-03-18 ENCOUNTER — HOSPITAL ENCOUNTER (OUTPATIENT)
Dept: LAB | Facility: MEDICAL CENTER | Age: 75
End: 2025-03-18
Attending: INTERNAL MEDICINE
Payer: MEDICARE

## 2025-03-18 ENCOUNTER — HOSPITAL ENCOUNTER (OUTPATIENT)
Dept: LAB | Facility: MEDICAL CENTER | Age: 75
End: 2025-03-18
Payer: MEDICARE

## 2025-03-18 VITALS
TEMPERATURE: 98.3 F | WEIGHT: 158.07 LBS | DIASTOLIC BLOOD PRESSURE: 64 MMHG | OXYGEN SATURATION: 97 % | SYSTOLIC BLOOD PRESSURE: 136 MMHG | HEIGHT: 65 IN | BODY MASS INDEX: 26.34 KG/M2 | HEART RATE: 75 BPM

## 2025-03-18 DIAGNOSIS — G47.00 INSOMNIA, UNSPECIFIED TYPE: ICD-10-CM

## 2025-03-18 DIAGNOSIS — E03.2 HYPOTHYROIDISM DUE TO MEDICATION: ICD-10-CM

## 2025-03-18 DIAGNOSIS — J84.9 INTERSTITIAL LUNG DISEASE (HCC): ICD-10-CM

## 2025-03-18 DIAGNOSIS — I15.9 SECONDARY HYPERTENSION: ICD-10-CM

## 2025-03-18 DIAGNOSIS — J43.9 PULMONARY EMPHYSEMA, UNSPECIFIED EMPHYSEMA TYPE (HCC): ICD-10-CM

## 2025-03-18 DIAGNOSIS — C34.12 SQUAMOUS CELL CARCINOMA OF UPPER LOBE OF LEFT LUNG (HCC): ICD-10-CM

## 2025-03-18 DIAGNOSIS — E78.5 DYSLIPIDEMIA: ICD-10-CM

## 2025-03-18 LAB
BASOPHILS # BLD AUTO: 0.1 % (ref 0–1.8)
BASOPHILS # BLD: 0.02 K/UL (ref 0–0.12)
EOSINOPHIL # BLD AUTO: 0.02 K/UL (ref 0–0.51)
EOSINOPHIL NFR BLD: 0.1 % (ref 0–6.9)
ERYTHROCYTE [DISTWIDTH] IN BLOOD BY AUTOMATED COUNT: 47.8 FL (ref 35.9–50)
HCT VFR BLD AUTO: 43.3 % (ref 42–52)
HGB BLD-MCNC: 13.2 G/DL (ref 14–18)
IMM GRANULOCYTES # BLD AUTO: 0.06 K/UL (ref 0–0.11)
IMM GRANULOCYTES NFR BLD AUTO: 0.4 % (ref 0–0.9)
LYMPHOCYTES # BLD AUTO: 0.97 K/UL (ref 1–4.8)
LYMPHOCYTES NFR BLD: 6.5 % (ref 22–41)
MCH RBC QN AUTO: 27.1 PG (ref 27–33)
MCHC RBC AUTO-ENTMCNC: 30.5 G/DL (ref 32.3–36.5)
MCV RBC AUTO: 88.9 FL (ref 81.4–97.8)
MONOCYTES # BLD AUTO: 0.52 K/UL (ref 0–0.85)
MONOCYTES NFR BLD AUTO: 3.5 % (ref 0–13.4)
NEUTROPHILS # BLD AUTO: 13.33 K/UL (ref 1.82–7.42)
NEUTROPHILS NFR BLD: 89.4 % (ref 44–72)
NRBC # BLD AUTO: 0 K/UL
NRBC BLD-RTO: 0 /100 WBC (ref 0–0.2)
PLATELET # BLD AUTO: 374 K/UL (ref 164–446)
PMV BLD AUTO: 9.3 FL (ref 9–12.9)
RBC # BLD AUTO: 4.87 M/UL (ref 4.7–6.1)
RHEUMATOID FACT SER IA-ACNC: <10 IU/ML (ref 0–14)
WBC # BLD AUTO: 14.9 K/UL (ref 4.8–10.8)

## 2025-03-18 PROCEDURE — 85025 COMPLETE CBC W/AUTO DIFF WBC: CPT

## 2025-03-18 PROCEDURE — 84443 ASSAY THYROID STIM HORMONE: CPT

## 2025-03-18 PROCEDURE — 86038 ANTINUCLEAR ANTIBODIES: CPT

## 2025-03-18 PROCEDURE — 99214 OFFICE O/P EST MOD 30 MIN: CPT | Performed by: STUDENT IN AN ORGANIZED HEALTH CARE EDUCATION/TRAINING PROGRAM

## 2025-03-18 PROCEDURE — 3078F DIAST BP <80 MM HG: CPT | Performed by: STUDENT IN AN ORGANIZED HEALTH CARE EDUCATION/TRAINING PROGRAM

## 2025-03-18 PROCEDURE — 83516 IMMUNOASSAY NONANTIBODY: CPT | Mod: 91

## 2025-03-18 PROCEDURE — 86003 ALLG SPEC IGE CRUDE XTRC EA: CPT | Mod: 91

## 2025-03-18 PROCEDURE — 86200 CCP ANTIBODY: CPT

## 2025-03-18 PROCEDURE — 86431 RHEUMATOID FACTOR QUANT: CPT

## 2025-03-18 PROCEDURE — 80053 COMPREHEN METABOLIC PANEL: CPT

## 2025-03-18 PROCEDURE — 82378 CARCINOEMBRYONIC ANTIGEN: CPT

## 2025-03-18 PROCEDURE — 36415 COLL VENOUS BLD VENIPUNCTURE: CPT

## 2025-03-18 PROCEDURE — 86235 NUCLEAR ANTIGEN ANTIBODY: CPT | Mod: 91

## 2025-03-18 PROCEDURE — 86331 IMMUNODIFFUSION OUCHTERLONY: CPT | Mod: 91

## 2025-03-18 PROCEDURE — 86606 ASPERGILLUS ANTIBODY: CPT

## 2025-03-18 PROCEDURE — 3075F SYST BP GE 130 - 139MM HG: CPT | Performed by: STUDENT IN AN ORGANIZED HEALTH CARE EDUCATION/TRAINING PROGRAM

## 2025-03-18 PROCEDURE — 82164 ANGIOTENSIN I ENZYME TEST: CPT

## 2025-03-18 PROCEDURE — 86005 ALLG SPEC IGE MULTIALLG SCR: CPT | Mod: XU

## 2025-03-18 RX ORDER — LISINOPRIL 20 MG/1
20 TABLET ORAL 2 TIMES DAILY
Qty: 200 TABLET | Refills: 3 | Status: SHIPPED | OUTPATIENT
Start: 2025-03-18

## 2025-03-18 RX ORDER — TRAZODONE HYDROCHLORIDE 50 MG/1
50 TABLET ORAL NIGHTLY
Qty: 100 TABLET | Refills: 3 | Status: SHIPPED | OUTPATIENT
Start: 2025-03-18

## 2025-03-18 RX ORDER — ROSUVASTATIN CALCIUM 10 MG/1
10 TABLET, COATED ORAL EVERY EVENING
Qty: 100 TABLET | Refills: 3 | Status: SHIPPED | OUTPATIENT
Start: 2025-03-18

## 2025-03-18 ASSESSMENT — ENCOUNTER SYMPTOMS
CHILLS: 0
FEVER: 0
WHEEZING: 0
DIZZINESS: 0
WEIGHT LOSS: 0
VOMITING: 0
HEADACHES: 0
PALPITATIONS: 0
SHORTNESS OF BREATH: 0
NAUSEA: 0

## 2025-03-18 ASSESSMENT — PATIENT HEALTH QUESTIONNAIRE - PHQ9
CLINICAL INTERPRETATION OF PHQ2 SCORE: 1
SUM OF ALL RESPONSES TO PHQ QUESTIONS 1-9: 3
5. POOR APPETITE OR OVEREATING: 0 - NOT AT ALL

## 2025-03-18 ASSESSMENT — FIBROSIS 4 INDEX: FIB4 SCORE: 1.27

## 2025-03-18 NOTE — PROGRESS NOTES
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 Note reviewed. On keytruda for hx of SCC, followed with oncology, last ct scan with persistent fibrosis and possible honycombin in periphery. - trialed high dose steroid and PJP prophylaxis. Repeat CT scan, if breathing improves   - overall symptoms is improving- dyspnea has improve. He feels less gaffney an spo2 maintaining above 88% while at rest and seem to require less time in between exertions.         Health Maintenance:     ROS:  Review of Systems   Constitutional:  Negative for chills, fever and weight loss.   HENT:  Negative for hearing loss.    Respiratory:  Negative for shortness of breath and wheezing.    Cardiovascular:  Negative for chest pain and palpitations.   Gastrointestinal:  Negative for nausea and vomiting.   Genitourinary:  Negative for frequency and urgency.   Skin:  Negative for rash.   Neurological:  Negative for dizziness and headaches.       Objective:     Exam:  /64 (BP Location: Left arm, Patient Position: Sitting, BP Cuff Size: Adult)   Pulse 75   Temp 36.8 °C (98.3 °F) (Temporal)   Ht 1.638 m (5' 4.5\")   Wt 71.7 kg (158 lb 1.1 oz)   SpO2 97%   BMI 26.71 kg/m²  Body mass index is 26.71 kg/m².    Physical Exam  Constitutional:       Appearance: Normal appearance.   Cardiovascular:      Rate and Rhythm: Normal rate and regular rhythm.   Pulmonary:      Effort: Pulmonary effort is normal.      Breath sounds: Normal breath sounds.   Musculoskeletal:      Cervical back: Normal range of motion and neck supple.   Neurological:      Mental Status: He is alert.     Labs:     Assessment & Plan:     74 y.o. male with the following - "     1. Pulmonary emphysema, unspecified emphysema type (HCC)  Chronic, stable  Concern for ILD  Lab pending  Last pulmonology note reviewed as per HPI  Patient is doing well on high-dose prednisone and also on Bactrim PJP prophylaxis.  Is on titrating dose over the next 4 weeks  May need to consider blood sugar monitoring  Overall he is symptomatically improved as per HPI  2. Secondary hypertension  Chronic, stable report dose was adjusted by oncologist currently stable on lisinopril 20 mg twice daily requesting for 3-month refill  - lisinopril (PRINIVIL) 20 MG Tab; Take 1 Tablet by mouth 2 times a day.  Dispense: 200 Tablet; Refill: 3    3. Dyslipidemia  Chronic, stable on rosuvastatin 10 mg daily  - rosuvastatin (CRESTOR) 10 MG Tab; Take 1 Tablet by mouth every evening.  Dispense: 100 Tablet; Refill: 3    4. Insomnia, unspecified type  Chronic, stable  - traZODone (DESYREL) 50 MG Tab; Take 1 tablet by mouth every evening.  Dispense: 100 Tablet; Refill: 3        Return in about 6 months (around 9/18/2025) for Lab review, Med check.    Please note that this dictation was created using voice recognition software. I have made every reasonable attempt to correct obvious errors, but I expect that there are errors of grammar and possibly content that I did not discover before finalizing the note.

## 2025-03-18 NOTE — TELEPHONE ENCOUNTER
Order was faxed to [facility].    Called and spoke with pt, informed pt of this. Pt will follow up with [facility] tomorrow to make sure they received our order.

## 2025-03-18 NOTE — TELEPHONE ENCOUNTER
JESIKA Andrews  You4 days ago     CK  I ordered during his appointment, can you make sure the DME company has it? Thanks so much!      Nikki Jackson  You; JESIKA Andrews5 days ago     AC  Hi,    Patient just called me, regarding not having a nebulizer machine.  Patient  stated, he did not pickup his prescription for Duoneb, since he doesn't have one yet.  Can someone please have this set up for him?  And if someone can also reach out to him, when it's all set up.  Thanks in advance.    --Howard

## 2025-03-19 LAB
ALBUMIN SERPL BCP-MCNC: 4 G/DL (ref 3.2–4.9)
ALBUMIN/GLOB SERPL: 1.2 G/DL
ALP SERPL-CCNC: 74 U/L (ref 30–99)
ALT SERPL-CCNC: 87 U/L (ref 2–50)
ANION GAP SERPL CALC-SCNC: 10 MMOL/L (ref 7–16)
AST SERPL-CCNC: 54 U/L (ref 12–45)
BILIRUB SERPL-MCNC: 0.2 MG/DL (ref 0.1–1.5)
BUN SERPL-MCNC: 19 MG/DL (ref 8–22)
CALCIUM ALBUM COR SERPL-MCNC: 9.2 MG/DL (ref 8.5–10.5)
CALCIUM SERPL-MCNC: 9.2 MG/DL (ref 8.5–10.5)
CEA SERPL-MCNC: 3.8 NG/ML (ref 0–3)
CHLORIDE SERPL-SCNC: 103 MMOL/L (ref 96–112)
CO2 SERPL-SCNC: 24 MMOL/L (ref 20–33)
CREAT SERPL-MCNC: 0.87 MG/DL (ref 0.5–1.4)
GFR SERPLBLD CREATININE-BSD FMLA CKD-EPI: 90 ML/MIN/1.73 M 2
GLOBULIN SER CALC-MCNC: 3.4 G/DL (ref 1.9–3.5)
GLUCOSE SERPL-MCNC: 90 MG/DL (ref 65–99)
POTASSIUM SERPL-SCNC: 4.4 MMOL/L (ref 3.6–5.5)
PROT SERPL-MCNC: 7.4 G/DL (ref 6–8.2)
SODIUM SERPL-SCNC: 137 MMOL/L (ref 135–145)
TSH SERPL-ACNC: 0.05 UIU/ML (ref 0.35–5.5)

## 2025-03-20 LAB
CCP IGA+IGG SERPL IA-ACNC: 5 UNITS (ref 0–19)
ENA JO1 AB TITR SER: 2 AU/ML (ref 0–40)
ENA SCL70 IGG SER QL: 0 AU/ML (ref 0–40)
ENA SS-A 60KD AB SER-ACNC: 1 AU/ML (ref 0–40)
ENA SS-A IGG SER QL: 3 AU/ML (ref 0–40)
ENA SS-B IGG SER IA-ACNC: 0 AU/ML (ref 0–40)
MYELOPEROXIDASE AB SER-ACNC: 0 AU/ML (ref 0–19)
NUCLEAR IGG SER QL IA: NORMAL
PROTEINASE3 AB SER-ACNC: 1 AU/ML (ref 0–19)

## 2025-03-21 LAB — ACE SERPL-CCNC: <10 U/L (ref 16–85)

## 2025-03-24 LAB
A FLAVUS AB SER QL ID: NORMAL
A FUMIGATUS1 AB SER QL ID: NORMAL
A FUMIGATUS2 AB SER QL: NORMAL
A FUMIGATUS3 AB SER QL ID: NORMAL
A FUMIGATUS6 AB SER QL ID: NORMAL
A PULLULANS AB SER QL ID: NORMAL
BEEF IGE QN: <0.1 KU/L
DEPRECATED MISC ALLERGEN IGE RAST QL: NORMAL
EPID ALLERG MIX73 IGE QN: NEGATIVE KU/L
P BETAE IGE QN: <0.1 KU/L
PIGEON SERUM AB QL ID: NORMAL
PORK IGE QN: <0.1 KU/L
S RECTIVIRGULA AB SER QL ID: NORMAL
S VIRIDIS AB SER QL: NORMAL
T CANDIDUS AB SER QL: NORMAL

## 2025-03-26 ENCOUNTER — OFFICE VISIT (OUTPATIENT)
Dept: SLEEP MEDICINE | Facility: MEDICAL CENTER | Age: 75
End: 2025-03-26
Payer: MEDICARE

## 2025-03-26 VITALS
SYSTOLIC BLOOD PRESSURE: 100 MMHG | WEIGHT: 157 LBS | OXYGEN SATURATION: 91 % | DIASTOLIC BLOOD PRESSURE: 46 MMHG | BODY MASS INDEX: 26.16 KG/M2 | HEART RATE: 88 BPM | HEIGHT: 65 IN

## 2025-03-26 DIAGNOSIS — K21.9 GASTROESOPHAGEAL REFLUX DISEASE, UNSPECIFIED WHETHER ESOPHAGITIS PRESENT: ICD-10-CM

## 2025-03-26 DIAGNOSIS — J43.9 PULMONARY EMPHYSEMA, UNSPECIFIED EMPHYSEMA TYPE (HCC): ICD-10-CM

## 2025-03-26 DIAGNOSIS — J84.9 INTERSTITIAL LUNG DISEASE (HCC): ICD-10-CM

## 2025-03-26 DIAGNOSIS — J96.11 CHRONIC HYPOXEMIC RESPIRATORY FAILURE (HCC): ICD-10-CM

## 2025-03-26 PROCEDURE — RXMED WILLOW AMBULATORY MEDICATION CHARGE: Performed by: INTERNAL MEDICINE

## 2025-03-26 PROCEDURE — 99213 OFFICE O/P EST LOW 20 MIN: CPT

## 2025-03-26 PROCEDURE — RXMED WILLOW AMBULATORY MEDICATION CHARGE: Performed by: STUDENT IN AN ORGANIZED HEALTH CARE EDUCATION/TRAINING PROGRAM

## 2025-03-26 PROCEDURE — 99215 OFFICE O/P EST HI 40 MIN: CPT

## 2025-03-26 PROCEDURE — RXMED WILLOW AMBULATORY MEDICATION CHARGE

## 2025-03-26 RX ORDER — PREDNISONE 10 MG/1
30 TABLET ORAL DAILY
Qty: 90 TABLET | Refills: 1 | Status: SHIPPED | OUTPATIENT
Start: 2025-03-26

## 2025-03-26 RX ORDER — OMEPRAZOLE 20 MG/1
20 CAPSULE, DELAYED RELEASE ORAL DAILY
Qty: 30 CAPSULE | Refills: 11 | Status: SHIPPED | OUTPATIENT
Start: 2025-03-26

## 2025-03-26 RX ORDER — SULFAMETHOXAZOLE AND TRIMETHOPRIM 800; 160 MG/1; MG/1
1 TABLET ORAL
Qty: 12 TABLET | Refills: 2 | Status: SHIPPED | OUTPATIENT
Start: 2025-03-26

## 2025-03-26 ASSESSMENT — ENCOUNTER SYMPTOMS
FEVER: 0
CHILLS: 0
SPUTUM PRODUCTION: 0
COUGH: 1
PALPITATIONS: 0
WHEEZING: 0
SHORTNESS OF BREATH: 1
DEPRESSION: 0
STRIDOR: 0
HEMOPTYSIS: 0
HEARTBURN: 0
DIZZINESS: 0
WEIGHT LOSS: 0

## 2025-03-26 ASSESSMENT — FIBROSIS 4 INDEX: FIB4 SCORE: 1.15

## 2025-03-26 NOTE — ASSESSMENT & PLAN NOTE
Chronic 2/2 smoking history   Stable, severe.      GOLD Stage: E  MMRC Group: 4  Exacerbations in the last year: 2 w/ hospitalization  Change in dyspnea, sputum, or cough: denies      Routine Tx: Trelegy 100 + 4-6 LPM nasal cannula   PRN Tx: Duoneb + albuterol  Encouraged regular exercise as tolerated  Annual covid and influenza vaccinations encouraged  Monitor for hypoxemia of oxygen <88% at home and schedule visit with any changes     Notify clinic with any early signs of exacerbation including change in sputum production or color and increased dyspnea.      Smoking cessation: discussed pharmacotherapy available for treatment and/or nicotine replacement therapy.

## 2025-03-26 NOTE — PROGRESS NOTES
Pulmonary Clinic follow up    Date of Service: 3/26/2025     Reason for follow up:  Establish Care (Last seen 03/12/25 w/ Sohail ALEXANDRA APRN/Dx: COPD, Chronic hypoxemic respiratory failure) and Results (Labs 03/18/25)    History of Present Illness:     Bright Shirley is a 74 y.o. male being evaluated in clinic today for worsening shortness of breathing. Patient reports that over the last 3 weeks he has had worsening shortness of breath, around the time that he had his fourth or fifth Keytruda injection for lung cancer.  Patient denies any illnesses around the time that the shortness of breath began.  Patient also denies any fever, chills, night sweats.  Patient currently follows with oncology, CT scan on 3/4/2025 with persistent fibrosis and possible honeycombing in the lung peripheries, primarily in the bases without acute pneumonia infiltrate noted.  Patient's left upper lobe mass was persistent with increasing left upper lobe collapse, likely causing his worsening shortness of breath versus delayed hypersensitivity reaction from Keytruda.  Unlikely that this is a reaction from Keytruda, but patient would like to trial high-dose of steroids at home with PJP prophylaxis, and agrees that he will go to the emergency department with any worsening shortness of breath or if he does not respond to steroids. Discussed case with Dr. Love.     Today 3/26/2025: Patient presents presents today on prednisone 40 mg daily, which she is titrated down from 60 mg.  Patient reports significant improvement in breathing.  Patient's lung sounds have improved since last appointment.  Patient is back to baseline oxygen of 5 L continuously.  Goal is to decrease patient's prednisone dosing of 10 mg/week as tolerated to a maintenance dose of 10 to 15 mg/week.  Patient to follow-up in ILD clinic with Dr. Lee.  Patient reports today that he believes that when he had COVID 1 year ago, was when his fibrosis had worsened.    PMH:  squamous cell carcinoma of upper left lobe of lung, HTN, HLD, bilateral carotid stenosis    Pulmonary Hx: smoking history of 49 pack years, quit in 2024    MMRC Grade:   0- Breathless only during strenuous exercise  1- Short of breath when hurrying or going up a small hill  2- Walks slower than friends due to breathlessness, has to stop at own pace  3- Stops to catch breath on level ground after 100m  4- Breathless with ambulating around house or ADLs     Review of Systems   Constitutional:  Negative for chills, fever and weight loss.   Respiratory:  Positive for cough and shortness of breath. Negative for hemoptysis, sputum production, wheezing and stridor.    Cardiovascular:  Negative for chest pain, palpitations and leg swelling.   Gastrointestinal:  Negative for heartburn.   Musculoskeletal:  Negative for joint pain.   Skin:  Negative for rash.   Neurological:  Negative for dizziness.   Endo/Heme/Allergies:  Negative for environmental allergies.   Psychiatric/Behavioral:  Negative for depression.        Current Outpatient Medications on File Prior to Visit   Medication Sig Dispense Refill    lisinopril (PRINIVIL) 20 MG Tab Take 1 Tablet by mouth 2 times a day. 200 Tablet 3    rosuvastatin (CRESTOR) 10 MG Tab Take 1 Tablet by mouth every evening. 100 Tablet 3    traZODone (DESYREL) 50 MG Tab Take 1 tablet by mouth every evening. 100 Tablet 3    fluticasone-umeclidinium-vilanterol (TRELEGY ELLIPTA) 200-62.5-25 mcg/act inhaler Inhale 1 Puff every day. 180 Each 3    ipratropium-albuterol (DUONEB) 0.5-2.5 (3) MG/3ML nebulizer solution Inhale 3 mL by nebulization every four hours as needed for Shortness of Breath. 90 mL 11    levothyroxine (SYNTHROID) 100 MCG Tab Take 1 Tablet by mouth every morning on an empty stomach. 30 Tablet 11    amLODIPine (NORVASC) 5 MG Tab Take 1 Tablet by mouth every day. 100 Tablet 3    albuterol 108 (90 Base) MCG/ACT Aero Soln inhalation aerosol Inhale 2 Puffs every four hours as needed  for Shortness of Breath. 18 g 11    Misc. Devices Misc Portable oxygen concentrator (POC), at 2L/min via NC. (Patient taking differently: Portable oxygen concentrator (POC), at 5L/min via NC.) 1 Each 0    Fexofenadine HCl (MUCINEX ALLERGY PO) Take 400 Capsules by mouth every day.      Home Care Oxygen Inhale 5 L/min continuous. O2 LPM @: 5 LPM  Specify Usage: Continuous  Type of O2: Gas  Oxygen via: N/C  Oxygen Modality: Concentrator and Portable Tank  Humidification: Yes      acetaminophen (TYLENOL) 325 MG Tab Take 500 mg by mouth every 6 hours as needed for Mild Pain.      Melatonin 10 MG Tab Take 30 mg by mouth every evening.      aspirin (ASA) 81 MG Chew Tab chewable tablet Chew 81 mg every evening. 100 Tablet     docosahexanoic acid (OMEGA 3 FA) 1000 MG Cap Take 1,000 mg by mouth every day.      Multiple Vitamin (MULTIVITAMIN ADULT PO) Take 1 Tablet by mouth every day.      calcium polycarbophil (FIBERCON) 625 MG Tab Take 625 mg by mouth every day.       No current facility-administered medications on file prior to visit.     Social History     Tobacco Use    Smoking status: Former     Current packs/day: 0.00     Average packs/day: 1 pack/day for 49.3 years (49.3 ttl pk-yrs)     Types: Cigarettes     Start date:      Quit date: 2024     Years since quittin.9    Smokeless tobacco: Never    Tobacco comments:     vape pen & cigarettes     49 years total andria an average of 1 ppd   Vaping Use    Vaping status: Former    Substances: Nicotine   Substance Use Topics    Alcohol use: Not Currently     Comment: not often    Drug use: Not Currently     Types: Marijuana     Comment: THC edibles - rarely/ NOT IN MONTHS      Past Medical History:   Diagnosis Date    Acute exacerbation of chronic obstructive pulmonary disease (COPD) ([facility]) 2021    Acute hypoxemic respiratory failure ([facility]) 2021    Acute respiratory failure with hypoxia ([facility]) 2024    Acute urinary retention 2021    Aortic  "atherosclerosis (HCC)     Carotid stenosis, bilateral - mild     Cerebral infarction (HCC) - based on CT head 7/2022     Cerebral infarction (HCC) - based on CT head 7/2022     Chronic obstructive pulmonary disease (HCC)     Hypercholesteremia     Hypertension     Intertrochanteric fracture of femur (HCC) 06/06/2021    Other emphysema (HCC) 08/07/2019    Supplemental oxygen dependent 06/30/2021     Past Surgical History:   Procedure Laterality Date    PB TREAT INTER/SUBTROCH FX,W/PLATE/SCREW Left 6/6/2021    Procedure: FIXATION, FRACTURE, HIP, USING DYNAMIC HIP SCREW, WITH COMPRESSION;  Surgeon: Avinash Suazo M.D.;  Location: SURGERY [facility];  Service: Orthopedics    HERNIA REPAIR Right     ORIF, FEMUR Right     15 years ago    OTHER      right inguinal hernia repair    OTHER ORTHOPEDIC SURGERY      right hip     Seasonal  Family History   Problem Relation Age of Onset    Breast Cancer Mother     Cancer Mother         breast cancer    Stroke Mother     Other Father         cirrhosis form alcohol    Stroke Sister     Heart Disease Brother         cardiac aneurysm    Hypertension Maternal Grandmother     Hyperlipidemia Maternal Grandmother     Ovarian Cancer Neg Hx     Tubal Cancer Neg Hx     Colorectal Cancer Neg Hx     Peritoneal Cancer Neg Hx      Ambulatory Vitals  Encounter Vitals  Blood Pressure : 100/46  Pulse: 88  Pulse Oximetry: 91 % (5 LPM)  Weight: 71.2 kg (157 lb)  Height: 163.8 cm (5' 4.5\")  BMI (Calculated): 26.53     Physical Exam  Constitutional:       General: He is not in acute distress.  HENT:      Head: Normocephalic.      Nose: Nose normal.      Mouth/Throat:      Mouth: Mucous membranes are moist.   Eyes:      Conjunctiva/sclera: Conjunctivae normal.   Cardiovascular:      Rate and Rhythm: Normal rate and regular rhythm.      Heart sounds: No murmur heard.  Pulmonary:      Effort: Prolonged expiration and respiratory distress present.      Breath sounds: Decreased breath sounds present. No " wheezing.   Abdominal:      General: There is no distension.   Musculoskeletal:      Right lower leg: No edema.      Left lower leg: No edema.   Skin:     General: Skin is warm and dry.      Capillary Refill: Capillary refill takes less than 2 seconds.      Nails: There is no clubbing.   Neurological:      General: No focal deficit present.      Mental Status: He is alert and oriented to person, place, and time.   Psychiatric:         Mood and Affect: Mood normal.     Results:    PFT 5/24/2024:    Interpretation:  There is no significant obstructive ventilatory defect on spirometry, however there is a markedly reduced peak expiratory flow with a significant bronchodilator response.  This may be consistent with asthma/reactive airways disease.  Clinically correlate.    More pronounced is a moderate restrictive ventilatory defect evidenced by the total lung capacity measuring 62% predicted.    There is severe diffusion impairment.  The reduced DLCO and DL/VA ratio is consistent with pulmonary emphysema, pulmonary vascular disease or interstitial lung disease.    Flow-volume loop is consistent with the above interpretation of likely mixed obstructive and restrictive ventilatory defect with a partial bronchodilator response.    No prior PFTs for comparison.    CT chest 3/4/2025:    Lungs: Roughly similar in size 7 x 5.4 cm left upper lobe mass anteriorly. Configuration is changed because of the increasing left upper lobe collapse.     Severe emphysema with fibrosis and honeycombing in the lung peripheries remains. Mild diffuse groundglass densities remain. No new nodules. No acute large pneumonia infiltrate.    Echocardiogram 6/6/2021:    CONCLUSIONS  No prior study is available for comparison.   Technically difficult study - adequate information is obtained.   Technically difficult study - adequate information is obtained.   Hyperdynamic systolic function.  Left ventricular ejection fraction is visually estimated to be  70%.  The left atrium is normal in size.  Structurally normal mitral valve without significant stenosis or regurgitation.  Mild aortic valve sclerosis without significant stenosis or regurgitation.  Structurally normal tricuspid valve without significant stenosis or regurgitation.  The pulmonic valve is not well visualized.  Normal pericardium without effusion.     Latest Reference Range & Units 03/18/25 14:29   Aspergillus Flavis None Detected  None Detected   Aspergillus fumigatus #1 None Detected  None Detected   Aspergillus fumigatus #2 None Detected  None Detected   Aspergillus fumigatus #3 None Detected  None Detected   Aspergillus fumigaus #6 None Detected  None Detected   Aureobasidium pullulans None Detected  None Detected   Micropolyspora Faeni None Detected  None Detected   [place] Serum None Detected  None Detected   Saccharomonospora viridis None Detected  None Detected   Thermoactinomyces candidus None Detected  None Detected   F27 Beef <=0.34 kU/L <0.10   Feather Mix, Allergen Negative kU/L Negative   Fungi/Mold, [place] <=0.34 kU/L <0.10   Immunocap Score  See Note   Pork  F026 <=0.34 kU/L <0.10   Rheumatoid Factor -Neph- 0 - 14 IU/mL <10   Cyclic Citrullinated Peptide Ab, IgG/A 0 - 19 Units 5   Anti-Scl-70 0 - 40 AU/mL 0   Kori-1 Antibody, IgG 0 - 40 AU/mL 2   Antinuclear Antibody None Detected  None Detected   SSA 52 (R0)(JANNA) Ab, IgG 0 - 40 AU/mL 3   SSA 60 (R0)(JANNA) Ab, IgG 0 - 40 AU/mL 1   Sjogren'S Anti-Ss-B 0 - 40 AU/mL 0   Myeloperoxidase Ab 0 - 19 AU/mL 0   Serine Proteinase 3, IgG 0 - 19 AU/mL 1     Vaccinations:    Covid: complete 2024  Flu: complete 2024  Pneumonia: complete 2023   RSV: complete 2023  Assessment & Plan  Interstitial lung disease (HCC)  CT from 2021 had emphysematous changes although no suggestion of honeycombing. CT's from 2024 and 2025 had suggested honeycombing with a GGO component although hard to differentiate between paraseptal emphysema vs honeycombing on the  imaging. Patient has no hx of autoimmune or exposures other than Keytruda injections. 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 Lee in ILD clinic in 4-6 weeks    Orders:    predniSONE (DELTASONE) 10 MG Tab; Take 3 Tablets by mouth every day. Please decrease by 1 tablet daily each week as tolerated.    sulfamethoxazole-trimethoprim (BACTRIM DS) 800-160 MG tablet; Take 1 Tablet by mouth every Monday, Wednesday, and Friday. Take until gone.    Gastroesophageal reflux disease, unspecified whether esophagitis present  Concern for a component of silent aspiration, treat with PPI       Pulmonary emphysema, unspecified emphysema type (HCC)  Chronic 2/2 smoking history   Stable, severe.      GOLD Stage: E  MMRC Group: 4  Exacerbations in the last year: 2 w/ hospitalization  Change in dyspnea, sputum, or cough: denies      Routine Tx: Trelegy 100 + 4-6 LPM nasal cannula   PRN Tx: Duoneb + albuterol  Encouraged regular exercise as tolerated  Annual covid and influenza vaccinations encouraged  Monitor for hypoxemia of oxygen <88% at home and schedule visit with any changes     Notify clinic with any early signs of exacerbation including change in sputum production or color and increased dyspnea.      Smoking cessation: discussed pharmacotherapy available for treatment and/or nicotine replacement therapy.          Chronic hypoxemic respiratory failure (HCC)  Oxygen at 5 LPM  Chronic hypoxia 2/2 smoking history   Patient is compliant with treatment.   Patient is benefiting from treatment.            Return in about 4 weeks (around 4/23/2025), or if symptoms worsen or fail to improve, for f/u with Dr. Lee in ILD clinic .     This note was generated using voice recognition software which has a chance of producing errors of grammar and possibly content.  I have  made every reasonable attempt to find and correct any obvious errors, but it should be expected that some may not be found prior to finalization of this note.    Time spent in record review prior to patient arrival, reviewing results, and in face-to-face encounter totaled 55 min, excluding any procedures if performed.    Sohail GARCIA  [facility]

## 2025-03-26 NOTE — ASSESSMENT & PLAN NOTE
Oxygen at 5 LPM  Chronic hypoxia 2/2 smoking history   Patient is compliant with treatment.   Patient is benefiting from treatment.

## 2025-03-27 ENCOUNTER — PHARMACY VISIT (OUTPATIENT)
Dept: PHARMACY | Facility: MEDICAL CENTER | Age: 75
End: 2025-03-27
Payer: COMMERCIAL

## 2025-03-27 PROCEDURE — RXMED WILLOW AMBULATORY MEDICATION CHARGE

## 2025-03-27 NOTE — ASSESSMENT & PLAN NOTE
CT from 2021 had emphysematous changes although no suggestion of honeycombing. CT's from 2024 and 2025 had suggested honeycombing with a GGO component although hard to differentiate between paraseptal emphysema vs honeycombing on the imaging. Patient has no hx of autoimmune or exposures other than Keytruda injections. 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 Lee in ILD clinic in 4-6 weeks    Orders:    predniSONE (DELTASONE) 10 MG Tab; Take 3 Tablets by mouth every day. Please decrease by 1 tablet daily each week as tolerated.    sulfamethoxazole-trimethoprim (BACTRIM DS) 800-160 MG tablet; Take 1 Tablet by mouth every Monday, Wednesday, and Friday. Take until gone.

## 2025-03-28 PROCEDURE — RXMED WILLOW AMBULATORY MEDICATION CHARGE

## 2025-04-01 PROCEDURE — RXMED WILLOW AMBULATORY MEDICATION CHARGE: Performed by: STUDENT IN AN ORGANIZED HEALTH CARE EDUCATION/TRAINING PROGRAM

## 2025-04-02 ENCOUNTER — PHARMACY VISIT (OUTPATIENT)
Dept: PHARMACY | Facility: MEDICAL CENTER | Age: 75
End: 2025-04-02
Payer: COMMERCIAL

## 2025-04-02 PROCEDURE — RXMED WILLOW AMBULATORY MEDICATION CHARGE: Performed by: STUDENT IN AN ORGANIZED HEALTH CARE EDUCATION/TRAINING PROGRAM

## 2025-04-02 PROCEDURE — RXMED WILLOW AMBULATORY MEDICATION CHARGE

## 2025-04-03 ENCOUNTER — HOSPITAL ENCOUNTER (OUTPATIENT)
Dept: HEMATOLOGY ONCOLOGY | Facility: MEDICAL CENTER | Age: 75
End: 2025-04-03
Attending: INTERNAL MEDICINE
Payer: MEDICARE

## 2025-04-03 ENCOUNTER — PHARMACY VISIT (OUTPATIENT)
Dept: PHARMACY | Facility: MEDICAL CENTER | Age: 75
End: 2025-04-03
Payer: COMMERCIAL

## 2025-04-03 DIAGNOSIS — C34.12 SQUAMOUS CELL CARCINOMA OF UPPER LOBE OF LEFT LUNG (HCC): ICD-10-CM

## 2025-04-03 PROCEDURE — 98006 SYNCH AUDIO-VIDEO EST MOD 30: CPT | Performed by: INTERNAL MEDICINE

## 2025-04-03 RX ORDER — 0.9 % SODIUM CHLORIDE 0.9 %
10 VIAL (ML) INJECTION PRN
OUTPATIENT
Start: 2025-06-26

## 2025-04-03 RX ORDER — ONDANSETRON 2 MG/ML
4 INJECTION INTRAMUSCULAR; INTRAVENOUS PRN
OUTPATIENT
Start: 2025-06-27

## 2025-04-03 RX ORDER — METHYLPREDNISOLONE SODIUM SUCCINATE 125 MG/2ML
125 INJECTION, POWDER, LYOPHILIZED, FOR SOLUTION INTRAMUSCULAR; INTRAVENOUS PRN
OUTPATIENT
Start: 2025-06-27

## 2025-04-03 RX ORDER — 0.9 % SODIUM CHLORIDE 0.9 %
VIAL (ML) INJECTION PRN
OUTPATIENT
Start: 2025-06-26

## 2025-04-03 RX ORDER — DIPHENHYDRAMINE HYDROCHLORIDE 50 MG/ML
50 INJECTION, SOLUTION INTRAMUSCULAR; INTRAVENOUS PRN
OUTPATIENT
Start: 2025-06-27

## 2025-04-03 RX ORDER — SODIUM CHLORIDE 9 MG/ML
INJECTION, SOLUTION INTRAVENOUS CONTINUOUS
OUTPATIENT
Start: 2025-06-27

## 2025-04-03 RX ORDER — 0.9 % SODIUM CHLORIDE 0.9 %
10 VIAL (ML) INJECTION PRN
OUTPATIENT
Start: 2025-06-27

## 2025-04-03 RX ORDER — 0.9 % SODIUM CHLORIDE 0.9 %
VIAL (ML) INJECTION PRN
OUTPATIENT
Start: 2025-06-27

## 2025-04-03 RX ORDER — 0.9 % SODIUM CHLORIDE 0.9 %
3 VIAL (ML) INJECTION PRN
OUTPATIENT
Start: 2025-06-27

## 2025-04-03 RX ORDER — 0.9 % SODIUM CHLORIDE 0.9 %
3 VIAL (ML) INJECTION PRN
OUTPATIENT
Start: 2025-06-26

## 2025-04-03 RX ORDER — EPINEPHRINE 1 MG/ML(1)
0.5 AMPUL (ML) INJECTION PRN
OUTPATIENT
Start: 2025-06-27

## 2025-04-03 RX ORDER — PROCHLORPERAZINE MALEATE 10 MG
10 TABLET ORAL EVERY 6 HOURS PRN
OUTPATIENT
Start: 2025-06-27

## 2025-04-03 RX ORDER — ONDANSETRON 8 MG/1
8 TABLET, ORALLY DISINTEGRATING ORAL PRN
OUTPATIENT
Start: 2025-06-27

## 2025-04-03 NOTE — PROGRESS NOTES
"Pharmacy Chemotherapy calculation:    DX: Squamous cell lung cancer    Cycle 6  Previous treatment = C5 2/21/25    Regimen: Pembrolizumab    *Dosing Reference*  Pembrolizumab 400 mg IV over 30 minutes on Day 1  42-day cycle until disease progression or unacceptable toxicity or until up to 24 months of therapy has been completed  NCCN Guidelines for Non-Small Cell Lung Cancer V.1.2024.  Eduardoon EB, et al. J Clin Oncol. 2019;37(28):0636-1780.  Venkatesh M, et al. N Engl J Med. 2016;375(73)[id number].    Allergies: Seasonal  /58   Pulse 91   Temp 36.7 °C (98 °F) (Temporal)   Resp 17   Ht 1.63 m (5' 4.17\")   Wt 73.2 kg (161 lb 6 oz)   SpO2 90%   BMI 27.55 kg/m²   Body surface area is 1.82 meters squared.    Labs 4/4/25:  ANC~ 8450 Plt = 260k   Hgb = 12.8     SCr = 0.91mg/dL CrCl ~ 73mL/min   AST/ALT/AP = 43/78/84 TBili = <0.2    TSH = 0.213       Pembrolizumab 400 mg fixed dose = 400 mg   <10% difference, okay to treat with final dose = 400 mg IV    Margarette Cabrera, PharmD    "

## 2025-04-03 NOTE — PROGRESS NOTES
PROGRESS NOTE: HEMATOLOGY/ONCOLOGY     This evaluation was conducted via Teams using secure and encrypted videoconferencing technology. The patient was in their home in the [facility].    The patient's identity was confirmed and verbal consent was obtained for this virtual visit.       Primary Care:  Nella Ramirez M.D.    Chief Complaint   Patient presents with    Follow-Up     Prechemo        Current Treatment: Stage 2 lung cancer on adjuvant Keytruda since 9/6/2024.  8/1/2024 : Radiosurgery to left upper lobe lung cancer.  9/6/2024 : C 1 Keytruda  10/18/24 : C 2 Keytruda  11/29/24 : C3  1/10/25 : C 4  2/21/25 : C 5 keytruda      Subjective:     History of Presenting Illness:  Bright Shirley is a 74 y.o. 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    Patients history dates back to May 3/2024 when he was experiencing SOB and presented to the ER and found to be COVID+.  He had a CT scan of the chest on 5/3/24 which showed show a 6.2 x 4.8 x 4.8 cm left upper lobe mass consistent with a lung primary. Findings are consistent also with chronic interstitial lung disease/fibrosis. He also had patchy groundglass opacities bilaterally suggesting infection/inflammation. Bilateral adrenal masses were noted to measure up to 3 cm on the right and 3.5 cm on the left with a -8 Hounsfield unit consistent with a benign adrenal adenoma.     He saw Jennifer Brown on 5/20/24.  A PET scan was done on 5/29/24, which again showed a hypermetabolic VANESA mass. No other metastatic disease. She ordered a lung biopsy which was completed on 6/11/24.  This showed a poorly differentiated squamous cell carcinoma.    Of note, he was a smoker but quit when he entered the hospital on 5/3/24.     He lives in [place] and has a roommate.     Interval History  He is feeling well and that he is a relatively asymptomatic.  He is short of breath improved  significantly with pulmonary follow-up.     Past Medical History:   Diagnosis Date    Acute exacerbation of chronic obstructive pulmonary disease (COPD) ([facility]) 2021    Acute hypoxemic respiratory failure (HCC) 2021    Acute respiratory failure with hypoxia ([facility]) 2024    Acute urinary retention 2021    Aortic atherosclerosis (HCC)     Carotid stenosis, bilateral - mild     Cerebral infarction (HCC) - based on CT head 2022     Cerebral infarction (HCC) - based on CT head 2022     Chronic obstructive pulmonary disease (HCC)     Hypercholesteremia     Hypertension     Intertrochanteric fracture of femur (HCC) 2021    Other emphysema (HCC) 2019    Supplemental oxygen dependent 2021        Past Surgical History:   Procedure Laterality Date    PB TREAT INTER/SUBTROCH FX,W/PLATE/SCREW Left 2021    Procedure: FIXATION, FRACTURE, HIP, USING DYNAMIC HIP SCREW, WITH COMPRESSION;  Surgeon: Avinash Suazo M.D.;  Location: SURGERY [facility];  Service: Orthopedics    HERNIA REPAIR Right     ORIF, FEMUR Right     15 years ago    OTHER      right inguinal hernia repair    OTHER ORTHOPEDIC SURGERY      right hip       Social History     Tobacco Use    Smoking status: Former     Current packs/day: 0.00     Average packs/day: 1 pack/day for 49.3 years (49.3 ttl pk-yrs)     Types: Cigarettes     Start date:      Quit date: 2024     Years since quittin.9    Smokeless tobacco: Never    Tobacco comments:     vape pen & cigarettes     49 years total andria an average of 1 ppd   Vaping Use    Vaping status: Former    Substances: Nicotine   Substance Use Topics    Alcohol use: Not Currently     Comment: not often    Drug use: Not Currently     Types: Marijuana     Comment: THC edibles - rarely/ NOT IN MONTHS        Family History   Problem Relation Age of Onset    Breast Cancer Mother     Cancer Mother         breast cancer    Stroke Mother     Other Father         cirrhosis form  alcohol    Stroke Sister     Heart Disease Brother         cardiac aneurysm    Hypertension Maternal Grandmother     Hyperlipidemia Maternal Grandmother     Ovarian Cancer Neg Hx     Tubal Cancer Neg Hx     Colorectal Cancer Neg Hx     Peritoneal Cancer Neg Hx        Allergies   Allergen Reactions    Seasonal        Current Outpatient Medications   Medication Sig Dispense Refill    predniSONE (DELTASONE) 10 MG Tab Take 3 Tablets by mouth every day. Please decrease by 1 tablet daily each week as tolerated. 90 Tablet 1    sulfamethoxazole-trimethoprim (BACTRIM DS) 800-160 MG tablet Take 1 Tablet by mouth every Monday, Wednesday, and Friday. Take until gone. 12 Tablet 2    omeprazole (PRILOSEC) 20 MG delayed-release capsule Take 1 Capsule by mouth every day. 30 Capsule 11    lisinopril (PRINIVIL) 20 MG Tab Take 1 Tablet by mouth 2 times a day. 200 Tablet 3    rosuvastatin (CRESTOR) 10 MG Tab Take 1 Tablet by mouth every evening. 100 Tablet 3    traZODone (DESYREL) 50 MG Tab Take 1 tablet by mouth every evening. 100 Tablet 3    fluticasone-umeclidinium-vilanterol (TRELEGY ELLIPTA) 200-62.5-25 mcg/act inhaler Inhale 1 Puff every day. 180 Each 3    ipratropium-albuterol (DUONEB) 0.5-2.5 (3) MG/3ML nebulizer solution Inhale 3 mL by nebulization every four hours as needed for Shortness of Breath. 90 mL 11    levothyroxine (SYNTHROID) 100 MCG Tab Take 1 Tablet by mouth every morning on an empty stomach. 30 Tablet 11    amLODIPine (NORVASC) 5 MG Tab Take 1 Tablet by mouth every day. 100 Tablet 3    albuterol 108 (90 Base) MCG/ACT Aero Soln inhalation aerosol Inhale 2 Puffs every four hours as needed for Shortness of Breath. 18 g 11    Misc. Devices Misc Portable oxygen concentrator (POC), at 2L/min via NC. (Patient taking differently: Portable oxygen concentrator (POC), at 5L/min via NC.) 1 Each 0    Fexofenadine HCl (MUCINEX ALLERGY PO) Take 400 Capsules by mouth every day.      Home Care Oxygen Inhale 5 L/min continuous. O2  LPM @: 5 LPM  Specify Usage: Continuous  Type of O2: Gas  Oxygen via: N/C  Oxygen Modality: Concentrator and Portable Tank  Humidification: Yes      acetaminophen (TYLENOL) 325 MG Tab Take 500 mg by mouth every 6 hours as needed for Mild Pain.      Melatonin 10 MG Tab Take 30 mg by mouth every evening.      aspirin (ASA) 81 MG Chew Tab chewable tablet Chew 81 mg every evening. 100 Tablet     docosahexanoic acid (OMEGA 3 FA) 1000 MG Cap Take 1,000 mg by mouth every day.      Multiple Vitamin (MULTIVITAMIN ADULT PO) Take 1 Tablet by mouth every day.      calcium polycarbophil (FIBERCON) 625 MG Tab Take 625 mg by mouth every day.       No current facility-administered medications for this encounter.       Review of systems  There is no complaint of fever headache or short of breath chest pain nausea vomiting abdominal pain diarrhea reported.      Problem list, medications, and allergies reviewed by myself today in Epic.     Objective:     There were no vitals filed for this visit.        DESC; KARNOFSKY SCALE WITH ECOG EQUIVALENT: 60, Requires occasional assistance, but is able to care for most of his personal needs (ECOG equivalent 2)    DISTRESS LEVEL: no acute distress    PE- deferred.      Pathology:    A. Left lung mass biopsy:          Moderately to poorly differentiated squamous cell carcinoma in a           background of abundant necrosis.     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 on 6/30/2024    #1.  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 NGS: DNMT3A, STAG2, TERT, TP53.   He had SRS for his lung cancer previously. He is on adjuvant Keytruda 400 mg IV every 6 weeks and he is tolerating very well.  I will continue Keytruda 400 mg every 6 weeks for a total of 1 year.  Patient had a follow-up PET scan on January 2, 2025  which shows a significant improvement but there is left over hypermetabolic activity in the left upper lung. I did a follow-up CT scan of the chest on 3/5/2025 which showed unchanged in size 7 cm left upper lobe mass compared with 11/18/2024 with increasing significant left upper lobe collapse. There is extensive emphysema and honeycombing fibrosis as well as subtle diffuse groundglass fibrosis.  He recently had a significant short of breath which improved.patient will have a cycle 6 of Keytruda on April 4 and cycle 7 Keytruda on May 16, 2025.  He will have a total 8 cycles of Keytruda so long as he stays in remission.    #2.  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 daily.  His Synthroid dose was reduced and he will be taking 100 mcg p.o. daily from today.    Any questions and concerns raised by the patient were addressed and answered. Patient denies any further questions.  Patient encouraged to call the office with any concerns or issues.   Thank you for allowing me to participate in your patient care.

## 2025-04-04 ENCOUNTER — APPOINTMENT (OUTPATIENT)
Dept: HEMATOLOGY ONCOLOGY | Facility: MEDICAL CENTER | Age: 75
End: 2025-04-04
Attending: INTERNAL MEDICINE
Payer: MEDICARE

## 2025-04-04 ENCOUNTER — OUTPATIENT INFUSION SERVICES (OUTPATIENT)
Dept: ONCOLOGY | Facility: MEDICAL CENTER | Age: 75
End: 2025-04-04
Attending: INTERNAL MEDICINE
Payer: MEDICARE

## 2025-04-04 VITALS
SYSTOLIC BLOOD PRESSURE: 118 MMHG | TEMPERATURE: 98 F | BODY MASS INDEX: 27.55 KG/M2 | OXYGEN SATURATION: 90 % | WEIGHT: 161.38 LBS | DIASTOLIC BLOOD PRESSURE: 58 MMHG | HEART RATE: 91 BPM | RESPIRATION RATE: 17 BRPM | HEIGHT: 64 IN

## 2025-04-04 DIAGNOSIS — C34.12 SQUAMOUS CELL CARCINOMA OF UPPER LOBE OF LEFT LUNG (HCC): ICD-10-CM

## 2025-04-04 LAB
ALBUMIN SERPL BCP-MCNC: 3.9 G/DL (ref 3.2–4.9)
ALBUMIN/GLOB SERPL: 1.4 G/DL
ALP SERPL-CCNC: 84 U/L (ref 30–99)
ALT SERPL-CCNC: 78 U/L (ref 2–50)
ANION GAP SERPL CALC-SCNC: 13 MMOL/L (ref 7–16)
AST SERPL-CCNC: 43 U/L (ref 12–45)
BASOPHILS # BLD AUTO: 0.3 % (ref 0–1.8)
BASOPHILS # BLD: 0.03 K/UL (ref 0–0.12)
BILIRUB SERPL-MCNC: <0.2 MG/DL (ref 0.1–1.5)
BUN SERPL-MCNC: 16 MG/DL (ref 8–22)
CALCIUM ALBUM COR SERPL-MCNC: 8.6 MG/DL (ref 8.5–10.5)
CALCIUM SERPL-MCNC: 8.5 MG/DL (ref 8.5–10.5)
CHLORIDE SERPL-SCNC: 102 MMOL/L (ref 96–112)
CO2 SERPL-SCNC: 21 MMOL/L (ref 20–33)
CREAT SERPL-MCNC: 0.91 MG/DL (ref 0.5–1.4)
EOSINOPHIL # BLD AUTO: 0.05 K/UL (ref 0–0.51)
EOSINOPHIL NFR BLD: 0.4 % (ref 0–6.9)
ERYTHROCYTE [DISTWIDTH] IN BLOOD BY AUTOMATED COUNT: 51.9 FL (ref 35.9–50)
GFR SERPLBLD CREATININE-BSD FMLA CKD-EPI: 88 ML/MIN/1.73 M 2
GLOBULIN SER CALC-MCNC: 2.8 G/DL (ref 1.9–3.5)
GLUCOSE SERPL-MCNC: 112 MG/DL (ref 65–99)
HCT VFR BLD AUTO: 41.5 % (ref 42–52)
HGB BLD-MCNC: 12.8 G/DL (ref 14–18)
IMM GRANULOCYTES # BLD AUTO: 0.13 K/UL (ref 0–0.11)
IMM GRANULOCYTES NFR BLD AUTO: 1.2 % (ref 0–0.9)
LYMPHOCYTES # BLD AUTO: 2.09 K/UL (ref 1–4.8)
LYMPHOCYTES NFR BLD: 18.6 % (ref 22–41)
MCH RBC QN AUTO: 27.4 PG (ref 27–33)
MCHC RBC AUTO-ENTMCNC: 30.8 G/DL (ref 32.3–36.5)
MCV RBC AUTO: 88.7 FL (ref 81.4–97.8)
MONOCYTES # BLD AUTO: 0.49 K/UL (ref 0–0.85)
MONOCYTES NFR BLD AUTO: 4.4 % (ref 0–13.4)
NEUTROPHILS # BLD AUTO: 8.45 K/UL (ref 1.82–7.42)
NEUTROPHILS NFR BLD: 75.1 % (ref 44–72)
NRBC # BLD AUTO: 0 K/UL
NRBC BLD-RTO: 0 /100 WBC (ref 0–0.2)
OUTPT INFUS CBC COMMENT OICOM: ABNORMAL
PLATELET # BLD AUTO: 260 K/UL (ref 164–446)
PMV BLD AUTO: 9 FL (ref 9–12.9)
POTASSIUM SERPL-SCNC: 4.4 MMOL/L (ref 3.6–5.5)
PROT SERPL-MCNC: 6.7 G/DL (ref 6–8.2)
RBC # BLD AUTO: 4.68 M/UL (ref 4.7–6.1)
SODIUM SERPL-SCNC: 136 MMOL/L (ref 135–145)
TSH SERPL-ACNC: 0.21 UIU/ML (ref 0.38–5.33)
WBC # BLD AUTO: 11.2 K/UL (ref 4.8–10.8)

## 2025-04-04 PROCEDURE — 700111 HCHG RX REV CODE 636 W/ 250 OVERRIDE (IP): Mod: JZ,TB | Performed by: INTERNAL MEDICINE

## 2025-04-04 PROCEDURE — 80053 COMPREHEN METABOLIC PANEL: CPT

## 2025-04-04 PROCEDURE — 96413 CHEMO IV INFUSION 1 HR: CPT

## 2025-04-04 PROCEDURE — 700105 HCHG RX REV CODE 258: Performed by: INTERNAL MEDICINE

## 2025-04-04 PROCEDURE — 85025 COMPLETE CBC W/AUTO DIFF WBC: CPT

## 2025-04-04 PROCEDURE — 84443 ASSAY THYROID STIM HORMONE: CPT

## 2025-04-04 RX ORDER — EPINEPHRINE 1 MG/ML(1)
0.5 AMPUL (ML) INJECTION PRN
Status: DISCONTINUED | OUTPATIENT
Start: 2025-04-04 | End: 2025-04-04 | Stop reason: HOSPADM

## 2025-04-04 RX ORDER — METHYLPREDNISOLONE SODIUM SUCCINATE 125 MG/2ML
125 INJECTION, POWDER, LYOPHILIZED, FOR SOLUTION INTRAMUSCULAR; INTRAVENOUS PRN
Status: DISCONTINUED | OUTPATIENT
Start: 2025-04-04 | End: 2025-04-04 | Stop reason: HOSPADM

## 2025-04-04 RX ORDER — SODIUM CHLORIDE 9 MG/ML
INJECTION, SOLUTION INTRAVENOUS CONTINUOUS
Status: DISCONTINUED | OUTPATIENT
Start: 2025-04-04 | End: 2025-04-04 | Stop reason: HOSPADM

## 2025-04-04 RX ORDER — DIPHENHYDRAMINE HYDROCHLORIDE 50 MG/ML
50 INJECTION, SOLUTION INTRAMUSCULAR; INTRAVENOUS PRN
Status: DISCONTINUED | OUTPATIENT
Start: 2025-04-04 | End: 2025-04-04 | Stop reason: HOSPADM

## 2025-04-04 RX ADMIN — SODIUM CHLORIDE: 9 INJECTION, SOLUTION INTRAVENOUS at 16:15

## 2025-04-04 RX ADMIN — SODIUM CHLORIDE 400 MG: 9 INJECTION, SOLUTION INTRAVENOUS at 16:26

## 2025-04-04 ASSESSMENT — FIBROSIS 4 INDEX: FIB4 SCORE: 1.15

## 2025-04-04 NOTE — PROGRESS NOTES
Chemotherapy Verification - SECONDARY RN       Height = 163 cm  Weight = 73.2 kg  BSA = 1.82 m2       Medication: Keytruda  Dose: 400 mg (Set dose)  Calculated Dose: 400 mg                             (In mg/m2, AUC, mg/kg)       I confirm that this process was performed independently.

## 2025-04-04 NOTE — PROGRESS NOTES
Bright presents to infusion for cycle 6/ day 1 of pembrolizumab for lung cancer. Pt denies having any new or acute complaints today, reports tolerating past treatments well. PIV started with positive return and labs drawn off of IV start.     Labs reviewed by RN, within parameters for treatment today.     pembrolizumab given over 30 min. Patient tolerated well with no adverse effects.     PIV flushed post infusion with positive blood return, d/sarah with tip intact, site unremarkable, pressure dressing applied. Patient left in stable condition, knows when to return for next appt - 5/16/25.

## 2025-04-04 NOTE — PROGRESS NOTES
"Pharmacy Chemotherapy Calculation:    Dx: NSCLC        Protocol: Pembrolizumab     *Dosing Reference*  Pembrolizumab 400 mg IV over 30 minutes on Day 1  42-day cycle until disease progression or unacceptable toxicity or until up to 24 months of therapy has been completed   NCCN Guidelines® for Non-Small Cell Lung Cancer V.1.2024.    Bassem KOTHARI et al. J Clin Oncol. 2019;37(28):4712-2913.c   Venkatesh M, et al. N Engl J Med. 2016;375(19):6881-2433.a   David WONG et al. Lancet. 2019;393(81816):4552-6089.a   Isabella M, et al. Eur J Cancer. 2020;131:68-75.b    Allergies:  Seasonal     /58   Pulse 91   Temp 36.7 °C (98 °F) (Temporal)   Resp 17   Ht 1.63 m (5' 4.17\")   Wt 73.2 kg (161 lb 6 oz)   SpO2 90%   BMI 27.55 kg/m²  Body surface area is 1.82 meters squared.    Labs 4/4/25:  ANC~ 8450 Plt = 260k   Hgb = 12.8     SCr = 0.91 mg/dL CrCl ~ 73 mL/min   AST/ALT/AP = 43/78/84 TBili = <0.2  TSH = 0.213          Drug Order   (Drug name, dose, route, IV Fluid & volume, frequency, number of doses) Cycle 6  Previous treatment: C5 on 2/21/25   Medication = Pembrolizumab  Base Dose = 400 mg  Fixed dose, no calc required  Final Dose = 400 mg  Route = IV  Fluid & Volume = NS 50 mL  Admin Duration = Over 30 minutes          Fixed dose, ok to treat with final dose   By my signature below, I confirm this process was performed independently with the BSA and all final chemotherapy dosing calculations congruent. I have reviewed the above chemotherapy order and that my calculation of the final dose and BSA (when applicable) corroborate those calculations of the  pharmacist. Discrepancies of 10% or greater in the written dose have been addressed and documented within the [facility] Progress notes.    Holly Morales, PharmD  "

## 2025-04-04 NOTE — PROGRESS NOTES
Chemotherapy Verification - PRIMARY RN      Height = 1.63 m  Weight = 73.2 kg  BSA = 1.82 m2     Medication: pembrolizumab  Dose: 400 mg fixed dose  Calculated Dose: 400 mg fixed dose                             (In mg/m2, AUC, mg/kg)     I confirm this process was performed independently with the BSA and all final chemotherapy dosing calculations congruent.  Any discrepancies of 10% or greater have been addressed with the chemotherapy pharmacist. The resolution of the discrepancy has been documented in the EPIC progress notes.

## 2025-04-12 NOTE — FACE TO FACE
"Face to Face Note  -  Durable Medical Equipment    Dneise GARCIA D.O. - NPI: [id number]  I certify that this patient is under my care and that they had a durable medical equipment(DME)face to face encounter by myself that meets the physician DME face-to-face encounter requirements with this patient on:    Date of encounter:   Patient:                    MRN:                       YOB: 2024  Bright Shirley  [id number]  1950     The encounter with the patient was in whole, or in part, for the following medical condition, which is the primary reason for durable medical equipment:  COPD, Covid-19 Infection, and Other - suspected VANESA malignancy    I certify that, based on my findings, the following durable medical equipment is medically necessary:    Oxygen   HOME O2 Saturation Measurements:(Values must be present for Home Oxygen orders)  Room air sat at rest: 85  Room air sat with amb: 79  With liters of O2: 3, O2 sat at rest with O2: 94  With Liters of O2: 5, O2 sat with amb with O2 : 91  Is the patient mobile?: Yes  If patient feels more short of breath, they can go up to 6 liters per minute and contact healthcare provider.    Supporting Symptoms: The patient requires supplemental oxygen, as the following interventions have been tried with limited or no improvement: \"Oral and/or IV steroids, \"Ambulation with oximetry, and \"Incentive spirometry.    My Clinical findings support the need for the above equipment due to:  Hypoxia  "
No

## 2025-04-15 ENCOUNTER — APPOINTMENT (OUTPATIENT)
Dept: RADIOLOGY | Facility: MEDICAL CENTER | Age: 75
DRG: 193 | End: 2025-04-15
Attending: EMERGENCY MEDICINE
Payer: MEDICARE

## 2025-04-15 ENCOUNTER — HOSPITAL ENCOUNTER (INPATIENT)
Facility: MEDICAL CENTER | Age: 75
End: 2025-04-15
Attending: EMERGENCY MEDICINE | Admitting: STUDENT IN AN ORGANIZED HEALTH CARE EDUCATION/TRAINING PROGRAM
Payer: MEDICARE

## 2025-04-15 DIAGNOSIS — E78.5 DYSLIPIDEMIA: ICD-10-CM

## 2025-04-15 DIAGNOSIS — R73.01 IFG (IMPAIRED FASTING GLUCOSE): ICD-10-CM

## 2025-04-15 DIAGNOSIS — I65.23 CAROTID STENOSIS, BILATERAL: Chronic | ICD-10-CM

## 2025-04-15 DIAGNOSIS — A41.9 SEVERE SEPSIS (HCC): ICD-10-CM

## 2025-04-15 DIAGNOSIS — Z71.89 ACP (ADVANCE CARE PLANNING): ICD-10-CM

## 2025-04-15 DIAGNOSIS — E87.20 LACTIC ACIDOSIS: ICD-10-CM

## 2025-04-15 DIAGNOSIS — J84.9 INTERSTITIAL LUNG DISEASE (HCC): ICD-10-CM

## 2025-04-15 DIAGNOSIS — R53.1 ACUTE LEFT-SIDED WEAKNESS: ICD-10-CM

## 2025-04-15 DIAGNOSIS — I10 PRIMARY HYPERTENSION: ICD-10-CM

## 2025-04-15 DIAGNOSIS — F17.210 CIGARETTE NICOTINE DEPENDENCE, UNCOMPLICATED: ICD-10-CM

## 2025-04-15 DIAGNOSIS — J96.11 CHRONIC HYPOXEMIC RESPIRATORY FAILURE (HCC): ICD-10-CM

## 2025-04-15 DIAGNOSIS — C34.12 SQUAMOUS CELL CARCINOMA OF UPPER LOBE OF LEFT LUNG (HCC): ICD-10-CM

## 2025-04-15 DIAGNOSIS — I25.10 CORONARY ARTERY CALCIFICATION SEEN ON CAT SCAN: Chronic | ICD-10-CM

## 2025-04-15 DIAGNOSIS — N17.9 AKI (ACUTE KIDNEY INJURY) (HCC): ICD-10-CM

## 2025-04-15 DIAGNOSIS — J44.9 CHRONIC OBSTRUCTIVE PULMONARY DISEASE, UNSPECIFIED COPD TYPE (HCC): ICD-10-CM

## 2025-04-15 DIAGNOSIS — J96.21 ACUTE ON CHRONIC RESPIRATORY FAILURE WITH HYPOXIA (HCC): ICD-10-CM

## 2025-04-15 DIAGNOSIS — R65.20 SEVERE SEPSIS (HCC): ICD-10-CM

## 2025-04-15 DIAGNOSIS — I70.0 AORTIC ATHEROSCLEROSIS (HCC): Chronic | ICD-10-CM

## 2025-04-15 PROBLEM — J18.9 PNEUMONIA DUE TO INFECTIOUS ORGANISM: Status: ACTIVE | Noted: 2025-04-15

## 2025-04-15 LAB
ALBUMIN SERPL BCP-MCNC: 3.5 G/DL (ref 3.2–4.9)
ALBUMIN/GLOB SERPL: 1.3 G/DL
ALP SERPL-CCNC: 63 U/L (ref 30–99)
ALT SERPL-CCNC: 79 U/L (ref 2–50)
ANION GAP SERPL CALC-SCNC: 16 MMOL/L (ref 7–16)
APPEARANCE UR: CLEAR
AST SERPL-CCNC: 60 U/L (ref 12–45)
BACTERIA #/AREA URNS HPF: NORMAL /HPF
BASOPHILS # BLD AUTO: 0.3 % (ref 0–1.8)
BASOPHILS # BLD: 0.02 K/UL (ref 0–0.12)
BILIRUB SERPL-MCNC: 0.3 MG/DL (ref 0.1–1.5)
BILIRUB UR QL STRIP.AUTO: NEGATIVE
BUN SERPL-MCNC: 17 MG/DL (ref 8–22)
CALCIUM ALBUM COR SERPL-MCNC: 8.7 MG/DL (ref 8.5–10.5)
CALCIUM SERPL-MCNC: 8.3 MG/DL (ref 8.5–10.5)
CASTS URNS QL MICRO: NORMAL /LPF (ref 0–2)
CHLORIDE SERPL-SCNC: 102 MMOL/L (ref 96–112)
CK SERPL-CCNC: 614 U/L (ref 0–154)
CO2 SERPL-SCNC: 20 MMOL/L (ref 20–33)
COLOR UR: YELLOW
CORTIS SERPL-MCNC: 27.4 UG/DL (ref 0–23)
CREAT SERPL-MCNC: 1.22 MG/DL (ref 0.5–1.4)
CRP SERPL HS-MCNC: 4.92 MG/DL (ref 0–0.75)
CRP SERPL HS-MCNC: 5.12 MG/DL (ref 0–0.75)
EKG IMPRESSION: NORMAL
EOSINOPHIL # BLD AUTO: 0 K/UL (ref 0–0.51)
EOSINOPHIL NFR BLD: 0 % (ref 0–6.9)
EPITHELIAL CELLS 1715: NORMAL /HPF (ref 0–5)
ERYTHROCYTE [DISTWIDTH] IN BLOOD BY AUTOMATED COUNT: 53 FL (ref 35.9–50)
ERYTHROCYTE [SEDIMENTATION RATE] IN BLOOD BY WESTERGREN METHOD: 16 MM/HOUR (ref 0–20)
FLUAV RNA SPEC QL NAA+PROBE: NEGATIVE
FLUBV RNA SPEC QL NAA+PROBE: NEGATIVE
GFR SERPLBLD CREATININE-BSD FMLA CKD-EPI: 62 ML/MIN/1.73 M 2
GLOBULIN SER CALC-MCNC: 2.8 G/DL (ref 1.9–3.5)
GLUCOSE SERPL-MCNC: 134 MG/DL (ref 65–99)
GLUCOSE UR STRIP.AUTO-MCNC: NEGATIVE MG/DL
HCT VFR BLD AUTO: 38.6 % (ref 42–52)
HGB BLD-MCNC: 11.9 G/DL (ref 14–18)
HOLDING TUBE BB 8507: NORMAL
IMM GRANULOCYTES # BLD AUTO: 0.05 K/UL (ref 0–0.11)
IMM GRANULOCYTES NFR BLD AUTO: 0.8 % (ref 0–0.9)
INR PPP: 1.01 (ref 0.87–1.13)
KETONES UR STRIP.AUTO-MCNC: NEGATIVE MG/DL
LACTATE SERPL-SCNC: 4.1 MMOL/L (ref 0.5–2)
LACTATE SERPL-SCNC: 4.9 MMOL/L (ref 0.5–2)
LACTATE SERPL-SCNC: 5.3 MMOL/L (ref 0.5–2)
LDH SERPL L TO P-CCNC: 314 U/L (ref 107–266)
LEUKOCYTE ESTERASE UR QL STRIP.AUTO: NEGATIVE
LYMPHOCYTES # BLD AUTO: 0.63 K/UL (ref 1–4.8)
LYMPHOCYTES NFR BLD: 10.3 % (ref 22–41)
MAGNESIUM SERPL-MCNC: 2 MG/DL (ref 1.5–2.5)
MCH RBC QN AUTO: 27.4 PG (ref 27–33)
MCHC RBC AUTO-ENTMCNC: 30.8 G/DL (ref 32.3–36.5)
MCV RBC AUTO: 88.7 FL (ref 81.4–97.8)
MICRO URNS: ABNORMAL
MONOCYTES # BLD AUTO: 0.23 K/UL (ref 0–0.85)
MONOCYTES NFR BLD AUTO: 3.7 % (ref 0–13.4)
NEUTROPHILS # BLD AUTO: 5.21 K/UL (ref 1.82–7.42)
NEUTROPHILS NFR BLD: 84.9 % (ref 44–72)
NITRITE UR QL STRIP.AUTO: NEGATIVE
NRBC # BLD AUTO: 0 K/UL
NRBC BLD-RTO: 0 /100 WBC (ref 0–0.2)
NT-PROBNP SERPL IA-MCNC: 687 PG/ML (ref 0–125)
NT-PROBNP SERPL IA-MCNC: 834 PG/ML (ref 0–125)
PH UR STRIP.AUTO: 8 [PH] (ref 5–8)
PHOSPHATE SERPL-MCNC: 4.2 MG/DL (ref 2.5–4.5)
PLATELET # BLD AUTO: 172 K/UL (ref 164–446)
PMV BLD AUTO: 9.5 FL (ref 9–12.9)
POTASSIUM SERPL-SCNC: 4.4 MMOL/L (ref 3.6–5.5)
PROCALCITONIN SERPL-MCNC: 1.11 NG/ML
PROCALCITONIN SERPL-MCNC: 1.38 NG/ML
PROT SERPL-MCNC: 6.3 G/DL (ref 6–8.2)
PROT UR QL STRIP: 30 MG/DL
PROTHROMBIN TIME: 13.3 SEC (ref 12–14.6)
RBC # BLD AUTO: 4.35 M/UL (ref 4.7–6.1)
RBC # URNS HPF: NORMAL /HPF (ref 0–2)
RBC UR QL AUTO: ABNORMAL
RSV RNA SPEC QL NAA+PROBE: NEGATIVE
SARS-COV-2 RNA RESP QL NAA+PROBE: NOTDETECTED
SODIUM SERPL-SCNC: 138 MMOL/L (ref 135–145)
SP GR UR STRIP.AUTO: 1.03
SPECIMEN SOURCE: NORMAL
TROPONIN T SERPL-MCNC: 37 NG/L (ref 6–19)
UROBILINOGEN UR STRIP.AUTO-MCNC: 1 EU/DL
WBC # BLD AUTO: 6.1 K/UL (ref 4.8–10.8)
WBC #/AREA URNS HPF: NORMAL /HPF

## 2025-04-15 PROCEDURE — 83880 ASSAY OF NATRIURETIC PEPTIDE: CPT

## 2025-04-15 PROCEDURE — A9270 NON-COVERED ITEM OR SERVICE: HCPCS | Performed by: STUDENT IN AN ORGANIZED HEALTH CARE EDUCATION/TRAINING PROGRAM

## 2025-04-15 PROCEDURE — 71045 X-RAY EXAM CHEST 1 VIEW: CPT

## 2025-04-15 PROCEDURE — 700102 HCHG RX REV CODE 250 W/ 637 OVERRIDE(OP): Performed by: STUDENT IN AN ORGANIZED HEALTH CARE EDUCATION/TRAINING PROGRAM

## 2025-04-15 PROCEDURE — 83615 LACTATE (LD) (LDH) ENZYME: CPT

## 2025-04-15 PROCEDURE — 99497 ADVNCD CARE PLAN 30 MIN: CPT | Mod: 25 | Performed by: STUDENT IN AN ORGANIZED HEALTH CARE EDUCATION/TRAINING PROGRAM

## 2025-04-15 PROCEDURE — 700111 HCHG RX REV CODE 636 W/ 250 OVERRIDE (IP): Mod: JZ | Performed by: EMERGENCY MEDICINE

## 2025-04-15 PROCEDURE — 86140 C-REACTIVE PROTEIN: CPT

## 2025-04-15 PROCEDURE — 81001 URINALYSIS AUTO W/SCOPE: CPT

## 2025-04-15 PROCEDURE — A9270 NON-COVERED ITEM OR SERVICE: HCPCS | Performed by: EMERGENCY MEDICINE

## 2025-04-15 PROCEDURE — 700111 HCHG RX REV CODE 636 W/ 250 OVERRIDE (IP): Mod: JZ,TB | Performed by: STUDENT IN AN ORGANIZED HEALTH CARE EDUCATION/TRAINING PROGRAM

## 2025-04-15 PROCEDURE — 0241U HCHG SARS-COV-2 COVID-19 NFCT DS RESP RNA 4 TRGT MIC: CPT

## 2025-04-15 PROCEDURE — 84145 PROCALCITONIN (PCT): CPT

## 2025-04-15 PROCEDURE — 85610 PROTHROMBIN TIME: CPT

## 2025-04-15 PROCEDURE — 87086 URINE CULTURE/COLONY COUNT: CPT

## 2025-04-15 PROCEDURE — 85652 RBC SED RATE AUTOMATED: CPT

## 2025-04-15 PROCEDURE — 93005 ELECTROCARDIOGRAM TRACING: CPT | Mod: TC | Performed by: EMERGENCY MEDICINE

## 2025-04-15 PROCEDURE — 71275 CT ANGIOGRAPHY CHEST: CPT

## 2025-04-15 PROCEDURE — 84484 ASSAY OF TROPONIN QUANT: CPT

## 2025-04-15 PROCEDURE — 96365 THER/PROPH/DIAG IV INF INIT: CPT

## 2025-04-15 PROCEDURE — 84100 ASSAY OF PHOSPHORUS: CPT

## 2025-04-15 PROCEDURE — 82550 ASSAY OF CK (CPK): CPT

## 2025-04-15 PROCEDURE — 82533 TOTAL CORTISOL: CPT

## 2025-04-15 PROCEDURE — 87449 NOS EACH ORGANISM AG IA: CPT

## 2025-04-15 PROCEDURE — 96367 TX/PROPH/DG ADDL SEQ IV INF: CPT

## 2025-04-15 PROCEDURE — 700117 HCHG RX CONTRAST REV CODE 255: Performed by: EMERGENCY MEDICINE

## 2025-04-15 PROCEDURE — 700105 HCHG RX REV CODE 258: Performed by: STUDENT IN AN ORGANIZED HEALTH CARE EDUCATION/TRAINING PROGRAM

## 2025-04-15 PROCEDURE — 83605 ASSAY OF LACTIC ACID: CPT

## 2025-04-15 PROCEDURE — 85025 COMPLETE CBC W/AUTO DIFF WBC: CPT

## 2025-04-15 PROCEDURE — 99285 EMERGENCY DEPT VISIT HI MDM: CPT

## 2025-04-15 PROCEDURE — 36415 COLL VENOUS BLD VENIPUNCTURE: CPT

## 2025-04-15 PROCEDURE — 700105 HCHG RX REV CODE 258: Performed by: EMERGENCY MEDICINE

## 2025-04-15 PROCEDURE — 83735 ASSAY OF MAGNESIUM: CPT

## 2025-04-15 PROCEDURE — 700102 HCHG RX REV CODE 250 W/ 637 OVERRIDE(OP): Performed by: EMERGENCY MEDICINE

## 2025-04-15 PROCEDURE — 86738 MYCOPLASMA ANTIBODY: CPT

## 2025-04-15 PROCEDURE — 700101 HCHG RX REV CODE 250: Performed by: STUDENT IN AN ORGANIZED HEALTH CARE EDUCATION/TRAINING PROGRAM

## 2025-04-15 PROCEDURE — 80053 COMPREHEN METABOLIC PANEL: CPT

## 2025-04-15 PROCEDURE — 87899 AGENT NOS ASSAY W/OPTIC: CPT

## 2025-04-15 PROCEDURE — 87040 BLOOD CULTURE FOR BACTERIA: CPT

## 2025-04-15 PROCEDURE — 99223 1ST HOSP IP/OBS HIGH 75: CPT | Performed by: STUDENT IN AN ORGANIZED HEALTH CARE EDUCATION/TRAINING PROGRAM

## 2025-04-15 PROCEDURE — 94640 AIRWAY INHALATION TREATMENT: CPT

## 2025-04-15 PROCEDURE — 96375 TX/PRO/DX INJ NEW DRUG ADDON: CPT

## 2025-04-15 PROCEDURE — 770020 HCHG ROOM/CARE - TELE (206)

## 2025-04-15 RX ORDER — LABETALOL HYDROCHLORIDE 5 MG/ML
10 INJECTION, SOLUTION INTRAVENOUS EVERY 4 HOURS PRN
Status: DISCONTINUED | OUTPATIENT
Start: 2025-04-15 | End: 2025-05-01 | Stop reason: HOSPADM

## 2025-04-15 RX ORDER — ALBUTEROL SULFATE 5 MG/ML
2.5 SOLUTION RESPIRATORY (INHALATION)
Status: DISCONTINUED | OUTPATIENT
Start: 2025-04-15 | End: 2025-05-01 | Stop reason: HOSPADM

## 2025-04-15 RX ORDER — ACETAMINOPHEN 325 MG/1
650 TABLET ORAL EVERY 6 HOURS PRN
Status: DISCONTINUED | OUTPATIENT
Start: 2025-04-15 | End: 2025-05-01 | Stop reason: HOSPADM

## 2025-04-15 RX ORDER — SODIUM CHLORIDE, SODIUM LACTATE, POTASSIUM CHLORIDE, CALCIUM CHLORIDE 600; 310; 30; 20 MG/100ML; MG/100ML; MG/100ML; MG/100ML
INJECTION, SOLUTION INTRAVENOUS CONTINUOUS
Status: DISCONTINUED | OUTPATIENT
Start: 2025-04-15 | End: 2025-04-16

## 2025-04-15 RX ORDER — PROPYLENE GLYCOL/PEG 400 0.3 %-0.4%
1 DROPS OPHTHALMIC (EYE) 4 TIMES DAILY
COMMUNITY

## 2025-04-15 RX ORDER — HEPARIN SODIUM 5000 [USP'U]/ML
5000 INJECTION, SOLUTION INTRAVENOUS; SUBCUTANEOUS EVERY 8 HOURS
Status: DISCONTINUED | OUTPATIENT
Start: 2025-04-16 | End: 2025-05-01 | Stop reason: HOSPADM

## 2025-04-15 RX ORDER — ALBUTEROL SULFATE 90 UG/1
2 INHALANT RESPIRATORY (INHALATION) EVERY 4 HOURS PRN
Status: DISCONTINUED | OUTPATIENT
Start: 2025-04-15 | End: 2025-05-01 | Stop reason: HOSPADM

## 2025-04-15 RX ORDER — IPRATROPIUM BROMIDE AND ALBUTEROL SULFATE 2.5; .5 MG/3ML; MG/3ML
3 SOLUTION RESPIRATORY (INHALATION)
Status: DISCONTINUED | OUTPATIENT
Start: 2025-04-15 | End: 2025-04-16

## 2025-04-15 RX ORDER — ROSUVASTATIN CALCIUM 10 MG/1
10 TABLET, COATED ORAL EVERY EVENING
Status: DISCONTINUED | OUTPATIENT
Start: 2025-04-15 | End: 2025-05-01 | Stop reason: HOSPADM

## 2025-04-15 RX ORDER — POLYVINYL ALCOHOL 14 MG/ML
1 SOLUTION/ DROPS OPHTHALMIC
Status: DISCONTINUED | OUTPATIENT
Start: 2025-04-15 | End: 2025-05-01 | Stop reason: HOSPADM

## 2025-04-15 RX ORDER — ACETAMINOPHEN 325 MG/1
975 TABLET ORAL ONCE
Status: COMPLETED | OUTPATIENT
Start: 2025-04-15 | End: 2025-04-15

## 2025-04-15 RX ORDER — MONTELUKAST SODIUM 10 MG/1
10 TABLET ORAL NIGHTLY
Status: DISCONTINUED | OUTPATIENT
Start: 2025-04-15 | End: 2025-05-01 | Stop reason: HOSPADM

## 2025-04-15 RX ORDER — METHYLPREDNISOLONE SODIUM SUCCINATE 40 MG/ML
40 INJECTION, POWDER, LYOPHILIZED, FOR SOLUTION INTRAMUSCULAR; INTRAVENOUS EVERY 8 HOURS
Status: DISCONTINUED | OUTPATIENT
Start: 2025-04-15 | End: 2025-04-16

## 2025-04-15 RX ORDER — AZITHROMYCIN 500 MG/5ML
500 INJECTION, POWDER, LYOPHILIZED, FOR SOLUTION INTRAVENOUS ONCE
Status: DISCONTINUED | OUTPATIENT
Start: 2025-04-15 | End: 2025-04-15

## 2025-04-15 RX ORDER — AZITHROMYCIN MONOHYDRATE 500 MG/5ML
500 INJECTION, POWDER, LYOPHILIZED, FOR SOLUTION INTRAVENOUS ONCE
Status: COMPLETED | OUTPATIENT
Start: 2025-04-15 | End: 2025-04-15

## 2025-04-15 RX ORDER — CEFTRIAXONE 1 G/1
1000 INJECTION, POWDER, FOR SOLUTION INTRAMUSCULAR; INTRAVENOUS ONCE
Status: COMPLETED | OUTPATIENT
Start: 2025-04-15 | End: 2025-04-15

## 2025-04-15 RX ORDER — POLYVINYL ALCOHOL 14 MG/ML
1 SOLUTION/ DROPS OPHTHALMIC PRN
COMMUNITY

## 2025-04-15 RX ORDER — TRAZODONE HYDROCHLORIDE 50 MG/1
50 TABLET ORAL NIGHTLY
Status: DISCONTINUED | OUTPATIENT
Start: 2025-04-15 | End: 2025-05-01 | Stop reason: HOSPADM

## 2025-04-15 RX ORDER — POLYETHYLENE GLYCOL 3350 17 G/17G
1 POWDER, FOR SOLUTION ORAL
Status: DISCONTINUED | OUTPATIENT
Start: 2025-04-15 | End: 2025-04-17

## 2025-04-15 RX ORDER — LISINOPRIL 20 MG/1
20 TABLET ORAL 2 TIMES DAILY
Status: DISCONTINUED | OUTPATIENT
Start: 2025-04-15 | End: 2025-04-25

## 2025-04-15 RX ORDER — SODIUM CHLORIDE, SODIUM LACTATE, POTASSIUM CHLORIDE, AND CALCIUM CHLORIDE .6; .31; .03; .02 G/100ML; G/100ML; G/100ML; G/100ML
500 INJECTION, SOLUTION INTRAVENOUS
Status: DISCONTINUED | OUTPATIENT
Start: 2025-04-15 | End: 2025-05-01 | Stop reason: HOSPADM

## 2025-04-15 RX ORDER — AZITHROMYCIN 250 MG/1
500 TABLET, FILM COATED ORAL EVERY EVENING
Status: COMPLETED | OUTPATIENT
Start: 2025-04-16 | End: 2025-04-17

## 2025-04-15 RX ORDER — SODIUM CHLORIDE, SODIUM LACTATE, POTASSIUM CHLORIDE, AND CALCIUM CHLORIDE .6; .31; .03; .02 G/100ML; G/100ML; G/100ML; G/100ML
30 INJECTION, SOLUTION INTRAVENOUS ONCE
Status: COMPLETED | OUTPATIENT
Start: 2025-04-15 | End: 2025-04-15

## 2025-04-15 RX ORDER — LEVOTHYROXINE SODIUM 100 UG/1
100 TABLET ORAL
Status: DISCONTINUED | OUTPATIENT
Start: 2025-04-16 | End: 2025-05-01 | Stop reason: HOSPADM

## 2025-04-15 RX ORDER — ONDANSETRON 2 MG/ML
4 INJECTION INTRAMUSCULAR; INTRAVENOUS EVERY 4 HOURS PRN
Status: DISCONTINUED | OUTPATIENT
Start: 2025-04-15 | End: 2025-05-01 | Stop reason: HOSPADM

## 2025-04-15 RX ORDER — LINEZOLID 2 MG/ML
600 INJECTION, SOLUTION INTRAVENOUS EVERY 12 HOURS
Status: DISCONTINUED | OUTPATIENT
Start: 2025-04-15 | End: 2025-04-16

## 2025-04-15 RX ORDER — METHYLPREDNISOLONE SODIUM SUCCINATE 40 MG/ML
40 INJECTION, POWDER, LYOPHILIZED, FOR SOLUTION INTRAMUSCULAR; INTRAVENOUS ONCE
Status: COMPLETED | OUTPATIENT
Start: 2025-04-15 | End: 2025-04-15

## 2025-04-15 RX ORDER — AMOXICILLIN 250 MG
2 CAPSULE ORAL EVERY EVENING
Status: DISCONTINUED | OUTPATIENT
Start: 2025-04-15 | End: 2025-04-17

## 2025-04-15 RX ORDER — ASPIRIN 81 MG/1
81 TABLET ORAL DAILY
Status: DISCONTINUED | OUTPATIENT
Start: 2025-04-16 | End: 2025-05-01 | Stop reason: HOSPADM

## 2025-04-15 RX ORDER — ONDANSETRON 4 MG/1
4 TABLET, ORALLY DISINTEGRATING ORAL EVERY 4 HOURS PRN
Status: DISCONTINUED | OUTPATIENT
Start: 2025-04-15 | End: 2025-05-01 | Stop reason: HOSPADM

## 2025-04-15 RX ORDER — AMLODIPINE BESYLATE 5 MG/1
5 TABLET ORAL DAILY
Status: DISCONTINUED | OUTPATIENT
Start: 2025-04-16 | End: 2025-05-01 | Stop reason: HOSPADM

## 2025-04-15 RX ADMIN — SODIUM CHLORIDE, POTASSIUM CHLORIDE, SODIUM LACTATE AND CALCIUM CHLORIDE 2190 ML: 600; 310; 30; 20 INJECTION, SOLUTION INTRAVENOUS at 15:37

## 2025-04-15 RX ADMIN — ACETAMINOPHEN 975 MG: 325 TABLET ORAL at 18:49

## 2025-04-15 RX ADMIN — SODIUM CHLORIDE, POTASSIUM CHLORIDE, SODIUM LACTATE AND CALCIUM CHLORIDE: 600; 310; 30; 20 INJECTION, SOLUTION INTRAVENOUS at 21:10

## 2025-04-15 RX ADMIN — ROSUVASTATIN CALCIUM 10 MG: 20 TABLET, FILM COATED ORAL at 21:12

## 2025-04-15 RX ADMIN — METHYLPREDNISOLONE SODIUM SUCCINATE 40 MG: 40 INJECTION, POWDER, FOR SOLUTION INTRAMUSCULAR; INTRAVENOUS at 21:14

## 2025-04-15 RX ADMIN — LISINOPRIL 20 MG: 20 TABLET ORAL at 21:11

## 2025-04-15 RX ADMIN — AZITHROMYCIN DIHYDRATE 500 MG: 500 INJECTION, POWDER, LYOPHILIZED, FOR SOLUTION INTRAVENOUS at 16:59

## 2025-04-15 RX ADMIN — MONTELUKAST 10 MG: 10 TABLET, FILM COATED ORAL at 22:03

## 2025-04-15 RX ADMIN — IPRATROPIUM BROMIDE AND ALBUTEROL SULFATE 3 ML: .5; 2.5 SOLUTION RESPIRATORY (INHALATION) at 22:54

## 2025-04-15 RX ADMIN — CEFTRIAXONE SODIUM 1000 MG: 1 INJECTION, POWDER, FOR SOLUTION INTRAMUSCULAR; INTRAVENOUS at 16:57

## 2025-04-15 RX ADMIN — IOHEXOL 75 ML: 350 INJECTION, SOLUTION INTRAVENOUS at 18:45

## 2025-04-15 RX ADMIN — CEFEPIME 2 G: 2 INJECTION, POWDER, FOR SOLUTION INTRAVENOUS at 21:08

## 2025-04-15 RX ADMIN — METHYLPREDNISOLONE SODIUM SUCCINATE 40 MG: 40 INJECTION, POWDER, FOR SOLUTION INTRAMUSCULAR; INTRAVENOUS at 16:55

## 2025-04-15 ASSESSMENT — ENCOUNTER SYMPTOMS
DIZZINESS: 0
SHORTNESS OF BREATH: 1
HEARTBURN: 0
PALPITATIONS: 0
SPUTUM PRODUCTION: 1
CHILLS: 1
WEAKNESS: 1
WHEEZING: 1
MYALGIAS: 1
ABDOMINAL PAIN: 0
NAUSEA: 0
DOUBLE VISION: 0
BRUISES/BLEEDS EASILY: 0
FOCAL WEAKNESS: 1
BLURRED VISION: 0
HEMOPTYSIS: 0
FEVER: 0
DEPRESSION: 0
VOMITING: 0
HEADACHES: 0
COUGH: 1

## 2025-04-15 ASSESSMENT — LIFESTYLE VARIABLES: SUBSTANCE_ABUSE: 0

## 2025-04-15 ASSESSMENT — FIBROSIS 4 INDEX: FIB4 SCORE: 1.39

## 2025-04-15 ASSESSMENT — PAIN DESCRIPTION - PAIN TYPE: TYPE: CHRONIC PAIN

## 2025-04-15 NOTE — ED TRIAGE NOTES
.  Chief Complaint   Patient presents with    Possible Stroke      Pt BIB EMS from home for possible stroke like symptoms. Left side weakness, left facial droop, AOX1. Pt was found by friend, LNW at midnight. Pt was hypoxic upon EMS arrival at 67% RA sat. Pt has Hx of COPD wears O2 at 5 L baseline, Pt O2 tanks all empty. Pt symptoms improved during transport with Duoneb and oxygen. Pt AOx4 no other deficits.

## 2025-04-15 NOTE — ED NOTES
Pharmacy Medication Reconciliation      ~Medication reconciliation updated and complete per patient   ~Allergies have been verified and updated   ~  ~Is dispense history available in EPIC: yes  ~Patient home pharmacy :  [facility] [phone]      ~Anticoagulants (rivaroxaban, apixaban, edoxaban, dabigatran, warfarin, enoxaparin) taken in the last 14 days? NO      Patient takes Septra DS one tablet every Monday, Wednesday & Friday, started 3/12/25 last dose 4/14/25 am

## 2025-04-15 NOTE — ED PROVIDER NOTES
ED Provider Note    CHIEF COMPLAINT  Chief Complaint   Patient presents with    Possible Stroke           HPI/ROS    OUTSIDE HISTORIAN(S):  MS same the patient was exquisitely hypoxic, left-sided facial droop, left upper and lower extremity weakness and expressive aphasia but after receiving oxygen patient symptoms completely abated.  Abnormal blood sugar, normal heart rate, on normal blood pressure.  The patient was started on a DuoNeb continuous and had significant improvement of his symptoms.    Bright Shirley is a 74 y.o. male who presents for shortness of breath.  The patient states that he is on 4 L nasal cannula at home and his action has not been working appropriately and last several days.  He is having subjective fever, increasing cough, increased work of breathing.  Does have a history of COPD as well as interstitial lung disease as well as a lung mass.  He is currently on chemotherapy and has had radiation therapy in the past.  Is nonsurgical.  The patient denies chest pain, nausea, vomiting, abdominal pain, hemoptysis, swelling of his lower extremities.  EMS arrived and they noticed the patient left-sided facial droop, left upper and lower extremity weakness and expressive aphasia was exquisitely hypoxic.  Patient abnormal blood sugar, he was started on oxime mask and had a normal blood sugar.  Shortly after starting oxygen his saturations improved and the patient symptoms quickly abated.  He was deemed a code stroke initially and was seen at the triage/charge desk with Dr.Ke brady who does not believe the patient is having a stroke.    PAST MEDICAL HISTORY   has a past medical history of Acute exacerbation of chronic obstructive pulmonary disease (COPD) ([facility]) (07/01/2021), Acute hypoxemic respiratory failure ([facility]) (06/08/2021), Acute respiratory failure with hypoxia ([facility]) (04/29/2024), Acute urinary retention (06/07/2021), Aortic atherosclerosis ([facility]), Carotid stenosis, bilateral - mild,  Cerebral infarction (HCC) - based on CT head 2022, Cerebral infarction (HCC) - based on CT head 2022, Chronic obstructive pulmonary disease (HCC), Hypercholesteremia, Hypertension, Intertrochanteric fracture of femur (HCC) (2021), Other emphysema (HCC) (2019), and Supplemental oxygen dependent (2021).    SURGICAL HISTORY   has a past surgical history that includes hernia repair (Right); orif, femur (Right); other orthopedic surgery; other; and treat inter/subtroch fx,w/plate/screw (Left, 2021).    FAMILY HISTORY  Family History   Problem Relation Age of Onset    Breast Cancer Mother     Cancer Mother         breast cancer    Stroke Mother     Other Father         cirrhosis form alcohol    Stroke Sister     Heart Disease Brother         cardiac aneurysm    Hypertension Maternal Grandmother     Hyperlipidemia Maternal Grandmother     Ovarian Cancer Neg Hx     Tubal Cancer Neg Hx     Colorectal Cancer Neg Hx     Peritoneal Cancer Neg Hx        SOCIAL HISTORY  Social History     Tobacco Use    Smoking status: Former     Current packs/day: 0.00     Average packs/day: 1 pack/day for 49.3 years (49.3 ttl pk-yrs)     Types: Cigarettes     Start date:      Quit date: 2024     Years since quittin.9    Smokeless tobacco: Never    Tobacco comments:     vape pen & cigarettes     49 years total andria an average of 1 ppd   Vaping Use    Vaping status: Former    Substances: Nicotine   Substance and Sexual Activity    Alcohol use: Not Currently     Comment: not often    Drug use: Not Currently     Types: Marijuana     Comment: THC edibles - rarely/ NOT IN MONTHS    Sexual activity: Not Currently       CURRENT MEDICATIONS  Home Medications       Reviewed by Steve Chavez (Pharmacy Tech) on 04/15/25 at 1620  Med List Status: Complete     Medication Last Dose Status   acetaminophen (TYLENOL) 325 MG Tab 2025 Active   albuterol 108 (90 Base) MCG/ACT Aero Soln inhalation aerosol 2025  Active   amLODIPine (NORVASC) 5 MG Tab 4/14/2025 Active   artificial tears 1.4 % Solution 4/14/2025 Active   calcium polycarbophil (FIBERCON) 625 MG Tab 4/14/2025 Active   docosahexanoic acid (OMEGA 3 FA) 1000 MG Cap 4/14/2025 Active   Fexofenadine HCl (MUCINEX ALLERGY PO) 4/14/2025 Active   fluticasone-umeclidinium-vilanterol (TRELEGY ELLIPTA) 200-62.5-25 mcg/act inhaler 4/14/2025 Active   Home Care Oxygen  Active   ipratropium-albuterol (DUONEB) 0.5-2.5 (3) MG/3ML nebulizer solution 4/14/2025 Active   levothyroxine (SYNTHROID) 100 MCG Tab 4/14/2025 Active   lisinopril (PRINIVIL) 20 MG Tab 4/14/2025 Active   Melatonin 10 MG Tab 4/14/2025 Active   Misc. Devices Misc  Active   Multiple Vitamin (MULTIVITAMIN ADULT PO) 4/14/2025 Active   naphazoline-pheniramine (ALLERGY EYE) 0.025-0.3 % ophthalmic solution 4/14/2025 Active   naphazoline-pheniramine (OPCON-A) 0.027-0.315 % Solution  Active   omeprazole (PRILOSEC) 20 MG delayed-release capsule 4/14/2025 Active   predniSONE (DELTASONE) 10 MG Tab 4/14/2025 Active   rosuvastatin (CRESTOR) 10 MG Tab 4/14/2025 Active   sulfamethoxazole-trimethoprim (BACTRIM DS) 800-160 MG tablet 4/14/2025 Active   traZODone (DESYREL) 50 MG Tab 4/14/2025 Active                  Audit from Redirected Encounters    **Home medications have not yet been reviewed for this encounter**         ALLERGIES  Allergies   Allergen Reactions    Seasonal Runny Nose and Itching     Seasonal allergies       PHYSICAL EXAM  VITAL SIGNS: /51   Pulse 99   Temp 37.3 °C (99.2 °F) (Temporal)   Resp 20   Wt 73 kg (161 lb)   SpO2 89%   BMI 27.49 kg/m²      Nursing notes and vitals reviewed.  Constitutional: Well developed, Well nourished, No acute distress, Non-toxic appearance.   Cardiovascular: Normal heart rate, Normal rhythm, No murmurs, No rubs, No gallops.   Thorax & Lungs: Profound respiratory distress, audible wheezing, use of accessory muscles inspiration expiration  Abdomen: Bowel sounds normal,  Soft, No tenderness, No guarding, No rebound, No masses, No pulsatile masses.   Skin: Warm, diaphoretic, no erythema, No rash.   Extremities: No deformity, no edema, good range of motion range of motion upper lower extremes bilaterally  Neurologic:  Alert & oriented to month and age, Normal cognition, Cranial nerves II-XII are intact, No slurred speech, Negative finger to nose bilaterally, No pronator drift bilaterally,   strength 5/5 bilaterally, Leg raise strength 5/5 bilaterally, Plantarflexion strength 5/5 bilaterally, Dorsiflexion strength 5/5 bilaterally, Deep tendon reflexes 2/4 upper and lower extremities bilaterally, Sensation intact throughout, No Nystagmus.  NIH of 0  Psychiatric: Affect normal for clinical presentation.      EKG/LABS  Normal sinus rhythm on monitor  I have independently interpreted this EKG  Results for orders placed or performed during the hospital encounter of 04/15/25   MAGNESIUM    Collection Time: 04/15/25  3:18 PM   Result Value Ref Range    Magnesium 2.0 1.5 - 2.5 mg/dL   C Reactive Protein Quantitative (Non-Cardiac)    Collection Time: 04/15/25  3:18 PM   Result Value Ref Range    Stat C-Reactive Protein 4.92 (H) 0.00 - 0.75 mg/dL   proBrain Natriuretic Peptide, NT    Collection Time: 04/15/25  3:18 PM   Result Value Ref Range    NT-proBNP 687 (H) 0 - 125 pg/mL   PROCALCITONIN    Collection Time: 04/15/25  3:18 PM   Result Value Ref Range    Procalcitonin 1.11 (H) <0.25 ng/mL   HOLD BLOOD BANK SPECIMEN (NOT TESTED)    Collection Time: 04/15/25  3:18 PM   Result Value Ref Range    Holding Tube - Bb DONE    Prothrombin time (INR)    Collection Time: 04/15/25  3:19 PM   Result Value Ref Range    PT 13.3 12.0 - 14.6 sec    INR 1.01 0.87 - 1.13   Lactic Acid    Collection Time: 04/15/25  3:35 PM   Result Value Ref Range    Lactic Acid 5.3 (HH) 0.5 - 2.0 mmol/L   Blood Culture - Draw one from central line and one from peripheral site    Collection Time: 04/15/25  3:35 PM     Specimen: Line; Blood   Result Value Ref Range    Significant Indicator NEG     Source BLD     Site Peripheral     Culture Result       No Growth  Note: Blood cultures are incubated for 5 days and  are monitored continuously.Positive blood cultures  are called to the RN and reported as soon as  they are identified.     CoV-2, FLU A/B, and RSV by PCR (2-4 Hours CEPHEID) : Collect NP swab in VTM    Collection Time: 04/15/25  3:39 PM    Specimen: Respirate   Result Value Ref Range    Influenza virus A RNA Negative Negative    Influenza virus B, PCR Negative Negative    RSV, PCR Negative Negative    SARS-CoV-2 by PCR NotDetected     SARS-CoV-2 Source NP Swab    Blood Culture - Draw one from central line and one from peripheral site    Collection Time: 04/15/25  3:44 PM    Specimen: Peripheral; Blood   Result Value Ref Range    Significant Indicator NEG     Source BLD     Site PERIPHERAL     Culture Result       No Growth  Note: Blood cultures are incubated for 5 days and  are monitored continuously.Positive blood cultures  are called to the RN and reported as soon as  they are identified.     EKG    Collection Time: 04/15/25  4:22 PM   Result Value Ref Range    Report       [facility]    Test Date:  2025-04-15  Pt Name:    ELO STARKEY                Department: ER  MRN:        [id number]                      Room:        04  Gender:     Male                         Technician: [id number]  :        [id number]                   Requested By:BENJAMÍN ENGLAND  Order #:    [id number]                    Reading MD: BENJAMÍN ENGLAND, DO    Measurements  Intervals                                Axis  Rate:       103                          P:          42  NC:         116                          QRS:        46  QRSD:       87                           T:          36  QT:         348  QTc:        456    Interpretive Statements  Sinus tachycardia  Compared to ECG 2024  09:53:14  Sinus rhythm no longer present  Electronically Signed On 04- 16:22:17 PDT by BENJAMÍN ENGLAND DO     Lactic Acid    Collection Time: 04/15/25  4:40 PM   Result Value Ref Range    Lactic Acid 4.1 (HH) 0.5 - 2.0 mmol/L   CBC WITH DIFFERENTIAL    Collection Time: 04/15/25  4:40 PM   Result Value Ref Range    WBC 6.1 4.8 - 10.8 K/uL    RBC 4.35 (L) 4.70 - 6.10 M/uL    Hemoglobin 11.9 (L) 14.0 - 18.0 g/dL    Hematocrit 38.6 (L) 42.0 - 52.0 %    MCV 88.7 81.4 - 97.8 fL    MCH 27.4 27.0 - 33.0 pg    MCHC 30.8 (L) 32.3 - 36.5 g/dL    RDW 53.0 (H) 35.9 - 50.0 fL    Platelet Count 172 164 - 446 K/uL    MPV 9.5 9.0 - 12.9 fL    Neutrophils-Polys 84.90 (H) 44.00 - 72.00 %    Lymphocytes 10.30 (L) 22.00 - 41.00 %    Monocytes 3.70 0.00 - 13.40 %    Eosinophils 0.00 0.00 - 6.90 %    Basophils 0.30 0.00 - 1.80 %    Immature Granulocytes 0.80 0.00 - 0.90 %    Nucleated RBC 0.00 0.00 - 0.20 /100 WBC    Neutrophils (Absolute) 5.21 1.82 - 7.42 K/uL    Lymphs (Absolute) 0.63 (L) 1.00 - 4.80 K/uL    Monos (Absolute) 0.23 0.00 - 0.85 K/uL    Eos (Absolute) 0.00 0.00 - 0.51 K/uL    Baso (Absolute) 0.02 0.00 - 0.12 K/uL    Immature Granulocytes (abs) 0.05 0.00 - 0.11 K/uL    NRBC (Absolute) 0.00 K/uL   Sed Rate    Collection Time: 04/15/25  4:40 PM   Result Value Ref Range    Sed Rate Westergren 16 0 - 20 mm/hour   Comp Metabolic Panel    Collection Time: 04/15/25  4:40 PM   Result Value Ref Range    Sodium 138 135 - 145 mmol/L    Potassium 4.4 3.6 - 5.5 mmol/L    Chloride 102 96 - 112 mmol/L    Co2 20 20 - 33 mmol/L    Anion Gap 16.0 7.0 - 16.0    Glucose 134 (H) 65 - 99 mg/dL    Bun 17 8 - 22 mg/dL    Creatinine 1.22 0.50 - 1.40 mg/dL    Calcium 8.3 (L) 8.5 - 10.5 mg/dL    Correct Calcium 8.7 8.5 - 10.5 mg/dL    AST(SGOT) 60 (H) 12 - 45 U/L    ALT(SGPT) 79 (H) 2 - 50 U/L    Alkaline Phosphatase 63 30 - 99 U/L    Total Bilirubin 0.3 0.1 - 1.5 mg/dL    Albumin 3.5 3.2 - 4.9 g/dL    Total Protein 6.3 6.0 - 8.2 g/dL     Globulin 2.8 1.9 - 3.5 g/dL    A-G Ratio 1.3 g/dL   TROPONIN    Collection Time: 04/15/25  4:40 PM   Result Value Ref Range    Troponin T 37 (H) 6 - 19 ng/L   proBrain Natriuretic Peptide, NT    Collection Time: 04/15/25  4:40 PM   Result Value Ref Range    NT-proBNP 834 (H) 0 - 125 pg/mL   PROCALCITONIN    Collection Time: 04/15/25  4:40 PM   Result Value Ref Range    Procalcitonin 1.38 (H) <0.25 ng/mL   ESTIMATED GFR    Collection Time: 04/15/25  4:40 PM   Result Value Ref Range    GFR (CKD-EPI) 62 >60 mL/min/1.73 m 2   PHOSPHORUS    Collection Time: 04/15/25  4:40 PM   Result Value Ref Range    Phosphorus 4.2 2.5 - 4.5 mg/dL   CRP QUANTITIVE (NON-CARDIAC)    Collection Time: 04/15/25  4:40 PM   Result Value Ref Range    Stat C-Reactive Protein 5.12 (H) 0.00 - 0.75 mg/dL   LDH    Collection Time: 04/15/25  4:40 PM   Result Value Ref Range    LDH Total 314 (H) 107 - 266 U/L   CREATINE KINASE    Collection Time: 04/15/25  4:40 PM   Result Value Ref Range    CPK Total 614 (H) 0 - 154 U/L   CORTISOL    Collection Time: 04/15/25  4:40 PM   Result Value Ref Range    Cortisol 27.4 (H) 0.0 - 23.0 ug/dL   LACTIC ACID    Collection Time: 04/15/25  6:30 PM   Result Value Ref Range    Lactic Acid 4.9 (HH) 0.5 - 2.0 mmol/L   Urinalysis    Collection Time: 04/15/25  7:06 PM    Specimen: Urine   Result Value Ref Range    Color Yellow     Character Clear     Specific Gravity 1.027 <1.035    Ph 8.0 5.0 - 8.0    Glucose Negative Negative mg/dL    Ketones Negative Negative mg/dL    Protein 30 (A) Negative mg/dL    Bilirubin Negative Negative    Urobilinogen, Urine 1.0 <=1.0 EU/dL    Nitrite Negative Negative    Leukocyte Esterase Negative Negative    Occult Blood Large (A) Negative    Micro Urine Req Microscopic    URINE MICROSCOPIC (W/UA)    Collection Time: 04/15/25  7:06 PM   Result Value Ref Range    WBC 0-2 /hpf    RBC 0-2 0 - 2 /hpf    Bacteria None Seen None /hpf    Epithelial Cells 0-2 0 - 5 /hpf    Urine Casts 0-2 0 - 2  /lpf       RADIOLOGY/PROCEDURES   I have independently interpreted the diagnostic imaging associated with this visit and am waiting the final reading from the radiologist.   My preliminary interpretation is as follows: X-ray reveals evidence of infiltrate in the left upper lobe    Radiologist interpretation:  CT-CTA CHEST PULMONARY ARTERY W/ RECONS   Final Result      1. No acute pulmonary embolus.   2. Stable cavitary mass in the left upper lobe.   3. Extensive edematous changes in the lungs with peripheral honeycombing.   4. Aortic and coronary arterial sclerosis.   5. Simple appearing right renal cortical cyst, requiring no further follow-up.   6. Stable bilateral adrenal masses.   7. Acute or subacute left third through fifth rib fractures.            DX-CHEST-PORTABLE (1 VIEW)   Final Result      1.  LEFT upper lung consolidation, likely pneumonia.  Underlying mass is not excluded.   2.  Probable pulmonary fibrosis.   3.  Limited by positioning.      EC-ECHOCARDIOGRAM COMPLETE W/O CONT    (Results Pending)       COURSE & MEDICAL DECISION MAKING    ASSESSMENT, COURSE AND PLAN  Care Narrative: This is a pleasant 74-year-old male who presents with hypoxia.  Here in the emergency department, the patient has no evidence of CVA.   neurology is at bedside initially and states the patient is not a candidate for any intervention for neurology as the patient's symptoms resolved after his hypoxia resolved believe condition was precipitated by hypoxia and resolved.  The patient did have x-ray that showed evidence of pneumonia, he does have sepsis and a lactic acidosis received IV fluids and IV antibiotics for this.  Patient and culture sent.  Is concern for possible pulmonary embolism CT was completed which showed evidence of the left mass that was persistent as well as surrounding edema and concern for possible infection especially with the elevated laboratory markers.  The patient was requiring oxygen  supplementation secondary to his hypoxia.  I discussed the patient with Dr. Wright for hospitalization.    CRITICAL CARE  The very real possibilty of a deterioration of this patient's condition required the highest level of my preparedness for sudden, emergent intervention.  I provided critical care services, which included medication orders, frequent reevaluations of the patient's condition and response to treatment, ordering and reviewing test results, and discussing the case with various consultants.  The critical care time associated with the care of the patient was 35 minutes. Review chart for interventions. This time is exclusive of any other billable procedures.         DISPOSITION AND DISCUSSIONS  I have discussed management of the patient with the following physicians and HARVINDER's: Dr. Kamara for hospitalization    FINAL DIAGNOSIS  Sepsis  Hypoxia  Pneumonia  Lung mass critical care time 35 minutes       Electronically signed by: Jr Mancilla D.O., 4/15/2025 3:19 PM

## 2025-04-16 LAB
ALBUMIN SERPL BCP-MCNC: 3.4 G/DL (ref 3.2–4.9)
ALBUMIN/GLOB SERPL: 1.2 G/DL
ALP SERPL-CCNC: 62 U/L (ref 30–99)
ALT SERPL-CCNC: 87 U/L (ref 2–50)
ANION GAP SERPL CALC-SCNC: 15 MMOL/L (ref 7–16)
AST SERPL-CCNC: 109 U/L (ref 12–45)
BASOPHILS # BLD AUTO: 0.2 % (ref 0–1.8)
BASOPHILS # BLD: 0.01 K/UL (ref 0–0.12)
BILIRUB SERPL-MCNC: 0.2 MG/DL (ref 0.1–1.5)
BUN SERPL-MCNC: 18 MG/DL (ref 8–22)
CALCIUM ALBUM COR SERPL-MCNC: 8.8 MG/DL (ref 8.5–10.5)
CALCIUM SERPL-MCNC: 8.3 MG/DL (ref 8.5–10.5)
CHLORIDE SERPL-SCNC: 100 MMOL/L (ref 96–112)
CO2 SERPL-SCNC: 20 MMOL/L (ref 20–33)
CREAT SERPL-MCNC: 0.92 MG/DL (ref 0.5–1.4)
CREAT UR-MCNC: 51.8 MG/DL
EOSINOPHIL # BLD AUTO: 0.01 K/UL (ref 0–0.51)
EOSINOPHIL NFR BLD: 0.2 % (ref 0–6.9)
ERYTHROCYTE [DISTWIDTH] IN BLOOD BY AUTOMATED COUNT: 51.5 FL (ref 35.9–50)
GFR SERPLBLD CREATININE-BSD FMLA CKD-EPI: 87 ML/MIN/1.73 M 2
GLOBULIN SER CALC-MCNC: 2.9 G/DL (ref 1.9–3.5)
GLUCOSE SERPL-MCNC: 198 MG/DL (ref 65–99)
GRAM STN SPEC: NORMAL
HCT VFR BLD AUTO: 36 % (ref 42–52)
HGB BLD-MCNC: 11.6 G/DL (ref 14–18)
IMM GRANULOCYTES # BLD AUTO: 0.02 K/UL (ref 0–0.11)
IMM GRANULOCYTES NFR BLD AUTO: 0.4 % (ref 0–0.9)
LACTATE SERPL-SCNC: 3.6 MMOL/L (ref 0.5–2)
LYMPHOCYTES # BLD AUTO: 0.71 K/UL (ref 1–4.8)
LYMPHOCYTES NFR BLD: 14.5 % (ref 22–41)
MCH RBC QN AUTO: 27.8 PG (ref 27–33)
MCHC RBC AUTO-ENTMCNC: 32.2 G/DL (ref 32.3–36.5)
MCV RBC AUTO: 86.3 FL (ref 81.4–97.8)
MONOCYTES # BLD AUTO: 0.13 K/UL (ref 0–0.85)
MONOCYTES NFR BLD AUTO: 2.7 % (ref 0–13.4)
NEUTROPHILS # BLD AUTO: 4 K/UL (ref 1.82–7.42)
NEUTROPHILS NFR BLD: 82 % (ref 44–72)
NRBC # BLD AUTO: 0 K/UL
NRBC BLD-RTO: 0 /100 WBC (ref 0–0.2)
PLATELET # BLD AUTO: 161 K/UL (ref 164–446)
PMV BLD AUTO: 9.2 FL (ref 9–12.9)
POTASSIUM SERPL-SCNC: 3.9 MMOL/L (ref 3.6–5.5)
PROT SERPL-MCNC: 6.3 G/DL (ref 6–8.2)
RBC # BLD AUTO: 4.17 M/UL (ref 4.7–6.1)
SCCMEC + MECA PNL NOSE NAA+PROBE: NEGATIVE
SIGNIFICANT IND 70042: NORMAL
SITE SITE: NORMAL
SODIUM SERPL-SCNC: 135 MMOL/L (ref 135–145)
SODIUM UR-SCNC: 81 MMOL/L
SOURCE SOURCE: NORMAL
WBC # BLD AUTO: 4.9 K/UL (ref 4.8–10.8)

## 2025-04-16 PROCEDURE — 97165 OT EVAL LOW COMPLEX 30 MIN: CPT

## 2025-04-16 PROCEDURE — 700102 HCHG RX REV CODE 250 W/ 637 OVERRIDE(OP): Performed by: STUDENT IN AN ORGANIZED HEALTH CARE EDUCATION/TRAINING PROGRAM

## 2025-04-16 PROCEDURE — 87102 FUNGUS ISOLATION CULTURE: CPT

## 2025-04-16 PROCEDURE — 80053 COMPREHEN METABOLIC PANEL: CPT

## 2025-04-16 PROCEDURE — 700101 HCHG RX REV CODE 250: Performed by: STUDENT IN AN ORGANIZED HEALTH CARE EDUCATION/TRAINING PROGRAM

## 2025-04-16 PROCEDURE — A9270 NON-COVERED ITEM OR SERVICE: HCPCS | Performed by: STUDENT IN AN ORGANIZED HEALTH CARE EDUCATION/TRAINING PROGRAM

## 2025-04-16 PROCEDURE — 700105 HCHG RX REV CODE 258: Performed by: STUDENT IN AN ORGANIZED HEALTH CARE EDUCATION/TRAINING PROGRAM

## 2025-04-16 PROCEDURE — 700111 HCHG RX REV CODE 636 W/ 250 OVERRIDE (IP): Mod: JZ,TB | Performed by: STUDENT IN AN ORGANIZED HEALTH CARE EDUCATION/TRAINING PROGRAM

## 2025-04-16 PROCEDURE — 82570 ASSAY OF URINE CREATININE: CPT

## 2025-04-16 PROCEDURE — 700111 HCHG RX REV CODE 636 W/ 250 OVERRIDE (IP): Mod: JZ | Performed by: STUDENT IN AN ORGANIZED HEALTH CARE EDUCATION/TRAINING PROGRAM

## 2025-04-16 PROCEDURE — 84300 ASSAY OF URINE SODIUM: CPT

## 2025-04-16 PROCEDURE — 87205 SMEAR GRAM STAIN: CPT | Mod: 91

## 2025-04-16 PROCEDURE — 97535 SELF CARE MNGMENT TRAINING: CPT

## 2025-04-16 PROCEDURE — 87641 MR-STAPH DNA AMP PROBE: CPT

## 2025-04-16 PROCEDURE — 94669 MECHANICAL CHEST WALL OSCILL: CPT

## 2025-04-16 PROCEDURE — 83605 ASSAY OF LACTIC ACID: CPT

## 2025-04-16 PROCEDURE — 94664 DEMO&/EVAL PT USE INHALER: CPT

## 2025-04-16 PROCEDURE — 99222 1ST HOSP IP/OBS MODERATE 55: CPT | Performed by: STUDENT IN AN ORGANIZED HEALTH CARE EDUCATION/TRAINING PROGRAM

## 2025-04-16 PROCEDURE — 770020 HCHG ROOM/CARE - TELE (206)

## 2025-04-16 PROCEDURE — 99233 SBSQ HOSP IP/OBS HIGH 50: CPT | Performed by: HOSPITALIST

## 2025-04-16 PROCEDURE — 94640 AIRWAY INHALATION TREATMENT: CPT

## 2025-04-16 PROCEDURE — 36415 COLL VENOUS BLD VENIPUNCTURE: CPT

## 2025-04-16 PROCEDURE — 85025 COMPLETE CBC W/AUTO DIFF WBC: CPT

## 2025-04-16 RX ORDER — SULFAMETHOXAZOLE AND TRIMETHOPRIM 800; 160 MG/1; MG/1
1 TABLET ORAL
Status: DISCONTINUED | OUTPATIENT
Start: 2025-04-16 | End: 2025-05-01 | Stop reason: HOSPADM

## 2025-04-16 RX ORDER — IPRATROPIUM BROMIDE AND ALBUTEROL SULFATE 2.5; .5 MG/3ML; MG/3ML
3 SOLUTION RESPIRATORY (INHALATION)
Status: DISCONTINUED | OUTPATIENT
Start: 2025-04-17 | End: 2025-04-18

## 2025-04-16 RX ORDER — PANTOPRAZOLE SODIUM 40 MG/10ML
40 INJECTION, POWDER, LYOPHILIZED, FOR SOLUTION INTRAVENOUS 2 TIMES DAILY
Status: DISPENSED | OUTPATIENT
Start: 2025-04-16 | End: 2025-04-19

## 2025-04-16 RX ORDER — METHYLPREDNISOLONE SODIUM SUCCINATE 40 MG/ML
40 INJECTION, POWDER, LYOPHILIZED, FOR SOLUTION INTRAMUSCULAR; INTRAVENOUS EVERY 8 HOURS
Status: COMPLETED | OUTPATIENT
Start: 2025-04-16 | End: 2025-04-16

## 2025-04-16 RX ADMIN — THIAMINE HYDROCHLORIDE 500 MG: 100 INJECTION, SOLUTION INTRAMUSCULAR; INTRAVENOUS at 02:19

## 2025-04-16 RX ADMIN — TRAZODONE HYDROCHLORIDE 50 MG: 50 TABLET ORAL at 01:19

## 2025-04-16 RX ADMIN — PIPERACILLIN AND TAZOBACTAM 3.38 G: 3; .375 INJECTION, POWDER, FOR SOLUTION INTRAVENOUS at 01:33

## 2025-04-16 RX ADMIN — ACETAMINOPHEN 650 MG: 325 TABLET, FILM COATED ORAL at 23:15

## 2025-04-16 RX ADMIN — METHYLPREDNISOLONE SODIUM SUCCINATE 40 MG: 40 INJECTION, POWDER, FOR SOLUTION INTRAMUSCULAR; INTRAVENOUS at 21:10

## 2025-04-16 RX ADMIN — HEPARIN SODIUM 5000 UNITS: 5000 INJECTION, SOLUTION INTRAVENOUS; SUBCUTANEOUS at 14:21

## 2025-04-16 RX ADMIN — THIAMINE HYDROCHLORIDE 500 MG: 100 INJECTION, SOLUTION INTRAMUSCULAR; INTRAVENOUS at 14:21

## 2025-04-16 RX ADMIN — MONTELUKAST 10 MG: 10 TABLET, FILM COATED ORAL at 21:10

## 2025-04-16 RX ADMIN — IPRATROPIUM BROMIDE AND ALBUTEROL SULFATE 3 ML: .5; 2.5 SOLUTION RESPIRATORY (INHALATION) at 06:46

## 2025-04-16 RX ADMIN — ASPIRIN 81 MG: 81 TABLET, COATED ORAL at 05:41

## 2025-04-16 RX ADMIN — IPRATROPIUM BROMIDE AND ALBUTEROL SULFATE 3 ML: .5; 2.5 SOLUTION RESPIRATORY (INHALATION) at 14:05

## 2025-04-16 RX ADMIN — AMLODIPINE BESYLATE 5 MG: 5 TABLET ORAL at 05:41

## 2025-04-16 RX ADMIN — IPRATROPIUM BROMIDE AND ALBUTEROL SULFATE 3 ML: .5; 2.5 SOLUTION RESPIRATORY (INHALATION) at 11:20

## 2025-04-16 RX ADMIN — LINEZOLID 600 MG: 600 INJECTION, SOLUTION INTRAVENOUS at 05:49

## 2025-04-16 RX ADMIN — THIAMINE HYDROCHLORIDE 500 MG: 100 INJECTION, SOLUTION INTRAMUSCULAR; INTRAVENOUS at 10:48

## 2025-04-16 RX ADMIN — Medication 30 MG: at 23:14

## 2025-04-16 RX ADMIN — LINEZOLID 600 MG: 600 INJECTION, SOLUTION INTRAVENOUS at 01:26

## 2025-04-16 RX ADMIN — AZITHROMYCIN DIHYDRATE 500 MG: 250 TABLET ORAL at 17:27

## 2025-04-16 RX ADMIN — Medication 30 MG: at 01:19

## 2025-04-16 RX ADMIN — PIPERACILLIN AND TAZOBACTAM 3.38 G: 3; .375 INJECTION, POWDER, FOR SOLUTION INTRAVENOUS at 15:08

## 2025-04-16 RX ADMIN — PANTOPRAZOLE SODIUM 40 MG: 40 INJECTION, POWDER, FOR SOLUTION INTRAVENOUS at 05:40

## 2025-04-16 RX ADMIN — HEPARIN SODIUM 5000 UNITS: 5000 INJECTION, SOLUTION INTRAVENOUS; SUBCUTANEOUS at 21:11

## 2025-04-16 RX ADMIN — LISINOPRIL 20 MG: 20 TABLET ORAL at 17:27

## 2025-04-16 RX ADMIN — FLUTICASONE FUROATE, UMECLIDINIUM BROMIDE AND VILANTEROL TRIFENATATE 1 PUFF: 200; 62.5; 25 POWDER RESPIRATORY (INHALATION) at 05:55

## 2025-04-16 RX ADMIN — THIAMINE HYDROCHLORIDE 500 MG: 100 INJECTION, SOLUTION INTRAMUSCULAR; INTRAVENOUS at 21:12

## 2025-04-16 RX ADMIN — LEVOTHYROXINE SODIUM 100 MCG: 0.1 TABLET ORAL at 05:41

## 2025-04-16 RX ADMIN — TRAZODONE HYDROCHLORIDE 50 MG: 50 TABLET ORAL at 23:14

## 2025-04-16 RX ADMIN — METHYLPREDNISOLONE SODIUM SUCCINATE 40 MG: 40 INJECTION, POWDER, FOR SOLUTION INTRAMUSCULAR; INTRAVENOUS at 05:41

## 2025-04-16 RX ADMIN — ROSUVASTATIN CALCIUM 10 MG: 20 TABLET, FILM COATED ORAL at 17:27

## 2025-04-16 RX ADMIN — PIPERACILLIN AND TAZOBACTAM 3.38 G: 3; .375 INJECTION, POWDER, FOR SOLUTION INTRAVENOUS at 03:36

## 2025-04-16 RX ADMIN — PIPERACILLIN AND TAZOBACTAM 3.38 G: 3; .375 INJECTION, POWDER, FOR SOLUTION INTRAVENOUS at 21:44

## 2025-04-16 RX ADMIN — IPRATROPIUM BROMIDE AND ALBUTEROL SULFATE 3 ML: .5; 2.5 SOLUTION RESPIRATORY (INHALATION) at 19:07

## 2025-04-16 RX ADMIN — LISINOPRIL 20 MG: 20 TABLET ORAL at 05:41

## 2025-04-16 RX ADMIN — METHYLPREDNISOLONE SODIUM SUCCINATE 40 MG: 40 INJECTION, POWDER, FOR SOLUTION INTRAMUSCULAR; INTRAVENOUS at 14:21

## 2025-04-16 RX ADMIN — HEPARIN SODIUM 5000 UNITS: 5000 INJECTION, SOLUTION INTRAVENOUS; SUBCUTANEOUS at 05:41

## 2025-04-16 RX ADMIN — SULFAMETHOXAZOLE AND TRIMETHOPRIM 1 TABLET: 800; 160 TABLET ORAL at 17:50

## 2025-04-16 ASSESSMENT — LIFESTYLE VARIABLES
HOW MANY TIMES IN THE PAST YEAR HAVE YOU HAD 5 OR MORE DRINKS IN A DAY: 0
ON A TYPICAL DAY WHEN YOU DRINK ALCOHOL HOW MANY DRINKS DO YOU HAVE: 1
TOTAL SCORE: 0
HAVE YOU EVER FELT YOU SHOULD CUT DOWN ON YOUR DRINKING: NO
TOTAL SCORE: 0
EVER HAD A DRINK FIRST THING IN THE MORNING TO STEADY YOUR NERVES TO GET RID OF A HANGOVER: NO
AVERAGE NUMBER OF DAYS PER WEEK YOU HAVE A DRINK CONTAINING ALCOHOL: 3
DOES PATIENT WANT TO STOP DRINKING: NO
HAVE PEOPLE ANNOYED YOU BY CRITICIZING YOUR DRINKING: NO
TOTAL SCORE: 0
EVER FELT BAD OR GUILTY ABOUT YOUR DRINKING: NO
ALCOHOL_USE: YES
CONSUMPTION TOTAL: NEGATIVE

## 2025-04-16 ASSESSMENT — COGNITIVE AND FUNCTIONAL STATUS - GENERAL
MOVING TO AND FROM BED TO CHAIR: A LITTLE
STANDING UP FROM CHAIR USING ARMS: A LITTLE
SUGGESTED CMS G CODE MODIFIER DAILY ACTIVITY: CJ
MOVING FROM LYING ON BACK TO SITTING ON SIDE OF FLAT BED: A LITTLE
SUGGESTED CMS G CODE MODIFIER DAILY ACTIVITY: CJ
TOILETING: A LITTLE
HELP NEEDED FOR BATHING: A LITTLE
HELP NEEDED FOR BATHING: A LITTLE
SUGGESTED CMS G CODE MODIFIER MOBILITY: CK
TOILETING: A LITTLE
DAILY ACTIVITIY SCORE: 21
TURNING FROM BACK TO SIDE WHILE IN FLAT BAD: A LITTLE
DRESSING REGULAR LOWER BODY CLOTHING: A LITTLE
CLIMB 3 TO 5 STEPS WITH RAILING: A LITTLE
MOBILITY SCORE: 18
WALKING IN HOSPITAL ROOM: A LITTLE
DRESSING REGULAR LOWER BODY CLOTHING: A LITTLE
DAILY ACTIVITIY SCORE: 21

## 2025-04-16 ASSESSMENT — ENCOUNTER SYMPTOMS
NAUSEA: 0
MYALGIAS: 0
PALPITATIONS: 0
LOSS OF CONSCIOUSNESS: 0
DIZZINESS: 0
COUGH: 1
WEAKNESS: 1
DOUBLE VISION: 0
BACK PAIN: 0
DIARRHEA: 0
EYE PAIN: 0
CLAUDICATION: 0
VOMITING: 0
HEMOPTYSIS: 0
HEADACHES: 0
DEPRESSION: 0
CHILLS: 0
PHOTOPHOBIA: 0
BLURRED VISION: 0
FEVER: 0
SHORTNESS OF BREATH: 1
SPUTUM PRODUCTION: 1
ABDOMINAL PAIN: 0
HEARTBURN: 0
BRUISES/BLEEDS EASILY: 0
PND: 0
WHEEZING: 1

## 2025-04-16 ASSESSMENT — SOCIAL DETERMINANTS OF HEALTH (SDOH)
WITHIN THE PAST 12 MONTHS, YOU WORRIED THAT YOUR FOOD WOULD RUN OUT BEFORE YOU GOT THE MONEY TO BUY MORE: NEVER TRUE
WITHIN THE PAST 12 MONTHS, THE FOOD YOU BOUGHT JUST DIDN'T LAST AND YOU DIDN'T HAVE MONEY TO GET MORE: NEVER TRUE
IN THE PAST 12 MONTHS, HAS THE ELECTRIC, GAS, OIL, OR WATER COMPANY THREATENED TO SHUT OFF SERVICE IN YOUR HOME?: NO
WITHIN THE LAST YEAR, HAVE YOU BEEN AFRAID OF YOUR PARTNER OR EX-PARTNER?: NO
WITHIN THE LAST YEAR, HAVE TO BEEN RAPED OR FORCED TO HAVE ANY KIND OF SEXUAL ACTIVITY BY YOUR PARTNER OR EX-PARTNER?: NO
WITHIN THE LAST YEAR, HAVE YOU BEEN HUMILIATED OR EMOTIONALLY ABUSED IN OTHER WAYS BY YOUR PARTNER OR EX-PARTNER?: NO
WITHIN THE LAST YEAR, HAVE YOU BEEN KICKED, HIT, SLAPPED, OR OTHERWISE PHYSICALLY HURT BY YOUR PARTNER OR EX-PARTNER?: NO

## 2025-04-16 ASSESSMENT — PATIENT HEALTH QUESTIONNAIRE - PHQ9
2. FEELING DOWN, DEPRESSED, IRRITABLE, OR HOPELESS: NOT AT ALL
SUM OF ALL RESPONSES TO PHQ9 QUESTIONS 1 AND 2: 0
1. LITTLE INTEREST OR PLEASURE IN DOING THINGS: NOT AT ALL

## 2025-04-16 ASSESSMENT — PAIN DESCRIPTION - PAIN TYPE: TYPE: ACUTE PAIN

## 2025-04-16 ASSESSMENT — FIBROSIS 4 INDEX
FIB4 SCORE: 5.37
FIB4 SCORE: 3.1

## 2025-04-16 ASSESSMENT — ACTIVITIES OF DAILY LIVING (ADL): TOILETING: INDEPENDENT

## 2025-04-16 NOTE — ED NOTES
Escorted to bathroom with ED staff on oxymask. He did have significant exertional dyspnea. Tolerating 6lpm at rest.

## 2025-04-16 NOTE — PROGRESS NOTES
Hospital Medicine Daily Progress Note    Date of Service  4/16/2025    Chief Complaint  Bright Shirley is a 74 y.o. male admitted 4/15/2025 with pneumonia hypoxia    Hospital Course  Bright Shirley is a 74 y.o. 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  Patient initially presented to ER for stroke concern.  Patient was evaluated by neurology, stroke load was aborted and his weakness on left side was resolved.  He is however requiring increased oxygen requirement of 10 L oxy mask.  CTA chest noted to have stable cavitary mass in left upper lobe, extensive edematous changes in lung peripheral honeycombing.  He does have concern lactic acid of 5.3.  Due to concern of severe sepsis, admission requested by ERP.  Therefore, admitted to medicine service for further evaluation and treatment.     Interval Problem Update  4/16 patient is new to me today, patient is alert oriented follows commands, he is requiring 8 L of oxygen when I saw her in the morning, he is able to speak in full sentences, he stated that at home he was getting short of breath with exertion, normally he is comfortable at rest with 5 L of oxygen but he feels that he is requiring more with exertion/ambulation, will continue antibiotics, CT results reviewed, MRSA screen is negative and sputum cultures and blood cultures pending, discussed with bedside nurse charge nurse  pharmacist    I have discussed this patient's plan of care and discharge plan at IDT rounds today with Case Management, Nursing, Nursing leadership, and other members of the IDT team.    Consultants/Specialty  None    Code Status  Full Code    Disposition  The patient is not medically cleared for discharge to home or a post-acute facility.      I have placed the appropriate orders for post-discharge  needs.    Review of Systems  Review of Systems   Constitutional:  Negative for chills and fever.   Eyes:  Negative for blurred vision and double vision.   Respiratory:  Positive for cough, sputum production, shortness of breath and wheezing. Negative for hemoptysis.    Cardiovascular:  Negative for chest pain, palpitations, claudication, leg swelling and PND.   Gastrointestinal:  Negative for heartburn, nausea and vomiting.   Genitourinary:  Negative for hematuria and urgency.   Musculoskeletal:  Negative for back pain and myalgias.   Skin:  Negative for rash.   Neurological:  Negative for dizziness and headaches.   Endo/Heme/Allergies:  Does not bruise/bleed easily.   Psychiatric/Behavioral:  Negative for depression.         Physical Exam  Temp:  [36.3 °C (97.3 °F)-37.3 °C (99.2 °F)] 36.3 °C (97.3 °F)  Pulse:  [] 87  Resp:  [18-29] 18  BP: ()/(50-82) 126/71  SpO2:  [78 %-99 %] 94 %    Physical Exam  Vitals and nursing note reviewed.   Constitutional:       Appearance: He is ill-appearing.   Eyes:      General: No scleral icterus.        Right eye: No discharge.         Left eye: No discharge.   Cardiovascular:      Rate and Rhythm: Normal rate and regular rhythm.      Pulses: Normal pulses.      Heart sounds: Normal heart sounds.   Pulmonary:      Effort: Pulmonary effort is normal. No respiratory distress.      Breath sounds: Normal breath sounds.   Abdominal:      General: Bowel sounds are normal. There is no distension.      Tenderness: There is no abdominal tenderness.   Musculoskeletal:         General: Normal range of motion.      Cervical back: Normal range of motion and neck supple.      Right lower leg: No edema.      Left lower leg: No edema.   Skin:     General: Skin is warm and dry.      Capillary Refill: Capillary refill takes less than 2 seconds.      Coloration: Skin is not jaundiced.   Neurological:      General: No focal deficit present.      Mental Status: He is alert and oriented to  person, place, and time.      Cranial Nerves: No cranial nerve deficit.   Psychiatric:         Mood and Affect: Mood normal.         Behavior: Behavior normal.         Fluids    Intake/Output Summary (Last 24 hours) at 4/16/2025 1413  Last data filed at 4/16/2025 1106  Gross per 24 hour   Intake 836 ml   Output 1300 ml   Net -464 ml        Laboratory  Recent Labs     04/15/25  1640 04/16/25  0306   WBC 6.1 4.9   RBC 4.35* 4.17*   HEMOGLOBIN 11.9* 11.6*   HEMATOCRIT 38.6* 36.0*   MCV 88.7 86.3   MCH 27.4 27.8   MCHC 30.8* 32.2*   RDW 53.0* 51.5*   PLATELETCT 172 161*   MPV 9.5 9.2     Recent Labs     04/15/25  1640 04/16/25  0306   SODIUM 138 135   POTASSIUM 4.4 3.9   CHLORIDE 102 100   CO2 20 20   GLUCOSE 134* 198*   BUN 17 18   CREATININE 1.22 0.92   CALCIUM 8.3* 8.3*     Recent Labs     04/15/25  1519   INR 1.01               Imaging  CT-CTA CHEST PULMONARY ARTERY W/ RECONS   Final Result      1. No acute pulmonary embolus.   2. Stable cavitary mass in the left upper lobe.   3. Extensive edematous changes in the lungs with peripheral honeycombing.   4. Aortic and coronary arterial sclerosis.   5. Simple appearing right renal cortical cyst, requiring no further follow-up.   6. Stable bilateral adrenal masses.   7. Acute or subacute left third through fifth rib fractures.            DX-CHEST-PORTABLE (1 VIEW)   Final Result      1.  LEFT upper lung consolidation, likely pneumonia.  Underlying mass is not excluded.   2.  Probable pulmonary fibrosis.   3.  Limited by positioning.      EC-ECHOCARDIOGRAM COMPLETE W/O CONT    (Results Pending)        Assessment/Plan  * Severe sepsis (HCC)- (present on admission)  Assessment & Plan  This is Sepsis Present on admission  SIRS criteria identified on my evaluation include: Tachycardia, with heart rate greater than 90 BPM, Tachypnea, with respirations greater than 20 per minute, and Bandemia, greater than 10% bands  Clinical indicators of end organ dysfunction include Lactic  Acid greater than 2  Source is pulm  Sepsis protocol initiated  Crystalloid Fluid Administration: Fluid resuscitation ordered per standard protocol - 30 mL/kg per current or ideal body weight  IV antibiotics as appropriate for source of sepsis  Reassessment: I have reassessed the patient's hemodynamic status    Acute left-sided weakness  Assessment & Plan  Resolved by ER arrival  Stroke alert aborted by stroke neurology  PT/OT/ST    H/o TIA per pt  -added ASA  -continue statin    Continue monitoring    TREVOR (acute kidney injury) (HCC)  Assessment & Plan  Cr 1.22  IVF  Minimize nephrotoxic agents, renally dose meds  Check FeNa    Creatinine 0.92    ACP (advance care planning)  Assessment & Plan  Goal of care discussed with patient in the emergency room.  He stated he is agreeable for noninvasive, as well as invasive/heroic life-sustaining measures-including CPR/defibrillation/intubation or mechanical ventilation.  He is agreeable for hospitalization, further treatment with IVF, breathing treatment, antibiotic therapy, any medical management as clinically warranted.  Diagnosis, prognosis, question and concern addressed.  Full CODE STATUS confirmed.  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 Melara, medical oncology    Acute on chronic respiratory failure with hypoxia (HCC)- (present on admission)  Assessment &  Plan  Dependent on 5 L at baseline  Currently requiring 10 L OxyMask-wean off as tolerated  No PE seen on CTA chest  Check COVID/influenza/RSV  Antibiotic for pneumonia  Check TTE    CTA did not show PE  Patient with probably postobstructive pneumonia due to lung mass  Continue antibiotics    Chronic obstructive pulmonary disease (HCC)- (present on admission)  Assessment & Plan  Pulmonary hygiene  -DuoNebs, Trelegy Ellipta, montelukast  -PEP  -s/p Solu-Medrol 125 mg  -Continue Solu-Medrol 40 mg every 8 hours  RT protocol    Patient is on 5 L of oxygen at home now he is requiring 8 L    Dyslipidemia- (present on admission)  Assessment & Plan  Statin    Primary hypertension- (present on admission)  Assessment & Plan  Lisinopril, amlodipine         VTE prophylaxis: Heparin    I have performed a physical exam and reviewed and updated ROS and Plan today (4/16/2025). In review of yesterday's note (4/15/2025), there are no changes except as documented above.      I reviewed CBC  I reviewed BMP  Reviewed troponin levels  I reviewed chest x-ray  I reviewed MRSA screening  I reviewed ER physician notes  I reviewed admitting physician H&P note I discussed with clinical pharmacist  Patient is on IV Zosyn monitor for side effects including limited to  C. difficile infection interstitial nephritis thrombophlebitis  Patient is on azithromycin monitor for side effects include but limited to cardiac arrhythmias  Continue telemetry monitoring  I have ordered blood work for in a.m. CBC BMP procalcitonin

## 2025-04-16 NOTE — DISCHARGE PLANNING
"TCN following. HTH/SCP chart review completed. Note pt currently in ED 2' to possible Stroke.  Per Emergency Medicine note by Kacy Fiore R.N. on 4/15/25 at 3:19 PM, \"Pt BIB EMS from home for possible stroke like symptoms. Left side weakness, left facial droop, AOX1. Pt was found by friend, LNW at midnight. Pt was hypoxic upon EMS arrival at 67% RA sat. Pt has Hx of COPD wears O2 at 5 L baseline, Pt O2 tanks all empty. Pt symptoms improved during transport with Duoneb and oxygen. Pt AOx4 no other deficits\".       Patient has a [facility] PCP.  Note pt does not have primary/follow up visit however he has an appointment scheduled on Insterstitial Lung Disease with Physician Jacquelyn Lee M.D. on Thursday May 1 12:35 PM with [facility] and an Oncology Established Patient with Nurse Practitioner JESIKA Kaba on Friday May 16 12:45 PM.      Ambulation status unknown at this time.   At this time anticipating that pt will dc to home with possible HH or post-acute (either directly from ED or after admission to [facility] if warranted).     If patient does not warrant admission/ inpatient status to [facility] and is unable to functionally discharge home, please reach out to TCN for assist with SCP auth to discharge directly from ED to [facility].     If patient admits to [facility], TCN will continue to monitor and assist with transitional care needs as indicated. Please reach out to TCN via [facility] if post acute transitional care needs are warranted for dc planning.    "

## 2025-04-16 NOTE — ASSESSMENT & PLAN NOTE
Patient being transitioned cancer care to Dr. Orona  Per my discussion with Dr. Orona, patient can still have Keytruda on 5/15 if he is able to discharge by then, if not they can delay treatment

## 2025-04-16 NOTE — CARE PLAN
The patient is Watcher - Medium risk of patient condition declining or worsening    Shift Goals  Clinical Goals: monitor o2, safety  Patient Goals: get better    Progress made toward(s) clinical / shift goals:      Problem: Knowledge Deficit - Standard  Goal: Patient and family/care givers will demonstrate understanding of plan of care, disease process/condition, diagnostic tests and medications  Outcome: Progressing     Problem: Knowledge Deficit - COPD  Goal: Patient/significant other demonstrates understanding of disease process, utilization of the Action Plan, medications and discharge instruction  Outcome: Progressing       Patient is not progressing towards the following goals:

## 2025-04-16 NOTE — ASSESSMENT & PLAN NOTE
Finished a course of antibiotics, ID recommended no further antibiotics  Supportive care for human metapneumovirus

## 2025-04-16 NOTE — CARE PLAN
Problem: Knowledge Deficit - Standard  Goal: Patient and family/care givers will demonstrate understanding of plan of care, disease process/condition, diagnostic tests and medications  Outcome: Progressing     Problem: Ineffective Airway Clearance  Goal: Patient will maintain patent airway with clear/clearing breath sounds  Outcome: Progressing     Problem: Impaired Gas Exchange  Goal: Patient will demonstrate improved ventilation and adequate oxygenation and participate in treatment regimen within the level of ability/situation.  Outcome: Progressing     Problem: Hemodynamics  Goal: Patient's hemodynamics, fluid balance and neurologic status will be stable or improve  Outcome: Progressing     Problem: Fluid Volume  Goal: Fluid volume balance will be maintained  Outcome: Progressing   The patient is Stable - Low risk of patient condition declining or worsening    Shift Goals  Clinical Goals: New admit, ABX    Progress made toward(s) clinical / shift goals:  Progressing    Patient is not progressing towards the following goals:

## 2025-04-16 NOTE — ASSESSMENT & PLAN NOTE
Resolved by ER arrival  Stroke alert aborted by stroke neurology  PT/OT/ST  H/o TIA per pt  On aspirin and statin

## 2025-04-16 NOTE — PROGRESS NOTES
Bedside report received from off going RN/tech: Jevon assumed care of patient.     Fall Risk Score: LOW RISK  Fall risk interventions in place: Provide patient/family education based on risk assessment and Place fall precaution signage outside patient door  Bed type: Regular (Nba Score less than 17 interventions in place)  Patient on cardiac monitor: Yes  IVF/IV medications: Infusion per MAR (List Med(s)) LR  Oxygen: How many liters 7L and Traced the line to wall oxygen  Bedside sitter: Not Applicable   Isolation: Not applicable

## 2025-04-16 NOTE — THERAPY
"Occupational Therapy   Initial Evaluation     Patient Name: Bright Shirley  Age:  74 y.o., Sex:  male  Medical Record #: [id number]  Today's Date: 4/16/2025     Precautions  Precautions: Fall Risk; desats with activity!    Assessment  Patient is 74 y.o. male originally admitted with stroke like symptoms, but found to be hypoxic and symptoms resolved after receiving O2. He was found to have PNA, sepsis, TREVOR, lactic acidosis. He has a hx of COPD on home o2, squamous cell lung cancer, HTN, HLD.     Patient educated on appropriate AE/DME to utilize during ADL routine, energy conservation strategies, and compensatory strategies during ADLs and functional txfs to maximize independence and safety. AE/DME education included information on use of reacher and sock-aid and shower chair. Patient  verbalized understanding.    He is at a supervision level for basic self care, functional mobility, and functional transfers with AD. He denies any further deficits in self care at this time.    Plan    Occupational Therapy Initial Treatment Plan   Duration: Discharge Needs Only    DC Equipment Recommendations: Tub / Shower Seat, Reacher  Discharge Recommendations: Anticipate that the patient will have no further occupational therapy needs after discharge from the hospital     Subjective    \"This is worse than when I came in.\" Referring to SOB w/ activity     Objective       04/16/25 1209   Prior Living Situation   Prior Services Home-Independent   Housing / Facility 1 Story House   Bathroom Set up Bathtub / Shower Combination;Grab Bars   Equipment Owned Front-Wheel Walker;Sock Aid;Oxygen   Lives with - Patient's Self Care Capacity Unrelated Adult   Comments Pt lives with a roommate who is very supportive   Prior Level of ADL Function   Self Feeding Independent   Grooming / Hygiene Independent   Bathing Independent   Dressing Independent   Toileting Independent   Prior Level of IADL Function   Medication Management Independent "   Laundry Independent   Kitchen Mobility Independent   Finances Independent   Home Management Independent   Shopping Independent   Prior Level Of Mobility Independent Without Device in Community;Independent Without Device in Home   Driving / Transportation Driving Independent   Occupation (Pre-Hospital Vocational) Retired Due To Age   Precautions   Precautions Fall Risk   Vitals   Vitals Comments 5L o2; desats with activity down to 70s, takes time to recover. After bathroom activity patient had a difficult time recovering and needed oxymask and RN said ok to bump up to 10L to recover; pt recovered and therapist lowered to 7L. RN aware.   Pain 0 - 10 Group   Therapist Pain Assessment Nurse Notified;During Activity;0   Cognition    Cognition / Consciousness WDL   Level of Consciousness Alert   Comments pleasant and receptive   Active ROM Upper Body   Active ROM Upper Body  WDL   Strength Upper Body   Upper Body Strength  WDL   Balance Assessment   Sitting Balance (Static) Fair +   Sitting Balance (Dynamic) Fair   Standing Balance (Static) Fair   Standing Balance (Dynamic) Fair   Weight Shift Sitting Fair   Weight Shift Standing Fair   Comments with fww   Bed Mobility    Supine to Sit Supervised   Sit to Supine Supervised   Scooting Supervised   ADL Assessment   Grooming Supervision;Standing   Lower Body Dressing Supervision   Toileting Supervision   Comments SOB with activity; edu on strategies and appropriate AE/DME   How much help from another person does the patient currently need...   Putting on and taking off regular lower body clothing? 3   Bathing (including washing, rinsing, and drying)? 3   Toileting, which includes using a toilet, bedpan, or urinal? 3   Putting on and taking off regular upper body clothing? 4   Taking care of personal grooming such as brushing teeth? 4   Eating meals? 4   6 Clicks Daily Activity Score 21   Functional Mobility   Sit to Stand Supervised   Bed, Chair, Wheelchair Transfer  Supervised   Mobility in room>BR>BTB   Comments w/ fww

## 2025-04-16 NOTE — H&P
Hospital Medicine History & Physical Note    Date of Service  4/15/2025    Primary Care Physician  Nehemiah Martinez M.D.    Consultants  Neurology (Dr. Fletcher) -- stroke alert aborted    Code Status  Full Code    Chief Complaint  Chief Complaint   Patient presents with    Possible Stroke       History of Presenting Illness  Bright Shirley is a 74 y.o. 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  Patient initially presented to ER for stroke concern.  Patient was evaluated by neurology, stroke load was aborted and his weakness on left side was resolved.  He is however requiring increased oxygen requirement of 10 L oxy mask.  CTA chest noted to have stable cavitary mass in left upper lobe, extensive edematous changes in lung peripheral honeycombing.  He does have concern lactic acid of 5.3.  Due to concern of severe sepsis, admission requested by ERP.  Therefore, admitted to medicine service for further evaluation and treatment.    I discussed the plan of care with patient, bedside RN, charge RN, and pharmacy.    Review of Systems  Review of Systems   Constitutional:  Positive for chills. Negative for fever.   HENT:  Negative for hearing loss and tinnitus.    Eyes:  Negative for blurred vision and double vision.   Respiratory:  Positive for cough, sputum production, shortness of breath and wheezing. Negative for hemoptysis.    Cardiovascular:  Negative for chest pain and palpitations.   Gastrointestinal:  Negative for abdominal pain, heartburn, nausea and vomiting.   Genitourinary:  Negative for dysuria and urgency.   Musculoskeletal:  Positive for myalgias. Negative for joint pain.   Skin:  Negative for itching and rash.   Neurological:  Positive for focal weakness (left) and weakness. Negative for dizziness and headaches.   Endo/Heme/Allergies:  Negative for  environmental allergies. Does not bruise/bleed easily.   Psychiatric/Behavioral:  Negative for depression and substance abuse.    All other systems reviewed and are negative.      Past Medical History   has a past medical history of Acute exacerbation of chronic obstructive pulmonary disease (COPD) ([facility]) (07/01/2021), Acute hypoxemic respiratory failure ([facility]) (06/08/2021), Acute respiratory failure with hypoxia ([facility]) (04/29/2024), Acute urinary retention (06/07/2021), Aortic atherosclerosis ([facility]), Carotid stenosis, bilateral - mild, Cerebral infarction (HCC) - based on CT head 7/2022, Cerebral infarction (HCC) - based on CT head 7/2022, Chronic obstructive pulmonary disease (HCC), Hypercholesteremia, Hypertension, Intertrochanteric fracture of femur ([facility]) (06/06/2021), Other emphysema ([facility]) (08/07/2019), and Supplemental oxygen dependent (06/30/2021).    Surgical History   has a past surgical history that includes hernia repair (Right); orif, femur (Right); other orthopedic surgery; other; and pr treat inter/subtroch fx,w/plate/screw (Left, 6/6/2021).     Family History  family history includes Breast Cancer in his mother; Cancer in his mother; Heart Disease in his brother; Hyperlipidemia in his maternal grandmother; Hypertension in his maternal grandmother; Other in his father; Stroke in his mother and sister.   Family history reviewed with patient. There is family history that is pertinent to the chief complaint.     Social History   reports that he quit smoking about a year ago. His smoking use included cigarettes. He started smoking about 50 years ago. He has a 49.3 pack-year smoking history. He has never used smokeless tobacco. He reports that he does not currently use alcohol. He reports that he does not currently use drugs after having used the following drugs: Marijuana.    Allergies  Allergies   Allergen Reactions    Seasonal Runny Nose and Itching     Seasonal allergies       Medications  Prior to  Admission Medications   Prescriptions Last Dose Informant Patient Reported? Taking?   Fexofenadine HCl (MUCINEX ALLERGY PO) 4/14/2025 Morning Patient Yes Yes   Sig: Take 1 Tablet by mouth every day.   Home Care Oxygen  Patient Yes No   Sig: Inhale 5 L/min continuous. O2 LPM @: 5 LPM  Specify Usage: Continuous  Type of O2: Gas  Oxygen via: N/C  Oxygen Modality: Concentrator and Portable Tank  Humidification: Yes   Melatonin 10 MG Tab 4/14/2025 Bedtime Patient Yes Yes   Sig: Take 30 mg by mouth every evening.   Misc. Devices Misc  Patient No No   Sig: Portable oxygen concentrator (POC), at 2L/min via NC.   Patient taking differently: Portable oxygen concentrator (POC), at 5L/min via NC.   Multiple Vitamin (MULTIVITAMIN ADULT PO) 4/14/2025 Morning Patient Yes Yes   Sig: Take 1 Tablet by mouth every day.   acetaminophen (TYLENOL) 325 MG Tab 4/8/2025 Noon Patient Yes No   Sig: Take 500 mg by mouth every 6 hours as needed for Mild Pain.   albuterol 108 (90 Base) MCG/ACT Aero Soln inhalation aerosol 4/14/2025 Noon Patient No Yes   Sig: Inhale 2 Puffs every four hours as needed for Shortness of Breath.   amLODIPine (NORVASC) 5 MG Tab 4/14/2025 Morning Patient No Yes   Sig: Take 1 Tablet by mouth every day.   artificial tears 1.4 % Solution 4/14/2025 Morning Patient Yes Yes   Sig: Administer 1 Drop into both eyes as needed (dry eyes).   calcium polycarbophil (FIBERCON) 625 MG Tab 4/14/2025 Morning Patient Yes Yes   Sig: Take 625 mg by mouth every day.   docosahexanoic acid (OMEGA 3 FA) 1000 MG Cap 4/14/2025 Morning Patient Yes Yes   Sig: Take 1,000 mg by mouth every day.   fluticasone-umeclidinium-vilanterol (TRELEGY ELLIPTA) 200-62.5-25 mcg/act inhaler 4/14/2025 Morning Patient No Yes   Sig: Inhale 1 Puff every day.   ipratropium-albuterol (DUONEB) 0.5-2.5 (3) MG/3ML nebulizer solution 4/14/2025 Morning Patient No Yes   Sig: Inhale 3 mL by nebulization every four hours as needed for Shortness of Breath.   levothyroxine  (SYNTHROID) 100 MCG Tab 4/14/2025 Morning Patient No Yes   Sig: Take 1 Tablet by mouth every morning on an empty stomach.   lisinopril (PRINIVIL) 20 MG Tab 4/14/2025 Morning Patient No Yes   Sig: Take 1 Tablet by mouth 2 times a day.   naphazoline-pheniramine (ALLERGY EYE) 0.025-0.3 % ophthalmic solution 4/14/2025 Morning Patient Yes Yes   Sig: Administer 1 Drop into both eyes 4 times a day.   naphazoline-pheniramine (OPCON-A) 0.027-0.315 % Solution  Patient Yes Yes   Sig: Administer 1 Drop into both eyes 3 times a day as needed (allergies).   omeprazole (PRILOSEC) 20 MG delayed-release capsule 4/14/2025 Morning Patient No Yes   Sig: Take 1 Capsule by mouth every day.   predniSONE (DELTASONE) 10 MG Tab 4/14/2025 Morning Patient No Yes   Sig: Take 3 Tablets by mouth every day. Please decrease by 1 tablet daily each week as tolerated.   Patient taking differently: Take 20 mg by mouth every day. Please decrease by 1 tablet daily each week as tolerated.   rosuvastatin (CRESTOR) 10 MG Tab 4/14/2025 Evening Patient No Yes   Sig: Take 1 Tablet by mouth every evening.   sulfamethoxazole-trimethoprim (BACTRIM DS) 800-160 MG tablet 4/14/2025 Morning Patient No Yes   Sig: Take 1 Tablet by mouth every Monday, Wednesday, and Friday. Take until gone.   traZODone (DESYREL) 50 MG Tab 4/14/2025 Bedtime Patient No Yes   Sig: Take 1 tablet by mouth every evening.      Facility-Administered Medications: None       Physical Exam  Temp:  [36.6 °C (97.8 °F)-37.3 °C (99.2 °F)] 36.6 °C (97.8 °F)  Pulse:  [] 93  Resp:  [19-29] 26  BP: ()/(50-82) 152/82  SpO2:  [78 %-96 %] 90 %  Blood Pressure : 106/51   Temperature: 37.3 °C (99.2 °F)   Pulse: 93   Respiration: (!) 24   Pulse Oximetry: 94 %       Physical Exam  Constitutional:       Appearance: He is ill-appearing.   HENT:      Head: Normocephalic and atraumatic.      Nose: Nose normal.      Mouth/Throat:      Mouth: Mucous membranes are dry.      Pharynx: Oropharynx is clear.    Eyes:      General: No scleral icterus.     Extraocular Movements: Extraocular movements intact.   Cardiovascular:      Rate and Rhythm: Regular rhythm. Tachycardia present.      Pulses: Normal pulses.      Heart sounds:      No friction rub.   Pulmonary:      Breath sounds: No stridor. Wheezing, rhonchi and rales present.      Comments: Tachypnea  Chest:      Chest wall: No tenderness.   Abdominal:      General: There is no distension.      Tenderness: There is no abdominal tenderness. There is no guarding or rebound.   Musculoskeletal:         General: Normal range of motion.      Cervical back: Neck supple. No tenderness.      Right lower leg: No edema.      Left lower leg: No edema.   Skin:     General: Skin is warm and dry.      Capillary Refill: Capillary refill takes less than 2 seconds.   Neurological:      Mental Status: He is alert and oriented to person, place, and time.      Sensory: No sensory deficit.      Comments: No facial droop  No pronator drift  No dysarthria   Psychiatric:         Mood and Affect: Mood normal.         Laboratory:  Recent Labs     04/15/25  1640   WBC 6.1   RBC 4.35*   HEMOGLOBIN 11.9*   HEMATOCRIT 38.6*   MCV 88.7   MCH 27.4   MCHC 30.8*   RDW 53.0*   PLATELETCT 172   MPV 9.5     Recent Labs     04/15/25  1640   SODIUM 138   POTASSIUM 4.4   CHLORIDE 102   CO2 20   GLUCOSE 134*   BUN 17   CREATININE 1.22   CALCIUM 8.3*     Recent Labs     04/15/25  1640   ALTSGPT 79*   ASTSGOT 60*   ALKPHOSPHAT 63   TBILIRUBIN 0.3   GLUCOSE 134*     Recent Labs     04/15/25  1519   INR 1.01     Recent Labs     04/15/25  1518 04/15/25  1640   NTPROBNP 687* 834*         Recent Labs     04/15/25  1640   TROPONINT 37*       Imaging:  CT-CTA CHEST PULMONARY ARTERY W/ RECONS   Final Result      1. No acute pulmonary embolus.   2. Stable cavitary mass in the left upper lobe.   3. Extensive edematous changes in the lungs with peripheral honeycombing.   4. Aortic and coronary arterial sclerosis.   5.  Simple appearing right renal cortical cyst, requiring no further follow-up.   6. Stable bilateral adrenal masses.   7. Acute or subacute left third through fifth rib fractures.            DX-CHEST-PORTABLE (1 VIEW)   Final Result      1.  LEFT upper lung consolidation, likely pneumonia.  Underlying mass is not excluded.   2.  Probable pulmonary fibrosis.   3.  Limited by positioning.      EC-ECHOCARDIOGRAM COMPLETE W/O CONT    (Results Pending)       X-Ray:  I have personally reviewed the images and compared with prior images.  EKG:  I have personally reviewed the images and compared with prior images.    Assessment/Plan:  Justification for Admission Status  I anticipate this patient will require least 2 midnights hospitalization, therefore appropriate for inpatient status.      * Severe sepsis (HCC)- (present on admission)  Assessment & Plan  This is Sepsis Present on admission  SIRS criteria identified on my evaluation include: Tachycardia, with heart rate greater than 90 BPM, Tachypnea, with respirations greater than 20 per minute, and Bandemia, greater than 10% bands  Clinical indicators of end organ dysfunction include Lactic Acid greater than 2  Source is pulm  Sepsis protocol initiated  Crystalloid Fluid Administration: Fluid resuscitation ordered per standard protocol - 30 mL/kg per current or ideal body weight  IV antibiotics as appropriate for source of sepsis  Reassessment: I have reassessed the patient's hemodynamic status    Pneumonia due to infectious organism  Assessment & Plan  Cavitary left upper lobe mass  Patient is immunocompromised due to squamous cell lung cancer on chemotherapy  Follow cultures  Antibiotic: Zosyn, Zyvox, azithromycin  Wean off oxygen support as tolerated    Acute left-sided weakness  Assessment & Plan  Resolved by ER arrival  Stroke alert aborted by stroke neurology  PT/OT/ST    H/o TIA per pt  -added ASA  -continue statin    TREVOR (acute kidney injury) (HCC)  Assessment &  Plan  Cr 1.22  IVF  Minimize nephrotoxic agents, renally dose meds  Check FeNa    Lactic acidosis  Assessment & Plan  LA 5.3 --> 4.9  Continue IVF  IV antibiotic  High-dose IV thiamine  Trend lactic acid    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 Melara, medical oncology    Acute on chronic respiratory failure with hypoxia (HCC)- (present on admission)  Assessment & Plan  Dependent on 5 L at baseline  Currently requiring 10 L OxyMask-wean off as tolerated  No PE seen on CTA chest  Check COVID/influenza/RSV  Antibiotic for pneumonia  Check TTE    Chronic obstructive pulmonary disease (HCC)- (present on admission)  Assessment & Plan  Pulmonary hygiene  -DuoNebs, Trelegy Ellipta, montelukast  -PEP  -s/p Solu-Medrol 125 mg  -Continue Solu-Medrol 40 mg every 8 hours  RT protocol    Dyslipidemia- (present on admission)  Assessment & Plan  Statin    Primary hypertension- (present on admission)  Assessment & Plan  Lisinopril, amlodipine    ACP (advance care planning)  Assessment & Plan  Goal of care discussed with patient in the emergency room.  He stated he is agreeable for noninvasive, as well as invasive/heroic life-sustaining measures-including CPR/defibrillation/intubation or mechanical ventilation.  He is agreeable for hospitalization, further treatment with IVF, breathing treatment, antibiotic therapy, any medical management as clinically warranted.  Diagnosis, prognosis, question and concern addressed.  Full CODE STATUS confirmed.  ACP: 17 minutes        VTE prophylaxis: heparin ppx

## 2025-04-16 NOTE — HOSPITAL COURSE
Bright Shirley is a 74 y.o. 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  Patient initially presented to ER for stroke concern.  Patient was evaluated by neurology, stroke load was aborted and his weakness on left side was resolved.  He is however requiring increased oxygen requirement of 10 L oxy mask.  CTA chest noted to have stable cavitary mass in left upper lobe, extensive edematous changes in lung peripheral honeycombing.  He does have concern lactic acid of 5.3.  Due to concern of severe sepsis, admission requested by ERP.  Therefore, admitted to medicine service for further evaluation and treatment.     Echocardiogram showed ejection fraction 58%.  Mild mitral regurgitation.  Normal diastolic function.  Normal right ventricular size and systolic function.    Pulmonary and infectious disease were consulted.  Patient does have a history of squamous cell cancer of the left upper lobe pembrolizumab making him immunosuppressed and also concern for autoimmune pneumonitis.  He was found to be positive for human metapneumovirus.  With initial concern for superimposed bacterial infection patient was on Zosyn which was escalated to meropenem and doxycycline.  He had minimal improvements on this regimen, he had procalcitonin is downtrending.  Fungal infection is felt to be less likely.  Pulmonology felt patient is high risk for bronchoscopy due to low pulmonary reserve.  MRSA nares was negative.  COVID/influenza/RSV negative.  Fungal cultures of sputum showed rare fungal elements.  Aspergillus galactomannan, Legionella, mycoplasma, coccidiomycosis negative.  On 4/22 infectious disease recommended to discontinue antibiotics and monitor for clinical worsening.  Okay to continue Bactrim for PJP prophylaxis.  If there is clinical worsening recommended to repeat CT  thorax and reconsult.  Pulmonary recommended to continue high-dose prednisone with concern for immunotherapy induced pneumonitis versus interstitial lung disease.  Continue Bactrim for PJP prophylaxis as above.  As PET/CT did show improvement it is okay to continue Keytruda.  Patient also known to have severe COPD on triple therapy.  Patient was also aggressively diuresed.

## 2025-04-16 NOTE — PROGRESS NOTES
4 Eyes Skin Assessment Completed by JOYCELYN Escoto and EUGENIE Richey.    Head WDL  Ears Redness and Blanching  Nose WDL  Mouth WDL  Neck WDL  Breast/Chest WDL  Shoulder Blades WDL  Spine WDL  (R) Arm/Elbow/Hand WDL  (L) Arm/Elbow/Hand Redness and Blanching elbow  Abdomen WDL  Groin WDL  Scrotum/Coccyx/Buttocks WDL  (R) Leg WDL  (L) Leg WDL  (R) Heel/Foot/Toe Redness and Blanching  (L) Heel/Foot/Toe Redness and Blanching          Devices In Places Tele Box, Blood Pressure Cuff, Pulse Ox, and Nasal Cannula      Interventions In Place Gray Ear Foams, Pillows, Heels Loaded W/Pillows, and Pressure Redistribution Mattress    Possible Skin Injury No    Pictures Uploaded Into Epic N/A  Wound Consult Placed N/A  RN Wound Prevention Protocol Ordered No

## 2025-04-16 NOTE — ASSESSMENT & PLAN NOTE
Dependent on 5 L at baseline  Infected with human metapneumovirus, isolation  CTA chest negative for PE  TTE normal EF, minimal valve disease  Pulmonology and ID were consulted. He has loss of lung volume from his cancer and radiation, has poor reserve.  Prednisone with taper per pulmonology.  He remains on HFNC and has not improved much, pulmonologist to see the patient again

## 2025-04-16 NOTE — ED NOTES
Assist RN:     Pt back from monitor, connected to monitor and oxygen plugged back into wall, line traced. Pt requesting Tyelnol and water, ERP made aware. Pt can have water once CT scans are back.

## 2025-04-16 NOTE — DISCHARGE PLANNING
Post Acute Navigator Team    Patient screened based off of following information:    High End of Life Care Index risk score 70  High LACE + score 75  Low 6 click Mobility score TBD   Low 6 click ADL score TBD   Readmission: No     No POLST or AD noted in EMR.    Per chart review, post acute needs to be determined.     Per chart review, Goals of Care established per Dr. Kamara's H&P note on 4/15/25- Patient is agreeable to noninvasive and invasive/heroic life-sustaining measures.    Octavia2-[email or web address] met with Bright at bedside. Introduced self and role. Provided flyer for [facility] home based services and home palliative care brouchure. Bright reports he understands what palliative care is and will look over material provided.    Please reach out to me directly should care team feel patient will benefit from a discharge goals of care conversation regarding outpatient palliative care or hospice.

## 2025-04-16 NOTE — RESPIRATORY CARE
"  COPD EDUCATION by COPD CLINICAL EDUCATOR  (Phone: [phone])  4/16/2025 at 2:54 PM by Inna Barnett, ADARSH     Patient seen by the education team to complete of series. Reference material about the program was given to patient along with our contact information.  Which included: What is COPD (how the lungs work), common treatments for COPD, the difference between \"rescue\" medications and \"daily\" medications, bronchial hygiene with an explanation of COPD Action Plan. We reviewed the appropriate medication techniques -including a demonstration. We discussed the correct way to care for and clean respiratory equipment and when applicable.    This session also discussed the signs and symptoms of an exacerbation (flare-up), triggers that can create flare-ups and reiteration of an \"Action Plan\" to reference daily. This will help to categorize their symptoms and utilize appropriate therapy.  Further education included breathing techniques to treat acute symptoms and home Oxygen safety.      Patient is established with [facility] and follows up regularly. Patient knowledgeable about his disease. He was agreeable to pulmonary rehab. Given flutter valve with instruction. Declined a spacer he has one.       COPD Screen  COPD Risk Screening  Do you have a history of COPD?: Yes  Do you have a Pulmonologist?: Yes    COPD Assessment  COPD Clinical Specialists ONLY  COPD Education Session 1: Yes  COPD Education Session 2: Yes  Is this a COPD exacerbation patient?: Yes  DME Company: [employer]  DME Equipment Type: 3-5L NC, nebulizer  Pulmonary Follow Up Appointment: 05/01/25  Appt Time: 1250  Pulmonologist Name: [facility]  (called for sooner)  Referrals Initiated: Yes  Pulmonary Rehab: Yes  $ Demo/Eval of SVN's, MDI's and Aerosols: Yes  (OP) Pulmonary Function Testing: Yes (Markedly reduced peak expiratory flow with a significant bronchodilator response. Consistent with asthma/reactive airways disease.  Moderate restrictive ventilatory defect " "evidenced by TLC 62%. Reduced DLCO and DL/VA ratio is consistent with emphysema)  Interdisciplinary Rounds: Attendance at Rounds (30 Min)    PFT Results  Markedly reduced peak expiratory flow with a significant bronchodilator response. Consistent with asthma/reactive airways disease.  Moderate restrictive ventilatory defect evidenced by TLC 62%. Reduced DLCO and DL/VA ratio is consistent with emphysema    Meds to Beds  [facility] provides bedside medication delivery for all eligible patients at discharge and you have been automatically enrolled in the Meds to Beds Program. Would you like to opt out of this program for any reason?: No - Stay Opted In     MY COPD ACTION PLAN     It is recommended that patients and physicians/healthcare providers complete this action plan together. This plan should be discussed at each physician visit and updated as needed.    The green, yellow and red zones show groups of symptoms of COPD. This list of symptoms is not comprehensive, and you may experience other symptoms. In the \"Actions\" column, your healthcare provider has recommended actions for you to take based on your symptoms.    Patient Name: Bright Shirley   YOB: 1950   Last Updated on: 4/16/2025  2:48 PM   Green Zone:  I am doing well today Actions     Usual activitiy and exercise level   Take daily medications     Usual amounts of cough and phlegm/mucus   Use oxygen as prescribed     Sleep well at night   Continue regular exercise/diet plan     Appetite is good   At all times avoid cigarette smoke, inhaled irritants     Daily Medications (these medications are taken every day):   Fluticasone and Umeclidinium and Vilanterol (Trelogy) 1 Puff Once daily     Additional Information:  Rinse mouth after each use     Yellow Zone:  I am having a bad day or a COPD flare Actions     More breathless than usual   Continue daily medications     I have less energy for my daily activities   Use quick relief inhaler as " "ordered     Increased or thicker phlegm/mucus   Use oxygen as prescribed     Using quick relief inhaler/nebulizer more often   Get plenty of rest     Swelling of ankles more than usual   Use pursed lip breathing     More coughing than usual   At all times avoid cigarette smoke, inhaled irritants     I feel like I have a \"chest cold\"     Poor sleep and my symptoms woke me up     My appetite is not good     My medicine is not helping      Call provider immediately if symptoms don’t improve     Continue daily medications, add rescue medications:   Albuterol  Albuterol/Ipratropium (Combivent, Duoneb) 2 Puffs  3mL via nebulizer Every 4 hours PRN  Every 4 hours PRN       Medications to be used during a flare up, (as Discussed with Provider):           Additional Information:  Use with spacer and wait 1-2 minutes between each puff with inhaler  Clean and rinse nebulizer     Red Zone:  I need urgent medical care Actions     Severe shortness of breath even at rest   Call 911 or seek medical care immediately     Not able to do any activity because of breathing      Fever or shaking chills      Feeling confused or very drowsy       Chest pains      Coughing up blood                  "

## 2025-04-16 NOTE — ED NOTES
Pt incontient of stool. ED tech at bedside to assist patient in cleaning up and providing a linen change.

## 2025-04-16 NOTE — PROGRESS NOTES
Monitor Summary:   Rhythm: sr  Rate:   Measurement: .13/.11/.38  Ectopy: frequent pac's, trigeminy

## 2025-04-17 ENCOUNTER — APPOINTMENT (OUTPATIENT)
Dept: CARDIOLOGY | Facility: MEDICAL CENTER | Age: 75
End: 2025-04-17
Attending: STUDENT IN AN ORGANIZED HEALTH CARE EDUCATION/TRAINING PROGRAM
Payer: MEDICARE

## 2025-04-17 LAB
ANION GAP SERPL CALC-SCNC: 12 MMOL/L (ref 7–16)
BACTERIA UR CULT: NORMAL
BUN SERPL-MCNC: 15 MG/DL (ref 8–22)
CALCIUM SERPL-MCNC: 8.4 MG/DL (ref 8.5–10.5)
CHLORIDE SERPL-SCNC: 101 MMOL/L (ref 96–112)
CO2 SERPL-SCNC: 23 MMOL/L (ref 20–33)
CREAT SERPL-MCNC: 1.07 MG/DL (ref 0.5–1.4)
EKG IMPRESSION: NORMAL
ERYTHROCYTE [DISTWIDTH] IN BLOOD BY AUTOMATED COUNT: 50.4 FL (ref 35.9–50)
FUNGUS SPEC FUNGUS STN: NORMAL
GFR SERPLBLD CREATININE-BSD FMLA CKD-EPI: 72 ML/MIN/1.73 M 2
GLUCOSE SERPL-MCNC: 123 MG/DL (ref 65–99)
HCT VFR BLD AUTO: 35.5 % (ref 42–52)
HGB BLD-MCNC: 11.5 G/DL (ref 14–18)
L PNEUMO AG UR QL IA: NEGATIVE
LV EJECT FRACT  99904: 58
LV EJECT FRACT MOD 2C 99903: 63.42
LV EJECT FRACT MOD 4C 99902: 53.84
LV EJECT FRACT MOD BP 99901: 59.62
M PNEUMO IGM SER IA-ACNC: 0.03 U/L
MCH RBC QN AUTO: 27.6 PG (ref 27–33)
MCHC RBC AUTO-ENTMCNC: 32.4 G/DL (ref 32.3–36.5)
MCV RBC AUTO: 85.3 FL (ref 81.4–97.8)
PLATELET # BLD AUTO: 170 K/UL (ref 164–446)
PMV BLD AUTO: 9.4 FL (ref 9–12.9)
POTASSIUM SERPL-SCNC: 4 MMOL/L (ref 3.6–5.5)
PROCALCITONIN SERPL-MCNC: 0.99 NG/ML
RBC # BLD AUTO: 4.16 M/UL (ref 4.7–6.1)
S PNEUM AG UR QL: NEGATIVE
SIGNIFICANT IND 70042: NORMAL
SIGNIFICANT IND 70042: NORMAL
SITE SITE: NORMAL
SITE SITE: NORMAL
SODIUM SERPL-SCNC: 136 MMOL/L (ref 135–145)
SOURCE SOURCE: NORMAL
SOURCE SOURCE: NORMAL
WBC # BLD AUTO: 6.6 K/UL (ref 4.8–10.8)

## 2025-04-17 PROCEDURE — 36415 COLL VENOUS BLD VENIPUNCTURE: CPT

## 2025-04-17 PROCEDURE — 84145 PROCALCITONIN (PCT): CPT

## 2025-04-17 PROCEDURE — 85027 COMPLETE CBC AUTOMATED: CPT

## 2025-04-17 PROCEDURE — 93005 ELECTROCARDIOGRAM TRACING: CPT | Mod: TC | Performed by: HOSPITALIST

## 2025-04-17 PROCEDURE — 93010 ELECTROCARDIOGRAM REPORT: CPT | Performed by: INTERNAL MEDICINE

## 2025-04-17 PROCEDURE — 97163 PT EVAL HIGH COMPLEX 45 MIN: CPT

## 2025-04-17 PROCEDURE — 80048 BASIC METABOLIC PNL TOTAL CA: CPT

## 2025-04-17 PROCEDURE — 94640 AIRWAY INHALATION TREATMENT: CPT

## 2025-04-17 PROCEDURE — 97535 SELF CARE MNGMENT TRAINING: CPT

## 2025-04-17 PROCEDURE — 99233 SBSQ HOSP IP/OBS HIGH 50: CPT | Performed by: HOSPITALIST

## 2025-04-17 PROCEDURE — 94669 MECHANICAL CHEST WALL OSCILL: CPT

## 2025-04-17 PROCEDURE — 700111 HCHG RX REV CODE 636 W/ 250 OVERRIDE (IP): Performed by: STUDENT IN AN ORGANIZED HEALTH CARE EDUCATION/TRAINING PROGRAM

## 2025-04-17 PROCEDURE — 700105 HCHG RX REV CODE 258: Performed by: STUDENT IN AN ORGANIZED HEALTH CARE EDUCATION/TRAINING PROGRAM

## 2025-04-17 PROCEDURE — 93306 TTE W/DOPPLER COMPLETE: CPT | Mod: 26 | Performed by: INTERNAL MEDICINE

## 2025-04-17 PROCEDURE — 770020 HCHG ROOM/CARE - TELE (206)

## 2025-04-17 PROCEDURE — A9270 NON-COVERED ITEM OR SERVICE: HCPCS | Performed by: STUDENT IN AN ORGANIZED HEALTH CARE EDUCATION/TRAINING PROGRAM

## 2025-04-17 PROCEDURE — 99232 SBSQ HOSP IP/OBS MODERATE 35: CPT | Performed by: STUDENT IN AN ORGANIZED HEALTH CARE EDUCATION/TRAINING PROGRAM

## 2025-04-17 PROCEDURE — 700102 HCHG RX REV CODE 250 W/ 637 OVERRIDE(OP): Performed by: STUDENT IN AN ORGANIZED HEALTH CARE EDUCATION/TRAINING PROGRAM

## 2025-04-17 PROCEDURE — 700101 HCHG RX REV CODE 250: Performed by: HOSPITALIST

## 2025-04-17 PROCEDURE — 93306 TTE W/DOPPLER COMPLETE: CPT

## 2025-04-17 RX ADMIN — IPRATROPIUM BROMIDE AND ALBUTEROL SULFATE 3 ML: .5; 2.5 SOLUTION RESPIRATORY (INHALATION) at 19:47

## 2025-04-17 RX ADMIN — TRAZODONE HYDROCHLORIDE 50 MG: 50 TABLET ORAL at 22:35

## 2025-04-17 RX ADMIN — LISINOPRIL 20 MG: 20 TABLET ORAL at 17:32

## 2025-04-17 RX ADMIN — THIAMINE HYDROCHLORIDE 500 MG: 100 INJECTION, SOLUTION INTRAMUSCULAR; INTRAVENOUS at 10:25

## 2025-04-17 RX ADMIN — ACETAMINOPHEN 650 MG: 325 TABLET, FILM COATED ORAL at 22:35

## 2025-04-17 RX ADMIN — HEPARIN SODIUM 5000 UNITS: 5000 INJECTION, SOLUTION INTRAVENOUS; SUBCUTANEOUS at 14:07

## 2025-04-17 RX ADMIN — PIPERACILLIN AND TAZOBACTAM 3.38 G: 3; .375 INJECTION, POWDER, FOR SOLUTION INTRAVENOUS at 05:39

## 2025-04-17 RX ADMIN — THIAMINE HYDROCHLORIDE 500 MG: 100 INJECTION, SOLUTION INTRAMUSCULAR; INTRAVENOUS at 14:10

## 2025-04-17 RX ADMIN — PIPERACILLIN AND TAZOBACTAM 3.38 G: 3; .375 INJECTION, POWDER, FOR SOLUTION INTRAVENOUS at 15:01

## 2025-04-17 RX ADMIN — PREDNISONE 60 MG: 50 TABLET ORAL at 05:36

## 2025-04-17 RX ADMIN — PIPERACILLIN AND TAZOBACTAM 3.38 G: 3; .375 INJECTION, POWDER, FOR SOLUTION INTRAVENOUS at 22:40

## 2025-04-17 RX ADMIN — AZITHROMYCIN DIHYDRATE 500 MG: 250 TABLET ORAL at 17:33

## 2025-04-17 RX ADMIN — ASPIRIN 81 MG: 81 TABLET, COATED ORAL at 05:35

## 2025-04-17 RX ADMIN — AMLODIPINE BESYLATE 5 MG: 5 TABLET ORAL at 05:36

## 2025-04-17 RX ADMIN — MONTELUKAST 10 MG: 10 TABLET, FILM COATED ORAL at 21:34

## 2025-04-17 RX ADMIN — THIAMINE HYDROCHLORIDE 500 MG: 100 INJECTION, SOLUTION INTRAMUSCULAR; INTRAVENOUS at 21:37

## 2025-04-17 RX ADMIN — LISINOPRIL 20 MG: 20 TABLET ORAL at 05:36

## 2025-04-17 RX ADMIN — PANTOPRAZOLE SODIUM 40 MG: 40 INJECTION, POWDER, FOR SOLUTION INTRAVENOUS at 05:35

## 2025-04-17 RX ADMIN — IPRATROPIUM BROMIDE AND ALBUTEROL SULFATE 3 ML: .5; 2.5 SOLUTION RESPIRATORY (INHALATION) at 07:20

## 2025-04-17 RX ADMIN — HEPARIN SODIUM 5000 UNITS: 5000 INJECTION, SOLUTION INTRAVENOUS; SUBCUTANEOUS at 05:36

## 2025-04-17 RX ADMIN — PANTOPRAZOLE SODIUM 40 MG: 40 INJECTION, POWDER, FOR SOLUTION INTRAVENOUS at 17:33

## 2025-04-17 RX ADMIN — LEVOTHYROXINE SODIUM 100 MCG: 0.1 TABLET ORAL at 05:35

## 2025-04-17 RX ADMIN — Medication 30 MG: at 22:35

## 2025-04-17 RX ADMIN — IPRATROPIUM BROMIDE AND ALBUTEROL SULFATE 3 ML: .5; 2.5 SOLUTION RESPIRATORY (INHALATION) at 11:00

## 2025-04-17 RX ADMIN — ROSUVASTATIN CALCIUM 10 MG: 20 TABLET, FILM COATED ORAL at 17:33

## 2025-04-17 RX ADMIN — IPRATROPIUM BROMIDE AND ALBUTEROL SULFATE 3 ML: .5; 2.5 SOLUTION RESPIRATORY (INHALATION) at 15:36

## 2025-04-17 RX ADMIN — HEPARIN SODIUM 5000 UNITS: 5000 INJECTION, SOLUTION INTRAVENOUS; SUBCUTANEOUS at 21:34

## 2025-04-17 RX ADMIN — FLUTICASONE FUROATE, UMECLIDINIUM BROMIDE AND VILANTEROL TRIFENATATE 1 PUFF: 200; 62.5; 25 POWDER RESPIRATORY (INHALATION) at 05:35

## 2025-04-17 ASSESSMENT — GAIT ASSESSMENTS
DEVIATION: BRADYKINETIC
GAIT LEVEL OF ASSIST: STANDBY ASSIST
ASSISTIVE DEVICE: FRONT WHEEL WALKER
DISTANCE (FEET): 20

## 2025-04-17 ASSESSMENT — ENCOUNTER SYMPTOMS
MYALGIAS: 0
VOMITING: 0
CLAUDICATION: 0
DIZZINESS: 0
HEMOPTYSIS: 0
NAUSEA: 0
BACK PAIN: 0
PALPITATIONS: 0
BLURRED VISION: 0
SPUTUM PRODUCTION: 1
HEARTBURN: 0
WHEEZING: 1
CHILLS: 0
COUGH: 1
BRUISES/BLEEDS EASILY: 0
HEADACHES: 0
FEVER: 0
DOUBLE VISION: 0
DEPRESSION: 0
PND: 0
SHORTNESS OF BREATH: 1

## 2025-04-17 ASSESSMENT — COGNITIVE AND FUNCTIONAL STATUS - GENERAL
STANDING UP FROM CHAIR USING ARMS: A LITTLE
MOBILITY SCORE: 21
SUGGESTED CMS G CODE MODIFIER MOBILITY: CJ
CLIMB 3 TO 5 STEPS WITH RAILING: A LITTLE
WALKING IN HOSPITAL ROOM: A LITTLE

## 2025-04-17 ASSESSMENT — FIBROSIS 4 INDEX: FIB4 SCORE: 5.37

## 2025-04-17 ASSESSMENT — PAIN DESCRIPTION - PAIN TYPE
TYPE: ACUTE PAIN
TYPE: ACUTE PAIN

## 2025-04-17 NOTE — DISCHARGE PLANNING
HTH/SCP TCN chart review completed. Current discharge considerations anticipated for home with close outpatient f/u vs. Possible HH when medically cleared however PT recommendations are pending.  OT recommends no further occupational therapy needs after discharge from the hospital on 4/16 with recommendations for a tub/shower seat. Please see PAN Team note on 4/16/25.      Current 6 clicks mobility at 18 and per kardex 15 feet X 2 with FWW.  Patient has a FWW and O2 at home on 5 L/min at his baseline and is currently on 5 L/min.  Please reach out to TCN via voalte if any additional transitional discharge planning needs are indicated. Thank you.    Completed:  PT orders in chart.    OT OT recommends no further occupational therapy needs after discharge from the hospital on 4/16 with recommendations for a tub/shower seat.  (At time of writing this note).   SCP with [facility].  Will refer to  if indicated at discharge.

## 2025-04-17 NOTE — DOCUMENTATION QUERY
Critical access hospital                                                                       Query Response Note      PATIENT:               ELO STARKEY  ACCT #:                  [id number]  MRN:                     [id number]  :                      1950  ADMIT DATE:       4/15/2025 3:16 PM  DISCH DATE:          RESPONDING  PROVIDER #:        [id number]           QUERY TEXT:    Sepsis is documented in the Medical Record.  Patient also with squamous cell carcinoma of left lung.    After further study, can the diagnosis of Sepsis be clarified?    The patient's Clinical Indicators include:  H&P and Progress Notes:  Severe Sepsis: SIRS include: tachycardia, tachypnea, Bandemia.  Source is Pulm.  Pneumonia: Cavitary VANESA mass.  Pt is immunocompromised d/t squamous cell lung cancer on chemo  4/15 WBC 6.1, Neutrophils (absolute) 5.21   Procal 1.38  4/15 Lactic acid 5.3  Admit Vitals:  87/50, 106, 24, 99.2F, 88% 12L NRB  Risk Factors: PNA, Lung ca on chemo, COPD  Treatment: Zithromax, Zosyn, Zyvox, IVF    Important Note:  if new diagnosis or change to documentation made via query please include in daily documentation and/or DC Summary    Thank you,  Saman Elkins RN, BSN, CCDS  Clinical   Connect via [employer]  Options provided:   -- Sepsis is ruled out, SIRS is d/t malignancy   -- Sepsis is confirmed   -- Other explanation, (please specify other explanation)      Query created by: Saman Elkins on 2025 9:53 AM    RESPONSE TEXT:    Sepsis is ruled out, SIRS is d/t malignancy          Electronically signed by:  RUPINDER JONES MD 2025 3:18 PM

## 2025-04-17 NOTE — THERAPY
"Physical Therapy   Initial Evaluation     Patient Name: Bright Shirley  Age:  74 y.o., Sex:  male  Medical Record #: [id number]  Today's Date: 4/17/2025     Precautions  Precautions: Fall Risk  Comments: rapidly desats    Assessment  Patient is 74 y.o. male admitted with stroke like symptoms, found to be hypoxic and symptoms since resolved. Diagnoses of PNA, sepsis, TREVOR, lactic acidosis. Pmhx of hx of COPD on home o2 5L, squamous cell lung cancer, HTN, HLD. Pt required grossly SPV/SBA for mobility including ambulating 20ft with FWW, however, main limitation is pt's Spo2 dropping to 68% on 8L with minimal activity requiring 3 min to recover with rest. See further details below. Discussed d/c planning options, recommend HHPT. Also discussed how pt would benefit from OP pulmonary rehab program. Pt would benefit from continued acute IP PT services to address said deficits.     Plan    Physical Therapy Initial Treatment Plan   Treatment Plan : Bed Mobility, Equipment, Family / Caregiver Training, Gait Training, Manual Therapy, Neuro Re-Education / Balance, Self Care / Home Evaluation, Stair Training, Therapeutic Activities, Therapeutic Exercise  Treatment Frequency: 3 Times per Week  Duration: Until Therapy Goals Met    DC Equipment Recommendations: None  Discharge Recommendations: Recommend home health for continued physical therapy services (would highly benefit from OP pulmonary rehab)       Subjective    \"It's been happening for the past few months\" regarding desaturating with talking and activity     Objective     04/17/25 1439   Vitals   O2 (LPM) 5   O2 Delivery Device Silicone Nasal Cannula   Vitals Comments Pt titrated to 8L via oxymask prior to mobility knowing that he desaturates, pt still desaturated to 68% with very short distance ambulation, requiring 3 min seated rest to recover to 88%. Placed back on 5L via NC at 88% and end of session. Desaturates to mid 80's while talking   Pain 0 - 10 Group " "  Therapist Pain Assessment Post Activity Pain Same as Prior to Activity;Nurse Notified;0   Prior Living Situation   Prior Services Home-Independent   Housing / Facility 1 Story House   Steps Into Home 0   Steps In Home 0   Equipment Owned Front-Wheel Walker;Single Point Cane;Oxygen   Lives with - Patient's Self Care Capacity Unrelated Adult   Comments Reports roommate helpful. Pt drives. Has oxygen tank, concentrater, and portable options   Prior Level of Functional Mobility   Bed Mobility Independent   Transfer Status Independent   Ambulation Independent   Ambulation Distance   (50-100ft at one time before needing to rest, recover Spo2)   Assistive Devices Used None   Comments Reports at baseline wears 5L NC, lives around 90% spo2 at rest, and desats to 75% with activity for past few months.   History of Falls   History of Falls Yes   Date of Last Fall   (3 years ago, \"passed out\" and broke femur, likely 2/2 low o2)   Cognition    Cognition / Consciousness WDL   Level of Consciousness Alert   Comments pleasant and cooperative   Active ROM Lower Body    Active ROM Lower Body  WDL   Strength Lower Body   Lower Body Strength  WDL   Comments lack muscular endurance, endorses feeling weaker since hospitalization 2/2 limited mobility in house   Balance Assessment   Sitting Balance (Static) Fair +   Sitting Balance (Dynamic) Fair   Standing Balance (Static) Fair   Standing Balance (Dynamic) Fair   Weight Shift Sitting Fair   Weight Shift Standing Fair   Comments w/ FWW   Bed Mobility    Comments in chair pre/post   Gait Analysis   Gait Level Of Assist Standby Assist   Assistive Device Front Wheel Walker   Distance (Feet) 20   # of Times Distance was Traveled 1   Deviation Bradykinetic   Comments limited 2/2 SpO2 drop and SOB   Functional Mobility   Sit to Stand Supervised   Bed, Chair, Wheelchair Transfer Supervised   Transfer Method Stand Step   Mobility w/ FWW in room   6 Clicks Assessment - How much HELP from from " another person do you currently need... (If the patient hasn't done an activity recently, how much help from another person do you think he/she would need if he/she tried?)   Turning from your back to your side while in a flat bed without using bedrails? 4   Moving from lying on your back to sitting on the side of a flat bed without using bedrails? 4   Moving to and from a bed to a chair (including a wheelchair)? 4   Standing up from a chair using your arms (e.g., wheelchair, or bedside chair)? 3   Walking in hospital room? 3   Climbing 3-5 steps with a railing? 3   6 clicks Mobility Score 21   Activity Tolerance   Comments limited 2/2 Spo2 drop/SOB   Short Term Goals    Short Term Goal # 1 Pt will perform supine <> sit with SPV in 6 visits to get in/out of bed   Short Term Goal # 2 Pt will perform stand step transfers with FWW and SPV in 6 visits to get in/out of chair   Short Term Goal # 3 Pt will ambulate 50ft with FWW and SPV in 6 visits to access home environment   Education Group   Education Provided Role of Physical Therapist   Role of Physical Therapist Patient Response Patient;Acceptance;Explanation;Verbal Demonstration   Additional Comments Pt receptive to self management and compensatory strategies with mobility. Extensive education regarding energy conservation and management, Spo2 monitoring, s/s hypoxia, OP pulmonary rehab and HHPT   Physical Therapy Initial Treatment Plan    Treatment Plan  Bed Mobility;Equipment;Family / Caregiver Training;Gait Training;Manual Therapy;Neuro Re-Education / Balance;Self Care / Home Evaluation;Stair Training;Therapeutic Activities;Therapeutic Exercise   Treatment Frequency 3 Times per Week   Duration Until Therapy Goals Met   Problem List    Problems Impaired Transfers;Impaired Ambulation;Functional Strength Deficit;Impaired Balance;Decreased Activity Tolerance   Anticipated Discharge Equipment and Recommendations   DC Equipment Recommendations None   Discharge  Recommendations Recommend home health for continued physical therapy services  (would highly benefit from OP pulmonary rehab)   Interdisciplinary Plan of Care Collaboration   IDT Collaboration with  Nursing   Patient Position at End of Therapy Seated;Chair Alarm On;Call Light within Reach;Tray Table within Reach;Phone within Reach   Collaboration Comments RN updated   Session Information   Date / Session Number  4/17- 1 (1/3, 4/23)

## 2025-04-17 NOTE — PROGRESS NOTES
Bedside report received from off going RN/tech: teresa Yi care of patient.     Fall Risk Score: HIGH RISK  Fall risk interventions in place: Place yellow fall risk ID band on patient, Provide patient/family education based on risk assessment, Educate patient/family to call staff for assistance when getting out of bed, Place fall precaution signage outside patient door, Place patient in room close to nursing station, Utilize bed/chair fall alarm, and Bed alarm connected correctly  Bed type: Regular (Nba Score less than 17 interventions in place)  Patient on cardiac monitor: Yes  IVF/IV medications: Not Applicable   Oxygen: How many liters 5L  Bedside sitter: Not Applicable   Isolation: Not applicable

## 2025-04-17 NOTE — PROGRESS NOTES
Monitor Summary:    -ST      [facility], [facility]  .16/.10/.40

## 2025-04-17 NOTE — CONSULTS
Pulmonary Consultation    Date of consult: 4/16/2025    Referring Physician  NAT Goodwin.*    Reason for Consultation  COPD exacerbation     History of Presenting Illness  74 y.o. male who presented on 4/15/2025 with left-sided weakness concerning for stroke.  He was in his usual state of health, was accidentally disconnected from his oxygen support on 4/15/2025 at home which led to his weakness as per patient.  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  He has now tapered down to prednisone 20 mg/day.  since admission he was started on higher dose of steroids, antibiotics which improved his shortness of breath and is back to his baseline oxygen support of 5 L.      Code Status  Full Code    Review of Systems  Review of Systems   Constitutional:  Negative for chills and fever.   Eyes:  Negative for photophobia and pain.   Respiratory:  Positive for cough, sputum production and shortness of breath.    Gastrointestinal:  Negative for abdominal pain and diarrhea.   Neurological:  Positive for weakness. Negative for dizziness and loss of consciousness.   All other systems reviewed and are negative.      Past Medical History   has a past medical history of Acute exacerbation of chronic obstructive pulmonary disease (COPD) ([facility]) (07/01/2021), Acute hypoxemic respiratory failure ([facility]) (06/08/2021), Acute respiratory failure with hypoxia ([facility])  (04/29/2024), Acute urinary retention (06/07/2021), Aortic atherosclerosis (HCC), Carotid stenosis, bilateral - mild, Cerebral infarction (HCC) - based on CT head 7/2022, Cerebral infarction (HCC) - based on CT head 7/2022, Chronic obstructive pulmonary disease (HCC), Hypercholesteremia, Hypertension, Intertrochanteric fracture of femur (HCC) (06/06/2021), Other emphysema (HCC) (08/07/2019), and Supplemental oxygen dependent (06/30/2021).    Surgical History   has a past surgical history that includes hernia repair (Right); orif, femur (Right); other orthopedic surgery; other; and pr treat inter/subtroch fx,w/plate/screw (Left, 6/6/2021).    Family History  family history includes Breast Cancer in his mother; Cancer in his mother; Heart Disease in his brother; Hyperlipidemia in his maternal grandmother; Hypertension in his maternal grandmother; Other in his father; Stroke in his mother and sister.    Social History   reports that he quit smoking about a year ago. His smoking use included cigarettes. He started smoking about 50 years ago. He has a 49.3 pack-year smoking history. He has never used smokeless tobacco. He reports that he does not currently use alcohol. He reports that he does not currently use drugs after having used the following drugs: Marijuana.    Medications  Home Medications       Reviewed by Steve Chavez (Pharmacy Tech) on 04/15/25 at 1620  Med List Status: Complete     Medication Last Dose Status   acetaminophen (TYLENOL) 325 MG Tab 4/8/2025 Active   albuterol 108 (90 Base) MCG/ACT Aero Soln inhalation aerosol 4/14/2025 Active   amLODIPine (NORVASC) 5 MG Tab 4/14/2025 Active   artificial tears 1.4 % Solution 4/14/2025 Active   calcium polycarbophil (FIBERCON) 625 MG Tab 4/14/2025 Active   docosahexanoic acid (OMEGA 3 FA) 1000 MG Cap 4/14/2025 Active   Fexofenadine HCl (MUCINEX ALLERGY PO) 4/14/2025 Active   fluticasone-umeclidinium-vilanterol (TRELEGY ELLIPTA) 200-62.5-25 mcg/act  inhaler 4/14/2025 Active   Home Care Oxygen  Active   ipratropium-albuterol (DUONEB) 0.5-2.5 (3) MG/3ML nebulizer solution 4/14/2025 Active   levothyroxine (SYNTHROID) 100 MCG Tab 4/14/2025 Active   lisinopril (PRINIVIL) 20 MG Tab 4/14/2025 Active   Melatonin 10 MG Tab 4/14/2025 Active   Misc. Devices Misc  Active   Multiple Vitamin (MULTIVITAMIN ADULT PO) 4/14/2025 Active   naphazoline-pheniramine (ALLERGY EYE) 0.025-0.3 % ophthalmic solution 4/14/2025 Active   naphazoline-pheniramine (OPCON-A) 0.027-0.315 % Solution  Active   omeprazole (PRILOSEC) 20 MG delayed-release capsule 4/14/2025 Active   predniSONE (DELTASONE) 10 MG Tab 4/14/2025 Active   rosuvastatin (CRESTOR) 10 MG Tab 4/14/2025 Active   sulfamethoxazole-trimethoprim (BACTRIM DS) 800-160 MG tablet 4/14/2025 Active   traZODone (DESYREL) 50 MG Tab 4/14/2025 Active                  Audit from Redirected Encounters    **Home medications have not yet been reviewed for this encounter**       Current Facility-Administered Medications   Medication Dose Route Frequency Provider Last Rate Last Admin    pantoprazole (Protonix) injection 40 mg  40 mg Intravenous BID Benny Kamara M.D.   40 mg at 04/16/25 0540    LR (Bolus) infusion 500 mL  500 mL Intravenous Once PRN Benny Kamara M.D.        acetaminophen (Tylenol) tablet 650 mg  650 mg Oral Q6HRS PRN Benny Kamara M.D.        labetalol (Normodyne/Trandate) injection 10 mg  10 mg Intravenous Q4HRS PRN Benny Kamara M.D.        ondansetron (Zofran) syringe/vial injection 4 mg  4 mg Intravenous Q4HRS PRN Benny Kamara M.D.        Or    ondansetron (Zofran ODT) dispertab 4 mg  4 mg Oral Q4HRS PRN Benny Kamara M.D.        senna-docusate (Pericolace Or Senokot S) 8.6-50 MG per tablet 2 Tablet  2 Tablet Oral Q EVENING Benny Kamara M.D.        And    polyethylene glycol/lytes (Miralax) Packet 1 Packet  1 Packet Oral QDAY PRN Benny Kamara M.D.        albuterol inhaler 2 Puff  2 Puff Inhalation Q4HRS PRN Benny Kamara,  M.D.        amLODIPine (Norvasc) tablet 5 mg  5 mg Oral DAILY Benny Kamara M.D.   5 mg at 04/16/25 0541    artificial tears ophthalmic solution 1 Drop  1 Drop Both Eyes Q HOUR PRN Benny Kamara M.D.        calcium polycarbophil (Fibercon) tablet 625 mg  625 mg Oral DAILY Benny Kamara M.D.        fluticasone-umeclidinium-vilanterol (Trelegy Ellipta) 200-62.5-25 mcg/act inhaler 1 Puff  1 Puff Inhalation DAILY Benny Kamara M.D.   1 Puff at 04/16/25 0555    levothyroxine (Synthroid) tablet 100 mcg  100 mcg Oral AM ES Benny Kamara M.D.   100 mcg at 04/16/25 0541    lisinopril (Prinivil) tablet 20 mg  20 mg Oral BID Benny Kamara M.D.   20 mg at 04/16/25 0541    melatonin tablet 30 mg  30 mg Oral Q EVENING Benny Kamara M.D.   30 mg at 04/16/25 0119    rosuvastatin (Crestor) tablet 10 mg  10 mg Oral Q EVENING Benny Kamara M.D.   10 mg at 04/15/25 2112    traZODone (Desyrel) tablet 50 mg  50 mg Oral Nightly Benny Kamara M.D.   50 mg at 04/16/25 0119    azithromycin (Zithromax) tablet 500 mg  500 mg Oral Q EVENING Benny Kamara M.D.        Respiratory Therapy Consult   Nebulization Continuous RT Benny Kamara M.D.        ipratropium-albuterol (DUONEB) nebulizer solution  3 mL Nebulization Q4HRS (RT) Benny Kamara M.D.   3 mL at 04/16/25 1405    albuterol (Proventil) 2.5mg/0.5ml nebulizer solution 2.5 mg  2.5 mg Nebulization Q2HRS PRN (RT) Benny Kamara M.D.        methylPREDNISolone (SOLU-MEDROL) 40 MG injection 40 mg  40 mg Intravenous Q8HRS Benny Kamara M.D.   40 mg at 04/16/25 1421    montelukast (Singulair) tablet 10 mg  10 mg Oral Nightly Benny Kamara M.D.   10 mg at 04/15/25 2203    thiamine (B-1) 500 mg in dextrose 5% 100 mL IVPB  500 mg Intravenous TID Benny Kamara M.D. 200 mL/hr at 04/16/25 1421 500 mg at 04/16/25 1421    piperacillin-tazobactam (Zosyn) 3.375 g in  mL IVPB  3.375 g Intravenous Q8HRS Benny Kamara M.D. 25 mL/hr at 04/16/25 1508 3.375 g at 04/16/25 1508    heparin injection 5,000 Units   5,000 Units Subcutaneous Q8HRS Benny Kamara M.D.   5,000 Units at 04/16/25 1421    aspirin EC tablet 81 mg  81 mg Oral DAILY Benny Kamara M.D.   81 mg at 04/16/25 0541       Allergies  Allergies   Allergen Reactions    Seasonal Runny Nose and Itching     Seasonal allergies       Vital Signs last 24 hours  Temp:  [36.3 °C (97.3 °F)-36.6 °C (97.8 °F)] 36.5 °C (97.7 °F)  Pulse:  [] 90  Resp:  [18-27] 20  BP: (106-156)/(51-82) 150/80  SpO2:  [78 %-99 %] 90 %    Physical Exam  Physical Exam  Vitals reviewed.   Constitutional:       Appearance: He is obese.   HENT:      Head: Normocephalic and atraumatic.   Eyes:      General: No scleral icterus.     Conjunctiva/sclera: Conjunctivae normal.   Cardiovascular:      Rate and Rhythm: Normal rate and regular rhythm.      Heart sounds: No murmur heard.  Pulmonary:      Breath sounds: Rhonchi present. No wheezing or rales.      Comments: Decreased breath sounds bilaterally  Abdominal:      General: Bowel sounds are normal. There is no distension.      Tenderness: There is no abdominal tenderness.   Musculoskeletal:      Right lower leg: No edema.      Left lower leg: No edema.   Skin:     General: Skin is warm and dry.   Neurological:      General: No focal deficit present.      Mental Status: He is alert and oriented to person, place, and time.         Fluids    Intake/Output Summary (Last 24 hours) at 4/16/2025 1722  Last data filed at 4/16/2025 1500  Gross per 24 hour   Intake 1036 ml   Output 1300 ml   Net -264 ml       Laboratory  Recent Results (from the past 48 hours)   MAGNESIUM    Collection Time: 04/15/25  3:18 PM   Result Value Ref Range    Magnesium 2.0 1.5 - 2.5 mg/dL   C Reactive Protein Quantitative (Non-Cardiac)    Collection Time: 04/15/25  3:18 PM   Result Value Ref Range    Stat C-Reactive Protein 4.92 (H) 0.00 - 0.75 mg/dL   proBrain Natriuretic Peptide, NT    Collection Time: 04/15/25  3:18 PM   Result Value Ref Range    NT-proBNP 687 (H) 0 - 125  pg/mL   PROCALCITONIN    Collection Time: 04/15/25  3:18 PM   Result Value Ref Range    Procalcitonin 1.11 (H) <0.25 ng/mL   HOLD BLOOD BANK SPECIMEN (NOT TESTED)    Collection Time: 04/15/25  3:18 PM   Result Value Ref Range    Holding Tube - Bb DONE    Prothrombin time (INR)    Collection Time: 04/15/25  3:19 PM   Result Value Ref Range    PT 13.3 12.0 - 14.6 sec    INR 1.01 0.87 - 1.13   Lactic Acid    Collection Time: 04/15/25  3:35 PM   Result Value Ref Range    Lactic Acid 5.3 (HH) 0.5 - 2.0 mmol/L   Blood Culture - Draw one from central line and one from peripheral site    Collection Time: 04/15/25  3:35 PM    Specimen: Line; Blood   Result Value Ref Range    Significant Indicator NEG     Source BLD     Site Peripheral     Culture Result       No Growth  Note: Blood cultures are incubated for 5 days and  are monitored continuously.Positive blood cultures  are called to the RN and reported as soon as  they are identified.     CoV-2, FLU A/B, and RSV by PCR (2-4 Hours CEPHEID) : Collect NP swab in VTM    Collection Time: 04/15/25  3:39 PM    Specimen: Respirate   Result Value Ref Range    Influenza virus A RNA Negative Negative    Influenza virus B, PCR Negative Negative    RSV, PCR Negative Negative    SARS-CoV-2 by PCR NotDetected     SARS-CoV-2 Source NP Swab    Blood Culture - Draw one from central line and one from peripheral site    Collection Time: 04/15/25  3:44 PM    Specimen: Peripheral; Blood   Result Value Ref Range    Significant Indicator NEG     Source BLD     Site PERIPHERAL     Culture Result       No Growth  Note: Blood cultures are incubated for 5 days and  are monitored continuously.Positive blood cultures  are called to the RN and reported as soon as  they are identified.     EKG    Collection Time: 04/15/25  4:22 PM   Result Value Ref Range    Report       [facility] Emergency Dept.    Test Date:  2025-04-15  Pt Name:    ELO STARKEY                Department: ER  MRN:         [id number]                      Room:       [facility]  Gender:     Male                         Technician: [id number]  :        1950                   Requested By:BENJAMÍN ENGLAND  Order #:    [id number]                    Reading MD: BENJAMÍN ENGLAND DO    Measurements  Intervals                                Axis  Rate:       103                          P:          42  OH:         116                          QRS:        46  QRSD:       87                           T:          36  QT:         348  QTc:        456    Interpretive Statements  Sinus tachycardia  Compared to ECG 2024 09:53:14  Sinus rhythm no longer present  Electronically Signed On 04- 16:22:17 PDT by BENJAMÍN ENGLAND DO     Lactic Acid    Collection Time: 04/15/25  4:40 PM   Result Value Ref Range    Lactic Acid 4.1 (HH) 0.5 - 2.0 mmol/L   CBC WITH DIFFERENTIAL    Collection Time: 04/15/25  4:40 PM   Result Value Ref Range    WBC 6.1 4.8 - 10.8 K/uL    RBC 4.35 (L) 4.70 - 6.10 M/uL    Hemoglobin 11.9 (L) 14.0 - 18.0 g/dL    Hematocrit 38.6 (L) 42.0 - 52.0 %    MCV 88.7 81.4 - 97.8 fL    MCH 27.4 27.0 - 33.0 pg    MCHC 30.8 (L) 32.3 - 36.5 g/dL    RDW 53.0 (H) 35.9 - 50.0 fL    Platelet Count 172 164 - 446 K/uL    MPV 9.5 9.0 - 12.9 fL    Neutrophils-Polys 84.90 (H) 44.00 - 72.00 %    Lymphocytes 10.30 (L) 22.00 - 41.00 %    Monocytes 3.70 0.00 - 13.40 %    Eosinophils 0.00 0.00 - 6.90 %    Basophils 0.30 0.00 - 1.80 %    Immature Granulocytes 0.80 0.00 - 0.90 %    Nucleated RBC 0.00 0.00 - 0.20 /100 WBC    Neutrophils (Absolute) 5.21 1.82 - 7.42 K/uL    Lymphs (Absolute) 0.63 (L) 1.00 - 4.80 K/uL    Monos (Absolute) 0.23 0.00 - 0.85 K/uL    Eos (Absolute) 0.00 0.00 - 0.51 K/uL    Baso (Absolute) 0.02 0.00 - 0.12 K/uL    Immature Granulocytes (abs) 0.05 0.00 - 0.11 K/uL    NRBC (Absolute) 0.00 K/uL   Sed Rate    Collection Time: 04/15/25  4:40 PM   Result Value Ref Range    Sed Rate [facility] 16 0 - 20 mm/hour    Comp Metabolic Panel    Collection Time: 04/15/25  4:40 PM   Result Value Ref Range    Sodium 138 135 - 145 mmol/L    Potassium 4.4 3.6 - 5.5 mmol/L    Chloride 102 96 - 112 mmol/L    Co2 20 20 - 33 mmol/L    Anion Gap 16.0 7.0 - 16.0    Glucose 134 (H) 65 - 99 mg/dL    Bun 17 8 - 22 mg/dL    Creatinine 1.22 0.50 - 1.40 mg/dL    Calcium 8.3 (L) 8.5 - 10.5 mg/dL    Correct Calcium 8.7 8.5 - 10.5 mg/dL    AST(SGOT) 60 (H) 12 - 45 U/L    ALT(SGPT) 79 (H) 2 - 50 U/L    Alkaline Phosphatase 63 30 - 99 U/L    Total Bilirubin 0.3 0.1 - 1.5 mg/dL    Albumin 3.5 3.2 - 4.9 g/dL    Total Protein 6.3 6.0 - 8.2 g/dL    Globulin 2.8 1.9 - 3.5 g/dL    A-G Ratio 1.3 g/dL   TROPONIN    Collection Time: 04/15/25  4:40 PM   Result Value Ref Range    Troponin T 37 (H) 6 - 19 ng/L   proBrain Natriuretic Peptide, NT    Collection Time: 04/15/25  4:40 PM   Result Value Ref Range    NT-proBNP 834 (H) 0 - 125 pg/mL   PROCALCITONIN    Collection Time: 04/15/25  4:40 PM   Result Value Ref Range    Procalcitonin 1.38 (H) <0.25 ng/mL   ESTIMATED GFR    Collection Time: 04/15/25  4:40 PM   Result Value Ref Range    GFR (CKD-EPI) 62 >60 mL/min/1.73 m 2   PHOSPHORUS    Collection Time: 04/15/25  4:40 PM   Result Value Ref Range    Phosphorus 4.2 2.5 - 4.5 mg/dL   CRP QUANTITIVE (NON-CARDIAC)    Collection Time: 04/15/25  4:40 PM   Result Value Ref Range    Stat C-Reactive Protein 5.12 (H) 0.00 - 0.75 mg/dL   LDH    Collection Time: 04/15/25  4:40 PM   Result Value Ref Range    LDH Total 314 (H) 107 - 266 U/L   CREATINE KINASE    Collection Time: 04/15/25  4:40 PM   Result Value Ref Range    CPK Total 614 (H) 0 - 154 U/L   CORTISOL    Collection Time: 04/15/25  4:40 PM   Result Value Ref Range    Cortisol 27.4 (H) 0.0 - 23.0 ug/dL   LACTIC ACID    Collection Time: 04/15/25  6:30 PM   Result Value Ref Range    Lactic Acid 4.9 (HH) 0.5 - 2.0 mmol/L   Urinalysis    Collection Time: 04/15/25  7:06 PM    Specimen: Urine   Result Value Ref Range     Color Yellow     Character Clear     Specific Gravity 1.027 <1.035    Ph 8.0 5.0 - 8.0    Glucose Negative Negative mg/dL    Ketones Negative Negative mg/dL    Protein 30 (A) Negative mg/dL    Bilirubin Negative Negative    Urobilinogen, Urine 1.0 <=1.0 EU/dL    Nitrite Negative Negative    Leukocyte Esterase Negative Negative    Occult Blood Large (A) Negative    Micro Urine Req Microscopic    Urine Culture    Collection Time: 04/15/25  7:06 PM    Specimen: Urine   Result Value Ref Range    Significant Indicator NEG     Source UR     Site -     Culture Result No growth at 24 hours.    URINE MICROSCOPIC (W/UA)    Collection Time: 04/15/25  7:06 PM   Result Value Ref Range    WBC 0-2 /hpf    RBC 0-2 0 - 2 /hpf    Bacteria None Seen None /hpf    Epithelial Cells 0-2 0 - 5 /hpf    Urine Casts 0-2 0 - 2 /lpf   Urine Sodium Random    Collection Time: 04/16/25  2:30 AM   Result Value Ref Range    Sodium, Urine -per volume 81 mmol/L   Urine Creatinine Random    Collection Time: 04/16/25  2:30 AM   Result Value Ref Range    Creatinine, Random Urine 51.80 mg/dL   CBC WITH DIFFERENTIAL    Collection Time: 04/16/25  3:06 AM   Result Value Ref Range    WBC 4.9 4.8 - 10.8 K/uL    RBC 4.17 (L) 4.70 - 6.10 M/uL    Hemoglobin 11.6 (L) 14.0 - 18.0 g/dL    Hematocrit 36.0 (L) 42.0 - 52.0 %    MCV 86.3 81.4 - 97.8 fL    MCH 27.8 27.0 - 33.0 pg    MCHC 32.2 (L) 32.3 - 36.5 g/dL    RDW 51.5 (H) 35.9 - 50.0 fL    Platelet Count 161 (L) 164 - 446 K/uL    MPV 9.2 9.0 - 12.9 fL    Neutrophils-Polys 82.00 (H) 44.00 - 72.00 %    Lymphocytes 14.50 (L) 22.00 - 41.00 %    Monocytes 2.70 0.00 - 13.40 %    Eosinophils 0.20 0.00 - 6.90 %    Basophils 0.20 0.00 - 1.80 %    Immature Granulocytes 0.40 0.00 - 0.90 %    Nucleated RBC 0.00 0.00 - 0.20 /100 WBC    Neutrophils (Absolute) 4.00 1.82 - 7.42 K/uL    Lymphs (Absolute) 0.71 (L) 1.00 - 4.80 K/uL    Monos (Absolute) 0.13 0.00 - 0.85 K/uL    Eos (Absolute) 0.01 0.00 - 0.51 K/uL    Baso (Absolute)  0.01 0.00 - 0.12 K/uL    Immature Granulocytes (abs) 0.02 0.00 - 0.11 K/uL    NRBC (Absolute) 0.00 K/uL   Comp Metabolic Panel    Collection Time: 04/16/25  3:06 AM   Result Value Ref Range    Sodium 135 135 - 145 mmol/L    Potassium 3.9 3.6 - 5.5 mmol/L    Chloride 100 96 - 112 mmol/L    Co2 20 20 - 33 mmol/L    Anion Gap 15.0 7.0 - 16.0    Glucose 198 (H) 65 - 99 mg/dL    Bun 18 8 - 22 mg/dL    Creatinine 0.92 0.50 - 1.40 mg/dL    Calcium 8.3 (L) 8.5 - 10.5 mg/dL    Correct Calcium 8.8 8.5 - 10.5 mg/dL    AST(SGOT) 109 (H) 12 - 45 U/L    ALT(SGPT) 87 (H) 2 - 50 U/L    Alkaline Phosphatase 62 30 - 99 U/L    Total Bilirubin 0.2 0.1 - 1.5 mg/dL    Albumin 3.4 3.2 - 4.9 g/dL    Total Protein 6.3 6.0 - 8.2 g/dL    Globulin 2.9 1.9 - 3.5 g/dL    A-G Ratio 1.2 g/dL   LACTIC ACID    Collection Time: 04/16/25  3:06 AM   Result Value Ref Range    Lactic Acid 3.6 (H) 0.5 - 2.0 mmol/L   ESTIMATED GFR    Collection Time: 04/16/25  3:06 AM   Result Value Ref Range    GFR (CKD-EPI) 87 >60 mL/min/1.73 m 2   MRSA By PCR (Amp)    Collection Time: 04/16/25  6:00 AM    Specimen: Nares; Respirate   Result Value Ref Range    MRSA by PCR Negative Negative   GRAM STAIN    Collection Time: 04/16/25 10:30 AM    Specimen: Respirate   Result Value Ref Range    Significant Indicator .     Source RESP     Site SPUTUM     Gram Stain Result       Sputum Gram stain quality score is <1, probable  oropharyngeal contamination. Culture not performed.  Recollect if clinically indicated.         Imaging  CT-CTA CHEST PULMONARY ARTERY W/ RECONS   Final Result      1. No acute pulmonary embolus.   2. Stable cavitary mass in the left upper lobe.   3. Extensive edematous changes in the lungs with peripheral honeycombing.   4. Aortic and coronary arterial sclerosis.   5. Simple appearing right renal cortical cyst, requiring no further follow-up.   6. Stable bilateral adrenal masses.   7. Acute or subacute left third through fifth rib fractures.             DX-CHEST-PORTABLE (1 VIEW)   Final Result      1.  LEFT upper lung consolidation, likely pneumonia.  Underlying mass is not excluded.   2.  Probable pulmonary fibrosis.   3.  Limited by positioning.      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    - Patient back to his baseline 5 L oxygen support  - Pro-Young elevated, COVID/flu/ RSV negative, sputum culture in progress, MRSA negative  -All CD workup, HSP Aspergillus panel has been negative  - CT chest done this admission was compared to previous CT scans, concern for increased collapse of the left upper lobe/bowel consolidation highly suspicious for superadded pneumonia in the setting of immunosuppression  -His PET/CT scan showed improvement of lung mass SUV uptake with few doses of Keytruda      -Would recommend to continue antibiotics for at least a week to cover Pseudomonas, completed 3 days of azithromycin  -Continue IV steroids for today, can transition to p.o. steroids 60 mg starting tomorrow  -Continue PJP prophylaxis as the patient has been on steroids for over a month since 3/12/2025  -Follow-up sputum cultures  - Continue Trelegy, DuoNeb as needed    Pulmonary will continue to follow

## 2025-04-17 NOTE — PROGRESS NOTES
Monitor Summary    Rhythm: SR/ST  Rate:   Ectopy: pvc, pac, trigem  Measurements: .12 / .08 / .31

## 2025-04-17 NOTE — PROGRESS NOTES
[facility] Medicine Daily Progress Note    Date of Service  4/17/2025    Chief Complaint  Bright Shirley is a 74 y.o. male admitted 4/15/2025 with pneumonia hypoxia    Hospital Course  Bright Shirley is a 74 y.o. 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  Patient initially presented to ER for stroke concern.  Patient was evaluated by neurology, stroke load was aborted and his weakness on left side was resolved.  He is however requiring increased oxygen requirement of 10 L oxy mask.  CTA chest noted to have stable cavitary mass in left upper lobe, extensive edematous changes in lung peripheral honeycombing.  He does have concern lactic acid of 5.3.  Due to concern of severe sepsis, admission requested by ERP.  Therefore, admitted to medicine service for further evaluation and treatment.     Interval Problem Update  4/16 patient is new to me today, patient is alert oriented follows commands, he is requiring 8 L of oxygen when I saw her in the morning, he is able to speak in full sentences, he stated that at home he was getting short of breath with exertion, normally he is comfortable at rest with 5 L of oxygen but he feels that he is requiring more with exertion/ambulation, will continue antibiotics, CT results reviewed, MRSA screen is negative and sputum cultures and blood cultures pending, discussed with bedside nurse charge nurse  pharmacist  4/17 patient is resting in chair, he is alert oriented follows commands no new neurological deficit, still having productive cough, continue IV antibiotics, notes from pulmonology reviewed, will continue with antipseudomonal coverage for now due to high risk, follow-up final blood culture results, discussed with bedside nurse charge nurse  pharmacist.    I have discussed this patient's  plan of care and discharge plan at IDT rounds today with Case Management, Nursing, Nursing leadership, and other members of the IDT team.    Consultants/Specialty  Pulmonology    Code Status  Full Code    Disposition  The patient is not medically cleared for discharge to home or a post-acute facility.      I have placed the appropriate orders for post-discharge needs.    Review of Systems  Review of Systems   Constitutional:  Negative for chills and fever.   Eyes:  Negative for blurred vision and double vision.   Respiratory:  Positive for cough, sputum production, shortness of breath and wheezing. Negative for hemoptysis.    Cardiovascular:  Negative for chest pain, palpitations, claudication, leg swelling and PND.   Gastrointestinal:  Negative for heartburn, nausea and vomiting.   Genitourinary:  Negative for hematuria and urgency.   Musculoskeletal:  Negative for back pain and myalgias.   Skin:  Negative for rash.   Neurological:  Negative for dizziness and headaches.   Endo/Heme/Allergies:  Does not bruise/bleed easily.   Psychiatric/Behavioral:  Negative for depression.         Physical Exam  Temp:  [36 °C (96.8 °F)-36.5 °C (97.7 °F)] 36.4 °C (97.5 °F)  Pulse:  [60-98] 98  Resp:  [16-20] 17  BP: (123-149)/(66-79) 129/70  SpO2:  [91 %-96 %] 91 %    Physical Exam  Vitals and nursing note reviewed.   Constitutional:       Appearance: He is ill-appearing.   Eyes:      General:         Right eye: No discharge.         Left eye: No discharge.   Cardiovascular:      Rate and Rhythm: Normal rate and regular rhythm.      Pulses: Normal pulses.      Heart sounds: Normal heart sounds.   Pulmonary:      Effort: Pulmonary effort is normal. No respiratory distress.      Breath sounds: Normal breath sounds.   Abdominal:      General: Bowel sounds are normal. There is no distension.      Tenderness: There is no abdominal tenderness. There is no guarding.   Musculoskeletal:         General: Normal range of motion.      Cervical  back: Normal range of motion and neck supple.      Right lower leg: No edema.      Left lower leg: No edema.   Skin:     General: Skin is warm and dry.      Capillary Refill: Capillary refill takes less than 2 seconds.      Coloration: Skin is not jaundiced.   Neurological:      General: No focal deficit present.      Mental Status: He is alert and oriented to person, place, and time.      Cranial Nerves: No cranial nerve deficit.   Psychiatric:         Mood and Affect: Mood normal.         Behavior: Behavior normal.         Fluids    Intake/Output Summary (Last 24 hours) at 4/17/2025 1654  Last data filed at 4/17/2025 1517  Gross per 24 hour   Intake 410 ml   Output 1150 ml   Net -740 ml        Laboratory  Recent Labs     04/15/25  1640 04/16/25  0306 04/17/25  0106   WBC 6.1 4.9 6.6   RBC 4.35* 4.17* 4.16*   HEMOGLOBIN 11.9* 11.6* 11.5*   HEMATOCRIT 38.6* 36.0* 35.5*   MCV 88.7 86.3 85.3   MCH 27.4 27.8 27.6   MCHC 30.8* 32.2* 32.4   RDW 53.0* 51.5* 50.4*   PLATELETCT 172 161* 170   MPV 9.5 9.2 9.4     Recent Labs     04/15/25  1640 04/16/25  0306 04/17/25  0106   SODIUM 138 135 136   POTASSIUM 4.4 3.9 4.0   CHLORIDE 102 100 101   CO2 20 20 23   GLUCOSE 134* 198* 123*   BUN 17 18 15   CREATININE 1.22 0.92 1.07   CALCIUM 8.3* 8.3* 8.4*     Recent Labs     04/15/25  1519   INR 1.01               Imaging  CT-CTA CHEST PULMONARY ARTERY W/ RECONS   Final Result      1. No acute pulmonary embolus.   2. Stable cavitary mass in the left upper lobe.   3. Extensive edematous changes in the lungs with peripheral honeycombing.   4. Aortic and coronary arterial sclerosis.   5. Simple appearing right renal cortical cyst, requiring no further follow-up.   6. Stable bilateral adrenal masses.   7. Acute or subacute left third through fifth rib fractures.            DX-CHEST-PORTABLE (1 VIEW)   Final Result      1.  LEFT upper lung consolidation, likely pneumonia.  Underlying mass is not excluded.   2.  Probable pulmonary fibrosis.    3.  Limited by positioning.      EC-ECHOCARDIOGRAM COMPLETE W/O CONT    (Results Pending)        Assessment/Plan  * Severe sepsis (HCC)- (present on admission)  Assessment & Plan  This is Sepsis Present on admission  SIRS criteria identified on my evaluation include: Tachycardia, with heart rate greater than 90 BPM, Tachypnea, with respirations greater than 20 per minute, and Bandemia, greater than 10% bands  Clinical indicators of end organ dysfunction include Lactic Acid greater than 2  Source is pulm  Sepsis protocol initiated  Crystalloid Fluid Administration: Fluid resuscitation ordered per standard protocol - 30 mL/kg per current or ideal body weight  IV antibiotics as appropriate for source of sepsis  Reassessment: I have reassessed the patient's hemodynamic status    Acute left-sided weakness  Assessment & Plan  Resolved by ER arrival  Stroke alert aborted by stroke neurology  PT/OT/ST    H/o TIA per pt  -added ASA  -continue statin    Continue monitoring  No neuro decifit    TREVOR (acute kidney injury) (HCC)  Assessment & Plan  Cr 1.22  IVF  Minimize nephrotoxic agents, renally dose meds  Check FeNa    Creatinine 1.07    ACP (advance care planning)  Assessment & Plan  Goal of care discussed with patient in the emergency room.  He stated he is agreeable for noninvasive, as well as invasive/heroic life-sustaining measures-including CPR/defibrillation/intubation or mechanical ventilation.  He is agreeable for hospitalization, further treatment with IVF, breathing treatment, antibiotic therapy, any medical management as clinically warranted.  Diagnosis, prognosis, question and concern addressed.  Full CODE STATUS confirmed.  ACP: 17 minutes    Pneumonia due to infectious organism  Assessment & Plan  Cavitary left upper lobe mass  Patient is immunocompromised due to squamous cell lung cancer on chemotherapy  Follow cultures  Antibiotic: Zosyn, Zyvox, azithromycin  Wean off oxygen support as tolerated continue  broad-spectrum antibiotics follow-up MRSA screening follow-up    Blood sputum cultures  Will need treatment for possible  pseudomonas as per pulm will get EKG to check qtc.     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 Melara, medical oncology    Acute on chronic respiratory failure with hypoxia (HCC)- (present on admission)  Assessment & Plan  Dependent on 5 L at baseline  Currently requiring 10 L OxyMask-wean off as tolerated  No PE seen on CTA chest  Check COVID/influenza/RSV  Antibiotic for pneumonia  Check TTE    CTA did not show PE  Patient with probably postobstructive pneumonia due to lung mass  Continue antibiotics  Note from pulm reviewed.     Chronic obstructive pulmonary disease (HCC)- (present on admission)  Assessment & Plan  Pulmonary hygiene  -DuoNebs, Trelegy Ellipta, montelukast  -PEP  -s/p Solu-Medrol 125 mg  -Continue Solu-Medrol 40 mg every 8 hours  RT protocol    Patient is on 5 L of oxygen at home now he is requiring 8 L  5L of o2.     Dyslipidemia- (present on admission)  Assessment & Plan  Statin    Primary hypertension- (present on admission)  Assessment & Plan  Lisinopril, amlodipine         VTE prophylaxis: Heparin    I have performed a physical exam and reviewed and updated ROS and Plan today (4/17/2025). In review of yesterday's note (4/16/2025), there are no changes except as documented above.      I reviewed CBC  I reviewed BMP  I reviewed calcium levels  I reviewed note from pulmonology  I reviewed procalcitonin levels  Patient is on IV Zosyn monitor for side effects include but not limited to interstitial nephritis seizures  I have ordered EKG to check on patient's QTc  I have ordered blood work for in a.m.  Discussed with clinical pharmacist  Monitoring  blood cultures

## 2025-04-17 NOTE — CARE PLAN
The patient is Stable - Low risk of patient condition declining or worsening    Shift Goals  Clinical Goals: monitor respiratory status  Patient Goals: see friends  Family Goals: bandar    Progress made toward(s) clinical / shift goals:      Problem: Knowledge Deficit - Standard  Goal: Patient and family/care givers will demonstrate understanding of plan of care, disease process/condition, diagnostic tests and medications  Outcome: Progressing     Problem: Respiratory  Goal: Patient will achieve/maintain optimum respiratory ventilation and gas exchange  Outcome: Progressing       Patient is not progressing towards the following goals:

## 2025-04-18 ENCOUNTER — APPOINTMENT (OUTPATIENT)
Dept: RADIOLOGY | Facility: MEDICAL CENTER | Age: 75
End: 2025-04-18
Attending: PHYSICIAN ASSISTANT
Payer: MEDICARE

## 2025-04-18 ENCOUNTER — APPOINTMENT (OUTPATIENT)
Dept: RADIOLOGY | Facility: MEDICAL CENTER | Age: 75
DRG: 193 | End: 2025-04-18
Attending: HOSPITALIST
Payer: MEDICARE

## 2025-04-18 LAB
ANION GAP SERPL CALC-SCNC: 12 MMOL/L (ref 7–16)
B PARAP IS1001 DNA NPH QL NAA+NON-PROBE: NOT DETECTED
B PERT.PT PRMT NPH QL NAA+NON-PROBE: NOT DETECTED
BUN SERPL-MCNC: 20 MG/DL (ref 8–22)
C DIFF TOX GENS STL QL NAA+PROBE: NEGATIVE
C PNEUM DNA NPH QL NAA+NON-PROBE: NOT DETECTED
CALCIUM SERPL-MCNC: 8.5 MG/DL (ref 8.5–10.5)
CHLORIDE SERPL-SCNC: 101 MMOL/L (ref 96–112)
CO2 SERPL-SCNC: 23 MMOL/L (ref 20–33)
CREAT SERPL-MCNC: 1.13 MG/DL (ref 0.5–1.4)
ERYTHROCYTE [DISTWIDTH] IN BLOOD BY AUTOMATED COUNT: 51.6 FL (ref 35.9–50)
FLUAV RNA NPH QL NAA+NON-PROBE: NOT DETECTED
FLUBV RNA NPH QL NAA+NON-PROBE: NOT DETECTED
GFR SERPLBLD CREATININE-BSD FMLA CKD-EPI: 68 ML/MIN/1.73 M 2
GLUCOSE SERPL-MCNC: 89 MG/DL (ref 65–99)
HADV DNA NPH QL NAA+NON-PROBE: NOT DETECTED
HCOV 229E RNA NPH QL NAA+NON-PROBE: NOT DETECTED
HCOV HKU1 RNA NPH QL NAA+NON-PROBE: NOT DETECTED
HCOV NL63 RNA NPH QL NAA+NON-PROBE: NOT DETECTED
HCOV OC43 RNA NPH QL NAA+NON-PROBE: NOT DETECTED
HCT VFR BLD AUTO: 37.7 % (ref 42–52)
HGB BLD-MCNC: 12.2 G/DL (ref 14–18)
HMPV RNA NPH QL NAA+NON-PROBE: DETECTED
HPIV1 RNA NPH QL NAA+NON-PROBE: NOT DETECTED
HPIV2 RNA NPH QL NAA+NON-PROBE: NOT DETECTED
HPIV3 RNA NPH QL NAA+NON-PROBE: NOT DETECTED
HPIV4 RNA NPH QL NAA+NON-PROBE: NOT DETECTED
M PNEUMO DNA NPH QL NAA+NON-PROBE: NOT DETECTED
MAGNESIUM SERPL-MCNC: 2.1 MG/DL (ref 1.5–2.5)
MCH RBC QN AUTO: 27.7 PG (ref 27–33)
MCHC RBC AUTO-ENTMCNC: 32.4 G/DL (ref 32.3–36.5)
MCV RBC AUTO: 85.7 FL (ref 81.4–97.8)
PLATELET # BLD AUTO: 203 K/UL (ref 164–446)
PMV BLD AUTO: 9.7 FL (ref 9–12.9)
POTASSIUM SERPL-SCNC: 3.7 MMOL/L (ref 3.6–5.5)
RBC # BLD AUTO: 4.4 M/UL (ref 4.7–6.1)
RSV RNA NPH QL NAA+NON-PROBE: NOT DETECTED
RV+EV RNA NPH QL NAA+NON-PROBE: NOT DETECTED
SARS-COV-2 RNA NPH QL NAA+NON-PROBE: NOTDETECTED
SODIUM SERPL-SCNC: 136 MMOL/L (ref 135–145)
WBC # BLD AUTO: 6.6 K/UL (ref 4.8–10.8)

## 2025-04-18 PROCEDURE — 770020 HCHG ROOM/CARE - TELE (206)

## 2025-04-18 PROCEDURE — A9270 NON-COVERED ITEM OR SERVICE: HCPCS | Performed by: HOSPITALIST

## 2025-04-18 PROCEDURE — 86698 HISTOPLASMA ANTIBODY: CPT

## 2025-04-18 PROCEDURE — A9270 NON-COVERED ITEM OR SERVICE: HCPCS | Performed by: STUDENT IN AN ORGANIZED HEALTH CARE EDUCATION/TRAINING PROGRAM

## 2025-04-18 PROCEDURE — 700111 HCHG RX REV CODE 636 W/ 250 OVERRIDE (IP): Performed by: STUDENT IN AN ORGANIZED HEALTH CARE EDUCATION/TRAINING PROGRAM

## 2025-04-18 PROCEDURE — 87305 ASPERGILLUS AG IA: CPT

## 2025-04-18 PROCEDURE — 700102 HCHG RX REV CODE 250 W/ 637 OVERRIDE(OP): Performed by: HOSPITALIST

## 2025-04-18 PROCEDURE — 94640 AIRWAY INHALATION TREATMENT: CPT

## 2025-04-18 PROCEDURE — 36415 COLL VENOUS BLD VENIPUNCTURE: CPT

## 2025-04-18 PROCEDURE — 71045 X-RAY EXAM CHEST 1 VIEW: CPT

## 2025-04-18 PROCEDURE — 87493 C DIFF AMPLIFIED PROBE: CPT

## 2025-04-18 PROCEDURE — 86612 BLASTOMYCES ANTIBODY: CPT

## 2025-04-18 PROCEDURE — 700102 HCHG RX REV CODE 250 W/ 637 OVERRIDE(OP): Performed by: STUDENT IN AN ORGANIZED HEALTH CARE EDUCATION/TRAINING PROGRAM

## 2025-04-18 PROCEDURE — 700105 HCHG RX REV CODE 258: Performed by: STUDENT IN AN ORGANIZED HEALTH CARE EDUCATION/TRAINING PROGRAM

## 2025-04-18 PROCEDURE — 87449 NOS EACH ORGANISM AG IA: CPT

## 2025-04-18 PROCEDURE — 94669 MECHANICAL CHEST WALL OSCILL: CPT

## 2025-04-18 PROCEDURE — 700101 HCHG RX REV CODE 250: Performed by: HOSPITALIST

## 2025-04-18 PROCEDURE — 99233 SBSQ HOSP IP/OBS HIGH 50: CPT | Performed by: HOSPITALIST

## 2025-04-18 PROCEDURE — 80048 BASIC METABOLIC PNL TOTAL CA: CPT

## 2025-04-18 PROCEDURE — 85027 COMPLETE CBC AUTOMATED: CPT

## 2025-04-18 PROCEDURE — 83735 ASSAY OF MAGNESIUM: CPT

## 2025-04-18 PROCEDURE — 86635 COCCIDIOIDES ANTIBODY: CPT

## 2025-04-18 PROCEDURE — 0202U NFCT DS 22 TRGT SARS-COV-2: CPT

## 2025-04-18 PROCEDURE — 99232 SBSQ HOSP IP/OBS MODERATE 35: CPT | Performed by: STUDENT IN AN ORGANIZED HEALTH CARE EDUCATION/TRAINING PROGRAM

## 2025-04-18 RX ORDER — LOPERAMIDE HYDROCHLORIDE 2 MG/1
2 CAPSULE ORAL 4 TIMES DAILY PRN
Status: DISCONTINUED | OUTPATIENT
Start: 2025-04-18 | End: 2025-05-01 | Stop reason: HOSPADM

## 2025-04-18 RX ORDER — IPRATROPIUM BROMIDE AND ALBUTEROL SULFATE 2.5; .5 MG/3ML; MG/3ML
3 SOLUTION RESPIRATORY (INHALATION)
Status: DISCONTINUED | OUTPATIENT
Start: 2025-04-18 | End: 2025-04-25

## 2025-04-18 RX ADMIN — PANTOPRAZOLE SODIUM 40 MG: 40 INJECTION, POWDER, FOR SOLUTION INTRAVENOUS at 04:37

## 2025-04-18 RX ADMIN — ACETAMINOPHEN 650 MG: 325 TABLET, FILM COATED ORAL at 08:33

## 2025-04-18 RX ADMIN — Medication 30 MG: at 22:45

## 2025-04-18 RX ADMIN — PIPERACILLIN AND TAZOBACTAM 3.38 G: 3; .375 INJECTION, POWDER, FOR SOLUTION INTRAVENOUS at 13:33

## 2025-04-18 RX ADMIN — PIPERACILLIN AND TAZOBACTAM 3.38 G: 3; .375 INJECTION, POWDER, FOR SOLUTION INTRAVENOUS at 22:55

## 2025-04-18 RX ADMIN — ROSUVASTATIN CALCIUM 10 MG: 20 TABLET, FILM COATED ORAL at 18:17

## 2025-04-18 RX ADMIN — FLUTICASONE FUROATE, UMECLIDINIUM BROMIDE AND VILANTEROL TRIFENATATE 1 PUFF: 200; 62.5; 25 POWDER RESPIRATORY (INHALATION) at 04:37

## 2025-04-18 RX ADMIN — HEPARIN SODIUM 5000 UNITS: 5000 INJECTION, SOLUTION INTRAVENOUS; SUBCUTANEOUS at 13:31

## 2025-04-18 RX ADMIN — PANTOPRAZOLE SODIUM 40 MG: 40 INJECTION, POWDER, FOR SOLUTION INTRAVENOUS at 18:18

## 2025-04-18 RX ADMIN — THIAMINE HYDROCHLORIDE 500 MG: 100 INJECTION, SOLUTION INTRAMUSCULAR; INTRAVENOUS at 15:49

## 2025-04-18 RX ADMIN — TRAZODONE HYDROCHLORIDE 50 MG: 50 TABLET ORAL at 22:45

## 2025-04-18 RX ADMIN — HEPARIN SODIUM 5000 UNITS: 5000 INJECTION, SOLUTION INTRAVENOUS; SUBCUTANEOUS at 04:37

## 2025-04-18 RX ADMIN — PREDNISONE 60 MG: 50 TABLET ORAL at 04:37

## 2025-04-18 RX ADMIN — IPRATROPIUM BROMIDE AND ALBUTEROL SULFATE 3 ML: .5; 2.5 SOLUTION RESPIRATORY (INHALATION) at 10:42

## 2025-04-18 RX ADMIN — PIPERACILLIN AND TAZOBACTAM 3.38 G: 3; .375 INJECTION, POWDER, FOR SOLUTION INTRAVENOUS at 04:36

## 2025-04-18 RX ADMIN — HEPARIN SODIUM 5000 UNITS: 5000 INJECTION, SOLUTION INTRAVENOUS; SUBCUTANEOUS at 22:45

## 2025-04-18 RX ADMIN — IPRATROPIUM BROMIDE AND ALBUTEROL SULFATE 3 ML: .5; 2.5 SOLUTION RESPIRATORY (INHALATION) at 08:20

## 2025-04-18 RX ADMIN — IPRATROPIUM BROMIDE AND ALBUTEROL SULFATE 3 ML: .5; 2.5 SOLUTION RESPIRATORY (INHALATION) at 22:19

## 2025-04-18 RX ADMIN — ACETAMINOPHEN 650 MG: 325 TABLET, FILM COATED ORAL at 22:45

## 2025-04-18 RX ADMIN — ASPIRIN 81 MG: 81 TABLET, COATED ORAL at 04:37

## 2025-04-18 RX ADMIN — THIAMINE HYDROCHLORIDE 500 MG: 100 INJECTION, SOLUTION INTRAMUSCULAR; INTRAVENOUS at 08:41

## 2025-04-18 RX ADMIN — MONTELUKAST 10 MG: 10 TABLET, FILM COATED ORAL at 22:45

## 2025-04-18 RX ADMIN — LOPERAMIDE HYDROCHLORIDE 2 MG: 2 CAPSULE ORAL at 21:49

## 2025-04-18 RX ADMIN — AMLODIPINE BESYLATE 5 MG: 5 TABLET ORAL at 04:37

## 2025-04-18 RX ADMIN — LISINOPRIL 20 MG: 20 TABLET ORAL at 04:37

## 2025-04-18 RX ADMIN — IPRATROPIUM BROMIDE AND ALBUTEROL SULFATE 3 ML: .5; 2.5 SOLUTION RESPIRATORY (INHALATION) at 14:49

## 2025-04-18 RX ADMIN — LOPERAMIDE HYDROCHLORIDE 2 MG: 2 CAPSULE ORAL at 18:30

## 2025-04-18 RX ADMIN — IPRATROPIUM BROMIDE AND ALBUTEROL SULFATE 3 ML: .5; 2.5 SOLUTION RESPIRATORY (INHALATION) at 20:05

## 2025-04-18 RX ADMIN — LISINOPRIL 20 MG: 20 TABLET ORAL at 18:16

## 2025-04-18 RX ADMIN — LEVOTHYROXINE SODIUM 100 MCG: 0.1 TABLET ORAL at 04:37

## 2025-04-18 ASSESSMENT — FIBROSIS 4 INDEX: FIB4 SCORE: 4.26

## 2025-04-18 ASSESSMENT — ENCOUNTER SYMPTOMS
BRUISES/BLEEDS EASILY: 0
CHILLS: 0
BLURRED VISION: 0
FEVER: 0
NAUSEA: 0
VOMITING: 0
PALPITATIONS: 0
DEPRESSION: 0
BACK PAIN: 0
SPUTUM PRODUCTION: 1
PND: 0
HEADACHES: 0
CLAUDICATION: 0
DOUBLE VISION: 0
SHORTNESS OF BREATH: 1
HEMOPTYSIS: 0
HEARTBURN: 0
MYALGIAS: 0
WHEEZING: 1
COUGH: 1
DIZZINESS: 0

## 2025-04-18 ASSESSMENT — PAIN DESCRIPTION - PAIN TYPE
TYPE: ACUTE PAIN

## 2025-04-18 NOTE — PROGRESS NOTES
Monitor Summary  Rhythm: Normal Sinus Rhythm and Sinus Tachycardia  Rate:   Ectopy: PVCs, PACs, Couplets  .13 / .06 / .30                            12 hr Chart check.

## 2025-04-18 NOTE — CARE PLAN
The patient is Watcher - Medium risk of patient condition declining or worsening    Shift Goals  Clinical Goals: abx, monitor o2,  Patient Goals: get back to baseline  Family Goals: bandar    Progress made toward(s) clinical / shift goals:      Problem: Knowledge Deficit - Standard  Goal: Patient and family/care givers will demonstrate understanding of plan of care, disease process/condition, diagnostic tests and medications  Outcome: Progressing     Problem: Knowledge Deficit - COPD  Goal: Patient/significant other demonstrates understanding of disease process, utilization of the Action Plan, medications and discharge instruction  Outcome: Progressing     Problem: Impaired Gas Exchange  Goal: Patient will demonstrate improved ventilation and adequate oxygenation and participate in treatment regimen within the level of ability/situation.  Outcome: Progressing       Patient is not progressing towards the following goals:

## 2025-04-18 NOTE — PROGRESS NOTES
Isolation Precautions:    Patient is on droplet and contact isolation for Human Metapneumovirus.    Patient educated on reason for isolation, how the infection may be transmitted, and how to help prevent transmission to others.     Patient educated that droplet and contact precautions involves staff and visitors wearing PPE, follow  Standard Precautions and perform meticulous hand hygiene in order to prevent transmission of infection. (Contact Precautions: gown and gloves; Droplet Precautions: surgical mask worn by staff and visitors in the room, and worn by the patient when out of the room;        Patient transport and mobilization on unit. Patient educated that they may leave their room, but prior to exiting, the patient needs to have on a fresh patient gown, ensure the potentially infectious area is covered, perform appropriate hand hygiene immediately prior to exiting the room. Patient is to wear surgical mask when required to leave the patient room).

## 2025-04-18 NOTE — CARE PLAN
The patient is Watcher - Medium risk of patient condition declining or worsening    Shift Goals  Clinical Goals: monitor o2, abx, resp status, VS and labs  Patient Goals: back to baseline on O2  Family Goals: bandar    Progress made toward(s) clinical / shift goals:    Problem: Knowledge Deficit - Standard  Goal: Patient and family/care givers will demonstrate understanding of plan of care, disease process/condition, diagnostic tests and medications  Outcome: Progressing  Note: Discussed plan of care, pt able to actively participate in the learning process and demonstrates understanding by return demonstration or return explanation. Improved ability to communicate regarding their healthcare and current admission. Improved ability to identify barriers to learning and care.       Problem: Hemodynamics  Goal: Patient's hemodynamics, fluid balance and neurologic status will be stable or improve  Outcome: Progressing  Note: Vital signs stable, CMS intact, signs of bleeding assessed. I/O monitored. IV fluids not in use. Oral intake adequate. Peripheral pulses assessed and monitored. Capillary refill assessed. PRN blood pressure control meds given as needed.           Problem: Fall Risk  Goal: Patient will remain free from falls  Outcome: Progressing  Note: Fall prevention measures in place, bed alarm on and connected correctly, call light within reach, hourly rounding, Education provided on fall prevention. Pt instructed to use call light prior to exiting the bed, educated on use of bed alarms, and risks and complications related to falls. Medication orders evaluated for fall risk, gait/mobility assessed, sensory deficits assessed, mental status assessed, assessed for hypotension, hypovolemia, weakness, fatigue, elimination, and confusion.

## 2025-04-18 NOTE — PROGRESS NOTES
Bedside report received from off going RN/tech: Emir, assumed care of patient.     Fall Risk Score: HIGH RISK  Fall risk interventions in place: Place yellow fall risk ID band on patient, Provide patient/family education based on risk assessment, Educate patient/family to call staff for assistance when getting out of bed, Place fall precaution signage outside patient door, Place patient in room close to nursing station, Utilize bed/chair fall alarm, and Bed alarm connected correctly  Bed type: Regular (Nba Score less than 17 interventions in place)  Patient on cardiac monitor: Yes  IVF/IV medications: Infusion per MAR (List Med(s)) Zosyn  Oxygen: How many liters 6L, Traced the line to wall oxygen, and No oxygen tank in room  Bedside sitter: Not Applicable   Isolation: Not applicable

## 2025-04-18 NOTE — PROGRESS NOTES
Pulmonary Progress Note    Date of admission  4/15/2025    Chief Complaint  74 y.o. male who presented on 4/15/2025 with left-sided weakness concerning for stroke.  He was in his usual state of health, was accidentally disconnected from his oxygen support on 4/15/2025 at home which led to his weakness as per patient.  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  He has now tapered down to prednisone 20 mg/day.  since admission he was started on higher dose of steroids, antibiotics which improved his shortness of breath and is back to his baseline oxygen support of 5 L.     4/17: still continues to require 5-6 L oxygen support, still has SOB with cough     Review of Systems  Review of Systems   Constitutional:  Negative for chills and fever.   Eyes:  Negative for photophobia and pain.   Respiratory:  Positive for cough, sputum production and shortness of breath.    Gastrointestinal:  Negative for abdominal pain and diarrhea.   Neurological:  Positive for weakness. Negative for dizziness and loss of consciousness.   All other systems reviewed and are negative.    Vital Signs for last 24 hours   Temp:  [36 °C (96.8 °F)-36.5 °C (97.7 °F)] 36.4 °C (97.5 °F)  Pulse:  [60-98] 98  Resp:  [16-20] 17  BP: (123-149)/(66-79) 129/70  SpO2:  [91 %-96 %] 91 %      Physical Exam   Vitals reviewed.   Constitutional:       Appearance: He is obese.   HENT:      Head:  Normocephalic and atraumatic.   Eyes:      General: No scleral icterus.     Conjunctiva/sclera: Conjunctivae normal.   Cardiovascular:      Rate and Rhythm: Normal rate and regular rhythm.      Heart sounds: No murmur heard.  Pulmonary:      Breath sounds: Rhonchi present. No wheezing or rales.      Comments: Decreased breath sounds bilaterally  Abdominal:      General: Bowel sounds are normal. There is no distension.      Tenderness: There is no abdominal tenderness.   Musculoskeletal:      Right lower leg: No edema.      Left lower leg: No edema.   Skin:     General: Skin is warm and dry.   Neurological:      General: No focal deficit present.      Mental Status: He is alert and oriented to person, place, and time.     Medications  Current Facility-Administered Medications   Medication Dose Route Frequency Provider Last Rate Last Admin    pantoprazole (Protonix) injection 40 mg  40 mg Intravenous BID Benny Kamara M.D.   40 mg at 04/17/25 0535    sulfamethoxazole-trimethoprim (Bactrim DS) 800-160 MG tablet 1 Tablet  1 Tablet Oral 3x/week Chantell Dennis M.D.   1 Tablet at 04/16/25 1750    predniSONE (Deltasone) tablet 60 mg  60 mg Oral DAILY Chantell Dennis M.D.   60 mg at 04/17/25 0536    ipratropium-albuterol (DUONEB) nebulizer solution  3 mL Nebulization 4X/DAY (RT) Steve Rios M.D.   3 mL at 04/17/25 1536    LR (Bolus) infusion 500 mL  500 mL Intravenous Once PRN Benny Kamara M.D.        acetaminophen (Tylenol) tablet 650 mg  650 mg Oral Q6HRS PRN Benny Kamara M.D.   650 mg at 04/16/25 2315    labetalol (Normodyne/Trandate) injection 10 mg  10 mg Intravenous Q4HRS PRN Benny Kamara M.D.        ondansetron (Zofran) syringe/vial injection 4 mg  4 mg Intravenous Q4HRS PRN Benny Kamara M.D.        Or    ondansetron (Zofran ODT) dispertab 4 mg  4 mg Oral Q4HRS PRN Benny Kamara M.D.        senna-docusate (Pericolace Or Senokot S) 8.6-50 MG per tablet 2 Tablet  2 Tablet Oral Q EVENING Benny  FABIENNE Kamara        And    polyethylene glycol/lytes (Miralax) Packet 1 Packet  1 Packet Oral QDAY PRN Benny Kamara M.D.        albuterol inhaler 2 Puff  2 Puff Inhalation Q4HRS PRN Benny Kamara M.D.        amLODIPine (Norvasc) tablet 5 mg  5 mg Oral DAILY Benny Kamara M.D.   5 mg at 04/17/25 0536    artificial tears ophthalmic solution 1 Drop  1 Drop Both Eyes Q HOUR PRN Benny Kamara M.D.        calcium polycarbophil (Fibercon) tablet 625 mg  625 mg Oral DAILY Benny Kamara M.D.        fluticasone-umeclidinium-vilanterol (Trelegy Ellipta) 200-62.5-25 mcg/act inhaler 1 Puff  1 Puff Inhalation DAILY Benny Kamara M.D.   1 Puff at 04/17/25 0535    levothyroxine (Synthroid) tablet 100 mcg  100 mcg Oral AM ES Benny Kamara M.D.   100 mcg at 04/17/25 0535    lisinopril (Prinivil) tablet 20 mg  20 mg Oral BID Benny Kamara M.D.   20 mg at 04/17/25 0536    melatonin tablet 30 mg  30 mg Oral Q EVENING Benny Kamara M.D.   30 mg at 04/16/25 2314    rosuvastatin (Crestor) tablet 10 mg  10 mg Oral Q EVENING Benny Kamara M.D.   10 mg at 04/16/25 1727    traZODone (Desyrel) tablet 50 mg  50 mg Oral Nightly Benny Kamara M.D.   50 mg at 04/16/25 2314    azithromycin (Zithromax) tablet 500 mg  500 mg Oral Q EVENING Benny Kamara M.D.   500 mg at 04/16/25 1727    Respiratory Therapy Consult   Nebulization Continuous RT Benny Kamara M.D.        albuterol (Proventil) 2.5mg/0.5ml nebulizer solution 2.5 mg  2.5 mg Nebulization Q2HRS PRN (RT) Benny Kamara M.D.        montelukast (Singulair) tablet 10 mg  10 mg Oral Nightly Benny Kamara M.D.   10 mg at 04/16/25 2110    thiamine (B-1) 500 mg in dextrose 5% 100 mL IVPB  500 mg Intravenous TID Benny Kamara M.D. 200 mL/hr at 04/17/25 1410 500 mg at 04/17/25 1410    piperacillin-tazobactam (Zosyn) 3.375 g in  mL IVPB  3.375 g Intravenous Q8HRS Benny Kamara M.D. 25 mL/hr at 04/17/25 1501 3.375 g at 04/17/25 1501    heparin injection 5,000 Units  5,000 Units Subcutaneous Q8HRS  Benny Kamara M.D.   5,000 Units at 04/17/25 1407    aspirin EC tablet 81 mg  81 mg Oral DAILY Benny Kamara M.D.   81 mg at 04/17/25 0535       Fluids    Intake/Output Summary (Last 24 hours) at 4/17/2025 1701  Last data filed at 4/17/2025 1517  Gross per 24 hour   Intake 410 ml   Output 1150 ml   Net -740 ml       Laboratory      Recent Labs     04/15/25  [facility]   CPKTOTAL 614*     Recent Labs     04/15/25  1518 04/15/25  1640 04/16/25  0306 04/17/25  0106   SODIUM  --  138 135 136   POTASSIUM  --  4.4 3.9 4.0   CHLORIDE  --  102 100 101   CO2  --  20 20 23   BUN  --  17 18 15   CREATININE  --  1.22 0.92 1.07   MAGNESIUM 2.0  --   --   --    PHOSPHORUS  --  4.2  --   --    CALCIUM  --  8.3* 8.3* 8.4*     Recent Labs     04/15/25  1640 04/16/25  0306 04/17/25  0106   ALTSGPT 79* 87*  --    ASTSGOT 60* 109*  --    ALKPHOSPHAT 63 62  --    TBILIRUBIN 0.3 0.2  --    GLUCOSE 134* 198* 123*     Recent Labs     04/15/25  1640 04/16/25  0306 04/17/25  0106   WBC 6.1 4.9 6.6   NEUTSPOLYS 84.90* 82.00*  --    LYMPHOCYTES 10.30* 14.50*  --    MONOCYTES 3.70 2.70  --    EOSINOPHILS 0.00 0.20  --    BASOPHILS 0.30 0.20  --    ASTSGOT 60* 109*  --    ALTSGPT 79* 87*  --    ALKPHOSPHAT 63 62  --    TBILIRUBIN 0.3 0.2  --      Recent Labs     04/15/25  1519 04/15/25  1640 04/16/25  0306 04/17/25  0106   RBC  --  4.35* 4.17* 4.16*   HEMOGLOBIN  --  11.9* 11.6* 11.5*   HEMATOCRIT  --  38.6* 36.0* 35.5*   PLATELETCT  --  172 161* 170   PROTHROMBTM 13.3  --   --   --    INR 1.01  --   --   --        Imaging  CT:    Reviewed    Assessment/Plan  Acute on chronic hypoxemic respiratory failure likely secondary to postobstructive pneumonia  COPD exacerbation  Severe emphysema  Squamous cell cancer of the left upper lobe, on Keytruda  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     - Patient back to his baseline 5 L oxygen support  - Pro-Young elevated and is trending down, COVID/flu/ RSV negative, sputum culture in progress, MRSA  negative  -All CD workup, HSP Aspergillus panel has been negative  - CT chest done this admission was compared to previous CT scans, concern for increased collapse of the left upper lobe/bowel consolidation highly suspicious for superadded pneumonia in the setting of immunosuppression  -His PET/CT scan showed improvement of lung mass SUV uptake with few doses of Keytruda        -Would recommend to continue antibiotics for at least a week to cover Pseudomonas, completed 3 days of azithromycin  -continue PO steroids at current dose  -Continue PJP prophylaxis as the patient has been on steroids for over a month since 3/12/2025  -high risk for bronchoscopy, so will add aspergillus, fungal cultures, fungitell, although unlikely will add cocci/histo/blasto testing   - Continue Trelegy, DuoNeb as needed    I have performed a physical exam and reviewed and updated ROS and Plan today (4/17/2025). In review of yesterday's note (4/16/2025), there are no changes except as documented above.

## 2025-04-19 LAB
1 3 BETA D GLUCAN INTERP Q4483: NEGATIVE
1,3 BETA GLUCAN SER-MCNC: <31 PG/ML
ANION GAP SERPL CALC-SCNC: 13 MMOL/L (ref 7–16)
BUN SERPL-MCNC: 19 MG/DL (ref 8–22)
CALCIUM SERPL-MCNC: 8.5 MG/DL (ref 8.5–10.5)
CHLORIDE SERPL-SCNC: 99 MMOL/L (ref 96–112)
CO2 SERPL-SCNC: 22 MMOL/L (ref 20–33)
CREAT SERPL-MCNC: 0.98 MG/DL (ref 0.5–1.4)
ERYTHROCYTE [DISTWIDTH] IN BLOOD BY AUTOMATED COUNT: 53.2 FL (ref 35.9–50)
GFR SERPLBLD CREATININE-BSD FMLA CKD-EPI: 80 ML/MIN/1.73 M 2
GLUCOSE SERPL-MCNC: 81 MG/DL (ref 65–99)
HCT VFR BLD AUTO: 41.5 % (ref 42–52)
HGB BLD-MCNC: 12.9 G/DL (ref 14–18)
MCH RBC QN AUTO: 27.4 PG (ref 27–33)
MCHC RBC AUTO-ENTMCNC: 31.1 G/DL (ref 32.3–36.5)
MCV RBC AUTO: 88.3 FL (ref 81.4–97.8)
NT-PROBNP SERPL IA-MCNC: 766 PG/ML (ref 0–125)
PLATELET # BLD AUTO: 174 K/UL (ref 164–446)
PMV BLD AUTO: 9.5 FL (ref 9–12.9)
POTASSIUM SERPL-SCNC: 3.4 MMOL/L (ref 3.6–5.5)
PROCALCITONIN SERPL-MCNC: 0.41 NG/ML
RBC # BLD AUTO: 4.7 M/UL (ref 4.7–6.1)
SODIUM SERPL-SCNC: 134 MMOL/L (ref 135–145)
WBC # BLD AUTO: 4.9 K/UL (ref 4.8–10.8)

## 2025-04-19 PROCEDURE — 700111 HCHG RX REV CODE 636 W/ 250 OVERRIDE (IP): Mod: JZ | Performed by: HOSPITALIST

## 2025-04-19 PROCEDURE — 700105 HCHG RX REV CODE 258: Performed by: STUDENT IN AN ORGANIZED HEALTH CARE EDUCATION/TRAINING PROGRAM

## 2025-04-19 PROCEDURE — 94640 AIRWAY INHALATION TREATMENT: CPT

## 2025-04-19 PROCEDURE — 770020 HCHG ROOM/CARE - TELE (206)

## 2025-04-19 PROCEDURE — 83880 ASSAY OF NATRIURETIC PEPTIDE: CPT

## 2025-04-19 PROCEDURE — 80048 BASIC METABOLIC PNL TOTAL CA: CPT

## 2025-04-19 PROCEDURE — 99232 SBSQ HOSP IP/OBS MODERATE 35: CPT | Performed by: STUDENT IN AN ORGANIZED HEALTH CARE EDUCATION/TRAINING PROGRAM

## 2025-04-19 PROCEDURE — 84145 PROCALCITONIN (PCT): CPT

## 2025-04-19 PROCEDURE — 700105 HCHG RX REV CODE 258: Performed by: HOSPITALIST

## 2025-04-19 PROCEDURE — 99291 CRITICAL CARE FIRST HOUR: CPT | Performed by: HOSPITALIST

## 2025-04-19 PROCEDURE — 700102 HCHG RX REV CODE 250 W/ 637 OVERRIDE(OP): Performed by: STUDENT IN AN ORGANIZED HEALTH CARE EDUCATION/TRAINING PROGRAM

## 2025-04-19 PROCEDURE — 36415 COLL VENOUS BLD VENIPUNCTURE: CPT

## 2025-04-19 PROCEDURE — A9270 NON-COVERED ITEM OR SERVICE: HCPCS | Performed by: HOSPITALIST

## 2025-04-19 PROCEDURE — 700102 HCHG RX REV CODE 250 W/ 637 OVERRIDE(OP): Performed by: HOSPITALIST

## 2025-04-19 PROCEDURE — 700101 HCHG RX REV CODE 250: Performed by: HOSPITALIST

## 2025-04-19 PROCEDURE — 700111 HCHG RX REV CODE 636 W/ 250 OVERRIDE (IP): Performed by: STUDENT IN AN ORGANIZED HEALTH CARE EDUCATION/TRAINING PROGRAM

## 2025-04-19 PROCEDURE — 94669 MECHANICAL CHEST WALL OSCILL: CPT

## 2025-04-19 PROCEDURE — 85027 COMPLETE CBC AUTOMATED: CPT

## 2025-04-19 PROCEDURE — 97161 PT EVAL LOW COMPLEX 20 MIN: CPT | Performed by: HOSPITALIST

## 2025-04-19 PROCEDURE — A9270 NON-COVERED ITEM OR SERVICE: HCPCS | Performed by: STUDENT IN AN ORGANIZED HEALTH CARE EDUCATION/TRAINING PROGRAM

## 2025-04-19 RX ORDER — FUROSEMIDE 10 MG/ML
20 INJECTION INTRAMUSCULAR; INTRAVENOUS DAILY
Status: DISCONTINUED | OUTPATIENT
Start: 2025-04-19 | End: 2025-04-20

## 2025-04-19 RX ORDER — POTASSIUM CHLORIDE 1500 MG/1
20 TABLET, EXTENDED RELEASE ORAL ONCE
Status: COMPLETED | OUTPATIENT
Start: 2025-04-19 | End: 2025-04-19

## 2025-04-19 RX ADMIN — IPRATROPIUM BROMIDE AND ALBUTEROL SULFATE 3 ML: .5; 2.5 SOLUTION RESPIRATORY (INHALATION) at 22:58

## 2025-04-19 RX ADMIN — HEPARIN SODIUM 5000 UNITS: 5000 INJECTION, SOLUTION INTRAVENOUS; SUBCUTANEOUS at 13:08

## 2025-04-19 RX ADMIN — HEPARIN SODIUM 5000 UNITS: 5000 INJECTION, SOLUTION INTRAVENOUS; SUBCUTANEOUS at 21:19

## 2025-04-19 RX ADMIN — ACETAMINOPHEN 650 MG: 325 TABLET, FILM COATED ORAL at 22:17

## 2025-04-19 RX ADMIN — TRAZODONE HYDROCHLORIDE 50 MG: 50 TABLET ORAL at 22:12

## 2025-04-19 RX ADMIN — SULFAMETHOXAZOLE AND TRIMETHOPRIM 1 TABLET: 800; 160 TABLET ORAL at 02:31

## 2025-04-19 RX ADMIN — ACETAMINOPHEN 650 MG: 325 TABLET, FILM COATED ORAL at 05:04

## 2025-04-19 RX ADMIN — IPRATROPIUM BROMIDE AND ALBUTEROL SULFATE 3 ML: .5; 2.5 SOLUTION RESPIRATORY (INHALATION) at 11:19

## 2025-04-19 RX ADMIN — LEVOTHYROXINE SODIUM 100 MCG: 0.1 TABLET ORAL at 05:02

## 2025-04-19 RX ADMIN — LISINOPRIL 20 MG: 20 TABLET ORAL at 16:38

## 2025-04-19 RX ADMIN — PREDNISONE 60 MG: 50 TABLET ORAL at 04:53

## 2025-04-19 RX ADMIN — HEPARIN SODIUM 5000 UNITS: 5000 INJECTION, SOLUTION INTRAVENOUS; SUBCUTANEOUS at 05:03

## 2025-04-19 RX ADMIN — IPRATROPIUM BROMIDE AND ALBUTEROL SULFATE 3 ML: .5; 2.5 SOLUTION RESPIRATORY (INHALATION) at 06:58

## 2025-04-19 RX ADMIN — FLUTICASONE FUROATE, UMECLIDINIUM BROMIDE AND VILANTEROL TRIFENATATE 1 PUFF: 200; 62.5; 25 POWDER RESPIRATORY (INHALATION) at 08:16

## 2025-04-19 RX ADMIN — ROSUVASTATIN CALCIUM 10 MG: 20 TABLET, FILM COATED ORAL at 16:39

## 2025-04-19 RX ADMIN — IPRATROPIUM BROMIDE AND ALBUTEROL SULFATE 3 ML: .5; 2.5 SOLUTION RESPIRATORY (INHALATION) at 15:42

## 2025-04-19 RX ADMIN — ASPIRIN 81 MG: 81 TABLET, COATED ORAL at 04:53

## 2025-04-19 RX ADMIN — LISINOPRIL 20 MG: 20 TABLET ORAL at 04:54

## 2025-04-19 RX ADMIN — PIPERACILLIN AND TAZOBACTAM 3.38 G: 3; .375 INJECTION, POWDER, FOR SOLUTION INTRAVENOUS at 13:07

## 2025-04-19 RX ADMIN — PIPERACILLIN AND TAZOBACTAM 3.38 G: 3; .375 INJECTION, POWDER, FOR SOLUTION INTRAVENOUS at 05:03

## 2025-04-19 RX ADMIN — MONTELUKAST 10 MG: 10 TABLET, FILM COATED ORAL at 21:18

## 2025-04-19 RX ADMIN — Medication 30 MG: at 22:12

## 2025-04-19 RX ADMIN — POTASSIUM CHLORIDE 20 MEQ: 1500 TABLET, EXTENDED RELEASE ORAL at 08:21

## 2025-04-19 RX ADMIN — PIPERACILLIN AND TAZOBACTAM 3.38 G: 3; .375 INJECTION, POWDER, FOR SOLUTION INTRAVENOUS at 21:22

## 2025-04-19 RX ADMIN — FUROSEMIDE 20 MG: 10 INJECTION, SOLUTION INTRAVENOUS at 08:21

## 2025-04-19 RX ADMIN — LOPERAMIDE HYDROCHLORIDE 2 MG: 2 CAPSULE ORAL at 22:19

## 2025-04-19 RX ADMIN — IPRATROPIUM BROMIDE AND ALBUTEROL SULFATE 3 ML: .5; 2.5 SOLUTION RESPIRATORY (INHALATION) at 02:42

## 2025-04-19 RX ADMIN — AMLODIPINE BESYLATE 5 MG: 5 TABLET ORAL at 04:54

## 2025-04-19 ASSESSMENT — ENCOUNTER SYMPTOMS
FEVER: 0
CLAUDICATION: 0
SPUTUM PRODUCTION: 1
NAUSEA: 0
MYALGIAS: 0
BACK PAIN: 0
DIZZINESS: 0
HEADACHES: 0
HEARTBURN: 0
WHEEZING: 1
PND: 0
DOUBLE VISION: 0
DEPRESSION: 0
COUGH: 1
CHILLS: 0
BLURRED VISION: 0
SHORTNESS OF BREATH: 1
BRUISES/BLEEDS EASILY: 0
PALPITATIONS: 0
VOMITING: 0
HEMOPTYSIS: 0

## 2025-04-19 ASSESSMENT — PAIN DESCRIPTION - PAIN TYPE
TYPE: ACUTE PAIN

## 2025-04-19 ASSESSMENT — FIBROSIS 4 INDEX: FIB4 SCORE: 4.26

## 2025-04-19 NOTE — CARE PLAN
The patient is Watcher - Medium risk of patient condition declining or worsening    Shift Goals  Clinical Goals: monitor/titrate O2 to meet O2 needs  Patient Goals: Wean to O2 baseline  Family Goals: rest    Progress made toward(s) clinical / shift goals:      Problem: Respiratory  Goal: Patient will achieve/maintain optimum respiratory ventilation and gas exchange this shift as seen by a dec in O2 L this shift from morning 10L  Outcome: Progressing  Note: Patient was weaned down from 10L of O2 to 6.5 L of O2. Patient did have an inc in WOB with mobility, talking, and turning. During these events patient did require an inc of O2 to 14L. He would dip down to the 70's.      Problem: Bowel Elimination  Goal: Establish and maintain regular bowel function this shift as seen by a decrease in BMs this shift.   Note: Patient has had 2 BMS this shift. Did require use of imodium after cdiff r.o had been completed. This is less BMs then he had yesterday.        Patient is not progressing towards the following goals:

## 2025-04-19 NOTE — PROGRESS NOTES
[facility] Medicine Daily Progress Note    Date of Service  4/19/2025    Chief Complaint  Bright Shirley is a 74 y.o. male admitted 4/15/2025 with pneumonia hypoxia    Hospital Course  Bright Shirley is a 74 y.o. 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  Patient initially presented to ER for stroke concern.  Patient was evaluated by neurology, stroke load was aborted and his weakness on left side was resolved.  He is however requiring increased oxygen requirement of 10 L oxy mask.  CTA chest noted to have stable cavitary mass in left upper lobe, extensive edematous changes in lung peripheral honeycombing.  He does have concern lactic acid of 5.3.  Due to concern of severe sepsis, admission requested by ERP.  Therefore, admitted to medicine service for further evaluation and treatment.     Interval Problem Update  4/16 patient is new to me today, patient is alert oriented follows commands, he is requiring 8 L of oxygen when I saw her in the morning, he is able to speak in full sentences, he stated that at home he was getting short of breath with exertion, normally he is comfortable at rest with 5 L of oxygen but he feels that he is requiring more with exertion/ambulation, will continue antibiotics, CT results reviewed, MRSA screen is negative and sputum cultures and blood cultures pending, discussed with bedside nurse charge nurse  pharmacist  4/17 patient is resting in chair, he is alert oriented follows commands no new neurological deficit, still having productive cough, continue IV antibiotics, notes from pulmonology reviewed, will continue with antipseudomonal coverage for now due to high risk, follow-up final blood culture results, discussed with bedside nurse charge nurse  pharmacist.  4/18 patient is resting in chair,  he is in good spirit, he is requiring 10 L of oxygen early in the morning, however the repeat chest x-ray discussed with pulmonology, continue chest physiotherapy s/p RT flutter valve, continue antibiotics, follow-up cultures, I will order respiratory panel, discussed medicine return discussed palliative pharmacist, later today patient's oxygen has decreased to 8 L, follow-up blood work in a.m.  4/19 patient is in bed, no fever or chills not in distress, patient is requiring higher amount of o2 patient is on 40L by HFNC, patient is able to tolerate diet, diarrhea resolved, c diff neg, human pneumovirus positive, discussed with pulmonologist, I have started him on iv lasix, continue abx, steroids nebs. Discussed with bedside nurse, charge nurse .     I have discussed this patient's plan of care and discharge plan at IDT rounds today with Case Management, Nursing, Nursing leadership, and other members of the IDT team.    Consultants/Specialty  Pulmonology    Code Status  Full Code    Disposition  The patient is not medically cleared for discharge to home or a post-acute facility.      I have placed the appropriate orders for post-discharge needs.    Review of Systems  Review of Systems   Constitutional:  Negative for chills and fever.   Eyes:  Negative for blurred vision and double vision.   Respiratory:  Positive for cough, sputum production, shortness of breath and wheezing. Negative for hemoptysis.    Cardiovascular:  Negative for chest pain, palpitations, claudication, leg swelling and PND.   Gastrointestinal:  Negative for heartburn, nausea and vomiting.   Genitourinary:  Negative for hematuria and urgency.   Musculoskeletal:  Negative for back pain and myalgias.   Skin:  Negative for rash.   Neurological:  Negative for dizziness and headaches.   Endo/Heme/Allergies:  Does not bruise/bleed easily.   Psychiatric/Behavioral:  Negative for depression.         Physical Exam  Temp:  [36.4 °C (97.5 °F)-36.7  °C (98.1 °F)] 36.6 °C (97.9 °F)  Pulse:  [] 77  Resp:  [16-22] 18  BP: (118-153)/(63-93) 127/69  SpO2:  [90 %-98 %] 96 %    Physical Exam  Vitals and nursing note reviewed.   Constitutional:       Appearance: He is ill-appearing.   Eyes:      General:         Right eye: No discharge.         Left eye: No discharge.   Cardiovascular:      Rate and Rhythm: Normal rate and regular rhythm.      Pulses: Normal pulses.      Heart sounds: Normal heart sounds.   Pulmonary:      Effort: Pulmonary effort is normal. No respiratory distress.      Breath sounds: Normal breath sounds.   Abdominal:      General: Bowel sounds are normal. There is no distension.      Tenderness: There is no guarding.   Musculoskeletal:         General: Normal range of motion.      Cervical back: Normal range of motion and neck supple.      Right lower leg: No edema.      Left lower leg: No edema.   Skin:     General: Skin is warm and dry.      Capillary Refill: Capillary refill takes less than 2 seconds.      Coloration: Skin is not jaundiced.   Neurological:      General: No focal deficit present.      Mental Status: He is alert and oriented to person, place, and time.      Cranial Nerves: No cranial nerve deficit.   Psychiatric:         Mood and Affect: Mood normal.         Behavior: Behavior normal.         Fluids    Intake/Output Summary (Last 24 hours) at 4/19/2025 1541  Last data filed at 4/19/2025 1515  Gross per 24 hour   Intake 815 ml   Output 2000 ml   Net -1185 ml        Laboratory  Recent Labs     04/17/25 0106 04/18/25  0117 04/19/25  0431   WBC 6.6 6.6 4.9   RBC 4.16* 4.40* 4.70   HEMOGLOBIN 11.5* 12.2* 12.9*   HEMATOCRIT 35.5* 37.7* 41.5*   MCV 85.3 85.7 88.3   MCH 27.6 27.7 27.4   MCHC 32.4 32.4 31.1*   RDW 50.4* 51.6* 53.2*   PLATELETCT 170 203 174   MPV 9.4 9.7 9.5     Recent Labs     04/17/25  0106 04/18/25  0117 04/19/25  0431   SODIUM 136 136 134*   POTASSIUM 4.0 3.7 3.4*   CHLORIDE 101 101 99   CO2 23 23 22   GLUCOSE  123* 89 81   BUN 15 20 19   CREATININE 1.07 1.13 0.98   CALCIUM 8.4* 8.5 8.5                     Imaging  DX-CHEST-LIMITED (1 VIEW)   Final Result      Worsening bilateral pulmonary infiltrates.      DX-CHEST-PORTABLE (1 VIEW)   Final Result         1.  Pulmonary edema and/or infiltrates, greatest in the left upper lobe, slightly decreased since prior study.      EC-ECHOCARDIOGRAM COMPLETE W/O CONT   Final Result      CT-CTA CHEST PULMONARY ARTERY W/ RECONS   Final Result      1. No acute pulmonary embolus.   2. Stable cavitary mass in the left upper lobe.   3. Extensive edematous changes in the lungs with peripheral honeycombing.   4. Aortic and coronary arterial sclerosis.   5. Simple appearing right renal cortical cyst, requiring no further follow-up.   6. Stable bilateral adrenal masses.   7. Acute or subacute left third through fifth rib fractures.            DX-CHEST-PORTABLE (1 VIEW)   Final Result      1.  LEFT upper lung consolidation, likely pneumonia.  Underlying mass is not excluded.   2.  Probable pulmonary fibrosis.   3.  Limited by positioning.           Assessment/Plan  * Severe sepsis (HCC)- (present on admission)  Assessment & Plan  This is Sepsis Present on admission  SIRS criteria identified on my evaluation include: Tachycardia, with heart rate greater than 90 BPM, Tachypnea, with respirations greater than 20 per minute, and Bandemia, greater than 10% bands  Clinical indicators of end organ dysfunction include Lactic Acid greater than 2  Source is pulm  Sepsis protocol initiated  Crystalloid Fluid Administration: Fluid resuscitation ordered per standard protocol - 30 mL/kg per current or ideal body weight  IV antibiotics as appropriate for source of sepsis  Reassessment: I have reassessed the patient's hemodynamic status    Acute left-sided weakness  Assessment & Plan  Resolved by ER arrival  Stroke alert aborted by stroke neurology  PT/OT/ST    H/o TIA per pt  -added ASA  -continue  statin    Continue monitoring  No neuro decifit      TREVOR (acute kidney injury) (HCC)  Assessment & Plan  Cr 1.22  IVF  Minimize nephrotoxic agents, renally dose meds  Check FeNa    Creatinine 1.13    ACP (advance care planning)  Assessment & Plan  Goal of care discussed with patient in the emergency room.  He stated he is agreeable for noninvasive, as well as invasive/heroic life-sustaining measures-including CPR/defibrillation/intubation or mechanical ventilation.  He is agreeable for hospitalization, further treatment with IVF, breathing treatment, antibiotic therapy, any medical management as clinically warranted.  Diagnosis, prognosis, question and concern addressed.  Full CODE STATUS confirmed.  ACP: 17 minutes    Pneumonia due to infectious organism  Assessment & Plan  Cavitary left upper lobe mass  Patient is immunocompromised due to squamous cell lung cancer on chemotherapy  Follow cultures  Antibiotic: Zosyn, Zyvox, azithromycin  Wean off oxygen support as tolerated continue broad-spectrum antibiotics follow-up MRSA screening follow-up    Blood sputum cultures  Will need treatment for possible  pseudomonas as per pulm will get EKG to check qtc.     Continue abx.  Cx neg   Human metapneumovirus positive.   Supportive treatment.    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 Melara, medical oncology    Acute on chronic respiratory failure with hypoxia (HCC)- (present on admission)  Assessment & Plan  Dependent on 5 L at baseline  Currently requiring 10 L OxyMask-wean off as tolerated  No PE seen on CTA chest  Check COVID/influenza/RSV  Antibiotic for pneumonia  Check TTE    CTA did not show PE  Patient with probably postobstructive pneumonia due to lung mass  Continue  antibiotics  Note from pulm reviewed.     Patient requiring higher levels of oxygen today 10 L  Discussed with pulmonology  Continue antibiotics  Continue chest physical therapy  Follow-up fungal results    Patient is requiring higher amount of o2, he is on 40L of o2, I discussed with pulmonologist  Patient started on HFNC.   Continue iv diuresis, iv abx.      Chronic obstructive pulmonary disease (HCC)- (present on admission)  Assessment & Plan  Pulmonary hygiene  -DuoNeiveth, Marcellemagali Ellipta, montelukast  -PEP  -s/p Solu-Medrol 125 mg  -Continue Solu-Medrol 40 mg every 8 hours  RT protocol    Patient is on 5 L of oxygen at home now he is requiring 8 L  5L of o2.     10 L of oxygen  Continue steroids  Continue breathing treatment  Discussed with pulmonology    Dyslipidemia- (present on admission)  Assessment & Plan  Statin    Primary hypertension- (present on admission)  Assessment & Plan  Lisinopril, amlodipine         VTE prophylaxis: Heparin    I have performed a physical exam and reviewed and updated ROS and Plan today (4/19/2025). In review of yesterday's note (4/18/2025), there are no changes except as documented above.      Patient is critically ill.   The patient continues to have: increasing o2 demand. Patient is now on HFNC  The vital organ system that is affected is the: respiratory  If untreated there is a high chance of deterioration into: respiratory distress, respiratory failure and eventually death.   The critical care that I am providing today is: HFNC, patient on 40L of o2, started on iv lasix, discussed with pulmonologist, iv zosyn monitoring for side effects.   The critical that has been undertaken is medically complex.   There has been no overlap in critical care time.   Critical Care Time not including procedures: 45 minutes.

## 2025-04-19 NOTE — WOUND TEAM
[facility]  Inpatient Services  Initial Wound and Skin Care Evaluation    Admission Date: 4/15/2025     Last order of IP CONSULT TO WOUND CARE was found on 2025 from Hospital Encounter on 4/15/2025     HPI, PMH, SH: Reviewed    Past Surgical History:   Procedure Laterality Date    PB TREAT INTER/SUBTROCH FX,W/PLATE/SCREW Left 2021    Procedure: FIXATION, FRACTURE, HIP, USING DYNAMIC HIP SCREW, WITH COMPRESSION;  Surgeon: Avinash Suazo M.D.;  Location: SURGERY [facility];  Service: Orthopedics    HERNIA REPAIR Right     ORIF, FEMUR Right     15 years ago    OTHER      right inguinal hernia repair    OTHER ORTHOPEDIC SURGERY      right hip     Social History     Tobacco Use    Smoking status: Former     Current packs/day: 0.00     Average packs/day: 1 pack/day for 49.3 years (49.3 ttl pk-yrs)     Types: Cigarettes     Start date:      Quit date: 2024     Years since quittin.9    Smokeless tobacco: Never    Tobacco comments:     vape pen & cigarettes     49 years total andria an average of 1 ppd   Substance Use Topics    Alcohol use: Not Currently     Comment: not often     Chief Complaint   Patient presents with    Possible Stroke     Diagnosis: Severe sepsis (HCC) [A41.9, R65.20]    Unit where seen by Wound Team: T834/02     WOUND CONSULT RELATED TO:  L rib flank    WOUND TEAM PLAN OF CARE - Frequency of Follow-up:   Nursing to follow dressing orders written for wound care. Contact wound team if area fails to progress, deteriorates or with any questions/concerns if something comes up before next scheduled follow up (See below as to whether wound is following and frequency of wound follow up)  Bedside RN to provide site care and dressing changes for L flank, sacrum, and B heels per order.    WOUND HISTORY:   Pt is a 74 y.o. male who was admitted for left-sided weakness concerning for stroke on 4/15/25. 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      4/18/25: Respiratory panel positive for human metapneumovirus.    WOUND ASSESSMENT/LDA  Wound 04/18/25 Other (comment) Flank Upper Left (Active)   Date First Assessed/Time First Assessed: 04/18/25 2300   Present on Original Admission: No  Primary Wound Type: Other (comment)  Location: Flank  Wound Orientation: Upper  Laterality: Left      Assessments 4/19/2025 12:00 PM   Wound Image     Site Assessment Pink;Red;Yellow   Periwound Assessment Pink   Drainage Amount Scant   Drainage Description Serosanguineous   Wound Cleansing Not Applicable   Periwound Protectant Mepitel   Dressing Status Clean;Dry;Intact   Dressing Changed Observed   Dressing Cleansing/Solutions Not Applicable   Dressing Options Mepitel One   Dressing Change/Treatment Frequency Every 48 hrs, and As Needed   NEXT Dressing Change/Treatment Date 04/21/25   NEXT Weekly Photo (Inpatient Only) 04/26/25   Wound Team Following Weekly   Non-staged Wound Description Partial thickness   Wound Length (cm) 1 cm   Wound Width (cm) 1.2 cm   Wound Depth (cm) 0 cm   Wound Surface Area (cm^2) 1.2 cm^2   Wound Volume (cm^3) 0 cm^3   Wound Bed Granulation (%) 95 %   Wound Bed Eschar (%) 5 %   Wound Odor None   WOUND NURSE ONLY - Time Spent with Patient (mins) 45        Vascular:    JAMIE:   No results found.    Lab Values:    Lab Results   Component Value Date/Time    WBC 4.9 04/19/2025 04:31 AM    RBC 4.70 04/19/2025 04:31 AM    HEMOGLOBIN 12.9 (L) 04/19/2025 04:31 AM    HEMATOCRIT 41.5 (L) 04/19/2025 04:31 AM    CREACTPROT 5.12 (H) 04/15/2025 04:40 PM    SEDRATEWES 16 04/15/2025 04:40 PM    HBA1C 6.1 (H) 07/10/2023 09:25 AM    PLATELETCT 174 04/19/2025 04:31 AM         Culture Results show:  No results found for this or any previous visit (from the past 720 hours).    Pain Level/Medicated:  Patient denies pain       INTERVENTIONS BY WOUND TEAM:  Chart and images reviewed. Discussed with bedside RN. All areas of concern  (based on picture review, LDA review and discussion with bedside RN) have been thoroughly assessed. Documentation of areas based on significant findings. This RN in to assess patient. Performed standard wound care which includes appropriate positioning, dressing removal and non-selective debridement. Pictures and measurements obtained weekly if/when required.    Wound:  L rib flank  Preparation for Dressing removal: Removed without difficulty  Primary Dressing:  Re-applied mepitel. Bedside nursing to change per order.    Advanced Wound Care Discharge Planning  Number of Clinicians necessary to complete wound care: 2  Is patient requiring IV pain medications for dressing changes:  No   Length of time for dressing change 45 min. (This does not include chart review, pre-medication time, set up, clean up or time spent charting.)    Interdisciplinary consultation: Patient, Bedside RN (Leslie), Jailene LUNA (Wound PT) and Vernell MALONE (SPT) .  Pressure injury and staging reviewed with N/A.    EVALUATION / RATIONALE FOR TREATMENT:     Date:  04/19/25  Wound Status:  Initial evaluation    L rib flank wound characteristics consistent with a partial thickness tear. No signs of infection. Surrounding skin with mild but normal erythema. Mepitel dressing is in place, effectively maintaining a moist wound environment. Nursing to continue with site care and dressing changes per orders. Patient is at risk of developing pressure injuries due to minimal time OOB, therefore B heel offloading dressing and positioning orders in place as of this encounter.         Goals: Pt will have 100% re-epithelialization of the L rib flank wound within 1 week.    NURSING PLAN OF CARE ORDERS:  Dressing changes: See Dressing Care orders  Skin care: See Skin Care orders    NUTRITION RECOMMENDATIONS   Wound Team Recommendations:  N/A    DIET ORDERS (From admission to next 24h)       Start     Ordered    04/15/25 1840  Diet Order Diet: Regular  ALL  MEALS        Question:  Diet:  Answer:  Regular    04/15/25 7596                    PREVENTATIVE INTERVENTIONS:    Q shift Nba - performed per nursing policy  Q shift pressure point assessments - performed per nursing policy    Surface/Positioning  Reposition q 2 hours with pillows - Changed this Visit    Offloading/Redistribution  Heel offloading dressing (Silicone dressing) - Changed this Visit    Anticipated discharge plans:  TBD        Vac Discharge Needs:  Vac Discharge plan is purely a recommendation from wound team and not a requirement for discharge unless otherwise stated by physician.  Not Applicable Pt not on a wound vac

## 2025-04-19 NOTE — CARE PLAN
Problem: Bronchoconstriction  Goal: Improve in air movement and diminished wheezing  Description: Target End Date:  2 to 3 days1.  Implement inhaled treatments2.  Evaluate and manage medication effects  Outcome: Progressing     Problem: Hyperinflation  Goal: Prevent or improve atelectasis  Description: Target End Date:  3 to 4 days1. Instruct incentive spirometry usage2.  Perform hyperinflation therapy as indicated  Outcome: Progressing

## 2025-04-19 NOTE — PROGRESS NOTES
Monitor Summary  Rhythm: SR  Ectopy: (R) PAC, (R) PVC (R) Coup  Measurements: .13/ .06/.32

## 2025-04-19 NOTE — CARE PLAN
The patient is Watcher - Medium risk of patient condition declining or worsening    Shift Goals  Clinical Goals: wean 02, monitor vs and labs  Patient Goals: get better  Family Goals: bandar    Progress made toward(s) clinical / shift goals:    Problem: Knowledge Deficit - Standard  Goal: Patient and family/care givers will demonstrate understanding of plan of care, disease process/condition, diagnostic tests and medications  Outcome: Progressing  Note: Discussed plan of care, pt able to actively participate in the learning process and demonstrates understanding by return demonstration or return explanation. Improved ability to communicate regarding their healthcare and current admission. Improved ability to identify barriers to learning and care.         Problem: Hemodynamics  Goal: Patient's hemodynamics, fluid balance and neurologic status will be stable or improve  Outcome: Progressing  Note: Vital signs stable, CMS intact, signs of bleeding assessed. I/O monitored. IV fluids not in use. Oral intake adequate. Peripheral pulses assessed and monitored. Capillary refill assessed. PRN blood pressure control meds given as needed.             Patient is not progressing towards the following goals:

## 2025-04-19 NOTE — CARE PLAN
The patient is Stable - Low risk of patient condition declining or worsening    Shift Goals  Clinical Goals: Monitor O2  Patient Goals: Feel better  Family Goals: NA    Progress made toward(s) clinical / shift goals:      Problem: Knowledge Deficit - Standard  Goal: Patient and family/care givers will demonstrate understanding of plan of care, disease process/condition, diagnostic tests and medications  Description: Target End Date:  1-3 days or as soon as patient condition allowsDocument in Patient Education1.  Patient and family/caregiver oriented to unit, equipment, visitation policy and means for communicating concern2.  Complete/review Learning Assessment3.  Assess knowledge level of disease process/condition, treatment plan, diagnostic tests and medications4.  Explain disease process/condition, treatment plan, diagnostic tests and medications  Outcome: Progressing     Problem: Knowledge Deficit - COPD  Goal: Patient/significant other demonstrates understanding of disease process, utilization of the Action Plan, medications and discharge instruction  Description: Target End Date:  1-3 days or as soon as patient condition allowsDocument in Patient Education1.  Discuss the importance of medical follow-up care, periodic chest x-rays, sputum cultures2.  Review worsening signs and symptoms of COPD flare and exacerbation and its management3.  Discuss respiratory medications, side effects, adverse reactions4.  Demonstrate technique for using a metered-dose inhaler (MDI) and utilization of a spacer5.  Review the COPD Action Plan6.  Instruct and reinforce the rationale for breathing exercises, coughing effectively, and general conditioning exercises7.  Stress importance of oral care and dental hygiene8.  Discuss the importance of avoiding people with active respiratory infections and need for routine influenza and pneumococcal vaccinations9.  Discuss factors that may trigger condition and encourage patient/significant  other to explore ways to control these factors in and around the home and work [id number]. Review the harmful effects of smoking and advise cessation of [id number]. Provide information about activity limitations and alternating activities with rest periods to prevent amrgueg48. Instruct asthmatic patient of use of peak flow meter, as [id number]. Review oxygen requirements and dosage, safe use of oxygen, and refer to the supplier as [id number]. Educate patient/family/caregiver on the use of Nasal Intermittent Positive Pressure Ventilation (NIPPV) and possible adverse reactions  Outcome: Progressing     Problem: Risk for Infection - COPD  Goal: Patient will remain free from signs and symptoms of infection  Description: Target End Date:  Prior to discharge or change in level of care1.  Infection prevention measures2.  Instruct patient regarding signs and symptoms of infection3.  Review the importance of breathing exercises, effective cough, frequent position changes, and adequate fluid intake4.  Recommend rinsing mouth with water and spitting, not swallowing, or use of a spacer on the mouthpiece of inhaled corticosteroids  Outcome: Progressing     Problem: Nutrition - Advanced  Goal: Patient will display progressive weight gain toward goal have adequate food and fluid intake  Description: Target End Date:  Prior to discharge or change in level of care1.  Daily weights2.  Monitor dietary intake3.  Promote systemic fluid hydration within cardiac tolerance4.  Albumin and PreAlbumin per provider order5.  Enteral and parenteral nutrition per provider order6.  Calorie count7.  Provide oral care8.  Collaborate with Clinical Dietician9.  Consider Speech Therapy consult for swallowing [id number]. Administer supplemental oxygen during meals as indicated  Outcome: Progressing     Problem: Self Care  Goal: Patient will have the ability to perform ADLs independently or with assistance (bathe, groom, dress, toilet and  feed)  Description: Target End Date:  Prior to discharge or change in level of careDocument on ADL flowsheet1.  Assess the capability and level of deficiency to perform ADLs2.  Encourage family/care giver involvement3.  Provide assistive devices4.  Consider PT/OT evaluations5.  Maintain support, give positive feedback, encourage self-care allowing extra time and verbal cuing as needed6.  Avoid doing something for patients they can do themselves, but provide assistance as needed7.  Assist in anticipating/planning individual needs8.  Collaborate with Case Management and  to meet discharge needs  Outcome: Progressing     Problem: Hemodynamics  Goal: Patient's hemodynamics, fluid balance and neurologic status will be stable or improve  Description: Target End Date:  Prior to discharge or change in level of careDocument on Assessment and I/O flowsheet templates1.  Monitor vital signs, pulse oximetry and cardiac monitor per provider order and/or policy2.  Maintain blood pressure per provider order3.  Hemodynamic monitoring per provider order4.  Manage IV fluids and IV infusions5.  Monitor intake and output6.  Daily weights per unit policy or provider order7.  Assess peripheral pulses and capillary refill8.  Assess color and body temperature9.  Position patient for maximum circulation/cardiac [id number]. Monitor for signs/symptoms of excessive zibtzjjn83. Assess mental status, restlessness and changes in level of [id number]. Monitor temperature and report fever or hypothermia to provider immediately. Consideration of targeted temperature management.  Outcome: Progressing     Problem: Respiratory  Goal: Patient will achieve/maintain optimum respiratory ventilation and gas exchange  Description: Target End Date:  Prior to discharge or change in level of careDocument on Assessment flowsheet1.  Assess and monitor rate, rhythm, depth and effort of respiration2.  Breath sounds assessed qshift and/or as needed3.   Assess O2 saturation, administer/titrate oxygen as ordered4.  Position patient for maximum ventilatory efficiency5.  Turn, cough, and deep breath with splinting to improve effectiveness6.  Collaborate with RT to administer medication/treatments per order7.  Encourage use of incentive spirometer and encourage patient to cough after use and utilize splinting techniques if applicable8.  Airway suctioning9.  Monitor sputum production for changes in color, consistency and sbvmujgpy54. Perform frequent oral uyfiezi43. Alternate physical activity with rest periods  Target End Date:  Prior to discharge or change in level of careDocument on Assessment flowsheet1.  Assess and monitor rate, rhythm, depth and effort of respiration2.  Breath sounds assessed qshift and/or as needed3.  Assess O2 saturation, administer/titrate oxygen as ordered4.  Position patient for maximum ventilatory efficiency5.  Turn, cough, and deep breath with splinting to improve effectiveness6.  Collaborate with RT to administer medication/treatments per order7.  Encourage use of incentive spirometer and encourage patient to cough after use and utilize splinting techniques if applicable8.  Airway suctioning9.  Monitor sputum production for changes in color, consistency and gjvxfmtjx58. Perform frequent oral nznffha16. Alternate physical activity with rest periods  Outcome: Progressing

## 2025-04-19 NOTE — PROGRESS NOTES
Pulmonary Progress Note    Date of admission  4/15/2025    Chief Complaint  74 y.o. male who presented on 4/15/2025 with left-sided weakness concerning for stroke.  He was in his usual state of health, was accidentally disconnected from his oxygen support on 4/15/2025 at home which led to his weakness as per patient.  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  He has now tapered down to prednisone 20 mg/day.  since admission he was started on higher dose of steroids, antibiotics which improved his shortness of breath and is back to his baseline oxygen support of 5 L.      4/17: still continues to require 5-6 L oxygen support, still has SOB with cough  4/18: He continues to require 8 to 10 L oxygen support due to  desaturation when he coughs, respiratory panel positive for human metapneumovirus,  4/19: he has significant oxygen requirements now on HFNC , and desaturates when he is coughing or ambulating        Review of Systems  Constitutional:  Negative for chills and fever.   Eyes:  Negative for photophobia and pain.   Respiratory:  Positive for cough, sputum production and shortness of breath.    Gastrointestinal:  Negative for abdominal pain and diarrhea.   Neurological:  Positive for weakness. Negative for dizziness and loss of consciousness.   All other systems reviewed and are negative.       Vital Signs for last 24 hours   Temp:  [36.4 °C (97.5  °F)-36.7 °C (98.1 °F)] 36.6 °C (97.9 °F)  Pulse:  [] 77  Resp:  [16-22] 18  BP: (118-153)/(63-93) 127/69  SpO2:  [90 %-98 %] 96 %      Physical Exam   Vitals reviewed.   Constitutional:       Appearance: He is obese.   HENT:      Head: Normocephalic and atraumatic.   Eyes:      General: No scleral icterus.     Conjunctiva/sclera: Conjunctivae normal.   Cardiovascular:      Rate and Rhythm: Normal rate and regular rhythm.      Heart sounds: No murmur heard.  Pulmonary:      Breath sounds: Rhonchi present. No wheezing or rales.      Comments: Decreased breath sounds bilaterally  Abdominal:      General: Bowel sounds are normal. There is no distension.      Tenderness: There is no abdominal tenderness.   Musculoskeletal:      Right lower leg: No edema.      Left lower leg: No edema.   Skin:     General: Skin is warm and dry.   Neurological:      General: No focal deficit present.      Mental Status: He is alert and oriented to person, place, and time.    Medications  Current Facility-Administered Medications   Medication Dose Route Frequency Provider Last Rate Last Admin    piperacillin-tazobactam (Zosyn) 3.375 g in  mL IVPB  3.375 g Intravenous Q8HRS Steve Rios M.D. 25 mL/hr at 04/19/25 1307 3.375 g at 04/19/25 1307    furosemide (Lasix) injection 20 mg  20 mg Intravenous DAILY Steve Rios M.D.   20 mg at 04/19/25 0821    ipratropium-albuterol (DUONEB) nebulizer solution  3 mL Nebulization Q4HRS (RT) Steve Rios M.D.   3 mL at 04/19/25 1119    fluticasone-umeclidinium-vilanterol (Trelegy Ellipta) 200-62.5-25 mcg/act inhaler 1 Puff  1 Puff Inhalation QDAILY (RT) Steve Rios M.D.   1 Puff at 04/19/25 0816    loperamide (Imodium) capsule 2 mg  2 mg Oral 4X/DAY PRN Steve Rios M.D.   2 mg at 04/18/25 2146    sulfamethoxazole-trimethoprim (Bactrim DS) 800-160 MG tablet 1 Tablet  1 Tablet Oral 3x/week Chantell Dennis M.D.   1 Tablet at 04/19/25 8557     predniSONE (Deltasone) tablet 60 mg  60 mg Oral DAILY Chantell Dennis M.D.   60 mg at 04/19/25 0453    LR (Bolus) infusion 500 mL  500 mL Intravenous Once PRN Benny Kamara M.D.        acetaminophen (Tylenol) tablet 650 mg  650 mg Oral Q6HRS PRN Benny Kamara M.D.   650 mg at 04/19/25 0504    labetalol (Normodyne/Trandate) injection 10 mg  10 mg Intravenous Q4HRS PRN Benny Kamara M.D.        ondansetron (Zofran) syringe/vial injection 4 mg  4 mg Intravenous Q4HRS PRN Benny Kamara M.D.        Or    ondansetron (Zofran ODT) dispertab 4 mg  4 mg Oral Q4HRS PRN Benny Kamara M.D.        albuterol inhaler 2 Puff  2 Puff Inhalation Q4HRS PRN Benny Kamara M.D.        amLODIPine (Norvasc) tablet 5 mg  5 mg Oral DAILY Benny Kamara M.D.   5 mg at 04/19/25 0454    artificial tears ophthalmic solution 1 Drop  1 Drop Both Eyes Q HOUR PRN Benny Kamara M.D.        calcium polycarbophil (Fibercon) tablet 625 mg  625 mg Oral DAILY Benny Kamara M.D.        levothyroxine (Synthroid) tablet 100 mcg  100 mcg Oral AM ES Benny Kamara M.D.   100 mcg at 04/19/25 0502    lisinopril (Prinivil) tablet 20 mg  20 mg Oral BID Benny Kamara M.D.   20 mg at 04/19/25 0454    melatonin tablet 30 mg  30 mg Oral Q EVENING Benny Kamara M.D.   30 mg at 04/18/25 2245    rosuvastatin (Crestor) tablet 10 mg  10 mg Oral Q EVENING Benny Kamara M.D.   10 mg at 04/18/25 1817    traZODone (Desyrel) tablet 50 mg  50 mg Oral Nightly Benny Kamara M.D.   50 mg at 04/18/25 2245    Respiratory Therapy Consult   Nebulization Continuous RT Benny Kamara M.D.        albuterol (Proventil) 2.5mg/0.5ml nebulizer solution 2.5 mg  2.5 mg Nebulization Q2HRS PRN (RT) Benny Kamara M.D.        montelukast (Singulair) tablet 10 mg  10 mg Oral Nightly Benny Kamara M.D.   10 mg at 04/18/25 2245    heparin injection 5,000 Units  5,000 Units Subcutaneous Q8HRS Benny Kamara M.D.   5,000 Units at 04/19/25 1308    aspirin EC tablet 81 mg  81 mg Oral DAILY Benny Kamara  M.D.   81 mg at 04/19/25 0453       Fluids    Intake/Output Summary (Last 24 hours) at 4/19/2025 1524  Last data filed at 4/19/2025 1515  Gross per 24 hour   Intake 815 ml   Output 2000 ml   Net -1185 ml       Laboratory          Recent Labs     04/17/25  0106 04/18/25  0117 04/19/25  0431   SODIUM 136 136 134*   POTASSIUM 4.0 3.7 3.4*   CHLORIDE 101 101 99   CO2 23 23 22   BUN 15 20 19   CREATININE 1.07 1.13 0.98   MAGNESIUM  --  2.1  --    CALCIUM 8.4* 8.5 8.5     Recent Labs     04/17/25  0106 04/18/25  0117 04/19/25  0431   GLUCOSE 123* 89 81     Recent Labs     04/17/25  0106 04/18/25  0117 04/19/25  0431   WBC 6.6 6.6 4.9     Recent Labs     04/17/25  0106 04/18/25  0117 04/19/25  0431   RBC 4.16* 4.40* 4.70   HEMOGLOBIN 11.5* 12.2* 12.9*   HEMATOCRIT 35.5* 37.7* 41.5*   PLATELETCT 170 203 174       Imaging  CT:    Reviewed    Assessment/Plan  Acute on chronic hypoxemic respiratory failure likely secondary to postobstructive pneumonia  COPD exacerbation due to human metapneumovirus infection  Severe emphysema  Squamous cell cancer of the left upper lobe, on Keytruda  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     - Patient back on HFNC today   - Pro-Young elevated and is trending down, COVID/flu/ RSV negative, sputum culture in progress, MRSA negative, fungal cultures of sputum-rare fungal elements   -All CD workup, HSP Aspergillus panel, fungitell has been negative  - CT chest done this admission was compared to previous CT scans, concern for increased collapse of the left upper lobe/bowel consolidation highly suspicious for superadded pneumonia in the setting of immunosuppression  -His PET/CT scan showed improvement of lung mass SUV uptake with few doses of Keytruda  - TTE normal EF, mild MR, normal RV and diastolic finction         -Would recommend to continue antibiotics for at least a week to cover Pseudomonas, completed 3 days of azithromycin  -continue PO steroids at current dose  -Continue  PJP prophylaxis as the patient has been on steroids for over a month since 3/12/2025  -high risk for bronchoscopy, pending aspergillus, cocci/histo/blasto testing   - Continue Trelegy, DuoNeb as needed    I have performed a physical exam and reviewed and updated ROS and Plan today (4/19/2025). In review of yesterday's note (4/18/2025), there are no changes except as documented above.

## 2025-04-19 NOTE — PROGRESS NOTES
Bedside report received from off going RN/tech: Anna, assumed care of patient.     Fall Risk Score: MODERATE RISK  Fall risk interventions in place: Provide patient/family education based on risk assessment, Educate patient/family to call staff for assistance when getting out of bed, Place fall precaution signage outside patient door, Place patient in room close to nursing station, Utilize bed/chair fall alarm, Notify charge of high risk for huddle, and Bed alarm connected correctly  Bed type: Regular (Nba Score less than 17 interventions in place)  Patient on cardiac monitor: Yes  IVF/IV medications: Not Applicable   Oxygen: How many liters HFNC- 40L and Traced the line to wall oxygen  Bedside sitter: Not Applicable   Isolation: Not applicable

## 2025-04-19 NOTE — PROGRESS NOTES
Pulmonary Progress Note    Date of admission  4/15/2025    Chief Complaint  74 y.o. male who presented on 4/15/2025 with left-sided weakness concerning for stroke.  He was in his usual state of health, was accidentally disconnected from his oxygen support on 4/15/2025 at home which led to his weakness as per patient.  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  He has now tapered down to prednisone 20 mg/day.  since admission he was started on higher dose of steroids, antibiotics which improved his shortness of breath and is back to his baseline oxygen support of 5 L.      4/17: still continues to require 5-6 L oxygen support, still has SOB with cough  4/18: He continues to require 8 to 10 L oxygen support due to  desaturation when he coughs, respiratory panel positive for human metapneumovirus,    Review of Systems  Constitutional:  Negative for chills and fever.   Eyes:  Negative for photophobia and pain.   Respiratory:  Positive for cough, sputum production and shortness of breath.    Gastrointestinal:  Negative for abdominal pain and diarrhea.   Neurological:  Positive for weakness. Negative for dizziness and loss of consciousness.   All other systems reviewed and are negative.    Vital Signs for last 24 hours   Temp:  [36.5 °C (97.7 °F)-36.8 °C (98.2 °F)] 36.5 °C (97.7 °F)  Pulse:  [] 81  Resp:  [16-34] 18  BP: ()/() 145/84  SpO2:   [90 %-97 %] 91 %      Physical Exam   Vitals reviewed.   Constitutional:       Appearance: He is obese.   HENT:      Head: Normocephalic and atraumatic.   Eyes:      General: No scleral icterus.     Conjunctiva/sclera: Conjunctivae normal.   Cardiovascular:      Rate and Rhythm: Normal rate and regular rhythm.      Heart sounds: No murmur heard.  Pulmonary:      Breath sounds: Rhonchi present. No wheezing or rales.      Comments: Decreased breath sounds bilaterally  Abdominal:      General: Bowel sounds are normal. There is no distension.      Tenderness: There is no abdominal tenderness.   Musculoskeletal:      Right lower leg: No edema.      Left lower leg: No edema.   Skin:     General: Skin is warm and dry.   Neurological:      General: No focal deficit present.      Mental Status: He is alert and oriented to person, place, and time.     Medications  Current Facility-Administered Medications   Medication Dose Route Frequency Provider Last Rate Last Admin    ipratropium-albuterol (DUONEB) nebulizer solution  3 mL Nebulization Q4HRS (RT) Steve Rios M.D.   3 mL at 04/18/25 1449    [START ON 4/19/2025] fluticasone-umeclidinium-vilanterol (Trelegy Ellipta) 200-62.5-25 mcg/act inhaler 1 Puff  1 Puff Inhalation QDAILY (RT) Steve Rios M.D.        loperamide (Imodium) capsule 2 mg  2 mg Oral 4X/DAY PRN Steve Rios M.D.   2 mg at 04/18/25 1830    pantoprazole (Protonix) injection 40 mg  40 mg Intravenous BID Benny Kamara M.D.   40 mg at 04/18/25 1818    sulfamethoxazole-trimethoprim (Bactrim DS) 800-160 MG tablet 1 Tablet  1 Tablet Oral 3x/week Chantell Dennis M.D.   1 Tablet at 04/16/25 1750    predniSONE (Deltasone) tablet 60 mg  60 mg Oral DAILY Chantell Dennis M.D.   60 mg at 04/18/25 0437    LR (Bolus) infusion 500 mL  500 mL Intravenous Once PRN Benny Kamara M.D.        acetaminophen (Tylenol) tablet 650 mg  650 mg Oral Q6HRS PRN Benny Kamara M.D.   650 mg at 04/18/25 0827     labetalol (Normodyne/Trandate) injection 10 mg  10 mg Intravenous Q4HRS PRN Benny Kamara M.D.        ondansetron (Zofran) syringe/vial injection 4 mg  4 mg Intravenous Q4HRS PRN Benny Kamara M.D.        Or    ondansetron (Zofran ODT) dispertab 4 mg  4 mg Oral Q4HRS PRN Benny Kamara M.D.        albuterol inhaler 2 Puff  2 Puff Inhalation Q4HRS PRN Benny Kamara M.D.        amLODIPine (Norvasc) tablet 5 mg  5 mg Oral DAILY Benny Kamara M.D.   5 mg at 04/18/25 0437    artificial tears ophthalmic solution 1 Drop  1 Drop Both Eyes Q HOUR PRN Benny Kamara M.D.        calcium polycarbophil (Fibercon) tablet 625 mg  625 mg Oral DAILY Benny Kamara M.D.        levothyroxine (Synthroid) tablet 100 mcg  100 mcg Oral AM ES Benny Kamara M.D.   100 mcg at 04/18/25 0437    lisinopril (Prinivil) tablet 20 mg  20 mg Oral BID Benny Kamara M.D.   20 mg at 04/18/25 1816    melatonin tablet 30 mg  30 mg Oral Q EVENING Benny Kamara M.D.   30 mg at 04/17/25 2235    rosuvastatin (Crestor) tablet 10 mg  10 mg Oral Q EVENING Benny Kamara M.D.   10 mg at 04/18/25 1817    traZODone (Desyrel) tablet 50 mg  50 mg Oral Nightly Benny Kamara M.D.   50 mg at 04/17/25 2235    Respiratory Therapy Consult   Nebulization Continuous RT Benny Kamara M.D.        albuterol (Proventil) 2.5mg/0.5ml nebulizer solution 2.5 mg  2.5 mg Nebulization Q2HRS PRN (RT) Benny Kamara M.D.        montelukast (Singulair) tablet 10 mg  10 mg Oral Nightly Benny Kamara M.D.   10 mg at 04/17/25 2134    piperacillin-tazobactam (Zosyn) 3.375 g in  mL IVPB  3.375 g Intravenous Q8HRS Benny Kamara M.D.   Stopped at 04/18/25 1733    heparin injection 5,000 Units  5,000 Units Subcutaneous Q8HRS Benny Kamara M.D.   5,000 Units at 04/18/25 1331    aspirin EC tablet 81 mg  81 mg Oral DAILY Benny Kamara M.D.   81 mg at 04/18/25 0437       Fluids    Intake/Output Summary (Last 24 hours) at 4/18/2025 1850  Last data filed at 4/18/2025 1550  Gross per 24 hour    Intake 240 ml   Output 1275 ml   Net -1035 ml       Laboratory          Recent Labs     04/16/25 0306 04/17/25 0106 04/18/25  0117   SODIUM 135 136 136   POTASSIUM 3.9 4.0 3.7   CHLORIDE 100 101 101   CO2 20 23 23   BUN 18 15 20   CREATININE 0.92 1.07 1.13   MAGNESIUM  --   --  2.1   CALCIUM 8.3* 8.4* 8.5     Recent Labs     04/16/25 0306 04/17/25 0106 04/18/25  0117   ALTSGPT 87*  --   --    ASTSGOT 109*  --   --    ALKPHOSPHAT 62  --   --    TBILIRUBIN 0.2  --   --    GLUCOSE 198* 123* 89     Recent Labs     04/16/25 0306 04/17/25 0106 04/18/25  0117   WBC 4.9 6.6 6.6   NEUTSPOLYS 82.00*  --   --    LYMPHOCYTES 14.50*  --   --    MONOCYTES 2.70  --   --    EOSINOPHILS 0.20  --   --    BASOPHILS 0.20  --   --    ASTSGOT 109*  --   --    ALTSGPT 87*  --   --    ALKPHOSPHAT 62  --   --    TBILIRUBIN 0.2  --   --      Recent Labs     04/16/25 0306 04/17/25 0106 04/18/25 0117   RBC 4.17* 4.16* 4.40*   HEMOGLOBIN 11.6* 11.5* 12.2*   HEMATOCRIT 36.0* 35.5* 37.7*   PLATELETCT 161* 170 203       Imaging  CT:    Reviewed    Assessment/Plan  Acute on chronic hypoxemic respiratory failure likely secondary to postobstructive pneumonia  COPD exacerbation due to human metapneumovirus infection  Severe emphysema  Squamous cell cancer of the left upper lobe, on Keytruda  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     - Patient back to his baseline 6.5 L oxygen support, is requiring 8 to 10 L  - Pro-Young elevated and is trending down, COVID/flu/ RSV negative, sputum culture in progress, MRSA negative, fungal cultures of sputum-rare fungal elements   -All CD workup, HSP Aspergillus panel has been negative  - CT chest done this admission was compared to previous CT scans, concern for increased collapse of the left upper lobe/bowel consolidation highly suspicious for superadded pneumonia in the setting of immunosuppression  -His PET/CT scan showed improvement of lung mass SUV uptake with few doses of Keytruda         -Would recommend to continue antibiotics for at least a week to cover Pseudomonas, completed 3 days of azithromycin  -continue PO steroids at current dose  -Continue PJP prophylaxis as the patient has been on steroids for over a month since 3/12/2025  -high risk for bronchoscopy, pending aspergillus, fungitell, cocci/histo/blasto testing   - Continue Trelegy, DuoNeb as needed    I have performed a physical exam and reviewed and updated ROS and Plan today (4/18/2025). In review of yesterday's note (4/17/2025), there are no changes except as documented above.

## 2025-04-19 NOTE — PROGRESS NOTES
Monitor Summary  Rhythm: Normal Sinus Rhythm and Sinus Tachycardia  Rate:   Ectopy: PVCs, PACs, Couplets, Triplets, blocked pac's  .12 / .08 / .35                            12 hr Chart check.

## 2025-04-19 NOTE — PROGRESS NOTES
Bedside report received from off going RN/tech: Caitlyn, assumed care of patient.     Fall Risk Score: MODERATE RISK  Fall risk interventions in place: Provide patient/family education based on risk assessment, Educate patient/family to call staff for assistance when getting out of bed, Place fall precaution signage outside patient door, Place patient in room close to nursing station, Utilize bed/chair fall alarm, and Bed alarm connected correctly  Bed type: Regular (Nba Score less than 17 interventions in place)  Patient on cardiac monitor: Yes  IVF/IV medications: Not Applicable   Oxygen: How many liters 7L, Traced the line to wall oxygen, and No oxygen tank in room  Bedside sitter: Not Applicable   Isolation: Isolation precautions in place droplet and contact; HMPV

## 2025-04-19 NOTE — CARE PLAN
Problem: Bronchoconstriction  Goal: Improve in air movement and diminished wheezing  Description: Target End Date:  2 to 3 days1.  Implement inhaled treatments2.  Evaluate and manage medication effects  Outcome: Progressing     Problem: Hyperinflation  Goal: Prevent or improve atelectasis  Description: Target End Date:  3 to 4 days1. Instruct incentive spirometry usage2.  Perform hyperinflation therapy as indicated  Outcome: Progressing     Problem: Humidified High Flow Nasal Cannula  Goal: Maintain adequate oxygenation dependent on patient condition  Description: 1.  Implement humidified high flow oxygen therapy2.  Titrate high flow oxygen to maintain appropriate SpO2  Outcome: Progressing

## 2025-04-19 NOTE — PROGRESS NOTES
[facility] Medicine Daily Progress Note    Date of Service  4/18/2025    Chief Complaint  Bright Shirley is a 74 y.o. male admitted 4/15/2025 with pneumonia hypoxia    Hospital Course  Bright Shirley is a 74 y.o. 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  Patient initially presented to ER for stroke concern.  Patient was evaluated by neurology, stroke load was aborted and his weakness on left side was resolved.  He is however requiring increased oxygen requirement of 10 L oxy mask.  CTA chest noted to have stable cavitary mass in left upper lobe, extensive edematous changes in lung peripheral honeycombing.  He does have concern lactic acid of 5.3.  Due to concern of severe sepsis, admission requested by ERP.  Therefore, admitted to medicine service for further evaluation and treatment.     Interval Problem Update  4/16 patient is new to me today, patient is alert oriented follows commands, he is requiring 8 L of oxygen when I saw her in the morning, he is able to speak in full sentences, he stated that at home he was getting short of breath with exertion, normally he is comfortable at rest with 5 L of oxygen but he feels that he is requiring more with exertion/ambulation, will continue antibiotics, CT results reviewed, MRSA screen is negative and sputum cultures and blood cultures pending, discussed with bedside nurse charge nurse  pharmacist  4/17 patient is resting in chair, he is alert oriented follows commands no new neurological deficit, still having productive cough, continue IV antibiotics, notes from pulmonology reviewed, will continue with antipseudomonal coverage for now due to high risk, follow-up final blood culture results, discussed with bedside nurse charge nurse  pharmacist.  4/18 patient is resting in chair,  he is in good spirit, he is requiring 10 L of oxygen early in the morning, however the repeat chest x-ray discussed with pulmonology, continue chest physiotherapy s/p RT flutter valve, continue antibiotics, follow-up cultures, I will order respiratory panel, discussed medicine return discussed palliative pharmacist, later today patient's oxygen has decreased to 8 L, follow-up blood work in a.m.    I have discussed this patient's plan of care and discharge plan at IDT rounds today with Case Management, Nursing, Nursing leadership, and other members of the IDT team.    Consultants/Specialty  Pulmonology    Code Status  Full Code    Disposition  The patient is not medically cleared for discharge to home or a post-acute facility.      I have placed the appropriate orders for post-discharge needs.    Review of Systems  Review of Systems   Constitutional:  Negative for chills and fever.   Eyes:  Negative for blurred vision and double vision.   Respiratory:  Positive for cough, sputum production, shortness of breath and wheezing. Negative for hemoptysis.    Cardiovascular:  Negative for chest pain, palpitations, claudication, leg swelling and PND.   Gastrointestinal:  Negative for heartburn, nausea and vomiting.   Genitourinary:  Negative for hematuria and urgency.   Musculoskeletal:  Negative for back pain and myalgias.   Skin:  Negative for rash.   Neurological:  Negative for dizziness and headaches.   Endo/Heme/Allergies:  Does not bruise/bleed easily.   Psychiatric/Behavioral:  Negative for depression.         Physical Exam  Temp:  [36.5 °C (97.7 °F)-36.8 °C (98.2 °F)] 36.5 °C (97.7 °F)  Pulse:  [] 81  Resp:  [16-34] 18  BP: ()/() 120/63  SpO2:  [90 %-97 %] 90 %    Physical Exam  Vitals and nursing note reviewed.   Constitutional:       Appearance: He is ill-appearing.   Eyes:      General:         Right eye: No discharge.         Left eye: No discharge.   Cardiovascular:      Rate and Rhythm: Normal  rate and regular rhythm.      Pulses: Normal pulses.      Heart sounds: Normal heart sounds.   Pulmonary:      Effort: Pulmonary effort is normal. No respiratory distress.      Breath sounds: Normal breath sounds.   Abdominal:      General: Bowel sounds are normal. There is no distension.      Tenderness: There is no abdominal tenderness. There is no guarding.   Musculoskeletal:         General: Normal range of motion.      Cervical back: Normal range of motion and neck supple.      Right lower leg: No edema.      Left lower leg: No edema.   Skin:     General: Skin is warm and dry.      Capillary Refill: Capillary refill takes less than 2 seconds.      Coloration: Skin is not jaundiced.   Neurological:      General: No focal deficit present.      Mental Status: He is alert and oriented to person, place, and time.      Cranial Nerves: No cranial nerve deficit.   Psychiatric:         Mood and Affect: Mood normal.         Behavior: Behavior normal.         Fluids    Intake/Output Summary (Last 24 hours) at 4/18/2025 1726  Last data filed at 4/18/2025 1550  Gross per 24 hour   Intake 240 ml   Output 1275 ml   Net -1035 ml        Laboratory  Recent Labs     04/16/25  0306 04/17/25  0106 04/18/25  0117   WBC 4.9 6.6 6.6   RBC 4.17* 4.16* 4.40*   HEMOGLOBIN 11.6* 11.5* 12.2*   HEMATOCRIT 36.0* 35.5* 37.7*   MCV 86.3 85.3 85.7   MCH 27.8 27.6 27.7   MCHC 32.2* 32.4 32.4   RDW 51.5* 50.4* 51.6*   PLATELETCT 161* 170 203   MPV 9.2 9.4 9.7     Recent Labs     04/16/25  0306 04/17/25  0106 04/18/25  0117   SODIUM 135 136 136   POTASSIUM 3.9 4.0 3.7   CHLORIDE 100 101 101   CO2 20 23 23   GLUCOSE 198* 123* 89   BUN 18 15 20   CREATININE 0.92 1.07 1.13   CALCIUM 8.3* 8.4* 8.5                     Imaging  DX-CHEST-LIMITED (1 VIEW)   Final Result      Worsening bilateral pulmonary infiltrates.      DX-CHEST-PORTABLE (1 VIEW)   Final Result         1.  Pulmonary edema and/or infiltrates, greatest in the left upper lobe, slightly  decreased since prior study.      EC-ECHOCARDIOGRAM COMPLETE W/O CONT   Final Result      CT-CTA CHEST PULMONARY ARTERY W/ RECONS   Final Result      1. No acute pulmonary embolus.   2. Stable cavitary mass in the left upper lobe.   3. Extensive edematous changes in the lungs with peripheral honeycombing.   4. Aortic and coronary arterial sclerosis.   5. Simple appearing right renal cortical cyst, requiring no further follow-up.   6. Stable bilateral adrenal masses.   7. Acute or subacute left third through fifth rib fractures.            DX-CHEST-PORTABLE (1 VIEW)   Final Result      1.  LEFT upper lung consolidation, likely pneumonia.  Underlying mass is not excluded.   2.  Probable pulmonary fibrosis.   3.  Limited by positioning.           Assessment/Plan  * Severe sepsis (HCC)- (present on admission)  Assessment & Plan  This is Sepsis Present on admission  SIRS criteria identified on my evaluation include: Tachycardia, with heart rate greater than 90 BPM, Tachypnea, with respirations greater than 20 per minute, and Bandemia, greater than 10% bands  Clinical indicators of end organ dysfunction include Lactic Acid greater than 2  Source is pulm  Sepsis protocol initiated  Crystalloid Fluid Administration: Fluid resuscitation ordered per standard protocol - 30 mL/kg per current or ideal body weight  IV antibiotics as appropriate for source of sepsis  Reassessment: I have reassessed the patient's hemodynamic status    Acute left-sided weakness  Assessment & Plan  Resolved by ER arrival  Stroke alert aborted by stroke neurology  PT/OT/ST    H/o TIA per pt  -added ASA  -continue statin    Continue monitoring  No neuro decifit      TREVOR (acute kidney injury) ([facility])  Assessment & Plan  Cr 1.22  IVF  Minimize nephrotoxic agents, renally dose meds  Check FeNa    Creatinine 1.13    ACP (advance care planning)  Assessment & Plan  Goal of care discussed with patient in the emergency room.  He stated he is agreeable for  noninvasive, as well as invasive/heroic life-sustaining measures-including CPR/defibrillation/intubation or mechanical ventilation.  He is agreeable for hospitalization, further treatment with IVF, breathing treatment, antibiotic therapy, any medical management as clinically warranted.  Diagnosis, prognosis, question and concern addressed.  Full CODE STATUS confirmed.  ACP: 17 minutes    Pneumonia due to infectious organism  Assessment & Plan  Cavitary left upper lobe mass  Patient is immunocompromised due to squamous cell lung cancer on chemotherapy  Follow cultures  Antibiotic: Zosyn, Zyvox, azithromycin  Wean off oxygen support as tolerated continue broad-spectrum antibiotics follow-up MRSA screening follow-up    Blood sputum cultures  Will need treatment for possible  pseudomonas as per pulm will get EKG to check qtc.     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 Melara, medical oncology    Acute on chronic respiratory failure with hypoxia (HCC)- (present on admission)  Assessment & Plan  Dependent on 5 L at baseline  Currently requiring 10 L OxyMask-wean off as tolerated  No PE seen on CTA chest  Check COVID/influenza/RSV  Antibiotic for pneumonia  Check TTE    CTA did not show PE  Patient with probably postobstructive pneumonia due to lung mass  Continue antibiotics  Note from pulm reviewed.     Patient requiring higher levels of oxygen today 10 L  Discussed with pulmonology  Continue antibiotics  Continue chest physical therapy  Follow-up fungal results    Chronic obstructive pulmonary disease (HCC)- (present on admission)  Assessment & Plan  Pulmonary hygiene  -Agustin Zuleta Ellipta, montelukast  -PEP  -s/p Solu-Medrol 125 mg  -Continue Solu-Medrol 40 mg every 8 hours  RT  protocol    Patient is on 5 L of oxygen at home now he is requiring 8 L  5L of o2.     10 L of oxygen  Continue steroids  Continue breathing treatment  Discussed with pulmonology    Dyslipidemia- (present on admission)  Assessment & Plan  Statin    Primary hypertension- (present on admission)  Assessment & Plan  Lisinopril, amlodipine         VTE prophylaxis: Heparin    I have performed a physical exam and reviewed and updated ROS and Plan today (4/18/2025). In review of yesterday's note (4/17/2025), there are no changes except as documented above.      Review of CBC  I reviewed BMP  I reviewed calcium levels  I reviewed magnesium levels  I ordered a repeat chest x-ray  Discussed with pulmonology  Patient is on IV Zosyn monitor for side effects including elevated to C. difficile infection neutropenia thrombophlebitis  I reviewed EKG, no acute ischemic changes, QTc 436  I have ordered blood work for an a.m. CBC BMP procalcitonin

## 2025-04-20 LAB
ANION GAP SERPL CALC-SCNC: 13 MMOL/L (ref 7–16)
BACTERIA BLD CULT: NORMAL
BACTERIA BLD CULT: NORMAL
BUN SERPL-MCNC: 26 MG/DL (ref 8–22)
CALCIUM SERPL-MCNC: 8.2 MG/DL (ref 8.5–10.5)
CHLORIDE SERPL-SCNC: 100 MMOL/L (ref 96–112)
CO2 SERPL-SCNC: 22 MMOL/L (ref 20–33)
CREAT SERPL-MCNC: 1.21 MG/DL (ref 0.5–1.4)
ERYTHROCYTE [DISTWIDTH] IN BLOOD BY AUTOMATED COUNT: 52.6 FL (ref 35.9–50)
GALACTOMANNAN AG SERPL QL IA: NEGATIVE
GALACTOMANNAN AG SERPL-ACNC: 0.04
GFR SERPLBLD CREATININE-BSD FMLA CKD-EPI: 63 ML/MIN/1.73 M 2
GLUCOSE SERPL-MCNC: 93 MG/DL (ref 65–99)
HCT VFR BLD AUTO: 37 % (ref 42–52)
HGB BLD-MCNC: 11.5 G/DL (ref 14–18)
MAGNESIUM SERPL-MCNC: 2.3 MG/DL (ref 1.5–2.5)
MCH RBC QN AUTO: 27.1 PG (ref 27–33)
MCHC RBC AUTO-ENTMCNC: 31.1 G/DL (ref 32.3–36.5)
MCV RBC AUTO: 87.1 FL (ref 81.4–97.8)
PLATELET # BLD AUTO: 178 K/UL (ref 164–446)
PMV BLD AUTO: 9.7 FL (ref 9–12.9)
POTASSIUM SERPL-SCNC: 3.9 MMOL/L (ref 3.6–5.5)
RBC # BLD AUTO: 4.25 M/UL (ref 4.7–6.1)
SIGNIFICANT IND 70042: NORMAL
SIGNIFICANT IND 70042: NORMAL
SITE SITE: NORMAL
SITE SITE: NORMAL
SODIUM SERPL-SCNC: 135 MMOL/L (ref 135–145)
SOURCE SOURCE: NORMAL
SOURCE SOURCE: NORMAL
WBC # BLD AUTO: 5.2 K/UL (ref 4.8–10.8)

## 2025-04-20 PROCEDURE — 94640 AIRWAY INHALATION TREATMENT: CPT

## 2025-04-20 PROCEDURE — 36415 COLL VENOUS BLD VENIPUNCTURE: CPT

## 2025-04-20 PROCEDURE — 700105 HCHG RX REV CODE 258: Performed by: STUDENT IN AN ORGANIZED HEALTH CARE EDUCATION/TRAINING PROGRAM

## 2025-04-20 PROCEDURE — 85027 COMPLETE CBC AUTOMATED: CPT

## 2025-04-20 PROCEDURE — 80048 BASIC METABOLIC PNL TOTAL CA: CPT

## 2025-04-20 PROCEDURE — 700101 HCHG RX REV CODE 250: Performed by: HOSPITALIST

## 2025-04-20 PROCEDURE — 99291 CRITICAL CARE FIRST HOUR: CPT | Performed by: HOSPITALIST

## 2025-04-20 PROCEDURE — A9270 NON-COVERED ITEM OR SERVICE: HCPCS | Performed by: STUDENT IN AN ORGANIZED HEALTH CARE EDUCATION/TRAINING PROGRAM

## 2025-04-20 PROCEDURE — 700102 HCHG RX REV CODE 250 W/ 637 OVERRIDE(OP): Performed by: STUDENT IN AN ORGANIZED HEALTH CARE EDUCATION/TRAINING PROGRAM

## 2025-04-20 PROCEDURE — 700102 HCHG RX REV CODE 250 W/ 637 OVERRIDE(OP): Performed by: HOSPITALIST

## 2025-04-20 PROCEDURE — 700111 HCHG RX REV CODE 636 W/ 250 OVERRIDE (IP): Mod: JZ | Performed by: HOSPITALIST

## 2025-04-20 PROCEDURE — 700105 HCHG RX REV CODE 258: Performed by: HOSPITALIST

## 2025-04-20 PROCEDURE — A9270 NON-COVERED ITEM OR SERVICE: HCPCS | Performed by: HOSPITALIST

## 2025-04-20 PROCEDURE — 83735 ASSAY OF MAGNESIUM: CPT

## 2025-04-20 PROCEDURE — 99232 SBSQ HOSP IP/OBS MODERATE 35: CPT | Performed by: STUDENT IN AN ORGANIZED HEALTH CARE EDUCATION/TRAINING PROGRAM

## 2025-04-20 PROCEDURE — 770020 HCHG ROOM/CARE - TELE (206)

## 2025-04-20 PROCEDURE — 700111 HCHG RX REV CODE 636 W/ 250 OVERRIDE (IP): Performed by: STUDENT IN AN ORGANIZED HEALTH CARE EDUCATION/TRAINING PROGRAM

## 2025-04-20 PROCEDURE — 94669 MECHANICAL CHEST WALL OSCILL: CPT

## 2025-04-20 PROCEDURE — 93005 ELECTROCARDIOGRAM TRACING: CPT | Mod: TC | Performed by: HOSPITALIST

## 2025-04-20 RX ORDER — DOXYCYCLINE 100 MG/1
100 TABLET ORAL EVERY 12 HOURS
Status: DISCONTINUED | OUTPATIENT
Start: 2025-04-20 | End: 2025-04-20

## 2025-04-20 RX ORDER — DOXYCYCLINE 100 MG/1
100 TABLET ORAL EVERY 12 HOURS
Status: DISCONTINUED | OUTPATIENT
Start: 2025-04-20 | End: 2025-04-22

## 2025-04-20 RX ADMIN — PREDNISONE 60 MG: 50 TABLET ORAL at 05:19

## 2025-04-20 RX ADMIN — LEVOTHYROXINE SODIUM 100 MCG: 0.1 TABLET ORAL at 05:18

## 2025-04-20 RX ADMIN — IPRATROPIUM BROMIDE AND ALBUTEROL SULFATE 3 ML: .5; 2.5 SOLUTION RESPIRATORY (INHALATION) at 14:43

## 2025-04-20 RX ADMIN — ASPIRIN 81 MG: 81 TABLET, COATED ORAL at 05:18

## 2025-04-20 RX ADMIN — DOXYCYCLINE 100 MG: 100 TABLET, FILM COATED ORAL at 14:17

## 2025-04-20 RX ADMIN — AMLODIPINE BESYLATE 5 MG: 5 TABLET ORAL at 05:18

## 2025-04-20 RX ADMIN — MONTELUKAST 10 MG: 10 TABLET, FILM COATED ORAL at 21:47

## 2025-04-20 RX ADMIN — ACETAMINOPHEN 650 MG: 325 TABLET, FILM COATED ORAL at 22:46

## 2025-04-20 RX ADMIN — IPRATROPIUM BROMIDE AND ALBUTEROL SULFATE 3 ML: .5; 2.5 SOLUTION RESPIRATORY (INHALATION) at 10:13

## 2025-04-20 RX ADMIN — LOPERAMIDE HYDROCHLORIDE 2 MG: 2 CAPSULE ORAL at 15:02

## 2025-04-20 RX ADMIN — IPRATROPIUM BROMIDE AND ALBUTEROL SULFATE 3 ML: .5; 2.5 SOLUTION RESPIRATORY (INHALATION) at 23:15

## 2025-04-20 RX ADMIN — HEPARIN SODIUM 5000 UNITS: 5000 INJECTION, SOLUTION INTRAVENOUS; SUBCUTANEOUS at 05:21

## 2025-04-20 RX ADMIN — PIPERACILLIN AND TAZOBACTAM 3.38 G: 3; .375 INJECTION, POWDER, FOR SOLUTION INTRAVENOUS at 05:21

## 2025-04-20 RX ADMIN — IPRATROPIUM BROMIDE AND ALBUTEROL SULFATE 3 ML: .5; 2.5 SOLUTION RESPIRATORY (INHALATION) at 05:16

## 2025-04-20 RX ADMIN — IPRATROPIUM BROMIDE AND ALBUTEROL SULFATE 3 ML: .5; 2.5 SOLUTION RESPIRATORY (INHALATION) at 19:15

## 2025-04-20 RX ADMIN — FUROSEMIDE 20 MG: 10 INJECTION, SOLUTION INTRAVENOUS at 05:25

## 2025-04-20 RX ADMIN — ACETAMINOPHEN 650 MG: 325 TABLET, FILM COATED ORAL at 06:43

## 2025-04-20 RX ADMIN — HEPARIN SODIUM 5000 UNITS: 5000 INJECTION, SOLUTION INTRAVENOUS; SUBCUTANEOUS at 14:17

## 2025-04-20 RX ADMIN — Medication 30 MG: at 22:43

## 2025-04-20 RX ADMIN — DOXYCYCLINE 100 MG: 100 TABLET, FILM COATED ORAL at 21:47

## 2025-04-20 RX ADMIN — MEROPENEM 500 MG: 500 INJECTION, POWDER, FOR SOLUTION INTRAVENOUS at 18:45

## 2025-04-20 RX ADMIN — HEPARIN SODIUM 5000 UNITS: 5000 INJECTION, SOLUTION INTRAVENOUS; SUBCUTANEOUS at 21:47

## 2025-04-20 RX ADMIN — MEROPENEM 500 MG: 500 INJECTION, POWDER, FOR SOLUTION INTRAVENOUS at 14:17

## 2025-04-20 RX ADMIN — LISINOPRIL 20 MG: 20 TABLET ORAL at 05:19

## 2025-04-20 RX ADMIN — CALCIUM POLYCARBOPHIL 625 MG: 625 TABLET, FILM COATED ORAL at 05:27

## 2025-04-20 RX ADMIN — ROSUVASTATIN CALCIUM 10 MG: 20 TABLET, FILM COATED ORAL at 18:26

## 2025-04-20 RX ADMIN — IPRATROPIUM BROMIDE AND ALBUTEROL SULFATE 3 ML: .5; 2.5 SOLUTION RESPIRATORY (INHALATION) at 07:16

## 2025-04-20 RX ADMIN — TRAZODONE HYDROCHLORIDE 50 MG: 50 TABLET ORAL at 22:44

## 2025-04-20 RX ADMIN — FLUTICASONE FUROATE, UMECLIDINIUM BROMIDE AND VILANTEROL TRIFENATATE 1 PUFF: 200; 62.5; 25 POWDER RESPIRATORY (INHALATION) at 07:16

## 2025-04-20 ASSESSMENT — PAIN DESCRIPTION - PAIN TYPE
TYPE: ACUTE PAIN

## 2025-04-20 ASSESSMENT — ENCOUNTER SYMPTOMS
VOMITING: 0
SHORTNESS OF BREATH: 1
CHILLS: 0
PND: 0
PALPITATIONS: 0
HEADACHES: 0
WHEEZING: 1
MYALGIAS: 0
COUGH: 1
BRUISES/BLEEDS EASILY: 0
DEPRESSION: 0
BACK PAIN: 0
CLAUDICATION: 0
DIZZINESS: 0
BLURRED VISION: 0
DOUBLE VISION: 0
HEARTBURN: 0
FEVER: 0
NAUSEA: 0
HEMOPTYSIS: 0
SPUTUM PRODUCTION: 1

## 2025-04-20 ASSESSMENT — FIBROSIS 4 INDEX: FIB4 SCORE: 4.86

## 2025-04-20 NOTE — PROGRESS NOTES
Patient up to commode then up to chair with standby assist. De-satted to 80%, however able to recover to 90% with rest

## 2025-04-20 NOTE — PROGRESS NOTES
Pt refusing the chair alarm, pt education provided and acknowledges understanding. Pt is okay with bed alarm still on. Charge RN notified.

## 2025-04-20 NOTE — PROGRESS NOTES
Monitor Summary  Rhythm: SR ST  Rate:   Ectopy: freq PVC, freq PAC, occ blocked PAC, rare Trip, 6b PSVT  .12 / .08 / .31

## 2025-04-20 NOTE — PROGRESS NOTES
Bedside report received from Iesha RN at this time. Patient on droplet and contact precautions. Sitting up in bed, satting 90% on 50L 50% high flow oxygen, denies CP or SOB at rest. Voiding in urinal. On tele SR 88. No distress, call bell in hand, bed alarm on, calls approp for needs

## 2025-04-20 NOTE — CARE PLAN
The patient is Watcher - Medium risk of patient condition declining or worsening    Shift Goals  Clinical Goals: VSS, wean O2 as tolerated  Patient Goals: rest  Family Goals: bandar    Progress made toward(s) clinical / shift goals:    Problem: Knowledge Deficit - Standard  Goal: Patient and family/care givers will demonstrate understanding of plan of care, disease process/condition, diagnostic tests and medications  Outcome: Progressing     Problem: Impaired Gas Exchange  Goal: Patient will demonstrate improved ventilation and adequate oxygenation and participate in treatment regimen within the level of ability/situation.  Outcome: Progressing       Patient is not progressing towards the following goals:

## 2025-04-20 NOTE — PROGRESS NOTES
Pulmonary Progress Note    Date of admission  4/15/2025    Chief Complaint  74 y.o. male who presented on 4/15/2025 with left-sided weakness concerning for stroke.  He was in his usual state of health, was accidentally disconnected from his oxygen support on 4/15/2025 at home which led to his weakness as per patient.  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  He has now tapered down to prednisone 20 mg/day.  since admission he was started on higher dose of steroids, antibiotics which improved his shortness of breath and is back to his baseline oxygen support of 5 L.      4/17: still continues to require 5-6 L oxygen support, still has SOB with cough  4/18: He continues to require 8 to 10 L oxygen support due to  desaturation when he coughs, respiratory panel positive for human metapneumovirus,  4/19: he has significant oxygen requirements now on HFNC , and desaturates when he is coughing or ambulating   4/20 : no significant improvement still on HFNC and desaturates , spoke to ID about the situation agreeable to escalate abx to meropenem and doxycycline , as he is high risk for bronchoscopy at this time        Review of Systems  Constitutional:  Negative for chills and fever.   Eyes:  Negative for photophobia and pain.   Respiratory:  Positive for cough, sputum production and shortness of breath.    Gastrointestinal:  Negative for abdominal pain and  diarrhea.   Neurological:  Positive for weakness. Negative for dizziness and loss of consciousness.   All other systems reviewed and are negative.    Vital Signs for last 24 hours   Temp:  [36.2 °C (97.2 °F)-36.7 °C (98.1 °F)] 36.2 °C (97.2 °F)  Pulse:  [70-99] 70  Resp:  [17-24] 24  BP: (106-156)/(58-85) 106/67  SpO2:  [89 %-94 %] 89 %        Physical Exam   Vitals reviewed.   Constitutional:       Appearance: He is obese.   HENT:      Head: Normocephalic and atraumatic.   Eyes:      General: No scleral icterus.     Conjunctiva/sclera: Conjunctivae normal.   Cardiovascular:      Rate and Rhythm: Normal rate and regular rhythm.      Heart sounds: No murmur heard.  Pulmonary:      Breath sounds: Rhonchi present. No wheezing or rales.      Comments: Decreased breath sounds bilaterally  Abdominal:      General: Bowel sounds are normal. There is no distension.      Tenderness: There is no abdominal tenderness.   Musculoskeletal:      Right lower leg: No edema.      Left lower leg: No edema.   Skin:     General: Skin is warm and dry.   Neurological:      General: No focal deficit present.      Mental Status: He is alert and oriented to person, place, and time.    Medications  Current Facility-Administered Medications   Medication Dose Route Frequency Provider Last Rate Last Admin    meropenem (Merrem) 500 mg in  mL IV-MBP  500 mg Intravenous Q6HRS Chantell Dennis M.D. 200 mL/hr at 04/20/25 1417 500 mg at 04/20/25 1417    doxycycline monohydrate (Adoxa) tablet 100 mg  100 mg Oral Q12HRS Chantell Dennis M.D.   100 mg at 04/20/25 1417    ipratropium-albuterol (DUONEB) nebulizer solution  3 mL Nebulization Q4HRS (RT) Steve Rios M.D.   3 mL at 04/20/25 1443    fluticasone-umeclidinium-vilanterol (Trelegy Ellipta) 200-62.5-25 mcg/act inhaler 1 Puff  1 Puff Inhalation QDAILY (RT) Steve Rios M.D.   1 Puff at 04/20/25 0716    loperamide (Imodium) capsule 2 mg  2 mg Oral 4X/DAY NICOLETTE MCKENNA  FABIENNE Rios   2 mg at 04/19/25 2219    sulfamethoxazole-trimethoprim (Bactrim DS) 800-160 MG tablet 1 Tablet  1 Tablet Oral 3x/week Chantell Dennis M.D.   1 Tablet at 04/19/25 0231    predniSONE (Deltasone) tablet 60 mg  60 mg Oral DAILY Chantell Dennis M.D.   60 mg at 04/20/25 0519    LR (Bolus) infusion 500 mL  500 mL Intravenous Once PRN Benny Kamara M.D.        acetaminophen (Tylenol) tablet 650 mg  650 mg Oral Q6HRS PRN Benny Kamara M.D.   650 mg at 04/20/25 0643    labetalol (Normodyne/Trandate) injection 10 mg  10 mg Intravenous Q4HRS PRN Benny Kamara M.D.        ondansetron (Zofran) syringe/vial injection 4 mg  4 mg Intravenous Q4HRS PRN Benny Kamara M.D.        Or    ondansetron (Zofran ODT) dispertab 4 mg  4 mg Oral Q4HRS PRN Benny Kamara M.D.        albuterol inhaler 2 Puff  2 Puff Inhalation Q4HRS PRN Benny Kamara M.D.        amLODIPine (Norvasc) tablet 5 mg  5 mg Oral DAILY Benny Kamara M.D.   5 mg at 04/20/25 0518    artificial tears ophthalmic solution 1 Drop  1 Drop Both Eyes Q HOUR PRN Benny Kamara M.D.        calcium polycarbophil (Fibercon) tablet 625 mg  625 mg Oral DAILY Benny Kamara M.D.   625 mg at 04/20/25 0527    levothyroxine (Synthroid) tablet 100 mcg  100 mcg Oral AM ES Benny Kamara M.D.   100 mcg at 04/20/25 0518    lisinopril (Prinivil) tablet 20 mg  20 mg Oral BID Benny Kamara M.D.   20 mg at 04/20/25 0519    melatonin tablet 30 mg  30 mg Oral Q EVENING Benny Kamara M.D.   30 mg at 04/19/25 2212    rosuvastatin (Crestor) tablet 10 mg  10 mg Oral Q EVENING Benny Kamara M.D.   10 mg at 04/19/25 1639    traZODone (Desyrel) tablet 50 mg  50 mg Oral Nightly Benny Kamara M.D.   50 mg at 04/19/25 2974    Respiratory Therapy Consult   Nebulization Continuous RT Benny Kamara M.D.        albuterol (Proventil) 2.5mg/0.5ml nebulizer solution 2.5 mg  2.5 mg Nebulization Q2HRS PRN (RT) Benny Kamara M.D.        montelukast (Singulair) tablet 10 mg  10 mg Oral Nightly  Benny Kamara M.D.   10 mg at 04/19/25 2118    heparin injection 5,000 Units  5,000 Units Subcutaneous Q8HRS Benny Kamara M.D.   5,000 Units at 04/20/25 1417    aspirin EC tablet 81 mg  81 mg Oral DAILY Benny Kamara M.D.   81 mg at 04/20/25 0518       Fluids    Intake/Output Summary (Last 24 hours) at 4/20/2025 1448  Last data filed at 4/19/2025 2122  Gross per 24 hour   Intake 450 ml   Output 300 ml   Net 150 ml       Laboratory          Recent Labs     04/18/25  0117 04/19/25  0431 04/20/25  0037   SODIUM 136 134* 135   POTASSIUM 3.7 3.4* 3.9   CHLORIDE 101 99 100   CO2 23 22 22   BUN 20 19 26*   CREATININE 1.13 0.98 1.21   MAGNESIUM 2.1  --  2.3   CALCIUM 8.5 8.5 8.2*     Recent Labs     04/18/25  0117 04/19/25  0431 04/20/25  0037   GLUCOSE 89 81 93     Recent Labs     04/18/25  0117 04/19/25  0431 04/20/25  0037   WBC 6.6 4.9 5.2     Recent Labs     04/18/25  0117 04/19/25  0431 04/20/25  0037   RBC 4.40* 4.70 4.25*   HEMOGLOBIN 12.2* 12.9* 11.5*   HEMATOCRIT 37.7* 41.5* 37.0*   PLATELETCT 203 174 178       Imaging  CT:    Reviewed    Assessment/Plan  Acute on chronic hypoxemic respiratory failure likely secondary to postobstructive pneumonia  COPD exacerbation due to human metapneumovirus infection  Severe emphysema  Squamous cell cancer of the left upper lobe, on Keytruda  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     - Patient back on HFNC today   - Pro-Young elevated and is trending down, COVID/flu/ RSV negative, sputum culture in progress, MRSA negative, fungal cultures of sputum-rare fungal elements , aspergillus galactomannan /fungitell negative   -All CD workup, HSP Aspergillus panel, fungitell has been negative  - CT chest done this admission was compared to previous CT scans, concern for increased collapse of the left upper lobe/bowel consolidation highly suspicious for superadded pneumonia in the setting of immunosuppression  -His PET/CT scan showed improvement of lung mass SUV uptake  with few doses of Keytruda  - TTE normal EF, mild MR, normal RV and diastolic finction         -will increase the abx coverage to meropenem, doxycycline ( discussed with ID Dr. Zapata ), no role for ribavirin   -continue PO steroids at current dose  -Continue PJP prophylaxis as the patient has been on steroids for over a month since 3/12/2025  -high risk for bronchoscopy due to low pulmonary reserve , pending  cocci/histo/blasto testing   - Continue Trelegy, DuoNeb as needed    I have performed a physical exam and reviewed and updated ROS and Plan today (4/20/2025). In review of yesterday's note (4/19/2025), there are no changes except as documented above.

## 2025-04-20 NOTE — CARE PLAN
Problem: Bronchoconstriction  Goal: Improve in air movement and diminished wheezing  Description: Target End Date:  2 to 3 days1.  Implement inhaled treatments2.  Evaluate and manage medication effects  Outcome: Progressing     Problem: Humidified High Flow Nasal Cannula  Goal: Maintain adequate oxygenation dependent on patient condition  Description: 1.  Implement humidified high flow oxygen therapy2.  Titrate high flow oxygen to maintain appropriate SpO2  Outcome: Progressing   HHFNC 50L/60 % satting 91%  Duoneb Q4

## 2025-04-20 NOTE — PROGRESS NOTES
[facility] Medicine Daily Progress Note    Date of Service  4/20/2025    Chief Complaint  Bright Shirley is a 74 y.o. male admitted 4/15/2025 with pneumonia hypoxia    Hospital Course  Bright Shirley is a 74 y.o. 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  Patient initially presented to ER for stroke concern.  Patient was evaluated by neurology, stroke load was aborted and his weakness on left side was resolved.  He is however requiring increased oxygen requirement of 10 L oxy mask.  CTA chest noted to have stable cavitary mass in left upper lobe, extensive edematous changes in lung peripheral honeycombing.  He does have concern lactic acid of 5.3.  Due to concern of severe sepsis, admission requested by ERP.  Therefore, admitted to medicine service for further evaluation and treatment.     Interval Problem Update  4/16 patient is new to me today, patient is alert oriented follows commands, he is requiring 8 L of oxygen when I saw her in the morning, he is able to speak in full sentences, he stated that at home he was getting short of breath with exertion, normally he is comfortable at rest with 5 L of oxygen but he feels that he is requiring more with exertion/ambulation, will continue antibiotics, CT results reviewed, MRSA screen is negative and sputum cultures and blood cultures pending, discussed with bedside nurse charge nurse  pharmacist  4/17 patient is resting in chair, he is alert oriented follows commands no new neurological deficit, still having productive cough, continue IV antibiotics, notes from pulmonology reviewed, will continue with antipseudomonal coverage for now due to high risk, follow-up final blood culture results, discussed with bedside nurse charge nurse  pharmacist.  4/18 patient is resting in chair,  he is in good spirit, he is requiring 10 L of oxygen early in the morning, however the repeat chest x-ray discussed with pulmonology, continue chest physiotherapy s/p RT flutter valve, continue antibiotics, follow-up cultures, I will order respiratory panel, discussed medicine return discussed palliative pharmacist, later today patient's oxygen has decreased to 8 L, follow-up blood work in a.m.  4/19 patient is in bed, no fever or chills not in distress, patient is requiring higher amount of o2 patient is on 40L by HFNC, patient is able to tolerate diet, diarrhea resolved, c diff neg, human pneumovirus positive, discussed with pulmonologist, I have started him on iv lasix, continue abx, steroids nebs. Discussed with bedside nurse, charge nurse .   4/20 patient is resting in bed, patient is alert oriented follows commands, patient requiring 50 L of oxygen by high flow nasal cannula, discussed with pulmonology, transition to meropenem and doxycycline, continue supportive treatment, received IV Lasix today, continue RT per protocol, discussed with bedside nurse charge nurse  pharmacist.    I have discussed this patient's plan of care and discharge plan at IDT rounds today with Case Management, Nursing, Nursing leadership, and other members of the IDT team.    Consultants/Specialty  Pulmonology    Code Status  Full Code    Disposition  The patient is not medically cleared for discharge to home or a post-acute facility.      I have placed the appropriate orders for post-discharge needs.    Review of Systems  Review of Systems   Constitutional:  Negative for chills and fever.   Eyes:  Negative for blurred vision and double vision.   Respiratory:  Positive for cough, sputum production, shortness of breath and wheezing. Negative for hemoptysis.    Cardiovascular:  Negative for chest pain, palpitations, claudication, leg swelling and PND.   Gastrointestinal:  Negative for heartburn, nausea and  vomiting.   Genitourinary:  Negative for hematuria and urgency.   Musculoskeletal:  Negative for back pain and myalgias.   Skin:  Negative for rash.   Neurological:  Negative for dizziness and headaches.   Endo/Heme/Allergies:  Does not bruise/bleed easily.   Psychiatric/Behavioral:  Negative for depression.         Physical Exam  Temp:  [36.2 °C (97.2 °F)-36.7 °C (98.1 °F)] 36.2 °C (97.2 °F)  Pulse:  [70-99] 70  Resp:  [17-24] 24  BP: (106-156)/(58-85) 106/67  SpO2:  [89 %-94 %] 89 %    Physical Exam  Vitals and nursing note reviewed.   Constitutional:       Appearance: He is ill-appearing.   Eyes:      General:         Right eye: No discharge.         Left eye: No discharge.   Cardiovascular:      Rate and Rhythm: Normal rate and regular rhythm.      Pulses: Normal pulses.      Heart sounds: Normal heart sounds.   Pulmonary:      Effort: Pulmonary effort is normal. No respiratory distress.      Breath sounds: Normal breath sounds.   Abdominal:      General: Bowel sounds are normal. There is no distension.      Tenderness: There is no guarding.   Musculoskeletal:         General: Normal range of motion.      Cervical back: Normal range of motion and neck supple.      Right lower leg: No edema.      Left lower leg: No edema.   Skin:     General: Skin is warm and dry.      Capillary Refill: Capillary refill takes less than 2 seconds.      Coloration: Skin is not jaundiced.   Neurological:      General: No focal deficit present.      Mental Status: He is alert and oriented to person, place, and time.      Cranial Nerves: No cranial nerve deficit.   Psychiatric:         Mood and Affect: Mood normal.         Behavior: Behavior normal.         Fluids    Intake/Output Summary (Last 24 hours) at 4/20/2025 1528  Last data filed at 4/19/2025 2122  Gross per 24 hour   Intake 0 ml   Output 0 ml   Net 0 ml        Laboratory  Recent Labs     04/18/25  0117 04/19/25  0431 04/20/25  0037   WBC 6.6 4.9 5.2   RBC 4.40* 4.70 4.25*    HEMOGLOBIN 12.2* 12.9* 11.5*   HEMATOCRIT 37.7* 41.5* 37.0*   MCV 85.7 88.3 87.1   MCH 27.7 27.4 27.1   MCHC 32.4 31.1* 31.1*   RDW 51.6* 53.2* 52.6*   PLATELETCT 203 174 178   MPV 9.7 9.5 9.7     Recent Labs     04/18/25  0117 04/19/25  0431 04/20/25  0037   SODIUM 136 134* 135   POTASSIUM 3.7 3.4* 3.9   CHLORIDE 101 99 100   CO2 23 22 22   GLUCOSE 89 81 93   BUN 20 19 26*   CREATININE 1.13 0.98 1.21   CALCIUM 8.5 8.5 8.2*                     Imaging  DX-CHEST-LIMITED (1 VIEW)   Final Result      Worsening bilateral pulmonary infiltrates.      DX-CHEST-PORTABLE (1 VIEW)   Final Result         1.  Pulmonary edema and/or infiltrates, greatest in the left upper lobe, slightly decreased since prior study.      EC-ECHOCARDIOGRAM COMPLETE W/O CONT   Final Result      CT-CTA CHEST PULMONARY ARTERY W/ RECONS   Final Result      1. No acute pulmonary embolus.   2. Stable cavitary mass in the left upper lobe.   3. Extensive edematous changes in the lungs with peripheral honeycombing.   4. Aortic and coronary arterial sclerosis.   5. Simple appearing right renal cortical cyst, requiring no further follow-up.   6. Stable bilateral adrenal masses.   7. Acute or subacute left third through fifth rib fractures.            DX-CHEST-PORTABLE (1 VIEW)   Final Result      1.  LEFT upper lung consolidation, likely pneumonia.  Underlying mass is not excluded.   2.  Probable pulmonary fibrosis.   3.  Limited by positioning.           Assessment/Plan  * Severe sepsis (HCC)- (present on admission)  Assessment & Plan  This is Sepsis Present on admission  SIRS criteria identified on my evaluation include: Tachycardia, with heart rate greater than 90 BPM, Tachypnea, with respirations greater than 20 per minute, and Bandemia, greater than 10% bands  Clinical indicators of end organ dysfunction include Lactic Acid greater than 2  Source is pulm  Sepsis protocol initiated  Crystalloid Fluid Administration: Fluid resuscitation ordered per  standard protocol - 30 mL/kg per current or ideal body weight  IV antibiotics as appropriate for source of sepsis  Reassessment: I have reassessed the patient's hemodynamic status    Acute left-sided weakness  Assessment & Plan  Resolved by ER arrival  Stroke alert aborted by stroke neurology  PT/OT/ST    H/o TIA per pt  -added ASA  -continue statin    Continue monitoring  No neuro decifit      TREVOR (acute kidney injury) (HCC)  Assessment & Plan  Cr 1.22  IVF  Minimize nephrotoxic agents, renally dose meds  Check FeNa    Creatinine 1.13    ACP (advance care planning)  Assessment & Plan  Goal of care discussed with patient in the emergency room.  He stated he is agreeable for noninvasive, as well as invasive/heroic life-sustaining measures-including CPR/defibrillation/intubation or mechanical ventilation.  He is agreeable for hospitalization, further treatment with IVF, breathing treatment, antibiotic therapy, any medical management as clinically warranted.  Diagnosis, prognosis, question and concern addressed.  Full CODE STATUS confirmed.  ACP: 17 minutes    Pneumonia due to infectious organism  Assessment & Plan  Cavitary left upper lobe mass  Patient is immunocompromised due to squamous cell lung cancer on chemotherapy  Follow cultures  Antibiotic: Zosyn, Zyvox, azithromycin  Wean off oxygen support as tolerated continue broad-spectrum antibiotics follow-up MRSA screening follow-up    Blood sputum cultures  Will need treatment for possible  pseudomonas as per pulm will get EKG to check qtc.     Continue abx.  Cx neg   Human metapneumovirus positive.   Supportive treatment.    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 Melara, medical oncology    Acute on chronic respiratory failure with  hypoxia (HCC)- (present on admission)  Assessment & Plan  Dependent on 5 L at baseline  Currently requiring 10 L OxyMask-wean off as tolerated  No PE seen on CTA chest  Check COVID/influenza/RSV  Antibiotic for pneumonia  Check TTE    CTA did not show PE  Patient with probably postobstructive pneumonia due to lung mass  Continue antibiotics  Note from pulm reviewed.     Patient requiring higher levels of oxygen today 10 L  Discussed with pulmonology  Continue antibiotics  Continue chest physical therapy  Follow-up fungal results    Patient is requiring higher amount of o2, he is on 40L of o2, I discussed with pulmonologist  Patient started on HFNC.   Continue iv diuresis, iv abx.      Chronic obstructive pulmonary disease (HCC)- (present on admission)  Assessment & Plan  Pulmonary hygiene  -DuoNebs, Trelegy Ellipta, montelukast  -PEP  -s/p Solu-Medrol 125 mg  -Continue Solu-Medrol 40 mg every 8 hours  RT protocol    Patient is on 5 L of oxygen at home now he is requiring 8 L  5L of o2.     10 L of oxygen  Continue steroids  Continue breathing treatment  Discussed with pulmonology    Dyslipidemia- (present on admission)  Assessment & Plan  Statin    Primary hypertension- (present on admission)  Assessment & Plan  Lisinopril, amlodipine         VTE prophylaxis: Heparin    I have performed a physical exam and reviewed and updated ROS and Plan today (4/20/2025). In review of yesterday's note (4/19/2025), there are no changes except as documented above.    Patient is critically ill.   The patient continues to have: With high oxygen requirements  The vital organ system that is affected is the: Respiratory  If untreated there is a high chance of deterioration into: Respiratory failure and eventually death.   The critical care that I am providing today is: Patient is on high flow nasal cannula/noninvasive mechanical ventilation patient requiring 50 L of oxygen, I have discussed with pulmonology,  we have adjusted patient's  antibiotics started on IV meropenem monitor for side effects include but not limited to seizures and doxycycline, continue monitoring for side effects, patient received IV Lasix monitoring for side effects include but not limited to hypotension.  The critical that has been undertaken is medically complex.   There has been no overlap in critical care time.   Critical Care Time not including procedures: 47 minutes      Patient is has a high medical complexity, complex decision making and is at high risk for complication, morbidity, and mortality.

## 2025-04-20 NOTE — PROGRESS NOTES
Bedside report received from day shift RN  JOYCELYN Cast. Pt is currently A&Ox4, SR, breathing at a normal rate and rhythm, warm, dry, and skin color appropriate for ethnicity, and in no visible emotional or physical distress. Bed locked in lowest position, call light is within reach, fall prevention measures in place. Pt educated to use call light before getting out of bed, pt verbalized understanding. Pt is on a cardiac monitor, order verified, transmitter connected appropriately, and leads placed correctly, rhythm and rate assessed by RN, monitor staff updated of changes and parameters as necessary.  No further needs at this time. Assumed care of pt. Report given to assisting staff - CNA/CCT/ACT    Fall Risk Score: HIGH RISK  Fall risk interventions in place: Place yellow fall risk ID band on patient, Provide patient/family education based on risk assessment, Educate patient/family to call staff for assistance when getting out of bed, Place fall precaution signage outside patient door, Place patient in room close to nursing station, Utilize bed/chair fall alarm, Notify charge of high risk for huddle, Bed alarm connected correctly, and Refuses - escalate to charge  Bed type: Regular (Nba Score less than 17 interventions in place)  Patient on cardiac monitor: Yes  IVF/IV medications: Not Applicable   Oxygen: How many liters HFNC 50L and Traced the line to wall oxygen  Bedside sitter: Not Applicable   Isolation: Not applicable

## 2025-04-21 LAB
ANION GAP SERPL CALC-SCNC: 11 MMOL/L (ref 7–16)
BUN SERPL-MCNC: 23 MG/DL (ref 8–22)
CALCIUM SERPL-MCNC: 8.4 MG/DL (ref 8.5–10.5)
CHLORIDE SERPL-SCNC: 101 MMOL/L (ref 96–112)
CO2 SERPL-SCNC: 24 MMOL/L (ref 20–33)
CREAT SERPL-MCNC: 0.91 MG/DL (ref 0.5–1.4)
EKG IMPRESSION: NORMAL
ERYTHROCYTE [DISTWIDTH] IN BLOOD BY AUTOMATED COUNT: 50.4 FL (ref 35.9–50)
GFR SERPLBLD CREATININE-BSD FMLA CKD-EPI: 88 ML/MIN/1.73 M 2
GLUCOSE SERPL-MCNC: 93 MG/DL (ref 65–99)
HCT VFR BLD AUTO: 35.2 % (ref 42–52)
HGB BLD-MCNC: 11.5 G/DL (ref 14–18)
MAGNESIUM SERPL-MCNC: 2.4 MG/DL (ref 1.5–2.5)
MCH RBC QN AUTO: 27.9 PG (ref 27–33)
MCHC RBC AUTO-ENTMCNC: 32.7 G/DL (ref 32.3–36.5)
MCV RBC AUTO: 85.4 FL (ref 81.4–97.8)
PLATELET # BLD AUTO: 191 K/UL (ref 164–446)
PMV BLD AUTO: 9.7 FL (ref 9–12.9)
POTASSIUM SERPL-SCNC: 3.7 MMOL/L (ref 3.6–5.5)
PROCALCITONIN SERPL-MCNC: 0.25 NG/ML
RBC # BLD AUTO: 4.12 M/UL (ref 4.7–6.1)
SODIUM SERPL-SCNC: 136 MMOL/L (ref 135–145)
WBC # BLD AUTO: 4.2 K/UL (ref 4.8–10.8)

## 2025-04-21 PROCEDURE — 99232 SBSQ HOSP IP/OBS MODERATE 35: CPT | Mod: GC | Performed by: STUDENT IN AN ORGANIZED HEALTH CARE EDUCATION/TRAINING PROGRAM

## 2025-04-21 PROCEDURE — 700101 HCHG RX REV CODE 250: Performed by: HOSPITALIST

## 2025-04-21 PROCEDURE — A9270 NON-COVERED ITEM OR SERVICE: HCPCS | Performed by: STUDENT IN AN ORGANIZED HEALTH CARE EDUCATION/TRAINING PROGRAM

## 2025-04-21 PROCEDURE — 99223 1ST HOSP IP/OBS HIGH 75: CPT | Performed by: INTERNAL MEDICINE

## 2025-04-21 PROCEDURE — A9270 NON-COVERED ITEM OR SERVICE: HCPCS | Performed by: HOSPITALIST

## 2025-04-21 PROCEDURE — 700105 HCHG RX REV CODE 258: Performed by: STUDENT IN AN ORGANIZED HEALTH CARE EDUCATION/TRAINING PROGRAM

## 2025-04-21 PROCEDURE — 94640 AIRWAY INHALATION TREATMENT: CPT

## 2025-04-21 PROCEDURE — 80048 BASIC METABOLIC PNL TOTAL CA: CPT

## 2025-04-21 PROCEDURE — 770020 HCHG ROOM/CARE - TELE (206)

## 2025-04-21 PROCEDURE — 700102 HCHG RX REV CODE 250 W/ 637 OVERRIDE(OP): Performed by: HOSPITALIST

## 2025-04-21 PROCEDURE — 99233 SBSQ HOSP IP/OBS HIGH 50: CPT | Performed by: HOSPITALIST

## 2025-04-21 PROCEDURE — 93010 ELECTROCARDIOGRAM REPORT: CPT | Performed by: INTERNAL MEDICINE

## 2025-04-21 PROCEDURE — 85027 COMPLETE CBC AUTOMATED: CPT

## 2025-04-21 PROCEDURE — 700102 HCHG RX REV CODE 250 W/ 637 OVERRIDE(OP): Performed by: STUDENT IN AN ORGANIZED HEALTH CARE EDUCATION/TRAINING PROGRAM

## 2025-04-21 PROCEDURE — 84145 PROCALCITONIN (PCT): CPT

## 2025-04-21 PROCEDURE — 36415 COLL VENOUS BLD VENIPUNCTURE: CPT

## 2025-04-21 PROCEDURE — 83735 ASSAY OF MAGNESIUM: CPT

## 2025-04-21 PROCEDURE — 700111 HCHG RX REV CODE 636 W/ 250 OVERRIDE (IP): Performed by: STUDENT IN AN ORGANIZED HEALTH CARE EDUCATION/TRAINING PROGRAM

## 2025-04-21 RX ADMIN — IPRATROPIUM BROMIDE AND ALBUTEROL SULFATE 3 ML: .5; 2.5 SOLUTION RESPIRATORY (INHALATION) at 10:46

## 2025-04-21 RX ADMIN — FLUTICASONE FUROATE, UMECLIDINIUM BROMIDE AND VILANTEROL TRIFENATATE 1 PUFF: 200; 62.5; 25 POWDER RESPIRATORY (INHALATION) at 09:52

## 2025-04-21 RX ADMIN — ROSUVASTATIN CALCIUM 10 MG: 20 TABLET, FILM COATED ORAL at 17:16

## 2025-04-21 RX ADMIN — TRAZODONE HYDROCHLORIDE 50 MG: 50 TABLET ORAL at 22:57

## 2025-04-21 RX ADMIN — ACETAMINOPHEN 650 MG: 325 TABLET, FILM COATED ORAL at 23:04

## 2025-04-21 RX ADMIN — LISINOPRIL 20 MG: 20 TABLET ORAL at 17:16

## 2025-04-21 RX ADMIN — IPRATROPIUM BROMIDE AND ALBUTEROL SULFATE 3 ML: .5; 2.5 SOLUTION RESPIRATORY (INHALATION) at 03:33

## 2025-04-21 RX ADMIN — IPRATROPIUM BROMIDE AND ALBUTEROL SULFATE 3 ML: .5; 2.5 SOLUTION RESPIRATORY (INHALATION) at 19:59

## 2025-04-21 RX ADMIN — ASPIRIN 81 MG: 81 TABLET, COATED ORAL at 06:10

## 2025-04-21 RX ADMIN — DOXYCYCLINE 100 MG: 100 TABLET, FILM COATED ORAL at 22:57

## 2025-04-21 RX ADMIN — MEROPENEM 500 MG: 500 INJECTION, POWDER, FOR SOLUTION INTRAVENOUS at 00:40

## 2025-04-21 RX ADMIN — MONTELUKAST 10 MG: 10 TABLET, FILM COATED ORAL at 22:57

## 2025-04-21 RX ADMIN — LEVOTHYROXINE SODIUM 100 MCG: 0.1 TABLET ORAL at 06:10

## 2025-04-21 RX ADMIN — CALCIUM POLYCARBOPHIL 625 MG: 625 TABLET, FILM COATED ORAL at 06:09

## 2025-04-21 RX ADMIN — Medication 30 MG: at 22:57

## 2025-04-21 RX ADMIN — AMLODIPINE BESYLATE 5 MG: 5 TABLET ORAL at 06:11

## 2025-04-21 RX ADMIN — DOXYCYCLINE 100 MG: 100 TABLET, FILM COATED ORAL at 09:53

## 2025-04-21 RX ADMIN — IPRATROPIUM BROMIDE AND ALBUTEROL SULFATE 3 ML: .5; 2.5 SOLUTION RESPIRATORY (INHALATION) at 13:58

## 2025-04-21 RX ADMIN — MEROPENEM 500 MG: 500 INJECTION, POWDER, FOR SOLUTION INTRAVENOUS at 12:57

## 2025-04-21 RX ADMIN — IPRATROPIUM BROMIDE AND ALBUTEROL SULFATE 3 ML: .5; 2.5 SOLUTION RESPIRATORY (INHALATION) at 22:34

## 2025-04-21 RX ADMIN — IPRATROPIUM BROMIDE AND ALBUTEROL SULFATE 3 ML: .5; 2.5 SOLUTION RESPIRATORY (INHALATION) at 07:37

## 2025-04-21 RX ADMIN — LISINOPRIL 20 MG: 20 TABLET ORAL at 06:11

## 2025-04-21 RX ADMIN — HEPARIN SODIUM 5000 UNITS: 5000 INJECTION, SOLUTION INTRAVENOUS; SUBCUTANEOUS at 14:26

## 2025-04-21 RX ADMIN — HEPARIN SODIUM 5000 UNITS: 5000 INJECTION, SOLUTION INTRAVENOUS; SUBCUTANEOUS at 06:12

## 2025-04-21 RX ADMIN — PREDNISONE 60 MG: 50 TABLET ORAL at 06:10

## 2025-04-21 RX ADMIN — LOPERAMIDE HYDROCHLORIDE 2 MG: 2 CAPSULE ORAL at 11:35

## 2025-04-21 RX ADMIN — HEPARIN SODIUM 5000 UNITS: 5000 INJECTION, SOLUTION INTRAVENOUS; SUBCUTANEOUS at 22:56

## 2025-04-21 RX ADMIN — SULFAMETHOXAZOLE AND TRIMETHOPRIM 1 TABLET: 800; 160 TABLET ORAL at 09:53

## 2025-04-21 RX ADMIN — MEROPENEM 500 MG: 500 INJECTION, POWDER, FOR SOLUTION INTRAVENOUS at 17:19

## 2025-04-21 RX ADMIN — MEROPENEM 500 MG: 500 INJECTION, POWDER, FOR SOLUTION INTRAVENOUS at 06:09

## 2025-04-21 ASSESSMENT — ENCOUNTER SYMPTOMS
PSYCHIATRIC NEGATIVE: 1
MUSCULOSKELETAL NEGATIVE: 1
HALLUCINATIONS: 0
BRUISES/BLEEDS EASILY: 0
BLURRED VISION: 0
SPUTUM PRODUCTION: 0
ABDOMINAL PAIN: 0
HEMOPTYSIS: 0
HEARTBURN: 0
COUGH: 0
DEPRESSION: 0
ORTHOPNEA: 0
FEVER: 0
MYALGIAS: 0
EYES NEGATIVE: 1
PND: 0
CHILLS: 0
NAUSEA: 0
CONSTITUTIONAL NEGATIVE: 1
SPUTUM PRODUCTION: 1
GASTROINTESTINAL NEGATIVE: 1
DOUBLE VISION: 0
CARDIOVASCULAR NEGATIVE: 1
BACK PAIN: 0
COUGH: 1
CLAUDICATION: 0
NEUROLOGICAL NEGATIVE: 1
PALPITATIONS: 0
WEIGHT LOSS: 0
SHORTNESS OF BREATH: 1
DIZZINESS: 0
HEADACHES: 0
WHEEZING: 1
VOMITING: 0
TINGLING: 0

## 2025-04-21 ASSESSMENT — PAIN DESCRIPTION - PAIN TYPE
TYPE: ACUTE PAIN

## 2025-04-21 ASSESSMENT — FIBROSIS 4 INDEX: FIB4 SCORE: 4.53

## 2025-04-21 NOTE — PROGRESS NOTES
Bedside report received from day shift RN  JOYCELYN Morgan. Pt is currently A&Ox4, SR, breathing at a normal rate and rhythm, warm, dry, and skin color appropriate for ethnicity, and in no visible emotional or physical distress. Bed locked in lowest position, call light is within reach, fall prevention measures in place. Pt educated to use call light before getting out of bed, pt verbalized understanding. Pt is on a cardiac monitor, order verified, transmitter connected appropriately, and leads placed correctly, rhythm and rate assessed by RN, monitor staff updated of changes and parameters as necessary.  No further needs at this time. Assumed care of pt. Report given to assisting staff - CNA/CCT/ACT    Fall Risk Score: MODERATE RISK  Fall risk interventions in place: Provide patient/family education based on risk assessment, Educate patient/family to call staff for assistance when getting out of bed, Place fall precaution signage outside patient door, Place patient in room close to nursing station, Utilize bed/chair fall alarm, and Bed alarm connected correctly  Bed type: Regular (Nba Score less than 17 interventions in place)  Patient on cardiac monitor: Yes  IVF/IV medications: Not Applicable   Oxygen: How many liters 40L and Traced the line to wall oxygen  Bedside sitter: Not Applicable   Isolation: Not applicable

## 2025-04-21 NOTE — CARE PLAN
The patient is Stable - Low risk of patient condition declining or worsening    Shift Goals  Clinical Goals: wean O2, abx  Patient Goals: get better  Family Goals: bandar    Progress made toward(s) clinical / shift goals:    Problem: Knowledge Deficit - Standard  Goal: Patient and family/care givers will demonstrate understanding of plan of care, disease process/condition, diagnostic tests and medications  Outcome: Progressing     Problem: Knowledge Deficit - COPD  Goal: Patient/significant other demonstrates understanding of disease process, utilization of the Action Plan, medications and discharge instruction  Outcome: Progressing     Problem: Fluid Volume  Goal: Fluid volume balance will be maintained  Outcome: Progressing       Patient is not progressing towards the following goals:

## 2025-04-21 NOTE — PROGRESS NOTES
[facility] INFECTIOUS DISEASES INPATIENT CONSULT NOTE     Date of Service: 4/21/2025    Consult Requested By: Steve Rios M.D.    Reason for Consultation: worsening pneumonia    Chief Complaint: Initially presented for evaluation of left-sided weakness    History of Present Illness:     Bright Shirley is a 74 y.o. 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  His left-sided weakness resolved.  He has been managed with antibiotics for pulmonary infection.  During this hospital stay, his oxygen requirement has increased.  Currently he is requiring 40 L oxygen.      Review of Systems:  Patient states he feels better today compared to yesterday.  He states he slept last night.  He states cough has been worse with increased sputum production.  Sputum is yellowish green in color with no blood content.  He mentions having slight chest pain on his left side.  He also mentions having abdominal pain when he coughs.  Denies having shortness of breath, palpitation.    Past Medical History:   Diagnosis Date    Acute exacerbation of chronic obstructive pulmonary disease (COPD) ([facility]) 07/01/2021    Acute hypoxemic respiratory failure ([facility]) 06/08/2021    Acute respiratory failure with hypoxia ([facility]) 04/29/2024    Acute urinary retention 06/07/2021    Aortic atherosclerosis ([facility])     Carotid stenosis, bilateral - mild     Cerebral infarction ([facility]) - based on CT head 7/2022     Cerebral infarction ([facility]) - based on CT head 7/2022     Chronic obstructive pulmonary disease ([facility])     Hypercholesteremia     Hypertension     Intertrochanteric fracture of femur ([facility]) 06/06/2021    Other emphysema ([facility]) 08/07/2019    Supplemental oxygen  dependent 2021       Past Surgical History:   Procedure Laterality Date    PB TREAT INTER/SUBTROCH FX,W/PLATE/SCREW Left 2021    Procedure: FIXATION, FRACTURE, HIP, USING DYNAMIC HIP SCREW, WITH COMPRESSION;  Surgeon: Avinash Suazo M.D.;  Location: SURGERY [facility];  Service: Orthopedics    HERNIA REPAIR Right     ORIF, FEMUR Right     15 years ago    OTHER      right inguinal hernia repair    OTHER ORTHOPEDIC SURGERY      right hip       Family History   Problem Relation Age of Onset    Breast Cancer Mother     Cancer Mother         breast cancer    Stroke Mother     Other Father         cirrhosis form alcohol    Stroke Sister     Heart Disease Brother         cardiac aneurysm    Hypertension Maternal Grandmother     Hyperlipidemia Maternal Grandmother     Ovarian Cancer Neg Hx     Tubal Cancer Neg Hx     Colorectal Cancer Neg Hx     Peritoneal Cancer Neg Hx        Social History     Socioeconomic History    Marital status: Single     Spouse name: Not on file    Number of children: Not on file    Years of education: Not on file    Highest education level: Bachelor's degree (e.g., BA, AB, BS)   Occupational History    Not on file   Tobacco Use    Smoking status: Former     Current packs/day: 0.00     Average packs/day: 1 pack/day for 49.3 years (49.3 ttl pk-yrs)     Types: Cigarettes     Start date:      Quit date: 2024     Years since quittin.9    Smokeless tobacco: Never    Tobacco comments:     vape pen & cigarettes     49 years total andria an average of 1 ppd   Vaping Use    Vaping status: Former    Substances: Nicotine   Substance and Sexual Activity    Alcohol use: Not Currently     Comment: not often    Drug use: Not Currently     Types: Marijuana     Comment: THC edibles - rarely/ NOT IN MONTHS    Sexual activity: Not Currently   Other Topics Concern    Not on file   Social History Narrative    Retired construction      Social Drivers of Health     Financial  Resource Strain: Low Risk  (12/15/2024)    Overall Financial Resource Strain (CARDIA)     Difficulty of Paying Living Expenses: Not very hard   Food Insecurity: No Food Insecurity (4/16/2025)    Hunger Vital Sign     Worried About Running Out of Food in the Last Year: Never true     Ran Out of Food in the Last Year: Never true   Transportation Needs: No Transportation Needs (4/16/2025)    PRAPARE - Transportation     Lack of Transportation (Medical): No     Lack of Transportation (Non-Medical): No   Physical Activity: Inactive (12/15/2024)    Exercise Vital Sign     Days of Exercise per Week: 0 days     Minutes of Exercise per Session: 0 min   Stress: Stress Concern Present (12/15/2024)    [employer] [place] [employer] - Occupational Stress Questionnaire     Feeling of Stress : To some extent   Social Connections: Socially Isolated (12/15/2024)    Social Connection and Isolation Panel [NHANES]     Frequency of Communication with Friends and Family: Once a week     Frequency of Social Gatherings with Friends and Family: Once a week     Attends Mu-ism Services: Never     Active Member of Clubs or Organizations: No     Attends Club or Organization Meetings: Never     Marital Status:    Intimate Partner Violence: Not At Risk (4/16/2025)    Humiliation, Afraid, Rape, and Kick questionnaire     Fear of Current or Ex-Partner: No     Emotionally Abused: No     Physically Abused: No     Sexually Abused: No   Housing Stability: Low Risk  (4/16/2025)    Housing Stability Vital Sign     Unable to Pay for Housing in the Last Year: No     Number of Times Moved in the Last Year: 0     Homeless in the Last Year: No       Allergies   Allergen Reactions    Seasonal Runny Nose and Itching     Seasonal allergies       Medications:    Current Facility-Administered Medications:     meropenem (Merrem) 500 mg in  mL IV-MBP, 500 mg, Intravenous, Q6HRS, Chantell Dennis M.D., Stopped at 04/21/25 0639     doxycycline monohydrate (Adoxa) tablet 100 mg, 100 mg, Oral, Q12HRS, Chantell Dennis M.D., 100 mg at 04/20/25 2147    ipratropium-albuterol (DUONEB) nebulizer solution, 3 mL, Nebulization, Q4HRS (RT), Steve Rios M.D., 3 mL at 04/21/25 0737    fluticasone-umeclidinium-vilanterol (Trelegy Ellipta) 200-62.5-25 mcg/act inhaler 1 Puff, 1 Puff, Inhalation, QDAILY (RT), Steve Rios M.D., 1 Puff at 04/20/25 0716    loperamide (Imodium) capsule 2 mg, 2 mg, Oral, 4X/DAY PRN, Steve Rios M.D., 2 mg at 04/20/25 1502    sulfamethoxazole-trimethoprim (Bactrim DS) 800-160 MG tablet 1 Tablet, 1 Tablet, Oral, 3x/week, Chantell Dennis M.D., 1 Tablet at 04/19/25 0231    predniSONE (Deltasone) tablet 60 mg, 60 mg, Oral, DAILY, Chantell Dennis M.D., 60 mg at 04/21/25 0610    LR (Bolus) infusion 500 mL, 500 mL, Intravenous, Once PRN, Benny Kamara M.D.    acetaminophen (Tylenol) tablet 650 mg, 650 mg, Oral, Q6HRS PRN, Benny Kamara M.D., 650 mg at 04/20/25 2246    labetalol (Normodyne/Trandate) injection 10 mg, 10 mg, Intravenous, Q4HRS PRN, Benny Kamara M.D.    ondansetron (Zofran) syringe/vial injection 4 mg, 4 mg, Intravenous, Q4HRS PRN **OR** ondansetron (Zofran ODT) dispertab 4 mg, 4 mg, Oral, Q4HRS PRN, Benny Kamara M.D.    albuterol inhaler 2 Puff, 2 Puff, Inhalation, Q4HRS PRN, Benny Kamara M.D.    amLODIPine (Norvasc) tablet 5 mg, 5 mg, Oral, DAILY, Benny Kamara M.D., 5 mg at 04/21/25 0611    artificial tears ophthalmic solution 1 Drop, 1 Drop, Both Eyes, Q HOUR PRN, Benny Kamara M.D.    calcium polycarbophil (Fibercon) tablet 625 mg, 625 mg, Oral, DAILY, Benny Kamara M.D., 625 mg at 04/21/25 0609    levothyroxine (Synthroid) tablet 100 mcg, 100 mcg, Oral, AM ES, Benny Kamara M.D., 100 mcg at 04/21/25 0610    lisinopril (Prinivil) tablet 20 mg, 20 mg, Oral, BID, Benny Kamara M.D., 20 mg at 04/21/25 0611    melatonin tablet 30 mg, 30 mg, Oral, Q EVENING, Benny Kamara M.D., 30 mg  "at 25 2243    rosuvastatin (Crestor) tablet 10 mg, 10 mg, Oral, Q EVENING, Benny Kamara M.D., 10 mg at 25 182    traZODone (Desyrel) tablet 50 mg, 50 mg, Oral, Nightly, Benny Kamara M.D., 50 mg at 25    Respiratory Therapy Consult, , Nebulization, Continuous RT, Benny Kamara M.D.    albuterol (Proventil) 2.5mg/0.5ml nebulizer solution 2.5 mg, 2.5 mg, Nebulization, Q2HRS PRN (RT), Benny Kamara M.D.    montelukast (Singulair) tablet 10 mg, 10 mg, Oral, Nightly, Benny Kamara M.D., 10 mg at 25 2147    heparin injection 5,000 Units, 5,000 Units, Subcutaneous, Q8HRS, Benny Kamara M.D., 5,000 Units at 25 0612    aspirin EC tablet 81 mg, 81 mg, Oral, DAILY, Benny Kamara M.D., 81 mg at 25 0610    Physical Exam:   Vital Signs: BP (!) 151/82   Pulse 63   Temp 36.3 °C (97.3 °F) (Temporal)   Resp 19   Ht 1.63 m (5' 4.17\")   Wt 67.5 kg (148 lb 13 oz)   SpO2 90%   BMI 25.41 kg/m²   Temp  Av.5 °C (97.7 °F)  Min: 36 °C (96.8 °F)  Max: 37.3 °C (99.2 °F)  Vital signs reviewed    Constitutional: Patient is oriented to person, place, and time. Appears well-developed and well-nourished. No distress  Head: Atraumatic, normocephalic  Eyes: Conjunctivae normal  Mouth/Throat: Lips without lesions  Cardiovascular: Normal rate  Pulmonary/Chest: CTA, No respiratory distress. Unlabored respiratory effort  Abdominal: Non distended  Musculoskeletal: No joint tenderness, swelling, erythema, or restriction of motion noted.  Neurological: Alert and oriented to person, place, and time. No gross cranial nerve deficit. No focal neural deficit noted  Skin: Skin is warm and dry. No rashes or embolic phenomena noted on exposed skin  Psychiatric: Normal mood and affect. Behavior is normal.     LABS:  Recent Labs     25  0431 25  0037 25  0325   WBC 4.9 5.2 4.2*      Recent Labs     25  0431 25  0037 25  0325   HEMOGLOBIN 12.9* 11.5* 11.5*   HEMATOCRIT 41.5* " 37.0* 35.2*   MCV 88.3 87.1 85.4   MCH 27.4 27.1 27.9   PLATELETCT 174 178 191       Recent Labs     04/19/25  0431 04/20/25  0037 04/21/25  0325   SODIUM 134* 135 136   POTASSIUM 3.4* 3.9 3.7   CHLORIDE 99 100 101   CO2 22 22 24   CREATININE 0.98 1.21 0.91        MICRO:    Latest pertinent labs were reviewed    IMAGING STUDIES:  Per my read, his chest x ray showed left upper lobe air space opacity and interstitial opacities scattered on bilateral lung field.   CT showed cavitary stable mass in left upper lobe. Also showed extensive edematous changes in lungs with peripheral honeycombing.     Hospital Course/Assessment:   Bright Shirley is a 74 y.o. male with a history of COPD, squamous cell carcinoma of left lung upper lobe on chemotherapy with pembrolizumab has acute on chronic hypoxic respiratory failure secondary to infectious etiology and COPD exacerbation, complicated by chemotherapy.  Tested positive for human metapneumovirus infection. Suspect superimposed fungal vs versus bacterial infection. Suspicion of aspiration less likely given involvement of left upper lung, even though brief history of left sided weakness is present. Procalcitonin downtrending. MRSA negative. COVID/flu/ RSV negative. fungal cultures of sputum-rare fungal elements , aspergillus galactomannan /fungitell negative.    Pertinent Diagnoses:  Acute on chronic hypoxic respiratory failure likely secondary to pneumonia, most likely due to human metapneumovirus infection  COPD exacerbation   Squamous cell carcinoma of left upper lung on chemotherapy with pembrolizumab     Plan:   - Continue current antibiotic regimen as follows:  - continue meropenem 500mg q6h (Day 2)  - continue doxycycline 100mg BID (Day 2)  - Droplet and contact precaution for immunocompromised state and human metapneumovirus infection  - Continue PJP prophylaxis with TMP-SMX  - Continue prednisone 60 mg daily   - Trend Procalcitonin  - Follow fungal test  -  supplementary oxygen as required     Please feel free to call with questions.    We will continue to follow.     This presentation represents a severe exacerbation of the patient's chornic illness    Jeremiah Boyle M.D.    Please note that this dictation was created using voice recognition software. I have worked with technical experts from [facility] to optimize the interface.  I have made every reasonable attempt to correct obvious errors, but there may be errors of grammar and possibly content that I did not discover before finalizing the note.

## 2025-04-21 NOTE — PROGRESS NOTES
[facility] Medicine Daily Progress Note    Date of Service  4/21/2025    Chief Complaint  Bright Shirley is a 74 y.o. male admitted 4/15/2025 with pneumonia hypoxia    Hospital Course  Bright Shirley is a 74 y.o. 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  Patient initially presented to ER for stroke concern.  Patient was evaluated by neurology, stroke load was aborted and his weakness on left side was resolved.  He is however requiring increased oxygen requirement of 10 L oxy mask.  CTA chest noted to have stable cavitary mass in left upper lobe, extensive edematous changes in lung peripheral honeycombing.  He does have concern lactic acid of 5.3.  Due to concern of severe sepsis, admission requested by ERP.  Therefore, admitted to medicine service for further evaluation and treatment.     Interval Problem Update  4/16 patient is new to me today, patient is alert oriented follows commands, he is requiring 8 L of oxygen when I saw her in the morning, he is able to speak in full sentences, he stated that at home he was getting short of breath with exertion, normally he is comfortable at rest with 5 L of oxygen but he feels that he is requiring more with exertion/ambulation, will continue antibiotics, CT results reviewed, MRSA screen is negative and sputum cultures and blood cultures pending, discussed with bedside nurse charge nurse  pharmacist  4/17 patient is resting in chair, he is alert oriented follows commands no new neurological deficit, still having productive cough, continue IV antibiotics, notes from pulmonology reviewed, will continue with antipseudomonal coverage for now due to high risk, follow-up final blood culture results, discussed with bedside nurse charge nurse  pharmacist.  4/18 patient is resting in chair,  he is in good spirit, he is requiring 10 L of oxygen early in the morning, however the repeat chest x-ray discussed with pulmonology, continue chest physiotherapy s/p RT flutter valve, continue antibiotics, follow-up cultures, I will order respiratory panel, discussed medicine return discussed palliative pharmacist, later today patient's oxygen has decreased to 8 L, follow-up blood work in a.m.  4/19 patient is in bed, no fever or chills not in distress, patient is requiring higher amount of o2 patient is on 40L by HFNC, patient is able to tolerate diet, diarrhea resolved, c diff neg, human pneumovirus positive, discussed with pulmonologist, I have started him on iv lasix, continue abx, steroids nebs. Discussed with bedside nurse, charge nurse .   4/20 patient is resting in bed, patient is alert oriented follows commands, patient requiring 50 L of oxygen by high flow nasal cannula, discussed with pulmonology, transition to meropenem and doxycycline, continue supportive treatment, received IV Lasix today, continue RT per protocol, discussed with bedside nurse charge nurse  pharmacist.  4/21 patient is resting in bed, he is alert oriented follows commands, he is still requiring 4 L of oxygen but clinically doing okay, discussed with pulmonology discussed with infectious disease at this time appears to be related to bilateral pneumonia, continue antibiotics for 1 more day if patient continues to improve clinically probably will be able to stop antibiotics tomorrow, continue chest physical therapy incentive spirometry flutter valve, I discussed with bedside nurse charge nurse  pharmacist    I have discussed this patient's plan of care and discharge plan at IDT rounds today with Case Management, Nursing, Nursing leadership, and other members of the IDT team.    Consultants/Specialty  Pulmonology  ID    Code Status  Full Code    Disposition  The patient is not medically cleared for  discharge to home or a post-acute facility.      I have placed the appropriate orders for post-discharge needs.    Review of Systems  Review of Systems   Constitutional:  Negative for chills and fever.   Eyes:  Negative for blurred vision and double vision.   Respiratory:  Positive for cough, sputum production, shortness of breath and wheezing. Negative for hemoptysis.    Cardiovascular:  Negative for chest pain, palpitations, claudication, leg swelling and PND.   Gastrointestinal:  Negative for heartburn, nausea and vomiting.   Genitourinary:  Negative for hematuria and urgency.   Musculoskeletal:  Negative for back pain and myalgias.   Skin:  Negative for rash.   Neurological:  Negative for dizziness and headaches.   Endo/Heme/Allergies:  Does not bruise/bleed easily.   Psychiatric/Behavioral:  Negative for depression.         Physical Exam  Temp:  [36.2 °C (97.2 °F)-36.4 °C (97.5 °F)] 36.4 °C (97.5 °F)  Pulse:  [] 81  Resp:  [17-24] 18  BP: ()/(61-82) 131/79  SpO2:  [90 %-95 %] 94 %    Physical Exam  Vitals and nursing note reviewed.   Constitutional:       Appearance: He is ill-appearing.   Eyes:      General:         Right eye: No discharge.         Left eye: No discharge.   Cardiovascular:      Rate and Rhythm: Normal rate and regular rhythm.      Pulses: Normal pulses.      Heart sounds: Normal heart sounds.   Pulmonary:      Effort: Pulmonary effort is normal. No respiratory distress.      Breath sounds: Normal breath sounds.   Abdominal:      General: Bowel sounds are normal. There is no distension.      Tenderness: There is no guarding.   Musculoskeletal:         General: Normal range of motion.      Cervical back: Normal range of motion and neck supple.      Right lower leg: No edema.      Left lower leg: No edema.   Skin:     General: Skin is warm and dry.      Capillary Refill: Capillary refill takes less than 2 seconds.      Coloration: Skin is not jaundiced.   Neurological:      General:  No focal deficit present.      Mental Status: He is alert and oriented to person, place, and time.      Cranial Nerves: No cranial nerve deficit.   Psychiatric:         Mood and Affect: Mood normal.         Behavior: Behavior normal.         Fluids    Intake/Output Summary (Last 24 hours) at 4/21/2025 1601  Last data filed at 4/21/2025 1238  Gross per 24 hour   Intake 840 ml   Output 600 ml   Net 240 ml        Laboratory  Recent Labs     04/19/25  0431 04/20/25  0037 04/21/25  0325   WBC 4.9 5.2 4.2*   RBC 4.70 4.25* 4.12*   HEMOGLOBIN 12.9* 11.5* 11.5*   HEMATOCRIT 41.5* 37.0* 35.2*   MCV 88.3 87.1 85.4   MCH 27.4 27.1 27.9   MCHC 31.1* 31.1* 32.7   RDW 53.2* 52.6* 50.4*   PLATELETCT 174 178 191   MPV 9.5 9.7 9.7     Recent Labs     04/19/25 0431 04/20/25 0037 04/21/25  0325   SODIUM 134* 135 136   POTASSIUM 3.4* 3.9 3.7   CHLORIDE 99 100 101   CO2 22 22 24   GLUCOSE 81 93 93   BUN 19 26* 23*   CREATININE 0.98 1.21 0.91   CALCIUM 8.5 8.2* 8.4*                     Imaging  DX-CHEST-LIMITED (1 VIEW)   Final Result      Worsening bilateral pulmonary infiltrates.      DX-CHEST-PORTABLE (1 VIEW)   Final Result         1.  Pulmonary edema and/or infiltrates, greatest in the left upper lobe, slightly decreased since prior study.      EC-ECHOCARDIOGRAM COMPLETE W/O CONT   Final Result      CT-CTA CHEST PULMONARY ARTERY W/ RECONS   Final Result      1. No acute pulmonary embolus.   2. Stable cavitary mass in the left upper lobe.   3. Extensive edematous changes in the lungs with peripheral honeycombing.   4. Aortic and coronary arterial sclerosis.   5. Simple appearing right renal cortical cyst, requiring no further follow-up.   6. Stable bilateral adrenal masses.   7. Acute or subacute left third through fifth rib fractures.            DX-CHEST-PORTABLE (1 VIEW)   Final Result      1.  LEFT upper lung consolidation, likely pneumonia.  Underlying mass is not excluded.   2.  Probable pulmonary fibrosis.   3.  Limited by  positioning.           Assessment/Plan  * Severe sepsis (HCC)- (present on admission)  Assessment & Plan  This is Sepsis Present on admission  SIRS criteria identified on my evaluation include: Tachycardia, with heart rate greater than 90 BPM, Tachypnea, with respirations greater than 20 per minute, and Bandemia, greater than 10% bands  Clinical indicators of end organ dysfunction include Lactic Acid greater than 2  Source is pulm  Sepsis protocol initiated  Crystalloid Fluid Administration: Fluid resuscitation ordered per standard protocol - 30 mL/kg per current or ideal body weight  IV antibiotics as appropriate for source of sepsis  Reassessment: I have reassessed the patient's hemodynamic status    Acute left-sided weakness  Assessment & Plan  Resolved by ER arrival  Stroke alert aborted by stroke neurology  PT/OT/ST    H/o TIA per pt  -added ASA  -continue statin    Continue monitoring  No neuro decifit      TREVOR (acute kidney injury) (HCC)  Assessment & Plan  Cr 1.22  IVF  Minimize nephrotoxic agents, renally dose meds  Check FeNa    Creatinine 1.13    ACP (advance care planning)  Assessment & Plan  Goal of care discussed with patient in the emergency room.  He stated he is agreeable for noninvasive, as well as invasive/heroic life-sustaining measures-including CPR/defibrillation/intubation or mechanical ventilation.  He is agreeable for hospitalization, further treatment with IVF, breathing treatment, antibiotic therapy, any medical management as clinically warranted.  Diagnosis, prognosis, question and concern addressed.  Full CODE STATUS confirmed.  ACP: 17 minutes    Pneumonia due to infectious organism  Assessment & Plan  Cavitary left upper lobe mass  Patient is immunocompromised due to squamous cell lung cancer on chemotherapy  Follow cultures  Antibiotic: Zosyn, Zyvox, azithromycin  Wean off oxygen support as tolerated continue broad-spectrum antibiotics follow-up MRSA screening follow-up    Blood  sputum cultures  Will need treatment for possible  pseudomonas as per pulm will get EKG to check qtc.     Continue abx.  Cx neg   Human metapneumovirus positive.   Supportive treatment.    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 Melara, medical oncology    Acute on chronic respiratory failure with hypoxia (HCC)- (present on admission)  Assessment & Plan  Dependent on 5 L at baseline  Currently requiring 10 L OxyMask-wean off as tolerated  No PE seen on CTA chest  Check COVID/influenza/RSV  Antibiotic for pneumonia  Check TTE    CTA did not show PE  Patient with probably postobstructive pneumonia due to lung mass  Continue antibiotics  Note from pulm reviewed.     Patient requiring higher levels of oxygen today 10 L  Discussed with pulmonology  Continue antibiotics  Continue chest physical therapy  Follow-up fungal results    Patient is requiring higher amount of o2, he is on 40L of o2, I discussed with pulmonologist  Patient started on HFNC.   Continue iv diuresis, iv abx.      Chronic obstructive pulmonary disease (HCC)- (present on admission)  Assessment & Plan  Pulmonary hygiene  -DuoNebs, Trelegy Ellipta, montelukast  -PEP  -s/p Solu-Medrol 125 mg  -Continue Solu-Medrol 40 mg every 8 hours  RT protocol    Patient is on 5 L of oxygen at home now he is requiring 8 L  5L of o2.     10 L of oxygen  Continue steroids  Continue breathing treatment  Discussed with pulmonology    Dyslipidemia- (present on admission)  Assessment & Plan  Statin    Primary hypertension- (present on admission)  Assessment & Plan  Lisinopril, amlodipine         VTE prophylaxis: Heparin    I have performed a physical exam and reviewed and updated ROS and Plan today (4/21/2025). In review of yesterday's note  (4/20/2025), there are no changes except as documented above.        I reviewed CBC  I reviewed BMP  I reviewed magnesium levels  I discussed with infectious disease  I discussed with pulmonology  Patient is on IV meropenem monitor for side effects include but not limited to seizures.  Patient is on doxycycline monitor for side effects including limited to photosensitivity  Discussed with clinical pharmacist  I have ordered blood work for in a.m. CBC BMP  Monitoring fungus workup

## 2025-04-21 NOTE — PROGRESS NOTES
Monitor Summary  Rhythm: SR ST  Rate:  (42 unsustained)  Ectopy: freq PAC rare PVC rare bigem rare trigem rare coup  .14 / .07 / .36

## 2025-04-21 NOTE — DISCHARGE PLANNING
Care Transition Team Assessment  Patient is a 74 year-old male admitted for possible stroke. Please see pt's H&P for prior medical history. RNCM met with pt at bedside to complete assessment. Pt A&Ox4 and able to verify the information on the face sheet. Pt lives with roommate in a single-story. Emergency contact is roommate Chalino Min [phone]. Prior to admission patient is independent with ADL's and IADL’s. Pt has a cane and FWW that he uses PRN and uses 5L O2 at baseline supplied by [employer]. Pt reported that friends good support. Pt receives monthly SSI deposits. The patient's PCP is Dr. Martinez. Patient's preferred pharmacy is [facility]. Patient reports a history of SNF/IPR ([facility]) and HHC ([facility]) use in the past. Pt denies any SA or MH concerns, reports having 1 alcoholic beverage about 3 times per week. Patients confirmed medical coverage with [employer].  Patient has means to transportation and a friend will provide transport once medically stable for discharge. RNCM discussed discharge planning. Patient reported pt's goal is to return home once medically stable. Patient is agreeable to placement if recommenced.     Information Source  Orientation Level: Oriented X4  Information Given By: Patient  Who is responsible for making decisions for patient? : Patient    Readmission Evaluation  Is this a readmission?: No    Elopement Risk  Legal Hold: No  Ambulatory or Self Mobile in Wheelchair: Yes  Disoriented: No  Psychiatric Symptoms: None  History of Wandering: No  Elopement this Admit: No  Vocalizing Wanting to Leave: No  Displays Behaviors, Body Language Wanting to Leave: No-Not at Risk for Elopement  Elopement Risk: Not at Risk for Elopement    Interdisciplinary Discharge Planning  Lives with - Patient's Self Care Capacity: Unrelated Adult  Patient or legal guardian wants to designate a caregiver: No  Support Systems: Friends / Neighbors  Housing / Facility: [address]  Prior Services:  Home-Independent    Discharge Preparedness  What is your plan after discharge?: Home with help  What are your discharge supports?: Other (comment) (roommate)  Prior Functional Level: Ambulatory, Drives Self, Independent with Activities of Daily Living, Independent with Medication Management, Uses Walker, Uses Cane  Difficulity with ADLs: Walking  Difficulty with ADLs Comment: has cane and fww that he uses PRN  Difficulity with IADLs: None    Functional Assesment  Prior Functional Level: Ambulatory, Drives Self, Independent with Activities of Daily Living, Independent with Medication Management, Uses Walker, Uses Cane    Finances  Financial Barriers to Discharge: No  Prescription Coverage: Yes    Vision / Hearing Impairment  Vision Impairment : Yes  Right Eye Vision: Impaired, Wears Glasses  Left Eye Vision: Impaired, Wears Glasses    Advance Directive  Advance Directive?: None  Advance Directive offered?: AD Booklet refused    Domestic Abuse  Possible Abuse/Neglect Reported to:: Not Applicable    Psychological Assessment  History of Substance Abuse: None  History of Psychiatric Problems: No  Non-compliant with Treatment: No  Newly Diagnosed Illness: No    Discharge Risks or Barriers  Discharge risks or barriers?: Complex medical needs  Patient risk factors: Complex medical needs    Anticipated Discharge Information  Discharge Disposition: D/T to home under HHA care in anticipation of covered skilled care (06)  Discharge Address: [address] [place] [id number]  Discharge Contact Phone Number: [phone]  Case Management Discharge Planning    Admission Date: 4/15/2025  GMLOS: 4  ALOS: 6    6-Clicks ADL Score: 21  6-Clicks Mobility Score: 21    Anticipated Discharge Dispo: Discharge Disposition: D/T to home under HHA care in anticipation of covered skilled care (06)  Discharge Address: [address] [place] [id number]  Discharge Contact Phone Number: [phone]    DME Needed: No    Action(s) Taken: ERICK  Assessment Complete (See below)    Escalations Completed: None    Medically Clear: No    Next Steps: RNCM to continue to follow patient for DCP needs. Patient still requiring high flow O2. Referral sent to [facility]. Patient reports he would be in agreement with placement if needed.    Barriers to Discharge: Medical clearance

## 2025-04-21 NOTE — CARE PLAN
The patient is Watcher - Medium risk of patient condition declining or worsening    Shift Goals  Clinical Goals: Cardiac monitor, VSS  Patient Goals: rest  Family Goals: bandar    Progress made toward(s) clinical / shift goals:    Problem: Knowledge Deficit - Standard  Goal: Patient and family/care givers will demonstrate understanding of plan of care, disease process/condition, diagnostic tests and medications  Outcome: Progressing     Problem: Respiratory  Goal: Patient will achieve/maintain optimum respiratory ventilation and gas exchange  Outcome: Progressing  WoB assessed, all lobes auscultated. O2 weaned as tolerated. Remaining on 40L 60%. Suction provided as needed.      Patient is not progressing towards the following goals:

## 2025-04-21 NOTE — PROGRESS NOTES
Bedside report received from off going RN/tech: Iesha, assumed care of patient.     Fall Risk Score: MODERATE RISK  Fall risk interventions in place: Place yellow fall risk ID band on patient, Provide patient/family education based on risk assessment, Educate patient/family to call staff for assistance when getting out of bed, Place fall precaution signage outside patient door, Place patient in room close to nursing station, and Utilize bed/chair fall alarm  Bed type: Regular (Nba Score less than 17 interventions in place)  Patient on cardiac monitor: Yes  IVF/IV medications: Not Applicable   Oxygen: HFNC 40L @ 50%   Bedside sitter: Not Applicable   Isolation: Isolation precautions in place

## 2025-04-21 NOTE — CARE PLAN
Problem: Bronchoconstriction  Goal: Improve in air movement and diminished wheezing  Description: Target End Date:  2 to 3 days1.  Implement inhaled treatments2.  Evaluate and manage medication effects  Outcome: Not Met     Problem: Humidified High Flow Nasal Cannula  Goal: Maintain adequate oxygenation dependent on patient condition  Description: 1.  Implement humidified high flow oxygen therapy2.  Titrate high flow oxygen to maintain appropriate SpO2  Outcome: Not Met   N FNC

## 2025-04-21 NOTE — CONSULTS
Please see separate consult note by [facility] resident, Dr. oByle, with my attestation.        Note type incorrectly entered as a progress note.  Progress note by Dr. Boyle from today will serve as the new ID consult note

## 2025-04-21 NOTE — PROGRESS NOTES
Progress note    HPI: Bright Shirley is a 74 y.o. male who presented on 4/15/2025 with left-sided weakness concerning for stroke. He was in his usual state of health, was accidentally disconnected from his oxygen support on 4/15/2025 at home which led to his weakness as per patient. 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 He has now tapered down to prednisone 20 mg/day. since admission he was started on higher dose of steroids, antibiotics which improved his shortness of breath and is back to his baseline oxygen support of 5 L.     INTERVAL HISTORY:   4/17: still continues to require 5-6 L oxygen support, still has SOB with cough  4/18: He continues to require 8 to 10 L oxygen support due to  desaturation when he coughs, respiratory panel positive for human metapneumovirus,  4/19: he has significant oxygen requirements now on HFNC , and desaturates when he is coughing or ambulating   4/20 : no significant improvement still on HFNC and desaturates , spoke to ID about the situation agreeable to escalate abx to meropenem and doxycycline , as he is high risk for bronchoscopy at this time   4/21: No significant clinical improvement.  HFNC 40/50.  No significant change in CBC/CMP but Pro-Young trending down.  Zosyn for 4 days (last 4/20).  Currently meropenem/doxycycline day 2     Vitals:    04/21/25 0458 04/21/25 0611 04/21/25 0740 04/21/25 0743   BP:  115/64 125/62  (!) 151/82   Pulse: 67  67 63   Resp: 20  18 19   Temp: 36.2 °C (97.2 °F)   36.3 °C (97.3 °F)   TempSrc: Temporal   Temporal   SpO2: 91%  90% 90%   Weight: 67.5 kg (148 lb 13 oz)      Height:       Body mass index is 25.41 kg/m².    Review of Systems   Constitutional: Negative.  Negative for chills, fever and weight loss.   HENT: Negative.     Eyes: Negative.    Respiratory:  Positive for shortness of breath. Negative for cough, hemoptysis and sputum production.    Cardiovascular: Negative.  Negative for chest pain, palpitations and orthopnea.   Gastrointestinal: Negative.  Negative for abdominal pain and heartburn.   Genitourinary: Negative.  Negative for dysuria.   Musculoskeletal: Negative.  Negative for myalgias.   Skin: Negative.  Negative for rash.   Neurological: Negative.  Negative for dizziness, tingling and headaches.   Endo/Heme/Allergies: Negative.    Psychiatric/Behavioral: Negative.  Negative for depression, hallucinations and suicidal ideas.    All other systems reviewed and are negative.     Physical Exam  Vitals and nursing note reviewed.   Constitutional:       General: He is not in acute distress.     Appearance: Normal appearance. He is not ill-appearing, toxic-appearing or diaphoretic.   Cardiovascular:      Rate and Rhythm: Normal rate and regular rhythm.      Pulses: Normal pulses.      Heart sounds: Normal heart sounds. No murmur heard.  Pulmonary:      Effort: Pulmonary effort is normal. No respiratory distress.      Breath sounds: Normal breath sounds. No stridor.      Comments: Respiratory crackles most noticeable in right lung  Abdominal:      General: Bowel sounds are normal. There is no distension.      Palpations: Abdomen is soft.      Tenderness: There is no abdominal tenderness. There is no guarding.   Musculoskeletal:         General: No swelling or tenderness. Normal range of motion.      Right lower leg: No edema.      Left lower leg: No edema.   Skin:     General:  Skin is warm.      Capillary Refill: Capillary refill takes less than 2 seconds.      Coloration: Skin is not jaundiced or pale.      Findings: No bruising.   Neurological:      General: No focal deficit present.      Mental Status: He is alert. Mental status is at baseline.      Cranial Nerves: No cranial nerve deficit.      Sensory: No sensory deficit.      Coordination: Coordination normal.      Gait: Gait normal.      Deep Tendon Reflexes: Reflexes normal.   Psychiatric:         Mood and Affect: Mood normal.         Behavior: Behavior normal.         Thought Content: Thought content normal.         Judgment: Judgment normal.         LABS:   Recent Labs     04/19/25 0431 04/20/25 0037 04/21/25  0325   WBC 4.9 5.2 4.2*   RBC 4.70 4.25* 4.12*   HEMOGLOBIN 12.9* 11.5* 11.5*   HEMATOCRIT 41.5* 37.0* 35.2*   MCV 88.3 87.1 85.4   MCH 27.4 27.1 27.9   MCHC 31.1* 31.1* 32.7   RDW 53.2* 52.6* 50.4*   PLATELETCT 174 178 191   MPV 9.5 9.7 9.7      Recent Labs     04/19/25 0431 04/20/25  0037 04/21/25  0325   SODIUM 134* 135 136   POTASSIUM 3.4* 3.9 3.7   CHLORIDE 99 100 101   CO2 22 22 24   GLUCOSE 81 93 93   BUN 19 26* 23*   CREATININE 0.98 1.21 0.91   CALCIUM 8.5 8.2* 8.4*      Recent Labs     04/19/25 0431 04/20/25  0037 04/21/25  0325   GLUCOSE 81 93 93      RADIOLOGY:   DX-CHEST-LIMITED (1 VIEW)   Final Result      Worsening bilateral pulmonary infiltrates.      DX-CHEST-PORTABLE (1 VIEW)   Final Result         1.  Pulmonary edema and/or infiltrates, greatest in the left upper lobe, slightly decreased since prior study.      EC-ECHOCARDIOGRAM COMPLETE W/O CONT   Final Result      CT-CTA CHEST PULMONARY ARTERY W/ RECONS   Final Result      1. No acute pulmonary embolus.   2. Stable cavitary mass in the left upper lobe.   3. Extensive edematous changes in the lungs with peripheral honeycombing.   4. Aortic and coronary arterial sclerosis.   5. Simple appearing right renal cortical cyst, requiring no further  follow-up.   6. Stable bilateral adrenal masses.   7. Acute or subacute left third through fifth rib fractures.            DX-CHEST-PORTABLE (1 VIEW)   Final Result      1.  LEFT upper lung consolidation, likely pneumonia.  Underlying mass is not excluded.   2.  Probable pulmonary fibrosis.   3.  Limited by positioning.           ASSESSMENT/PLAN:   Acute on chronic hypoxic respiratory failure secondary to human metapneumovirus with prior history of poorly differentiated squamous cell carcinoma of left upper lobe and COPD with severe emphysema.  Chronic component concerning for CPFT/ILD versus immunotherapy induced pneumonitis.  - S/p Zosyn for 4 days, now on meropenem/doxycycline day 2 with improving Pro-Young.  Negative COVID/flu/RSV, MRSA, Aspergillus/Fungitell.  Sputum culture in progress..  Followed by ID  - Continue weight-based prednisone, monitor glucose   - Prophylaxis with Bactrim  Poorly differentiated squamous cell carcinoma pulm upper lobe.  PET/CT shows improvement, okay to continue Keytruda.  COPD with severe emphysema in exacerbation.  Improving, okay to continue Trelegy       Armando R. Reyes Yparraguirre, M.D.  Internal Medicine Senior Resident   [facility]      This note was generated using voice recognition software which has a small chance of producing errors of grammar and possibly content. I have made every reasonable attempt to find and correct any obvious errors but expect that some may not be found prior to finalization of this note.

## 2025-04-22 ENCOUNTER — TELEPHONE (OUTPATIENT)
Dept: HEALTH INFORMATION MANAGEMENT | Facility: OTHER | Age: 75
End: 2025-04-22
Payer: MEDICARE

## 2025-04-22 LAB
ANION GAP SERPL CALC-SCNC: 13 MMOL/L (ref 7–16)
BUN SERPL-MCNC: 23 MG/DL (ref 8–22)
CALCIUM SERPL-MCNC: 8.6 MG/DL (ref 8.5–10.5)
CHLORIDE SERPL-SCNC: 100 MMOL/L (ref 96–112)
CO2 SERPL-SCNC: 23 MMOL/L (ref 20–33)
COCCIDIOIDES IGG SER-ACNC: 0.7 IV
COCCIDIOIDES IGM SERPL-ACNC: 0.1 IV
CREAT SERPL-MCNC: 0.96 MG/DL (ref 0.5–1.4)
ERYTHROCYTE [DISTWIDTH] IN BLOOD BY AUTOMATED COUNT: 48.7 FL (ref 35.9–50)
GFR SERPLBLD CREATININE-BSD FMLA CKD-EPI: 83 ML/MIN/1.73 M 2
GLUCOSE SERPL-MCNC: 105 MG/DL (ref 65–99)
H CAPSUL MYC AB TITR SER CF: NORMAL {TITER}
H CAPSUL YST AB TITR SER CF: NORMAL {TITER}
HCT VFR BLD AUTO: 38.9 % (ref 42–52)
HGB BLD-MCNC: 12.8 G/DL (ref 14–18)
MCH RBC QN AUTO: 27.6 PG (ref 27–33)
MCHC RBC AUTO-ENTMCNC: 32.9 G/DL (ref 32.3–36.5)
MCV RBC AUTO: 84 FL (ref 81.4–97.8)
PLATELET # BLD AUTO: 226 K/UL (ref 164–446)
PMV BLD AUTO: 8.8 FL (ref 9–12.9)
POTASSIUM SERPL-SCNC: 4.2 MMOL/L (ref 3.6–5.5)
RBC # BLD AUTO: 4.63 M/UL (ref 4.7–6.1)
SODIUM SERPL-SCNC: 136 MMOL/L (ref 135–145)
WBC # BLD AUTO: 4.7 K/UL (ref 4.8–10.8)

## 2025-04-22 PROCEDURE — 700102 HCHG RX REV CODE 250 W/ 637 OVERRIDE(OP): Performed by: STUDENT IN AN ORGANIZED HEALTH CARE EDUCATION/TRAINING PROGRAM

## 2025-04-22 PROCEDURE — 700105 HCHG RX REV CODE 258: Performed by: STUDENT IN AN ORGANIZED HEALTH CARE EDUCATION/TRAINING PROGRAM

## 2025-04-22 PROCEDURE — 94640 AIRWAY INHALATION TREATMENT: CPT

## 2025-04-22 PROCEDURE — A9270 NON-COVERED ITEM OR SERVICE: HCPCS | Performed by: STUDENT IN AN ORGANIZED HEALTH CARE EDUCATION/TRAINING PROGRAM

## 2025-04-22 PROCEDURE — 99232 SBSQ HOSP IP/OBS MODERATE 35: CPT | Mod: GC | Performed by: INTERNAL MEDICINE

## 2025-04-22 PROCEDURE — 36415 COLL VENOUS BLD VENIPUNCTURE: CPT

## 2025-04-22 PROCEDURE — 85027 COMPLETE CBC AUTOMATED: CPT

## 2025-04-22 PROCEDURE — 80048 BASIC METABOLIC PNL TOTAL CA: CPT

## 2025-04-22 PROCEDURE — 700111 HCHG RX REV CODE 636 W/ 250 OVERRIDE (IP): Performed by: STUDENT IN AN ORGANIZED HEALTH CARE EDUCATION/TRAINING PROGRAM

## 2025-04-22 PROCEDURE — 770020 HCHG ROOM/CARE - TELE (206)

## 2025-04-22 PROCEDURE — 99291 CRITICAL CARE FIRST HOUR: CPT | Performed by: INTERNAL MEDICINE

## 2025-04-22 PROCEDURE — 99233 SBSQ HOSP IP/OBS HIGH 50: CPT | Performed by: INTERNAL MEDICINE

## 2025-04-22 PROCEDURE — A9270 NON-COVERED ITEM OR SERVICE: HCPCS | Performed by: HOSPITALIST

## 2025-04-22 PROCEDURE — 700102 HCHG RX REV CODE 250 W/ 637 OVERRIDE(OP): Performed by: HOSPITALIST

## 2025-04-22 PROCEDURE — 700101 HCHG RX REV CODE 250: Performed by: HOSPITALIST

## 2025-04-22 RX ADMIN — LISINOPRIL 20 MG: 20 TABLET ORAL at 05:57

## 2025-04-22 RX ADMIN — AMLODIPINE BESYLATE 5 MG: 5 TABLET ORAL at 05:57

## 2025-04-22 RX ADMIN — HEPARIN SODIUM 5000 UNITS: 5000 INJECTION, SOLUTION INTRAVENOUS; SUBCUTANEOUS at 22:35

## 2025-04-22 RX ADMIN — IPRATROPIUM BROMIDE AND ALBUTEROL SULFATE 3 ML: .5; 2.5 SOLUTION RESPIRATORY (INHALATION) at 02:26

## 2025-04-22 RX ADMIN — IPRATROPIUM BROMIDE AND ALBUTEROL SULFATE 3 ML: .5; 2.5 SOLUTION RESPIRATORY (INHALATION) at 13:53

## 2025-04-22 RX ADMIN — IPRATROPIUM BROMIDE AND ALBUTEROL SULFATE 3 ML: .5; 2.5 SOLUTION RESPIRATORY (INHALATION) at 10:36

## 2025-04-22 RX ADMIN — ROSUVASTATIN CALCIUM 10 MG: 20 TABLET, FILM COATED ORAL at 18:31

## 2025-04-22 RX ADMIN — CALCIUM POLYCARBOPHIL 625 MG: 625 TABLET, FILM COATED ORAL at 05:57

## 2025-04-22 RX ADMIN — DOXYCYCLINE 100 MG: 100 TABLET, FILM COATED ORAL at 09:25

## 2025-04-22 RX ADMIN — MEROPENEM 500 MG: 500 INJECTION, POWDER, FOR SOLUTION INTRAVENOUS at 00:17

## 2025-04-22 RX ADMIN — IPRATROPIUM BROMIDE AND ALBUTEROL SULFATE 3 ML: .5; 2.5 SOLUTION RESPIRATORY (INHALATION) at 06:21

## 2025-04-22 RX ADMIN — Medication 30 MG: at 22:35

## 2025-04-22 RX ADMIN — MONTELUKAST 10 MG: 10 TABLET, FILM COATED ORAL at 20:50

## 2025-04-22 RX ADMIN — PREDNISONE 60 MG: 50 TABLET ORAL at 05:57

## 2025-04-22 RX ADMIN — ASPIRIN 81 MG: 81 TABLET, COATED ORAL at 05:57

## 2025-04-22 RX ADMIN — HEPARIN SODIUM 5000 UNITS: 5000 INJECTION, SOLUTION INTRAVENOUS; SUBCUTANEOUS at 05:57

## 2025-04-22 RX ADMIN — MEROPENEM 500 MG: 500 INJECTION, POWDER, FOR SOLUTION INTRAVENOUS at 12:37

## 2025-04-22 RX ADMIN — LEVOTHYROXINE SODIUM 100 MCG: 0.1 TABLET ORAL at 05:57

## 2025-04-22 RX ADMIN — LISINOPRIL 20 MG: 20 TABLET ORAL at 20:50

## 2025-04-22 RX ADMIN — HEPARIN SODIUM 5000 UNITS: 5000 INJECTION, SOLUTION INTRAVENOUS; SUBCUTANEOUS at 14:00

## 2025-04-22 RX ADMIN — ACETAMINOPHEN 650 MG: 325 TABLET, FILM COATED ORAL at 22:43

## 2025-04-22 RX ADMIN — IPRATROPIUM BROMIDE AND ALBUTEROL SULFATE 3 ML: .5; 2.5 SOLUTION RESPIRATORY (INHALATION) at 22:35

## 2025-04-22 RX ADMIN — IPRATROPIUM BROMIDE AND ALBUTEROL SULFATE 3 ML: .5; 2.5 SOLUTION RESPIRATORY (INHALATION) at 19:36

## 2025-04-22 RX ADMIN — LOPERAMIDE HYDROCHLORIDE 2 MG: 2 CAPSULE ORAL at 22:43

## 2025-04-22 RX ADMIN — MEROPENEM 500 MG: 500 INJECTION, POWDER, FOR SOLUTION INTRAVENOUS at 05:56

## 2025-04-22 RX ADMIN — TRAZODONE HYDROCHLORIDE 50 MG: 50 TABLET ORAL at 22:35

## 2025-04-22 ASSESSMENT — ENCOUNTER SYMPTOMS
CARDIOVASCULAR NEGATIVE: 1
DOUBLE VISION: 0
WHEEZING: 1
MUSCULOSKELETAL NEGATIVE: 1
COUGH: 1
EYES NEGATIVE: 1
PSYCHIATRIC NEGATIVE: 1
NAUSEA: 0
DEPRESSION: 0
HEADACHES: 0
CONSTITUTIONAL NEGATIVE: 1
SPUTUM PRODUCTION: 0
HEMOPTYSIS: 0
CLAUDICATION: 0
ORTHOPNEA: 0
ABDOMINAL PAIN: 0
TINGLING: 0
HALLUCINATIONS: 0
COUGH: 0
PALPITATIONS: 0
BACK PAIN: 0
WEIGHT LOSS: 0
NEUROLOGICAL NEGATIVE: 1
BRUISES/BLEEDS EASILY: 0
VOMITING: 0
PND: 0
CHILLS: 0
MYALGIAS: 0
SPUTUM PRODUCTION: 1
DIZZINESS: 0
SHORTNESS OF BREATH: 1
FEVER: 0
GASTROINTESTINAL NEGATIVE: 1
BLURRED VISION: 0
HEARTBURN: 0

## 2025-04-22 ASSESSMENT — PAIN DESCRIPTION - PAIN TYPE
TYPE: ACUTE PAIN

## 2025-04-22 ASSESSMENT — FIBROSIS 4 INDEX: FIB4 SCORE: 3.83

## 2025-04-22 NOTE — CARE PLAN
The patient is Watcher - Medium risk of patient condition declining or worsening    Shift Goals  Clinical Goals: Wean O2, VSS, comfort  Patient Goals: rest  Family Goals: bandar    Progress made toward(s) clinical / shift goals:    Problem: Knowledge Deficit - Standard  Goal: Patient and family/care givers will demonstrate understanding of plan of care, disease process/condition, diagnostic tests and medications  Outcome: Progressing     Problem: Knowledge Deficit - COPD  Goal: Patient/significant other demonstrates understanding of disease process, utilization of the Action Plan, medications and discharge instruction  Outcome: Progressing     Problem: Impaired Gas Exchange  Goal: Patient will demonstrate improved ventilation and adequate oxygenation and participate in treatment regimen within the level of ability/situation.  Outcome: Progressing       Patient is not progressing towards the following goals:

## 2025-04-22 NOTE — PROGRESS NOTES
[facility] INFECTIOUS DISEASES INPATIENT CONSULT NOTE     Date of Service: 4/21/2025    Consult Requested By: Steve Rios M.D.    Reason for Consultation: worsening pneumonia    Chief Complaint: Initially presented for evaluation of left-sided weakness    History of Present Illness:     Bright Shirley is a 74 y.o. 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  His left-sided weakness resolved.  He has been managed with antibiotics and supportive measures for pulmonary infection.  During this hospital stay, his oxygen requirement has increased.  Currently he is requiring 55 L oxygen.      Review of Systems:  Patient states his overall status is similar to yesterday but his productive cough has improved compared to yesterday.  He mentions having similar chest pain on his left side.   Denies having shortness of breath, palpitation.    Past Medical History:   Diagnosis Date    Acute exacerbation of chronic obstructive pulmonary disease (COPD) ([facility]) 07/01/2021    Acute hypoxemic respiratory failure ([facility]) 06/08/2021    Acute respiratory failure with hypoxia ([facility]) 04/29/2024    Acute urinary retention 06/07/2021    Aortic atherosclerosis ([facility])     Carotid stenosis, bilateral - mild     Cerebral infarction ([facility]) - based on CT head 7/2022     Cerebral infarction ([facility]) - based on CT head 7/2022     Chronic obstructive pulmonary disease ([facility])     Hypercholesteremia     Hypertension     Intertrochanteric fracture of femur ([facility]) 06/06/2021    Other emphysema ([facility]) 08/07/2019    Supplemental oxygen dependent 06/30/2021       Past Surgical History:   Procedure Laterality Date    PB TREAT INTER/SUBTROCH FX,W/PLATE/SCREW Left  2021    Procedure: FIXATION, FRACTURE, HIP, USING DYNAMIC HIP SCREW, WITH COMPRESSION;  Surgeon: Avinash Suazo M.D.;  Location: SURGERY [facility];  Service: Orthopedics    HERNIA REPAIR Right     ORIF, FEMUR Right     15 years ago    OTHER      right inguinal hernia repair    OTHER ORTHOPEDIC SURGERY      right hip       Family History   Problem Relation Age of Onset    Breast Cancer Mother     Cancer Mother         breast cancer    Stroke Mother     Other Father         cirrhosis form alcohol    Stroke Sister     Heart Disease Brother         cardiac aneurysm    Hypertension Maternal Grandmother     Hyperlipidemia Maternal Grandmother     Ovarian Cancer Neg Hx     Tubal Cancer Neg Hx     Colorectal Cancer Neg Hx     Peritoneal Cancer Neg Hx        Social History     Socioeconomic History    Marital status: Single     Spouse name: Not on file    Number of children: Not on file    Years of education: Not on file    Highest education level: Bachelor's degree (e.g., BA, AB, BS)   Occupational History    Not on file   Tobacco Use    Smoking status: Former     Current packs/day: 0.00     Average packs/day: 1 pack/day for 49.3 years (49.3 ttl pk-yrs)     Types: Cigarettes     Start date:      Quit date: 2024     Years since quittin.0    Smokeless tobacco: Never    Tobacco comments:     vape pen & cigarettes     49 years total andria an average of 1 ppd   Vaping Use    Vaping status: Former    Substances: Nicotine   Substance and Sexual Activity    Alcohol use: Not Currently     Comment: not often    Drug use: Not Currently     Types: Marijuana     Comment: THC edibles - rarely/ NOT IN MONTHS    Sexual activity: Not Currently   Other Topics Concern    Not on file   Social History Narrative    Retired construction      Social Drivers of Health     Financial Resource Strain: Low Risk  (12/15/2024)    Overall Financial Resource Strain (CARDIA)     Difficulty of Paying Living Expenses: Not  very hard   Food Insecurity: No Food Insecurity (4/16/2025)    Hunger Vital Sign     Worried About Running Out of Food in the Last Year: Never true     Ran Out of Food in the Last Year: Never true   Transportation Needs: No Transportation Needs (4/16/2025)    PRAPARE - Transportation     Lack of Transportation (Medical): No     Lack of Transportation (Non-Medical): No   Physical Activity: Inactive (12/15/2024)    Exercise Vital Sign     Days of Exercise per Week: 0 days     Minutes of Exercise per Session: 0 min   Stress: Stress Concern Present (12/15/2024)    [place] [place] [employer] - Occupational Stress Questionnaire     Feeling of Stress : To some extent   Social Connections: Socially Isolated (12/15/2024)    Social Connection and Isolation Panel [NHANES]     Frequency of Communication with Friends and Family: Once a week     Frequency of Social Gatherings with Friends and Family: Once a week     Attends Anabaptism Services: Never     Active Member of Clubs or Organizations: No     Attends Club or Organization Meetings: Never     Marital Status:    Intimate Partner Violence: Not At Risk (4/16/2025)    Humiliation, Afraid, Rape, and Kick questionnaire     Fear of Current or Ex-Partner: No     Emotionally Abused: No     Physically Abused: No     Sexually Abused: No   Housing Stability: Low Risk  (4/16/2025)    Housing Stability Vital Sign     Unable to Pay for Housing in the Last Year: No     Number of Times Moved in the Last Year: 0     Homeless in the Last Year: No       Allergies   Allergen Reactions    Seasonal Runny Nose and Itching     Seasonal allergies       Medications:    Current Facility-Administered Medications:     meropenem (Merrem) 500 mg in  mL IV-MBP, 500 mg, Intravenous, Q6HRS, Chantell Dennis M.D., Stopped at 04/22/25 0626    doxycycline monohydrate (Adoxa) tablet 100 mg, 100 mg, Oral, Q12HRS, Chantell Dennis M.D., 100 mg at 04/21/25 7670     ipratropium-albuterol (DUONEB) nebulizer solution, 3 mL, Nebulization, Q4HRS (RT), Steve Rios M.D., 3 mL at 04/22/25 0621    fluticasone-umeclidinium-vilanterol (Trelegy Ellipta) 200-62.5-25 mcg/act inhaler 1 Puff, 1 Puff, Inhalation, QDAILY (RT), Steve Rios M.D., 1 Puff at 04/21/25 0952    loperamide (Imodium) capsule 2 mg, 2 mg, Oral, 4X/DAY PRN, Steve Rios M.D., 2 mg at 04/21/25 1135    sulfamethoxazole-trimethoprim (Bactrim DS) 800-160 MG tablet 1 Tablet, 1 Tablet, Oral, 3x/week, Chantell Dennis M.D., 1 Tablet at 04/21/25 0953    predniSONE (Deltasone) tablet 60 mg, 60 mg, Oral, DAILY, Chantell Dennis M.D., 60 mg at 04/22/25 0557    LR (Bolus) infusion 500 mL, 500 mL, Intravenous, Once PRN, Benny Kamara M.D.    acetaminophen (Tylenol) tablet 650 mg, 650 mg, Oral, Q6HRS PRN, Benny Kamara M.D., 650 mg at 04/21/25 2304    labetalol (Normodyne/Trandate) injection 10 mg, 10 mg, Intravenous, Q4HRS PRN, Benny Kamara M.D.    ondansetron (Zofran) syringe/vial injection 4 mg, 4 mg, Intravenous, Q4HRS PRN **OR** ondansetron (Zofran ODT) dispertab 4 mg, 4 mg, Oral, Q4HRS PRN, Benny Kamara M.D.    albuterol inhaler 2 Puff, 2 Puff, Inhalation, Q4HRS PRN, Benny Kamara M.D.    amLODIPine (Norvasc) tablet 5 mg, 5 mg, Oral, DAILY, Benny Kamara M.D., 5 mg at 04/22/25 0557    artificial tears ophthalmic solution 1 Drop, 1 Drop, Both Eyes, Q HOUR PRN, Benny Kamara M.D.    calcium polycarbophil (Fibercon) tablet 625 mg, 625 mg, Oral, DAILY, Benny Kamara M.D., 625 mg at 04/22/25 0557    levothyroxine (Synthroid) tablet 100 mcg, 100 mcg, Oral, AM ES, Benny Kamara M.D., 100 mcg at 04/22/25 0557    lisinopril (Prinivil) tablet 20 mg, 20 mg, Oral, BID, Benny Kamara M.D., 20 mg at 04/22/25 0557    melatonin tablet 30 mg, 30 mg, Oral, Q EVENING, Benny Kamara M.D., 30 mg at 04/21/25 2257    rosuvastatin (Crestor) tablet 10 mg, 10 mg, Oral, Q EVENING, Benny Kamara M.D., 10 mg at 04/21/25  "171    traZODone (Desyrel) tablet 50 mg, 50 mg, Oral, Nightly, Benny Kamara M.D., 50 mg at 25    Respiratory Therapy Consult, , Nebulization, Continuous RT, Benny Kamara M.D.    albuterol (Proventil) 2.5mg/0.5ml nebulizer solution 2.5 mg, 2.5 mg, Nebulization, Q2HRS PRN (RT), Benny Kamara M.D.    montelukast (Singulair) tablet 10 mg, 10 mg, Oral, Nightly, Benny Kamara M.D., 10 mg at 25    heparin injection 5,000 Units, 5,000 Units, Subcutaneous, Q8HRS, Benny Kamara M.D., 5,000 Units at 25 0557    aspirin EC tablet 81 mg, 81 mg, Oral, DAILY, Benny Kamara M.D., 81 mg at 25 0557    Physical Exam:   Vital Signs: BP (!) 153/77   Pulse 65   Temp 36.4 °C (97.5 °F) (Temporal)   Resp 17   Ht 1.63 m (5' 4.17\")   Wt 67.5 kg (148 lb 13 oz)   SpO2 93%   BMI 25.41 kg/m²   Temp  Av.5 °C (97.7 °F)  Min: 36 °C (96.8 °F)  Max: 37.3 °C (99.2 °F)  Vital signs reviewed    Constitutional: Patient is oriented to person, place, and time. Appears well-developed and well-nourished. No distress  Head: Atraumatic, normocephalic  Eyes: Conjunctivae normal  Mouth/Throat: Lips without lesions  Cardiovascular: Normal rate  Pulmonary/Chest: CTA, No respiratory distress. Unlabored respiratory effort.  Abdominal: Non distended  Musculoskeletal: No joint tenderness, swelling, erythema, or restriction of motion noted.  Neurological: Alert and oriented to person, place, and time. No gross cranial nerve deficit. No focal neural deficit noted  Skin: Skin is warm and dry. No rashes or embolic phenomena noted on exposed skin  Psychiatric: Normal mood and affect. Behavior is normal.     LABS:  Recent Labs     25  0431 25  0037 25  0325   WBC 4.9 5.2 4.2*      Recent Labs     25  0037 25  0325 25  0057   HEMOGLOBIN 11.5* 11.5* 12.8*   HEMATOCRIT 37.0* 35.2* 38.9*   MCV 87.1 85.4 84.0   MCH 27.1 27.9 27.6   PLATELETCT 178 191 226       Recent Labs     25  0431 " 04/20/25  0037 04/21/25  0325   SODIUM 134* 135 136   POTASSIUM 3.4* 3.9 3.7   CHLORIDE 99 100 101   CO2 22 22 24   CREATININE 0.98 1.21 0.91        MICRO:  Latest pertinent labs were reviewed    IMAGING STUDIES:  Per my read, his chest x ray showed left upper lobe air space opacity and interstitial opacities scattered on bilateral lung field.   CT showed cavitary stable mass in left upper lobe. Also showed extensive edematous changes in lungs with peripheral honeycombing. No any significant changes compared to previous imaging.     Hospital Course/Assessment:   Bright Shirley is a 74 y.o. male with a history of COPD, squamous cell carcinoma of left lung upper lobe on chemotherapy with pembrolizumab has acute on chronic hypoxic respiratory failure secondary to most likely infectious etiology-human metapneuovirus infection, complicated by chemotherapy and immunosuppresion. Less likely bacterial given overall clinical status and also given that Prcalcitonin is downtrending. Cannot rule out fungal infection but chances less likely. He is at high risk for bronchoscopy due to low pulmonary reserve per pulmo. Lab resulted so far: MRSA negative, COVID/flu/ RSV negative, fungal cultures of sputum-rare fungal elements , aspergillus galactomannan negative, legionella antigen urine negative, mycoplasma ab IgM negative, coccidiodomycosis negative.     Pertinent Diagnoses:  Acute on chronic hypoxic respiratory failure likely secondary to pneumonia, most likely due to human metapneumovirus infection  COPD exacerbation   Squamous cell carcinoma of left upper lung on chemotherapy with pembrolizumab     Plan:   - Discontinue antibiotics as there is no improvement/change in his status with 48 hr of ongoing antibiotic regimen.   - Droplet and contact precaution for human metapneumovirus infection  - Continue prednisone 60 mg daily and PJP prophylaxis with TMP-SMX as per pulmo  - Follow fungal test (blastomyces and histoplasma  pending)  - supplementary oxygen as required     Please feel free to call with questions.    We will sign off. Please consult with repeat CT thorax if there is worsening of his clinical status.     This presentation represents a severe exacerbation of the patient's chornic illness.    Jeremiah Boyle M.D.    Please note that this dictation was created using voice recognition software. I have worked with technical experts from [facility] to optimize the interface.  I have made every reasonable attempt to correct obvious errors, but there may be errors of grammar and possibly content that I did not discover before finalizing the note.

## 2025-04-22 NOTE — PROGRESS NOTES
Bedside report received from day shift RN  JOYCELYN Tejada. Pt is currently A&Ox4, SR, breathing at a normal rate and rhythm, warm, dry, and skin color appropriate for ethnicity, and in no visible emotional or physical distress. Bed locked in lowest position, call light is within reach, fall prevention measures in place. Pt educated to use call light before getting out of bed, pt verbalized understanding. Pt is on a cardiac monitor, order verified, transmitter connected appropriately, and leads placed correctly, rhythm and rate assessed by RN, monitor staff updated of changes and parameters as necessary.  No further needs at this time. Assumed care of pt. Report given to assisting staff - CNA/CCT/ACT    Fall Risk Score: MODERATE RISK  Fall risk interventions in place: Provide patient/family education based on risk assessment, Educate patient/family to call staff for assistance when getting out of bed, Place fall precaution signage outside patient door, Place patient in room close to nursing station, Utilize bed/chair fall alarm, and Bed alarm connected correctly  Bed type: Regular (Nba Score less than 17 interventions in place)  Patient on cardiac monitor: Yes  IVF/IV medications: Not Applicable   Oxygen: How many liters 40L and Traced the line to wall oxygen  Bedside sitter: Not Applicable   Isolation: Not applicable

## 2025-04-22 NOTE — PROGRESS NOTES
Progress note    HPI: Bright Shirley is a 74 y.o. male who presented on 4/15/2025 with left-sided weakness concerning for stroke. He was in his usual state of health, was accidentally disconnected from his oxygen support on 4/15/2025 at home which led to his weakness as per patient. 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 He has now tapered down to prednisone 20 mg/day. since admission he was started on higher dose of steroids, antibiotics which improved his shortness of breath and is back to his baseline oxygen support of 5 L.     INTERVAL HISTORY:   4/17: still continues to require 5-6 L oxygen support, still has SOB with cough  4/18: He continues to require 8 to 10 L oxygen support due to  desaturation when he coughs, respiratory panel positive for human metapneumovirus,  4/19: he has significant oxygen requirements now on HFNC , and desaturates when he is coughing or ambulating   4/20 : no significant improvement still on HFNC and desaturates , spoke to ID about the situation agreeable to escalate abx to meropenem and doxycycline , as he is high risk for bronchoscopy at this time   4/21: No significant clinical improvement.  HFNC 40/50.  No significant change in CBC/CMP but Pro-Young trending down.  Zosyn for 4 days (last 4/20).  Currently meropenem/doxycycline day 2  4/22: HFNC 55/55.  Stable CBC/CMP.  No significant clinical change.      Vitals:    04/21/25 2234 04/22/25 0020 04/22/25 0226 04/22/25 0557   BP:  (!) 155/88  (!) 144/94   Pulse:  87 72 88   Resp: (!) 21 20 18 18   Temp:  36.4 °C (97.5 °F)  36.3 °C (97.3 °F)   TempSrc:  Temporal  Temporal   SpO2: 91% 90% 90% 94%   Weight:       Height:       Body mass index is 25.41 kg/m².    Review of Systems   Constitutional: Negative.  Negative for chills, fever and weight loss.   HENT: Negative.     Eyes: Negative.    Respiratory:  Positive for shortness of breath. Negative for cough, hemoptysis and sputum production.    Cardiovascular: Negative.  Negative for chest pain, palpitations and orthopnea.   Gastrointestinal: Negative.  Negative for abdominal pain and heartburn.   Genitourinary: Negative.  Negative for dysuria.   Musculoskeletal: Negative.  Negative for myalgias.   Skin: Negative.  Negative for rash.   Neurological: Negative.  Negative for dizziness, tingling and headaches.   Endo/Heme/Allergies: Negative.    Psychiatric/Behavioral: Negative.  Negative for depression, hallucinations and suicidal ideas.    All other systems reviewed and are negative.     Physical Exam  Vitals and nursing note reviewed.   Constitutional:       General: He is not in acute distress.     Appearance: Normal appearance. He is not ill-appearing, toxic-appearing or diaphoretic.   Cardiovascular:      Rate and Rhythm: Normal rate and regular rhythm.      Pulses: Normal pulses.      Heart sounds: Normal heart sounds. No murmur heard.  Pulmonary:      Effort: Pulmonary effort is normal. No respiratory distress.      Breath sounds: Normal breath sounds. No stridor.      Comments: Respiratory crackles most noticeable in right lung  Abdominal:      General: Bowel sounds are normal. There is no distension.      Palpations: Abdomen is soft.      Tenderness: There is no abdominal tenderness. There is no guarding.   Musculoskeletal:         General: No swelling or tenderness. Normal range of motion.      Right lower leg: No  edema.      Left lower leg: No edema.   Skin:     General: Skin is warm.      Capillary Refill: Capillary refill takes less than 2 seconds.      Coloration: Skin is not jaundiced or pale.      Findings: No bruising.   Neurological:      General: No focal deficit present.      Mental Status: He is alert. Mental status is at baseline.      Cranial Nerves: No cranial nerve deficit.      Sensory: No sensory deficit.      Coordination: Coordination normal.      Gait: Gait normal.      Deep Tendon Reflexes: Reflexes normal.   Psychiatric:         Mood and Affect: Mood normal.         Behavior: Behavior normal.         Thought Content: Thought content normal.         Judgment: Judgment normal.         LABS:   Recent Labs     04/20/25  0037 04/21/25 0325 04/22/25  0057   WBC 5.2 4.2* 4.7*   RBC 4.25* 4.12* 4.63*   HEMOGLOBIN 11.5* 11.5* 12.8*   HEMATOCRIT 37.0* 35.2* 38.9*   MCV 87.1 85.4 84.0   MCH 27.1 27.9 27.6   MCHC 31.1* 32.7 32.9   RDW 52.6* 50.4* 48.7   PLATELETCT 178 191 226   MPV 9.7 9.7 8.8*      Recent Labs     04/20/25  0037 04/21/25 0325 04/22/25  0057   SODIUM 135 136 136   POTASSIUM 3.9 3.7 4.2   CHLORIDE 100 101 100   CO2 22 24 23   GLUCOSE 93 93 105*   BUN 26* 23* 23*   CREATININE 1.21 0.91 0.96   CALCIUM 8.2* 8.4* 8.6      Recent Labs     04/20/25  0037 04/21/25 0325 04/22/25  0057   GLUCOSE 93 93 105*      RADIOLOGY:   DX-CHEST-LIMITED (1 VIEW)   Final Result      Worsening bilateral pulmonary infiltrates.      DX-CHEST-PORTABLE (1 VIEW)   Final Result         1.  Pulmonary edema and/or infiltrates, greatest in the left upper lobe, slightly decreased since prior study.      EC-ECHOCARDIOGRAM COMPLETE W/O CONT   Final Result      CT-CTA CHEST PULMONARY ARTERY W/ RECONS   Final Result      1. No acute pulmonary embolus.   2. Stable cavitary mass in the left upper lobe.   3. Extensive edematous changes in the lungs with peripheral honeycombing.   4. Aortic and coronary arterial sclerosis.   5. Simple  appearing right renal cortical cyst, requiring no further follow-up.   6. Stable bilateral adrenal masses.   7. Acute or subacute left third through fifth rib fractures.            DX-CHEST-PORTABLE (1 VIEW)   Final Result      1.  LEFT upper lung consolidation, likely pneumonia.  Underlying mass is not excluded.   2.  Probable pulmonary fibrosis.   3.  Limited by positioning.           ASSESSMENT/PLAN:   Acute on chronic hypoxic respiratory failure secondary to human metapneumovirus with prior history of poorly differentiated squamous cell carcinoma of left upper lobe and COPD with severe emphysema.  Received 5-day course of Zosyn and 3 days of meropenem/doxycycline for suspected bacterial component.  At this point, highly unlikely given clinically stable.  Difficulty weaning off oxygen most likely due to low lung reserve from chronic COPD/malignancy as seen in CT.  Will defer antibiotic management to infectious disease.    COPD with severe emphysema in exacerbation.  Improving, okay to continue Trelegy     Poorly differentiated squamous cell carcinoma pulm upper lobe.  PET/CT shows improvement, okay to continue Keytruda.      Armando R. Reyes Yparraguirre, M.D.  Internal Medicine Senior Resident   [facility]      This note was generated using voice recognition software which has a small chance of producing errors of grammar and possibly content. I have made every reasonable attempt to find and correct any obvious errors but expect that some may not be found prior to finalization of this note.

## 2025-04-22 NOTE — DISCHARGE PLANNING
Care Transition Team Discharge Planning    Anticipated Discharge Information  Discharge Disposition: Disch to a long term care facility (63)  Discharge Address: [address] [place] [address] [place] [id number]  Discharge Contact Phone Number: [phone]    Discharge Plan:  Message sent to [facility] liaison Diane inquiring about bed availability today. Per Diane, she is waiting to hear about bed availability.    1115: Discussed in IDT rounds. Anticipate medical clearance for P[facility] tomorrow 4/23. Diane with P[facility] updated

## 2025-04-22 NOTE — PROGRESS NOTES
[facility] Medicine Daily Progress Note    Date of Service  4/22/2025    Chief Complaint  Bright Shirley is a 74 y.o. male admitted 4/15/2025 with pneumonia hypoxia    Hospital Course  Bright Shirley is a 74 y.o. 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  Patient initially presented to ER for stroke concern.  Patient was evaluated by neurology, stroke load was aborted and his weakness on left side was resolved.  He is however requiring increased oxygen requirement of 10 L oxy mask.  CTA chest noted to have stable cavitary mass in left upper lobe, extensive edematous changes in lung peripheral honeycombing.  He does have concern lactic acid of 5.3.  Due to concern of severe sepsis, admission requested by ERP.  Therefore, admitted to medicine service for further evaluation and treatment.     Interval Problem Update  4/16 patient is new to me today, patient is alert oriented follows commands, he is requiring 8 L of oxygen when I saw her in the morning, he is able to speak in full sentences, he stated that at home he was getting short of breath with exertion, normally he is comfortable at rest with 5 L of oxygen but he feels that he is requiring more with exertion/ambulation, will continue antibiotics, CT results reviewed, MRSA screen is negative and sputum cultures and blood cultures pending, discussed with bedside nurse charge nurse  pharmacist  4/17 patient is resting in chair, he is alert oriented follows commands no new neurological deficit, still having productive cough, continue IV antibiotics, notes from pulmonology reviewed, will continue with antipseudomonal coverage for now due to high risk, follow-up final blood culture results, discussed with bedside nurse charge nurse  pharmacist.  4/18 patient is resting in chair,  he is in good spirit, he is requiring 10 L of oxygen early in the morning, however the repeat chest x-ray discussed with pulmonology, continue chest physiotherapy s/p RT flutter valve, continue antibiotics, follow-up cultures, I will order respiratory panel, discussed medicine return discussed palliative pharmacist, later today patient's oxygen has decreased to 8 L, follow-up blood work in a.m.  4/19 patient is in bed, no fever or chills not in distress, patient is requiring higher amount of o2 patient is on 40L by HFNC, patient is able to tolerate diet, diarrhea resolved, c diff neg, human pneumovirus positive, discussed with pulmonologist, I have started him on iv lasix, continue abx, steroids nebs. Discussed with bedside nurse, charge nurse .   4/20 patient is resting in bed, patient is alert oriented follows commands, patient requiring 50 L of oxygen by high flow nasal cannula, discussed with pulmonology, transition to meropenem and doxycycline, continue supportive treatment, received IV Lasix today, continue RT per protocol, discussed with bedside nurse charge nurse  pharmacist.  4/21 patient is resting in bed, he is alert oriented follows commands, he is still requiring 4 L of oxygen but clinically doing okay, discussed with pulmonology discussed with infectious disease at this time appears to be related to bilateral pneumonia, continue antibiotics for 1 more day if patient continues to improve clinically probably will be able to stop antibiotics tomorrow, continue chest physical therapy incentive spirometry flutter valve, I discussed with bedside nurse charge nurse  pharmacist    Patient was seen and examined at bedside.  I have personally reviewed and interpreted vitals, labs, and imaging.    4/22.  Afebrile.  Intermittent bradycardia.  Intermittent hypertension.  On 55L high flow denies fevers, chills.  Reports an episode of chest pain last night which resolved  spontaneously.  States breathing and cough are improved.  He has reported loose bowel movement yesterday.  Case was discussed with ID and pulmonary.  Plan to discontinue antibiotics and monitor.  Plan transferred to [facility] tomorrow  Wbc 4.7  Hgb 12.8  Procal 0.41 > 0.25    I have discussed this patient's plan of care and discharge plan at IDT rounds today with Case Management, Nursing, Nursing leadership, and other members of the IDT team.    Consultants/Specialty  Pulmonology  ID    Code Status  Full Code    Disposition  The patient is not medically cleared for discharge to home or a post-acute facility.  Anticipate discharge to: a long-term acute care hospital    I have placed the appropriate orders for post-discharge needs.    Review of Systems  Review of Systems   Constitutional:  Negative for chills and fever.   Eyes:  Negative for blurred vision and double vision.   Respiratory:  Positive for cough, sputum production, shortness of breath and wheezing. Negative for hemoptysis.    Cardiovascular:  Negative for chest pain, palpitations, claudication, leg swelling and PND.   Gastrointestinal:  Negative for heartburn, nausea and vomiting.   Genitourinary:  Negative for hematuria and urgency.   Musculoskeletal:  Negative for back pain and myalgias.   Skin:  Negative for rash.   Neurological:  Negative for dizziness and headaches.   Endo/Heme/Allergies:  Does not bruise/bleed easily.   Psychiatric/Behavioral:  Negative for depression.         Physical Exam  Temp:  [36.3 °C (97.3 °F)-36.4 °C (97.5 °F)] 36.4 °C (97.5 °F)  Pulse:  [46-88] 65  Resp:  [17-22] 17  BP: (109-155)/(61-94) 153/77  SpO2:  [90 %-94 %] 93 %    Physical Exam  Vitals and nursing note reviewed.   Constitutional:       Appearance: He is ill-appearing.   Eyes:      General:         Right eye: No discharge.         Left eye: No discharge.   Cardiovascular:      Rate and Rhythm: Normal rate and regular rhythm.      Pulses: Normal pulses.      Heart  sounds: Normal heart sounds.   Pulmonary:      Effort: Pulmonary effort is normal. No respiratory distress.      Breath sounds: Normal breath sounds.   Abdominal:      General: Bowel sounds are normal. There is no distension.      Tenderness: There is no guarding.   Musculoskeletal:         General: Normal range of motion.      Cervical back: Normal range of motion and neck supple.      Right lower leg: No edema.      Left lower leg: No edema.   Skin:     General: Skin is warm and dry.      Capillary Refill: Capillary refill takes less than 2 seconds.      Coloration: Skin is not jaundiced.   Neurological:      General: No focal deficit present.      Mental Status: He is alert and oriented to person, place, and time.      Cranial Nerves: No cranial nerve deficit.   Psychiatric:         Mood and Affect: Mood normal.         Behavior: Behavior normal.         Fluids    Intake/Output Summary (Last 24 hours) at 4/22/2025 0908  Last data filed at 4/22/2025 0758  Gross per 24 hour   Intake 590 ml   Output 1700 ml   Net -1110 ml        Laboratory  Recent Labs     04/20/25  0037 04/21/25  0325 04/22/25  0057   WBC 5.2 4.2* 4.7*   RBC 4.25* 4.12* 4.63*   HEMOGLOBIN 11.5* 11.5* 12.8*   HEMATOCRIT 37.0* 35.2* 38.9*   MCV 87.1 85.4 84.0   MCH 27.1 27.9 27.6   MCHC 31.1* 32.7 32.9   RDW 52.6* 50.4* 48.7   PLATELETCT 178 191 226   MPV 9.7 9.7 8.8*     Recent Labs     04/20/25  0037 04/21/25  0325 04/22/25  0057   SODIUM 135 136 136   POTASSIUM 3.9 3.7 4.2   CHLORIDE 100 101 100   CO2 22 24 23   GLUCOSE 93 93 105*   BUN 26* 23* 23*   CREATININE 1.21 0.91 0.96   CALCIUM 8.2* 8.4* 8.6                     Imaging  DX-CHEST-LIMITED (1 VIEW)   Final Result      Worsening bilateral pulmonary infiltrates.      DX-CHEST-PORTABLE (1 VIEW)   Final Result         1.  Pulmonary edema and/or infiltrates, greatest in the left upper lobe, slightly decreased since prior study.      EC-ECHOCARDIOGRAM COMPLETE W/O CONT   Final Result      CT-CTA  CHEST PULMONARY ARTERY W/ RECONS   Final Result      1. No acute pulmonary embolus.   2. Stable cavitary mass in the left upper lobe.   3. Extensive edematous changes in the lungs with peripheral honeycombing.   4. Aortic and coronary arterial sclerosis.   5. Simple appearing right renal cortical cyst, requiring no further follow-up.   6. Stable bilateral adrenal masses.   7. Acute or subacute left third through fifth rib fractures.            DX-CHEST-PORTABLE (1 VIEW)   Final Result      1.  LEFT upper lung consolidation, likely pneumonia.  Underlying mass is not excluded.   2.  Probable pulmonary fibrosis.   3.  Limited by positioning.           Assessment/Plan  * Severe sepsis (HCC)- (present on admission)  Assessment & Plan  4/22/2025  This is Sepsis Present on admission  SIRS criteria identified on my evaluation include: Tachycardia, with heart rate greater than 90 BPM, Tachypnea, with respirations greater than 20 per minute, and Bandemia, greater than 10% bands  Clinical indicators of end organ dysfunction include Lactic Acid greater than 2  Source is pulm  Sepsis protocol initiated  Crystalloid Fluid Administration: Fluid resuscitation ordered per standard protocol - 30 mL/kg per current or ideal body weight  IV antibiotics as appropriate for source of sepsis  Reassessment: I have reassessed the patient's hemodynamic status    Acute left-sided weakness  Assessment & Plan  4/22/2025  Resolved by ER arrival  Stroke alert aborted by stroke neurology  PT/OT/ST    H/o TIA per pt  -added ASA  -continue statin    Continue monitoring  No neuro deficit    TREVOR (acute kidney injury) (HCC)  Assessment & Plan  4/22/2025  Cr 1.22  IVF  Minimize nephrotoxic agents, renally dose meds  Check FeNa    Creatinine 1.13    ACP (advance care planning)  Assessment & Plan  Goal of care discussed with patient in the emergency room.  He stated he is agreeable for noninvasive, as well as invasive/heroic life-sustaining  measures-including CPR/defibrillation/intubation or mechanical ventilation.  He is agreeable for hospitalization, further treatment with IVF, breathing treatment, antibiotic therapy, any medical management as clinically warranted.  Diagnosis, prognosis, question and concern addressed.  Full CODE STATUS confirmed.  ACP: 17 minutes    Pneumonia due to infectious organism  Assessment & Plan  4/22/2025  Cavitary left upper lobe mass  Patient is immunocompromised due to squamous cell lung cancer on chemotherapy  Follow cultures  Antibiotic: Zosyn, Zyvox, azithromycin  Wean off oxygen support as tolerated continue broad-spectrum antibiotics follow-up MRSA screening follow-up    Blood sputum cultures  Will need treatment for possible  pseudomonas as per pulm will get EKG to check qtc.     Continue abx.  Cx neg   Human metapneumovirus positive.   Supportive treatment.    Lactic acidosis  Assessment & Plan  4/22/2025  LA 5.3 --> 4.9  Continue IVF  IV antibiotic  High-dose IV thiamine  Trend lactic acid    Squamous cell carcinoma of upper lobe of left lung (HCC)- (present on admission)  Assessment & Plan  4/22/2025  On chemotherapy with pembrolizumab  Followed by Dr. Melara, medical oncology    Acute on chronic respiratory failure with hypoxia (HCC)- (present on admission)  Assessment & Plan  4/22/2025  Dependent on 5 L at baseline  Currently requiring 10 L OxyMask-wean off as tolerated  No PE seen on CTA chest  Check COVID/influenza/RSV  Antibiotic for pneumonia  Check TTE    CTA did not show PE  Patient with probably postobstructive pneumonia due to lung mass  Continue antibiotics  Note from pulm reviewed.     Patient requiring higher levels of oxygen today 10 L  Discussed with pulmonology  Continue antibiotics  Continue chest physical therapy  Follow-up fungal results    Patient is requiring higher amount of o2, he is on 40L of o2, I discussed with pulmonologist  Patient started on HFNC.   Continue iv diuresis, iv  abx.    Chronic obstructive pulmonary disease (HCC)- (present on admission)  Assessment & Plan  4/22/2025  Pulmonary hygiene  -DuoNebs, Trelegy Ellipta, montelukast  -PEP  -s/p Solu-Medrol 125 mg  -Continue Solu-Medrol 40 mg every 8 hours  RT protocol    Patient is on 5 L of oxygen at home now he is requiring 8 L  5L of o2.     10 L of oxygen  Continue steroids  Continue breathing treatment  Discussed with pulmonology    Dyslipidemia- (present on admission)  Assessment & Plan  4/22/2025  Statin    Primary hypertension- (present on admission)  Assessment & Plan  4/22/2025  Lisinopril, amlodipine         VTE prophylaxis: Heparin    I have performed a physical exam and reviewed and updated ROS and Plan today (4/22/2025). In review of yesterday's note (4/21/2025), there are no changes except as documented above.    Patient is critically ill.   The patient continues to have: Acute hypoxic respiratory failure secondary to immunotherapy induced pneumonitis versus interstitial lung disease versus COPD exacerbation versus human metapneumovirus pneumonia  The vital organ system that is affected is the: Pulmonary  If untreated there is a high chance of deterioration into: Respiratory failure  And eventually death.   The critical care that I am providing today is: High flow nasal cannula 55L 55% FiO2  The critical that has been undertaken is medically complex.   There has been no overlap in critical care time.   Critical Care Time not including procedures: 37

## 2025-04-22 NOTE — DISCHARGE PLANNING
HTH/SCP TCN chart review completed. Current discharge considerations are for PAMS when medically cleared.  Per chart review, he has a cane and FWW and was on 5 L/min O2 at his baseline.  He is currently on 50 HF LM O2.      Current 6 clicks mobility at 21 and per kardex 5 feet X 2 with HHA.  Update sent to CM via voalte regarding noted updated discharge planning needs.     Please reach out to TCN via voalte if any additional transitional discharge planning needs are indicated. Thank you.    Completed:  PT Recommend home health on 4/17/25 for continued physical therapy services (would highly benefit from OP pulmonary rehab).    OT anticipates no further occupational therapy needs after discharge from the hospital on 4/16 with recommendations for a tub/shower seat.  (At time of writing this note).   SCP with [facility] PCP.  Anticipate post-acute placement.     Addendum:  1638- [facility] authorization for PAMS obtained.  CM and PAMS liaison Diane notified.

## 2025-04-23 LAB
ALBUMIN SERPL BCP-MCNC: 3.2 G/DL (ref 3.2–4.9)
B DERMAT AB SER-ACNC: 0.3 IV
BUN SERPL-MCNC: 23 MG/DL (ref 8–22)
CALCIUM ALBUM COR SERPL-MCNC: 9.4 MG/DL (ref 8.5–10.5)
CALCIUM SERPL-MCNC: 8.8 MG/DL (ref 8.5–10.5)
CHLORIDE SERPL-SCNC: 102 MMOL/L (ref 96–112)
CO2 SERPL-SCNC: 23 MMOL/L (ref 20–33)
CREAT SERPL-MCNC: 0.8 MG/DL (ref 0.5–1.4)
ERYTHROCYTE [DISTWIDTH] IN BLOOD BY AUTOMATED COUNT: 49.3 FL (ref 35.9–50)
GFR SERPLBLD CREATININE-BSD FMLA CKD-EPI: 92 ML/MIN/1.73 M 2
GLUCOSE SERPL-MCNC: 92 MG/DL (ref 65–99)
HCT VFR BLD AUTO: 35.7 % (ref 42–52)
HGB BLD-MCNC: 11.6 G/DL (ref 14–18)
MAGNESIUM SERPL-MCNC: 2.1 MG/DL (ref 1.5–2.5)
MCH RBC QN AUTO: 27.6 PG (ref 27–33)
MCHC RBC AUTO-ENTMCNC: 32.5 G/DL (ref 32.3–36.5)
MCV RBC AUTO: 85 FL (ref 81.4–97.8)
PHOSPHATE SERPL-MCNC: 2.9 MG/DL (ref 2.5–4.5)
PLATELET # BLD AUTO: 236 K/UL (ref 164–446)
PMV BLD AUTO: 9.2 FL (ref 9–12.9)
POTASSIUM SERPL-SCNC: 4.2 MMOL/L (ref 3.6–5.5)
RBC # BLD AUTO: 4.2 M/UL (ref 4.7–6.1)
SODIUM SERPL-SCNC: 135 MMOL/L (ref 135–145)
WBC # BLD AUTO: 4.5 K/UL (ref 4.8–10.8)

## 2025-04-23 PROCEDURE — A9270 NON-COVERED ITEM OR SERVICE: HCPCS | Performed by: HOSPITALIST

## 2025-04-23 PROCEDURE — A9270 NON-COVERED ITEM OR SERVICE: HCPCS | Performed by: INTERNAL MEDICINE

## 2025-04-23 PROCEDURE — 80069 RENAL FUNCTION PANEL: CPT

## 2025-04-23 PROCEDURE — 700101 HCHG RX REV CODE 250: Performed by: HOSPITALIST

## 2025-04-23 PROCEDURE — 36415 COLL VENOUS BLD VENIPUNCTURE: CPT

## 2025-04-23 PROCEDURE — 700102 HCHG RX REV CODE 250 W/ 637 OVERRIDE(OP): Performed by: STUDENT IN AN ORGANIZED HEALTH CARE EDUCATION/TRAINING PROGRAM

## 2025-04-23 PROCEDURE — A9270 NON-COVERED ITEM OR SERVICE: HCPCS | Performed by: STUDENT IN AN ORGANIZED HEALTH CARE EDUCATION/TRAINING PROGRAM

## 2025-04-23 PROCEDURE — 700111 HCHG RX REV CODE 636 W/ 250 OVERRIDE (IP): Performed by: STUDENT IN AN ORGANIZED HEALTH CARE EDUCATION/TRAINING PROGRAM

## 2025-04-23 PROCEDURE — 700102 HCHG RX REV CODE 250 W/ 637 OVERRIDE(OP): Performed by: INTERNAL MEDICINE

## 2025-04-23 PROCEDURE — 770020 HCHG ROOM/CARE - TELE (206)

## 2025-04-23 PROCEDURE — 85027 COMPLETE CBC AUTOMATED: CPT

## 2025-04-23 PROCEDURE — 94640 AIRWAY INHALATION TREATMENT: CPT

## 2025-04-23 PROCEDURE — 700102 HCHG RX REV CODE 250 W/ 637 OVERRIDE(OP): Performed by: HOSPITALIST

## 2025-04-23 PROCEDURE — 99291 CRITICAL CARE FIRST HOUR: CPT | Performed by: INTERNAL MEDICINE

## 2025-04-23 PROCEDURE — 83735 ASSAY OF MAGNESIUM: CPT

## 2025-04-23 RX ADMIN — FLUTICASONE FUROATE, UMECLIDINIUM BROMIDE AND VILANTEROL TRIFENATATE 1 PUFF: 200; 62.5; 25 POWDER RESPIRATORY (INHALATION) at 08:10

## 2025-04-23 RX ADMIN — LISINOPRIL 20 MG: 20 TABLET ORAL at 17:56

## 2025-04-23 RX ADMIN — AMLODIPINE BESYLATE 5 MG: 5 TABLET ORAL at 05:26

## 2025-04-23 RX ADMIN — DIBASIC SODIUM PHOSPHATE, MONOBASIC POTASSIUM PHOSPHATE AND MONOBASIC SODIUM PHOSPHATE 500 MG: 852; 155; 130 TABLET ORAL at 08:09

## 2025-04-23 RX ADMIN — LISINOPRIL 20 MG: 20 TABLET ORAL at 05:27

## 2025-04-23 RX ADMIN — LEVOTHYROXINE SODIUM 100 MCG: 0.1 TABLET ORAL at 05:26

## 2025-04-23 RX ADMIN — HEPARIN SODIUM 5000 UNITS: 5000 INJECTION, SOLUTION INTRAVENOUS; SUBCUTANEOUS at 22:37

## 2025-04-23 RX ADMIN — PREDNISONE 60 MG: 50 TABLET ORAL at 05:26

## 2025-04-23 RX ADMIN — LOPERAMIDE HYDROCHLORIDE 2 MG: 2 CAPSULE ORAL at 22:37

## 2025-04-23 RX ADMIN — SULFAMETHOXAZOLE AND TRIMETHOPRIM 1 TABLET: 800; 160 TABLET ORAL at 17:56

## 2025-04-23 RX ADMIN — ACETAMINOPHEN 650 MG: 325 TABLET, FILM COATED ORAL at 08:09

## 2025-04-23 RX ADMIN — ROSUVASTATIN CALCIUM 10 MG: 20 TABLET, FILM COATED ORAL at 17:56

## 2025-04-23 RX ADMIN — TRAZODONE HYDROCHLORIDE 50 MG: 50 TABLET ORAL at 22:37

## 2025-04-23 RX ADMIN — HEPARIN SODIUM 5000 UNITS: 5000 INJECTION, SOLUTION INTRAVENOUS; SUBCUTANEOUS at 14:17

## 2025-04-23 RX ADMIN — ASPIRIN 81 MG: 81 TABLET, COATED ORAL at 05:26

## 2025-04-23 RX ADMIN — IPRATROPIUM BROMIDE AND ALBUTEROL SULFATE 3 ML: .5; 2.5 SOLUTION RESPIRATORY (INHALATION) at 08:29

## 2025-04-23 RX ADMIN — IPRATROPIUM BROMIDE AND ALBUTEROL SULFATE 3 ML: .5; 2.5 SOLUTION RESPIRATORY (INHALATION) at 15:53

## 2025-04-23 RX ADMIN — HEPARIN SODIUM 5000 UNITS: 5000 INJECTION, SOLUTION INTRAVENOUS; SUBCUTANEOUS at 05:27

## 2025-04-23 RX ADMIN — Medication 30 MG: at 22:37

## 2025-04-23 RX ADMIN — CALCIUM POLYCARBOPHIL 625 MG: 625 TABLET, FILM COATED ORAL at 05:28

## 2025-04-23 RX ADMIN — IPRATROPIUM BROMIDE AND ALBUTEROL SULFATE 3 ML: .5; 2.5 SOLUTION RESPIRATORY (INHALATION) at 19:40

## 2025-04-23 RX ADMIN — IPRATROPIUM BROMIDE AND ALBUTEROL SULFATE 3 ML: .5; 2.5 SOLUTION RESPIRATORY (INHALATION) at 22:50

## 2025-04-23 RX ADMIN — ACETAMINOPHEN 650 MG: 325 TABLET, FILM COATED ORAL at 22:37

## 2025-04-23 RX ADMIN — MONTELUKAST 10 MG: 10 TABLET, FILM COATED ORAL at 20:31

## 2025-04-23 RX ADMIN — IPRATROPIUM BROMIDE AND ALBUTEROL SULFATE 3 ML: .5; 2.5 SOLUTION RESPIRATORY (INHALATION) at 11:53

## 2025-04-23 ASSESSMENT — ENCOUNTER SYMPTOMS
DEPRESSION: 0
BLURRED VISION: 0
CHILLS: 0
PALPITATIONS: 0
SHORTNESS OF BREATH: 1
COUGH: 1
FEVER: 0
HEARTBURN: 0
VOMITING: 0
DOUBLE VISION: 0
SPUTUM PRODUCTION: 1
CLAUDICATION: 0
BRUISES/BLEEDS EASILY: 0
HEMOPTYSIS: 0
MYALGIAS: 0
PND: 0
BACK PAIN: 0
WHEEZING: 1
DIZZINESS: 0
NAUSEA: 0
HEADACHES: 0

## 2025-04-23 ASSESSMENT — FIBROSIS 4 INDEX: FIB4 SCORE: 3.83

## 2025-04-23 ASSESSMENT — PAIN DESCRIPTION - PAIN TYPE
TYPE: ACUTE PAIN

## 2025-04-23 NOTE — PROGRESS NOTES
[facility] Medicine Daily Progress Note    Date of Service  4/23/2025    Chief Complaint  Bright Shirley is a 74 y.o. male admitted 4/15/2025 with pneumonia hypoxia    Hospital Course  Bright Shirley is a 74 y.o. 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  Patient initially presented to ER for stroke concern.  Patient was evaluated by neurology, stroke load was aborted and his weakness on left side was resolved.  He is however requiring increased oxygen requirement of 10 L oxy mask.  CTA chest noted to have stable cavitary mass in left upper lobe, extensive edematous changes in lung peripheral honeycombing.  He does have concern lactic acid of 5.3.  Due to concern of severe sepsis, admission requested by ERP.  Therefore, admitted to medicine service for further evaluation and treatment.     Echocardiogram showed ejection fraction 58%.  Mild mitral regurgitation.  Normal diastolic function.  Normal right ventricular size and systolic function.    Pulmonary and infectious disease were consulted.  Patient does have a history of squamous cell cancer of the left upper lobe pembrolizumab making him immunosuppressed and also concern for autoimmune pneumonitis.  He was found to be positive for human metapneumovirus.  With initial concern for superimposed bacterial infection patient was on Zosyn which was escalated to meropenem and doxycycline.  He had minimal improvements on this regimen, he had procalcitonin is downtrending.  Fungal infection is felt to be less likely.  Pulmonology felt patient is high risk for bronchoscopy due to low pulmonary reserve.  MRSA nares was negative.  COVID/influenza/RSV negative.  Fungal cultures of sputum showed rare fungal elements.  Aspergillus galactomannan, Legionella, mycoplasma, coccidiomycosis negative.  On 4/22  infectious disease recommended to discontinue antibiotics and monitor for clinical worsening.  Okay to continue Bactrim for PJP prophylaxis.  If there is clinical worsening recommended to repeat CT thorax and reconsult.  Pulmonary recommended to continue high-dose prednisone with concern for immunotherapy induced pneumonitis versus interstitial lung disease.  Continue Bactrim for PJP prophylaxis as above.  As PET/CT did show improvement it is okay to continue Keytruda.  Patient also known to have severe COPD on triple therapy.  Patient was also aggressively diuresed.    Interval Problem Update  4/16 patient is new to me today, patient is alert oriented follows commands, he is requiring 8 L of oxygen when I saw her in the morning, he is able to speak in full sentences, he stated that at home he was getting short of breath with exertion, normally he is comfortable at rest with 5 L of oxygen but he feels that he is requiring more with exertion/ambulation, will continue antibiotics, CT results reviewed, MRSA screen is negative and sputum cultures and blood cultures pending, discussed with bedside nurse charge nurse  pharmacist  4/17 patient is resting in chair, he is alert oriented follows commands no new neurological deficit, still having productive cough, continue IV antibiotics, notes from pulmonology reviewed, will continue with antipseudomonal coverage for now due to high risk, follow-up final blood culture results, discussed with bedside nurse charge nurse  pharmacist.  4/18 patient is resting in chair, he is in good spirit, he is requiring 10 L of oxygen early in the morning, however the repeat chest x-ray discussed with pulmonology, continue chest physiotherapy s/p RT flutter valve, continue antibiotics, follow-up cultures, I will order respiratory panel, discussed medicine return discussed palliative pharmacist, later today patient's oxygen has decreased to 8 L, follow-up blood work in  a.m.  4/19 patient is in bed, no fever or chills not in distress, patient is requiring higher amount of o2 patient is on 40L by HFNC, patient is able to tolerate diet, diarrhea resolved, c diff neg, human pneumovirus positive, discussed with pulmonologist, I have started him on iv lasix, continue abx, steroids nebs. Discussed with bedside nurse, charge nurse .   4/20 patient is resting in bed, patient is alert oriented follows commands, patient requiring 50 L of oxygen by high flow nasal cannula, discussed with pulmonology, transition to meropenem and doxycycline, continue supportive treatment, received IV Lasix today, continue RT per protocol, discussed with bedside nurse charge nurse  pharmacist.  4/21 patient is resting in bed, he is alert oriented follows commands, he is still requiring 4 L of oxygen but clinically doing okay, discussed with pulmonology discussed with infectious disease at this time appears to be related to bilateral pneumonia, continue antibiotics for 1 more day if patient continues to improve clinically probably will be able to stop antibiotics tomorrow, continue chest physical therapy incentive spirometry flutter valve, I discussed with bedside nurse charge nurse  pharmacist    Patient was seen and examined at bedside.  I have personally reviewed and interpreted vitals, labs, and imaging.    4/22.  Afebrile.  Intermittent bradycardia.  Intermittent hypertension.  On 55L high flow denies fevers, chills.  Reports an episode of chest pain last night which resolved spontaneously.  States breathing and cough are improved.  He has reported loose bowel movement yesterday.  Case was discussed with ID and pulmonary.  Plan to discontinue antibiotics and monitor.  Plan transferred to [facility]'s LT[facility] tomorrow  Procal 0.41 > 0.25  4/23.  Afebrile.  Hypertensive.  On 50L HHF.  Headaches denies fevers, chills, chest pains.  Shortness of breath is improved.  Still having some  diarrhea.  Case was discussed with social work.  Plan is for LTAC.  No bed at [facility] today  Wbc 4.7 > 4.5  Hgb 12.8 > 11.6    I have discussed this patient's plan of care and discharge plan at IDT rounds today with Case Management, Nursing, Nursing leadership, and other members of the IDT team.    Consultants/Specialty  Pulmonology  ID    Code Status  Full Code    Disposition  The patient is not medically cleared for discharge to home or a post-acute facility.  Anticipate discharge to: a long-term acute care hospital    I have placed the appropriate orders for post-discharge needs.    Review of Systems  Review of Systems   Constitutional:  Negative for chills and fever.   Eyes:  Negative for blurred vision and double vision.   Respiratory:  Positive for cough, sputum production, shortness of breath and wheezing. Negative for hemoptysis.    Cardiovascular:  Negative for chest pain, palpitations, claudication, leg swelling and PND.   Gastrointestinal:  Negative for heartburn, nausea and vomiting.   Genitourinary:  Negative for hematuria and urgency.   Musculoskeletal:  Negative for back pain and myalgias.   Skin:  Negative for rash.   Neurological:  Negative for dizziness and headaches.   Endo/Heme/Allergies:  Does not bruise/bleed easily.   Psychiatric/Behavioral:  Negative for depression.         Physical Exam  Temp:  [36 °C (96.8 °F)-36.4 °C (97.5 °F)] 36.4 °C (97.5 °F)  Pulse:  [59-88] 65  Resp:  [17-20] 18  BP: ()/(60-94) 144/75  SpO2:  [92 %-96 %] 94 %    Physical Exam  Vitals and nursing note reviewed.   Constitutional:       Appearance: He is ill-appearing.   Eyes:      General:         Right eye: No discharge.         Left eye: No discharge.   Cardiovascular:      Rate and Rhythm: Normal rate and regular rhythm.      Pulses: Normal pulses.      Heart sounds: Normal heart sounds.   Pulmonary:      Effort: Pulmonary effort is normal. No respiratory distress.      Breath sounds: Normal breath sounds.    Abdominal:      General: Bowel sounds are normal. There is no distension.      Tenderness: There is no guarding.   Musculoskeletal:         General: Normal range of motion.      Cervical back: Normal range of motion and neck supple.      Right lower leg: No edema.      Left lower leg: No edema.   Skin:     General: Skin is warm and dry.      Capillary Refill: Capillary refill takes less than 2 seconds.      Coloration: Skin is not jaundiced.   Neurological:      General: No focal deficit present.      Mental Status: He is alert and oriented to person, place, and time.      Cranial Nerves: No cranial nerve deficit.   Psychiatric:         Mood and Affect: Mood normal.         Behavior: Behavior normal.         Fluids    Intake/Output Summary (Last 24 hours) at 4/23/2025 0533  Last data filed at 4/23/2025 0524  Gross per 24 hour   Intake 200 ml   Output 2650 ml   Net -2450 ml        Laboratory  Recent Labs     04/21/25  0325 04/22/25  0057 04/23/25  0320   WBC 4.2* 4.7* 4.5*   RBC 4.12* 4.63* 4.20*   HEMOGLOBIN 11.5* 12.8* 11.6*   HEMATOCRIT 35.2* 38.9* 35.7*   MCV 85.4 84.0 85.0   MCH 27.9 27.6 27.6   MCHC 32.7 32.9 32.5   RDW 50.4* 48.7 49.3   PLATELETCT 191 226 236   MPV 9.7 8.8* 9.2     Recent Labs     04/21/25  0325 04/22/25  0057 04/23/25  0320   SODIUM 136 136 135   POTASSIUM 3.7 4.2 4.2   CHLORIDE 101 100 102   CO2 24 23 23   GLUCOSE 93 105* 92   BUN 23* 23* 23*   CREATININE 0.91 0.96 0.80   CALCIUM 8.4* 8.6 8.8                     Imaging  DX-CHEST-LIMITED (1 VIEW)   Final Result      Worsening bilateral pulmonary infiltrates.      DX-CHEST-PORTABLE (1 VIEW)   Final Result         1.  Pulmonary edema and/or infiltrates, greatest in the left upper lobe, slightly decreased since prior study.      EC-ECHOCARDIOGRAM COMPLETE W/O CONT   Final Result      CT-CTA CHEST PULMONARY ARTERY W/ RECONS   Final Result      1. No acute pulmonary embolus.   2. Stable cavitary mass in the left upper lobe.   3. Extensive  edematous changes in the lungs with peripheral honeycombing.   4. Aortic and coronary arterial sclerosis.   5. Simple appearing right renal cortical cyst, requiring no further follow-up.   6. Stable bilateral adrenal masses.   7. Acute or subacute left third through fifth rib fractures.            DX-CHEST-PORTABLE (1 VIEW)   Final Result      1.  LEFT upper lung consolidation, likely pneumonia.  Underlying mass is not excluded.   2.  Probable pulmonary fibrosis.   3.  Limited by positioning.           Assessment/Plan  * Severe sepsis (HCC)- (present on admission)  Assessment & Plan  4/23/2025  This is Sepsis Present on admission  SIRS criteria identified on my evaluation include: Tachycardia, with heart rate greater than 90 BPM, Tachypnea, with respirations greater than 20 per minute, and Bandemia, greater than 10% bands  Clinical indicators of end organ dysfunction include Lactic Acid greater than 2  Source is pulm  Sepsis protocol initiated  Crystalloid Fluid Administration: Fluid resuscitation ordered per standard protocol - 30 mL/kg per current or ideal body weight  IV antibiotics as appropriate for source of sepsis  Reassessment: I have reassessed the patient's hemodynamic status    Acute left-sided weakness  Assessment & Plan  4/23/2025  Resolved by ER arrival  Stroke alert aborted by stroke neurology  PT/OT/ST    H/o TIA per pt  -added ASA  -continue statin    Continue monitoring  No neuro deficit    TREVOR (acute kidney injury) (HCC)  Assessment & Plan  4/23/2025  Cr 1.22  IVF  Minimize nephrotoxic agents, renally dose meds  Check FeNa    Creatinine 1.13    ACP (advance care planning)  Assessment & Plan  Goal of care discussed with patient in the emergency room.  He stated he is agreeable for noninvasive, as well as invasive/heroic life-sustaining measures-including CPR/defibrillation/intubation or mechanical ventilation.  He is agreeable for hospitalization, further treatment with IVF, breathing treatment,  antibiotic therapy, any medical management as clinically warranted.  Diagnosis, prognosis, question and concern addressed.  Full CODE STATUS confirmed.  ACP: 17 minutes    Pneumonia due to infectious organism  Assessment & Plan  4/23/2025  Cavitary left upper lobe mass  Patient is immunocompromised due to squamous cell lung cancer on chemotherapy  Follow cultures  Antibiotic: Zosyn, Zyvox, azithromycin  Wean off oxygen support as tolerated continue broad-spectrum antibiotics follow-up MRSA screening follow-up    Blood sputum cultures  Will need treatment for possible  pseudomonas as per pulm will get EKG to check qtc.     Continue abx.  Cx neg   Human metapneumovirus positive.   Supportive treatment.    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 Melara, medical oncology    Acute on chronic respiratory failure with hypoxia (HCC)- (present on admission)  Assessment & Plan  4/23/2025  Dependent on 5 L at baseline  Currently requiring 10 L OxyMask-wean off as tolerated  No PE seen on CTA chest  Check COVID/influenza/RSV  Antibiotic for pneumonia  Check TTE    CTA did not show PE  Patient with probably postobstructive pneumonia due to lung mass  Continue antibiotics  Note from pulm reviewed.     Patient requiring higher levels of oxygen today 10 L  Discussed with pulmonology  Continue antibiotics  Continue chest physical therapy  Follow-up fungal results    Patient is requiring higher amount of o2, he is on 40L of o2, I discussed with pulmonologist  Patient started on HFNC.   Continue iv diuresis, iv abx.    Chronic obstructive pulmonary disease (HCC)- (present on admission)  Assessment & Plan  4/23/2025  Pulmonary hygiene  -Agustin Zuleta,  montelukast  -PEP  -s/p Solu-Medrol 125 mg  -Continue Solu-Medrol 40 mg every 8 hours  RT protocol    Patient is on 5 L of oxygen at home now he is requiring 8 L  5L of o2.     10 L of oxygen  Continue steroids  Continue breathing treatment  Discussed with pulmonology    Dyslipidemia- (present on admission)  Assessment & Plan  4/23/2025  Statin    Primary hypertension- (present on admission)  Assessment & Plan  4/23/2025  Lisinopril, amlodipine         VTE prophylaxis: Heparin    I have performed a physical exam and reviewed and updated ROS and Plan today (4/23/2025). In review of yesterday's note (4/22/2025), there are no changes except as documented above.    Patient is critically ill.   The patient continues to have: Acute hypoxic respiratory failure secondary to immunotherapy induced pneumonitis versus interstitial lung disease versus COPD exacerbation versus human metapneumovirus pneumonia  The vital organ system that is affected is the: Pulmonary  If untreated there is a high chance of deterioration into: Respiratory failure  And eventually death.   The critical care that I am providing today is: High flow nasal cannula 50L 40% FiO2  The critical that has been undertaken is medically complex.   There has been no overlap in critical care time.   Critical Care Time not including procedures: 38

## 2025-04-23 NOTE — CARE PLAN
The patient is Watcher - Medium risk of patient condition declining or worsening    Shift Goals  Clinical Goals: Monitor O2, mobilize  Patient Goals: rest, up to chair this afternoon  Family Goals: ANAHI not at bedside    Progress made toward(s) clinical / shift goals:    Problem: Knowledge Deficit - Standard  Goal: Patient and family/care givers will demonstrate understanding of plan of care, disease process/condition, diagnostic tests and medications  Outcome: Progressing     Problem: Knowledge Deficit - COPD  Goal: Patient/significant other demonstrates understanding of disease process, utilization of the Action Plan, medications and discharge instruction  Outcome: Progressing     Problem: Risk for Infection - COPD  Goal: Patient will remain free from signs and symptoms of infection  Outcome: Progressing     Problem: Self Care  Goal: Patient will have the ability to perform ADLs independently or with assistance (bathe, groom, dress, toilet and feed)  Outcome: Progressing     Problem: Physical Regulation  Goal: Diagnostic test results will improve  Outcome: Progressing

## 2025-04-23 NOTE — DISCHARGE PLANNING
[phone]  DPA left message for Kacey with [facility], requesting a call back regarding pending referral.

## 2025-04-23 NOTE — PROGRESS NOTES
Bedside report received from off going RN: Cathy, assumed care of patient.     Fall Risk Score: MODERATE RISK  Fall risk interventions in place: Place yellow fall risk ID band on patient, Provide patient/family education based on risk assessment, Educate patient/family to call staff for assistance when getting out of bed, Place fall precaution signage outside patient door, Utilize bed/chair fall alarm, Notify charge of high risk for huddle, and Bed alarm connected correctly  Bed type: Low air loss (Nba Score less than 17 interventions in place)  Patient on cardiac monitor: Yes  IVF/IV medications: Not Applicable   Oxygen: How many liters 50L and Traced the line to wall oxygen  Bedside sitter: Not Applicable   Isolation: Not applicable

## 2025-04-23 NOTE — CARE PLAN
The patient is Stable - Low risk of patient condition declining or worsening    Shift Goals  Clinical Goals: Monitor WOB and wean O2 a tolerated  Patient Goals: Rest  Family Goals: ANAHI not at bedside    Progress made toward(s) clinical / shift goals:    Problem: Knowledge Deficit - Standard  Goal: Patient and family/care givers will demonstrate understanding of plan of care, disease process/condition, diagnostic tests and medications this shift as seen by patient stating he understands POC   Outcome: Progressing  Note: Discussed POC with patient including pain management, up to chair as much as tolerated. Pt verbalizes understanding of POC including continuing to attempt to wean O2 as tolerated to baseline of 5L     Problem: Respiratory  Goal: Patient will achieve/maintain optimum respiratory ventilation and gas exchange as seen by a decrease in O2   Outcome: Progressing  Flowsheets  Taken 4/22/2025 1843 by Catilyn Orellana, R.N.  Incentive Spirometer: Effective  Deep Breathe and Cough: Performs Correctly  Taken 4/22/2025 0020 by Natalie Fairchild RSadeNSade  Incentive Spirometer Volume: 1500 mL  Note: Discussed POC with patient including weaning O2 as tolerated. Patient started on 55 55 for his settings and is currently on 50 50 for his settings. Patient has had an improve cough shift shift. Notes better tolerance with transfers though he is still experience inc wob with mobility to chair.        Patient is not progressing towards the following goals:

## 2025-04-23 NOTE — PROGRESS NOTES
Monitor Summary  Rhythm: Normal Sinus Rhythm  Rate: 66 - 92  Ectopy: PVCs, PACs, Bigeminy , Trigeminy, Triplets  .13 / .09 / .38

## 2025-04-23 NOTE — CARE PLAN
The patient is Stable - Low risk of patient condition declining or worsening    Shift Goals  Clinical Goals: Monitor O2 saturation  Patient Goals: Sleep  Family Goals: ANAHI not at bedside    Progress made toward(s) clinical / shift goals:    Problem: Knowledge Deficit - Standard  Goal: Patient and family/care givers will demonstrate understanding of plan of care, disease process/condition, diagnostic tests and medications  Description: Target End Date:  1-3 days or as soon as patient condition allowsDocument in Patient Education1.  Patient and family/caregiver oriented to unit, equipment, visitation policy and means for communicating concern2.  Complete/review Learning Assessment3.  Assess knowledge level of disease process/condition, treatment plan, diagnostic tests and medications4.  Explain disease process/condition, treatment plan, diagnostic tests and medications  Outcome: Progressing     Problem: Risk for Aspiration  Goal: Patient's risk for aspiration will be absent or decrease  Description: Target End Date:  Prior to discharge or change in level of care1.   Complete dysphagia screening on admission2.   NPO until dysphagia screening complete or medically cleared3.   Collaborate with Speech Therapy, Clinical Dietitian and interdisciplinary team4.   Implement aspiration precautions5.   Assist patient up to chair for meals6.   Elevate head of bed 90 degrees if patient is unable to get out of bed7.   Encourage small bites8.   Ensure foods/liquids are of appropriate consistency9.   Assess for any signs/symptoms of [id number]. Assess breath sounds and vital signs after oral intake  Outcome: Progressing     Problem: Self Care  Goal: Patient will have the ability to perform ADLs independently or with assistance (bathe, groom, dress, toilet and feed)  Description: Target End Date:  Prior to discharge or change in level of careDocument on ADL flowsheet1.  Assess the capability and level of deficiency to perform ADLs2.   Encourage family/care giver involvement3.  Provide assistive devices4.  Consider PT/OT evaluations5.  Maintain support, give positive feedback, encourage self-care allowing extra time and verbal cuing as needed6.  Avoid doing something for patients they can do themselves, but provide assistance as needed7.  Assist in anticipating/planning individual needs8.  Collaborate with Case Management and  to meet discharge needs  Outcome: Progressing       Patient is not progressing towards the following goals:

## 2025-04-23 NOTE — DISCHARGE PLANNING
Care Transition Team Discharge Planning    Anticipated Discharge Information  Discharge Disposition: Disch to a long term care facility (63)  Discharge Address: [address] [place] [address] [place] [id number]  Discharge Contact Phone Number: [phone]    Discharge Plan: Patient medically clear for LTAC. Patient has insurance auth for [facility], however per [facility] liaison Diane, no beds available today. Requested DPA to f/u with [facility] to see if [facility] accept patient's insurance.     1250: Per [facility], cannot do one time insurance case.

## 2025-04-24 ENCOUNTER — APPOINTMENT (OUTPATIENT)
Dept: MEDICAL GROUP | Facility: MEDICAL CENTER | Age: 75
End: 2025-04-24
Payer: MEDICARE

## 2025-04-24 LAB
ALBUMIN SERPL BCP-MCNC: 3.5 G/DL (ref 3.2–4.9)
BUN SERPL-MCNC: 24 MG/DL (ref 8–22)
CALCIUM ALBUM COR SERPL-MCNC: 9.4 MG/DL (ref 8.5–10.5)
CALCIUM SERPL-MCNC: 9 MG/DL (ref 8.5–10.5)
CHLORIDE SERPL-SCNC: 98 MMOL/L (ref 96–112)
CO2 SERPL-SCNC: 24 MMOL/L (ref 20–33)
CREAT SERPL-MCNC: 1.01 MG/DL (ref 0.5–1.4)
ERYTHROCYTE [DISTWIDTH] IN BLOOD BY AUTOMATED COUNT: 50.2 FL (ref 35.9–50)
FUNGUS SPEC CULT: NORMAL
FUNGUS SPEC FUNGUS STN: NORMAL
GFR SERPLBLD CREATININE-BSD FMLA CKD-EPI: 78 ML/MIN/1.73 M 2
GLUCOSE SERPL-MCNC: 89 MG/DL (ref 65–99)
HCT VFR BLD AUTO: 40.1 % (ref 42–52)
HGB BLD-MCNC: 12.5 G/DL (ref 14–18)
MAGNESIUM SERPL-MCNC: 2.2 MG/DL (ref 1.5–2.5)
MCH RBC QN AUTO: 26.9 PG (ref 27–33)
MCHC RBC AUTO-ENTMCNC: 31.2 G/DL (ref 32.3–36.5)
MCV RBC AUTO: 86.2 FL (ref 81.4–97.8)
PHOSPHATE SERPL-MCNC: 3.4 MG/DL (ref 2.5–4.5)
PLATELET # BLD AUTO: 271 K/UL (ref 164–446)
PMV BLD AUTO: 9.2 FL (ref 9–12.9)
POTASSIUM SERPL-SCNC: 4.4 MMOL/L (ref 3.6–5.5)
RBC # BLD AUTO: 4.65 M/UL (ref 4.7–6.1)
SIGNIFICANT IND 70042: NORMAL
SITE SITE: NORMAL
SODIUM SERPL-SCNC: 133 MMOL/L (ref 135–145)
SOURCE SOURCE: NORMAL
WBC # BLD AUTO: 7.3 K/UL (ref 4.8–10.8)

## 2025-04-24 PROCEDURE — 770020 HCHG ROOM/CARE - TELE (206)

## 2025-04-24 PROCEDURE — 700111 HCHG RX REV CODE 636 W/ 250 OVERRIDE (IP): Performed by: STUDENT IN AN ORGANIZED HEALTH CARE EDUCATION/TRAINING PROGRAM

## 2025-04-24 PROCEDURE — 85027 COMPLETE CBC AUTOMATED: CPT

## 2025-04-24 PROCEDURE — 80069 RENAL FUNCTION PANEL: CPT

## 2025-04-24 PROCEDURE — 700102 HCHG RX REV CODE 250 W/ 637 OVERRIDE(OP): Performed by: INTERNAL MEDICINE

## 2025-04-24 PROCEDURE — 700102 HCHG RX REV CODE 250 W/ 637 OVERRIDE(OP): Performed by: STUDENT IN AN ORGANIZED HEALTH CARE EDUCATION/TRAINING PROGRAM

## 2025-04-24 PROCEDURE — 97110 THERAPEUTIC EXERCISES: CPT

## 2025-04-24 PROCEDURE — A9270 NON-COVERED ITEM OR SERVICE: HCPCS | Performed by: INTERNAL MEDICINE

## 2025-04-24 PROCEDURE — 94640 AIRWAY INHALATION TREATMENT: CPT

## 2025-04-24 PROCEDURE — 83735 ASSAY OF MAGNESIUM: CPT

## 2025-04-24 PROCEDURE — 97530 THERAPEUTIC ACTIVITIES: CPT

## 2025-04-24 PROCEDURE — A9270 NON-COVERED ITEM OR SERVICE: HCPCS | Performed by: STUDENT IN AN ORGANIZED HEALTH CARE EDUCATION/TRAINING PROGRAM

## 2025-04-24 PROCEDURE — 99291 CRITICAL CARE FIRST HOUR: CPT | Performed by: INTERNAL MEDICINE

## 2025-04-24 PROCEDURE — 36415 COLL VENOUS BLD VENIPUNCTURE: CPT

## 2025-04-24 PROCEDURE — 700101 HCHG RX REV CODE 250: Performed by: HOSPITALIST

## 2025-04-24 RX ORDER — OXYCODONE HYDROCHLORIDE 10 MG/1
10 TABLET ORAL
Refills: 0 | Status: DISCONTINUED | OUTPATIENT
Start: 2025-04-24 | End: 2025-05-01 | Stop reason: HOSPADM

## 2025-04-24 RX ORDER — OXYCODONE HYDROCHLORIDE 5 MG/1
5 TABLET ORAL
Refills: 0 | Status: DISCONTINUED | OUTPATIENT
Start: 2025-04-24 | End: 2025-05-01 | Stop reason: HOSPADM

## 2025-04-24 RX ORDER — PREDNISONE 50 MG/1
50 TABLET ORAL DAILY
Status: DISCONTINUED | OUTPATIENT
Start: 2025-04-25 | End: 2025-04-27

## 2025-04-24 RX ORDER — HYDROMORPHONE HYDROCHLORIDE 1 MG/ML
0.5 INJECTION, SOLUTION INTRAMUSCULAR; INTRAVENOUS; SUBCUTANEOUS
Status: DISCONTINUED | OUTPATIENT
Start: 2025-04-24 | End: 2025-05-01 | Stop reason: HOSPADM

## 2025-04-24 RX ORDER — HYDROCODONE BITARTRATE AND HOMATROPINE METHYLBROMIDE ORAL SOLUTION 5; 1.5 MG/5ML; MG/5ML
5 LIQUID ORAL
Refills: 0 | Status: DISCONTINUED | OUTPATIENT
Start: 2025-04-24 | End: 2025-05-01 | Stop reason: HOSPADM

## 2025-04-24 RX ADMIN — IPRATROPIUM BROMIDE AND ALBUTEROL SULFATE 3 ML: .5; 2.5 SOLUTION RESPIRATORY (INHALATION) at 08:50

## 2025-04-24 RX ADMIN — IPRATROPIUM BROMIDE AND ALBUTEROL SULFATE 3 ML: .5; 2.5 SOLUTION RESPIRATORY (INHALATION) at 12:22

## 2025-04-24 RX ADMIN — TRAZODONE HYDROCHLORIDE 50 MG: 50 TABLET ORAL at 23:21

## 2025-04-24 RX ADMIN — ROSUVASTATIN CALCIUM 10 MG: 20 TABLET, FILM COATED ORAL at 17:39

## 2025-04-24 RX ADMIN — HEPARIN SODIUM 5000 UNITS: 5000 INJECTION, SOLUTION INTRAVENOUS; SUBCUTANEOUS at 05:21

## 2025-04-24 RX ADMIN — IPRATROPIUM BROMIDE AND ALBUTEROL SULFATE 3 ML: .5; 2.5 SOLUTION RESPIRATORY (INHALATION) at 15:55

## 2025-04-24 RX ADMIN — LISINOPRIL 20 MG: 20 TABLET ORAL at 05:20

## 2025-04-24 RX ADMIN — IPRATROPIUM BROMIDE AND ALBUTEROL SULFATE 3 ML: .5; 2.5 SOLUTION RESPIRATORY (INHALATION) at 23:01

## 2025-04-24 RX ADMIN — HEPARIN SODIUM 5000 UNITS: 5000 INJECTION, SOLUTION INTRAVENOUS; SUBCUTANEOUS at 23:23

## 2025-04-24 RX ADMIN — HEPARIN SODIUM 5000 UNITS: 5000 INJECTION, SOLUTION INTRAVENOUS; SUBCUTANEOUS at 14:55

## 2025-04-24 RX ADMIN — LISINOPRIL 20 MG: 20 TABLET ORAL at 17:39

## 2025-04-24 RX ADMIN — IPRATROPIUM BROMIDE AND ALBUTEROL SULFATE 3 ML: .5; 2.5 SOLUTION RESPIRATORY (INHALATION) at 19:54

## 2025-04-24 RX ADMIN — OXYCODONE 5 MG: 5 TABLET ORAL at 23:22

## 2025-04-24 RX ADMIN — ACETAMINOPHEN 650 MG: 325 TABLET, FILM COATED ORAL at 08:48

## 2025-04-24 RX ADMIN — ACETAMINOPHEN 650 MG: 325 TABLET, FILM COATED ORAL at 17:50

## 2025-04-24 RX ADMIN — FLUTICASONE FUROATE, UMECLIDINIUM BROMIDE AND VILANTEROL TRIFENATATE 1 PUFF: 200; 62.5; 25 POWDER RESPIRATORY (INHALATION) at 08:49

## 2025-04-24 RX ADMIN — PREDNISONE 60 MG: 50 TABLET ORAL at 05:20

## 2025-04-24 RX ADMIN — ASPIRIN 81 MG: 81 TABLET, COATED ORAL at 05:20

## 2025-04-24 RX ADMIN — IPRATROPIUM BROMIDE AND ALBUTEROL SULFATE 3 ML: .5; 2.5 SOLUTION RESPIRATORY (INHALATION) at 02:30

## 2025-04-24 RX ADMIN — LEVOTHYROXINE SODIUM 100 MCG: 0.1 TABLET ORAL at 05:20

## 2025-04-24 RX ADMIN — CALCIUM POLYCARBOPHIL 625 MG: 625 TABLET, FILM COATED ORAL at 05:21

## 2025-04-24 RX ADMIN — OXYCODONE 5 MG: 5 TABLET ORAL at 20:20

## 2025-04-24 RX ADMIN — MONTELUKAST 10 MG: 10 TABLET, FILM COATED ORAL at 20:21

## 2025-04-24 RX ADMIN — AMLODIPINE BESYLATE 5 MG: 5 TABLET ORAL at 05:20

## 2025-04-24 RX ADMIN — Medication 30 MG: at 23:21

## 2025-04-24 RX ADMIN — HYDROCODONE BITARTRATE AND HOMATROPINE METHYLBROMIDE 5 ML: 5; 1.5 SOLUTION ORAL at 20:21

## 2025-04-24 RX ADMIN — OXYCODONE 5 MG: 5 TABLET ORAL at 08:48

## 2025-04-24 ASSESSMENT — ENCOUNTER SYMPTOMS
VOMITING: 0
FEVER: 0
HEMOPTYSIS: 0
NAUSEA: 0
HEARTBURN: 0
DOUBLE VISION: 0
CHILLS: 0
CLAUDICATION: 0
MYALGIAS: 0
DIZZINESS: 0
BLURRED VISION: 0
BACK PAIN: 0
HEADACHES: 0
SPUTUM PRODUCTION: 1
COUGH: 1
DEPRESSION: 0
WHEEZING: 1
SHORTNESS OF BREATH: 1
BRUISES/BLEEDS EASILY: 0
PND: 0
PALPITATIONS: 0

## 2025-04-24 ASSESSMENT — COGNITIVE AND FUNCTIONAL STATUS - GENERAL
SUGGESTED CMS G CODE MODIFIER MOBILITY: CJ
CLIMB 3 TO 5 STEPS WITH RAILING: A LITTLE
MOBILITY SCORE: 21
STANDING UP FROM CHAIR USING ARMS: A LITTLE
WALKING IN HOSPITAL ROOM: A LITTLE

## 2025-04-24 ASSESSMENT — FIBROSIS 4 INDEX: FIB4 SCORE: 3.19

## 2025-04-24 ASSESSMENT — PAIN DESCRIPTION - PAIN TYPE
TYPE: ACUTE PAIN

## 2025-04-24 NOTE — PROGRESS NOTES
Bedside report received from off going RN/tech: Cathy, assumed care of patient.     Fall Risk Score: MODERATE RISK  Fall risk interventions in place: Provide patient/family education based on risk assessment, Educate patient/family to call staff for assistance when getting out of bed, Place fall precaution signage outside patient door, Utilize bed/chair fall alarm, and Bed alarm connected correctly  Bed type: Regular (Nba Score less than 17 interventions in place)  Patient on cardiac monitor: Yes  IVF/IV medications: Not Applicable   Oxygen: How many liters 55% 20L and Traced the line to wall oxygen  Bedside sitter: Not Applicable   Isolation: Isolation precautions in place

## 2025-04-24 NOTE — THERAPY
Physical Therapy   Daily Treatment     Patient Name: Bright Shirley  Age:  74 y.o., Sex:  male  Medical Record #: [id number]  Today's Date: 4/24/2025     Precautions  Precautions: Fall Risk    Assessment    Pt continues to be on high Flow O2. Pt able to tolerate seated and standing therex with rests for pursed lipped breathing. Pt given and reviewed the following handout(s):  Seated Therapy Exercises for Lower extremities. Pt to do seated exercises 2 times a day. Will continue to follow.      Plan    Treatment Plan Status: Continue Current Treatment Plan  Type of Treatment: Bed Mobility, Equipment, Family / Caregiver Training, Gait Training, Manual Therapy, Neuro Re-Education / Balance, Self Care / Home Evaluation, Stair Training, Therapeutic Activities, Therapeutic Exercise  Treatment Frequency: 3 Times per Week  Treatment Duration: Until Therapy Goals Met    DC Equipment Recommendations: None  Discharge Recommendations: Recommend post-acute placement for additional physical therapy services prior to discharge home      Subjective    Pt in chair on his computer. Pt agreed to PT.      Objective       04/24/25 1544   Time In/Time Out   Therapy Start Time 1520   Therapy End Time 1545   Total Therapy Time 25   Precautions   Precautions Fall Risk   Vitals   O2 Delivery Device Heated High Flow Nasal Cannula   Vitals Comments pt would desat to 85% after about 30 seconds to a minute of exercise, min desats with talking, quickly increased to above 90% within 1 minute of seated pursed lipped breathing   Pain 0 - 10 Group   Therapist Pain Assessment 0;Post Activity Pain Same as Prior to Activity;During Activity   Cognition    Cognition / Consciousness WDL   Level of Consciousness Alert   Comments receptive to therex and education   Sitting Lower Body Exercises   Sitting Lower Body Exercises Yes   Ankle Pumps 1 set of 10;Bilateral   Hip Adduction 1 set of 10;Other Resistance (See Comments);Bilateral  (pillow squeezes)    Long Arc Quad Bilateral  (1 set of 5)   Marching Reciprocal;1 set of 10   Other Exercises glut squeezes 1 set of 10   Comments rests for 1 minute between exercises   Standing Lower Body Exercises   Standing Lower Body Exercises Yes   Marching 2 sets of 10   Heel Rise 1 set of 10   Comments rest between exercises, at FWW   Balance   Sitting Balance (Static) Good   Sitting Balance (Dynamic) Fair +   Standing Balance (Static) Fair   Standing Balance (Dynamic) Fair   Weight Shift Sitting Good   Weight Shift Standing Good   Skilled Intervention Facilitation;Verbal Cuing   Comments standing at FWW   Bed Mobility    Comments up in recliner pre/post session   Functional Mobility   Sit to Stand Supervised   Bed, Chair, Wheelchair Transfer Supervised   Transfer Method Stand Step   Mobility with FWW   6 Clicks Assessment - How much HELP from from another person do you currently need... (If the patient hasn't done an activity recently, how much help from another person do you think he/she would need if he/she tried?)   Turning from your back to your side while in a flat bed without using bedrails? 4   Moving from lying on your back to sitting on the side of a flat bed without using bedrails? 4   Moving to and from a bed to a chair (including a wheelchair)? 4   Standing up from a chair using your arms (e.g., wheelchair, or bedside chair)? 3   Walking in hospital room? 3   Climbing 3-5 steps with a railing? 3   6 clicks Mobility Score 21   Activity Tolerance   Comments limted by desaturation with exertion   Short Term Goals    Short Term Goal # 1 Pt will perform supine <> sit with SPV in 6 visits to get in/out of bed   Goal Outcome # 1 goal not met   Short Term Goal # 2 Pt will perform stand step transfers with FWW and SPV in 6 visits to get in/out of chair   Goal Outcome # 2 Goal met   Short Term Goal # 3 Pt will ambulate 50ft with FWW and SPV in 6 visits to access home environment   Goal Outcome # 3 Goal not met   Education  Group   Education Provided Role of Physical Therapist;Exercises - Supine;Exercises - Seated   Role of Physical Therapist Patient Response Patient;Acceptance;Explanation;Verbal Demonstration   Exercises - Supine Patient Response Patient;Acceptance;Explanation;Demonstration;Handout;Action Demonstration   Exercises - Seated Patient Response Patient;Acceptance;Explanation;Action Demonstration   Additional Comments pursed lipped breathing, relaxation of shoulders with SOB, energy conservation.   Physical Therapy Treatment Plan   Physical Therapy Treatment Plan Continue Current Treatment Plan   Treatment Plan  Bed Mobility;Equipment;Family / Caregiver Training;Gait Training;Manual Therapy;Neuro Re-Education / Balance;Self Care / Home Evaluation;Stair Training;Therapeutic Activities;Therapeutic Exercise   Treatment Frequency 3 Times per Week   Duration Until Therapy Goals Met   Anticipated Discharge Equipment and Recommendations   DC Equipment Recommendations None   Discharge Recommendations Recommend post-acute placement for additional physical therapy services prior to discharge home   Interdisciplinary Plan of Care Collaboration   IDT Collaboration with  Nursing   Patient Position at End of Therapy Seated;Chair Alarm On;Call Light within Reach;Tray Table within Reach;Phone within Reach   Collaboration Comments RN updated

## 2025-04-24 NOTE — CARE PLAN
The patient is Stable - Low risk of patient condition declining or worsening    Shift Goals  Clinical Goals: Monitor O2 status  Patient Goals: Sleep  Family Goals: ANAHI not at bedside    Progress made toward(s) clinical / shift goals:    Problem: Knowledge Deficit - Standard  Goal: Patient and family/care givers will demonstrate understanding of plan of care, disease process/condition, diagnostic tests and medications  Description: Target End Date:  1-3 days or as soon as patient condition allowsDocument in Patient Education1.  Patient and family/caregiver oriented to unit, equipment, visitation policy and means for communicating concern2.  Complete/review Learning Assessment3.  Assess knowledge level of disease process/condition, treatment plan, diagnostic tests and medications4.  Explain disease process/condition, treatment plan, diagnostic tests and medications  Outcome: Progressing     Problem: Knowledge Deficit - COPD  Goal: Patient/significant other demonstrates understanding of disease process, utilization of the Action Plan, medications and discharge instruction  Description: Target End Date:  1-3 days or as soon as patient condition allowsDocument in Patient Education1.  Discuss the importance of medical follow-up care, periodic chest x-rays, sputum cultures2.  Review worsening signs and symptoms of COPD flare and exacerbation and its management3.  Discuss respiratory medications, side effects, adverse reactions4.  Demonstrate technique for using a metered-dose inhaler (MDI) and utilization of a spacer5.  Review the COPD Action Plan6.  Instruct and reinforce the rationale for breathing exercises, coughing effectively, and general conditioning exercises7.  Stress importance of oral care and dental hygiene8.  Discuss the importance of avoiding people with active respiratory infections and need for routine influenza and pneumococcal vaccinations9.  Discuss factors that may trigger condition and encourage  patient/significant other to explore ways to control these factors in and around the home and work [id number]. Review the harmful effects of smoking and advise cessation of [id number]. Provide information about activity limitations and alternating activities with rest periods to prevent [id number]. Instruct asthmatic patient of use of peak flow meter, as [id number]. Review oxygen requirements and dosage, safe use of oxygen, and refer to the supplier as [id number]. Educate patient/family/caregiver on the use of Nasal Intermittent Positive Pressure Ventilation (NIPPV) and possible adverse reactions  Outcome: Progressing     Problem: Self Care  Goal: Patient will have the ability to perform ADLs independently or with assistance (bathe, groom, dress, toilet and feed)  Description: Target End Date:  Prior to discharge or change in level of careDocument on ADL flowsheet1.  Assess the capability and level of deficiency to perform ADLs2.  Encourage family/care giver involvement3.  Provide assistive devices4.  Consider PT/OT evaluations5.  Maintain support, give positive feedback, encourage self-care allowing extra time and verbal cuing as needed6.  Avoid doing something for patients they can do themselves, but provide assistance as needed7.  Assist in anticipating/planning individual needs8.  Collaborate with Case Management and  to meet discharge needs  Outcome: Progressing       Patient is not progressing towards the following goals:

## 2025-04-24 NOTE — PROGRESS NOTES
Monitor Summary  Rhythm: Normal Sinus Rhythm  Rate: 60-90's  Ectopy: PVCs, Trigeminy, Couplets  .16 / .08 / .40

## 2025-04-24 NOTE — DISCHARGE PLANNING
Care Transition Team Discharge Planning    Anticipated Discharge Information  Discharge Disposition: Disch to a long term care facility (63)  Discharge Address: [address] [place] [address] [place] [address]  Discharge Contact Phone Number: [phone]    Discharge Plan:  Patient discussed in IDT rounds.  for [facility] pending bed availability. Message sent to [facility] liaison Diane for update, pending response.     1320: Update from Diane with [facility], no beds available today.

## 2025-04-24 NOTE — PROGRESS NOTES
4 Eyes Skin Assessment Completed by JOYCELYN Morgan and JOYCELYN Choe.    Head WDL  Ears Redness and Blanching  Nose WDL  Mouth WDL  Neck WDL  Breast/Chest WDL  Shoulder Blades WDL  Spine WDL  (R) Arm/Elbow/Hand Blanching  (L) Arm/Elbow/Hand Blanching  Abdomen WDL  Groin Redness and Blanching  Scrotum/Coccyx/Buttocks Redness and Blanching, vast improvement from media taken on 04/19/25.  (R) Leg Blanching  (L) Leg Blanching  (R) Heel/Foot/Toe Blanching  (L) Heel/Foot/Toe Blanching          Devices In Places Tele Box, Pulse Ox, and HFNC      Interventions In Place Gray Ear Foams, Sacral Mepilex, and Pillows    Possible Skin Injury No    Pictures Uploaded Into Epic Yes  Wound Consult Placed N/A  RN Wound Prevention Protocol Ordered No

## 2025-04-24 NOTE — PROGRESS NOTES
Monitor Summary  Rhythm: Normal Sinus Rhythm  Rate: 70 - 90  Ectopy: PVCs, PACs, Couplets, Triplets  .14 / .10 / .36

## 2025-04-24 NOTE — PROGRESS NOTES
[facility] Medicine Daily Progress Note    Date of Service  4/24/2025    Chief Complaint  Bright Shirley is a 74 y.o. male admitted 4/15/2025 with pneumonia hypoxia    Hospital Course  Bright Shirley is a 74 y.o. 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  Patient initially presented to ER for stroke concern.  Patient was evaluated by neurology, stroke load was aborted and his weakness on left side was resolved.  He is however requiring increased oxygen requirement of 10 L oxy mask.  CTA chest noted to have stable cavitary mass in left upper lobe, extensive edematous changes in lung peripheral honeycombing.  He does have concern lactic acid of 5.3.  Due to concern of severe sepsis, admission requested by ERP.  Therefore, admitted to medicine service for further evaluation and treatment.     Echocardiogram showed ejection fraction 58%.  Mild mitral regurgitation.  Normal diastolic function.  Normal right ventricular size and systolic function.    Pulmonary and infectious disease were consulted.  Patient does have a history of squamous cell cancer of the left upper lobe pembrolizumab making him immunosuppressed and also concern for autoimmune pneumonitis.  He was found to be positive for human metapneumovirus.  With initial concern for superimposed bacterial infection patient was on Zosyn which was escalated to meropenem and doxycycline.  He had minimal improvements on this regimen, he had procalcitonin is downtrending.  Fungal infection is felt to be less likely.  Pulmonology felt patient is high risk for bronchoscopy due to low pulmonary reserve.  MRSA nares was negative.  COVID/influenza/RSV negative.  Fungal cultures of sputum showed rare fungal elements.  Aspergillus galactomannan, Legionella, mycoplasma, coccidiomycosis negative.  On 4/22  infectious disease recommended to discontinue antibiotics and monitor for clinical worsening.  Okay to continue Bactrim for PJP prophylaxis.  If there is clinical worsening recommended to repeat CT thorax and reconsult.  Pulmonary recommended to continue high-dose prednisone with concern for immunotherapy induced pneumonitis versus interstitial lung disease.  Continue Bactrim for PJP prophylaxis as above.  As PET/CT did show improvement it is okay to continue Keytruda.  Patient also known to have severe COPD on triple therapy.  Patient was also aggressively diuresed.    Interval Problem Update  4/16 patient is new to me today, patient is alert oriented follows commands, he is requiring 8 L of oxygen when I saw her in the morning, he is able to speak in full sentences, he stated that at home he was getting short of breath with exertion, normally he is comfortable at rest with 5 L of oxygen but he feels that he is requiring more with exertion/ambulation, will continue antibiotics, CT results reviewed, MRSA screen is negative and sputum cultures and blood cultures pending, discussed with bedside nurse charge nurse  pharmacist  4/17 patient is resting in chair, he is alert oriented follows commands no new neurological deficit, still having productive cough, continue IV antibiotics, notes from pulmonology reviewed, will continue with antipseudomonal coverage for now due to high risk, follow-up final blood culture results, discussed with bedside nurse charge nurse  pharmacist.  4/18 patient is resting in chair, he is in good spirit, he is requiring 10 L of oxygen early in the morning, however the repeat chest x-ray discussed with pulmonology, continue chest physiotherapy s/p RT flutter valve, continue antibiotics, follow-up cultures, I will order respiratory panel, discussed medicine return discussed palliative pharmacist, later today patient's oxygen has decreased to 8 L, follow-up blood work in  a.m.  4/19 patient is in bed, no fever or chills not in distress, patient is requiring higher amount of o2 patient is on 40L by HFNC, patient is able to tolerate diet, diarrhea resolved, c diff neg, human pneumovirus positive, discussed with pulmonologist, I have started him on iv lasix, continue abx, steroids nebs. Discussed with bedside nurse, charge nurse .   4/20 patient is resting in bed, patient is alert oriented follows commands, patient requiring 50 L of oxygen by high flow nasal cannula, discussed with pulmonology, transition to meropenem and doxycycline, continue supportive treatment, received IV Lasix today, continue RT per protocol, discussed with bedside nurse charge nurse  pharmacist.  4/21 patient is resting in bed, he is alert oriented follows commands, he is still requiring 4 L of oxygen but clinically doing okay, discussed with pulmonology discussed with infectious disease at this time appears to be related to bilateral pneumonia, continue antibiotics for 1 more day if patient continues to improve clinically probably will be able to stop antibiotics tomorrow, continue chest physical therapy incentive spirometry flutter valve, I discussed with bedside nurse charge nurse  pharmacist    Patient was seen and examined at bedside.  I have personally reviewed and interpreted vitals, labs, and imaging.    4/22.  Afebrile.  Intermittent bradycardia.  Intermittent hypertension.  On 55L high flow denies fevers, chills.  Reports an episode of chest pain last night which resolved spontaneously.  States breathing and cough are improved.  He has reported loose bowel movement yesterday.  Case was discussed with ID and pulmonary.  Plan to discontinue antibiotics and monitor.  Plan transferred to [facility] LT[facility] tomorrow  Procal 0.41 > 0.25  4/23.  Afebrile.  Hypertensive.  On 50L HHF.  Headaches denies fevers, chills, chest pains.  Shortness of breath is improved.  Still having some  diarrhea.  Case was discussed with social work.  Plan is for LTAC.  No bed at [facility] today  4/24.  Afebrile.  Intermittent hypertension.  On heated high flow at 20 L.  Denies fever, chills, chest pains.  Shortness of breath is improved.  Awaiting LTAC.  No bed at [facility] today.  Discussed with SW.  Continue to wean O2 as tolerated  Wbc 4.7 > 4.5 > 7.3  Hgb 12.8 > 11.6 > 12.5    I have discussed this patient's plan of care and discharge plan at IDT rounds today with Case Management, Nursing, Nursing leadership, and other members of the IDT team.    Consultants/Specialty  Pulmonology  ID    Code Status  Full Code    Disposition  The patient is not medically cleared for discharge to home or a post-acute facility.  Anticipate discharge to: a long-term acute care hospital    I have placed the appropriate orders for post-discharge needs.    Review of Systems  Review of Systems   Constitutional:  Negative for chills and fever.   Eyes:  Negative for blurred vision and double vision.   Respiratory:  Positive for cough, sputum production, shortness of breath and wheezing. Negative for hemoptysis.    Cardiovascular:  Negative for chest pain, palpitations, claudication, leg swelling and PND.   Gastrointestinal:  Negative for heartburn, nausea and vomiting.   Genitourinary:  Negative for hematuria and urgency.   Musculoskeletal:  Negative for back pain and myalgias.   Skin:  Negative for rash.   Neurological:  Negative for dizziness and headaches.   Endo/Heme/Allergies:  Does not bruise/bleed easily.   Psychiatric/Behavioral:  Negative for depression.         Physical Exam  Temp:  [36.1 °C (97 °F)-36.3 °C (97.3 °F)] 36.1 °C (97 °F)  Pulse:  [67-92] 82  Resp:  [18] 18  BP: (117-154)/(46-80) 134/68  SpO2:  [88 %-96 %] 94 %    Physical Exam  Vitals and nursing note reviewed.   Constitutional:       Appearance: He is ill-appearing.   Eyes:      General:         Right eye: No discharge.         Left eye: No discharge.   Cardiovascular:       Rate and Rhythm: Normal rate and regular rhythm.      Pulses: Normal pulses.      Heart sounds: Normal heart sounds.   Pulmonary:      Effort: Pulmonary effort is normal. No respiratory distress.      Breath sounds: Normal breath sounds.   Abdominal:      General: Bowel sounds are normal. There is no distension.      Tenderness: There is no guarding.   Musculoskeletal:         General: Normal range of motion.      Cervical back: Normal range of motion and neck supple.      Right lower leg: No edema.      Left lower leg: No edema.   Skin:     General: Skin is warm and dry.      Capillary Refill: Capillary refill takes less than 2 seconds.      Coloration: Skin is not jaundiced.   Neurological:      General: No focal deficit present.      Mental Status: He is alert and oriented to person, place, and time.      Cranial Nerves: No cranial nerve deficit.   Psychiatric:         Mood and Affect: Mood normal.         Behavior: Behavior normal.         Fluids    Intake/Output Summary (Last 24 hours) at 4/24/2025 0534  Last data filed at 4/24/2025 0403  Gross per 24 hour   Intake 960 ml   Output 1405 ml   Net -445 ml        Laboratory  Recent Labs     04/22/25  0057 04/23/25  0320 04/24/25  0111   WBC 4.7* 4.5* 7.3   RBC 4.63* 4.20* 4.65*   HEMOGLOBIN 12.8* 11.6* 12.5*   HEMATOCRIT 38.9* 35.7* 40.1*   MCV 84.0 85.0 86.2   MCH 27.6 27.6 26.9*   MCHC 32.9 32.5 31.2*   RDW 48.7 49.3 50.2*   PLATELETCT 226 236 271   MPV 8.8* 9.2 9.2     Recent Labs     04/22/25  0057 04/23/25  0320 04/24/25  0111   SODIUM 136 135 133*   POTASSIUM 4.2 4.2 4.4   CHLORIDE 100 102 98   CO2 23 23 24   GLUCOSE 105* 92 89   BUN 23* 23* 24*   CREATININE 0.96 0.80 1.01   CALCIUM 8.6 8.8 9.0                     Imaging  DX-CHEST-LIMITED (1 VIEW)   Final Result      Worsening bilateral pulmonary infiltrates.      DX-CHEST-PORTABLE (1 VIEW)   Final Result         1.  Pulmonary edema and/or infiltrates, greatest in the left upper lobe, slightly decreased  since prior study.      EC-ECHOCARDIOGRAM COMPLETE W/O CONT   Final Result      CT-CTA CHEST PULMONARY ARTERY W/ RECONS   Final Result      1. No acute pulmonary embolus.   2. Stable cavitary mass in the left upper lobe.   3. Extensive edematous changes in the lungs with peripheral honeycombing.   4. Aortic and coronary arterial sclerosis.   5. Simple appearing right renal cortical cyst, requiring no further follow-up.   6. Stable bilateral adrenal masses.   7. Acute or subacute left third through fifth rib fractures.            DX-CHEST-PORTABLE (1 VIEW)   Final Result      1.  LEFT upper lung consolidation, likely pneumonia.  Underlying mass is not excluded.   2.  Probable pulmonary fibrosis.   3.  Limited by positioning.           Assessment/Plan  * Severe sepsis (HCC)- (present on admission)  Assessment & Plan  4/24/2025  This is Sepsis Present on admission  SIRS criteria identified on my evaluation include: Tachycardia, with heart rate greater than 90 BPM, Tachypnea, with respirations greater than 20 per minute, and Bandemia, greater than 10% bands  Clinical indicators of end organ dysfunction include Lactic Acid greater than 2  Source is pulm  Sepsis protocol initiated  Crystalloid Fluid Administration: Fluid resuscitation ordered per standard protocol - 30 mL/kg per current or ideal body weight  IV antibiotics as appropriate for source of sepsis  Reassessment: I have reassessed the patient's hemodynamic status    Acute left-sided weakness  Assessment & Plan  4/24/2025  Resolved by ER arrival  Stroke alert aborted by stroke neurology  PT/OT/ST    H/o TIA per pt  -added ASA  -continue statin    Continue monitoring  No neuro deficit    TREVOR (acute kidney injury) (HCC)  Assessment & Plan  4/24/2025  Cr 1.22  IVF  Minimize nephrotoxic agents, renally dose meds  Check FeNa    Creatinine 1.13    ACP (advance care planning)  Assessment & Plan  Goal of care discussed with patient in the emergency room.  He stated he is  agreeable for noninvasive, as well as invasive/heroic life-sustaining measures-including CPR/defibrillation/intubation or mechanical ventilation.  He is agreeable for hospitalization, further treatment with IVF, breathing treatment, antibiotic therapy, any medical management as clinically warranted.  Diagnosis, prognosis, question and concern addressed.  Full CODE STATUS confirmed.  ACP: 17 minutes    Pneumonia due to infectious organism  Assessment & Plan  4/24/2025  Cavitary left upper lobe mass  Patient is immunocompromised due to squamous cell lung cancer on chemotherapy  Follow cultures  Antibiotic: Zosyn, Zyvox, azithromycin  Wean off oxygen support as tolerated continue broad-spectrum antibiotics follow-up MRSA screening follow-up    Blood sputum cultures  Will need treatment for possible  pseudomonas as per pulm will get EKG to check qtc.     DC abx.  Cx neg   Human metapneumovirus positive.   Supportive treatment.    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 Melara, medical oncology    Acute on chronic respiratory failure with hypoxia (HCC)- (present on admission)  Assessment & Plan  4/24/2025  Dependent on 5 L at baseline  Currently requiring 10 L OxyMask-wean off as tolerated  No PE seen on CTA chest  Check COVID/influenza/RSV  Antibiotic for pneumonia  Check TTE    CTA did not show PE  Patient with probably postobstructive pneumonia due to lung mass  Continue antibiotics  Note from pulm reviewed.     Patient requiring higher levels of oxygen today 10 L  Discussed with pulmonology  Continue antibiotics  Continue chest physical therapy  Follow-up fungal results    Patient is requiring higher amount of o2, he is on 40L of o2, I discussed with  pulmonologist  Patient started on HFNC.   Continue iv diuresis, iv abx.    Chronic obstructive pulmonary disease (HCC)- (present on admission)  Assessment & Plan  4/24/2025  Pulmonary hygiene  -DuoNebs, Trelegy Ellipta, montelukast  -PEP  -s/p Solu-Medrol 125 mg  -Continue Solu-Medrol 40 mg every 8 hours  RT protocol    Patient is on 5 L of oxygen at home now he is requiring 8 L  5L of o2.     10 L of oxygen  Continue steroids  Continue breathing treatment  Discussed with pulmonology    Dyslipidemia- (present on admission)  Assessment & Plan  4/24/2025  Statin    Primary hypertension- (present on admission)  Assessment & Plan  4/24/2025  Lisinopril, amlodipine         VTE prophylaxis: Heparin    I have performed a physical exam and reviewed and updated ROS and Plan today (4/24/2025). In review of yesterday's note (4/23/2025), there are no changes except as documented above.    Patient is critically ill.   The patient continues to have: Acute hypoxic respiratory failure secondary to immunotherapy induced pneumonitis versus interstitial lung disease versus COPD exacerbation versus human metapneumovirus pneumonia  The vital organ system that is affected is the: Pulmonary  If untreated there is a high chance of deterioration into: Respiratory failure  And eventually death.   The critical care that I am providing today is: High flow nasal cannula 20L 55% FiO2  The critical that has been undertaken is medically complex.   There has been no overlap in critical care time.   Critical Care Time not including procedures: 37

## 2025-04-25 LAB
ALBUMIN SERPL BCP-MCNC: 3.4 G/DL (ref 3.2–4.9)
BUN SERPL-MCNC: 23 MG/DL (ref 8–22)
CALCIUM ALBUM COR SERPL-MCNC: 9.4 MG/DL (ref 8.5–10.5)
CALCIUM SERPL-MCNC: 8.9 MG/DL (ref 8.5–10.5)
CHLORIDE SERPL-SCNC: 98 MMOL/L (ref 96–112)
CO2 SERPL-SCNC: 25 MMOL/L (ref 20–33)
CREAT SERPL-MCNC: 1.05 MG/DL (ref 0.5–1.4)
ERYTHROCYTE [DISTWIDTH] IN BLOOD BY AUTOMATED COUNT: 52 FL (ref 35.9–50)
GFR SERPLBLD CREATININE-BSD FMLA CKD-EPI: 74 ML/MIN/1.73 M 2
GLUCOSE SERPL-MCNC: 95 MG/DL (ref 65–99)
HCT VFR BLD AUTO: 38.8 % (ref 42–52)
HGB BLD-MCNC: 12.1 G/DL (ref 14–18)
MAGNESIUM SERPL-MCNC: 2.4 MG/DL (ref 1.5–2.5)
MCH RBC QN AUTO: 27.4 PG (ref 27–33)
MCHC RBC AUTO-ENTMCNC: 31.2 G/DL (ref 32.3–36.5)
MCV RBC AUTO: 87.8 FL (ref 81.4–97.8)
PHOSPHATE SERPL-MCNC: 3.9 MG/DL (ref 2.5–4.5)
PLATELET # BLD AUTO: 301 K/UL (ref 164–446)
PMV BLD AUTO: 9.6 FL (ref 9–12.9)
POTASSIUM SERPL-SCNC: 4.3 MMOL/L (ref 3.6–5.5)
RBC # BLD AUTO: 4.42 M/UL (ref 4.7–6.1)
SODIUM SERPL-SCNC: 133 MMOL/L (ref 135–145)
WBC # BLD AUTO: 7.8 K/UL (ref 4.8–10.8)

## 2025-04-25 PROCEDURE — 700101 HCHG RX REV CODE 250: Performed by: HOSPITALIST

## 2025-04-25 PROCEDURE — 83735 ASSAY OF MAGNESIUM: CPT

## 2025-04-25 PROCEDURE — 94640 AIRWAY INHALATION TREATMENT: CPT

## 2025-04-25 PROCEDURE — 700111 HCHG RX REV CODE 636 W/ 250 OVERRIDE (IP): Performed by: STUDENT IN AN ORGANIZED HEALTH CARE EDUCATION/TRAINING PROGRAM

## 2025-04-25 PROCEDURE — 700101 HCHG RX REV CODE 250: Performed by: INTERNAL MEDICINE

## 2025-04-25 PROCEDURE — 700102 HCHG RX REV CODE 250 W/ 637 OVERRIDE(OP): Performed by: INTERNAL MEDICINE

## 2025-04-25 PROCEDURE — 99291 CRITICAL CARE FIRST HOUR: CPT | Performed by: INTERNAL MEDICINE

## 2025-04-25 PROCEDURE — 700111 HCHG RX REV CODE 636 W/ 250 OVERRIDE (IP): Performed by: INTERNAL MEDICINE

## 2025-04-25 PROCEDURE — 80069 RENAL FUNCTION PANEL: CPT

## 2025-04-25 PROCEDURE — A9270 NON-COVERED ITEM OR SERVICE: HCPCS | Performed by: INTERNAL MEDICINE

## 2025-04-25 PROCEDURE — 700102 HCHG RX REV CODE 250 W/ 637 OVERRIDE(OP): Performed by: STUDENT IN AN ORGANIZED HEALTH CARE EDUCATION/TRAINING PROGRAM

## 2025-04-25 PROCEDURE — 85027 COMPLETE CBC AUTOMATED: CPT

## 2025-04-25 PROCEDURE — 770020 HCHG ROOM/CARE - TELE (206)

## 2025-04-25 PROCEDURE — 36415 COLL VENOUS BLD VENIPUNCTURE: CPT

## 2025-04-25 PROCEDURE — A9270 NON-COVERED ITEM OR SERVICE: HCPCS | Performed by: STUDENT IN AN ORGANIZED HEALTH CARE EDUCATION/TRAINING PROGRAM

## 2025-04-25 RX ORDER — LISINOPRIL 20 MG/1
20 TABLET ORAL
Status: DISCONTINUED | OUTPATIENT
Start: 2025-04-26 | End: 2025-05-01 | Stop reason: HOSPADM

## 2025-04-25 RX ORDER — IPRATROPIUM BROMIDE AND ALBUTEROL SULFATE 2.5; .5 MG/3ML; MG/3ML
3 SOLUTION RESPIRATORY (INHALATION)
Status: DISCONTINUED | OUTPATIENT
Start: 2025-04-25 | End: 2025-05-01 | Stop reason: HOSPADM

## 2025-04-25 RX ADMIN — ASPIRIN 81 MG: 81 TABLET, COATED ORAL at 05:32

## 2025-04-25 RX ADMIN — HEPARIN SODIUM 5000 UNITS: 5000 INJECTION, SOLUTION INTRAVENOUS; SUBCUTANEOUS at 22:18

## 2025-04-25 RX ADMIN — MONTELUKAST 10 MG: 10 TABLET, FILM COATED ORAL at 19:47

## 2025-04-25 RX ADMIN — IPRATROPIUM BROMIDE AND ALBUTEROL SULFATE 3 ML: .5; 2.5 SOLUTION RESPIRATORY (INHALATION) at 20:01

## 2025-04-25 RX ADMIN — Medication 30 MG: at 22:18

## 2025-04-25 RX ADMIN — IPRATROPIUM BROMIDE AND ALBUTEROL SULFATE 3 ML: .5; 2.5 SOLUTION RESPIRATORY (INHALATION) at 02:06

## 2025-04-25 RX ADMIN — IPRATROPIUM BROMIDE AND ALBUTEROL SULFATE 3 ML: .5; 2.5 SOLUTION RESPIRATORY (INHALATION) at 06:46

## 2025-04-25 RX ADMIN — HEPARIN SODIUM 5000 UNITS: 5000 INJECTION, SOLUTION INTRAVENOUS; SUBCUTANEOUS at 13:19

## 2025-04-25 RX ADMIN — AMLODIPINE BESYLATE 5 MG: 5 TABLET ORAL at 05:31

## 2025-04-25 RX ADMIN — LEVOTHYROXINE SODIUM 100 MCG: 0.1 TABLET ORAL at 05:32

## 2025-04-25 RX ADMIN — ROSUVASTATIN CALCIUM 10 MG: 20 TABLET, FILM COATED ORAL at 17:40

## 2025-04-25 RX ADMIN — IPRATROPIUM BROMIDE AND ALBUTEROL SULFATE 3 ML: .5; 2.5 SOLUTION RESPIRATORY (INHALATION) at 10:15

## 2025-04-25 RX ADMIN — TRAZODONE HYDROCHLORIDE 50 MG: 50 TABLET ORAL at 22:18

## 2025-04-25 RX ADMIN — CALCIUM POLYCARBOPHIL 625 MG: 625 TABLET, FILM COATED ORAL at 05:32

## 2025-04-25 RX ADMIN — LISINOPRIL 20 MG: 20 TABLET ORAL at 05:32

## 2025-04-25 RX ADMIN — HEPARIN SODIUM 5000 UNITS: 5000 INJECTION, SOLUTION INTRAVENOUS; SUBCUTANEOUS at 05:32

## 2025-04-25 RX ADMIN — OXYCODONE HYDROCHLORIDE 10 MG: 10 TABLET ORAL at 22:26

## 2025-04-25 RX ADMIN — IPRATROPIUM BROMIDE AND ALBUTEROL SULFATE 3 ML: .5; 2.5 SOLUTION RESPIRATORY (INHALATION) at 13:24

## 2025-04-25 RX ADMIN — FLUTICASONE FUROATE, UMECLIDINIUM BROMIDE AND VILANTEROL TRIFENATATE 1 PUFF: 200; 62.5; 25 POWDER RESPIRATORY (INHALATION) at 07:30

## 2025-04-25 RX ADMIN — OXYCODONE HYDROCHLORIDE 10 MG: 10 TABLET ORAL at 19:48

## 2025-04-25 RX ADMIN — HYDROCODONE BITARTRATE AND HOMATROPINE METHYLBROMIDE 5 ML: 5; 1.5 SOLUTION ORAL at 22:18

## 2025-04-25 RX ADMIN — OXYCODONE HYDROCHLORIDE 10 MG: 10 TABLET ORAL at 05:31

## 2025-04-25 RX ADMIN — PREDNISONE 50 MG: 50 TABLET ORAL at 05:30

## 2025-04-25 ASSESSMENT — ENCOUNTER SYMPTOMS
BRUISES/BLEEDS EASILY: 0
CHILLS: 0
HEMOPTYSIS: 0
BLURRED VISION: 0
HEARTBURN: 0
PALPITATIONS: 0
COUGH: 1
FEVER: 0
DIZZINESS: 0
DEPRESSION: 0
PND: 0
NAUSEA: 0
BACK PAIN: 0
SPUTUM PRODUCTION: 1
DOUBLE VISION: 0
WHEEZING: 1
SHORTNESS OF BREATH: 1
VOMITING: 0
HEADACHES: 0
MYALGIAS: 0
CLAUDICATION: 0

## 2025-04-25 ASSESSMENT — PAIN DESCRIPTION - PAIN TYPE
TYPE: ACUTE PAIN;CHRONIC PAIN
TYPE: ACUTE PAIN
TYPE: ACUTE PAIN;CHRONIC PAIN
TYPE: ACUTE PAIN
TYPE: ACUTE PAIN

## 2025-04-25 ASSESSMENT — FIBROSIS 4 INDEX: FIB4 SCORE: 2.87

## 2025-04-25 NOTE — PROGRESS NOTES
Bedside report received from off going RN/tech: Daija, assumed care of patient.     Fall Risk Score: MODERATE RISK  Fall risk interventions in place: Provide patient/family education based on risk assessment, Educate patient/family to call staff for assistance when getting out of bed, Place fall precaution signage outside patient door, Utilize bed/chair fall alarm, and Bed alarm connected correctly  Bed type: Regular (Nba Score less than 17 interventions in place)  Patient on cardiac monitor: Yes  IVF/IV medications: Not Applicable   Oxygen: How many liters 55%30L and Traced the line to wall oxygen  Bedside sitter: Not Applicable   Isolation: Isolation precautions in place

## 2025-04-25 NOTE — PROGRESS NOTES
Monitor Summary  Rhythm: Normal Sinus Rhythm  Rate: 78-93  Ectopy: PVCs, Bigeminy , Trigeminy, Couplets  .13 / .07 / .38              12 hr Chart check.

## 2025-04-25 NOTE — PROGRESS NOTES
Hospital Medicine Daily Progress Note    Date of Service  4/25/2025    Chief Complaint  Bright Shirley is a 74 y.o. male admitted 4/15/2025 with pneumonia hypoxia    Hospital Course  Bright Shirley is a 74 y.o. 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  Patient initially presented to ER for stroke concern.  Patient was evaluated by neurology, stroke load was aborted and his weakness on left side was resolved.  He is however requiring increased oxygen requirement of 10 L oxy mask.  CTA chest noted to have stable cavitary mass in left upper lobe, extensive edematous changes in lung peripheral honeycombing.  He does have concern lactic acid of 5.3.  Due to concern of severe sepsis, admission requested by ERP.  Therefore, admitted to medicine service for further evaluation and treatment.     Echocardiogram showed ejection fraction 58%.  Mild mitral regurgitation.  Normal diastolic function.  Normal right ventricular size and systolic function.    Pulmonary and infectious disease were consulted.  Patient does have a history of squamous cell cancer of the left upper lobe pembrolizumab making him immunosuppressed and also concern for autoimmune pneumonitis.  He was found to be positive for human metapneumovirus.  With initial concern for superimposed bacterial infection patient was on Zosyn which was escalated to meropenem and doxycycline.  He had minimal improvements on this regimen, he had procalcitonin is downtrending.  Fungal infection is felt to be less likely.  Pulmonology felt patient is high risk for bronchoscopy due to low pulmonary reserve.  MRSA nares was negative.  COVID/influenza/RSV negative.  Fungal cultures of sputum showed rare fungal elements.  Aspergillus galactomannan, Legionella, mycoplasma, coccidiomycosis negative.  On 4/22  infectious disease recommended to discontinue antibiotics and monitor for clinical worsening.  Okay to continue Bactrim for PJP prophylaxis.  If there is clinical worsening recommended to repeat CT thorax and reconsult.  Pulmonary recommended to continue high-dose prednisone with concern for immunotherapy induced pneumonitis versus interstitial lung disease.  Continue Bactrim for PJP prophylaxis as above.  As PET/CT did show improvement it is okay to continue Keytruda.  Patient also known to have severe COPD on triple therapy.  Patient was also aggressively diuresed.    Interval Problem Update  4/16 patient is new to me today, patient is alert oriented follows commands, he is requiring 8 L of oxygen when I saw her in the morning, he is able to speak in full sentences, he stated that at home he was getting short of breath with exertion, normally he is comfortable at rest with 5 L of oxygen but he feels that he is requiring more with exertion/ambulation, will continue antibiotics, CT results reviewed, MRSA screen is negative and sputum cultures and blood cultures pending, discussed with bedside nurse charge nurse  pharmacist  4/17 patient is resting in chair, he is alert oriented follows commands no new neurological deficit, still having productive cough, continue IV antibiotics, notes from pulmonology reviewed, will continue with antipseudomonal coverage for now due to high risk, follow-up final blood culture results, discussed with bedside nurse charge nurse  pharmacist.  4/18 patient is resting in chair, he is in good spirit, he is requiring 10 L of oxygen early in the morning, however the repeat chest x-ray discussed with pulmonology, continue chest physiotherapy s/p RT flutter valve, continue antibiotics, follow-up cultures, I will order respiratory panel, discussed medicine return discussed palliative pharmacist, later today patient's oxygen has decreased to 8 L, follow-up blood work in  a.m.  4/19 patient is in bed, no fever or chills not in distress, patient is requiring higher amount of o2 patient is on 40L by HFNC, patient is able to tolerate diet, diarrhea resolved, c diff neg, human pneumovirus positive, discussed with pulmonologist, I have started him on iv lasix, continue abx, steroids nebs. Discussed with bedside nurse, charge nurse .   4/20 patient is resting in bed, patient is alert oriented follows commands, patient requiring 50 L of oxygen by high flow nasal cannula, discussed with pulmonology, transition to meropenem and doxycycline, continue supportive treatment, received IV Lasix today, continue RT per protocol, discussed with bedside nurse charge nurse  pharmacist.  4/21 patient is resting in bed, he is alert oriented follows commands, he is still requiring 4 L of oxygen but clinically doing okay, discussed with pulmonology discussed with infectious disease at this time appears to be related to bilateral pneumonia, continue antibiotics for 1 more day if patient continues to improve clinically probably will be able to stop antibiotics tomorrow, continue chest physical therapy incentive spirometry flutter valve, I discussed with bedside nurse charge nurse  pharmacist    Patient was seen and examined at bedside.  I have personally reviewed and interpreted vitals, labs, and imaging.    4/22.  Afebrile.  Intermittent bradycardia.  Intermittent hypertension.  On 55L high flow denies fevers, chills.  Reports an episode of chest pain last night which resolved spontaneously.  States breathing and cough are improved.  He has reported loose bowel movement yesterday.  Case was discussed with ID and pulmonary.  Plan to discontinue antibiotics and monitor.  Plan transferred to [facility]'s LT[facility] tomorrow  Procal 0.41 > 0.25  4/23.  Afebrile.  Hypertensive.  On 50L HHF.  Headaches denies fevers, chills, chest pains.  Shortness of breath is improved.  Still having some  diarrhea.  Case was discussed with social work.  Plan is for LTAC.  No bed at [facility] today  4/24.  Afebrile.  Intermittent hypertension.  On heated high flow at 20 L.  Denies fever, chills, chest pains.  Shortness of breath is improved.  Awaiting LTAC.  No bed at [facility] today.  Discussed with SW.  Continue to wean O2 as tolerated  4/25.  Afebrile.  Stable vitals.  On 20-30 L heated high flow nasal cannula.  Denies fevers, chills, chest pains.  Reports shortness of breath.  Hypotensive this afternoon.  Adjusted blood pressure meds.  Awaiting bed at [facility]  Wbc 4.7 > 4.5 > 7.3 > 7.8  Hgb 12.8 > 11.6 > 12.5 > 12.1  Na 133    I have discussed this patient's plan of care and discharge plan at IDT rounds today with Case Management, Nursing, Nursing leadership, and other members of the IDT team.    Consultants/Specialty  Pulmonology  ID    Code Status  Full Code    Disposition  The patient is not medically cleared for discharge to home or a post-acute facility.  Anticipate discharge to: a long-term acute care hospital    I have placed the appropriate orders for post-discharge needs.    Review of Systems  Review of Systems   Constitutional:  Negative for chills and fever.   Eyes:  Negative for blurred vision and double vision.   Respiratory:  Positive for cough, sputum production, shortness of breath and wheezing. Negative for hemoptysis.    Cardiovascular:  Negative for chest pain, palpitations, claudication, leg swelling and PND.   Gastrointestinal:  Negative for heartburn, nausea and vomiting.   Genitourinary:  Negative for hematuria and urgency.   Musculoskeletal:  Negative for back pain and myalgias.   Skin:  Negative for rash.   Neurological:  Negative for dizziness and headaches.   Endo/Heme/Allergies:  Does not bruise/bleed easily.   Psychiatric/Behavioral:  Negative for depression.         Physical Exam  Temp:  [36.2 °C (97.2 °F)-36.5 °C (97.7 °F)] 36.4 °C (97.5 °F)  Pulse:  [41-92] 78  Resp:  [15-20] 15  BP:  (104-141)/(59-92) 133/74  SpO2:  [90 %-97 %] 97 %    Physical Exam  Vitals and nursing note reviewed.   Constitutional:       Appearance: He is ill-appearing.   Eyes:      General:         Right eye: No discharge.         Left eye: No discharge.   Cardiovascular:      Rate and Rhythm: Normal rate and regular rhythm.      Pulses: Normal pulses.      Heart sounds: Normal heart sounds.   Pulmonary:      Effort: Pulmonary effort is normal. No respiratory distress.      Breath sounds: Normal breath sounds.   Abdominal:      General: Bowel sounds are normal. There is no distension.      Tenderness: There is no guarding.   Musculoskeletal:         General: Normal range of motion.      Cervical back: Normal range of motion and neck supple.      Right lower leg: No edema.      Left lower leg: No edema.   Skin:     General: Skin is warm and dry.      Capillary Refill: Capillary refill takes less than 2 seconds.      Coloration: Skin is not jaundiced.   Neurological:      General: No focal deficit present.      Mental Status: He is alert and oriented to person, place, and time.      Cranial Nerves: No cranial nerve deficit.   Psychiatric:         Mood and Affect: Mood normal.         Behavior: Behavior normal.         Fluids    Intake/Output Summary (Last 24 hours) at 4/25/2025 0619  Last data filed at 4/25/2025 0145  Gross per 24 hour   Intake 1600 ml   Output 625 ml   Net 975 ml        Laboratory  Recent Labs     04/23/25  0320 04/24/25  0111 04/25/25  0316   WBC 4.5* 7.3 7.8   RBC 4.20* 4.65* 4.42*   HEMOGLOBIN 11.6* 12.5* 12.1*   HEMATOCRIT 35.7* 40.1* 38.8*   MCV 85.0 86.2 87.8   MCH 27.6 26.9* 27.4   MCHC 32.5 31.2* 31.2*   RDW 49.3 50.2* 52.0*   PLATELETCT 236 271 301   MPV 9.2 9.2 9.6     Recent Labs     04/23/25  0320 04/24/25  0111 04/25/25  0316   SODIUM 135 133* 133*   POTASSIUM 4.2 4.4 4.3   CHLORIDE 102 98 98   CO2 23 24 25   GLUCOSE 92 89 95   BUN 23* 24* 23*   CREATININE 0.80 1.01 1.05   CALCIUM 8.8 9.0 8.9                      Imaging  DX-CHEST-LIMITED (1 VIEW)   Final Result      Worsening bilateral pulmonary infiltrates.      DX-CHEST-PORTABLE (1 VIEW)   Final Result         1.  Pulmonary edema and/or infiltrates, greatest in the left upper lobe, slightly decreased since prior study.      EC-ECHOCARDIOGRAM COMPLETE W/O CONT   Final Result      CT-CTA CHEST PULMONARY ARTERY W/ RECONS   Final Result      1. No acute pulmonary embolus.   2. Stable cavitary mass in the left upper lobe.   3. Extensive edematous changes in the lungs with peripheral honeycombing.   4. Aortic and coronary arterial sclerosis.   5. Simple appearing right renal cortical cyst, requiring no further follow-up.   6. Stable bilateral adrenal masses.   7. Acute or subacute left third through fifth rib fractures.            DX-CHEST-PORTABLE (1 VIEW)   Final Result      1.  LEFT upper lung consolidation, likely pneumonia.  Underlying mass is not excluded.   2.  Probable pulmonary fibrosis.   3.  Limited by positioning.           Assessment/Plan  * Severe sepsis (HCC)- (present on admission)  Assessment & Plan  4/25/2025  This is Sepsis Present on admission  SIRS criteria identified on my evaluation include: Tachycardia, with heart rate greater than 90 BPM, Tachypnea, with respirations greater than 20 per minute, and Bandemia, greater than 10% bands  Clinical indicators of end organ dysfunction include Lactic Acid greater than 2  Source is pulm  Sepsis protocol initiated  Crystalloid Fluid Administration: Fluid resuscitation ordered per standard protocol - 30 mL/kg per current or ideal body weight  IV antibiotics as appropriate for source of sepsis  Reassessment: I have reassessed the patient's hemodynamic status    Acute left-sided weakness  Assessment & Plan  4/25/2025  Resolved by ER arrival  Stroke alert aborted by stroke neurology  PT/OT/ST    H/o TIA per pt  -added ASA  -continue statin    Continue monitoring  No neuro deficit    TREVOR (acute  kidney injury) (HCC)  Assessment & Plan  4/25/2025  Cr 1.22  IVF  Minimize nephrotoxic agents, renally dose meds  Check FeNa    Creatinine 1.13    ACP (advance care planning)  Assessment & Plan  Goal of care discussed with patient in the emergency room.  He stated he is agreeable for noninvasive, as well as invasive/heroic life-sustaining measures-including CPR/defibrillation/intubation or mechanical ventilation.  He is agreeable for hospitalization, further treatment with IVF, breathing treatment, antibiotic therapy, any medical management as clinically warranted.  Diagnosis, prognosis, question and concern addressed.  Full CODE STATUS confirmed.  ACP: 17 minutes    Pneumonia due to infectious organism  Assessment & Plan  4/25/2025  Cavitary left upper lobe mass  Patient is immunocompromised due to squamous cell lung cancer on chemotherapy  Follow cultures  Antibiotic: Zosyn, Zyvox, azithromycin  Wean off oxygen support as tolerated continue broad-spectrum antibiotics follow-up MRSA screening follow-up    Blood sputum cultures  Will need treatment for possible  pseudomonas as per pulm will get EKG to check qtc.     DC abx.  Cx neg   Human metapneumovirus positive.   Supportive treatment.    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 Melara, medical oncology    Acute on chronic respiratory failure with hypoxia (HCC)- (present on admission)  Assessment & Plan  4/25/2025  Dependent on 5 L at baseline  Currently requiring 10 L OxyMask-wean off as tolerated  No PE seen on CTA chest  Check COVID/influenza/RSV  Antibiotic for pneumonia  Check TTE    CTA did not show PE  Patient with probably postobstructive pneumonia due to lung mass  Continue antibiotics  Note from pulm reviewed.      Patient requiring higher levels of oxygen today 10 L  Discussed with pulmonology  Continue antibiotics  Continue chest physical therapy  Follow-up fungal results    Patient is requiring higher amount of o2, he is on 40L of o2, I discussed with pulmonologist  Patient started on HFNC.   Continue iv diuresis, iv abx.    Chronic obstructive pulmonary disease (HCC)- (present on admission)  Assessment & Plan  4/25/2025  Pulmonary hygiene  -DuRomaine, Agustin Ellipta, montelukast  -PEP  -s/p Solu-Medrol 125 mg  -Continue Solu-Medrol 40 mg every 8 hours  RT protocol    Continue steroids  Continue breathing treatment  Discussed with pulmonology    Dyslipidemia- (present on admission)  Assessment & Plan  4/25/2025  Statin    Primary hypertension- (present on admission)  Assessment & Plan  4/25/2025  Lisinopril, amlodipine         VTE prophylaxis: Heparin    I have performed a physical exam and reviewed and updated ROS and Plan today (4/25/2025). In review of yesterday's note (4/24/2025), there are no changes except as documented above.    Patient is critically ill.   The patient continues to have: Acute hypoxic respiratory failure secondary to immunotherapy induced pneumonitis versus interstitial lung disease versus COPD exacerbation versus human metapneumovirus pneumonia  The vital organ system that is affected is the: Pulmonary  If untreated there is a high chance of deterioration into: Respiratory failure  And eventually death.   The critical care that I am providing today is: High flow nasal cannula 35L 50% FiO2  The critical that has been undertaken is medically complex.   There has been no overlap in critical care time.   Critical Care Time not including procedures: 38

## 2025-04-25 NOTE — PROGRESS NOTES
Monitor Summary  Rhythm: SR  Rate: 74 - 95  Ectopy: r PVC, f PAC, r coup  .13 / .07 / .35

## 2025-04-25 NOTE — CARE PLAN
The patient is Watcher - Medium risk of patient condition declining or worsening    Shift Goals  Clinical Goals: Monitor O2  Patient Goals: get a good night sleep  Family Goals: ANAHI not at bedside    Progress made toward(s) clinical / shift goals:        Problem: Knowledge Deficit - Standard  Goal: Patient and family/care givers will demonstrate understanding of plan of care, disease process/condition, diagnostic tests and medications  Description: Target End Date:  1-3 days or as soon as patient condition allowsDocument in Patient Education1.  Patient and family/caregiver oriented to unit, equipment, visitation policy and means for communicating concern2.  Complete/review Learning Assessment3.  Assess knowledge level of disease process/condition, treatment plan, diagnostic tests and medications4.  Explain disease process/condition, treatment plan, diagnostic tests and medications  Outcome: Progressing     Problem: Nutrition - Advanced  Goal: Patient will display progressive weight gain toward goal have adequate food and fluid intake  Description: Target End Date:  Prior to discharge or change in level of care1.  Daily weights2.  Monitor dietary intake3.  Promote systemic fluid hydration within cardiac tolerance4.  Albumin and PreAlbumin per provider order5.  Enteral and parenteral nutrition per provider order6.  Calorie count7.  Provide oral care8.  Collaborate with Clinical Dietician9.  Consider Speech Therapy consult for swallowing [id number]. Administer supplemental oxygen during meals as indicated  Outcome: Progressing     Problem: Ineffective Airway Clearance  Goal: Patient will maintain patent airway with clear/clearing breath sounds  Description: Target End Date:  Prior to discharge or change in level of care1.   Position head midline with flexion appropriate for age and/or condition2.   Assist patient to assume a position of comfort3.   Assess and monitor breath sounds4.   Encourage abdominal or  pursed-lip breathing exercises5.   Assist with measures to improve effectiveness of cough effort6.   Demonstrate effective coughing and deep-breathing techniques7.   Assist patient to turn every 2 hours8.   Encourage patient to ambulate as tolerated9.   Keep environmental pollution to a [id number]. Airway suctioning as [id number]. Collaborate with RT to administer medications/treatments per order12. Promote systemic fluid hydration within cardiac [id number]. Provide warm or tepid liquids  Outcome: Progressing     Problem: Impaired Gas Exchange  Goal: Patient will demonstrate improved ventilation and adequate oxygenation and participate in treatment regimen within the level of ability/situation.  Description: Target End Date:  Prior to discharge or change in level of care1.   Assess/monitor rate/rhythm/depth of effort of respirations2.   Assess oxygenation as ordered3.   Administer/titrate oxygen as ordered4.   Position patient for maximum ventilatory efficiency5.   Turn, cough, and deep breathe6.   Vital signs, pulse oximetry7.   Assess color and body temperature8.   Assess and monitor breath sounds9.   Encourage deep-slow or pursed-lip breathing [id number]. Monitor changes in the level of consciousness and mental wyasaa65. Encourage expectoration of sputum; airway wiiwfqyvxi50. Elevate the head of the bed and position patient for maximum ventilatory ivnxvgtrzl52. Provide a calm, quiet yhytqllnsla48. Limit patient's activity during the acute phase and have patient resume activity gradually and increase as individually lozojpvmj87. Evaluate sleep patterns and limit stimulants such as zfukvwqw76. Collaborate with RT to administer medications/treatments as ordered  Outcome: Progressing     Problem: Risk for Aspiration  Goal: Patient's risk for aspiration will be absent or decrease  Description: Target End Date:  Prior to discharge or change in level of care1.   Complete dysphagia screening on admission2.   NPO until  dysphagia screening complete or medically cleared3.   Collaborate with Speech Therapy, Clinical Dietitian and interdisciplinary team4.   Implement aspiration precautions5.   Assist patient up to chair for meals6.   Elevate head of bed 90 degrees if patient is unable to get out of bed7.   Encourage small bites8.   Ensure foods/liquids are of appropriate consistency9.   Assess for any signs/symptoms of orplcbclgf64. Assess breath sounds and vital signs after oral intake  Outcome: Progressing     Problem: Self Care  Goal: Patient will have the ability to perform ADLs independently or with assistance (bathe, groom, dress, toilet and feed)  Description: Target End Date:  Prior to discharge or change in level of careDocument on ADL flowsheet1.  Assess the capability and level of deficiency to perform ADLs2.  Encourage family/care giver involvement3.  Provide assistive devices4.  Consider PT/OT evaluations5.  Maintain support, give positive feedback, encourage self-care allowing extra time and verbal cuing as needed6.  Avoid doing something for patients they can do themselves, but provide assistance as needed7.  Assist in anticipating/planning individual needs8.  Collaborate with Case Management and  to meet discharge needs  Outcome: Progressing     Problem: Fluid Volume  Goal: Fluid volume balance will be maintained  Description: Target End Date:  Prior to discharge or change in level of careDocument on I/O flowsheet1.  Monitor intake and output as ordered2.  Promote oral intake as appropriate3.  Report inadequate intake or output to physician4.  Administer IV therapy as ordered5.  Weights per provider order6.  Assess for signs and symptoms of bleeding7.  Monitor for signs of fluid overload (respiratory changes, edema, weight gain, increased abdominal girth)8.  Monitor of signs for inadequate fluid volume (poor skin turgor, dry mucous membranes)9.  Instruct patient on adherence to fluid  restrictions  Outcome: Progressing     Problem: Respiratory  Goal: Patient will achieve/maintain optimum respiratory ventilation and gas exchange  Description: Target End Date:  Prior to discharge or change in level of careDocument on Assessment flowsheet1.  Assess and monitor rate, rhythm, depth and effort of respiration2.  Breath sounds assessed qshift and/or as needed3.  Assess O2 saturation, administer/titrate oxygen as ordered4.  Position patient for maximum ventilatory efficiency5.  Turn, cough, and deep breath with splinting to improve effectiveness6.  Collaborate with RT to administer medication/treatments per order7.  Encourage use of incentive spirometer and encourage patient to cough after use and utilize splinting techniques if applicable8.  Airway suctioning9.  Monitor sputum production for changes in color, consistency and fbjxolzuk71. Perform frequent oral aehnhxu20. Alternate physical activity with rest periods  Target End Date:  Prior to discharge or change in level of careDocument on Assessment flowsheet1.  Assess and monitor rate, rhythm, depth and effort of respiration2.  Breath sounds assessed qshift and/or as needed3.  Assess O2 saturation, administer/titrate oxygen as ordered4.  Position patient for maximum ventilatory efficiency5.  Turn, cough, and deep breath with splinting to improve effectiveness6.  Collaborate with RT to administer medication/treatments per order7.  Encourage use of incentive spirometer and encourage patient to cough after use and utilize splinting techniques if applicable8.  Airway suctioning9.  Monitor sputum production for changes in color, consistency and nmcpsjjzq04. Perform frequent oral nfyzdmz35. Alternate physical activity with rest periods  Target End Date:  Prior to discharge or change in level of careDocument on Assessment flowsheet1.  Assess and monitor rate, rhythm, depth and effort of respiration2.  Breath sounds assessed qshift and/or as needed3.  Assess  O2 saturation, administer/titrate oxygen as ordered4.  Position patient for maximum ventilatory efficiency5.  Turn, cough, and deep breath with splinting to improve effectiveness6.  Collaborate with RT to administer medication/treatments per order7.  Encourage use of incentive spirometer and encourage patient to cough after use and utilize splinting techniques if applicable8.  Airway suctioning9.  Monitor sputum production for changes in color, consistency and gxztpcppl93. Perform frequent oral xfvvmqn43. Alternate physical activity with rest periods  Outcome: Progressing     Problem: Physical Regulation  Goal: Diagnostic test results will improve  Description: Target End Date:  Prior to discharge or change in level of care1.  Monitor lactic acid levels2.  Monitor ABG's3.  Monitor diagnostic test results  Outcome: Progressing     Problem: Pain - Standard  Goal: Alleviation of pain or a reduction in pain to the patient’s comfort goal  Description: Target End Date:  Prior to discharge or change in level of careDocument on Vitals flowsheet1.  Document pain using the appropriate pain scale per order or unit policy2.  Educate and implement non-pharmacologic comfort measures (i.e. relaxation, distraction, massage, cold/heat therapy, etc.)3.  Pain management medications as ordered4.  Reassess pain after pain med administration per policy5.  If opiods administered assess patient's response to pain medication is appropriate per POSS sedation scale6.  Follow pain management plan developed in collaboration with patient and interdisciplinary team (including palliative care or pain specialists if applicable)  Outcome: Progressing     Problem: Fall Risk  Goal: Patient will remain free from falls  Description: Target End Date:  Prior to discharge or change in level of careDocument interventions on the [facility] Fall Risk Assessment1.  Assess for fall risk factors2.  Implement fall precautions  Outcome: Progressing

## 2025-04-25 NOTE — CARE PLAN
Problem: Bronchoconstriction  Goal: Improve in air movement and diminished wheezing  Description: Target End Date:  2 to 3 days1.  Implement inhaled treatments2.  Evaluate and manage medication effects  Outcome: Progressing    DUO Q4  TRELEGY QD     Problem: Humidified High Flow Nasal Cannula  Goal: Maintain adequate oxygenation dependent on patient condition  Description: 1.  Implement humidified high flow oxygen therapy2.  Titrate high flow oxygen to maintain appropriate SpO2  Outcome: Progressing    HHFNC 25L/55%

## 2025-04-25 NOTE — DISCHARGE PLANNING
Care Transition Team Discharge Planning    Anticipated Discharge Information  Discharge Disposition: Disch to a long term care facility (63)  Discharge Address: [address] [place] [address]  Discharge Contact Phone Number: [phone]    Discharge Plan:  Updated by PT that patient has AD documents completed to be uploaded into chart. RNCM met with patient at bedside who has completed AD packet but has not had it notarized. Provided mobile notary list to patient. Informed patient to notify staff once completed and RNCM will upload to Epic.    RNCM reached out to [facility] liaison Diane for bed availability. Per Diane, she will know after AM meeting and provide update.

## 2025-04-26 LAB
ALBUMIN SERPL BCP-MCNC: 3.3 G/DL (ref 3.2–4.9)
BUN SERPL-MCNC: 25 MG/DL (ref 8–22)
CALCIUM ALBUM COR SERPL-MCNC: 9.7 MG/DL (ref 8.5–10.5)
CALCIUM SERPL-MCNC: 9.1 MG/DL (ref 8.5–10.5)
CHLORIDE SERPL-SCNC: 100 MMOL/L (ref 96–112)
CO2 SERPL-SCNC: 24 MMOL/L (ref 20–33)
CREAT SERPL-MCNC: 0.83 MG/DL (ref 0.5–1.4)
ERYTHROCYTE [DISTWIDTH] IN BLOOD BY AUTOMATED COUNT: 52.7 FL (ref 35.9–50)
GFR SERPLBLD CREATININE-BSD FMLA CKD-EPI: 91 ML/MIN/1.73 M 2
GLUCOSE SERPL-MCNC: 78 MG/DL (ref 65–99)
HCT VFR BLD AUTO: 38.7 % (ref 42–52)
HGB BLD-MCNC: 12.2 G/DL (ref 14–18)
MAGNESIUM SERPL-MCNC: 2.2 MG/DL (ref 1.5–2.5)
MCH RBC QN AUTO: 27.6 PG (ref 27–33)
MCHC RBC AUTO-ENTMCNC: 31.5 G/DL (ref 32.3–36.5)
MCV RBC AUTO: 87.6 FL (ref 81.4–97.8)
PHOSPHATE SERPL-MCNC: 3.6 MG/DL (ref 2.5–4.5)
PLATELET # BLD AUTO: 321 K/UL (ref 164–446)
PMV BLD AUTO: 9.4 FL (ref 9–12.9)
POTASSIUM SERPL-SCNC: 4.4 MMOL/L (ref 3.6–5.5)
RBC # BLD AUTO: 4.42 M/UL (ref 4.7–6.1)
SODIUM SERPL-SCNC: 133 MMOL/L (ref 135–145)
WBC # BLD AUTO: 8.2 K/UL (ref 4.8–10.8)

## 2025-04-26 PROCEDURE — 700111 HCHG RX REV CODE 636 W/ 250 OVERRIDE (IP): Performed by: INTERNAL MEDICINE

## 2025-04-26 PROCEDURE — 85027 COMPLETE CBC AUTOMATED: CPT

## 2025-04-26 PROCEDURE — 700102 HCHG RX REV CODE 250 W/ 637 OVERRIDE(OP): Performed by: STUDENT IN AN ORGANIZED HEALTH CARE EDUCATION/TRAINING PROGRAM

## 2025-04-26 PROCEDURE — 94640 AIRWAY INHALATION TREATMENT: CPT

## 2025-04-26 PROCEDURE — 770001 HCHG ROOM/CARE - MED/SURG/GYN PRIV*

## 2025-04-26 PROCEDURE — A9270 NON-COVERED ITEM OR SERVICE: HCPCS | Performed by: INTERNAL MEDICINE

## 2025-04-26 PROCEDURE — 700101 HCHG RX REV CODE 250: Performed by: INTERNAL MEDICINE

## 2025-04-26 PROCEDURE — 36415 COLL VENOUS BLD VENIPUNCTURE: CPT

## 2025-04-26 PROCEDURE — 80069 RENAL FUNCTION PANEL: CPT

## 2025-04-26 PROCEDURE — 700111 HCHG RX REV CODE 636 W/ 250 OVERRIDE (IP): Performed by: STUDENT IN AN ORGANIZED HEALTH CARE EDUCATION/TRAINING PROGRAM

## 2025-04-26 PROCEDURE — A9270 NON-COVERED ITEM OR SERVICE: HCPCS | Performed by: STUDENT IN AN ORGANIZED HEALTH CARE EDUCATION/TRAINING PROGRAM

## 2025-04-26 PROCEDURE — 700102 HCHG RX REV CODE 250 W/ 637 OVERRIDE(OP): Performed by: INTERNAL MEDICINE

## 2025-04-26 PROCEDURE — 83735 ASSAY OF MAGNESIUM: CPT

## 2025-04-26 PROCEDURE — 99291 CRITICAL CARE FIRST HOUR: CPT | Performed by: INTERNAL MEDICINE

## 2025-04-26 RX ADMIN — AMLODIPINE BESYLATE 5 MG: 5 TABLET ORAL at 06:44

## 2025-04-26 RX ADMIN — HEPARIN SODIUM 5000 UNITS: 5000 INJECTION, SOLUTION INTRAVENOUS; SUBCUTANEOUS at 14:57

## 2025-04-26 RX ADMIN — LEVOTHYROXINE SODIUM 100 MCG: 0.1 TABLET ORAL at 06:44

## 2025-04-26 RX ADMIN — ROSUVASTATIN CALCIUM 10 MG: 20 TABLET, FILM COATED ORAL at 16:33

## 2025-04-26 RX ADMIN — OXYCODONE 5 MG: 5 TABLET ORAL at 20:38

## 2025-04-26 RX ADMIN — IPRATROPIUM BROMIDE AND ALBUTEROL SULFATE 3 ML: .5; 2.5 SOLUTION RESPIRATORY (INHALATION) at 19:35

## 2025-04-26 RX ADMIN — IPRATROPIUM BROMIDE AND ALBUTEROL SULFATE 3 ML: .5; 2.5 SOLUTION RESPIRATORY (INHALATION) at 15:00

## 2025-04-26 RX ADMIN — LISINOPRIL 20 MG: 20 TABLET ORAL at 06:44

## 2025-04-26 RX ADMIN — HEPARIN SODIUM 5000 UNITS: 5000 INJECTION, SOLUTION INTRAVENOUS; SUBCUTANEOUS at 06:00

## 2025-04-26 RX ADMIN — HYDROCODONE BITARTRATE AND HOMATROPINE METHYLBROMIDE 5 ML: 5; 1.5 SOLUTION ORAL at 22:37

## 2025-04-26 RX ADMIN — MONTELUKAST 10 MG: 10 TABLET, FILM COATED ORAL at 22:38

## 2025-04-26 RX ADMIN — OXYCODONE HYDROCHLORIDE 10 MG: 10 TABLET ORAL at 02:24

## 2025-04-26 RX ADMIN — TRAZODONE HYDROCHLORIDE 50 MG: 50 TABLET ORAL at 22:37

## 2025-04-26 RX ADMIN — HEPARIN SODIUM 5000 UNITS: 5000 INJECTION, SOLUTION INTRAVENOUS; SUBCUTANEOUS at 22:37

## 2025-04-26 RX ADMIN — CALCIUM POLYCARBOPHIL 625 MG: 625 TABLET, FILM COATED ORAL at 06:44

## 2025-04-26 RX ADMIN — IPRATROPIUM BROMIDE AND ALBUTEROL SULFATE 3 ML: .5; 2.5 SOLUTION RESPIRATORY (INHALATION) at 06:57

## 2025-04-26 RX ADMIN — IPRATROPIUM BROMIDE AND ALBUTEROL SULFATE 3 ML: .5; 2.5 SOLUTION RESPIRATORY (INHALATION) at 10:18

## 2025-04-26 RX ADMIN — ASPIRIN 81 MG: 81 TABLET, COATED ORAL at 06:44

## 2025-04-26 RX ADMIN — SULFAMETHOXAZOLE AND TRIMETHOPRIM 1 TABLET: 800; 160 TABLET ORAL at 02:24

## 2025-04-26 RX ADMIN — Medication 30 MG: at 22:37

## 2025-04-26 RX ADMIN — FLUTICASONE FUROATE, UMECLIDINIUM BROMIDE AND VILANTEROL TRIFENATATE 1 PUFF: 200; 62.5; 25 POWDER RESPIRATORY (INHALATION) at 06:44

## 2025-04-26 RX ADMIN — PREDNISONE 50 MG: 50 TABLET ORAL at 06:44

## 2025-04-26 ASSESSMENT — ENCOUNTER SYMPTOMS
SHORTNESS OF BREATH: 1
HEARTBURN: 0
PALPITATIONS: 0
FEVER: 0
DIZZINESS: 0
CLAUDICATION: 0
VOMITING: 0
MYALGIAS: 0
PND: 0
BLURRED VISION: 0
DEPRESSION: 0
HEMOPTYSIS: 0
COUGH: 1
HEADACHES: 0
CHILLS: 0
DOUBLE VISION: 0
BACK PAIN: 0
NAUSEA: 0
WHEEZING: 1
SPUTUM PRODUCTION: 1
BRUISES/BLEEDS EASILY: 0

## 2025-04-26 ASSESSMENT — PAIN DESCRIPTION - PAIN TYPE
TYPE: ACUTE PAIN

## 2025-04-26 ASSESSMENT — FIBROSIS 4 INDEX: FIB4 SCORE: 2.87

## 2025-04-26 NOTE — PROGRESS NOTES
[facility] Medicine Daily Progress Note    Date of Service  4/26/2025    Chief Complaint  Bright Shirley is a 74 y.o. male admitted 4/15/2025 with pneumonia hypoxia    Hospital Course  Bright Shirley is a 74 y.o. 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  Patient initially presented to ER for stroke concern.  Patient was evaluated by neurology, stroke load was aborted and his weakness on left side was resolved.  He is however requiring increased oxygen requirement of 10 L oxy mask.  CTA chest noted to have stable cavitary mass in left upper lobe, extensive edematous changes in lung peripheral honeycombing.  He does have concern lactic acid of 5.3.  Due to concern of severe sepsis, admission requested by ERP.  Therefore, admitted to medicine service for further evaluation and treatment.     Echocardiogram showed ejection fraction 58%.  Mild mitral regurgitation.  Normal diastolic function.  Normal right ventricular size and systolic function.    Pulmonary and infectious disease were consulted.  Patient does have a history of squamous cell cancer of the left upper lobe pembrolizumab making him immunosuppressed and also concern for autoimmune pneumonitis.  He was found to be positive for human metapneumovirus.  With initial concern for superimposed bacterial infection patient was on Zosyn which was escalated to meropenem and doxycycline.  He had minimal improvements on this regimen, he had procalcitonin is downtrending.  Fungal infection is felt to be less likely.  Pulmonology felt patient is high risk for bronchoscopy due to low pulmonary reserve.  MRSA nares was negative.  COVID/influenza/RSV negative.  Fungal cultures of sputum showed rare fungal elements.  Aspergillus galactomannan, Legionella, mycoplasma, coccidiomycosis negative.  On 4/22  infectious disease recommended to discontinue antibiotics and monitor for clinical worsening.  Okay to continue Bactrim for PJP prophylaxis.  If there is clinical worsening recommended to repeat CT thorax and reconsult.  Pulmonary recommended to continue high-dose prednisone with concern for immunotherapy induced pneumonitis versus interstitial lung disease.  Continue Bactrim for PJP prophylaxis as above.  As PET/CT did show improvement it is okay to continue Keytruda.  Patient also known to have severe COPD on triple therapy.  Patient was also aggressively diuresed.    Interval Problem Update  4/16 patient is new to me today, patient is alert oriented follows commands, he is requiring 8 L of oxygen when I saw her in the morning, he is able to speak in full sentences, he stated that at home he was getting short of breath with exertion, normally he is comfortable at rest with 5 L of oxygen but he feels that he is requiring more with exertion/ambulation, will continue antibiotics, CT results reviewed, MRSA screen is negative and sputum cultures and blood cultures pending, discussed with bedside nurse charge nurse  pharmacist  4/17 patient is resting in chair, he is alert oriented follows commands no new neurological deficit, still having productive cough, continue IV antibiotics, notes from pulmonology reviewed, will continue with antipseudomonal coverage for now due to high risk, follow-up final blood culture results, discussed with bedside nurse charge nurse  pharmacist.  4/18 patient is resting in chair, he is in good spirit, he is requiring 10 L of oxygen early in the morning, however the repeat chest x-ray discussed with pulmonology, continue chest physiotherapy s/p RT flutter valve, continue antibiotics, follow-up cultures, I will order respiratory panel, discussed medicine return discussed palliative pharmacist, later today patient's oxygen has decreased to 8 L, follow-up blood work in  a.m.  4/19 patient is in bed, no fever or chills not in distress, patient is requiring higher amount of o2 patient is on 40L by HFNC, patient is able to tolerate diet, diarrhea resolved, c diff neg, human pneumovirus positive, discussed with pulmonologist, I have started him on iv lasix, continue abx, steroids nebs. Discussed with bedside nurse, charge nurse .   4/20 patient is resting in bed, patient is alert oriented follows commands, patient requiring 50 L of oxygen by high flow nasal cannula, discussed with pulmonology, transition to meropenem and doxycycline, continue supportive treatment, received IV Lasix today, continue RT per protocol, discussed with bedside nurse charge nurse  pharmacist.  4/21 patient is resting in bed, he is alert oriented follows commands, he is still requiring 4 L of oxygen but clinically doing okay, discussed with pulmonology discussed with infectious disease at this time appears to be related to bilateral pneumonia, continue antibiotics for 1 more day if patient continues to improve clinically probably will be able to stop antibiotics tomorrow, continue chest physical therapy incentive spirometry flutter valve, I discussed with bedside nurse charge nurse  pharmacist    Patient was seen and examined at bedside.  I have personally reviewed and interpreted vitals, labs, and imaging.    4/22.  Afebrile.  Intermittent bradycardia.  Intermittent hypertension.  On 55L high flow denies fevers, chills.  Reports an episode of chest pain last night which resolved spontaneously.  States breathing and cough are improved.  He has reported loose bowel movement yesterday.  Case was discussed with ID and pulmonary.  Plan to discontinue antibiotics and monitor.  Plan transferred to [facility]'s LT[facility] tomorrow  Procal 0.41 > 0.25  4/23.  Afebrile.  Hypertensive.  On 50L HHF.  Headaches denies fevers, chills, chest pains.  Shortness of breath is improved.  Still having some  diarrhea.  Case was discussed with social work.  Plan is for LTAC.  No bed at [facility] today  4/24.  Afebrile.  Intermittent hypertension.  On heated high flow at 20 L.  Denies fever, chills, chest pains.  Shortness of breath is improved.  Awaiting LTAC.  No bed at [facility] today.  Discussed with SW.  Continue to wean O2 as tolerated  4/25.  Afebrile.  Stable vitals.  On 20-30 L heated high flow nasal cannula.  Denies fevers, chills, chest pains.  Reports shortness of breath.  Hypotensive this afternoon.  Adjusted blood pressure meds.  Awaiting bed at [facility]  4/26.  Afebrile.  Stable vitals.  On 35 L heated high flow.  Denies fever, chills, chest pains.  Shortness of breath and cough continues to improve.  Blood pressure has been stable after adjustments to regimen yesterday.  Discussed with social work.  [facility] will not take patient until soonest Monday.  No longer needs telemetry.  Wbc 4.7 > 4.5 > 7.3 > 7.8 > 8.2  Hgb 12.8 > 11.6 > 12.5 > 12.1 > 12.2  Na 133    I have discussed this patient's plan of care and discharge plan at IDT rounds today with Case Management, Nursing, Nursing leadership, and other members of the IDT team.    Consultants/Specialty  Pulmonology  ID    Code Status  Full Code    Disposition  The patient is not medically cleared for discharge to home or a post-acute facility.  Anticipate discharge to: a long-term acute care hospital    I have placed the appropriate orders for post-discharge needs.    Review of Systems  Review of Systems   Constitutional:  Negative for chills and fever.   Eyes:  Negative for blurred vision and double vision.   Respiratory:  Positive for cough, sputum production, shortness of breath and wheezing. Negative for hemoptysis.    Cardiovascular:  Negative for chest pain, palpitations, claudication, leg swelling and PND.   Gastrointestinal:  Negative for heartburn, nausea and vomiting.   Genitourinary:  Negative for hematuria and urgency.   Musculoskeletal:  Negative for back pain  and myalgias.   Skin:  Negative for rash.   Neurological:  Negative for dizziness and headaches.   Endo/Heme/Allergies:  Does not bruise/bleed easily.   Psychiatric/Behavioral:  Negative for depression.         Physical Exam  Temp:  [36.4 °C (97.5 °F)-36.6 °C (97.9 °F)] 36.5 °C (97.7 °F)  Pulse:  [70-93] 71  Resp:  [16-20] 16  BP: ()/(48-79) 104/61  SpO2:  [89 %-98 %] 94 %    Physical Exam  Vitals and nursing note reviewed.   Constitutional:       Appearance: He is ill-appearing.   Eyes:      General:         Right eye: No discharge.         Left eye: No discharge.   Cardiovascular:      Rate and Rhythm: Normal rate and regular rhythm.      Pulses: Normal pulses.      Heart sounds: Normal heart sounds.   Pulmonary:      Effort: Pulmonary effort is normal. No respiratory distress.      Breath sounds: Normal breath sounds.   Abdominal:      General: Bowel sounds are normal. There is no distension.      Tenderness: There is no guarding.   Musculoskeletal:         General: Normal range of motion.      Cervical back: Normal range of motion and neck supple.      Right lower leg: No edema.      Left lower leg: No edema.   Skin:     General: Skin is warm and dry.      Capillary Refill: Capillary refill takes less than 2 seconds.      Coloration: Skin is not jaundiced.   Neurological:      General: No focal deficit present.      Mental Status: He is alert and oriented to person, place, and time.      Cranial Nerves: No cranial nerve deficit.   Psychiatric:         Mood and Affect: Mood normal.         Behavior: Behavior normal.         Fluids    Intake/Output Summary (Last 24 hours) at 4/26/2025 0817  Last data filed at 4/26/2025 0747  Gross per 24 hour   Intake 380 ml   Output --   Net 380 ml        Laboratory  Recent Labs     04/24/25  0111 04/25/25  0316 04/26/25  0650   WBC 7.3 7.8 8.2   RBC 4.65* 4.42* 4.42*   HEMOGLOBIN 12.5* 12.1* 12.2*   HEMATOCRIT 40.1* 38.8* 38.7*   MCV 86.2 87.8 87.6   MCH 26.9* 27.4 27.6    MCHC 31.2* 31.2* 31.5*   RDW 50.2* 52.0* 52.7*   PLATELETCT 271 301 321   MPV 9.2 9.6 9.4     Recent Labs     04/24/25  0111 04/25/25  0316 04/26/25  0650   SODIUM 133* 133* 133*   POTASSIUM 4.4 4.3 4.4   CHLORIDE 98 98 100   CO2 24 25 24   GLUCOSE 89 95 78   BUN 24* 23* 25*   CREATININE 1.01 1.05 0.83   CALCIUM 9.0 8.9 9.1                     Imaging  DX-CHEST-LIMITED (1 VIEW)   Final Result      Worsening bilateral pulmonary infiltrates.      DX-CHEST-PORTABLE (1 VIEW)   Final Result         1.  Pulmonary edema and/or infiltrates, greatest in the left upper lobe, slightly decreased since prior study.      EC-ECHOCARDIOGRAM COMPLETE W/O CONT   Final Result      CT-CTA CHEST PULMONARY ARTERY W/ RECONS   Final Result      1. No acute pulmonary embolus.   2. Stable cavitary mass in the left upper lobe.   3. Extensive edematous changes in the lungs with peripheral honeycombing.   4. Aortic and coronary arterial sclerosis.   5. Simple appearing right renal cortical cyst, requiring no further follow-up.   6. Stable bilateral adrenal masses.   7. Acute or subacute left third through fifth rib fractures.            DX-CHEST-PORTABLE (1 VIEW)   Final Result      1.  LEFT upper lung consolidation, likely pneumonia.  Underlying mass is not excluded.   2.  Probable pulmonary fibrosis.   3.  Limited by positioning.           Assessment/Plan  * Severe sepsis (HCC)- (present on admission)  Assessment & Plan  4/26/2025  This is Sepsis Present on admission  SIRS criteria identified on my evaluation include: Tachycardia, with heart rate greater than 90 BPM, Tachypnea, with respirations greater than 20 per minute, and Bandemia, greater than 10% bands  Clinical indicators of end organ dysfunction include Lactic Acid greater than 2  Source is pulm  Sepsis protocol initiated  Crystalloid Fluid Administration: Fluid resuscitation ordered per standard protocol - 30 mL/kg per current or ideal body weight  IV antibiotics as appropriate  for source of sepsis  Reassessment: I have reassessed the patient's hemodynamic status    Acute left-sided weakness  Assessment & Plan  4/26/2025  Resolved by ER arrival  Stroke alert aborted by stroke neurology  PT/OT/ST    H/o TIA per pt  -added ASA  -continue statin    Continue monitoring  No neuro deficit    TREVOR (acute kidney injury) (HCC)  Assessment & Plan  4/26/2025  Cr 1.22  IVF  Minimize nephrotoxic agents, renally dose meds  Check FeNa    Creatinine 1.13    ACP (advance care planning)  Assessment & Plan  Goal of care discussed with patient in the emergency room.  He stated he is agreeable for noninvasive, as well as invasive/heroic life-sustaining measures-including CPR/defibrillation/intubation or mechanical ventilation.  He is agreeable for hospitalization, further treatment with IVF, breathing treatment, antibiotic therapy, any medical management as clinically warranted.  Diagnosis, prognosis, question and concern addressed.  Full CODE STATUS confirmed.  ACP: 17 minutes    Pneumonia due to infectious organism  Assessment & Plan  4/26/2025  Cavitary left upper lobe mass  Patient is immunocompromised due to squamous cell lung cancer on chemotherapy  Follow cultures  Antibiotic: Zosyn, Zyvox, azithromycin  Wean off oxygen support as tolerated continue broad-spectrum antibiotics follow-up MRSA screening follow-up    Blood sputum cultures  Will need treatment for possible  pseudomonas as per pulm will get EKG to check qtc.     DC abx.  Cx neg   Human metapneumovirus positive.   Supportive treatment.    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 Melara, medical oncology    Acute on chronic respiratory failure with hypoxia (HCC)- (present on admission)  Assessment &  Plan  4/26/2025  Dependent on 5 L at baseline  Currently requiring 10 L OxyMask-wean off as tolerated  No PE seen on CTA chest  Check COVID/influenza/RSV  Antibiotic for pneumonia  Check TTE    CTA did not show PE  Patient with probably postobstructive pneumonia due to lung mass  Continue antibiotics  Note from pulm reviewed.     Patient requiring higher levels of oxygen today 10 L  Discussed with pulmonology  Continue antibiotics  Continue chest physical therapy  Follow-up fungal results    Patient is requiring higher amount of o2, he is on 40L of o2, I discussed with pulmonologist  Patient started on HFNC.     Chronic obstructive pulmonary disease (HCC)- (present on admission)  Assessment & Plan  4/26/2025  Pulmonary hygiene  -DuoNebs, Trelegy Ellipta, montelukast  -PEP  -s/p Solu-Medrol 125 mg  -Continue Solu-Medrol 40 mg every 8 hours  RT protocol    Continue steroids  Continue breathing treatment  Discussed with pulmonology    Dyslipidemia- (present on admission)  Assessment & Plan  4/26/2025  Statin    Primary hypertension- (present on admission)  Assessment & Plan  4/26/2025  Lisinopril, amlodipine         VTE prophylaxis: Heparin    I have performed a physical exam and reviewed and updated ROS and Plan today (4/26/2025). In review of yesterday's note (4/25/2025), there are no changes except as documented above.    Patient is critically ill.   The patient continues to have: Acute hypoxic respiratory failure secondary to immunotherapy induced pneumonitis versus interstitial lung disease versus COPD exacerbation versus human metapneumovirus pneumonia  The vital organ system that is affected is the: Pulmonary  If untreated there is a high chance of deterioration into: Respiratory failure  And eventually death.   The critical care that I am providing today is: High flow nasal cannula 35L 50% FiO2  The critical that has been undertaken is medically complex.   There has been no overlap in critical care time.    Critical Care Time not including procedures: 37   33 66

## 2025-04-26 NOTE — PROGRESS NOTES
Bedside report received from off going RN/tech: Tara, assumed care of patient.     Fall Risk Score: MODERATE RISK  Fall risk interventions in place: Provide patient/family education based on risk assessment, Educate patient/family to call staff for assistance when getting out of bed, Place fall precaution signage outside patient door, Utilize bed/chair fall alarm, and Bed alarm connected correctly  Bed type: Regular (Nba Score less than 17 interventions in place)  Patient on cardiac monitor: Yes  IVF/IV medications: Not Applicable   Oxygen: How many liters 55%35L and Traced the line to wall oxygen  Bedside sitter: Not Applicable   Isolation: Isolation precautions in place

## 2025-04-26 NOTE — PROGRESS NOTES
Monitor Summary  Rhythm: Normal Sinus Rhythm  Rate: 82-85  Ectopy: None Noted  .13 / .06 / .34              12 hr Chart check.

## 2025-04-26 NOTE — CARE PLAN
The patient is Watcher - Medium risk of patient condition declining or worsening    Shift Goals  Clinical Goals: Monitor O2 demand, titrate O2 as tolerated, pulm hygiene, VSS, pain <4/10  Patient Goals: rest, pain management  Family Goals: bandar    Progress made toward(s) clinical / shift goals:    Problem: Knowledge Deficit - Standard  Goal: Patient and family/care givers will demonstrate understanding of plan of care, disease process/condition, diagnostic tests and medications  Description: Target End Date:  1-3 days or as soon as patient condition allowsDocument in Patient Education1.  Patient and family/caregiver oriented to unit, equipment, visitation policy and means for communicating concern2.  Complete/review Learning Assessment3.  Assess knowledge level of disease process/condition, treatment plan, diagnostic tests and medications4.  Explain disease process/condition, treatment plan, diagnostic tests and medications  Outcome: Progressing     Problem: Knowledge Deficit - COPD  Goal: Patient/significant other demonstrates understanding of disease process, utilization of the Action Plan, medications and discharge instruction  Description: Target End Date:  1-3 days or as soon as patient condition allowsDocument in Patient Education1.  Discuss the importance of medical follow-up care, periodic chest x-rays, sputum cultures2.  Review worsening signs and symptoms of COPD flare and exacerbation and its management3.  Discuss respiratory medications, side effects, adverse reactions4.  Demonstrate technique for using a metered-dose inhaler (MDI) and utilization of a spacer5.  Review the COPD Action Plan6.  Instruct and reinforce the rationale for breathing exercises, coughing effectively, and general conditioning exercises7.  Stress importance of oral care and dental hygiene8.  Discuss the importance of avoiding people with active respiratory infections and need for routine influenza and pneumococcal vaccinations9.   Discuss factors that may trigger condition and encourage patient/significant other to explore ways to control these factors in and around the home and work [id number]. Review the harmful effects of smoking and advise cessation of kexfmlo01. Provide information about activity limitations and alternating activities with rest periods to prevent txlaqqy17. Instruct asthmatic patient of use of peak flow meter, as [id number]. Review oxygen requirements and dosage, safe use of oxygen, and refer to the supplier as [id number]. Educate patient/family/caregiver on the use of Nasal Intermittent Positive Pressure Ventilation (NIPPV) and possible adverse reactions  Outcome: Progressing     Problem: Risk for Infection - COPD  Goal: Patient will remain free from signs and symptoms of infection  Description: Target End Date:  Prior to discharge or change in level of care1.  Infection prevention measures2.  Instruct patient regarding signs and symptoms of infection3.  Review the importance of breathing exercises, effective cough, frequent position changes, and adequate fluid intake4.  Recommend rinsing mouth with water and spitting, not swallowing, or use of a spacer on the mouthpiece of inhaled corticosteroids  Outcome: Progressing     Problem: Ineffective Airway Clearance  Goal: Patient will maintain patent airway with clear/clearing breath sounds  Description: Target End Date:  Prior to discharge or change in level of care1.   Position head midline with flexion appropriate for age and/or condition2.   Assist patient to assume a position of comfort3.   Assess and monitor breath sounds4.   Encourage abdominal or pursed-lip breathing exercises5.   Assist with measures to improve effectiveness of cough effort6.   Demonstrate effective coughing and deep-breathing techniques7.   Assist patient to turn every 2 hours8.   Encourage patient to ambulate as tolerated9.   Keep environmental pollution to a [id number]. Airway suctioning as  [id number]. Collaborate with RT to administer medications/treatments per order12. Promote systemic fluid hydration within cardiac [id number]. Provide warm or tepid liquids  Outcome: Progressing     Problem: Impaired Gas Exchange  Goal: Patient will demonstrate improved ventilation and adequate oxygenation and participate in treatment regimen within the level of ability/situation.  Description: Target End Date:  Prior to discharge or change in level of care1.   Assess/monitor rate/rhythm/depth of effort of respirations2.   Assess oxygenation as ordered3.   Administer/titrate oxygen as ordered4.   Position patient for maximum ventilatory efficiency5.   Turn, cough, and deep breathe6.   Vital signs, pulse oximetry7.   Assess color and body temperature8.   Assess and monitor breath sounds9.   Encourage deep-slow or pursed-lip breathing [id number]. Monitor changes in the level of consciousness and mental jsxetb49. Encourage expectoration of sputum; airway gymsubzlth10. Elevate the head of the bed and position patient for maximum ventilatory [id number]. Provide a calm, quiet [id number]. Limit patient's activity during the acute phase and have patient resume activity gradually and increase as individually [id number]. Evaluate sleep patterns and limit stimulants such as [id number]. Collaborate with RT to administer medications/treatments as ordered  Outcome: Progressing     Problem: Respiratory  Goal: Patient will achieve/maintain optimum respiratory ventilation and gas exchange  Description: Target End Date:  Prior to discharge or change in level of careDocument on Assessment flowsheet1.  Assess and monitor rate, rhythm, depth and effort of respiration2.  Breath sounds assessed qshift and/or as needed3.  Assess O2 saturation, administer/titrate oxygen as ordered4.  Position patient for maximum ventilatory efficiency5.  Turn, cough, and deep breath with splinting to improve effectiveness6.  Collaborate with RT to  administer medication/treatments per order7.  Encourage use of incentive spirometer and encourage patient to cough after use and utilize splinting techniques if applicable8.  Airway suctioning9.  Monitor sputum production for changes in color, consistency and xxqgsqsfw45. Perform frequent oral iymqvsz96. Alternate physical activity with rest periods  Target End Date:  Prior to discharge or change in level of careDocument on Assessment flowsheet1.  Assess and monitor rate, rhythm, depth and effort of respiration2.  Breath sounds assessed qshift and/or as needed3.  Assess O2 saturation, administer/titrate oxygen as ordered4.  Position patient for maximum ventilatory efficiency5.  Turn, cough, and deep breath with splinting to improve effectiveness6.  Collaborate with RT to administer medication/treatments per order7.  Encourage use of incentive spirometer and encourage patient to cough after use and utilize splinting techniques if applicable8.  Airway suctioning9.  Monitor sputum production for changes in color, consistency and fuufzewhb52. Perform frequent oral pslwgky19. Alternate physical activity with rest periods  Target End Date:  Prior to discharge or change in level of careDocument on Assessment flowsheet1.  Assess and monitor rate, rhythm, depth and effort of respiration2.  Breath sounds assessed qshift and/or as needed3.  Assess O2 saturation, administer/titrate oxygen as ordered4.  Position patient for maximum ventilatory efficiency5.  Turn, cough, and deep breath with splinting to improve effectiveness6.  Collaborate with RT to administer medication/treatments per order7.  Encourage use of incentive spirometer and encourage patient to cough after use and utilize splinting techniques if applicable8.  Airway suctioning9.  Monitor sputum production for changes in color, consistency and mslefxwwv95. Perform frequent oral ufuremq64. Alternate physical activity with rest periods  Outcome: Progressing      Problem: Pain - Standard  Goal: Alleviation of pain or a reduction in pain to the patient’s comfort goal  Description: Target End Date:  Prior to discharge or change in level of careDocument on Vitals flowsheet1.  Document pain using the appropriate pain scale per order or unit policy2.  Educate and implement non-pharmacologic comfort measures (i.e. relaxation, distraction, massage, cold/heat therapy, etc.)3.  Pain management medications as ordered4.  Reassess pain after pain med administration per policy5.  If opiods administered assess patient's response to pain medication is appropriate per POSS sedation scale6.  Follow pain management plan developed in collaboration with patient and interdisciplinary team (including palliative care or pain specialists if applicable)  Outcome: Progressing       Patient is not progressing towards the following goals:

## 2025-04-27 LAB
ALBUMIN SERPL BCP-MCNC: 3.3 G/DL (ref 3.2–4.9)
BUN SERPL-MCNC: 34 MG/DL (ref 8–22)
CALCIUM ALBUM COR SERPL-MCNC: 9.4 MG/DL (ref 8.5–10.5)
CALCIUM SERPL-MCNC: 8.8 MG/DL (ref 8.5–10.5)
CHLORIDE SERPL-SCNC: 99 MMOL/L (ref 96–112)
CO2 SERPL-SCNC: 22 MMOL/L (ref 20–33)
CREAT SERPL-MCNC: 1.17 MG/DL (ref 0.5–1.4)
ERYTHROCYTE [DISTWIDTH] IN BLOOD BY AUTOMATED COUNT: 52.2 FL (ref 35.9–50)
GFR SERPLBLD CREATININE-BSD FMLA CKD-EPI: 65 ML/MIN/1.73 M 2
GLUCOSE SERPL-MCNC: 102 MG/DL (ref 65–99)
HCT VFR BLD AUTO: 35.5 % (ref 42–52)
HGB BLD-MCNC: 11.2 G/DL (ref 14–18)
MAGNESIUM SERPL-MCNC: 2.6 MG/DL (ref 1.5–2.5)
MCH RBC QN AUTO: 27.6 PG (ref 27–33)
MCHC RBC AUTO-ENTMCNC: 31.5 G/DL (ref 32.3–36.5)
MCV RBC AUTO: 87.4 FL (ref 81.4–97.8)
PHOSPHATE SERPL-MCNC: 3.8 MG/DL (ref 2.5–4.5)
PLATELET # BLD AUTO: 316 K/UL (ref 164–446)
PMV BLD AUTO: 9.3 FL (ref 9–12.9)
POTASSIUM SERPL-SCNC: 4.8 MMOL/L (ref 3.6–5.5)
RBC # BLD AUTO: 4.06 M/UL (ref 4.7–6.1)
SODIUM SERPL-SCNC: 133 MMOL/L (ref 135–145)
WBC # BLD AUTO: 7.3 K/UL (ref 4.8–10.8)

## 2025-04-27 PROCEDURE — 700102 HCHG RX REV CODE 250 W/ 637 OVERRIDE(OP): Performed by: INTERNAL MEDICINE

## 2025-04-27 PROCEDURE — A9270 NON-COVERED ITEM OR SERVICE: HCPCS | Performed by: INTERNAL MEDICINE

## 2025-04-27 PROCEDURE — 770001 HCHG ROOM/CARE - MED/SURG/GYN PRIV*

## 2025-04-27 PROCEDURE — 700111 HCHG RX REV CODE 636 W/ 250 OVERRIDE (IP): Performed by: INTERNAL MEDICINE

## 2025-04-27 PROCEDURE — 99291 CRITICAL CARE FIRST HOUR: CPT | Performed by: INTERNAL MEDICINE

## 2025-04-27 PROCEDURE — 700102 HCHG RX REV CODE 250 W/ 637 OVERRIDE(OP): Performed by: STUDENT IN AN ORGANIZED HEALTH CARE EDUCATION/TRAINING PROGRAM

## 2025-04-27 PROCEDURE — 700111 HCHG RX REV CODE 636 W/ 250 OVERRIDE (IP): Performed by: STUDENT IN AN ORGANIZED HEALTH CARE EDUCATION/TRAINING PROGRAM

## 2025-04-27 PROCEDURE — 94640 AIRWAY INHALATION TREATMENT: CPT

## 2025-04-27 PROCEDURE — 85027 COMPLETE CBC AUTOMATED: CPT

## 2025-04-27 PROCEDURE — 80069 RENAL FUNCTION PANEL: CPT

## 2025-04-27 PROCEDURE — 36415 COLL VENOUS BLD VENIPUNCTURE: CPT

## 2025-04-27 PROCEDURE — A9270 NON-COVERED ITEM OR SERVICE: HCPCS | Performed by: STUDENT IN AN ORGANIZED HEALTH CARE EDUCATION/TRAINING PROGRAM

## 2025-04-27 PROCEDURE — 83735 ASSAY OF MAGNESIUM: CPT

## 2025-04-27 PROCEDURE — 700101 HCHG RX REV CODE 250: Performed by: INTERNAL MEDICINE

## 2025-04-27 RX ORDER — PREDNISONE 20 MG/1
20 TABLET ORAL DAILY
Status: DISCONTINUED | OUTPATIENT
Start: 2025-05-16 | End: 2025-05-01 | Stop reason: HOSPADM

## 2025-04-27 RX ORDER — PREDNISONE 50 MG/1
50 TABLET ORAL DAILY
Status: COMPLETED | OUTPATIENT
Start: 2025-04-28 | End: 2025-05-01

## 2025-04-27 RX ORDER — PREDNISONE 10 MG/1
10 TABLET ORAL DAILY
Status: DISCONTINUED | OUTPATIENT
Start: 2025-05-23 | End: 2025-05-01 | Stop reason: HOSPADM

## 2025-04-27 RX ORDER — PREDNISONE 20 MG/1
40 TABLET ORAL DAILY
Status: DISCONTINUED | OUTPATIENT
Start: 2025-05-02 | End: 2025-05-01 | Stop reason: HOSPADM

## 2025-04-27 RX ADMIN — IPRATROPIUM BROMIDE AND ALBUTEROL SULFATE 3 ML: .5; 2.5 SOLUTION RESPIRATORY (INHALATION) at 13:27

## 2025-04-27 RX ADMIN — IPRATROPIUM BROMIDE AND ALBUTEROL SULFATE 3 ML: .5; 2.5 SOLUTION RESPIRATORY (INHALATION) at 10:02

## 2025-04-27 RX ADMIN — PREDNISONE 50 MG: 50 TABLET ORAL at 04:12

## 2025-04-27 RX ADMIN — MONTELUKAST 10 MG: 10 TABLET, FILM COATED ORAL at 20:29

## 2025-04-27 RX ADMIN — LISINOPRIL 20 MG: 20 TABLET ORAL at 04:12

## 2025-04-27 RX ADMIN — LEVOTHYROXINE SODIUM 100 MCG: 0.1 TABLET ORAL at 04:12

## 2025-04-27 RX ADMIN — AMLODIPINE BESYLATE 5 MG: 5 TABLET ORAL at 04:12

## 2025-04-27 RX ADMIN — Medication 30 MG: at 22:31

## 2025-04-27 RX ADMIN — HYDROCODONE BITARTRATE AND HOMATROPINE METHYLBROMIDE 5 ML: 5; 1.5 SOLUTION ORAL at 22:31

## 2025-04-27 RX ADMIN — TRAZODONE HYDROCHLORIDE 50 MG: 50 TABLET ORAL at 22:31

## 2025-04-27 RX ADMIN — IPRATROPIUM BROMIDE AND ALBUTEROL SULFATE 3 ML: .5; 2.5 SOLUTION RESPIRATORY (INHALATION) at 19:14

## 2025-04-27 RX ADMIN — HEPARIN SODIUM 5000 UNITS: 5000 INJECTION, SOLUTION INTRAVENOUS; SUBCUTANEOUS at 04:12

## 2025-04-27 RX ADMIN — ASPIRIN 81 MG: 81 TABLET, COATED ORAL at 04:12

## 2025-04-27 RX ADMIN — HEPARIN SODIUM 5000 UNITS: 5000 INJECTION, SOLUTION INTRAVENOUS; SUBCUTANEOUS at 22:32

## 2025-04-27 RX ADMIN — HEPARIN SODIUM 5000 UNITS: 5000 INJECTION, SOLUTION INTRAVENOUS; SUBCUTANEOUS at 15:12

## 2025-04-27 RX ADMIN — FLUTICASONE FUROATE, UMECLIDINIUM BROMIDE AND VILANTEROL TRIFENATATE 1 PUFF: 200; 62.5; 25 POWDER RESPIRATORY (INHALATION) at 04:14

## 2025-04-27 RX ADMIN — OXYCODONE 5 MG: 5 TABLET ORAL at 20:45

## 2025-04-27 RX ADMIN — IPRATROPIUM BROMIDE AND ALBUTEROL SULFATE 3 ML: .5; 2.5 SOLUTION RESPIRATORY (INHALATION) at 07:27

## 2025-04-27 RX ADMIN — CALCIUM POLYCARBOPHIL 625 MG: 625 TABLET, FILM COATED ORAL at 04:12

## 2025-04-27 RX ADMIN — ROSUVASTATIN CALCIUM 10 MG: 20 TABLET, FILM COATED ORAL at 17:13

## 2025-04-27 ASSESSMENT — ENCOUNTER SYMPTOMS
PND: 0
MYALGIAS: 0
BRUISES/BLEEDS EASILY: 0
DEPRESSION: 0
PALPITATIONS: 0
SPUTUM PRODUCTION: 1
CHILLS: 0
VOMITING: 0
SHORTNESS OF BREATH: 1
BLURRED VISION: 0
BACK PAIN: 0
NAUSEA: 0
HEARTBURN: 0
COUGH: 1
DIZZINESS: 0
HEADACHES: 0
CLAUDICATION: 0
WHEEZING: 1
DOUBLE VISION: 0
FEVER: 0
HEMOPTYSIS: 0

## 2025-04-27 ASSESSMENT — COGNITIVE AND FUNCTIONAL STATUS - GENERAL
STANDING UP FROM CHAIR USING ARMS: A LITTLE
SUGGESTED CMS G CODE MODIFIER MOBILITY: CJ
DAILY ACTIVITIY SCORE: 21
TOILETING: A LITTLE
CLIMB 3 TO 5 STEPS WITH RAILING: A LITTLE
SUGGESTED CMS G CODE MODIFIER DAILY ACTIVITY: CJ
HELP NEEDED FOR BATHING: A LITTLE
MOBILITY SCORE: 21
DRESSING REGULAR LOWER BODY CLOTHING: A LITTLE
WALKING IN HOSPITAL ROOM: A LITTLE

## 2025-04-27 ASSESSMENT — PAIN DESCRIPTION - PAIN TYPE
TYPE: ACUTE PAIN

## 2025-04-27 ASSESSMENT — FIBROSIS 4 INDEX: FIB4 SCORE: 2.74

## 2025-04-27 NOTE — PROGRESS NOTES
Pt transferred to [facility]. Report called to charge RN at 1430. All bedside belongings sent with pt including laptop and cell phone.

## 2025-04-27 NOTE — PROGRESS NOTES
[facility] Medicine Daily Progress Note    Date of Service  4/27/2025    Chief Complaint  Bright Shirley is a 74 y.o. male admitted 4/15/2025 with pneumonia hypoxia    Hospital Course  Bright Shirley is a 74 y.o. 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  Patient initially presented to ER for stroke concern.  Patient was evaluated by neurology, stroke load was aborted and his weakness on left side was resolved.  He is however requiring increased oxygen requirement of 10 L oxy mask.  CTA chest noted to have stable cavitary mass in left upper lobe, extensive edematous changes in lung peripheral honeycombing.  He does have concern lactic acid of 5.3.  Due to concern of severe sepsis, admission requested by ERP.  Therefore, admitted to medicine service for further evaluation and treatment.     Echocardiogram showed ejection fraction 58%.  Mild mitral regurgitation.  Normal diastolic function.  Normal right ventricular size and systolic function.    Pulmonary and infectious disease were consulted.  Patient does have a history of squamous cell cancer of the left upper lobe pembrolizumab making him immunosuppressed and also concern for autoimmune pneumonitis.  He was found to be positive for human metapneumovirus.  With initial concern for superimposed bacterial infection patient was on Zosyn which was escalated to meropenem and doxycycline.  He had minimal improvements on this regimen, he had procalcitonin is downtrending.  Fungal infection is felt to be less likely.  Pulmonology felt patient is high risk for bronchoscopy due to low pulmonary reserve.  MRSA nares was negative.  COVID/influenza/RSV negative.  Fungal cultures of sputum showed rare fungal elements.  Aspergillus galactomannan, Legionella, mycoplasma, coccidiomycosis negative.  On 4/22  infectious disease recommended to discontinue antibiotics and monitor for clinical worsening.  Okay to continue Bactrim for PJP prophylaxis.  If there is clinical worsening recommended to repeat CT thorax and reconsult.  Pulmonary recommended to continue high-dose prednisone with concern for immunotherapy induced pneumonitis versus interstitial lung disease.  Continue Bactrim for PJP prophylaxis as above.  As PET/CT did show improvement it is okay to continue Keytruda.  Patient also known to have severe COPD on triple therapy.  Patient was also aggressively diuresed.    Interval Problem Update  4/16 patient is new to me today, patient is alert oriented follows commands, he is requiring 8 L of oxygen when I saw her in the morning, he is able to speak in full sentences, he stated that at home he was getting short of breath with exertion, normally he is comfortable at rest with 5 L of oxygen but he feels that he is requiring more with exertion/ambulation, will continue antibiotics, CT results reviewed, MRSA screen is negative and sputum cultures and blood cultures pending, discussed with bedside nurse charge nurse  pharmacist  4/17 patient is resting in chair, he is alert oriented follows commands no new neurological deficit, still having productive cough, continue IV antibiotics, notes from pulmonology reviewed, will continue with antipseudomonal coverage for now due to high risk, follow-up final blood culture results, discussed with bedside nurse charge nurse  pharmacist.  4/18 patient is resting in chair, he is in good spirit, he is requiring 10 L of oxygen early in the morning, however the repeat chest x-ray discussed with pulmonology, continue chest physiotherapy s/p RT flutter valve, continue antibiotics, follow-up cultures, I will order respiratory panel, discussed medicine return discussed palliative pharmacist, later today patient's oxygen has decreased to 8 L, follow-up blood work in  a.m.  4/19 patient is in bed, no fever or chills not in distress, patient is requiring higher amount of o2 patient is on 40L by HFNC, patient is able to tolerate diet, diarrhea resolved, c diff neg, human pneumovirus positive, discussed with pulmonologist, I have started him on iv lasix, continue abx, steroids nebs. Discussed with bedside nurse, charge nurse .   4/20 patient is resting in bed, patient is alert oriented follows commands, patient requiring 50 L of oxygen by high flow nasal cannula, discussed with pulmonology, transition to meropenem and doxycycline, continue supportive treatment, received IV Lasix today, continue RT per protocol, discussed with bedside nurse charge nurse  pharmacist.  4/21 patient is resting in bed, he is alert oriented follows commands, he is still requiring 4 L of oxygen but clinically doing okay, discussed with pulmonology discussed with infectious disease at this time appears to be related to bilateral pneumonia, continue antibiotics for 1 more day if patient continues to improve clinically probably will be able to stop antibiotics tomorrow, continue chest physical therapy incentive spirometry flutter valve, I discussed with bedside nurse charge nurse  pharmacist    Patient was seen and examined at bedside.  I have personally reviewed and interpreted vitals, labs, and imaging.    4/22.  Afebrile.  Intermittent bradycardia.  Intermittent hypertension.  On 55L high flow denies fevers, chills.  Reports an episode of chest pain last night which resolved spontaneously.  States breathing and cough are improved.  He has reported loose bowel movement yesterday.  Case was discussed with ID and pulmonary.  Plan to discontinue antibiotics and monitor.  Plan transferred to [facility]'s LT[facility] tomorrow  Procal 0.41 > 0.25  4/23.  Afebrile.  Hypertensive.  On 50L HHF.  Headaches denies fevers, chills, chest pains.  Shortness of breath is improved.  Still having some  diarrhea.  Case was discussed with social work.  Plan is for LTAC.  No bed at [facility] today  4/24.  Afebrile.  Intermittent hypertension.  On heated high flow at 20 L.  Denies fever, chills, chest pains.  Shortness of breath is improved.  Awaiting LTAC.  No bed at [facility] today.  Discussed with SW.  Continue to wean O2 as tolerated  4/25.  Afebrile.  Stable vitals.  On 20-30 L heated high flow nasal cannula.  Denies fevers, chills, chest pains.  Reports shortness of breath.  Hypotensive this afternoon.  Adjusted blood pressure meds.  Awaiting bed at [facility]  4/26.  Afebrile.  Stable vitals.  On 35 L heated high flow.  Denies fever, chills, chest pains.  Shortness of breath and cough continues to improve.  Blood pressure has been stable after adjustments to regimen yesterday.  Discussed with social work.  [facility] will not take patient until soonest Monday.  No longer needs telemetry.  4/27.  Afebrile.  Intermittent tachycardia.  Stable vitals.  On 40 L heated high flow.  Denies fever, chills, chest pains.  Shortness of breath improving slowly.  Blood pressure has been stable.  No longer needs telemetry.  Plan for LTAC.  Wbc 4.7 > 4.5 > 7.3 > 7.8 > 8.2 > 7.3  Hgb 12.8 > 11.6 > 12.5 > 12.1 > 12.2 > 11.2  Na 133    I have discussed this patient's plan of care and discharge plan at IDT rounds today with Case Management, Nursing, Nursing leadership, and other members of the IDT team.    Consultants/Specialty  Pulmonology  ID    Code Status  Full Code    Disposition  The patient is not medically cleared for discharge to home or a post-acute facility.  Anticipate discharge to: a long-term acute care hospital    I have placed the appropriate orders for post-discharge needs.    Review of Systems  Review of Systems   Constitutional:  Negative for chills and fever.   Eyes:  Negative for blurred vision and double vision.   Respiratory:  Positive for cough, sputum production, shortness of breath and wheezing. Negative for hemoptysis.     Cardiovascular:  Negative for chest pain, palpitations, claudication, leg swelling and PND.   Gastrointestinal:  Negative for heartburn, nausea and vomiting.   Genitourinary:  Negative for hematuria and urgency.   Musculoskeletal:  Negative for back pain and myalgias.   Skin:  Negative for rash.   Neurological:  Negative for dizziness and headaches.   Endo/Heme/Allergies:  Does not bruise/bleed easily.   Psychiatric/Behavioral:  Negative for depression.         Physical Exam  Temp:  [36.1 °C (97 °F)-36.5 °C (97.7 °F)] 36.5 °C (97.7 °F)  Pulse:  [] 83  Resp:  [16-20] 18  BP: ()/(52-66) 126/60  SpO2:  [90 %-98 %] 98 %    Physical Exam  Vitals and nursing note reviewed.   Constitutional:       Appearance: He is ill-appearing.   Eyes:      General:         Right eye: No discharge.         Left eye: No discharge.   Cardiovascular:      Rate and Rhythm: Normal rate and regular rhythm.      Pulses: Normal pulses.      Heart sounds: Normal heart sounds.   Pulmonary:      Effort: Pulmonary effort is normal. No respiratory distress.      Breath sounds: Normal breath sounds.   Abdominal:      General: Bowel sounds are normal. There is no distension.      Tenderness: There is no guarding.   Musculoskeletal:         General: Normal range of motion.      Cervical back: Normal range of motion and neck supple.      Right lower leg: No edema.      Left lower leg: No edema.   Skin:     General: Skin is warm and dry.      Capillary Refill: Capillary refill takes less than 2 seconds.      Coloration: Skin is not jaundiced.   Neurological:      General: No focal deficit present.      Mental Status: He is alert and oriented to person, place, and time.      Cranial Nerves: No cranial nerve deficit.   Psychiatric:         Mood and Affect: Mood normal.         Behavior: Behavior normal.         Fluids    Intake/Output Summary (Last 24 hours) at 4/27/2025 0603  Last data filed at 4/27/2025 0400  Gross per 24 hour   Intake 590  ml   Output 1200 ml   Net -610 ml        Laboratory  Recent Labs     04/25/25  0316 04/26/25  0650 04/27/25  0133   WBC 7.8 8.2 7.3   RBC 4.42* 4.42* 4.06*   HEMOGLOBIN 12.1* 12.2* 11.2*   HEMATOCRIT 38.8* 38.7* 35.5*   MCV 87.8 87.6 87.4   MCH 27.4 27.6 27.6   MCHC 31.2* 31.5* 31.5*   RDW 52.0* 52.7* 52.2*   PLATELETCT 301 321 316   MPV 9.6 9.4 9.3     Recent Labs     04/25/25  0316 04/26/25  0650 04/27/25  0133   SODIUM 133* 133* 133*   POTASSIUM 4.3 4.4 4.8   CHLORIDE 98 100 99   CO2 25 24 22   GLUCOSE 95 78 102*   BUN 23* 25* 34*   CREATININE 1.05 0.83 1.17   CALCIUM 8.9 9.1 8.8                     Imaging  DX-CHEST-LIMITED (1 VIEW)   Final Result      Worsening bilateral pulmonary infiltrates.      DX-CHEST-PORTABLE (1 VIEW)   Final Result         1.  Pulmonary edema and/or infiltrates, greatest in the left upper lobe, slightly decreased since prior study.      EC-ECHOCARDIOGRAM COMPLETE W/O CONT   Final Result      CT-CTA CHEST PULMONARY ARTERY W/ RECONS   Final Result      1. No acute pulmonary embolus.   2. Stable cavitary mass in the left upper lobe.   3. Extensive edematous changes in the lungs with peripheral honeycombing.   4. Aortic and coronary arterial sclerosis.   5. Simple appearing right renal cortical cyst, requiring no further follow-up.   6. Stable bilateral adrenal masses.   7. Acute or subacute left third through fifth rib fractures.            DX-CHEST-PORTABLE (1 VIEW)   Final Result      1.  LEFT upper lung consolidation, likely pneumonia.  Underlying mass is not excluded.   2.  Probable pulmonary fibrosis.   3.  Limited by positioning.           Assessment/Plan  * Severe sepsis (HCC)- (present on admission)  Assessment & Plan  4/27/2025  This is Sepsis Present on admission  SIRS criteria identified on my evaluation include: Tachycardia, with heart rate greater than 90 BPM, Tachypnea, with respirations greater than 20 per minute, and Bandemia, greater than 10% bands  Clinical indicators of  end organ dysfunction include Lactic Acid greater than 2  Source is pulm  Sepsis protocol initiated  Crystalloid Fluid Administration: Fluid resuscitation ordered per standard protocol - 30 mL/kg per current or ideal body weight  IV antibiotics as appropriate for source of sepsis  Reassessment: I have reassessed the patient's hemodynamic status    Acute left-sided weakness  Assessment & Plan  4/27/2025  Resolved by ER arrival  Stroke alert aborted by stroke neurology  PT/OT/ST    H/o TIA per pt  -added ASA  -continue statin    Continue monitoring  No neuro deficit    TREVOR (acute kidney injury) (HCC)  Assessment & Plan  4/27/2025  Cr 1.22  IVF  Minimize nephrotoxic agents, renally dose meds  Check FeNa    Creatinine 1.13    ACP (advance care planning)  Assessment & Plan  Goal of care discussed with patient in the emergency room.  He stated he is agreeable for noninvasive, as well as invasive/heroic life-sustaining measures-including CPR/defibrillation/intubation or mechanical ventilation.  He is agreeable for hospitalization, further treatment with IVF, breathing treatment, antibiotic therapy, any medical management as clinically warranted.  Diagnosis, prognosis, question and concern addressed.  Full CODE STATUS confirmed.  ACP: 17 minutes    Pneumonia due to infectious organism  Assessment & Plan  4/27/2025  Cavitary left upper lobe mass  Patient is immunocompromised due to squamous cell lung cancer on chemotherapy  Follow cultures  Antibiotic: Zosyn, Zyvox, azithromycin  Wean off oxygen support as tolerated continue broad-spectrum antibiotics follow-up MRSA screening follow-up    Blood sputum cultures  Will need treatment for possible  pseudomonas as per pulm will get EKG to check qtc.     DC abx.  Cx neg   Human metapneumovirus positive.   Supportive treatment.    Lactic acidosis  Assessment & Plan  4/27/2025  LA 5.3 --> 4.9  Continue IVF  IV antibiotic  High-dose IV thiamine    Squamous cell carcinoma of upper  lobe of left lung (HCC)- (present on admission)  Assessment & Plan  4/27/2025  On chemotherapy with pembrolizumab  Followed by Dr. Melara, medical oncology    Acute on chronic respiratory failure with hypoxia (HCC)- (present on admission)  Assessment & Plan  4/27/2025  Dependent on 5 L at baseline  Currently requiring 10 L OxyMask-wean off as tolerated  No PE seen on CTA chest  Check COVID/influenza/RSV  Antibiotic for pneumonia  Check TTE    CTA did not show PE  Patient with probably postobstructive pneumonia due to lung mass  Continue antibiotics  Note from pulm reviewed.     Patient requiring higher levels of oxygen today 10 L  Discussed with pulmonology  Continue antibiotics  Continue chest physical therapy  Follow-up fungal results    Patient is requiring higher amount of o2, he is on 40L of o2, I discussed with pulmonologist  Patient started on HFNC.     Chronic obstructive pulmonary disease (HCC)- (present on admission)  Assessment & Plan  4/27/2025  Pulmonary hygiene  -DuoNebs, Trelegy Ellipta, montelukast  -PEP  -s/p Solu-Medrol 125 mg  -Continue Solu-Medrol 40 mg every 8 hours  RT protocol    Continue steroids  Continue breathing treatment  Discussed with pulmonology    Dyslipidemia- (present on admission)  Assessment & Plan  4/27/2025  Statin    Primary hypertension- (present on admission)  Assessment & Plan  4/27/2025  Lisinopril, amlodipine         VTE prophylaxis: Heparin    I have performed a physical exam and reviewed and updated ROS and Plan today (4/27/2025). In review of yesterday's note (4/26/2025), there are no changes except as documented above.    Patient is critically ill.   The patient continues to have: Acute hypoxic respiratory failure secondary to immunotherapy induced pneumonitis versus interstitial lung disease versus COPD exacerbation versus human metapneumovirus pneumonia  The vital organ system that is affected is the: Pulmonary  If untreated there is a high chance of deterioration  into: Respiratory failure  And eventually death.   The critical care that I am providing today is: High flow nasal cannula 40L 45% FiO2  The critical that has been undertaken is medically complex.   There has been no overlap in critical care time.   Critical Care Time not including procedures: 38

## 2025-04-27 NOTE — CARE PLAN
The patient is Watcher - Medium risk of patient condition declining or worsening    Shift Goals  Clinical Goals: Monitor oxygen, manage pain  Patient Goals: rest, comfort, pain management  Family Goals: ANAHI    Progress made toward(s) clinical / shift goals:      Problem: Knowledge Deficit - Standard  Goal: Patient and family/care givers will demonstrate understanding of plan of care, disease process/condition, diagnostic tests and medications  Outcome: Progressing     Problem: Knowledge Deficit - COPD  Goal: Patient/significant other demonstrates understanding of disease process, utilization of the Action Plan, medications and discharge instruction  Outcome: Progressing     Problem: Risk for Infection - COPD  Goal: Patient will remain free from signs and symptoms of infection  Outcome: Progressing     Problem: Nutrition - Advanced  Goal: Patient will display progressive weight gain toward goal have adequate food and fluid intake  Outcome: Progressing     Problem: Ineffective Airway Clearance  Goal: Patient will maintain patent airway with clear/clearing breath sounds  Outcome: Progressing     Problem: Impaired Gas Exchange  Goal: Patient will demonstrate improved ventilation and adequate oxygenation and participate in treatment regimen within the level of ability/situation.  Outcome: Progressing     Problem: Risk for Aspiration  Goal: Patient's risk for aspiration will be absent or decrease  Outcome: Progressing     Problem: Self Care  Goal: Patient will have the ability to perform ADLs independently or with assistance (bathe, groom, dress, toilet and feed)  Outcome: Progressing     Problem: Fluid Volume  Goal: Fluid volume balance will be maintained  Outcome: Progressing     Problem: Urinary - Renal Perfusion  Goal: Ability to achieve and maintain adequate renal perfusion and functioning will improve  Outcome: Progressing     Problem: Respiratory  Goal: Patient will achieve/maintain optimum respiratory ventilation  and gas exchange  Outcome: Progressing     Problem: Physical Regulation  Goal: Diagnostic test results will improve  Outcome: Progressing  Goal: Signs and symptoms of infection will decrease  Outcome: Progressing     Problem: Pain - Standard  Goal: Alleviation of pain or a reduction in pain to the patient’s comfort goal  Outcome: Progressing     Problem: Fall Risk  Goal: Patient will remain free from falls  Outcome: Progressing  Note: Treaded socks and bed/strip alarm on, side rails up x 3  . Call light within reach. Pt educated to call for assistance. Reinforce as needed. Continue to monitor.       Problem: Bowel Elimination  Goal: Establish and maintain regular bowel function  Outcome: Progressing

## 2025-04-27 NOTE — PROGRESS NOTES
Bedside report received from off going RN/tech: Lety, assumed care of patient@[id number].     Fall Risk Score: HIGH RISK  Fall risk interventions in place: Place yellow fall risk ID band on patient, Provide patient/family education based on risk assessment, Educate patient/family to call staff for assistance when getting out of bed, Place fall precaution signage outside patient door, Utilize bed/chair fall alarm, Notify charge of high risk for huddle, and Bed alarm connected correctly  Bed type: Regular (Nba Score less than 17 interventions in place)  Patient on cardiac monitor: Yes  IVF/IV medications: Not Applicable   Oxygen: How many liters 40L and Traced the line to wall oxygen, on high flow  Bedside sitter: Not Applicable   Isolation: Isolation precautions in place

## 2025-04-27 NOTE — PROGRESS NOTES
Monitor Summary  Rhythm: SR/ST  Rate:   Ectopy: PVCs(R-F), PACs(R-F), BPAC(R), Trigem(R)  .14 / .09 / .33

## 2025-04-27 NOTE — PROGRESS NOTES
..Bedside report received from off going RN/tech: Chuckie assumed care of patient.     Fall Risk Score: MODERATE RISK  Fall risk interventions in place: Place yellow fall risk ID band on patient, Provide patient/family education based on risk assessment, and Educate patient/family to call staff for assistance when getting out of bed  Bed type: Regular (Nba Score less than 17 interventions in place)  Patient on cardiac monitor: Yes  IVF/IV medications: Not Applicable   Oxygen: How many liters 40L 55% FIO2  Bedside sitter: Not Applicable   Isolation: Not applicable

## 2025-04-27 NOTE — PROGRESS NOTES
Monitor Summary  Rhythm: Normal Sinus Rhythm  Rate: 68-92  Ectopy: PACs  .14 / .07 / .36              12 hr Chart check.

## 2025-04-28 LAB
ALBUMIN SERPL BCP-MCNC: 3.3 G/DL (ref 3.2–4.9)
BUN SERPL-MCNC: 26 MG/DL (ref 8–22)
CALCIUM ALBUM COR SERPL-MCNC: 8.7 MG/DL (ref 8.5–10.5)
CALCIUM SERPL-MCNC: 8.1 MG/DL (ref 8.5–10.5)
CHLORIDE SERPL-SCNC: 100 MMOL/L (ref 96–112)
CO2 SERPL-SCNC: 24 MMOL/L (ref 20–33)
CREAT SERPL-MCNC: 1.19 MG/DL (ref 0.5–1.4)
ERYTHROCYTE [DISTWIDTH] IN BLOOD BY AUTOMATED COUNT: 51.1 FL (ref 35.9–50)
GFR SERPLBLD CREATININE-BSD FMLA CKD-EPI: 64 ML/MIN/1.73 M 2
GLUCOSE SERPL-MCNC: 90 MG/DL (ref 65–99)
HCT VFR BLD AUTO: 36.1 % (ref 42–52)
HGB BLD-MCNC: 11.5 G/DL (ref 14–18)
MAGNESIUM SERPL-MCNC: 2.1 MG/DL (ref 1.5–2.5)
MCH RBC QN AUTO: 27.6 PG (ref 27–33)
MCHC RBC AUTO-ENTMCNC: 31.9 G/DL (ref 32.3–36.5)
MCV RBC AUTO: 86.6 FL (ref 81.4–97.8)
PHOSPHATE SERPL-MCNC: 2.9 MG/DL (ref 2.5–4.5)
PLATELET # BLD AUTO: 367 K/UL (ref 164–446)
PMV BLD AUTO: 9.3 FL (ref 9–12.9)
POTASSIUM SERPL-SCNC: 4.4 MMOL/L (ref 3.6–5.5)
RBC # BLD AUTO: 4.17 M/UL (ref 4.7–6.1)
SODIUM SERPL-SCNC: 137 MMOL/L (ref 135–145)
WBC # BLD AUTO: 7.5 K/UL (ref 4.8–10.8)

## 2025-04-28 PROCEDURE — A9270 NON-COVERED ITEM OR SERVICE: HCPCS | Performed by: STUDENT IN AN ORGANIZED HEALTH CARE EDUCATION/TRAINING PROGRAM

## 2025-04-28 PROCEDURE — 700111 HCHG RX REV CODE 636 W/ 250 OVERRIDE (IP): Performed by: STUDENT IN AN ORGANIZED HEALTH CARE EDUCATION/TRAINING PROGRAM

## 2025-04-28 PROCEDURE — 94640 AIRWAY INHALATION TREATMENT: CPT

## 2025-04-28 PROCEDURE — A9270 NON-COVERED ITEM OR SERVICE: HCPCS | Performed by: INTERNAL MEDICINE

## 2025-04-28 PROCEDURE — 36415 COLL VENOUS BLD VENIPUNCTURE: CPT

## 2025-04-28 PROCEDURE — 85027 COMPLETE CBC AUTOMATED: CPT

## 2025-04-28 PROCEDURE — 80069 RENAL FUNCTION PANEL: CPT

## 2025-04-28 PROCEDURE — 83735 ASSAY OF MAGNESIUM: CPT

## 2025-04-28 PROCEDURE — 700102 HCHG RX REV CODE 250 W/ 637 OVERRIDE(OP): Performed by: INTERNAL MEDICINE

## 2025-04-28 PROCEDURE — 700111 HCHG RX REV CODE 636 W/ 250 OVERRIDE (IP): Performed by: INTERNAL MEDICINE

## 2025-04-28 PROCEDURE — 97530 THERAPEUTIC ACTIVITIES: CPT

## 2025-04-28 PROCEDURE — 770001 HCHG ROOM/CARE - MED/SURG/GYN PRIV*

## 2025-04-28 PROCEDURE — 700102 HCHG RX REV CODE 250 W/ 637 OVERRIDE(OP): Performed by: STUDENT IN AN ORGANIZED HEALTH CARE EDUCATION/TRAINING PROGRAM

## 2025-04-28 PROCEDURE — 700101 HCHG RX REV CODE 250: Performed by: INTERNAL MEDICINE

## 2025-04-28 PROCEDURE — 99291 CRITICAL CARE FIRST HOUR: CPT | Performed by: INTERNAL MEDICINE

## 2025-04-28 RX ADMIN — ROSUVASTATIN CALCIUM 10 MG: 20 TABLET, FILM COATED ORAL at 17:10

## 2025-04-28 RX ADMIN — PREDNISONE 50 MG: 50 TABLET ORAL at 05:40

## 2025-04-28 RX ADMIN — IPRATROPIUM BROMIDE AND ALBUTEROL SULFATE 3 ML: .5; 2.5 SOLUTION RESPIRATORY (INHALATION) at 15:00

## 2025-04-28 RX ADMIN — DIBASIC SODIUM PHOSPHATE, MONOBASIC POTASSIUM PHOSPHATE AND MONOBASIC SODIUM PHOSPHATE 500 MG: 852; 155; 130 TABLET ORAL at 05:54

## 2025-04-28 RX ADMIN — TRAZODONE HYDROCHLORIDE 50 MG: 50 TABLET ORAL at 22:57

## 2025-04-28 RX ADMIN — SULFAMETHOXAZOLE AND TRIMETHOPRIM 1 TABLET: 800; 160 TABLET ORAL at 10:33

## 2025-04-28 RX ADMIN — FLUTICASONE FUROATE, UMECLIDINIUM BROMIDE AND VILANTEROL TRIFENATATE 1 PUFF: 200; 62.5; 25 POWDER RESPIRATORY (INHALATION) at 05:42

## 2025-04-28 RX ADMIN — ASPIRIN 81 MG: 81 TABLET, COATED ORAL at 05:41

## 2025-04-28 RX ADMIN — AMLODIPINE BESYLATE 5 MG: 5 TABLET ORAL at 05:42

## 2025-04-28 RX ADMIN — Medication 30 MG: at 22:57

## 2025-04-28 RX ADMIN — OXYCODONE 5 MG: 5 TABLET ORAL at 20:08

## 2025-04-28 RX ADMIN — HEPARIN SODIUM 5000 UNITS: 5000 INJECTION, SOLUTION INTRAVENOUS; SUBCUTANEOUS at 05:42

## 2025-04-28 RX ADMIN — IPRATROPIUM BROMIDE AND ALBUTEROL SULFATE 3 ML: .5; 2.5 SOLUTION RESPIRATORY (INHALATION) at 07:21

## 2025-04-28 RX ADMIN — HEPARIN SODIUM 5000 UNITS: 5000 INJECTION, SOLUTION INTRAVENOUS; SUBCUTANEOUS at 14:12

## 2025-04-28 RX ADMIN — OXYCODONE 5 MG: 5 TABLET ORAL at 05:40

## 2025-04-28 RX ADMIN — CALCIUM POLYCARBOPHIL 625 MG: 625 TABLET, FILM COATED ORAL at 05:42

## 2025-04-28 RX ADMIN — LEVOTHYROXINE SODIUM 100 MCG: 0.1 TABLET ORAL at 05:41

## 2025-04-28 RX ADMIN — HYDROCODONE BITARTRATE AND HOMATROPINE METHYLBROMIDE 5 ML: 5; 1.5 SOLUTION ORAL at 22:57

## 2025-04-28 RX ADMIN — HEPARIN SODIUM 5000 UNITS: 5000 INJECTION, SOLUTION INTRAVENOUS; SUBCUTANEOUS at 22:57

## 2025-04-28 RX ADMIN — LISINOPRIL 20 MG: 20 TABLET ORAL at 05:42

## 2025-04-28 RX ADMIN — IPRATROPIUM BROMIDE AND ALBUTEROL SULFATE 3 ML: .5; 2.5 SOLUTION RESPIRATORY (INHALATION) at 10:35

## 2025-04-28 RX ADMIN — MONTELUKAST 10 MG: 10 TABLET, FILM COATED ORAL at 20:08

## 2025-04-28 ASSESSMENT — COGNITIVE AND FUNCTIONAL STATUS - GENERAL
CLIMB 3 TO 5 STEPS WITH RAILING: A LITTLE
WALKING IN HOSPITAL ROOM: A LITTLE
SUGGESTED CMS G CODE MODIFIER MOBILITY: CK
MOBILITY SCORE: 18
MOVING TO AND FROM BED TO CHAIR: A LITTLE
TURNING FROM BACK TO SIDE WHILE IN FLAT BAD: A LITTLE
STANDING UP FROM CHAIR USING ARMS: A LITTLE
MOVING FROM LYING ON BACK TO SITTING ON SIDE OF FLAT BED: A LITTLE

## 2025-04-28 ASSESSMENT — GAIT ASSESSMENTS
DEVIATION: NO DEVIATION
GAIT LEVEL OF ASSIST: STANDBY ASSIST

## 2025-04-28 ASSESSMENT — ENCOUNTER SYMPTOMS
SPUTUM PRODUCTION: 1
FEVER: 0
WHEEZING: 1
BLURRED VISION: 0
DEPRESSION: 0
SHORTNESS OF BREATH: 1
MYALGIAS: 0
NAUSEA: 0
HEARTBURN: 0
CHILLS: 0
CLAUDICATION: 0
PND: 0
BACK PAIN: 0
DOUBLE VISION: 0
HEADACHES: 0
HEMOPTYSIS: 0
PALPITATIONS: 0
BRUISES/BLEEDS EASILY: 0
COUGH: 1
VOMITING: 0
DIZZINESS: 0

## 2025-04-28 ASSESSMENT — PAIN DESCRIPTION - PAIN TYPE
TYPE: ACUTE PAIN

## 2025-04-28 NOTE — DISCHARGE PLANNING
HTH/SCP TCN chart review completed. Per review of most recent hospital medicine note 4/27, patient anticipated discharge plan remains [facility]/ [facility]. Please see TCN note 4/22/25, as noted SCP authorization is in place for anticipated discharge to [facility] once a bed is available.    Current 6 click mobility consistent at 21. Per chart review, he has a cane and FWW and was on 5 L/min O2 at his baseline.  Current O2 needs noted at 40 HF LM O2.     Please reach out to TCN via voalte if any additional transitional discharge planning needs are indicated. Thank you.    Completed:  PT recommendations updated to post aucte placement 4/24    OT anticipates no further occupational therapy needs after discharge from the hospital on 4/16 with recommendations for a tub/shower seat.     SCP with [facility] PCP.  Anticipate post-acute placement.

## 2025-04-28 NOTE — PROGRESS NOTES
4 Eyes Skin Assessment Completed by JOYCELYN Ferrell and JOYCELYN Rolle.    Head WDL  Ears WDL  Nose WDL  Mouth WDL  Neck WDL  Breast/Chest Redness, Scab, and Scar  Shoulder Blades WDL  Spine WDL  (R) Arm/Elbow/Hand Redness, Blanching, and Scab  (L) Arm/Elbow/Hand Redness, Blanching, and Scab  Abdomen Bruising  Groin WDL  Scrotum/Coccyx/Buttocks Redness, Blanching, and Excoriation  (R) Leg Scab  (L) Leg Scab  (R) Heel/Foot/Toe Redness and Blanching  (L) Heel/Foot/Toe Redness and Blanching          Devices In Places Blood Pressure Cuff and Pulse Ox, high flow nasal cannula       Interventions In Place TAP System, Pillows, Barrier Cream, and Heels Loaded W/Pillows    Possible Skin Injury Yes    Pictures Uploaded Into Epic Yes  Wound Consult Placed N/A care in place   RN Wound Prevention Protocol Ordered No

## 2025-04-28 NOTE — PROGRESS NOTES
[facility] Medicine Daily Progress Note    Date of Service  4/28/2025    Chief Complaint  Bright Shirley is a 74 y.o. male admitted 4/15/2025 with pneumonia hypoxia    Hospital Course  Bright Shirley is a 74 y.o. 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  Patient initially presented to ER for stroke concern.  Patient was evaluated by neurology, stroke load was aborted and his weakness on left side was resolved.  He is however requiring increased oxygen requirement of 10 L oxy mask.  CTA chest noted to have stable cavitary mass in left upper lobe, extensive edematous changes in lung peripheral honeycombing.  He does have concern lactic acid of 5.3.  Due to concern of severe sepsis, admission requested by ERP.  Therefore, admitted to medicine service for further evaluation and treatment.     Echocardiogram showed ejection fraction 58%.  Mild mitral regurgitation.  Normal diastolic function.  Normal right ventricular size and systolic function.    Pulmonary and infectious disease were consulted.  Patient does have a history of squamous cell cancer of the left upper lobe pembrolizumab making him immunosuppressed and also concern for autoimmune pneumonitis.  He was found to be positive for human metapneumovirus.  With initial concern for superimposed bacterial infection patient was on Zosyn which was escalated to meropenem and doxycycline.  He had minimal improvements on this regimen, he had procalcitonin is downtrending.  Fungal infection is felt to be less likely.  Pulmonology felt patient is high risk for bronchoscopy due to low pulmonary reserve.  MRSA nares was negative.  COVID/influenza/RSV negative.  Fungal cultures of sputum showed rare fungal elements.  Aspergillus galactomannan, Legionella, mycoplasma, coccidiomycosis negative.  On 4/22  infectious disease recommended to discontinue antibiotics and monitor for clinical worsening.  Okay to continue Bactrim for PJP prophylaxis.  If there is clinical worsening recommended to repeat CT thorax and reconsult.  Pulmonary recommended to continue high-dose prednisone with concern for immunotherapy induced pneumonitis versus interstitial lung disease.  Continue Bactrim for PJP prophylaxis as above.  As PET/CT did show improvement it is okay to continue Keytruda.  Patient also known to have severe COPD on triple therapy.  Patient was also aggressively diuresed.    Interval Problem Update  4/16 patient is new to me today, patient is alert oriented follows commands, he is requiring 8 L of oxygen when I saw her in the morning, he is able to speak in full sentences, he stated that at home he was getting short of breath with exertion, normally he is comfortable at rest with 5 L of oxygen but he feels that he is requiring more with exertion/ambulation, will continue antibiotics, CT results reviewed, MRSA screen is negative and sputum cultures and blood cultures pending, discussed with bedside nurse charge nurse  pharmacist  4/17 patient is resting in chair, he is alert oriented follows commands no new neurological deficit, still having productive cough, continue IV antibiotics, notes from pulmonology reviewed, will continue with antipseudomonal coverage for now due to high risk, follow-up final blood culture results, discussed with bedside nurse charge nurse  pharmacist.  4/18 patient is resting in chair, he is in good spirit, he is requiring 10 L of oxygen early in the morning, however the repeat chest x-ray discussed with pulmonology, continue chest physiotherapy s/p RT flutter valve, continue antibiotics, follow-up cultures, I will order respiratory panel, discussed medicine return discussed palliative pharmacist, later today patient's oxygen has decreased to 8 L, follow-up blood work in  a.m.  4/19 patient is in bed, no fever or chills not in distress, patient is requiring higher amount of o2 patient is on 40L by HFNC, patient is able to tolerate diet, diarrhea resolved, c diff neg, human pneumovirus positive, discussed with pulmonologist, I have started him on iv lasix, continue abx, steroids nebs. Discussed with bedside nurse, charge nurse .   4/20 patient is resting in bed, patient is alert oriented follows commands, patient requiring 50 L of oxygen by high flow nasal cannula, discussed with pulmonology, transition to meropenem and doxycycline, continue supportive treatment, received IV Lasix today, continue RT per protocol, discussed with bedside nurse charge nurse  pharmacist.  4/21 patient is resting in bed, he is alert oriented follows commands, he is still requiring 4 L of oxygen but clinically doing okay, discussed with pulmonology discussed with infectious disease at this time appears to be related to bilateral pneumonia, continue antibiotics for 1 more day if patient continues to improve clinically probably will be able to stop antibiotics tomorrow, continue chest physical therapy incentive spirometry flutter valve, I discussed with bedside nurse charge nurse  pharmacist    Patient was seen and examined at bedside.  I have personally reviewed and interpreted vitals, labs, and imaging.    4/22.  Afebrile.  Intermittent bradycardia.  Intermittent hypertension.  On 55L high flow denies fevers, chills.  Reports an episode of chest pain last night which resolved spontaneously.  States breathing and cough are improved.  He has reported loose bowel movement yesterday.  Case was discussed with ID and pulmonary.  Plan to discontinue antibiotics and monitor.  Plan transferred to [facility] LT[facility] tomorrow  Procal 0.41 > 0.25  4/23.  Afebrile.  Hypertensive.  On 50L HHF.  Headaches denies fevers, chills, chest pains.  Shortness of breath is improved.  Still having some  diarrhea.  Case was discussed with social work.  Plan is for LTAC.  No bed at [facility] today  4/24.  Afebrile.  Intermittent hypertension.  On heated high flow at 20 L.  Denies fever, chills, chest pains.  Shortness of breath is improved.  Awaiting LTAC.  No bed at [facility] today.  Discussed with SW.  Continue to wean O2 as tolerated  4/25.  Afebrile.  Stable vitals.  On 20-30 L heated high flow nasal cannula.  Denies fevers, chills, chest pains.  Reports shortness of breath.  Hypotensive this afternoon.  Adjusted blood pressure meds.  Awaiting bed at [facility]  4/26.  Afebrile.  Stable vitals.  On 35 L heated high flow.  Denies fever, chills, chest pains.  Shortness of breath and cough continues to improve.  Blood pressure has been stable after adjustments to regimen yesterday.  Discussed with social work.  [facility] will not take patient until soonest Monday.  No longer needs telemetry.  4/27.  Afebrile.  Intermittent tachycardia.  Stable vitals.  On 40 L heated high flow.  Denies fever, chills, chest pains.  Shortness of breath improving slowly.  Blood pressure has been stable.  No longer needs telemetry.  Plan for LTAC.  Afebrile.  Stable vitals.  On 40 liters heated high flow.  Replete phos.  Denies fevers, chills, chest pains.  Shortness of breath and cough are persistent.  I did discuss with social work coordinating with [facility].  Patient is due for his next Keytruda dose around 5/15.  [facility] was wondering what they have to discharge him before then, can get Keytruda while he still on high flow?.  I did reach out to his oncologist Dr. Melara to clarify.  Continue to wean supplemental oxygen as tolerated  Wbc 4.7 > 4.5 > 7.3 > 7.8 > 8.2 > 7.3 > 7.5  Hgb 12.8 > 11.6 > 12.5 > 12.1 > 12.2 > 11.2 > 11.5  Na 133 > 137    I have discussed this patient's plan of care and discharge plan at IDT rounds today with Case Management, Nursing, Nursing leadership, and other members of the IDT team.    Consultants/Specialty  Pulmonology  ID    Code  Status  Full Code    Disposition  The patient is not medically cleared for discharge to home or a post-acute facility.  Anticipate discharge to: a long-term acute care hospital    I have placed the appropriate orders for post-discharge needs.    Review of Systems  Review of Systems   Constitutional:  Negative for chills and fever.   Eyes:  Negative for blurred vision and double vision.   Respiratory:  Positive for cough, sputum production, shortness of breath and wheezing. Negative for hemoptysis.    Cardiovascular:  Negative for chest pain, palpitations, claudication, leg swelling and PND.   Gastrointestinal:  Negative for heartburn, nausea and vomiting.   Genitourinary:  Negative for hematuria and urgency.   Musculoskeletal:  Negative for back pain and myalgias.   Skin:  Negative for rash.   Neurological:  Negative for dizziness and headaches.   Endo/Heme/Allergies:  Does not bruise/bleed easily.   Psychiatric/Behavioral:  Negative for depression.         Physical Exam  Temp:  [36.2 °C (97.2 °F)-36.8 °C (98.2 °F)] 36.8 °C (98.2 °F)  Pulse:  [] 72  Resp:  [17-20] 20  BP: ()/(60-78) 136/70  SpO2:  [90 %-97 %] 93 %    Physical Exam  Vitals and nursing note reviewed.   Constitutional:       Appearance: He is ill-appearing.   Eyes:      General:         Right eye: No discharge.         Left eye: No discharge.   Cardiovascular:      Rate and Rhythm: Normal rate and regular rhythm.      Pulses: Normal pulses.      Heart sounds: Normal heart sounds.   Pulmonary:      Effort: Pulmonary effort is normal. No respiratory distress.      Breath sounds: Normal breath sounds.   Abdominal:      General: Bowel sounds are normal. There is no distension.      Tenderness: There is no guarding.   Musculoskeletal:         General: Normal range of motion.      Cervical back: Normal range of motion and neck supple.      Right lower leg: No edema.      Left lower leg: No edema.   Skin:     General: Skin is warm and dry.       Capillary Refill: Capillary refill takes less than 2 seconds.      Coloration: Skin is not jaundiced.   Neurological:      General: No focal deficit present.      Mental Status: He is alert and oriented to person, place, and time.      Cranial Nerves: No cranial nerve deficit.   Psychiatric:         Mood and Affect: Mood normal.         Behavior: Behavior normal.         Fluids    Intake/Output Summary (Last 24 hours) at 4/28/2025 0539  Last data filed at 4/28/2025 0510  Gross per 24 hour   Intake 236 ml   Output 800 ml   Net -564 ml        Laboratory  Recent Labs     04/26/25  0650 04/27/25  0133 04/28/25  0127   WBC 8.2 7.3 7.5   RBC 4.42* 4.06* 4.17*   HEMOGLOBIN 12.2* 11.2* 11.5*   HEMATOCRIT 38.7* 35.5* 36.1*   MCV 87.6 87.4 86.6   MCH 27.6 27.6 27.6   MCHC 31.5* 31.5* 31.9*   RDW 52.7* 52.2* 51.1*   PLATELETCT 321 316 367   MPV 9.4 9.3 9.3     Recent Labs     04/26/25  0650 04/27/25  0133 04/28/25  0127   SODIUM 133* 133* 137   POTASSIUM 4.4 4.8 4.4   CHLORIDE 100 99 100   CO2 24 22 24   GLUCOSE 78 102* 90   BUN 25* 34* 26*   CREATININE 0.83 1.17 1.19   CALCIUM 9.1 8.8 8.1*                     Imaging  DX-CHEST-LIMITED (1 VIEW)   Final Result      Worsening bilateral pulmonary infiltrates.      DX-CHEST-PORTABLE (1 VIEW)   Final Result         1.  Pulmonary edema and/or infiltrates, greatest in the left upper lobe, slightly decreased since prior study.      EC-ECHOCARDIOGRAM COMPLETE W/O CONT   Final Result      CT-CTA CHEST PULMONARY ARTERY W/ RECONS   Final Result      1. No acute pulmonary embolus.   2. Stable cavitary mass in the left upper lobe.   3. Extensive edematous changes in the lungs with peripheral honeycombing.   4. Aortic and coronary arterial sclerosis.   5. Simple appearing right renal cortical cyst, requiring no further follow-up.   6. Stable bilateral adrenal masses.   7. Acute or subacute left third through fifth rib fractures.            DX-CHEST-PORTABLE (1 VIEW)   Final Result      1.   LEFT upper lung consolidation, likely pneumonia.  Underlying mass is not excluded.   2.  Probable pulmonary fibrosis.   3.  Limited by positioning.           Assessment/Plan  * Severe sepsis (HCC)- (present on admission)  Assessment & Plan  4/28/2025  This is Sepsis Present on admission  SIRS criteria identified on my evaluation include: Tachycardia, with heart rate greater than 90 BPM, Tachypnea, with respirations greater than 20 per minute, and Bandemia, greater than 10% bands  Clinical indicators of end organ dysfunction include Lactic Acid greater than 2  Source is pulm  Sepsis protocol initiated  Crystalloid Fluid Administration: Fluid resuscitation ordered per standard protocol - 30 mL/kg per current or ideal body weight  IV antibiotics as appropriate for source of sepsis  Reassessment: I have reassessed the patient's hemodynamic status    Acute left-sided weakness  Assessment & Plan  4/28/2025  Resolved by ER arrival  Stroke alert aborted by stroke neurology  PT/OT/ST    H/o TIA per pt  -added ASA  -continue statin    Continue monitoring  No neuro deficit    TREVOR (acute kidney injury) (HCC)  Assessment & Plan  4/28/2025  Cr 1.22  IVF  Minimize nephrotoxic agents, renally dose meds  Check FeNa    ACP (advance care planning)  Assessment & Plan  Goal of care discussed with patient in the emergency room.  He stated he is agreeable for noninvasive, as well as invasive/heroic life-sustaining measures-including CPR/defibrillation/intubation or mechanical ventilation.  He is agreeable for hospitalization, further treatment with IVF, breathing treatment, antibiotic therapy, any medical management as clinically warranted.  Diagnosis, prognosis, question and concern addressed.  Full CODE STATUS confirmed.  ACP: 17 minutes    Pneumonia due to infectious organism  Assessment & Plan  4/28/2025  Cavitary left upper lobe mass  Patient is immunocompromised due to squamous cell lung cancer on chemotherapy  Follow  cultures  Antibiotic: Zosyn, Zyvox, azithromycin  Wean off oxygen support as tolerated continue broad-spectrum antibiotics follow-up MRSA screening follow-up    Blood sputum cultures  Will need treatment for possible  pseudomonas as per pulm will get EKG to check qtc.     DC abx.  Cx neg   Human metapneumovirus positive.   Supportive treatment.    Lactic acidosis  Assessment & Plan  4/28/2025  LA 5.3 --> 4.9  Continue IVF  IV antibiotic  High-dose IV thiamine    Squamous cell carcinoma of upper lobe of left lung (HCC)- (present on admission)  Assessment & Plan  4/28/2025  On chemotherapy with pembrolizumab  Followed by Dr. Melara, medical oncology    Acute on chronic respiratory failure with hypoxia (HCC)- (present on admission)  Assessment & Plan  4/28/2025  Dependent on 5 L at baseline  Currently requiring 10 L OxyMask-wean off as tolerated  No PE seen on CTA chest  Check COVID/influenza/RSV  Antibiotic for pneumonia  Check TTE    CTA did not show PE  Patient with probably postobstructive pneumonia due to lung mass  Continue antibiotics  Note from pulm reviewed.     Patient requiring higher levels of oxygen today 10 L  Discussed with pulmonology  Continue antibiotics  Continue chest physical therapy  Follow-up fungal results    Patient is requiring higher amount of o2, he is on 40L of o2, I discussed with pulmonologist  Patient started on HFNC.     Chronic obstructive pulmonary disease (HCC)- (present on admission)  Assessment & Plan  4/28/2025  Pulmonary hygiene  -DuoNebs, Trelegy Ellipta, montelukast  -PEP  -s/p Solu-Medrol 125 mg  -Continue Solu-Medrol 40 mg every 8 hours  RT protocol    Continue steroids  Continue breathing treatment  Discussed with pulmonology    Dyslipidemia- (present on admission)  Assessment & Plan  4/28/2025  Statin    Primary hypertension- (present on admission)  Assessment & Plan  4/28/2025  Lisinopril, amlodipine         VTE prophylaxis: Heparin    I have performed a physical exam and  reviewed and updated ROS and Plan today (4/28/2025). In review of yesterday's note (4/27/2025), there are no changes except as documented above.    Patient is critically ill.   The patient continues to have: Acute hypoxic respiratory failure secondary to immunotherapy induced pneumonitis versus interstitial lung disease versus COPD exacerbation versus human metapneumovirus pneumonia  The vital organ system that is affected is the: Pulmonary  If untreated there is a high chance of deterioration into: Respiratory failure  And eventually death.   The critical care that I am providing today is: High flow nasal cannula 40L 45% FiO2  The critical that has been undertaken is medically complex.   There has been no overlap in critical care time.   Critical Care Time not including procedures: 37 percent

## 2025-04-28 NOTE — THERAPY
Physical Therapy   Daily Treatment     Patient Name: Bright Shirley  Age:  74 y.o., Sex:  male  Medical Record #: [id number]  Today's Date: 4/28/2025     Precautions  Precautions: (P) Fall Risk  Comments: (P) quickly desats, 40L HFNC    Assessment    Pt was agreeable to PT session.  He is mobilizing with SBA d/t decreased activity tolerance.  Pt's O2 dropped to 79% on 40L /p 45 sec of activity and took 4-5 min of rest to recover to >90%.  He is asymptomatic.  PT will continue to follow.  Recommend post acute PT services.    Plan    Treatment Plan Status: (P) Continue Current Treatment Plan  Type of Treatment: Bed Mobility, Equipment, Family / Caregiver Training, Gait Training, Manual Therapy, Neuro Re-Education / Balance, Self Care / Home Evaluation, Stair Training, Therapeutic Activities, Therapeutic Exercise  Treatment Frequency: 3 Times per Week  Treatment Duration: Until Therapy Goals Met    DC Equipment Recommendations: (P) None  Discharge Recommendations: (P) Recommend post-acute placement for additional physical therapy services prior to discharge home           Objective       04/28/25 1022   Precautions   Precautions Fall Risk   Comments quickly desats, 40L HFNC   Vitals   Pulse (!) 112   Pulse Oximetry (!) 79 %  (/p 45 sec of activity, recovery 4-5 min to >90%)   O2 (LPM) 40   O2 Delivery Device Heated High Flow Nasal Cannula   Pain 0 - 10 Group   Therapist Pain Assessment Post Activity Pain Same as Prior to Activity  (Pt deines pain)   Cognition    Level of Consciousness Alert   Other Treatments   Other Treatments Provided deep breathing with B shoulder flexion, L/ R lateral flexion x8   Balance   Sitting Balance (Static) Good   Sitting Balance (Dynamic) Fair +   Standing Balance (Static) Fair +   Standing Balance (Dynamic) Fair   Weight Shift Sitting Good   Weight Shift Standing Good   Comments with L bed rail   Bed Mobility    Supine to Sit Supervised   Sit to Supine Supervised   Scooting Supervised  "  Comments HOB elevated, rail   Gait Analysis   Gait Level Of Assist Standby Assist   Assistive Device   (L bed rail)   Distance (Feet)   (stepping forward/ backward 20 steps)   # of Times Distance was Traveled 2   Deviation No deviation   Weight Bearing Status FWB BLEs   Skilled Intervention Verbal Cuing   Comments pt deines dizziness, lightheadedness, reports \"a little\" SOB   Functional Mobility   Sit to Stand Supervised   Bed, Chair, Wheelchair Transfer Supervised   Transfer Method Stand Step   Mobility with L bed rail   Short Term Goals    Short Term Goal # 1 Pt will perform supine <> sit with SPV in 6 visits to get in/out of bed   Goal Outcome # 1 Goal met   Short Term Goal # 2 Pt will perform stand step transfers with FWW and SPV in 6 visits to get in/out of chair   Goal Outcome # 2 Goal met   Short Term Goal # 3 Pt will ambulate 50ft with FWW and SPV in 6 visits to access home environment   Goal Outcome # 3 Goal not met   Physical Therapy Treatment Plan   Physical Therapy Treatment Plan Continue Current Treatment Plan   Anticipated Discharge Equipment and Recommendations   DC Equipment Recommendations None   Discharge Recommendations Recommend post-acute placement for additional physical therapy services prior to discharge home   Interdisciplinary Plan of Care Collaboration   IDT Collaboration with  Nursing   Patient Position at End of Therapy In Bed;Call Light within Reach;Tray Table within Reach   Collaboration Comments RN updated   Session Information   Date / Session Number  4/28 -3 (3/3, 4/30)           "

## 2025-04-28 NOTE — CARE PLAN
The patient is Stable - Low risk of patient condition declining or worsening    Shift Goals  Clinical Goals: Wean O2, Comfort. Mobility, Pulm hygiene  Patient Goals: Rest  Family Goals: Not present    Progress made toward(s) clinical / shift goals:        Problem: Knowledge Deficit - Standard  Goal: Patient and family/care givers will demonstrate understanding of plan of care, disease process/condition, diagnostic tests and medications  Outcome: Progressing     Problem: Knowledge Deficit - COPD  Goal: Patient/significant other demonstrates understanding of disease process, utilization of the Action Plan, medications and discharge instruction  Outcome: Progressing     Problem: Nutrition - Advanced  Goal: Patient will display progressive weight gain toward goal have adequate food and fluid intake  Outcome: Progressing     Problem: Impaired Gas Exchange  Goal: Patient will demonstrate improved ventilation and adequate oxygenation and participate in treatment regimen within the level of ability/situation.  Outcome: Progressing     Problem: Self Care  Goal: Patient will have the ability to perform ADLs independently or with assistance (bathe, groom, dress, toilet and feed)  Outcome: Progressing     Problem: Respiratory  Goal: Patient will achieve/maintain optimum respiratory ventilation and gas exchange  Outcome: Progressing  Flowsheets  Taken 4/27/2025 1456 by Gerard Giraldo, C.N.A.  O2 Delivery Device: Heated High Flow Nasal Cannula  Taken 4/27/2025 1453 by Mariaelena Lan RSadeNSade  Deep Breathe and Cough: Performs Correctly  Taken 4/26/2025 2048 by Lety Bruno, R.N.  Incentive Spirometer: Effective  Incentive Spirometer Volume: 500 mL     Problem: Bowel Elimination  Goal: Establish and maintain regular bowel function  Outcome: Progressing     Problem: Skin Integrity  Goal: Skin integrity is maintained or improved  Outcome: Progressing       Patient is not progressing towards the following goals:

## 2025-04-28 NOTE — DISCHARGE PLANNING
Patient will require LTAC level of care.   Referral sent and accepted with [facility]. Patient remains on 40 L HF of O2.     -Diane with [facility]: [phone] contacted writer asking if [facility] will discharge patient back to [facility] on 5/15/[facility]25 for Immunotherapy and if he will need to be off high-flow oxygen to start Immunotherapy.     -Writer forwarded above questions to attending physician and patient's Oncologist, Dr. Melara, pending response from either.     Case Management Discharge Planning    Admission Date: 4/15/2025  GMLOS: 4  ALOS: 13    6-Clicks ADL Score: 21  6-Clicks Mobility Score: 21    Anticipated Discharge Dispo: Discharge Disposition: Disch to a long term care facility (63)  Discharge Address: [address] [place] [id number]  Discharge Contact Phone Number: [phone]    DME Needed: No    Action(s) Taken: Updated Provider/Nurse on Discharge Plan    Escalations Completed: Provider and Speciality Provider    Medically Clear: Medically cleared for LTAC.     Next Steps: Pending response from patient's oncologist as [facility] has requested more information on Immunotherapy plan and requirements.     Is the patient up for discharge tomorrow: Unknown.

## 2025-04-28 NOTE — FLOWSHEET NOTE
04/28/25 1037   Events/Summary/Plan   Events/Summary/Plan pt WOB is high, no attempt for oxymask at this time

## 2025-04-28 NOTE — CARE PLAN
The patient is Stable - Low risk of patient condition declining or worsening    Shift Goals  Clinical Goals: Pain control,monitor O2  Patient Goals: rest  Family Goals: not present    Progress made toward(s) clinical / shift goals:  Yes      Problem: Knowledge Deficit - Standard  Goal: Patient and family/care givers will demonstrate understanding of plan of care, disease process/condition, diagnostic tests and medications  Outcome: Progressing     Problem: Knowledge Deficit - COPD  Goal: Patient/significant other demonstrates understanding of disease process, utilization of the Action Plan, medications and discharge instruction  Outcome: Progressing     Problem: Impaired Gas Exchange  Goal: Patient will demonstrate improved ventilation and adequate oxygenation and participate in treatment regimen within the level of ability/situation.  Outcome: Progressing     Problem: Risk for Aspiration  Goal: Patient's risk for aspiration will be absent or decrease  Outcome: Progressing     Problem: Self Care  Goal: Patient will have the ability to perform ADLs independently or with assistance (bathe, groom, dress, toilet and feed)  Outcome: Progressing     Problem: Fluid Volume  Goal: Fluid volume balance will be maintained  Outcome: Progressing     Problem: Urinary - Renal Perfusion  Goal: Ability to achieve and maintain adequate renal perfusion and functioning will improve  Outcome: Progressing     Problem: Respiratory  Goal: Patient will achieve/maintain optimum respiratory ventilation and gas exchange  Outcome: Progressing     Problem: Physical Regulation  Goal: Diagnostic test results will improve  Outcome: Progressing     Problem: Pain - Standard  Goal: Alleviation of pain or a reduction in pain to the patient’s comfort goal  Outcome: Progressing     Problem: Fall Risk  Goal: Patient will remain free from falls  Outcome: Progressing     Problem: Skin Integrity  Goal: Skin integrity is maintained or improved  Outcome:  Progressing

## 2025-04-28 NOTE — CARE PLAN
The patient is Stable - Low risk of patient condition declining or worsening    Shift Goals  Clinical Goals: Wean O2, Mobility, Respiratory Status  Patient Goals: Rest, discharge  Family Goals: Not present    Progress made toward(s) clinical / shift goals:        Problem: Knowledge Deficit - Standard  Goal: Patient and family/care givers will demonstrate understanding of plan of care, disease process/condition, diagnostic tests and medications  4/28/2025 0745 by Mariaelena Lan R.N.  Outcome: Progressing  Note: Pt educated to plan of care, discharge plan and members a part of the care team.   4/27/2025 1829 by Mariaelena Lan R.N.  Outcome: Progressing     Problem: Knowledge Deficit - COPD  Goal: Patient/significant other demonstrates understanding of disease process, utilization of the Action Plan, medications and discharge instruction  4/28/2025 0745 by Mariaelena Lan R.N.  Outcome: Progressing  4/27/2025 1829 by Mariaelena Lan R.N.  Outcome: Progressing     Problem: Risk for Infection - COPD  Goal: Patient will remain free from signs and symptoms of infection  Outcome: Progressing  Flowsheets (Taken 4/28/2025 0745)  Standard Infection Interventions:   Assessed for removal IV, central lines, intra-arterial or urinary catheters   Provided education on proper hand hygiene and infection prevention measures   Instructed patient/family on signs and symptoms of infection   Assessed for signs and symptoms of infection     Problem: Nutrition - Advanced  Goal: Patient will display progressive weight gain toward goal have adequate food and fluid intake  Outcome: Progressing     Problem: Ineffective Airway Clearance  Goal: Patient will maintain patent airway with clear/clearing breath sounds  Outcome: Progressing     Problem: Impaired Gas Exchange  Goal: Patient will demonstrate improved ventilation and adequate oxygenation and participate in treatment regimen within the level of ability/situation.  4/28/2025 0745 by Mariaelena  NAT Lan R.N.  Outcome: Progressing  4/27/2025 1829 by Mariaelena Lan R.N.  Outcome: Progressing     Problem: Self Care  Goal: Patient will have the ability to perform ADLs independently or with assistance (bathe, groom, dress, toilet and feed)  4/28/2025 0745 by Mariaelena Lan R.N.  Outcome: Progressing  4/27/2025 1829 by Mariaelena Lan R.N.  Outcome: Progressing     Problem: Respiratory  Goal: Patient will achieve/maintain optimum respiratory ventilation and gas exchange  4/28/2025 0745 by Mariaelena Lan R.N.  Outcome: Progressing  Flowsheets (Taken 4/28/2025 0730)  Incentive Spirometer: Effective  Incentive Spirometer Volume: 500 mL  Deep Breathe and Cough: Performs Correctly  4/27/2025 1829 by Mariaelena Lan R.N.  Outcome: Progressing  Flowsheets  Taken 4/27/2025 1456 by Gerard Giraldo C.N.ASade  O2 Delivery Device: Heated High Flow Nasal Cannula  Taken 4/27/2025 1453 by Mariaelena Lan R.N.  Deep Breathe and Cough: Performs Correctly  Taken 4/26/2025 2048 by Lety Bruno R.N.  Incentive Spirometer: Effective  Incentive Spirometer Volume: 500 mL     Problem: Pain - Standard  Goal: Alleviation of pain or a reduction in pain to the patient’s comfort goal  Outcome: Progressing  Flowsheets  Taken 4/28/2025 0730 by Mariaelena Lan R.N.  Non Verbal Scale:   Calm   Unlabored Breathing  Taken 4/28/2025 0540 by Shari Gutierrez R.N.  Pain Rating Scale (NPRS): 5     Problem: Bowel Elimination  Goal: Establish and maintain regular bowel function  4/28/2025 0745 by Mariaelena Lan R.N.  Outcome: Progressing  Flowsheets (Taken 4/28/2025 0730)  Last BM: 04/27/25 4/27/2025 1829 by Mariaelena Lan R.N.  Outcome: Progressing     Problem: Skin Integrity  Goal: Skin integrity is maintained or improved  4/28/2025 0745 by Mariaelena Lan R.N.  Outcome: Progressing  Note: Pt able to turn side to side/declines [id number]. Barrier cream applied to sacrum, pt continent of both bowel and urine.   4/27/2025 1829 by Mariaelena JOHNS  DORCAS Lan  Outcome: Progressing       Patient is not progressing towards the following goals:

## 2025-04-29 LAB
ALBUMIN SERPL BCP-MCNC: 3.4 G/DL (ref 3.2–4.9)
BUN SERPL-MCNC: 24 MG/DL (ref 8–22)
CALCIUM ALBUM COR SERPL-MCNC: 9.6 MG/DL (ref 8.5–10.5)
CALCIUM SERPL-MCNC: 9.1 MG/DL (ref 8.5–10.5)
CHLORIDE SERPL-SCNC: 98 MMOL/L (ref 96–112)
CO2 SERPL-SCNC: 28 MMOL/L (ref 20–33)
CREAT SERPL-MCNC: 1.01 MG/DL (ref 0.5–1.4)
ERYTHROCYTE [DISTWIDTH] IN BLOOD BY AUTOMATED COUNT: 51.1 FL (ref 35.9–50)
GFR SERPLBLD CREATININE-BSD FMLA CKD-EPI: 78 ML/MIN/1.73 M 2
GLUCOSE SERPL-MCNC: 94 MG/DL (ref 65–99)
HCT VFR BLD AUTO: 37.5 % (ref 42–52)
HGB BLD-MCNC: 12 G/DL (ref 14–18)
MAGNESIUM SERPL-MCNC: 2.2 MG/DL (ref 1.5–2.5)
MCH RBC QN AUTO: 27.6 PG (ref 27–33)
MCHC RBC AUTO-ENTMCNC: 32 G/DL (ref 32.3–36.5)
MCV RBC AUTO: 86.2 FL (ref 81.4–97.8)
PHOSPHATE SERPL-MCNC: 2.9 MG/DL (ref 2.5–4.5)
PLATELET # BLD AUTO: 348 K/UL (ref 164–446)
PMV BLD AUTO: 9.6 FL (ref 9–12.9)
POTASSIUM SERPL-SCNC: 4.1 MMOL/L (ref 3.6–5.5)
RBC # BLD AUTO: 4.35 M/UL (ref 4.7–6.1)
SODIUM SERPL-SCNC: 134 MMOL/L (ref 135–145)
WBC # BLD AUTO: 6.6 K/UL (ref 4.8–10.8)

## 2025-04-29 PROCEDURE — 700102 HCHG RX REV CODE 250 W/ 637 OVERRIDE(OP): Performed by: INTERNAL MEDICINE

## 2025-04-29 PROCEDURE — 80069 RENAL FUNCTION PANEL: CPT

## 2025-04-29 PROCEDURE — A9270 NON-COVERED ITEM OR SERVICE: HCPCS | Performed by: STUDENT IN AN ORGANIZED HEALTH CARE EDUCATION/TRAINING PROGRAM

## 2025-04-29 PROCEDURE — 36415 COLL VENOUS BLD VENIPUNCTURE: CPT

## 2025-04-29 PROCEDURE — 700111 HCHG RX REV CODE 636 W/ 250 OVERRIDE (IP): Performed by: INTERNAL MEDICINE

## 2025-04-29 PROCEDURE — 94640 AIRWAY INHALATION TREATMENT: CPT

## 2025-04-29 PROCEDURE — 700101 HCHG RX REV CODE 250: Performed by: INTERNAL MEDICINE

## 2025-04-29 PROCEDURE — 83735 ASSAY OF MAGNESIUM: CPT

## 2025-04-29 PROCEDURE — 700111 HCHG RX REV CODE 636 W/ 250 OVERRIDE (IP): Performed by: STUDENT IN AN ORGANIZED HEALTH CARE EDUCATION/TRAINING PROGRAM

## 2025-04-29 PROCEDURE — 770001 HCHG ROOM/CARE - MED/SURG/GYN PRIV*

## 2025-04-29 PROCEDURE — A9270 NON-COVERED ITEM OR SERVICE: HCPCS | Performed by: INTERNAL MEDICINE

## 2025-04-29 PROCEDURE — 99233 SBSQ HOSP IP/OBS HIGH 50: CPT | Performed by: INTERNAL MEDICINE

## 2025-04-29 PROCEDURE — 700102 HCHG RX REV CODE 250 W/ 637 OVERRIDE(OP): Performed by: STUDENT IN AN ORGANIZED HEALTH CARE EDUCATION/TRAINING PROGRAM

## 2025-04-29 PROCEDURE — 85027 COMPLETE CBC AUTOMATED: CPT

## 2025-04-29 RX ADMIN — OXYCODONE 5 MG: 5 TABLET ORAL at 16:34

## 2025-04-29 RX ADMIN — PREDNISONE 50 MG: 50 TABLET ORAL at 05:34

## 2025-04-29 RX ADMIN — ASPIRIN 81 MG: 81 TABLET, COATED ORAL at 05:35

## 2025-04-29 RX ADMIN — IPRATROPIUM BROMIDE AND ALBUTEROL SULFATE 3 ML: .5; 2.5 SOLUTION RESPIRATORY (INHALATION) at 20:47

## 2025-04-29 RX ADMIN — LISINOPRIL 20 MG: 20 TABLET ORAL at 05:35

## 2025-04-29 RX ADMIN — ROSUVASTATIN CALCIUM 10 MG: 20 TABLET, FILM COATED ORAL at 16:35

## 2025-04-29 RX ADMIN — CALCIUM POLYCARBOPHIL 625 MG: 625 TABLET, FILM COATED ORAL at 05:36

## 2025-04-29 RX ADMIN — HEPARIN SODIUM 5000 UNITS: 5000 INJECTION, SOLUTION INTRAVENOUS; SUBCUTANEOUS at 14:53

## 2025-04-29 RX ADMIN — FLUTICASONE FUROATE, UMECLIDINIUM BROMIDE AND VILANTEROL TRIFENATATE 1 PUFF: 200; 62.5; 25 POWDER RESPIRATORY (INHALATION) at 05:36

## 2025-04-29 RX ADMIN — IPRATROPIUM BROMIDE AND ALBUTEROL SULFATE 3 ML: .5; 2.5 SOLUTION RESPIRATORY (INHALATION) at 14:40

## 2025-04-29 RX ADMIN — HEPARIN SODIUM 5000 UNITS: 5000 INJECTION, SOLUTION INTRAVENOUS; SUBCUTANEOUS at 05:34

## 2025-04-29 RX ADMIN — AMLODIPINE BESYLATE 5 MG: 5 TABLET ORAL at 05:36

## 2025-04-29 RX ADMIN — IPRATROPIUM BROMIDE AND ALBUTEROL SULFATE 3 ML: .5; 2.5 SOLUTION RESPIRATORY (INHALATION) at 07:43

## 2025-04-29 RX ADMIN — TRAZODONE HYDROCHLORIDE 50 MG: 50 TABLET ORAL at 21:54

## 2025-04-29 RX ADMIN — HEPARIN SODIUM 5000 UNITS: 5000 INJECTION, SOLUTION INTRAVENOUS; SUBCUTANEOUS at 21:54

## 2025-04-29 RX ADMIN — MONTELUKAST 10 MG: 10 TABLET, FILM COATED ORAL at 21:54

## 2025-04-29 RX ADMIN — HYDROCODONE BITARTRATE AND HOMATROPINE METHYLBROMIDE 5 ML: 5; 1.5 SOLUTION ORAL at 21:54

## 2025-04-29 RX ADMIN — Medication 30 MG: at 21:53

## 2025-04-29 RX ADMIN — LEVOTHYROXINE SODIUM 100 MCG: 0.1 TABLET ORAL at 05:34

## 2025-04-29 RX ADMIN — IPRATROPIUM BROMIDE AND ALBUTEROL SULFATE 3 ML: .5; 2.5 SOLUTION RESPIRATORY (INHALATION) at 10:09

## 2025-04-29 RX ADMIN — ACETAMINOPHEN 650 MG: 325 TABLET, FILM COATED ORAL at 05:44

## 2025-04-29 ASSESSMENT — PAIN DESCRIPTION - PAIN TYPE
TYPE: ACUTE PAIN

## 2025-04-29 ASSESSMENT — ENCOUNTER SYMPTOMS
SHORTNESS OF BREATH: 1
SPUTUM PRODUCTION: 1
ABDOMINAL PAIN: 0
COUGH: 1
WEAKNESS: 1

## 2025-04-29 NOTE — PROGRESS NOTES
Hospital Medicine Daily Progress Note    Date of Service  4/29/2025    Chief Complaint  Bright Shirley is a 74 y.o. male admitted 4/15/2025 with SOB    Hospital Course  73 yo man with squamous cell lung cancer, COPD, chronically on 5 L O2, HTN, HLD who presented with left-sided weakness.  Neurology was consulted, his left-sided weakness had quickly resolved.  Pulmonology and ID were consulted for progressive shortness of breath.  He was treated with empiric antibiotic meropenem and doxycycline with minimal improvement.  He did test positive for human metapneumovirus.  ID recommended no further antibiotics.  He was started on high-dose prednisone for possible immunotherapy induced pneumonitis versus ILD and he received diuresis.    Interval Problem Update  He feels like his Sob has been slow to improve. Coughing up some phlegm. Remains on HFNC  I reached out to oncology, Dr. Melara no longer with [facility] oncology, Dr. Orona to see the patient today    I have discussed this patient's plan of care and discharge plan at IDT rounds today with Case Management, Nursing, Nursing leadership, and other members of the IDT team.    Consultants/Specialty  infectious disease, oncology, and pulmonary    Code Status  Full Code    Disposition  The patient is not medically cleared for discharge to home or a post-acute facility.  Anticipate discharge to: a long-term acute care hospital    I have placed the appropriate orders for post-discharge needs.    Review of Systems  Review of Systems   Constitutional:  Positive for malaise/fatigue.   Respiratory:  Positive for cough, sputum production and shortness of breath.    Gastrointestinal:  Negative for abdominal pain.   Neurological:  Positive for weakness.        Physical Exam  Temp:  [36.2 °C (97.1 °F)-36.7 °C (98.1 °F)] 36.2 °C (97.1 °F)  Pulse:  [65-98] 76  Resp:  [16-20] 16  BP: ()/(56-73) 99/56  SpO2:  [90 %-100 %] 95 %    Physical Exam  Vitals and nursing note reviewed.    Constitutional:       Appearance: He is ill-appearing.   HENT:      Head: Normocephalic.      Mouth/Throat:      Mouth: Mucous membranes are moist.   Eyes:      General:         Right eye: No discharge.         Left eye: No discharge.   Cardiovascular:      Rate and Rhythm: Normal rate and regular rhythm.   Pulmonary:      Effort: No respiratory distress.      Breath sounds: Rhonchi present. No wheezing or rales.   Abdominal:      Palpations: Abdomen is soft.      Tenderness: There is no abdominal tenderness.   Musculoskeletal:         General: No swelling.      Cervical back: Neck supple.   Skin:     General: Skin is warm and dry.   Neurological:      Mental Status: He is alert and oriented to person, place, and time.         Fluids    Intake/Output Summary (Last 24 hours) at 4/29/2025 1556  Last data filed at 4/29/2025 0958  Gross per 24 hour   Intake --   Output 525 ml   Net -525 ml        Laboratory  Recent Labs     04/27/25  0133 04/28/25  0127 04/29/25  0622   WBC 7.3 7.5 6.6   RBC 4.06* 4.17* 4.35*   HEMOGLOBIN 11.2* 11.5* 12.0*   HEMATOCRIT 35.5* 36.1* 37.5*   MCV 87.4 86.6 86.2   MCH 27.6 27.6 27.6   MCHC 31.5* 31.9* 32.0*   RDW 52.2* 51.1* 51.1*   PLATELETCT 316 367 348   MPV 9.3 9.3 9.6     Recent Labs     04/27/25  0133 04/28/25  0127 04/29/25  0622   SODIUM 133* 137 134*   POTASSIUM 4.8 4.4 4.1   CHLORIDE 99 100 98   CO2 22 24 28   GLUCOSE 102* 90 94   BUN 34* 26* 24*   CREATININE 1.17 1.19 1.01   CALCIUM 8.8 8.1* 9.1                   Imaging  DX-CHEST-LIMITED (1 VIEW)   Final Result      Worsening bilateral pulmonary infiltrates.      DX-CHEST-PORTABLE (1 VIEW)   Final Result         1.  Pulmonary edema and/or infiltrates, greatest in the left upper lobe, slightly decreased since prior study.      EC-ECHOCARDIOGRAM COMPLETE W/O CONT   Final Result      CT-CTA CHEST PULMONARY ARTERY W/ RECONS   Final Result      1. No acute pulmonary embolus.   2. Stable cavitary mass in the left upper lobe.   3.  Extensive edematous changes in the lungs with peripheral honeycombing.   4. Aortic and coronary arterial sclerosis.   5. Simple appearing right renal cortical cyst, requiring no further follow-up.   6. Stable bilateral adrenal masses.   7. Acute or subacute left third through fifth rib fractures.            DX-CHEST-PORTABLE (1 VIEW)   Final Result      1.  LEFT upper lung consolidation, likely pneumonia.  Underlying mass is not excluded.   2.  Probable pulmonary fibrosis.   3.  Limited by positioning.           Assessment/Plan  * Severe sepsis (HCC)- (present on admission)  Assessment & Plan  Sepsis improved    Acute left-sided weakness  Assessment & Plan  Resolved by ER arrival  Stroke alert aborted by stroke neurology  PT/OT/ST  H/o TIA per pt  On aspirin and statin    TREVOR (acute kidney injury) (HCC)  Assessment & Plan  Resolved    ACP (advance care planning)  Assessment & Plan  Full code    Pneumonia due to infectious organism  Assessment & Plan  Finished a course of antibiotics, ID recommended no further antibiotics  Supportive care for human metapneumovirus    Lactic acidosis  Assessment & Plan  Type II, multifactorial reasons for elevation, albuterol, stress, cancer  was given thiamine    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 Orona, who will see the patient today  I asked about Keytruda due 5/15 in terms of discharging to [facility]    Acute on chronic respiratory failure with hypoxia (HCC)- (present on admission)  Assessment & Plan  Dependent on 5 L at baseline  Infected with human metapneumovirus, isolation  CTA chest negative for PE  TTE normal EF, minimal valve disease  He remains on HFNC, weaning as tolerated  Pulmonology and ID were consulted. He has loss of lung volume from his cancer and radiation, has poor reserve.  Prednisone with taper per pulmonology.      Chronic obstructive pulmonary disease  (HCC)- (present on admission)  Assessment & Plan  Pulmonology was consulted  Continue Trelegy, Singulair, DuoNebs    Dyslipidemia- (present on admission)  Assessment & Plan  Continue rosuvastatin    Primary hypertension- (present on admission)  Assessment & Plan  BP fluctuating  Continue lisinopril, amlodipine         VTE prophylaxis:    heparin ppx      I have performed a physical exam and reviewed and updated ROS and Plan today (4/29/2025). In review of yesterday's note (4/28/2025), there are no changes except as documented above.

## 2025-04-29 NOTE — CARE PLAN
Problem: Humidified High Flow Nasal Cannula  Goal: Maintain adequate oxygenation dependent on patient condition  Description: 1.  Implement humidified high flow oxygen therapy2.  Titrate high flow oxygen to maintain appropriate SpO2  Outcome: Progressing   TRAIL 10L/OXYMASK off HHFNC  Duoneb QID

## 2025-04-29 NOTE — CARE PLAN
The patient is Stable - Low risk of patient condition declining or worsening    Shift Goals  Clinical Goals: Monitor O2, Mobility,  Patient Goals: rest  Family Goals: not present    Progress made toward(s) clinical / shift goals:  Yes      Problem: Knowledge Deficit - Standard  Goal: Patient and family/care givers will demonstrate understanding of plan of care, disease process/condition, diagnostic tests and medications  Outcome: Progressing     Problem: Impaired Gas Exchange  Goal: Patient will demonstrate improved ventilation and adequate oxygenation and participate in treatment regimen within the level of ability/situation.  Outcome: Progressing     Problem: Risk for Aspiration  Goal: Patient's risk for aspiration will be absent or decrease  Outcome: Progressing     Problem: Self Care  Goal: Patient will have the ability to perform ADLs independently or with assistance (bathe, groom, dress, toilet and feed)  Outcome: Progressing     Problem: Respiratory  Goal: Patient will achieve/maintain optimum respiratory ventilation and gas exchange  Outcome: Progressing     Problem: Pain - Standard  Goal: Alleviation of pain or a reduction in pain to the patient’s comfort goal  Outcome: Progressing     Problem: Fall Risk  Goal: Patient will remain free from falls  Outcome: Progressing     Problem: Skin Integrity  Goal: Skin integrity is maintained or improved  Outcome: Progressing

## 2025-04-29 NOTE — CARE PLAN
The patient is Stable - Low risk of patient condition declining or worsening    Shift Goals  Clinical Goals: Monitor O2/Wean to Oxymask, Mobility  Patient Goals: Rest, Discharge  Family Goals: Not present    Progress made toward(s) clinical / shift goals:        Problem: Knowledge Deficit - Standard  Goal: Patient and family/care givers will demonstrate understanding of plan of care, disease process/condition, diagnostic tests and medications  Outcome: Progressing  Note: Pt educated to plan of care regarding discharge, and involved care teams. Educated pt to use incentive spirometer and benefits of mobilization. Pt verbalizes understanding. Bed alarm in place, oriented to call light. Educated pt to PRN pain medication schedule.      Problem: Knowledge Deficit - COPD  Goal: Patient/significant other demonstrates understanding of disease process, utilization of the Action Plan, medications and discharge instruction  Outcome: Progressing  Note: Educated pt the use of high flow verus oxymask/NC      Problem: Risk for Infection - COPD  Goal: Patient will remain free from signs and symptoms of infection  Outcome: Progressing  Flowsheets  Taken 4/29/2025 1015  COPD Infection Interventions: Reviewed importance of breathing exercises, effective cough, frequent position changes and adequate fluid intake  Taken 4/28/2025 0745  Standard Infection Interventions:   Assessed for removal IV, central lines, intra-arterial or urinary catheters   Provided education on proper hand hygiene and infection prevention measures   Instructed patient/family on signs and symptoms of infection   Assessed for signs and symptoms of infection     Problem: Ineffective Airway Clearance  Goal: Patient will maintain patent airway with clear/clearing breath sounds  Outcome: Progressing     Problem: Self Care  Goal: Patient will have the ability to perform ADLs independently or with assistance (bathe, groom, dress, toilet and feed)  Outcome:  Progressing     Problem: Bowel Elimination  Goal: Establish and maintain regular bowel function  Outcome: Progressing     Problem: Skin Integrity  Goal: Skin integrity is maintained or improved  Outcome: Progressing       Patient is not progressing towards the following goals:

## 2025-04-29 NOTE — DISCHARGE PLANNING
Case Management Discharge Planning    Admission Date: 4/15/2025  GMLOS: 4  ALOS: 14    6-Clicks ADL Score: 21  6-Clicks Mobility Score: 18      Anticipated Discharge Dispo: Discharge Disposition: Disch to a long term care facility (63)  Discharge Address: [address] [place] [address] [place] [address]  Discharge Contact Phone Number: [phone]    DME Needed: No    Action(s) Taken: LMSW completed chart review. Pt has been accepted to [facility] however, pending answer from pt's oncologist regarding Immunotherapy. Per MD, unable to contact Dr. Melara at this time. Discussed with SW CM, Immunotherapy can likely only be done at [facility] or outpatient MD office therefore, [facility] would be unable to take pt to outpatient appts on high-flow oxygen. Informed [facility], who will discuss options with pt.    UPDATE   LMSW received notice that Dr. Orona will round on pt today, Dr. Melara no longer following at this time. Awaiting recommendation from hematology/oncology regarding Immunotherapy.    Escalations Completed: None    Medically Clear: No    Next Steps: LMSW to follow for any additional CM needs.    Barriers to Discharge: Medical clearance and Pending Placement    Is the patient up for discharge tomorrow: No

## 2025-04-30 VITALS
HEART RATE: 91 BPM | DIASTOLIC BLOOD PRESSURE: 69 MMHG | RESPIRATION RATE: 18 BRPM | SYSTOLIC BLOOD PRESSURE: 124 MMHG | HEIGHT: 64 IN | TEMPERATURE: 97.7 F | WEIGHT: 158.07 LBS | BODY MASS INDEX: 26.99 KG/M2 | OXYGEN SATURATION: 91 %

## 2025-04-30 LAB
ALBUMIN SERPL BCP-MCNC: 3.3 G/DL (ref 3.2–4.9)
BUN SERPL-MCNC: 27 MG/DL (ref 8–22)
CALCIUM ALBUM COR SERPL-MCNC: 9.6 MG/DL (ref 8.5–10.5)
CALCIUM SERPL-MCNC: 9 MG/DL (ref 8.5–10.5)
CHLORIDE SERPL-SCNC: 99 MMOL/L (ref 96–112)
CO2 SERPL-SCNC: 25 MMOL/L (ref 20–33)
CREAT SERPL-MCNC: 0.96 MG/DL (ref 0.5–1.4)
ERYTHROCYTE [DISTWIDTH] IN BLOOD BY AUTOMATED COUNT: 51.3 FL (ref 35.9–50)
GFR SERPLBLD CREATININE-BSD FMLA CKD-EPI: 82 ML/MIN/1.73 M 2
GLUCOSE SERPL-MCNC: 100 MG/DL (ref 65–99)
HCT VFR BLD AUTO: 36 % (ref 42–52)
HGB BLD-MCNC: 11.6 G/DL (ref 14–18)
MCH RBC QN AUTO: 28.1 PG (ref 27–33)
MCHC RBC AUTO-ENTMCNC: 32.2 G/DL (ref 32.3–36.5)
MCV RBC AUTO: 87.2 FL (ref 81.4–97.8)
PHOSPHATE SERPL-MCNC: 3.3 MG/DL (ref 2.5–4.5)
PLATELET # BLD AUTO: 355 K/UL (ref 164–446)
PMV BLD AUTO: 9.3 FL (ref 9–12.9)
POTASSIUM SERPL-SCNC: 5.2 MMOL/L (ref 3.6–5.5)
RBC # BLD AUTO: 4.13 M/UL (ref 4.7–6.1)
SODIUM SERPL-SCNC: 134 MMOL/L (ref 135–145)
WBC # BLD AUTO: 7.8 K/UL (ref 4.8–10.8)

## 2025-04-30 PROCEDURE — 36415 COLL VENOUS BLD VENIPUNCTURE: CPT

## 2025-04-30 PROCEDURE — 700101 HCHG RX REV CODE 250: Performed by: INTERNAL MEDICINE

## 2025-04-30 PROCEDURE — 94640 AIRWAY INHALATION TREATMENT: CPT

## 2025-04-30 PROCEDURE — 99233 SBSQ HOSP IP/OBS HIGH 50: CPT | Performed by: INTERNAL MEDICINE

## 2025-04-30 PROCEDURE — 700102 HCHG RX REV CODE 250 W/ 637 OVERRIDE(OP): Performed by: STUDENT IN AN ORGANIZED HEALTH CARE EDUCATION/TRAINING PROGRAM

## 2025-04-30 PROCEDURE — 700102 HCHG RX REV CODE 250 W/ 637 OVERRIDE(OP): Performed by: INTERNAL MEDICINE

## 2025-04-30 PROCEDURE — 99999 PR NO CHARGE: CPT | Performed by: INTERNAL MEDICINE

## 2025-04-30 PROCEDURE — 80069 RENAL FUNCTION PANEL: CPT

## 2025-04-30 PROCEDURE — 700111 HCHG RX REV CODE 636 W/ 250 OVERRIDE (IP): Performed by: INTERNAL MEDICINE

## 2025-04-30 PROCEDURE — A9270 NON-COVERED ITEM OR SERVICE: HCPCS | Performed by: INTERNAL MEDICINE

## 2025-04-30 PROCEDURE — A9270 NON-COVERED ITEM OR SERVICE: HCPCS | Performed by: STUDENT IN AN ORGANIZED HEALTH CARE EDUCATION/TRAINING PROGRAM

## 2025-04-30 PROCEDURE — 700111 HCHG RX REV CODE 636 W/ 250 OVERRIDE (IP): Performed by: STUDENT IN AN ORGANIZED HEALTH CARE EDUCATION/TRAINING PROGRAM

## 2025-04-30 PROCEDURE — 85027 COMPLETE CBC AUTOMATED: CPT

## 2025-04-30 PROCEDURE — 770001 HCHG ROOM/CARE - MED/SURG/GYN PRIV*

## 2025-04-30 PROCEDURE — 94760 N-INVAS EAR/PLS OXIMETRY 1: CPT

## 2025-04-30 RX ORDER — AZITHROMYCIN 250 MG/1
500 TABLET, FILM COATED ORAL SEE ADMIN INSTRUCTIONS
Status: DISCONTINUED | OUTPATIENT
Start: 2025-04-30 | End: 2025-04-30

## 2025-04-30 RX ORDER — HYDROCODONE BITARTRATE AND HOMATROPINE METHYLBROMIDE ORAL SOLUTION 5; 1.5 MG/5ML; MG/5ML
5 LIQUID ORAL
Status: SHIPPED
Start: 2025-04-30 | End: 2025-05-03

## 2025-04-30 RX ORDER — BUDESONIDE 0.5 MG/2ML
0.5 INHALANT ORAL
Status: DISCONTINUED | OUTPATIENT
Start: 2025-04-30 | End: 2025-05-01 | Stop reason: HOSPADM

## 2025-04-30 RX ORDER — ASPIRIN 81 MG/1
81 TABLET ORAL DAILY
Status: SHIPPED
Start: 2025-05-01

## 2025-04-30 RX ORDER — AZITHROMYCIN 250 MG/1
500 TABLET, FILM COATED ORAL
Status: DISCONTINUED | OUTPATIENT
Start: 2025-04-30 | End: 2025-05-01 | Stop reason: HOSPADM

## 2025-04-30 RX ORDER — HEPARIN SODIUM 5000 [USP'U]/ML
5000 INJECTION, SOLUTION INTRAVENOUS; SUBCUTANEOUS EVERY 8 HOURS
Status: ACTIVE | DISCHARGE
Start: 2025-04-30

## 2025-04-30 RX ORDER — LOPERAMIDE HYDROCHLORIDE 2 MG/1
2 CAPSULE ORAL 4 TIMES DAILY PRN
Status: SHIPPED
Start: 2025-04-30

## 2025-04-30 RX ORDER — SULFAMETHOXAZOLE AND TRIMETHOPRIM 800; 160 MG/1; MG/1
1 TABLET ORAL
Status: ACTIVE | DISCHARGE
Start: 2025-04-30

## 2025-04-30 RX ORDER — PREDNISONE 10 MG/1
TABLET ORAL
Status: SHIPPED
Start: 2025-05-01 | End: 2025-05-30

## 2025-04-30 RX ORDER — MONTELUKAST SODIUM 10 MG/1
10 TABLET ORAL NIGHTLY
Status: SHIPPED
Start: 2025-04-30

## 2025-04-30 RX ORDER — OXYCODONE HYDROCHLORIDE 5 MG/1
5 TABLET ORAL EVERY 6 HOURS PRN
Status: SHIPPED
Start: 2025-04-30 | End: 2025-05-03

## 2025-04-30 RX ADMIN — TRAZODONE HYDROCHLORIDE 50 MG: 50 TABLET ORAL at 20:43

## 2025-04-30 RX ADMIN — HYDROCODONE BITARTRATE AND HOMATROPINE METHYLBROMIDE 5 ML: 5; 1.5 SOLUTION ORAL at 20:43

## 2025-04-30 RX ADMIN — IPRATROPIUM BROMIDE AND ALBUTEROL SULFATE 3 ML: .5; 2.5 SOLUTION RESPIRATORY (INHALATION) at 18:55

## 2025-04-30 RX ADMIN — ACETAMINOPHEN 650 MG: 325 TABLET, FILM COATED ORAL at 12:25

## 2025-04-30 RX ADMIN — LEVOTHYROXINE SODIUM 100 MCG: 0.1 TABLET ORAL at 04:31

## 2025-04-30 RX ADMIN — BUDESONIDE 0.5 MG: 0.5 SUSPENSION RESPIRATORY (INHALATION) at 14:30

## 2025-04-30 RX ADMIN — IPRATROPIUM BROMIDE AND ALBUTEROL SULFATE 3 ML: .5; 2.5 SOLUTION RESPIRATORY (INHALATION) at 15:49

## 2025-04-30 RX ADMIN — ROSUVASTATIN CALCIUM 10 MG: 20 TABLET, FILM COATED ORAL at 16:54

## 2025-04-30 RX ADMIN — FLUTICASONE FUROATE, UMECLIDINIUM BROMIDE AND VILANTEROL TRIFENATATE 1 PUFF: 200; 62.5; 25 POWDER RESPIRATORY (INHALATION) at 04:32

## 2025-04-30 RX ADMIN — ASPIRIN 81 MG: 81 TABLET, COATED ORAL at 04:31

## 2025-04-30 RX ADMIN — OXYCODONE 5 MG: 5 TABLET ORAL at 21:02

## 2025-04-30 RX ADMIN — PREDNISONE 50 MG: 50 TABLET ORAL at 04:31

## 2025-04-30 RX ADMIN — HEPARIN SODIUM 5000 UNITS: 5000 INJECTION, SOLUTION INTRAVENOUS; SUBCUTANEOUS at 04:32

## 2025-04-30 RX ADMIN — Medication 30 MG: at 20:43

## 2025-04-30 RX ADMIN — LISINOPRIL 20 MG: 20 TABLET ORAL at 04:31

## 2025-04-30 RX ADMIN — IPRATROPIUM BROMIDE AND ALBUTEROL SULFATE 3 ML: .5; 2.5 SOLUTION RESPIRATORY (INHALATION) at 10:30

## 2025-04-30 RX ADMIN — HEPARIN SODIUM 5000 UNITS: 5000 INJECTION, SOLUTION INTRAVENOUS; SUBCUTANEOUS at 20:42

## 2025-04-30 RX ADMIN — AMLODIPINE BESYLATE 5 MG: 5 TABLET ORAL at 04:31

## 2025-04-30 RX ADMIN — SULFAMETHOXAZOLE AND TRIMETHOPRIM 1 TABLET: 800; 160 TABLET ORAL at 16:54

## 2025-04-30 RX ADMIN — AZITHROMYCIN DIHYDRATE 500 MG: 250 TABLET ORAL at 14:56

## 2025-04-30 RX ADMIN — HEPARIN SODIUM 5000 UNITS: 5000 INJECTION, SOLUTION INTRAVENOUS; SUBCUTANEOUS at 14:56

## 2025-04-30 RX ADMIN — CALCIUM POLYCARBOPHIL 625 MG: 625 TABLET, FILM COATED ORAL at 04:32

## 2025-04-30 RX ADMIN — IPRATROPIUM BROMIDE AND ALBUTEROL SULFATE 3 ML: .5; 2.5 SOLUTION RESPIRATORY (INHALATION) at 06:53

## 2025-04-30 RX ADMIN — MONTELUKAST 10 MG: 10 TABLET, FILM COATED ORAL at 20:43

## 2025-04-30 ASSESSMENT — ENCOUNTER SYMPTOMS
DIZZINESS: 1
WEAKNESS: 1
SPUTUM PRODUCTION: 1
COUGH: 1
SHORTNESS OF BREATH: 1

## 2025-04-30 ASSESSMENT — PAIN DESCRIPTION - PAIN TYPE
TYPE: ACUTE PAIN
TYPE: ACUTE PAIN

## 2025-04-30 NOTE — PROGRESS NOTES
Virtual Nurse rounding complete.    Round Needs: Other: requesting tylenol and Notified of Patient Needs: RN

## 2025-04-30 NOTE — PROGRESS NOTES
"Pulmonary Progress Note    Date of admission  4/15/2025    Chief Complaint  From previous pulmonary note: \"74 y.o. male who presented on 4/15/2025 with left-sided weakness concerning for stroke.  He was in his usual state of health, was accidentally disconnected from his oxygen support on 4/15/2025 at home which led to his weakness as per patient.  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  He has now tapered down to prednisone 20 mg/day.  since admission he was started on higher dose of steroids, antibiotics which improved his shortness of breath and is back to his baseline oxygen support of 5 L.      4/17: still continues to require 5-6 L oxygen support, still has SOB with cough  4/18: He continues to require 8 to 10 L oxygen support due to  desaturation when he coughs, respiratory panel positive for human metapneumovirus,  4/19: he has significant oxygen requirements now on HFNC , and desaturates when he is coughing or ambulating \"    Pulmonary was re-consulted on 4/30 due to continued wheezing and requirement for HFNC. The patient is transitioning to VERITO today.   He is using IS 5x/day and getting 1500 cc.   He stated he has not been ambulating much due to his HFNC.        Review of Systems  Constitutional:  Negative for chills and fever.   Eyes:  Negative for photophobia and pain.   Respiratory:  Positive for shortness of breath.  "   Gastrointestinal:  Negative for abdominal pain and diarrhea.   Neurological:  Positive for weakness. Negative for dizziness and loss of consciousness.   All other systems reviewed and are negative.       Vital Signs for last 24 hours   Vitals:    04/30/25 1032   BP:    Pulse: 88   Resp:    Temp:    SpO2: 94%         Physical Exam  Vitals reviewed. Exam conducted with a chaperone present.   Constitutional:       General: He is not in acute distress.     Appearance: He is ill-appearing. He is not toxic-appearing or diaphoretic.   HENT:      Mouth/Throat:      Pharynx: Oropharynx is clear.   Eyes:      General:         Right eye: No discharge.         Left eye: No discharge.      Pupils: Pupils are equal, round, and reactive to light.   Cardiovascular:      Rate and Rhythm: Normal rate.      Pulses: Normal pulses.   Pulmonary:      Effort: Pulmonary effort is normal. No respiratory distress.      Breath sounds: Wheezing (scattered faint) present.   Musculoskeletal:      Right lower leg: No edema.      Left lower leg: No edema.   Skin:     General: Skin is warm.      Capillary Refill: Capillary refill takes less than 2 seconds.      Coloration: Skin is not jaundiced.   Neurological:      General: No focal deficit present.      Mental Status: He is alert.   Psychiatric:         Behavior: Behavior normal.           Medications  Current Facility-Administered Medications   Medication Dose Route Frequency Provider Last Rate Last Admin    piperacillin-tazobactam (Zosyn) 3.375 g in  mL IVPB  3.375 g Intravenous Q8HRS Steve Rios M.D. 25 mL/hr at 04/19/25 1307 3.375 g at 04/19/25 1307    furosemide (Lasix) injection 20 mg  20 mg Intravenous DAILY Steve Rios M.D.   20 mg at 04/19/25 0821    ipratropium-albuterol (DUONEB) nebulizer solution  3 mL Nebulization Q4HRS (RT) Steve Rios M.D.   3 mL at 04/19/25 1119    fluticasone-umeclidinium-vilanterol (Trelegy Ellipta) 200-62.5-25 mcg/act  inhaler 1 Puff  1 Puff Inhalation QDAILY (RT) Steve Rios M.D.   1 Puff at 04/19/25 0816    loperamide (Imodium) capsule 2 mg  2 mg Oral 4X/DAY PRN Steve Rios M.D.   2 mg at 04/18/25 2149    sulfamethoxazole-trimethoprim (Bactrim DS) 800-160 MG tablet 1 Tablet  1 Tablet Oral 3x/week Chantell Dennis M.D.   1 Tablet at 04/19/25 0231    predniSONE (Deltasone) tablet 60 mg  60 mg Oral DAILY Chantell Dennis M.D.   60 mg at 04/19/25 0453    LR (Bolus) infusion 500 mL  500 mL Intravenous Once PRN Benny Kamara M.D.        acetaminophen (Tylenol) tablet 650 mg  650 mg Oral Q6HRS PRN Benny Kamara M.D.   650 mg at 04/19/25 0504    labetalol (Normodyne/Trandate) injection 10 mg  10 mg Intravenous Q4HRS PRN Benny Kamara M.D.        ondansetron (Zofran) syringe/vial injection 4 mg  4 mg Intravenous Q4HRS PRN Benny Kamara M.D.        Or    ondansetron (Zofran ODT) dispertab 4 mg  4 mg Oral Q4HRS PRN Benny Kamara M.D.        albuterol inhaler 2 Puff  2 Puff Inhalation Q4HRS PRN Benny Kamara M.D.        amLODIPine (Norvasc) tablet 5 mg  5 mg Oral DAILY Benny Kamara M.D.   5 mg at 04/19/25 0454    artificial tears ophthalmic solution 1 Drop  1 Drop Both Eyes Q HOUR PRN Benny Kamara M.D.        calcium polycarbophil (Fibercon) tablet 625 mg  625 mg Oral DAILY Benny Kamara M.D.        levothyroxine (Synthroid) tablet 100 mcg  100 mcg Oral AM ES Benny Kamara M.D.   100 mcg at 04/19/25 0502    lisinopril (Prinivil) tablet 20 mg  20 mg Oral BID Benny Kamara M.D.   20 mg at 04/19/25 0454    melatonin tablet 30 mg  30 mg Oral Q EVENING Benny Kamara M.D.   30 mg at 04/18/25 2245    rosuvastatin (Crestor) tablet 10 mg  10 mg Oral Q EVENING Benny Kamara M.D.   10 mg at 04/18/25 1817    traZODone (Desyrel) tablet 50 mg  50 mg Oral Nightly Benny Kamara M.D.   50 mg at 04/18/25 2245    Respiratory Therapy Consult   Nebulization Continuous RT Benny Kamara M.D.        albuterol (Proventil) 2.5mg/0.5ml  nebulizer solution 2.5 mg  2.5 mg Nebulization Q2HRS PRN (RT) Benny Kamara M.D.        montelukast (Singulair) tablet 10 mg  10 mg Oral Nightly Benny Kamara M.D.   10 mg at 04/18/25 2245    heparin injection 5,000 Units  5,000 Units Subcutaneous Q8HRS Benny Kamara M.D.   5,000 Units at 04/19/25 1308    aspirin EC tablet 81 mg  81 mg Oral DAILY Benny Kamara M.D.   81 mg at 04/19/25 0453       Fluids    Intake/Output Summary (Last 24 hours) at 4/19/2025 1524  Last data filed at 4/19/2025 1515  Gross per 24 hour   Intake 815 ml   Output 2000 ml   Net -1185 ml       Laboratory          Recent Labs     04/17/25  0106 04/18/25  0117 04/19/25  0431   SODIUM 136 136 134*   POTASSIUM 4.0 3.7 3.4*   CHLORIDE 101 101 99   CO2 23 23 22   BUN 15 20 19   CREATININE 1.07 1.13 0.98   MAGNESIUM  --  2.1  --    CALCIUM 8.4* 8.5 8.5     Recent Labs     04/17/25  0106 04/18/25  0117 04/19/25  0431   GLUCOSE 123* 89 81     Recent Labs     04/17/25  0106 04/18/25  0117 04/19/25  0431   WBC 6.6 6.6 4.9     Recent Labs     04/17/25  0106 04/18/25  0117 04/19/25  0431   RBC 4.16* 4.40* 4.70   HEMOGLOBIN 11.5* 12.2* 12.9*   HEMATOCRIT 35.5* 37.7* 41.5*   PLATELETCT 170 203 174       Imaging  CT:    Reviewed    Assessment/Plan  Acute on chronic hypoxemic respiratory failure likely secondary to postobstructive pneumonia  COPD exacerbation due to human metapneumovirus infection  Severe emphysema  Squamous cell cancer of the left upper lobe, on Keytruda  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     - Pro-Young elevated and trended down, COVID/flu/ RSV negative, sputum culture in progress, MRSA negative, fungal cultures of sputum-rare fungal elements   -All CD workup, HSP Aspergillus panel, fungitell has been negative  - CT chest done this admission was compared to previous CT scans, concern for increased collapse of the left upper lobe/bowel consolidation highly suspicious for superadded pneumonia in the setting of  immunosuppression  -His PET/CT scan showed improvement of lung mass SUV uptake with few doses of Keytruda  - TTE normal EF, mild MR, normal RV and diastolic finction   -consider TTE with bubble for shunt  -consider adding Azithromycin 500 mg MWF indefinitely  -consider adding Pulmicort 0.5 mg bid neb in addition to current inhalers  -consider prolonged steroid taper for AECOPD  -encourage ambulation with PT/OT  -CXR reviewed  -Encourage IS q15min while awake  -Mobilize when able  -Target O2 sat 88-92%   -Continue PJP prophylaxis as the patient has been on steroids for over a month since 3/12/2025  -high risk for bronchoscopy, pending aspergillus, cocci/histo/blasto testing   - Continue Trelegy, DuoNeb as needed; may consider change to Breztri due to weakness and poor fit for dry powder inhaler    I have performed a physical exam and reviewed and updated ROS and Plan today (4/19/2025). In review of yesterday's note (4/18/2025), there are no changes except as documented above.     Total consult time: 50 minutes which included time spent on chart review, personally reviewing pertinent images and labs, time spent counseling and educating the patient and/or family members, and coordinating care with the healthcare team to include consultants.     Bean Garcia DO, [facility]  Staff Pulmonologist and Intensivist  [facility]     Please note that this dictation was created using voice recognition software. The accuracy of the dictation is limited to the abilities of the software. I have made every reasonable attempt to correct obvious errors, but I expect that there are errors of grammar and possibly content that I did not discover before finalizing the note.

## 2025-04-30 NOTE — CARE PLAN
Problem: Bronchoconstriction  Goal: Improve in air movement and diminished wheezing  Description: Target End Date:  2 to 3 days1.  Implement inhaled treatments2.  Evaluate and manage medication effects  Outcome: Not Progressing     Problem: Humidified High Flow Nasal Cannula  Goal: Maintain adequate oxygenation dependent on patient condition  Description: 1.  Implement humidified high flow oxygen therapy2.  Titrate high flow oxygen to maintain appropriate SpO2  Outcome: Not Progressing

## 2025-04-30 NOTE — CARE PLAN
The patient is Stable - Low risk of patient condition declining or worsening    Shift Goals  Clinical Goals: Monitor O2, Assess to wean O2, Mobility  Patient Goals: Comfort, Food, Plan of care  Family Goals: Not present    Progress made toward(s) clinical / shift goals:        Bed alarm in place, yellow wrist band, fall risk socks on, pt verbalizes understanding to not get out of bed without staff present, pt oriented to call light, PRN pain medication system educated.     Problem: Knowledge Deficit - Standard  Goal: Patient and family/care givers will demonstrate understanding of plan of care, disease process/condition, diagnostic tests and medications  4/30/2025 1449 by Mariaelena Lan R.N.  Outcome: Progressing  4/30/2025 1440 by Mariaelena Lan, R.N.  Outcome: Progressing     Problem: Knowledge Deficit - COPD  Goal: Patient/significant other demonstrates understanding of disease process, utilization of the Action Plan, medications and discharge instruction  Outcome: Progressing     Problem: Risk for Infection - COPD  Goal: Patient will remain free from signs and symptoms of infection  Outcome: Progressing     Problem: Nutrition - Advanced  Goal: Patient will display progressive weight gain toward goal have adequate food and fluid intake  Outcome: Progressing     Problem: Fluid Volume  Goal: Fluid volume balance will be maintained  Outcome: Progressing     Problem: Respiratory  Goal: Patient will achieve/maintain optimum respiratory ventilation and gas exchange  Outcome: Not Progressing  Flowsheets  Taken 4/30/2025 0900 by Mariaelena Lan, R.N.  Incentive Spirometer: Effective  Incentive Spirometer Volume: 1000 mL  Deep Breathe and Cough: Needs Coaching  Taken 4/30/2025 0824 by Diane Mckinnon, C.N.A.  O2 Delivery Device: High Flow Nasal Cannula  Note: Patient needing high flow oxygen consistently per RT      Problem: Pain - Standard  Goal: Alleviation of pain or a reduction in pain to the patient’s  comfort goal  Outcome: Progressing       Patient is not progressing towards the following goals:      Problem: Respiratory  Goal: Patient will achieve/maintain optimum respiratory ventilation and gas exchange  Outcome: Not Progressing  Flowsheets  Taken 4/30/2025 0900 by Mariaelena Lan, RMICHELLE  Incentive Spirometer: Effective  Incentive Spirometer Volume: 1000 mL  Deep Breathe and Cough: Needs Coaching  Taken 4/30/2025 0824 by Diane Mckinnon, C.N.ASade  O2 Delivery Device: High Flow Nasal Cannula  Note: Patient needing high flow oxygen consistently per RT

## 2025-04-30 NOTE — DISCHARGE PLANNING
DC Transport Scheduled    Transport Company Scheduled:  [employer]  Spoke with Barry at [facility] to schedule transport.    Scheduled Date: 4/30/2025  Scheduled Time: 1630    Transport Type: [facility]  [facility]ination[facility]   Destination address: [address] [place] [address], [address], [facility] [address]    Notified care team of scheduled transport via [employer].     If there are any changes needed to the DC transportation scheduled, please contact [facility] at ext. [phone] between the hours of [phone]. If outside those hours, contact the ED Case Manager at ext. [phone].

## 2025-04-30 NOTE — PROGRESS NOTES
Assumed care of pt 04/29/2025  PM assessment complete  Education provided to the pt at bedside all questions answered.Pt was able to ambulate to the chair and back to bed 02 levels decreased during ambulation. Increased 02 until pt became normal limits back down to 30 high flow now.   Bed is locked and in the lowest position, call light has been left within reach. Hourly rounding in place. Care continuous. No further needs at this time pt is resting comfortably.

## 2025-04-30 NOTE — DISCHARGE SUMMARY
Discharge Summary    CHIEF COMPLAINT ON ADMISSION  Chief Complaint   Patient presents with    Possible Stroke       Reason for Admission  Severe sepsis (HCC)    Admission Date  4/15/2025     CODE STATUS  Full Code    HPI & HOSPITAL COURSE  73 yo man with squamous cell lung cancer, COPD, chronically on 5 L O2, HTN, HLD who presented with left-sided weakness.  Neurology was consulted, his left-sided weakness had quickly resolved.  Aspirin has been ordered for preventative measure for stroke.  Pulmonology and ID were consulted for progressive shortness of breath and hypoxia requiring high flow oxygen.  He was treated with empiric antibiotic meropenem and doxycycline with minimal improvement.  He did test positive for human metapneumovirus.  ID recommended no further antibiotics.  He was started on high-dose prednisone and pulmonology recommended slow taper of prednisone for COPD exacerbation secondary to metapneumovirus infection and complicated by poor lung reserve from previous radiation and his lung cancer.  He will need to follow-up with pulmonary rehab in pulmonary clinic after discharge.  Because he remains on high flow nasal cannula, he will discharge to a long-term acute care hospital.  Patient's oncologist Dr. Melara is no longer with [facility], his care will be taken over by Dr. Orona.  I reached out to Dr. Orona and she says if patient can discharge and time to have his Keytruda on 5/15/2025, he can have his treatment otherwise if it is unsafe to discharge at that time, they will delay the Keytruda.    Therefore, he is discharged in good and stable condition to a long-term acute care hospital.    The patient met 2-midnight criteria for an inpatient stay at the time of discharge.      FOLLOW UP ITEMS POST DISCHARGE  Slow prednisone taper for COPD exacerbation.  Follow-up with pulmonary rehab in pulmonary clinic  Follow-up with oncologist Dr. 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 *      FOLLOW UP  Future Appointments   Date Time Provider Department Center   5/8/2025  2:50 PM Jacquelyn Lee M.D. [facility] None   5/16/2025  1:00 PM JESIKA London [facility] None   5/16/2025  1:30 PM [facility]     No follow-up provider specified.    MEDICATIONS ON DISCHARGE     Medication List        START taking these medications        Instructions   aspirin 81 MG EC tablet  Start taking on: May 1, 2025   Take 1 Tablet by mouth every day.  Dose: 81 mg     heparin 5000 UNIT/ML Soln   Inject 1 mL under the skin every 8 hours.  Dose: 5,000 Units     HYDROcodone homatropine 5-1.5 mg/5 mL Soln   Take 5 mL by mouth at bedtime for 3 days.  Dose: 5 mL     loperamide 2 MG Caps  Commonly known as: Imodium   Take 1 Capsule by mouth 4 times a day as needed for Diarrhea.  Dose: 2 mg     montelukast 10 MG Tabs  Commonly known as: Singulair   Take 1 Tablet by mouth every evening.  Dose: 10 mg     oxyCODONE immediate-release 5 MG Tabs  Commonly known as: Roxicodone   Take 1 Tablet by mouth every 6 hours as needed for Severe Pain for up to 3 days.  Dose: 5 mg            CHANGE how you take these medications        Instructions   Misc. Devices Misc  What changed: additional instructions   Portable oxygen concentrator (POC), at 2L/min via NC.     predniSONE 10 MG Tabs  Start taking  on: May 1, 2025  What changed: See the new instructions.  Commonly known as: Dominga   Doctor's comments:    Take 5 Tablets by mouth every day for 1 day, THEN 4 Tablets every day for 7 days, THEN 3 Tablets every day for 7 days, THEN 2 Tablets every day for 7 days, THEN 1 Tablet every day for 7 days.     sulfamethoxazole-trimethoprim 800-160 MG tablet  What changed:   when to take this  additional instructions  Commonly known as: Bactrim DS   Take 1 Tablet by mouth 3 times a week.  Dose: 1 Tablet            CONTINUE taking these medications        Instructions   acetaminophen 325 MG Tabs  Commonly known as: Tylenol   Take 500 mg by mouth every 6 hours as needed for Mild Pain.  Dose: 500 mg     albuterol 108 (90 Base) MCG/ACT Aers inhalation aerosol   Inhale 2 Puffs every four hours as needed for Shortness of Breath.  Dose: 2 Puff     amLODIPine 5 MG Tabs  Commonly known as: Norvasc   Take 1 Tablet by mouth every day.  Dose: 5 mg     artificial tears 1.4 % Soln   Administer 1 Drop into both eyes as needed (dry eyes).  Dose: 1 Drop     calcium polycarbophil 625 MG Tabs  Commonly known as: Fibercon   Take 625 mg by mouth every day.  Dose: 625 mg     docosahexanoic acid 1000 MG Caps  Commonly known as: Omega 3 Fa   Take 1,000 mg by mouth every day.  Dose: 1,000 mg     ipratropium-albuterol 0.5-2.5 (3) MG/3ML nebulizer solution  Commonly known as: Duoneb   Inhale 3 mL by nebulization every four hours as needed for Shortness of Breath.  Dose: 3 mL     levothyroxine 100 MCG Tabs  Commonly known as: Synthroid   Take 1 Tablet by mouth every morning on an empty stomach.  Dose: 100 mcg     lisinopril 20 MG Tabs  Commonly known as: Prinivil   Take 1 Tablet by mouth 2 times a day.  Dose: 20 mg     Melatonin 10 MG Tabs   Take 30 mg by mouth every evening.  Dose: 30 mg     MUCINEX ALLERGY PO   Take 1 Tablet by mouth every day.  Dose: 1 Tablet     MULTIVITAMIN ADULT PO   Take 1 Tablet by mouth every day.  Dose: 1 Tablet     *  naphazoline-pheniramine 0.027-0.315 % Soln  Commonly known as: Opcon-A   Administer 1 Drop into both eyes 3 times a day as needed (allergies).  Dose: 1 Drop     * Allergy Eye 0.025-0.3 % ophthalmic solution  Generic drug: naphazoline-pheniramine   Administer 1 Drop into both eyes 4 times a day.  Dose: 1 Drop     omeprazole 20 MG delayed-release capsule  Commonly known as: PriLOSEC   Take 1 Capsule by mouth every day.  Dose: 20 mg     rosuvastatin 10 MG Tabs  Commonly known as: Crestor   Doctor's comments: Please remind pt to get labs done  Take 1 Tablet by mouth every evening.  Dose: 10 mg     traZODone 50 MG Tabs  Commonly known as: Desyrel   Take 1 tablet by mouth every evening.  Dose: 50 mg     Trelegy Ellipta 200-62.5-25 MCG/ACT inhaler  Generic drug: fluticasone-umeclidinium-vilanterol   Doctor's comments: Please profile  Inhale 1 Puff every day.  Dose: 1 Puff           * This list has 2 medication(s) that are the same as other medications prescribed for you. Read the directions carefully, and ask your doctor or other care provider to review them with you.                STOP taking these medications      Home Care Oxygen              Allergies  Allergies   Allergen Reactions    Seasonal Runny Nose and Itching     Seasonal allergies       DIET  Orders Placed This Encounter   Procedures    Diet Order Diet: Regular     Standing Status:   Standing     Number of Occurrences:   1     Diet::   Regular [1]       ACTIVITY  As tolerated.    LINES, DRAINS, AND WOUNDS  This is an automated list. Peripheral IVs will be removed prior to discharge.  Peripheral IV 04/19/25 20 G Posterior;Right Forearm (Active)   Site Assessment Clean;Dry;Intact 04/29/25 2200   Dressing Type Transparent 04/29/25 2200   Line Status Saline locked;Scrubbed the hub prior to access;Flushed 04/29/25 2200   Dressing Status Clean;Dry;Intact 04/29/25 2200   Dressing Intervention N/A 04/29/25 2200   Dressing Change Due 04/26/25 04/26/25 2100   Date  Primary Tubing Changed 04/29/25 04/29/25 2100   Date Secondary Tubing Changed 04/20/25 04/19/25 2100   Infiltration Grading ([facility], [facility]) 0 04/29/25 0958   Phlebitis Scale ([facility] Only) 0 04/29/25 0958       Wound 04/18/25 Other (comment) Flank Upper Left (Active)   Wound Image   04/19/25 1200   Site Assessment Clean;Dry 04/27/25 0802   Periwound Assessment Clean;Dry;Pink 04/27/25 0802   Margins Attached edges 04/27/25 0802   Drainage Amount None 04/27/25 0802   Drainage Description Serosanguineous 04/19/25 1200   Treatments Site care 04/27/25 0802   Wound Cleansing Not Applicable 04/26/25 2048   Periwound Protectant Mepitel 04/26/25 2048   Dressing Status Clean;Dry;Intact 04/27/25 0802   Dressing Changed Reinforced 04/26/25 2048   Dressing Cleansing/Solutions Not Applicable 04/26/25 2048   Dressing Options Mepitel One 04/26/25 2048   Dressing Change/Treatment Frequency Every 48 hrs, and As Needed 04/27/25 0802   NEXT Dressing Change/Treatment Date 04/27/25 04/26/25 2048   NEXT Weekly Photo (Inpatient Only) 04/26/25 04/26/25 2048   Wound Team Following Weekly 04/26/25 2048   Non-staged Wound Description Partial thickness 04/19/25 1200   Wound Length (cm) 1 cm 04/19/25 1200   Wound Width (cm) 1.2 cm 04/19/25 1200   Wound Depth (cm) 0 cm 04/19/25 1200   Wound Surface Area (cm^2) 1.2 cm^2 04/19/25 1200   Wound Volume (cm^3) 0 cm^3 04/19/25 1200   Wound Bed Granulation (%) 95 % 04/19/25 1200   Wound Bed Eschar (%) 5 % 04/19/25 1200   Wound Odor None 04/19/25 1200   WOUND NURSE ONLY - Time Spent with Patient (mins) 45 04/19/25 1200       Peripheral IV 04/19/25 20 G Posterior;Right Forearm (Active)   Site Assessment Clean;Dry;Intact 04/29/25 2200   Dressing Type Transparent 04/29/25 2200   Line Status Saline locked;Scrubbed the hub prior to access;Flushed 04/29/25 2200   Dressing Status Clean;Dry;Intact 04/29/25 2200   Dressing Intervention N/A 04/29/25 2200   Dressing Change Due 04/26/25 04/26/25 2100   Date Primary  Tubing Changed 04/29/25 04/29/25 2100   Date Secondary Tubing Changed 04/20/25 04/19/25 2100   Infiltration Grading ([facility], [facility]) 0 04/29/25 0958   Phlebitis Scale ([facility] Only) 0 04/29/25 0958               MENTAL STATUS ON TRANSFER   AOx4          CONSULTATIONS  Infectious disease, pulmonology    PROCEDURES  DX-CHEST-LIMITED (1 VIEW)   Final Result      Worsening bilateral pulmonary infiltrates.      DX-CHEST-PORTABLE (1 VIEW)   Final Result         1.  Pulmonary edema and/or infiltrates, greatest in the left upper lobe, slightly decreased since prior study.      EC-ECHOCARDIOGRAM COMPLETE W/O CONT   Final Result      CT-CTA CHEST PULMONARY ARTERY W/ RECONS   Final Result      1. No acute pulmonary embolus.   2. Stable cavitary mass in the left upper lobe.   3. Extensive edematous changes in the lungs with peripheral honeycombing.   4. Aortic and coronary arterial sclerosis.   5. Simple appearing right renal cortical cyst, requiring no further follow-up.   6. Stable bilateral adrenal masses.   7. Acute or subacute left third through fifth rib fractures.            DX-CHEST-PORTABLE (1 VIEW)   Final Result      1.  LEFT upper lung consolidation, likely pneumonia.  Underlying mass is not excluded.   2.  Probable pulmonary fibrosis.   3.  Limited by positioning.            LABORATORY  Lab Results   Component Value Date    SODIUM 134 (L) 04/30/2025    POTASSIUM 5.2 04/30/2025    CHLORIDE 99 04/30/2025    CO2 25 04/30/2025    GLUCOSE 100 (H) 04/30/2025    BUN 27 (H) 04/30/2025    CREATININE 0.96 04/30/2025        Lab Results   Component Value Date    WBC 7.8 04/30/2025    HEMOGLOBIN 11.6 (L) 04/30/2025    HEMATOCRIT 36.0 (L) 04/30/2025    PLATELETCT 355 04/30/2025        Total time of the discharge process exceeds 35 minutes.

## 2025-04-30 NOTE — PROGRESS NOTES
Hospital Medicine Daily Progress Note    Date of Service  4/30/2025    Chief Complaint  Bright Shirley is a 74 y.o. male admitted 4/15/2025 with SOB    Hospital Course  73 yo man with squamous cell lung cancer, COPD, chronically on 5 L O2, HTN, HLD who presented with left-sided weakness.  Neurology was consulted, his left-sided weakness had quickly resolved.  Pulmonology and ID were consulted for progressive shortness of breath.  He was treated with empiric antibiotic meropenem and doxycycline with minimal improvement.  He did test positive for human metapneumovirus.  ID recommended no further antibiotics.  He was started on high-dose prednisone for possible immunotherapy induced pneumonitis versus ILD and he received diuresis.    Interval Problem Update  Remains on HFNC, desats with ambulation.  He still feels he has not improved much with prednisone.  I reached out to Dr. Rai will see the patient  Per my discussion with Dr. Orona today, patient can still have Keytruda on 5/15 if he is able to discharge by then, if not they can delay treatment    I have discussed this patient's plan of care and discharge plan at IDT rounds today with Case Management, Nursing, Nursing leadership, and other members of the IDT team.    Consultants/Specialty  infectious disease, oncology, and pulmonary    Code Status  Full Code    Disposition  The patient is not medically cleared for discharge to home or a post-acute facility.  Anticipate discharge to: a long-term acute care hospital    I have placed the appropriate orders for post-discharge needs.    Review of Systems  Review of Systems   Constitutional:  Positive for malaise/fatigue.   Respiratory:  Positive for cough, sputum production and shortness of breath.    Neurological:  Positive for dizziness and weakness.        Physical Exam  Temp:  [36.1 °C (97 °F)-36.5 °C (97.7 °F)] 36.1 °C (97 °F)  Pulse:  [64-88] 88  Resp:  [16] 16  BP: (100-139)/(56-70) 108/61  SpO2:  [90  %-96 %] 94 %    Physical Exam  Vitals and nursing note reviewed.   Constitutional:       Appearance: He is ill-appearing.   HENT:      Head: Normocephalic.      Mouth/Throat:      Mouth: Mucous membranes are moist.   Eyes:      General:         Right eye: No discharge.         Left eye: No discharge.   Cardiovascular:      Rate and Rhythm: Normal rate and regular rhythm.   Pulmonary:      Breath sounds: Rhonchi (Mild) present. No wheezing or rales.      Comments: Tachypneic  Abdominal:      General: There is no distension.   Musculoskeletal:         General: No swelling.      Cervical back: Neck supple.   Skin:     General: Skin is warm and dry.   Neurological:      Mental Status: He is alert and oriented to person, place, and time.         Fluids    Intake/Output Summary (Last 24 hours) at 4/30/2025 1333  Last data filed at 4/29/2025 2026  Gross per 24 hour   Intake --   Output 400 ml   Net -400 ml        Laboratory  Recent Labs     04/28/25  0127 04/29/25 0622 04/30/25  0136   WBC 7.5 6.6 7.8   RBC 4.17* 4.35* 4.13*   HEMOGLOBIN 11.5* 12.0* 11.6*   HEMATOCRIT 36.1* 37.5* 36.0*   MCV 86.6 86.2 87.2   MCH 27.6 27.6 28.1   MCHC 31.9* 32.0* 32.2*   RDW 51.1* 51.1* 51.3*   PLATELETCT 367 348 355   MPV 9.3 9.6 9.3     Recent Labs     04/28/25  0127 04/29/25  0622 04/30/25  0136   SODIUM 137 134* 134*   POTASSIUM 4.4 4.1 5.2   CHLORIDE 100 98 99   CO2 24 28 25   GLUCOSE 90 94 100*   BUN 26* 24* 27*   CREATININE 1.19 1.01 0.96   CALCIUM 8.1* 9.1 9.0                   Imaging  DX-CHEST-LIMITED (1 VIEW)   Final Result      Worsening bilateral pulmonary infiltrates.      DX-CHEST-PORTABLE (1 VIEW)   Final Result         1.  Pulmonary edema and/or infiltrates, greatest in the left upper lobe, slightly decreased since prior study.      EC-ECHOCARDIOGRAM COMPLETE W/O CONT   Final Result      CT-CTA CHEST PULMONARY ARTERY W/ RECONS   Final Result      1. No acute pulmonary embolus.   2. Stable cavitary mass in the left upper  lobe.   3. Extensive edematous changes in the lungs with peripheral honeycombing.   4. Aortic and coronary arterial sclerosis.   5. Simple appearing right renal cortical cyst, requiring no further follow-up.   6. Stable bilateral adrenal masses.   7. Acute or subacute left third through fifth rib fractures.            DX-CHEST-PORTABLE (1 VIEW)   Final Result      1.  LEFT upper lung consolidation, likely pneumonia.  Underlying mass is not excluded.   2.  Probable pulmonary fibrosis.   3.  Limited by positioning.           Assessment/Plan  * Severe sepsis (HCC)- (present on admission)  Assessment & Plan  Sepsis improved    Acute left-sided weakness  Assessment & Plan  Resolved by ER arrival  Stroke alert aborted by stroke neurology  PT/OT/ST  H/o TIA per pt  On aspirin and statin    TREVOR (acute kidney injury) (HCC)  Assessment & Plan  Resolved    ACP (advance care planning)  Assessment & Plan  Full code    Pneumonia due to infectious organism  Assessment & Plan  Finished a course of antibiotics, ID recommended no further antibiotics  Supportive care for human metapneumovirus    Lactic acidosis  Assessment & Plan  Type II, multifactorial reasons for elevation, albuterol, stress, cancer  was given thiamine    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 Orona  Per my discussion with Dr. Orona, patient can still have Keytruda on 5/15 if he is able to discharge by then, if not they can delay treatment    Acute on chronic respiratory failure with hypoxia (HCC)- (present on admission)  Assessment & Plan  Dependent on 5 L at baseline  Infected with human metapneumovirus, isolation  CTA chest negative for PE  TTE normal EF, minimal valve disease  Pulmonology and ID were consulted. He has loss of lung volume from his cancer and radiation, has poor reserve.  Prednisone with taper per pulmonology.  He remains on HFNC and has  not improved much, pulmonologist to see the patient again      Chronic obstructive pulmonary disease (HCC)- (present on admission)  Assessment & Plan  Pulmonology was consulted  Continue Trelegy, Singulair, DuoNebs    Dyslipidemia- (present on admission)  Assessment & Plan  Continue rosuvastatin    Primary hypertension- (present on admission)  Assessment & Plan  BP fluctuating  Continue lisinopril, amlodipine         VTE prophylaxis:    heparin ppx      I have performed a physical exam and reviewed and updated ROS and Plan today (4/30/2025). In review of yesterday's note (4/29/2025), there are no changes except as documented above.

## 2025-04-30 NOTE — CARE PLAN
Problem: Knowledge Deficit - Standard  Goal: Patient and family/care givers will demonstrate understanding of plan of care, disease process/condition, diagnostic tests and medications  Outcome: Progressing  Note: Patient and family/caregiver oriented to unit, equipment, visitation policy and means for communicating concern2.  Complete/review Learning Assessment3.  Assess knowledge level of disease process/condition, treatment plan, diagnostic tests and medications4.  Explain disease process/condition, treatment plan, diagnostic tests and medications    Expected end:  4/30/2025       Problem: Self Care  Goal: Patient will have the ability to perform ADLs independently or with assistance (bathe, groom, dress, toilet and feed)  Outcome: Met  Note: Pt has been able to perform basic ADL's this PM. Oral care performed and partial bed bath     Problem: Fall Risk  Goal: Patient will remain free from falls  Outcome: Met     Problem: Skin Integrity  Goal: Skin integrity is maintained or improved  Outcome: Met   The patient is Stable - Low risk of patient condition declining or worsening    Shift Goals  Clinical Goals: Monitor 02, Mobility  Patient Goals: Rest, and Comfort  Family Goals: ANAHI

## 2025-04-30 NOTE — DISCHARGE PLANNING
"Medically cleared for LTACH.   Choice obtained (verbal by patient at bedside due to strict contact/droplet precautions), authorized and accepted with [facility].   Patient requires LTACH for his oxygen needs (30LPM/40%FiO2).   COBRA signed by physician and verbally by patient (Isolation).     -Discharge packet complete with Face sheet x2, discharge summary, 72 hours of chart notes. Bedside RN given number for RN report to [facility]: [phone].     1248: Writer initially scheduled for [facility] pickup today, 4/30/2025, at 1630 (GMT Unable to accommodate O2 needs). Alerted by attending physician that Pulmonology wants \"to see if treatment plan should be changed at all\". Transportation fro today cancelled and patient placed on will-call with [facility] in anticipation of discharge to [facility] in next 1-2 days (depending on bed availability and Pulm clearance).     -[facility] notified of cancelled discharge.   -Avoidable days updated in Epic for \"Late Identification of Discharge Needs\".       Case Management Discharge Planning    Admission Date: 4/15/2025  GMLOS: 4  ALOS: 15    6-Clicks ADL Score: 21  6-Clicks Mobility Score: 18    Anticipated Discharge Dispo: Discharge Disposition: Disch to a long term care facility (63)  Discharge Address: [address] [place] [id number]  Discharge Contact Phone Number: [phone]    DME Needed: No    Action(s) Taken: Updated Provider/Nurse on Discharge Plan, Patient Conference, Choice obtained, Acceptance Received, Transport Arranged , and Authorization Sent    Escalations Completed: Transportation Vendor    Medically Clear: Yes    Next Steps: Pending transportation confirmation from [employer].     Barriers to Discharge: Transportation    Is the patient up for discharge tomorrow: Today, 4/30/2025, via [facility], requested for 1630 transport.         "

## 2025-04-30 NOTE — DISCHARGE PLANNING
"HTH/SCP TCN chart review completed. Per review of most recent hospital medicine note 4/27, patient anticipated discharge plan remains [facility]/ [facility]. Per SATHYA Avila note on 4/29/25 at 2:02 PM, \"Discussed with DAFNE MCDONNELL, Immunotherapy can likely only be done at [facility] or outpatient MD office therefore, [facility] would be unable to take pt to outpatient appts on high-flow oxygen. Informed [facility], who will discuss options with pt\".      Please see TCN note 4/22/25.  Note, SCP authorization is in place for anticipated discharge to [facility] once a bed is available.     Current 6 click mobility consistent at 18. Per chart review, he has a cane and FWW and was on 5 L/min O2 at his baseline.  Current O2 needs noted at 40 HF LM O2.      Please reach out to TCN via voalte if any additional transitional discharge planning needs are indicated. Thank you.    PT recommendations updated to post aucte placement 4/28.    OT anticipates no further occupational therapy needs after discharge from the hospital on 4/16 with recommendations for a tub/shower seat.     SCP with [facility] PCP.  Anticipate post-acute placement.   "

## 2025-05-01 ENCOUNTER — APPOINTMENT (OUTPATIENT)
Dept: MEDICAL GROUP | Facility: MEDICAL CENTER | Age: 75
End: 2025-05-01
Payer: MEDICARE

## 2025-05-01 VITALS
OXYGEN SATURATION: 92 % | DIASTOLIC BLOOD PRESSURE: 58 MMHG | BODY MASS INDEX: 26.99 KG/M2 | WEIGHT: 158.07 LBS | RESPIRATION RATE: 17 BRPM | HEIGHT: 64 IN | TEMPERATURE: 98.1 F | HEART RATE: 93 BPM | SYSTOLIC BLOOD PRESSURE: 110 MMHG

## 2025-05-01 LAB
ALBUMIN SERPL BCP-MCNC: 3.3 G/DL (ref 3.2–4.9)
ANION GAP SERPL CALC-SCNC: 9 MMOL/L (ref 7–16)
BUN SERPL-MCNC: 24 MG/DL (ref 8–22)
CALCIUM ALBUM COR SERPL-MCNC: 9.8 MG/DL (ref 8.5–10.5)
CALCIUM SERPL-MCNC: 9.2 MG/DL (ref 8.5–10.5)
CHLORIDE SERPL-SCNC: 101 MMOL/L (ref 96–112)
CO2 SERPL-SCNC: 26 MMOL/L (ref 20–33)
CREAT SERPL-MCNC: 0.98 MG/DL (ref 0.5–1.4)
ERYTHROCYTE [DISTWIDTH] IN BLOOD BY AUTOMATED COUNT: 52 FL (ref 35.9–50)
GFR SERPLBLD CREATININE-BSD FMLA CKD-EPI: 80 ML/MIN/1.73 M 2
GLUCOSE SERPL-MCNC: 93 MG/DL (ref 65–99)
HCT VFR BLD AUTO: 37.5 % (ref 42–52)
HGB BLD-MCNC: 11.7 G/DL (ref 14–18)
MCH RBC QN AUTO: 27.3 PG (ref 27–33)
MCHC RBC AUTO-ENTMCNC: 31.2 G/DL (ref 32.3–36.5)
MCV RBC AUTO: 87.6 FL (ref 81.4–97.8)
PHOSPHATE SERPL-MCNC: 3.2 MG/DL (ref 2.5–4.5)
PLATELET # BLD AUTO: 325 K/UL (ref 164–446)
PMV BLD AUTO: 9 FL (ref 9–12.9)
POTASSIUM SERPL-SCNC: 4.8 MMOL/L (ref 3.6–5.5)
RBC # BLD AUTO: 4.28 M/UL (ref 4.7–6.1)
SODIUM SERPL-SCNC: 136 MMOL/L (ref 135–145)
WBC # BLD AUTO: 7.9 K/UL (ref 4.8–10.8)

## 2025-05-01 PROCEDURE — A9270 NON-COVERED ITEM OR SERVICE: HCPCS | Performed by: INTERNAL MEDICINE

## 2025-05-01 PROCEDURE — 85027 COMPLETE CBC AUTOMATED: CPT

## 2025-05-01 PROCEDURE — 700101 HCHG RX REV CODE 250: Performed by: INTERNAL MEDICINE

## 2025-05-01 PROCEDURE — 99239 HOSP IP/OBS DSCHRG MGMT >30: CPT | Performed by: INTERNAL MEDICINE

## 2025-05-01 PROCEDURE — 94640 AIRWAY INHALATION TREATMENT: CPT

## 2025-05-01 PROCEDURE — 700111 HCHG RX REV CODE 636 W/ 250 OVERRIDE (IP): Performed by: INTERNAL MEDICINE

## 2025-05-01 PROCEDURE — 94760 N-INVAS EAR/PLS OXIMETRY 1: CPT

## 2025-05-01 PROCEDURE — A9270 NON-COVERED ITEM OR SERVICE: HCPCS | Performed by: STUDENT IN AN ORGANIZED HEALTH CARE EDUCATION/TRAINING PROGRAM

## 2025-05-01 PROCEDURE — 80069 RENAL FUNCTION PANEL: CPT

## 2025-05-01 PROCEDURE — 700102 HCHG RX REV CODE 250 W/ 637 OVERRIDE(OP): Performed by: INTERNAL MEDICINE

## 2025-05-01 PROCEDURE — 36415 COLL VENOUS BLD VENIPUNCTURE: CPT

## 2025-05-01 PROCEDURE — 700111 HCHG RX REV CODE 636 W/ 250 OVERRIDE (IP): Performed by: STUDENT IN AN ORGANIZED HEALTH CARE EDUCATION/TRAINING PROGRAM

## 2025-05-01 PROCEDURE — 700102 HCHG RX REV CODE 250 W/ 637 OVERRIDE(OP): Performed by: STUDENT IN AN ORGANIZED HEALTH CARE EDUCATION/TRAINING PROGRAM

## 2025-05-01 RX ORDER — AZITHROMYCIN 500 MG/1
TABLET, FILM COATED ORAL
Status: ACTIVE | DISCHARGE
Start: 2025-05-02

## 2025-05-01 RX ORDER — BUDESONIDE 0.5 MG/2ML
500 INHALANT ORAL 2 TIMES DAILY
Status: SHIPPED
Start: 2025-05-01

## 2025-05-01 RX ADMIN — LISINOPRIL 20 MG: 20 TABLET ORAL at 04:55

## 2025-05-01 RX ADMIN — IPRATROPIUM BROMIDE AND ALBUTEROL SULFATE 3 ML: .5; 2.5 SOLUTION RESPIRATORY (INHALATION) at 15:16

## 2025-05-01 RX ADMIN — FLUTICASONE FUROATE, UMECLIDINIUM BROMIDE AND VILANTEROL TRIFENATATE 1 PUFF: 200; 62.5; 25 POWDER RESPIRATORY (INHALATION) at 04:56

## 2025-05-01 RX ADMIN — PREDNISONE 50 MG: 50 TABLET ORAL at 04:55

## 2025-05-01 RX ADMIN — BUDESONIDE 0.5 MG: 0.5 SUSPENSION RESPIRATORY (INHALATION) at 07:47

## 2025-05-01 RX ADMIN — LEVOTHYROXINE SODIUM 100 MCG: 0.1 TABLET ORAL at 04:55

## 2025-05-01 RX ADMIN — HEPARIN SODIUM 5000 UNITS: 5000 INJECTION, SOLUTION INTRAVENOUS; SUBCUTANEOUS at 04:55

## 2025-05-01 RX ADMIN — IPRATROPIUM BROMIDE AND ALBUTEROL SULFATE 3 ML: .5; 2.5 SOLUTION RESPIRATORY (INHALATION) at 07:47

## 2025-05-01 RX ADMIN — OXYCODONE 5 MG: 5 TABLET ORAL at 02:05

## 2025-05-01 RX ADMIN — AMLODIPINE BESYLATE 5 MG: 5 TABLET ORAL at 04:55

## 2025-05-01 RX ADMIN — ASPIRIN 81 MG: 81 TABLET, COATED ORAL at 04:55

## 2025-05-01 RX ADMIN — IPRATROPIUM BROMIDE AND ALBUTEROL SULFATE 3 ML: .5; 2.5 SOLUTION RESPIRATORY (INHALATION) at 10:38

## 2025-05-01 RX ADMIN — HEPARIN SODIUM 5000 UNITS: 5000 INJECTION, SOLUTION INTRAVENOUS; SUBCUTANEOUS at 14:39

## 2025-05-01 RX ADMIN — CALCIUM POLYCARBOPHIL 625 MG: 625 TABLET, FILM COATED ORAL at 04:55

## 2025-05-01 ASSESSMENT — PAIN DESCRIPTION - PAIN TYPE
TYPE: ACUTE PAIN
TYPE: ACUTE PAIN

## 2025-05-01 NOTE — PROGRESS NOTES
Assumed care of pt 04/30/2025  PM assessment complete  Education provided to pt at bedside all questions answered.  When transferring pt from the chair to the bed his 02 demand substantially increases. His 02 level drops to 68-71 when transferring it takes him about 30 minutes to recover and reach an 02 level of 90. I have educated the pt on proper breathing techniques. He would previously take a breath and hold it. Will assess 02 demand throughout the night. Bed is locked and in the lowest position, call light is within reach. Hourly rounding in place. Care continuous. No further needs at this time pt is resting comfortably.

## 2025-05-01 NOTE — CARE PLAN
Problem: Knowledge Deficit - Standard  Goal: Patient and family/care givers will demonstrate understanding of plan of care, disease process/condition, diagnostic tests and medications  Outcome: Progressing  Note: Education1.  Patient and family/caregiver oriented to unit, equipment, visitation policy and means for communicating concern2.  Complete/review Learning Assessment3.  Assess knowledge level of disease process/condition, treatment plan, diagnostic tests and medications4.  Explain disease process/condition, treatment plan, diagnostic tests and medications    Expected end:  5/3/2025  Education provided at bedside all questions answered.     Problem: Knowledge Deficit - COPD  Goal: Patient/significant other demonstrates understanding of disease process, utilization of the Action Plan, medications and discharge instruction  Outcome: Progressing  Note: Education provided regarding the COPD disease process pt states he has an understanding of the education provided.      Problem: Pain - Standard  Goal: Alleviation of pain or a reduction in pain to the patient’s comfort goal  Outcome: Met  Flowsheets  Taken 4/30/2025 2337  Non Verbal Scale:   Calm   Sleeping   Unlabored Breathing  OB Pain Intervention:   Medication - See MAR   Repositioned   Rest  Taken 4/30/2025 2102  Pain Rating Scale (NPRS): 5  Note: Pain being managed at comfort level throughout my entire shift. Extra pillows and blankets given for comfort    The patient is Stable - Low risk of patient condition declining or worsening    Shift Goals  Clinical Goals: Monitor 02, Assess to wean 02, Mobility  Patient Goals: Comfort, and updates  Family Goals: ANAHI        Patient is not progressing towards the following goals: 02 demand

## 2025-05-01 NOTE — CARE PLAN
Problem: Bronchoconstriction  Goal: Improve in air movement and diminished wheezing  Description: Target End Date:  2 to 3 days1.  Implement inhaled treatments2.  Evaluate and manage medication effects  Outcome: Progressing     Problem: Hyperinflation  Goal: Prevent or improve atelectasis  Description: Target End Date:  3 to 4 days1. Instruct incentive spirometry usage2.  Perform hyperinflation therapy as indicated  Outcome: Progressing     Problem: Humidified High Flow Nasal Cannula  Goal: Maintain adequate oxygenation dependent on patient condition  Description: 1.  Implement humidified high flow oxygen therapy2.  Titrate high flow oxygen to maintain appropriate SpO2  Outcome: Progressing     Pt on Hiflo 35L/40%  Duonebe QID  Pulmicort BID

## 2025-05-01 NOTE — DISCHARGE SUMMARY
Discharge Summary    CHIEF COMPLAINT ON ADMISSION  Chief Complaint   Patient presents with    Possible Stroke       Reason for Admission  Severe sepsis (HCC)    Admission Date  4/15/2025     CODE STATUS  Full Code    HPI & HOSPITAL COURSE  75 yo man with squamous cell lung cancer, COPD, chronically on 5 L O2, HTN, HLD who presented with left-sided weakness. Neurology was consulted, his left-sided weakness had quickly resolved.  Aspirin was started for stroke prevention.  Pulmonology and ID were consulted for progressive shortness of breath. He was treated with empiric antibiotic meropenem and doxycycline with minimal improvement. He did test positive for human metapneumovirus. ID recommended no further antibiotics. He was started on high-dose prednisone and pulmonology recommended slow taper of prednisone for COPD exacerbation secondary to metapneumovirus infection and complicated by poor lung reserve from previous radiation and his current lung cancer.  He still has minimal improvement and remains on high flow nasal cannula.  Pulmonology was reconsulted and recommended adding azithromycin 500 mg MWF indefinitely and adding Pulmicort 0.5 mg twice daily neb in addition to Trelegy, DuoNebs and extending steroid taper if needed.  Pulmonology also suggests repeating TTE with bubble to assess for shunt, otherwise he needs to continue I-S and mobilization.  Patient is to be on PJP prophylaxis while he is on steroids.  He will need to follow-up with outpatient pulmonology and pulmonary rehab.  Patient's oncologist Dr. Melara is no longer with [facility] and his care will be taken over by Dr. Orona.  I discussed with Dr. Orona and she says if patient can discharge in time to have his Keytruda on 5/15/2025, he can have his treatment otherwise if it is unsafe to discharge at that time, they will delay Keytruda.    Therefore, he is discharged in good and stable condition to a long-term acute care hospital.    The patient  met 2-midnight criteria for an inpatient stay at the time of discharge.      FOLLOW UP ITEMS POST DISCHARGE  Consider TTE to assess for shunting  Consider extending prednisone taper for acute COPD exacerbation as he is weaning from high flow oxygen  Follow-up with [facility] Dr. 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 *      FOLLOW UP  Future Appointments   Date Time Provider Department Center   5/8/2025  2:50 PM Jacquelyn Lee M.D. [facility] None   5/16/2025  1:00 PM JESIKA [facility] None   5/16/2025  1:30 PM [facility]     No follow-up provider specified.    MEDICATIONS ON DISCHARGE     Medication List        START taking these medications        Instructions   aspirin 81 MG EC tablet   Take 1 Tablet by mouth every day.  Dose: 81 mg     azithromycin 500 MG tablet  Start taking on: May 2, 2025  Commonly known as: Zithromax   Take 500mg orally once every Monday, Wednesday, Friday     budesonide 0.5 MG/2ML Susp  Commonly known as: Pulmicort   Take 2 mL by nebulization 2 times a day.  Dose: 0.5 mg     heparin 5000 UNIT/ML Soln   Inject 1 mL under the skin every 8 hours.  Dose: 5,000 Units     HYDROcodone homatropine 5-1.5 mg/5 mL Soln   Take 5 mL by mouth at bedtime for 3 days.  Dose: 5 mL     loperamide 2 MG Caps  Commonly known as: Imodium    Take 1 Capsule by mouth 4 times a day as needed for Diarrhea.  Dose: 2 mg     montelukast 10 MG Tabs  Commonly known as: Singulair   Take 1 Tablet by mouth every evening.  Dose: 10 mg     oxyCODONE immediate-release 5 MG Tabs  Commonly known as: Roxicodone   Take 1 Tablet by mouth every 6 hours as needed for Severe Pain for up to 3 days.  Dose: 5 mg            CHANGE how you take these medications        Instructions   Misc. Devices Misc  What changed: additional instructions   Portable oxygen concentrator (POC), at 2L/min via NC.     predniSONE 10 MG Tabs  Start taking on: May 1, 2025  What changed: See the new instructions.  Commonly known as: Dominga   Doctor's comments:    Take 5 Tablets by mouth every day for 1 day, THEN 4 Tablets every day for 7 days, THEN 3 Tablets every day for 7 days, THEN 2 Tablets every day for 7 days, THEN 1 Tablet every day for 7 days.     sulfamethoxazole-trimethoprim 800-160 MG tablet  What changed:   when to take this  additional instructions  Commonly known as: Bactrim DS   Take 1 Tablet by mouth 3 times a week.  Dose: 1 Tablet            CONTINUE taking these medications        Instructions   acetaminophen 325 MG Tabs  Commonly known as: Tylenol   Take 500 mg by mouth every 6 hours as needed for Mild Pain.  Dose: 500 mg     albuterol 108 (90 Base) MCG/ACT Aers inhalation aerosol   Inhale 2 Puffs every four hours as needed for Shortness of Breath.  Dose: 2 Puff     amLODIPine 5 MG Tabs  Commonly known as: Norvasc   Take 1 Tablet by mouth every day.  Dose: 5 mg     artificial tears 1.4 % Soln   Administer 1 Drop into both eyes as needed (dry eyes).  Dose: 1 Drop     calcium polycarbophil 625 MG Tabs  Commonly known as: Fibercon   Take 625 mg by mouth every day.  Dose: 625 mg     docosahexanoic acid 1000 MG Caps  Commonly known as: Omega 3 Fa   Take 1,000 mg by mouth every day.  Dose: 1,000 mg     ipratropium-albuterol 0.5-2.5 (3) MG/3ML nebulizer solution  Commonly known as:  Duoneb   Inhale 3 mL by nebulization every four hours as needed for Shortness of Breath.  Dose: 3 mL     levothyroxine 100 MCG Tabs  Commonly known as: Synthroid   Take 1 Tablet by mouth every morning on an empty stomach.  Dose: 100 mcg     lisinopril 20 MG Tabs  Commonly known as: Prinivil   Take 1 Tablet by mouth 2 times a day.  Dose: 20 mg     Melatonin 10 MG Tabs   Take 30 mg by mouth every evening.  Dose: 30 mg     MUCINEX ALLERGY PO   Take 1 Tablet by mouth every day.  Dose: 1 Tablet     MULTIVITAMIN ADULT PO   Take 1 Tablet by mouth every day.  Dose: 1 Tablet     * naphazoline-pheniramine 0.027-0.315 % Soln  Commonly known as: Opcon-A   Administer 1 Drop into both eyes 3 times a day as needed (allergies).  Dose: 1 Drop     * Allergy Eye 0.025-0.3 % ophthalmic solution  Generic drug: naphazoline-pheniramine   Administer 1 Drop into both eyes 4 times a day.  Dose: 1 Drop     omeprazole 20 MG delayed-release capsule  Commonly known as: PriLOSEC   Take 1 Capsule by mouth every day.  Dose: 20 mg     rosuvastatin 10 MG Tabs  Commonly known as: Crestor   Doctor's comments: Please remind pt to get labs done  Take 1 Tablet by mouth every evening.  Dose: 10 mg     traZODone 50 MG Tabs  Commonly known as: Desyrel   Take 1 tablet by mouth every evening.  Dose: 50 mg     Trelegy Ellipta 200-62.5-25 MCG/ACT inhaler  Generic drug: fluticasone-umeclidinium-vilanterol   Doctor's comments: Please profile  Inhale 1 Puff every day.  Dose: 1 Puff           * This list has 2 medication(s) that are the same as other medications prescribed for you. Read the directions carefully, and ask your doctor or other care provider to review them with you.                STOP taking these medications      Home Care Oxygen              Allergies  Allergies   Allergen Reactions    Seasonal Runny Nose and Itching     Seasonal allergies       DIET  Orders Placed This Encounter   Procedures    Diet Order Diet: Regular     Standing Status:    Standing     Number of Occurrences:   1     Diet::   Regular [1]       ACTIVITY  As tolerated.    LINES, DRAINS, AND WOUNDS  This is an automated list. Peripheral IVs will be removed prior to discharge.  Peripheral IV 04/19/25 20 G Posterior;Right Forearm (Active)   Site Assessment Clean;Dry;Intact 05/01/25 0935   Dressing Type Transparent 05/01/25 0935   Line Status Saline locked;Scrubbed the hub prior to access;Flushed 05/01/25 0935   Dressing Status Clean;Dry;Intact 05/01/25 0935   Dressing Intervention N/A 05/01/25 0935   Dressing Change Due 04/26/25 04/26/25 2100   Date Primary Tubing Changed 04/29/25 04/29/25 2100   Date Secondary Tubing Changed 04/20/25 04/19/25 2100   Infiltration Grading ([facility], [facility]) 0 05/01/25 0935   Phlebitis Scale ([facility] Only) 0 05/01/25 0935       Wound 04/18/25 Other (comment) Flank Upper Left (Active)   Wound Image   04/19/25 1200   Site Assessment Clean;Dry 05/01/25 0935   Periwound Assessment Clean;Dry;Pink 05/01/25 0935   Margins Attached edges 05/01/25 0935   Drainage Amount None 05/01/25 0935   Drainage Description Serosanguineous 05/01/25 0935   Treatments Site care 05/01/25 0935   Wound Cleansing Not Applicable 05/01/25 0935   Periwound Protectant Mepitel 05/01/25 0935   Dressing Status Clean;Dry;Intact 05/01/25 0935   Dressing Changed Observed 05/01/25 0935   Dressing Cleansing/Solutions Not Applicable 04/26/25 2048   Dressing Options Mepitel One 04/26/25 2048   Dressing Change/Treatment Frequency Every 48 hrs, and As Needed 04/27/25 0802   NEXT Dressing Change/Treatment Date 04/27/25 04/26/25 2048   NEXT Weekly Photo (Inpatient Only) 04/26/25 04/26/25 2048   Wound Team Following Weekly 04/26/25 2048   Non-staged Wound Description Partial thickness 04/19/25 1200   Wound Length (cm) 1 cm 04/19/25 1200   Wound Width (cm) 1.2 cm 04/19/25 1200   Wound Depth (cm) 0 cm 04/19/25 1200   Wound Surface Area (cm^2) 1.2 cm^2 04/19/25 1200   Wound Volume (cm^3) 0 cm^3 04/19/25 1200    Wound Bed Granulation (%) 95 % 04/19/25 1200   Wound Bed Eschar (%) 5 % 04/19/25 1200   Wound Odor None 04/19/25 1200   WOUND NURSE ONLY - Time Spent with Patient (mins) 45 04/19/25 1200       Peripheral IV 04/19/25 20 G Posterior;Right Forearm (Active)   Site Assessment Clean;Dry;Intact 05/01/25 0935   Dressing Type Transparent 05/01/25 0935   Line Status Saline locked;Scrubbed the hub prior to access;Flushed 05/01/25 0935   Dressing Status Clean;Dry;Intact 05/01/25 0935   Dressing Intervention N/A 05/01/25 0935   Dressing Change Due 04/26/25 04/26/25 2100   Date Primary Tubing Changed 04/29/25 04/29/25 2100   Date Secondary Tubing Changed 04/20/25 04/19/25 2100   Infiltration Grading ([facility], [facility]) 0 05/01/25 0935   Phlebitis Scale ([facility] Only) 0 05/01/25 0935               MENTAL STATUS ON TRANSFER      AOx4       CONSULTATIONS  Oncology, infectious disease    PROCEDURES  DX-CHEST-LIMITED (1 VIEW)   Final Result      Worsening bilateral pulmonary infiltrates.      DX-CHEST-PORTABLE (1 VIEW)   Final Result         1.  Pulmonary edema and/or infiltrates, greatest in the left upper lobe, slightly decreased since prior study.      EC-ECHOCARDIOGRAM COMPLETE W/O CONT   Final Result      CT-CTA CHEST PULMONARY ARTERY W/ RECONS   Final Result      1. No acute pulmonary embolus.   2. Stable cavitary mass in the left upper lobe.   3. Extensive edematous changes in the lungs with peripheral honeycombing.   4. Aortic and coronary arterial sclerosis.   5. Simple appearing right renal cortical cyst, requiring no further follow-up.   6. Stable bilateral adrenal masses.   7. Acute or subacute left third through fifth rib fractures.            DX-CHEST-PORTABLE (1 VIEW)   Final Result      1.  LEFT upper lung consolidation, likely pneumonia.  Underlying mass is not excluded.   2.  Probable pulmonary fibrosis.   3.  Limited by positioning.      EC-ECHOCARDIOGRAM LTD W/ CONT    (Results Pending)         LABORATORY  Lab Results    Component Value Date    SODIUM 136 05/01/2025    POTASSIUM 4.8 05/01/2025    CHLORIDE 101 05/01/2025    CO2 26 05/01/2025    GLUCOSE 93 05/01/2025    BUN 24 (H) 05/01/2025    CREATININE 0.98 05/01/2025        Lab Results   Component Value Date    WBC 7.9 05/01/2025    HEMOGLOBIN 11.7 (L) 05/01/2025    HEMATOCRIT 37.5 (L) 05/01/2025    PLATELETCT 325 05/01/2025        Total time of the discharge process exceeds 35 minutes.

## 2025-05-01 NOTE — CARE PLAN
The patient is Stable - Low risk of patient condition declining or worsening    Shift Goals  Clinical Goals: Mopnitor O2, wean HFNC to 30%, monitor lung sounds, comfort, rest, mobility  Patient Goals: mobility, comfort  Family Goals: n/a    Progress made toward(s) clinical / shift goals:       Problem: Knowledge Deficit - Standard  Goal: Patient and family/care givers will demonstrate understanding of plan of care, disease process/condition, diagnostic tests and medications  Description: Target End Date:  1-3 days or as soon as patient condition allowsDocument in Patient Education1.  Patient and family/caregiver oriented to unit, equipment, visitation policy and means for communicating concern2.  Complete/review Learning Assessment3.  Assess knowledge level of disease process/condition, treatment plan, diagnostic tests and medications4.  Explain disease process/condition, treatment plan, diagnostic tests and medications  Outcome: Progressing     Problem: Knowledge Deficit - COPD  Goal: Patient/significant other demonstrates understanding of disease process, utilization of the Action Plan, medications and discharge instruction  Description: Target End Date:  1-3 days or as soon as patient condition allowsDocument in Patient Education1.  Discuss the importance of medical follow-up care, periodic chest x-rays, sputum cultures2.  Review worsening signs and symptoms of COPD flare and exacerbation and its management3.  Discuss respiratory medications, side effects, adverse reactions4.  Demonstrate technique for using a metered-dose inhaler (MDI) and utilization of a spacer5.  Review the COPD Action Plan6.  Instruct and reinforce the rationale for breathing exercises, coughing effectively, and general conditioning exercises7.  Stress importance of oral care and dental hygiene8.  Discuss the importance of avoiding people with active respiratory infections and need for routine influenza and pneumococcal vaccinations9.   Discuss factors that may trigger condition and encourage patient/significant other to explore ways to control these factors in and around the home and work zngorgc60. Review the harmful effects of smoking and advise cessation of rudpdkj79. Provide information about activity limitations and alternating activities with rest periods to prevent gtbkgsb11. Instruct asthmatic patient of use of peak flow meter, as iiqfrjqqlyb35. Review oxygen requirements and dosage, safe use of oxygen, and refer to the supplier as vrzjorwck27. Educate patient/family/caregiver on the use of Nasal Intermittent Positive Pressure Ventilation (NIPPV) and possible adverse reactions  Outcome: Progressing     Problem: Risk for Infection - COPD  Goal: Patient will remain free from signs and symptoms of infection  Description: Target End Date:  Prior to discharge or change in level of care1.  Infection prevention measures2.  Instruct patient regarding signs and symptoms of infection3.  Review the importance of breathing exercises, effective cough, frequent position changes, and adequate fluid intake4.  Recommend rinsing mouth with water and spitting, not swallowing, or use of a spacer on the mouthpiece of inhaled corticosteroids  Outcome: Progressing     Problem: Nutrition - Advanced  Goal: Patient will display progressive weight gain toward goal have adequate food and fluid intake  Description: Target End Date:  Prior to discharge or change in level of care1.  Daily weights2.  Monitor dietary intake3.  Promote systemic fluid hydration within cardiac tolerance4.  Albumin and PreAlbumin per provider order5.  Enteral and parenteral nutrition per provider order6.  Calorie count7.  Provide oral care8.  Collaborate with Clinical Dietician9.  Consider Speech Therapy consult for swallowing [id number]. Administer supplemental oxygen during meals as indicated  Outcome: Progressing     Problem: Ineffective Airway Clearance  Goal: Patient will maintain  patent airway with clear/clearing breath sounds  Description: Target End Date:  Prior to discharge or change in level of care1.   Position head midline with flexion appropriate for age and/or condition2.   Assist patient to assume a position of comfort3.   Assess and monitor breath sounds4.   Encourage abdominal or pursed-lip breathing exercises5.   Assist with measures to improve effectiveness of cough effort6.   Demonstrate effective coughing and deep-breathing techniques7.   Assist patient to turn every 2 hours8.   Encourage patient to ambulate as tolerated9.   Keep environmental pollution to a tktdqdj04. Airway suctioning as xqzabc22. Collaborate with RT to administer medications/treatments per order12. Promote systemic fluid hydration within cardiac uhxeylebd64. Provide warm or tepid liquids  Outcome: Progressing     Problem: Impaired Gas Exchange  Goal: Patient will demonstrate improved ventilation and adequate oxygenation and participate in treatment regimen within the level of ability/situation.  Description: Target End Date:  Prior to discharge or change in level of care1.   Assess/monitor rate/rhythm/depth of effort of respirations2.   Assess oxygenation as ordered3.   Administer/titrate oxygen as ordered4.   Position patient for maximum ventilatory efficiency5.   Turn, cough, and deep breathe6.   Vital signs, pulse oximetry7.   Assess color and body temperature8.   Assess and monitor breath sounds9.   Encourage deep-slow or pursed-lip breathing ekovgsest57. Monitor changes in the level of consciousness and mental nbzyjd45. Encourage expectoration of sputum; airway fidplcsixw16. Elevate the head of the bed and position patient for maximum ventilatory pejshbkfeo71. Provide a calm, quiet otajvxjhbup36. Limit patient's activity during the acute phase and have patient resume activity gradually and increase as individually khxmvuiyh12. Evaluate sleep patterns and limit stimulants such as ewrdkrzl92. Collaborate  with RT to administer medications/treatments as ordered  Outcome: Progressing     Problem: Risk for Aspiration  Goal: Patient's risk for aspiration will be absent or decrease  Description: Target End Date:  Prior to discharge or change in level of care1.   Complete dysphagia screening on admission2.   NPO until dysphagia screening complete or medically cleared3.   Collaborate with Speech Therapy, Clinical Dietitian and interdisciplinary team4.   Implement aspiration precautions5.   Assist patient up to chair for meals6.   Elevate head of bed 90 degrees if patient is unable to get out of bed7.   Encourage small bites8.   Ensure foods/liquids are of appropriate consistency9.   Assess for any signs/symptoms of bqrkwhrndg33. Assess breath sounds and vital signs after oral intake  Outcome: Progressing     Problem: Fluid Volume  Goal: Fluid volume balance will be maintained  Description: Target End Date:  Prior to discharge or change in level of careDocument on I/O flowsheet1.  Monitor intake and output as ordered2.  Promote oral intake as appropriate3.  Report inadequate intake or output to physician4.  Administer IV therapy as ordered5.  Weights per provider order6.  Assess for signs and symptoms of bleeding7.  Monitor for signs of fluid overload (respiratory changes, edema, weight gain, increased abdominal girth)8.  Monitor of signs for inadequate fluid volume (poor skin turgor, dry mucous membranes)9.  Instruct patient on adherence to fluid restrictions  Outcome: Progressing     Problem: Urinary - Renal Perfusion  Goal: Ability to achieve and maintain adequate renal perfusion and functioning will improve  Description: Target End Date:  Prior to discharge or change in level of careDocument on I/O and Assessment flowsheet1.  Urine output will remain greater than 0.5ml/Kg/HR2.  Monitor amount and/or characteristics of urine per order/policy. Specific gravity per order/policy3.  Assess signs and symptoms of renal  dysfunction  Outcome: Progressing     Problem: Respiratory  Goal: Patient will achieve/maintain optimum respiratory ventilation and gas exchange  Description: Target End Date:  Prior to discharge or change in level of careDocument on Assessment flowsheet1.  Assess and monitor rate, rhythm, depth and effort of respiration2.  Breath sounds assessed qshift and/or as needed3.  Assess O2 saturation, administer/titrate oxygen as ordered4.  Position patient for maximum ventilatory efficiency5.  Turn, cough, and deep breath with splinting to improve effectiveness6.  Collaborate with RT to administer medication/treatments per order7.  Encourage use of incentive spirometer and encourage patient to cough after use and utilize splinting techniques if applicable8.  Airway suctioning9.  Monitor sputum production for changes in color, consistency and brlqscdjr76. Perform frequent oral hdqkpqg62. Alternate physical activity with rest periods  Target End Date:  Prior to discharge or change in level of careDocument on Assessment flowsheet1.  Assess and monitor rate, rhythm, depth and effort of respiration2.  Breath sounds assessed qshift and/or as needed3.  Assess O2 saturation, administer/titrate oxygen as ordered4.  Position patient for maximum ventilatory efficiency5.  Turn, cough, and deep breath with splinting to improve effectiveness6.  Collaborate with RT to administer medication/treatments per order7.  Encourage use of incentive spirometer and encourage patient to cough after use and utilize splinting techniques if applicable8.  Airway suctioning9.  Monitor sputum production for changes in color, consistency and tvyqgvfvz97. Perform frequent oral jrubblt68. Alternate physical activity with rest periods  Target End Date:  Prior to discharge or change in level of careDocument on Assessment flowsheet1.  Assess and monitor rate, rhythm, depth and effort of respiration2.  Breath sounds assessed qshift and/or as needed3.  Assess O2  saturation, administer/titrate oxygen as ordered4.  Position patient for maximum ventilatory efficiency5.  Turn, cough, and deep breath with splinting to improve effectiveness6.  Collaborate with RT to administer medication/treatments per order7.  Encourage use of incentive spirometer and encourage patient to cough after use and utilize splinting techniques if applicable8.  Airway suctioning9.  Monitor sputum production for changes in color, consistency and zuajvxolo47. Perform frequent oral mrksuii63. Alternate physical activity with rest periods  Outcome: Progressing     Problem: Physical Regulation  Goal: Diagnostic test results will improve  Description: Target End Date:  Prior to discharge or change in level of care1.  Monitor lactic acid levels2.  Monitor ABG's3.  Monitor diagnostic test results  Outcome: Progressing  Goal: Signs and symptoms of infection will decrease  Description: Target End Date:  Prior to discharge or change in level of care1.  Remove potential routes of infection, such as central lines and urinary catheter2.  Follow facility protocol for changing IV tubing and sites3.  Collaborate with Infectious Disease4.  Antibiotic therapy per provider order5.  Note drug effects and monitor for antibiotic toxicity  Outcome: Progressing     Problem: Bowel Elimination  Goal: Establish and maintain regular bowel function  Description: Target End Date:  Prior to discharge or change in level of care1.   Note date of last BM2.   Educate about diet, fluid intake, medication and activity to promote bowel function3.   Educate signs and symptoms of constipation and interventions to implement4.   Pharmacologic bowel management per provider order5.   Regular toileting schedule6.   Upright position for toileting7.   High fiber diet8.   Encourage hydration9.   Collaborate with Clinical Jihwmtjfh47. Care and maintenance of ostomy if applicable  Outcome: Progressing     Patient is not progressing towards the following  goals:

## 2025-05-01 NOTE — PROGRESS NOTES
[facility] Pick-up via [facility] scheduled for 1730. PIV to remain per RN's request.

## 2025-05-01 NOTE — DISCHARGE PLANNING
"Medically cleared for LTACH.   Choice obtained, accepted and authorized for [facility].   LPM increased the 35 and FiO2 to 50%. [facility] notified and stated \"that is what we specialize in\". Attending physician notified of [facility] agreement to receive patient today.   Discharge order and summary placed.     -COBRA verbally signed by physician and patient (Contact/Droplet Precautions). Discharge packet complete with face sheet x2, discharge summary, 72 hours of chart notes.     -Completed DC packet given to bedside RN along with number for RN report to [facility]: [phone].     -Patient on will-call with [facility]. [facility] requested patient at 1730. Ride Line notified and confirmed [facility] pickup today, 5/1/2025, at 1730. 2nd IMM not indicated due to transfer from Acute Inpatient to LTACH.     -Anticipating no further needs from case management prior to discharge.     Case Management Discharge Planning    Admission Date: 4/15/2025  GMLOS: 4  ALOS: 16    6-Clicks ADL Score: 21  6-Clicks Mobility Score: 18    Anticipated Discharge Dispo: Discharge Disposition: Disch to a long term care facility (63)  Discharge Address: [address] [place] [id number]  Discharge Contact Phone Number: [phone]    DME Needed: No    Action(s) Taken: Updated Provider/Nurse on Discharge Plan and Transport Arranged     Escalations Completed: Provider    Medically Clear: Yes    Next Steps: Anticipating no further needs from case management prior to discharge.     Barriers to Discharge: None    Is the patient up for discharge tomorrow: Today, 5/1/2025, via [facility].         "

## 2025-05-01 NOTE — DISCHARGE PLANNING
DC Transport Scheduled    Transport Company Scheduled:  [facility]  Spoke with Barry at [facility] to schedule transport.    Scheduled Date: 5/1/2025  Scheduled Time: 1730    Transport Type: [facility]  Destination: [facility]   Destination address: [address] [place] [address], [address], [facility] [address]      Notified care team of scheduled transport via [employer].     If there are any changes needed to the DC transportation scheduled, please contact [facility] at ext. [phone] between the hours of [phone]. If outside those hours, contact the ED Case Manager at ext. [phone].

## 2025-05-02 NOTE — PROGRESS NOTES
Pt picked up by [facility] via [facility]. PIV to remain per [facility] RN. Pt verbalizes understanding of d/c teaching and signed consent. Pt has all belongings with him.

## 2025-05-02 NOTE — SENIOR ADMIT NOTE
Went over discharge instructions with patient virtually with his AVS copy in his possession. Patient understood and had no questions at this time. Bedside RN notified that this was completed.

## 2025-05-05 ENCOUNTER — HOME HEALTH ADMISSION (OUTPATIENT)
Dept: HOME HEALTH SERVICES | Facility: HOME HEALTHCARE | Age: 75
End: 2025-05-05
Payer: MEDICARE

## 2025-05-12 RX ORDER — PROCHLORPERAZINE MALEATE 10 MG
10 TABLET ORAL EVERY 6 HOURS PRN
OUTPATIENT
Start: 2025-05-16

## 2025-05-12 RX ORDER — 0.9 % SODIUM CHLORIDE 0.9 %
10 VIAL (ML) INJECTION PRN
OUTPATIENT
Start: 2025-05-16

## 2025-05-12 RX ORDER — SODIUM CHLORIDE 9 MG/ML
INJECTION, SOLUTION INTRAVENOUS CONTINUOUS
OUTPATIENT
Start: 2025-05-16

## 2025-05-12 RX ORDER — 0.9 % SODIUM CHLORIDE 0.9 %
3 VIAL (ML) INJECTION PRN
OUTPATIENT
Start: 2025-05-15

## 2025-05-12 RX ORDER — 0.9 % SODIUM CHLORIDE 0.9 %
3 VIAL (ML) INJECTION PRN
OUTPATIENT
Start: 2025-05-16

## 2025-05-12 RX ORDER — 0.9 % SODIUM CHLORIDE 0.9 %
VIAL (ML) INJECTION PRN
OUTPATIENT
Start: 2025-05-15

## 2025-05-12 RX ORDER — DIPHENHYDRAMINE HYDROCHLORIDE 50 MG/ML
50 INJECTION, SOLUTION INTRAMUSCULAR; INTRAVENOUS PRN
OUTPATIENT
Start: 2025-05-16

## 2025-05-12 RX ORDER — EPINEPHRINE 1 MG/ML(1)
0.5 AMPUL (ML) INJECTION PRN
OUTPATIENT
Start: 2025-05-16

## 2025-05-12 RX ORDER — ONDANSETRON 2 MG/ML
4 INJECTION INTRAMUSCULAR; INTRAVENOUS PRN
OUTPATIENT
Start: 2025-05-16

## 2025-05-12 RX ORDER — 0.9 % SODIUM CHLORIDE 0.9 %
10 VIAL (ML) INJECTION PRN
OUTPATIENT
Start: 2025-05-15

## 2025-05-12 RX ORDER — ONDANSETRON 8 MG/1
8 TABLET, ORALLY DISINTEGRATING ORAL PRN
OUTPATIENT
Start: 2025-05-16

## 2025-05-12 RX ORDER — METHYLPREDNISOLONE SODIUM SUCCINATE 125 MG/2ML
125 INJECTION, POWDER, LYOPHILIZED, FOR SOLUTION INTRAMUSCULAR; INTRAVENOUS PRN
OUTPATIENT
Start: 2025-05-16

## 2025-05-12 RX ORDER — 0.9 % SODIUM CHLORIDE 0.9 %
VIAL (ML) INJECTION PRN
OUTPATIENT
Start: 2025-05-16

## 2025-05-14 LAB
FUNGUS SPEC CULT: NORMAL
FUNGUS SPEC FUNGUS STN: NORMAL
SIGNIFICANT IND 70042: NORMAL
SITE SITE: NORMAL
SOURCE SOURCE: NORMAL

## 2025-05-16 ENCOUNTER — APPOINTMENT (OUTPATIENT)
Dept: HEMATOLOGY ONCOLOGY | Facility: MEDICAL CENTER | Age: 75
End: 2025-05-16
Attending: NURSE PRACTITIONER
Payer: MEDICARE

## (undated) DEVICE — SUTURE 2-0 VICRYL PLUS CT-1 - 8 X 18 INCH(12/BX)

## (undated) DEVICE — DRAPE SURGICAL U 77X120 - (10/CA)

## (undated) DEVICE — DRAPE 36X28IN RAD CARM BND BG - (25/CA) O

## (undated) DEVICE — SLEEVE, VASO, THIGH, MED

## (undated) DEVICE — SUCTION INSTRUMENT YANKAUER OPEN TIP W/O VENT (50EA/CA)

## (undated) DEVICE — GLOVE BIOGEL SZ 7.5 SURGICAL PF LTX - (50PR/BX 4BX/CA)

## (undated) DEVICE — KIT ANESTHESIA W/CIRCUIT & 3/LT BAG W/FILTER (20EA/CA)

## (undated) DEVICE — TUBE CONNECT SUCTION CLEAR 120 X 1/4" (50EA/CA)"

## (undated) DEVICE — SUCTION INSTRUMENT YANKAUER BULBOUS TIP W/O VENT (50EA/CA)

## (undated) DEVICE — PENCIL ELECTSURG 10FT BTN SWH - (50/CA)

## (undated) DEVICE — DRAPE LARGE 3 QUARTER - (20/CA)

## (undated) DEVICE — CANISTER SUCTION 3000ML MECHANICAL FILTER AUTO SHUTOFF MEDI-VAC NONSTERILE LF DISP  (40EA/CA)

## (undated) DEVICE — LACTATED RINGERS INJ 1000 ML - (14EA/CA 60CA/PF)

## (undated) DEVICE — SPONGE GAUZESTER 4 X 4 4PLY - (128PK/CA)

## (undated) DEVICE — ELECTRODE DUAL RETURN W/ CORD - (50/PK)

## (undated) DEVICE — GLOVE SZ 8 BIOGEL PI MICRO - PF LF (50PR/BX)

## (undated) DEVICE — TOWEL STOP TIMEOUT SAFETY FLAG (40EA/CA)

## (undated) DEVICE — BLADE SURGICAL CLIPPER - (50EA/CA)

## (undated) DEVICE — STAPLER SKIN DISP - (6/BX 10BX/CA) VISISTAT

## (undated) DEVICE — SENSOR SPO2 NEO LNCS ADHESIVE (20/BX) SEE USER NOTES

## (undated) DEVICE — GLOVE BIOGEL INDICATOR SZ 7.5 SURGICAL PF LTX - (50PR/BX 4BX/CA)

## (undated) DEVICE — DRAPE C ARMOR (12EA/CA)

## (undated) DEVICE — TOWELS CLOTH SURGICAL - (4/PK 20PK/CA)

## (undated) DEVICE — GOWN SURGEONS X-LARGE - DISP. (30/CA)

## (undated) DEVICE — DRESSING PETROLEUM GAUZE 5 X 9" (50EA/BX 4BX/CA)"

## (undated) DEVICE — SODIUM CHL IRRIGATION 0.9% 1000ML (12EA/CA)

## (undated) DEVICE — SUTURE 0 VICRYL PLUS CT-1 - 8 X 18 INCH (12/BX)

## (undated) DEVICE — SET EXTENSION WITH 2 PORTS (48EA/CA) ***PART #2C8610 IS A SUBSTITUTE*****

## (undated) DEVICE — DRAPE U ORTHOPEDIC - (10/BX)

## (undated) DEVICE — TUBING CLEARLINK DUO-VENT - C-FLO (48EA/CA)

## (undated) DEVICE — SUTURE GENERAL

## (undated) DEVICE — MASK ANESTHESIA ADULT  - (100/CA)

## (undated) DEVICE — ELECTRODE 850 FOAM ADHESIVE - HYDROGEL RADIOTRNSPRNT (50/PK)

## (undated) DEVICE — TRAY SPINAL ANESTHESIA NON-SAFETY (10/CA)

## (undated) DEVICE — PROTECTOR ULNA NERVE - (36PR/CA)

## (undated) DEVICE — SUTURE 1 VICRYL PLUS CT-1 - 18 INCH (12/BX)

## (undated) DEVICE — GOWN WARMING STANDARD FLEX - (30/CA)

## (undated) DEVICE — SET LEADWIRE 5 LEAD BEDSIDE DISPOSABLE ECG (1SET OF 5/EA)

## (undated) DEVICE — DRESSING TRANSPARENT FILM TEGADERM 4 X 4.75" (50EA/BX)"

## (undated) DEVICE — HEAD HOLDER JUNIOR/ADULT

## (undated) DEVICE — NEPTUNE 4 PORT MANIFOLD - (20/PK)

## (undated) DEVICE — PACK MAJOR ORTHO - (2EA/CA)